# Patient Record
Sex: MALE | Race: OTHER | HISPANIC OR LATINO | Employment: OTHER | URBAN - METROPOLITAN AREA
[De-identification: names, ages, dates, MRNs, and addresses within clinical notes are randomized per-mention and may not be internally consistent; named-entity substitution may affect disease eponyms.]

---

## 2017-01-06 ENCOUNTER — ALLSCRIPTS OFFICE VISIT (OUTPATIENT)
Dept: OTHER | Facility: OTHER | Age: 52
End: 2017-01-06

## 2017-01-13 ENCOUNTER — ALLSCRIPTS OFFICE VISIT (OUTPATIENT)
Dept: OTHER | Facility: OTHER | Age: 52
End: 2017-01-13

## 2017-01-17 DIAGNOSIS — Z79.01 LONG TERM CURRENT USE OF ANTICOAGULANT: ICD-10-CM

## 2017-01-17 DIAGNOSIS — I25.10 ATHEROSCLEROTIC HEART DISEASE OF NATIVE CORONARY ARTERY WITHOUT ANGINA PECTORIS: ICD-10-CM

## 2017-01-17 DIAGNOSIS — I49.01 VENTRICULAR FIBRILLATION (HCC): ICD-10-CM

## 2017-01-17 DIAGNOSIS — I25.2 OLD MYOCARDIAL INFARCTION: ICD-10-CM

## 2017-01-17 DIAGNOSIS — Z95.2 PRESENCE OF PROSTHETIC HEART VALVE: ICD-10-CM

## 2017-01-19 ENCOUNTER — APPOINTMENT (OUTPATIENT)
Dept: LAB | Facility: CLINIC | Age: 52
End: 2017-01-19
Payer: COMMERCIAL

## 2017-01-19 ENCOUNTER — ALLSCRIPTS OFFICE VISIT (OUTPATIENT)
Dept: OTHER | Facility: OTHER | Age: 52
End: 2017-01-19

## 2017-01-19 DIAGNOSIS — I49.01 VENTRICULAR FIBRILLATION (HCC): ICD-10-CM

## 2017-01-19 DIAGNOSIS — Z79.01 LONG TERM CURRENT USE OF ANTICOAGULANT: ICD-10-CM

## 2017-01-19 LAB
ALBUMIN SERPL BCP-MCNC: 3.4 G/DL (ref 3.5–5)
ALP SERPL-CCNC: 102 U/L (ref 46–116)
ALT SERPL W P-5'-P-CCNC: 28 U/L (ref 12–78)
ANION GAP SERPL CALCULATED.3IONS-SCNC: 7 MMOL/L (ref 4–13)
AST SERPL W P-5'-P-CCNC: 22 U/L (ref 5–45)
BILIRUB SERPL-MCNC: 0.36 MG/DL (ref 0.2–1)
BUN SERPL-MCNC: 10 MG/DL (ref 5–25)
CALCIUM SERPL-MCNC: 8.7 MG/DL (ref 8.3–10.1)
CHLORIDE SERPL-SCNC: 104 MMOL/L (ref 100–108)
CO2 SERPL-SCNC: 29 MMOL/L (ref 21–32)
CREAT SERPL-MCNC: 0.93 MG/DL (ref 0.6–1.3)
GFR SERPL CREATININE-BSD FRML MDRD: >60 ML/MIN/1.73SQ M
GLUCOSE SERPL-MCNC: 87 MG/DL (ref 65–140)
INR PPP: 1.6 (ref 0.86–1.16)
MAGNESIUM SERPL-MCNC: 2 MG/DL (ref 1.6–2.6)
POTASSIUM SERPL-SCNC: 4 MMOL/L (ref 3.5–5.3)
PROT SERPL-MCNC: 7.3 G/DL (ref 6.4–8.2)
PROTHROMBIN TIME: 19 SECONDS (ref 12–14.3)
SODIUM SERPL-SCNC: 140 MMOL/L (ref 136–145)

## 2017-01-19 PROCEDURE — 85610 PROTHROMBIN TIME: CPT

## 2017-01-19 PROCEDURE — 36415 COLL VENOUS BLD VENIPUNCTURE: CPT

## 2017-01-19 PROCEDURE — 83735 ASSAY OF MAGNESIUM: CPT

## 2017-01-19 PROCEDURE — 80053 COMPREHEN METABOLIC PANEL: CPT

## 2017-01-20 ENCOUNTER — GENERIC CONVERSION - ENCOUNTER (OUTPATIENT)
Dept: OTHER | Facility: OTHER | Age: 52
End: 2017-01-20

## 2017-01-26 ENCOUNTER — APPOINTMENT (OUTPATIENT)
Dept: LAB | Facility: CLINIC | Age: 52
End: 2017-01-26
Payer: COMMERCIAL

## 2017-01-26 ENCOUNTER — TRANSCRIBE ORDERS (OUTPATIENT)
Dept: LAB | Facility: CLINIC | Age: 52
End: 2017-01-26

## 2017-01-26 ENCOUNTER — TRANSCRIBE ORDERS (OUTPATIENT)
Dept: ADMINISTRATIVE | Facility: HOSPITAL | Age: 52
End: 2017-01-26

## 2017-01-26 DIAGNOSIS — I49.01 VENTRICULAR FIBRILLATION (HCC): ICD-10-CM

## 2017-01-26 DIAGNOSIS — Z79.01 LONG TERM CURRENT USE OF ANTICOAGULANT: ICD-10-CM

## 2017-01-26 DIAGNOSIS — I25.2 OLD MYOCARDIAL INFARCTION: ICD-10-CM

## 2017-01-26 DIAGNOSIS — I25.10 ATHEROSCLEROSIS OF NATIVE CORONARY ARTERY OF NATIVE HEART, ANGINA PRESENCE UNSPECIFIED: ICD-10-CM

## 2017-01-26 DIAGNOSIS — I49.01 VENTRICULAR FIBRILLATION (HCC): Primary | ICD-10-CM

## 2017-01-26 LAB
INR PPP: 2.24 (ref 0.86–1.16)
PROTHROMBIN TIME: 24.5 SECONDS (ref 12–14.3)

## 2017-01-26 PROCEDURE — 36415 COLL VENOUS BLD VENIPUNCTURE: CPT

## 2017-01-26 PROCEDURE — 85610 PROTHROMBIN TIME: CPT

## 2017-01-30 ENCOUNTER — GENERIC CONVERSION - ENCOUNTER (OUTPATIENT)
Dept: OTHER | Facility: OTHER | Age: 52
End: 2017-01-30

## 2017-02-14 ENCOUNTER — ALLSCRIPTS OFFICE VISIT (OUTPATIENT)
Dept: OTHER | Facility: OTHER | Age: 52
End: 2017-02-14

## 2017-02-21 ENCOUNTER — APPOINTMENT (OUTPATIENT)
Dept: LAB | Facility: CLINIC | Age: 52
End: 2017-02-21
Payer: COMMERCIAL

## 2017-02-21 DIAGNOSIS — Z95.2 PRESENCE OF PROSTHETIC HEART VALVE: ICD-10-CM

## 2017-02-21 DIAGNOSIS — Z79.01 LONG TERM CURRENT USE OF ANTICOAGULANT: ICD-10-CM

## 2017-02-21 DIAGNOSIS — I49.01 VENTRICULAR FIBRILLATION (HCC): ICD-10-CM

## 2017-02-21 LAB
INR PPP: 3.32 (ref 0.86–1.16)
PROTHROMBIN TIME: 33 SECONDS (ref 12–14.3)

## 2017-02-21 PROCEDURE — 36415 COLL VENOUS BLD VENIPUNCTURE: CPT

## 2017-02-21 PROCEDURE — 85610 PROTHROMBIN TIME: CPT

## 2017-02-23 ENCOUNTER — ALLSCRIPTS OFFICE VISIT (OUTPATIENT)
Dept: OTHER | Facility: OTHER | Age: 52
End: 2017-02-23

## 2017-02-23 DIAGNOSIS — I49.01 VENTRICULAR FIBRILLATION (HCC): ICD-10-CM

## 2017-02-23 DIAGNOSIS — Z79.01 LONG TERM CURRENT USE OF ANTICOAGULANT: ICD-10-CM

## 2017-02-23 DIAGNOSIS — I42.9 CARDIOMYOPATHY (HCC): ICD-10-CM

## 2017-02-28 ENCOUNTER — APPOINTMENT (OUTPATIENT)
Dept: LAB | Facility: CLINIC | Age: 52
End: 2017-02-28
Payer: COMMERCIAL

## 2017-02-28 ENCOUNTER — TRANSCRIBE ORDERS (OUTPATIENT)
Dept: LAB | Facility: CLINIC | Age: 52
End: 2017-02-28

## 2017-02-28 DIAGNOSIS — I49.01 VENTRICULAR FIBRILLATION (HCC): ICD-10-CM

## 2017-02-28 DIAGNOSIS — Z79.01 LONG TERM CURRENT USE OF ANTICOAGULANT: ICD-10-CM

## 2017-02-28 DIAGNOSIS — I42.9 CARDIOMYOPATHY (HCC): ICD-10-CM

## 2017-02-28 LAB
ALBUMIN SERPL BCP-MCNC: 3.7 G/DL (ref 3.5–5)
ALP SERPL-CCNC: 88 U/L (ref 46–116)
ALT SERPL W P-5'-P-CCNC: 37 U/L (ref 12–78)
ANION GAP SERPL CALCULATED.3IONS-SCNC: 4 MMOL/L (ref 4–13)
AST SERPL W P-5'-P-CCNC: 33 U/L (ref 5–45)
BILIRUB SERPL-MCNC: 0.59 MG/DL (ref 0.2–1)
BUN SERPL-MCNC: 16 MG/DL (ref 5–25)
CALCIUM SERPL-MCNC: 9.1 MG/DL (ref 8.3–10.1)
CHLORIDE SERPL-SCNC: 102 MMOL/L (ref 100–108)
CO2 SERPL-SCNC: 32 MMOL/L (ref 21–32)
CREAT SERPL-MCNC: 1.3 MG/DL (ref 0.6–1.3)
GFR SERPL CREATININE-BSD FRML MDRD: 58 ML/MIN/1.73SQ M
GLUCOSE SERPL-MCNC: 101 MG/DL (ref 65–140)
INR PPP: 4.66 (ref 0.86–1.16)
POTASSIUM SERPL-SCNC: 4.2 MMOL/L (ref 3.5–5.3)
PROT SERPL-MCNC: 7.8 G/DL (ref 6.4–8.2)
PROTHROMBIN TIME: 42.7 SECONDS (ref 12–14.3)
SODIUM SERPL-SCNC: 138 MMOL/L (ref 136–145)
TSH SERPL DL<=0.05 MIU/L-ACNC: 1.22 UIU/ML (ref 0.36–3.74)

## 2017-02-28 PROCEDURE — 85610 PROTHROMBIN TIME: CPT

## 2017-02-28 PROCEDURE — 84443 ASSAY THYROID STIM HORMONE: CPT

## 2017-02-28 PROCEDURE — 36415 COLL VENOUS BLD VENIPUNCTURE: CPT

## 2017-02-28 PROCEDURE — 80053 COMPREHEN METABOLIC PANEL: CPT

## 2017-03-01 ENCOUNTER — TRANSCRIBE ORDERS (OUTPATIENT)
Dept: ADMINISTRATIVE | Facility: HOSPITAL | Age: 52
End: 2017-03-01

## 2017-03-01 DIAGNOSIS — I42.9 CARDIOMYOPATHY (HCC): Primary | ICD-10-CM

## 2017-03-01 DIAGNOSIS — I49.01 VENTRICULAR FIBRILLATION (HCC): ICD-10-CM

## 2017-03-02 ENCOUNTER — GENERIC CONVERSION - ENCOUNTER (OUTPATIENT)
Dept: OTHER | Facility: OTHER | Age: 52
End: 2017-03-02

## 2017-03-07 ENCOUNTER — HOSPITAL ENCOUNTER (OUTPATIENT)
Dept: PULMONOLOGY | Facility: HOSPITAL | Age: 52
Discharge: HOME | End: 2017-03-07
Payer: COMMERCIAL

## 2017-03-07 DIAGNOSIS — I49.01 VENTRICULAR FIBRILLATION (HCC): ICD-10-CM

## 2017-03-07 DIAGNOSIS — I42.9 CARDIOMYOPATHY (HCC): ICD-10-CM

## 2017-03-07 PROCEDURE — 94760 N-INVAS EAR/PLS OXIMETRY 1: CPT

## 2017-03-07 PROCEDURE — 94726 PLETHYSMOGRAPHY LUNG VOLUMES: CPT

## 2017-03-07 PROCEDURE — 94060 EVALUATION OF WHEEZING: CPT

## 2017-03-07 PROCEDURE — 94729 DIFFUSING CAPACITY: CPT

## 2017-03-07 RX ORDER — ALBUTEROL SULFATE 2.5 MG/3ML
2.5 SOLUTION RESPIRATORY (INHALATION) ONCE AS NEEDED
Status: DISCONTINUED | OUTPATIENT
Start: 2017-03-07 | End: 2017-03-11 | Stop reason: HOSPADM

## 2017-03-11 ENCOUNTER — GENERIC CONVERSION - ENCOUNTER (OUTPATIENT)
Dept: OTHER | Facility: OTHER | Age: 52
End: 2017-03-11

## 2017-03-13 ENCOUNTER — HOSPITAL ENCOUNTER (OUTPATIENT)
Dept: NUCLEAR MEDICINE | Facility: HOSPITAL | Age: 52
Discharge: HOME | End: 2017-03-13
Payer: COMMERCIAL

## 2017-03-13 ENCOUNTER — HOSPITAL ENCOUNTER (OUTPATIENT)
Dept: NON INVASIVE DIAGNOSTICS | Facility: HOSPITAL | Age: 52
Discharge: HOME | End: 2017-03-13
Payer: COMMERCIAL

## 2017-03-13 DIAGNOSIS — I25.2 OLD MYOCARDIAL INFARCTION: ICD-10-CM

## 2017-03-13 DIAGNOSIS — I25.10 ATHEROSCLEROSIS OF NATIVE CORONARY ARTERY OF NATIVE HEART, ANGINA PRESENCE UNSPECIFIED: ICD-10-CM

## 2017-03-13 DIAGNOSIS — I49.01 VENTRICULAR FIBRILLATION (HCC): ICD-10-CM

## 2017-03-13 PROCEDURE — A9502 TC99M TETROFOSMIN: HCPCS

## 2017-03-13 PROCEDURE — 93017 CV STRESS TEST TRACING ONLY: CPT

## 2017-03-13 PROCEDURE — 78452 HT MUSCLE IMAGE SPECT MULT: CPT

## 2017-03-13 RX ADMIN — REGADENOSON 0.4 MG: 0.08 INJECTION, SOLUTION INTRAVENOUS at 10:15

## 2017-03-16 ENCOUNTER — GENERIC CONVERSION - ENCOUNTER (OUTPATIENT)
Dept: OTHER | Facility: OTHER | Age: 52
End: 2017-03-16

## 2017-03-21 ENCOUNTER — ALLSCRIPTS OFFICE VISIT (OUTPATIENT)
Dept: OTHER | Facility: OTHER | Age: 52
End: 2017-03-21

## 2017-04-24 ENCOUNTER — ALLSCRIPTS OFFICE VISIT (OUTPATIENT)
Dept: OTHER | Facility: OTHER | Age: 52
End: 2017-04-24

## 2017-05-23 ENCOUNTER — ALLSCRIPTS OFFICE VISIT (OUTPATIENT)
Dept: OTHER | Facility: OTHER | Age: 52
End: 2017-05-23

## 2017-05-25 ENCOUNTER — ALLSCRIPTS OFFICE VISIT (OUTPATIENT)
Dept: OTHER | Facility: OTHER | Age: 52
End: 2017-05-25

## 2017-05-25 ENCOUNTER — APPOINTMENT (OUTPATIENT)
Dept: LAB | Facility: CLINIC | Age: 52
End: 2017-05-25
Payer: MEDICARE

## 2017-05-25 DIAGNOSIS — Z79.01 LONG TERM CURRENT USE OF ANTICOAGULANT: ICD-10-CM

## 2017-05-25 DIAGNOSIS — Z95.2 PRESENCE OF PROSTHETIC HEART VALVE: ICD-10-CM

## 2017-05-25 DIAGNOSIS — I42.9 CARDIOMYOPATHY (HCC): ICD-10-CM

## 2017-05-25 DIAGNOSIS — I49.01 VENTRICULAR FIBRILLATION (HCC): ICD-10-CM

## 2017-05-25 LAB
ANION GAP SERPL CALCULATED.3IONS-SCNC: 7 MMOL/L (ref 4–13)
BUN SERPL-MCNC: 13 MG/DL (ref 5–25)
CALCIUM SERPL-MCNC: 9.2 MG/DL (ref 8.3–10.1)
CHLORIDE SERPL-SCNC: 102 MMOL/L (ref 100–108)
CO2 SERPL-SCNC: 30 MMOL/L (ref 21–32)
CREAT SERPL-MCNC: 1.2 MG/DL (ref 0.6–1.3)
GFR SERPL CREATININE-BSD FRML MDRD: >60 ML/MIN/1.73SQ M
GLUCOSE P FAST SERPL-MCNC: 73 MG/DL (ref 65–99)
INR PPP: 1.3 (ref 0.86–1.16)
POTASSIUM SERPL-SCNC: 4.4 MMOL/L (ref 3.5–5.3)
PROTHROMBIN TIME: 16.3 SECONDS (ref 12.1–14.4)
SODIUM SERPL-SCNC: 139 MMOL/L (ref 136–145)

## 2017-05-25 PROCEDURE — 36415 COLL VENOUS BLD VENIPUNCTURE: CPT

## 2017-05-25 PROCEDURE — 80048 BASIC METABOLIC PNL TOTAL CA: CPT

## 2017-05-25 PROCEDURE — 85610 PROTHROMBIN TIME: CPT

## 2017-05-26 ENCOUNTER — GENERIC CONVERSION - ENCOUNTER (OUTPATIENT)
Dept: OTHER | Facility: OTHER | Age: 52
End: 2017-05-26

## 2017-06-07 ENCOUNTER — HOSPITAL ENCOUNTER (EMERGENCY)
Facility: HOSPITAL | Age: 52
Discharge: HOME | End: 2017-06-07
Payer: MEDICARE

## 2017-06-07 ENCOUNTER — APPOINTMENT (EMERGENCY)
Dept: RADIOLOGY | Facility: HOSPITAL | Age: 52
End: 2017-06-07
Payer: MEDICARE

## 2017-06-07 VITALS
WEIGHT: 155 LBS | BODY MASS INDEX: 25.02 KG/M2 | TEMPERATURE: 97.8 F | RESPIRATION RATE: 16 BRPM | SYSTOLIC BLOOD PRESSURE: 139 MMHG | DIASTOLIC BLOOD PRESSURE: 60 MMHG | OXYGEN SATURATION: 99 % | HEART RATE: 60 BPM

## 2017-06-07 DIAGNOSIS — J06.9 URI (UPPER RESPIRATORY INFECTION): Primary | ICD-10-CM

## 2017-06-07 PROCEDURE — 71020 HB CHEST X-RAY 2VW FRONTAL&LATL: CPT

## 2017-06-07 PROCEDURE — 99283 EMERGENCY DEPT VISIT LOW MDM: CPT

## 2017-06-07 PROCEDURE — 94640 AIRWAY INHALATION TREATMENT: CPT

## 2017-06-07 RX ORDER — SODIUM CHLORIDE FOR INHALATION 0.9 %
3 VIAL, NEBULIZER (ML) INHALATION ONCE
Status: COMPLETED | OUTPATIENT
Start: 2017-06-07 | End: 2017-06-07

## 2017-06-07 RX ORDER — AMIODARONE HYDROCHLORIDE 400 MG/1
200 TABLET ORAL DAILY
COMMUNITY
End: 2018-05-23 | Stop reason: SDUPTHER

## 2017-06-07 RX ORDER — ALBUTEROL SULFATE 90 UG/1
2 AEROSOL, METERED RESPIRATORY (INHALATION) EVERY 6 HOURS PRN
Qty: 1 INHALER | Refills: 0 | Status: ON HOLD | OUTPATIENT
Start: 2017-06-07 | End: 2018-01-15 | Stop reason: SDUPTHER

## 2017-06-07 RX ORDER — AZITHROMYCIN 250 MG/1
TABLET, FILM COATED ORAL
Qty: 6 TABLET | Refills: 0 | Status: SHIPPED | OUTPATIENT
Start: 2017-06-07 | End: 2017-06-07

## 2017-06-07 RX ORDER — EPLERENONE 25 MG/1
25 TABLET, FILM COATED ORAL DAILY
COMMUNITY
End: 2018-09-26 | Stop reason: SDUPTHER

## 2017-06-07 RX ORDER — ALBUTEROL SULFATE 2.5 MG/3ML
2.5 SOLUTION RESPIRATORY (INHALATION) ONCE
Status: COMPLETED | OUTPATIENT
Start: 2017-06-07 | End: 2017-06-07

## 2017-06-07 RX ADMIN — ALBUTEROL SULFATE 2.5 MG: 2.5 SOLUTION RESPIRATORY (INHALATION) at 13:07

## 2017-06-07 RX ADMIN — ISODIUM CHLORIDE 3 ML: 0.03 SOLUTION RESPIRATORY (INHALATION) at 13:07

## 2017-07-03 ENCOUNTER — ALLSCRIPTS OFFICE VISIT (OUTPATIENT)
Dept: OTHER | Facility: OTHER | Age: 52
End: 2017-07-03

## 2017-08-07 ENCOUNTER — ALLSCRIPTS OFFICE VISIT (OUTPATIENT)
Dept: OTHER | Facility: OTHER | Age: 52
End: 2017-08-07

## 2017-08-08 ENCOUNTER — HOSPITAL ENCOUNTER (EMERGENCY)
Facility: HOSPITAL | Age: 52
Discharge: HOME | End: 2017-08-08
Attending: EMERGENCY MEDICINE
Payer: MEDICARE

## 2017-08-08 VITALS
TEMPERATURE: 97.9 F | SYSTOLIC BLOOD PRESSURE: 130 MMHG | BODY MASS INDEX: 24.02 KG/M2 | DIASTOLIC BLOOD PRESSURE: 68 MMHG | OXYGEN SATURATION: 100 % | RESPIRATION RATE: 18 BRPM | HEART RATE: 60 BPM | WEIGHT: 148.8 LBS

## 2017-08-08 DIAGNOSIS — L23.7 POISON IVY DERMATITIS: Primary | ICD-10-CM

## 2017-08-08 PROCEDURE — 99282 EMERGENCY DEPT VISIT SF MDM: CPT

## 2017-08-08 RX ORDER — DIPHENHYDRAMINE HCL 25 MG
25 CAPSULE ORAL EVERY 6 HOURS PRN
Qty: 20 CAPSULE | Refills: 0 | Status: SHIPPED | OUTPATIENT
Start: 2017-08-08 | End: 2017-12-05

## 2017-08-08 RX ORDER — PREDNISONE 10 MG/1
TABLET ORAL
Qty: 48 TABLET | Refills: 0 | Status: SHIPPED | OUTPATIENT
Start: 2017-08-08 | End: 2017-12-05

## 2017-09-07 ENCOUNTER — ALLSCRIPTS OFFICE VISIT (OUTPATIENT)
Dept: OTHER | Facility: OTHER | Age: 52
End: 2017-09-07

## 2017-10-05 ENCOUNTER — ALLSCRIPTS OFFICE VISIT (OUTPATIENT)
Dept: OTHER | Facility: OTHER | Age: 52
End: 2017-10-05

## 2017-11-07 ENCOUNTER — ALLSCRIPTS OFFICE VISIT (OUTPATIENT)
Dept: OTHER | Facility: OTHER | Age: 52
End: 2017-11-07

## 2017-11-17 ENCOUNTER — GENERIC CONVERSION - ENCOUNTER (OUTPATIENT)
Dept: OTHER | Facility: OTHER | Age: 52
End: 2017-11-17

## 2017-11-17 ENCOUNTER — APPOINTMENT (OUTPATIENT)
Dept: LAB | Facility: CLINIC | Age: 52
End: 2017-11-17
Payer: MEDICARE

## 2017-11-17 DIAGNOSIS — Z79.01 LONG TERM CURRENT USE OF ANTICOAGULANT: ICD-10-CM

## 2017-11-17 DIAGNOSIS — I10 ESSENTIAL (PRIMARY) HYPERTENSION: ICD-10-CM

## 2017-11-17 DIAGNOSIS — I42.9 CARDIOMYOPATHY (HCC): ICD-10-CM

## 2017-11-17 DIAGNOSIS — I49.01 VENTRICULAR FIBRILLATION (HCC): ICD-10-CM

## 2017-11-17 LAB
ANION GAP SERPL CALCULATED.3IONS-SCNC: 7 MMOL/L (ref 4–13)
BUN SERPL-MCNC: 6 MG/DL (ref 5–25)
CALCIUM SERPL-MCNC: 8.8 MG/DL (ref 8.3–10.1)
CHLORIDE SERPL-SCNC: 101 MMOL/L (ref 100–108)
CO2 SERPL-SCNC: 30 MMOL/L (ref 21–32)
CREAT SERPL-MCNC: 1.05 MG/DL (ref 0.6–1.3)
ERYTHROCYTE [DISTWIDTH] IN BLOOD BY AUTOMATED COUNT: 14 % (ref 11.6–15.1)
GFR SERPL CREATININE-BSD FRML MDRD: 81 ML/MIN/1.73SQ M
GLUCOSE P FAST SERPL-MCNC: 98 MG/DL (ref 65–99)
HCT VFR BLD AUTO: 44 % (ref 36.5–49.3)
HGB BLD-MCNC: 14.2 G/DL (ref 12–17)
INR PPP: 2.29 (ref 0.86–1.16)
MAGNESIUM SERPL-MCNC: 2.3 MG/DL (ref 1.6–2.6)
MCH RBC QN AUTO: 27.7 PG (ref 26.8–34.3)
MCHC RBC AUTO-ENTMCNC: 32.3 G/DL (ref 31.4–37.4)
MCV RBC AUTO: 86 FL (ref 82–98)
PLATELET # BLD AUTO: 192 THOUSANDS/UL (ref 149–390)
PMV BLD AUTO: 11.8 FL (ref 8.9–12.7)
POTASSIUM SERPL-SCNC: 4.2 MMOL/L (ref 3.5–5.3)
PROTHROMBIN TIME: 25.5 SECONDS (ref 12.1–14.4)
RBC # BLD AUTO: 5.12 MILLION/UL (ref 3.88–5.62)
SODIUM SERPL-SCNC: 138 MMOL/L (ref 136–145)
WBC # BLD AUTO: 10.17 THOUSAND/UL (ref 4.31–10.16)

## 2017-11-17 PROCEDURE — 36415 COLL VENOUS BLD VENIPUNCTURE: CPT

## 2017-11-17 PROCEDURE — 85610 PROTHROMBIN TIME: CPT

## 2017-11-17 PROCEDURE — 80048 BASIC METABOLIC PNL TOTAL CA: CPT

## 2017-11-17 PROCEDURE — 85027 COMPLETE CBC AUTOMATED: CPT

## 2017-11-17 PROCEDURE — 83735 ASSAY OF MAGNESIUM: CPT

## 2017-12-05 ENCOUNTER — APPOINTMENT (EMERGENCY)
Dept: RADIOLOGY | Facility: HOSPITAL | Age: 52
End: 2017-12-05
Payer: MEDICARE

## 2017-12-05 ENCOUNTER — HOSPITAL ENCOUNTER (EMERGENCY)
Facility: HOSPITAL | Age: 52
Discharge: HOME | End: 2017-12-05
Attending: EMERGENCY MEDICINE
Payer: MEDICARE

## 2017-12-05 VITALS
DIASTOLIC BLOOD PRESSURE: 57 MMHG | WEIGHT: 154 LBS | OXYGEN SATURATION: 97 % | BODY MASS INDEX: 24.86 KG/M2 | RESPIRATION RATE: 16 BRPM | TEMPERATURE: 98.9 F | HEART RATE: 60 BPM | SYSTOLIC BLOOD PRESSURE: 123 MMHG

## 2017-12-05 DIAGNOSIS — J20.9 ACUTE BRONCHITIS: Primary | ICD-10-CM

## 2017-12-05 LAB
ANION GAP SERPL CALCULATED.3IONS-SCNC: 7 MMOL/L (ref 4–13)
APTT PPP: 101 SECONDS (ref 23–35)
BASOPHILS # BLD AUTO: 0.03 THOUSANDS/ΜL (ref 0–0.1)
BASOPHILS NFR BLD AUTO: 0 % (ref 0–1)
BUN SERPL-MCNC: 12 MG/DL (ref 5–25)
CALCIUM SERPL-MCNC: 8.9 MG/DL (ref 8.3–10.1)
CHLORIDE SERPL-SCNC: 103 MMOL/L (ref 100–108)
CO2 SERPL-SCNC: 30 MMOL/L (ref 21–32)
CREAT SERPL-MCNC: 1.09 MG/DL (ref 0.6–1.3)
EOSINOPHIL # BLD AUTO: 0.06 THOUSAND/ΜL (ref 0–0.61)
EOSINOPHIL NFR BLD AUTO: 1 % (ref 0–6)
ERYTHROCYTE [DISTWIDTH] IN BLOOD BY AUTOMATED COUNT: 14.7 % (ref 11.6–15.1)
GFR SERPL CREATININE-BSD FRML MDRD: 77 ML/MIN/1.73SQ M
GLUCOSE SERPL-MCNC: 132 MG/DL (ref 65–140)
HCT VFR BLD AUTO: 40.3 % (ref 36.5–49.3)
HGB BLD-MCNC: 13.1 G/DL (ref 12–17)
INR PPP: 5.39 (ref 0.86–1.16)
LYMPHOCYTES # BLD AUTO: 1.75 THOUSANDS/ΜL (ref 0.6–4.47)
LYMPHOCYTES NFR BLD AUTO: 15 % (ref 14–44)
MCH RBC QN AUTO: 28.3 PG (ref 26.8–34.3)
MCHC RBC AUTO-ENTMCNC: 32.5 G/DL (ref 31.4–37.4)
MCV RBC AUTO: 87 FL (ref 82–98)
MONOCYTES # BLD AUTO: 1.15 THOUSAND/ΜL (ref 0.17–1.22)
MONOCYTES NFR BLD AUTO: 10 % (ref 4–12)
NEUTROPHILS # BLD AUTO: 8.68 THOUSANDS/ΜL (ref 1.85–7.62)
NEUTS SEG NFR BLD AUTO: 74 % (ref 43–75)
NRBC BLD AUTO-RTO: 0 /100 WBCS
NT-PROBNP SERPL-MCNC: 1952 PG/ML
PLATELET # BLD AUTO: 182 THOUSANDS/UL (ref 149–390)
PMV BLD AUTO: 11.3 FL (ref 8.9–12.7)
POTASSIUM SERPL-SCNC: 3.8 MMOL/L (ref 3.5–5.3)
PROTHROMBIN TIME: 50.3 SECONDS (ref 12.1–14.4)
RBC # BLD AUTO: 4.63 MILLION/UL (ref 3.88–5.62)
SODIUM SERPL-SCNC: 140 MMOL/L (ref 136–145)
SPECIMEN SOURCE: NORMAL
TROPONIN I BLD-MCNC: 0.01 NG/ML (ref 0–0.08)
WBC # BLD AUTO: 11.67 THOUSAND/UL (ref 4.31–10.16)

## 2017-12-05 PROCEDURE — 80048 BASIC METABOLIC PNL TOTAL CA: CPT | Performed by: PHYSICIAN ASSISTANT

## 2017-12-05 PROCEDURE — 96374 THER/PROPH/DIAG INJ IV PUSH: CPT

## 2017-12-05 PROCEDURE — 85025 COMPLETE CBC W/AUTO DIFF WBC: CPT | Performed by: PHYSICIAN ASSISTANT

## 2017-12-05 PROCEDURE — 93005 ELECTROCARDIOGRAM TRACING: CPT | Performed by: PHYSICIAN ASSISTANT

## 2017-12-05 PROCEDURE — 85730 THROMBOPLASTIN TIME PARTIAL: CPT | Performed by: PHYSICIAN ASSISTANT

## 2017-12-05 PROCEDURE — 94640 AIRWAY INHALATION TREATMENT: CPT

## 2017-12-05 PROCEDURE — 71020 HB CHEST X-RAY 2VW FRONTAL&LATL: CPT

## 2017-12-05 PROCEDURE — 84484 ASSAY OF TROPONIN QUANT: CPT

## 2017-12-05 PROCEDURE — 83880 ASSAY OF NATRIURETIC PEPTIDE: CPT | Performed by: PHYSICIAN ASSISTANT

## 2017-12-05 PROCEDURE — 99284 EMERGENCY DEPT VISIT MOD MDM: CPT

## 2017-12-05 PROCEDURE — 85610 PROTHROMBIN TIME: CPT | Performed by: PHYSICIAN ASSISTANT

## 2017-12-05 PROCEDURE — 36415 COLL VENOUS BLD VENIPUNCTURE: CPT | Performed by: PHYSICIAN ASSISTANT

## 2017-12-05 RX ORDER — GUAIFENESIN/DEXTROMETHORPHAN 100-10MG/5
5 SYRUP ORAL 3 TIMES DAILY PRN
Qty: 118 ML | Refills: 0 | Status: SHIPPED | OUTPATIENT
Start: 2017-12-05 | End: 2017-12-15

## 2017-12-05 RX ORDER — ONDANSETRON 2 MG/ML
4 INJECTION INTRAMUSCULAR; INTRAVENOUS ONCE
Status: COMPLETED | OUTPATIENT
Start: 2017-12-05 | End: 2017-12-05

## 2017-12-05 RX ORDER — ALBUTEROL SULFATE 2.5 MG/3ML
2.5 SOLUTION RESPIRATORY (INHALATION) ONCE
Status: COMPLETED | OUTPATIENT
Start: 2017-12-05 | End: 2017-12-05

## 2017-12-05 RX ORDER — ALBUTEROL SULFATE 90 UG/1
2 AEROSOL, METERED RESPIRATORY (INHALATION) EVERY 4 HOURS PRN
Qty: 1 INHALER | Refills: 0 | Status: SHIPPED | OUTPATIENT
Start: 2017-12-05 | End: 2018-12-05

## 2017-12-05 RX ORDER — AZITHROMYCIN 250 MG/1
250 TABLET, FILM COATED ORAL DAILY
Qty: 6 TABLET | Refills: 0 | Status: SHIPPED | OUTPATIENT
Start: 2017-12-05 | End: 2017-12-10

## 2017-12-05 RX ADMIN — IPRATROPIUM BROMIDE 0.5 MG: 0.5 SOLUTION RESPIRATORY (INHALATION) at 09:32

## 2017-12-05 RX ADMIN — SODIUM CHLORIDE 250 ML: 0.9 INJECTION, SOLUTION INTRAVENOUS at 09:30

## 2017-12-05 RX ADMIN — ALBUTEROL SULFATE 2.5 MG: 2.5 SOLUTION RESPIRATORY (INHALATION) at 09:32

## 2017-12-05 RX ADMIN — ONDANSETRON 4 MG: 2 INJECTION INTRAMUSCULAR; INTRAVENOUS at 09:32

## 2017-12-05 NOTE — ED PROVIDER NOTES
History  Chief Complaint   Patient presents with    Flu Symptoms     Reports to have n/v/d, subjective fever, and coughing intermittently for the past three weeks  This is a 76-year-old male patient who states he has had 3 weeks of intermittent vomiting and diarrhea that improved last night  Over this time he has developed a cough no shortness of breath last night he states he developed fever  No headache no body aches  No blurred vision no congestion no sore throat  He states this feels like he had bronchitis in the past   He has not noted any leg edema  Patient does have a history of congestive heart failure  He denies chest pain or shortness of breath  He currently denies nausea vomiting or diarrhea no blood in his stool no urgency frequency or dysuria  He has tried over-the-counter medication for both his cough and his GI symptoms  Once again a GI symptoms are improving  Patient does have some rhonchi noted in his right upper lung with the other long and portions of the lung clear  Differential diagnosis includes not limited to bronchitis, pneumonia, ACS, early failure  Unlikely        Flu Symptoms       Prior to Admission Medications   Prescriptions Last Dose Informant Patient Reported? Taking? Atorvastatin Calcium (LIPITOR PO)   Yes Yes   Sig: Take 80 mg by mouth daily  Metoprolol Succinate (TOPROL XL PO)   Yes Yes   Sig: Take 50 mg by mouth daily  Potassium Chloride (KLOR-CON 10 PO)   Yes Yes   Sig: Take 20 mEq by mouth daily  albuterol (PROVENTIL HFA,VENTOLIN HFA) 90 mcg/act inhaler   No Yes   Sig: Inhale 2 puffs every 6 (six) hours as needed for wheezing or shortness of breath   amiodarone (PACERONE) 400 MG tablet   Yes Yes   Sig: Take 400 mg by mouth 2 (two) times a day   aspirin 81 MG tablet   Yes Yes   Sig: Take 81 mg by mouth daily     eplerenone (INSPRA) 25 mg tablet   Yes Yes   Sig: Take 25 mg by mouth daily   furosemide (LASIX) 40 mg tablet   Yes Yes   Sig: Take 40 mg by mouth 2 (two) times a day  lisinopril (ZESTRIL) 40 mg tablet   Yes Yes   Sig: Take 40 mg by mouth every evening  nitroglycerin (NITROSTAT) 0 4 mg SL tablet   Yes Yes   Sig: Place 0 4 mg under the tongue every 5 (five) minutes as needed for chest pain  warfarin (COUMADIN) 5 mg tablet   Yes Yes   Sig: Take 2 mg by mouth daily        Facility-Administered Medications: None       Past Medical History:   Diagnosis Date    AICD (automatic cardioverter/defibrillator) present     medtronic     Cardiomyopathy ([address] )     mixed  predominently nonischemic    Colonic mass     Coronary artery disease     Hyperlipidemia     Hypertension     Myocardial infarction     2014 and 2015    Rectal bleeding     S/P AVR (aortic valve replacement)     mechanical    Wears glasses        Past Surgical History:   Procedure Laterality Date    AORTIC VALVE REPLACEMENT      APPENDECTOMY      CARDIAC DEFIBRILLATOR PLACEMENT      CORONARY STENT PLACEMENT      MITRAL VALVE REPAIR      MOUTH SURGERY      PA LAP,SURG,COLECTOMY, PARTIAL, W/ANAST N/A 6/2/2016    Procedure: RESECTION COLON RIGHT LAPAROSCOPIC HAND-ASSISTED;  Surgeon: Bree Mei MD;  Location: [facility] OR;  Service: General       History reviewed  No pertinent family history  I have reviewed and agree with the history as documented  Social History   Substance Use Topics    Smoking status: Never Smoker    Smokeless tobacco: Never Used    Alcohol use No        Review of Systems   All other systems reviewed and are negative        Physical Exam  ED Triage Vitals   Temperature Pulse Respirations Blood Pressure SpO2   12/05/17 0900 12/05/17 0900 12/05/17 0900 12/05/17 0900 12/05/17 0900   98 9 °F (37 2 °C) 60 18 133/61 100 %      Temp Source Heart Rate Source Patient Position - Orthostatic VS BP Location FiO2 (%)   12/05/17 0900 12/05/17 1058 12/05/17 1058 12/05/17 1058 --   Temporal Monitor Lying Left arm       Pain Score       12/05/17 0900       No Pain Status:  Final result Specimen:  Blood from Arm, Right Updated:  12/05/17 1006     Sodium 140 mmol/L      Potassium 3 8 mmol/L      Chloride 103 mmol/L      CO2 30 mmol/L      Anion Gap 7 mmol/L      BUN 12 mg/dL      Creatinine 1 09 mg/dL      Glucose 132 mg/dL      Calcium 8 9 mg/dL      eGFR 77 ml/min/1 73sq m     Narrative:         National Kidney Disease Education Program recommendations are as follows:  GFR calculation is accurate only with a steady state creatinine  Chronic Kidney disease less than 60 ml/min/1 73 sq  meters  Kidney failure less than 15 ml/min/1 73 sq  meters  B-type natriuretic peptide [[id number]]  (Abnormal) Collected:  12/05/17 0929    Lab Status:  Final result Specimen:  Blood from Arm, Right Updated:  12/05/17 1006     NT-proBNP 1,952 (H) pg/mL     POCT troponin [[id number]]  (Normal) Collected:  12/05/17 0930    Lab Status:  Final result Updated:  12/05/17 0944     POC Troponin I 0 01 ng/ml      Specimen Type VENOUS    Narrative:         [employer] i-Stat handheld analyzer 99% cutoff is > 0 08ng/mL in [facility] Emergency Departments    o cTnI 99% cutoff is useful only when applied to patients in the clinical setting of myocardial ischemia  o cTnI 99% cutoff should be interpreted in the context of clinical history, ECG findings and possibly cardiac imaging to establish correct diagnosis  o cTnI 99% cutoff may be suggestive but clearly not indicative of a coronary event without the clinical setting of myocardial ischemia      CBC and differential [[id number]]  (Abnormal) Collected:  12/05/17 0929    Lab Status:  Final result Specimen:  Blood from Arm, Right Updated:  12/05/17 0938     WBC 11 67 (H) Thousand/uL      RBC 4 63 Million/uL      Hemoglobin 13 1 g/dL      Hematocrit 40 3 %      MCV 87 fL      MCH 28 3 pg      MCHC 32 5 g/dL      RDW 14 7 %      MPV 11 3 fL      Platelets 485 Thousands/uL      nRBC 0 /100 WBCs      Neutrophils Relative 74 %      Lymphocytes Relative 15 %      Monocytes Relative 10 %      Eosinophils Relative 1 %      Basophils Relative 0 %      Neutrophils Absolute 8 68 (H) Thousands/µL      Lymphocytes Absolute 1 75 Thousands/µL      Monocytes Absolute 1 15 Thousand/µL      Eosinophils Absolute 0 06 Thousand/µL      Basophils Absolute 0 03 Thousands/µL                  XR chest 2 views   Final Result by Marquise Keita DO (12/05 1016)   Right middle lobe infiltrate, suspicious for early pneumonia  Workstation performed: [id number]                    Procedures  ECG 12 Lead Documentation  Date/Time: 12/5/2017 9:34 AM  Performed by: Nishant Bobo  Authorized by: Steven Milner     Comments:      Initial EKG interpreted by me:  59 beats per minute sinus bradycardia however this rhythm is paced similar to previous  Phone Contacts  ED Phone Contact    ED Course  ED Course as of Dec 05 1140   Tue Dec 05, 2017   1042 Patient presented withUpper respiratory type symptoms  He is not short of breath  His lungs are now clear after 1 albuterol treatment  He does not have any fluid in his legs  Nor does he sound wet  He does have an elevated pro BNP  spoke with Dr Karon Jauregui explained case any feels with a history of cardiomyopathy and his chronic low ejection fracture and the fact that he does not look acutely ill and is not short of breath or have chest pain I could treat him for his bronchitis and follow up with Cardiology            HEART Risk Score    Flowsheet Row Most Recent Value   History  0 Filed at: 12/05/2017 1044   ECG  1 Filed at: 12/05/2017 1044   Age  1 Filed at: 12/05/2017 1044   Risk Factors  2 Filed at: 12/05/2017 1044   Troponin  0 Filed at: 12/05/2017 1044   Heart Score Risk Calculator   History  0 Filed at: 12/05/2017 1044   ECG  1 Filed at: 12/05/2017 1044   Age  1 Filed at: 12/05/2017 1044   Risk Factors  2 Filed at: 12/05/2017 1044   Troponin  0 Filed at: 12/05/2017 1044   HEART Score  4 Filed at: 12/05/2017 1044   HEART Score  4 Filed at: 12/05/2017 1044                            MDM  Number of Diagnoses or Management Options  Acute bronchitis:   Diagnosis management comments: This is a patient presented with cough and some rhonchi in his right lung  No shortness of breath no edema no orthopnea  No chest pain no shortness of breath  The last few days also had some vomiting and diarrhea with that is resolved  He states this feels like his regular bronchitis that he gets yearly  He did have a cardiac workup which was negative it did show that his INR was 5 39 I told him to hold his Coumadin for 2 days and colors Cardiologist  today he states he understands  Patient was given albuterol and states he feels considerably better  He asked for an inhaler for home which has worked the past his x-ray was negative all labs reviewed he is stable for discharge I also spoke with the cardiologist regarding his elevated BNP it was felt with an ejection fracture chronically at 24 and a large heart  that this is an everyday occurrence  Also because he has no symptoms of congestive heart failure at this time  Patient be treated bronchitis and stable for discharge    CritCare Time    Disposition  Final diagnoses:   Acute bronchitis     Time reflects when diagnosis was documented in both MDM as applicable and the Disposition within this note     Time User Action Codes Description Comment    12/5/2017 10:45 AM [place], [address] [facility] Add [J20 9] Acute bronchitis       ED Disposition     ED Disposition Condition Comment    Discharge  [address] discharge to home/self care      Condition at discharge: Good        Follow-up Information     Follow up With Specialties Details Why Contact Info    Tonio Alcantar PA-C Family Medicine  also follow up with cardiology [address] [facility]  [phone]          Discharge Medication List as of 12/5/2017 10:47 AM      START taking these medications    Details   azithromycin (ZITHROMAX) 250 mg tablet Take 1 tablet by mouth daily for 5 days 2 po day 1 then the 1 po day 2-5, Starting Tue 12/5/2017, Until Sun 12/10/2017, Print      dextromethorphan-guaiFENesin (ROBITUSSIN DM)  mg/5 mL syrup Take 5 mL by mouth 3 (three) times a day as needed for cough for up to 10 days, Starting Tue 12/5/2017, Until Fri 12/15/2017, Print         CONTINUE these medications which have NOT CHANGED    Details   albuterol (PROVENTIL HFA,VENTOLIN HFA) 90 mcg/act inhaler Inhale 2 puffs every 6 (six) hours as needed for wheezing or shortness of breath, Starting 6/7/2017, Until Discontinued, Print      amiodarone (PACERONE) 400 MG tablet Take 400 mg by mouth 2 (two) times a day, Until Discontinued, Historical Med      aspirin 81 MG tablet Take 81 mg by mouth daily  , Until Discontinued, Historical Med      Atorvastatin Calcium (LIPITOR PO) Take 80 mg by mouth daily  , Until Discontinued, Historical Med      eplerenone (INSPRA) 25 mg tablet Take 25 mg by mouth daily, Until Discontinued, Historical Med      furosemide (LASIX) 40 mg tablet Take 40 mg by mouth 2 (two) times a day , Until Discontinued, Historical Med      lisinopril (ZESTRIL) 40 mg tablet Take 40 mg by mouth every evening   , Until Discontinued, Historical Med      Metoprolol Succinate (TOPROL XL PO) Take 50 mg by mouth daily  , Until Discontinued, Historical Med      nitroglycerin (NITROSTAT) 0 4 mg SL tablet Place 0 4 mg under the tongue every 5 (five) minutes as needed for chest pain   , Until Discontinued, Historical Med      Potassium Chloride (KLOR-CON 10 PO) Take 20 mEq by mouth daily  , Until Discontinued, Historical Med      warfarin (COUMADIN) 5 mg tablet Take 2 mg by mouth daily  , Historical Med           No discharge procedures on file      ED Provider  Electronically Signed by           Kari Davis PA-C  12/05/17 [facility]  12/05/17 4638

## 2017-12-05 NOTE — ED NOTES
Call received from lab with critical PTT of 101 and INR of 5 39 Brad WOODALL made aware        Frankie Hernández RN  12/05/17 1275

## 2017-12-05 NOTE — DISCHARGE INSTRUCTIONS
Bronquitis aguda   LO QUE NECESITA SABER:   La bronquitis aguda es la inflamación e irritación de nasima vías respiratorias  Esta irritación puede provocar que tosa o que tenga otros problemas de respiración  La bronquitis aguda suele comenzar debido a otra enfermedad, antonio un resfriado o la gripe  La enfermedad se propaga de ware nariz y garganta a ware tráquea y [place]et Oas  La bronquitis a menudo es conocida antonio resfriado de pecho  La bronquitis aguda generalmente dura alrededor de 3 a 6 semanas y no es bertin enfermedad grave  La tos podría durar por varias semanas  INSTRUCCIONES SOBRE EL EMILY HOSPITALARIA:   Regrese a la angelique de emergencias si:   · Usted expectora angeles  · Nasima labios o uñas de los dedos se ponen azules  · Usted siente que no está recibiendo suficiente aire cuando respira  Pregúntele a ware [place] vitaminas y minerales son adecuados para usted  · Usted tiene fiebre  · Nasima problemas respiratorios no desaparecen o empeoran  · Ware tos no mejora dentro de 4 semanas  · Usted tiene preguntas o inquietudes acerca de ware condición o cuidado  Cuidados personales:   · Descanse más  El descanso ayuda a ware cuerpo a sanar  Empiece a hacer un poco más día a día  Descanse cuando usted sienta que es necesario  · Evite irritantes en el aire  Evite productos químicos, gases y polvo  Use bertin mascarilla si tiene que trabajar alrededor de polvo o gases  Permanezca dentro cuando los niveles de contaminación billy altos  Si tiene alergias, permanezca adentro cuando el conteo de polen sea CROSSCANONBY  No use productos en aerosol, antonio desodorante, aerosol contra los insectos y aerosol para el shobha  · No fume o esté alrededor de personas que fuman  La nicotina y otros químicos en los cigarrillos y cigarros dañan la cilia que saca la mucosidad de nasima pulmones  Pida información a ware médico si usted actualmente fuma y necesita ayuda para dejar de fumar   Los cigarrillos electrónicos o tabaco sin humo todavía contienen nicotina  Consulte con ware médico antes de [employer]  · [address] se le haya indicado  Los líquidos ayudan a mantener humectadas christ vías respiratorias y ayudarle a eliminar las flemas por medio de la tos  Es posible que usted necesite ilana más líquidos si tiene bronquitis [place]  Pregunte cuánto líquido debe ilana cada día y cuáles líquidos son los más adecuados para usted  · Use un humidificador o vaporizador  Use un humidificador de harry frío o un vaporizador para elevar la humedad en ware casa  [place] podría ayudarle a respirar más fácilmente y al mismo tiempo disminuir la tos  Disminuya ware riesgo para la bronquitis aguda:   · Vaya a que le pongan las vacunas necesarias  Pregúntele a ware médico si usted debería ser vacunado contra la gripe o la neumonía  · Evite la propagación de gérmenes  Usted puede disminuir ware riesgo de bronquitis [place] y otras enfermedades de la siguiente manera:     ¨ [place] frecuentemente con agua y Bc  Lleve bertin loción o gel antiséptico para las unique  Usted puede usar la loción o el gel para limpiar christ unique cuando no haya agua disponible  ¨ No se toque los ojos, la nariz o la boca a menos que se haya lavado las unique soheila  ¨ Siempre cubra ware boca al toser para evitar la propagación de gérmenes  Lo mejor es toser en un pañuelo desechable o en la manga de ware camisa, en lugar de en ware mano  [place] a los que le rodean que cubran christ bocas al toser  ¨ Trate de evitar a las personas que están resfriadas o tienen gripe  Si usted está enfermo, manténgase alejado de otras personas lo más que pueda  Medicamentos:  Ware médico podría  darle cualquiera de lo siguiente:  · El ibuprofeno o el acetaminofeno  son medicamentos que pueden ayudar a bajar ware fiebre  Están disponibles sin receta médica  Consulte con ware médico cuál medicamento es el adecuado para usted  Pregunte la cantidad y la frecuencia con que debe tomarlos  [address]  Estos medicamentos pueden provocar sangrado estomacal si no se bro correctamente  El ibuprofeno puede provocar daño al [place]vil [place]  No tome ibuprofeno si usted tiene enfermedad de los riñones, [facility] o si es alérgico a la aspirina  El acetaminofeno puede dañar el hígado  No tome más de 4,000 miligramos en 24 horas  · Los descongestionantes  ayudan a despejar la mucosidad en christ pulmones y que sea más fácil expectorarla  [place] puede ayudarle a respirar mejor  · Los jarabes para la tos  disminuyen ware necesidad de toser  Si ware tos produce mucosidad, no tome un supresor de la tos a menos que ware médico se lo indique  Ware médico podría sugerirle que tome un supresor de la tos en la noche para que pueda descansar  · Inhaladores  podrían ser German Prakash  Ware médico podría darle sunil o más inhaladores para ayudarle a respirar más fácil y Clemon Naegeli menos tos  Un inhalador le administra medicamento para abrir christ vías aéreas  [place]dale a ware médico que le muestre cómo usar ware inhalador correctamente  · [place] christ medicamentos antonio se le haya indicado  Consulte con ware médico si usted nila que ware medicamento no le está ayudando o si presenta efectos secundarios  Infórmele si es alérgico a algún medicamento  Mantenga bertin lista actualizada de los [place], las vitaminas y los productos herbales que [place]  Incluya los siguientes datos de los medicamentos: cantidad, frecuencia y motivo de administración  Traiga con usted la lista o los envases de la píldoras a christ citas de seguimiento  Lleve la lista de los medicamentos con usted en [place] de bertin emergencia  Acuda a christ consultas de control con ware médico según le indicaron  Escriba las preguntas que tenga para que recuerde hacerlas chip christ citas de seguimiento  © 2017 [address] Information is for End User's use only and may not be sold, redistributed or otherwise used for commercial purposes   All illustrations and images included in Renetta are the copyrighted property of A D A M , Inc  or Alejandro Hdez  Esta información es sólo para uso en educación  Ware intención no es darle un consejo médico sobre enfermedades o tratamientos  Colsulte con ware Dewain Racer farmacéutico antes de seguir cualquier régimen médico para saber si es seguro y efectivo para usted

## 2017-12-05 NOTE — PROGRESS NOTES
Spoke with patient personally told him to hold his Coumadin for 2 nights and have him call his cardiologist today  He denies any acute bleeding shortness of breath or chest pain

## 2017-12-06 LAB
ATRIAL RATE: 59 BPM
P AXIS: 94 DEGREES
PR INTERVAL: 160 MS
QRS AXIS: 263 DEGREES
QRSD INTERVAL: 198 MS
QT INTERVAL: 492 MS
QTC INTERVAL: 487 MS
T WAVE AXIS: 93 DEGREES
VENTRICULAR RATE: 59 BPM

## 2017-12-07 ENCOUNTER — GENERIC CONVERSION - ENCOUNTER (OUTPATIENT)
Dept: OTHER | Facility: OTHER | Age: 52
End: 2017-12-07

## 2017-12-08 ENCOUNTER — TRANSCRIBE ORDERS (OUTPATIENT)
Dept: LAB | Facility: CLINIC | Age: 52
End: 2017-12-08

## 2017-12-08 ENCOUNTER — GENERIC CONVERSION - ENCOUNTER (OUTPATIENT)
Dept: OTHER | Facility: OTHER | Age: 52
End: 2017-12-08

## 2017-12-08 ENCOUNTER — APPOINTMENT (OUTPATIENT)
Dept: LAB | Facility: CLINIC | Age: 52
End: 2017-12-08
Payer: MEDICARE

## 2017-12-08 ENCOUNTER — ALLSCRIPTS OFFICE VISIT (OUTPATIENT)
Dept: OTHER | Facility: OTHER | Age: 52
End: 2017-12-08

## 2017-12-08 DIAGNOSIS — Z95.2 HEART VALVE REPLACED BY TRANSPLANT: ICD-10-CM
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
## 2017-12-08 DIAGNOSIS — Z79.01 LONG-TERM (CURRENT) USE OF ANTICOAGULANTS: Primary | ICD-10-CM

## 2017-12-08 DIAGNOSIS — Z79.01 LONG-TERM (CURRENT) USE OF ANTICOAGULANTS: ICD-10-CM

## 2017-12-08 LAB
INR PPP: 4.66 (ref 0.86–1.16)
PROTHROMBIN TIME: 44.8 SECONDS (ref 12.1–14.4)

## 2017-12-08 PROCEDURE — 36415 COLL VENOUS BLD VENIPUNCTURE: CPT

## 2017-12-08 PROCEDURE — 85610 PROTHROMBIN TIME: CPT

## 2017-12-11 ENCOUNTER — GENERIC CONVERSION - ENCOUNTER (OUTPATIENT)
Dept: OTHER | Facility: OTHER | Age: 52
End: 2017-12-11

## 2017-12-13 ENCOUNTER — GENERIC CONVERSION - ENCOUNTER (OUTPATIENT)
Dept: OTHER | Facility: OTHER | Age: 52
End: 2017-12-13

## 2017-12-19 ENCOUNTER — APPOINTMENT (EMERGENCY)
Dept: CT IMAGING | Facility: HOSPITAL | Age: 52
End: 2017-12-19
Payer: MEDICARE

## 2017-12-19 ENCOUNTER — HOSPITAL ENCOUNTER (EMERGENCY)
Facility: HOSPITAL | Age: 52
Discharge: LEFT BEFORE DISCHARGE | End: 2017-12-19
Attending: EMERGENCY MEDICINE
Payer: MEDICARE

## 2017-12-19 VITALS
TEMPERATURE: 97.9 F | DIASTOLIC BLOOD PRESSURE: 69 MMHG | BODY MASS INDEX: 24.48 KG/M2 | OXYGEN SATURATION: 99 % | RESPIRATION RATE: 16 BRPM | SYSTOLIC BLOOD PRESSURE: 130 MMHG | WEIGHT: 151.68 LBS | HEART RATE: 70 BPM

## 2017-12-19 DIAGNOSIS — K62.5 RECTAL BLEEDING: Primary | ICD-10-CM

## 2017-12-19 DIAGNOSIS — Z79.01 ANTICOAGULATED ON COUMADIN: ICD-10-CM

## 2017-12-19 LAB
ABO GROUP BLD: NORMAL
ALBUMIN SERPL BCP-MCNC: 3.6 G/DL (ref 3.5–5)
ALP SERPL-CCNC: 92 U/L (ref 46–116)
ALT SERPL W P-5'-P-CCNC: 34 U/L (ref 12–78)
ANION GAP SERPL CALCULATED.3IONS-SCNC: 6 MMOL/L (ref 4–13)
APTT PPP: 54 SECONDS (ref 23–35)
AST SERPL W P-5'-P-CCNC: 32 U/L (ref 5–45)
BASOPHILS # BLD AUTO: 0.04 THOUSANDS/ΜL (ref 0–0.1)
BASOPHILS NFR BLD AUTO: 0 % (ref 0–1)
BILIRUB SERPL-MCNC: 0.52 MG/DL (ref 0.2–1)
BLD GP AB SCN SERPL QL: NEGATIVE
BUN SERPL-MCNC: 18 MG/DL (ref 5–25)
CALCIUM SERPL-MCNC: 9.3 MG/DL (ref 8.3–10.1)
CHLORIDE SERPL-SCNC: 102 MMOL/L (ref 100–108)
CO2 SERPL-SCNC: 29 MMOL/L (ref 21–32)
CREAT SERPL-MCNC: 1.22 MG/DL (ref 0.6–1.3)
EOSINOPHIL # BLD AUTO: 0.11 THOUSAND/ΜL (ref 0–0.61)
EOSINOPHIL NFR BLD AUTO: 1 % (ref 0–6)
ERYTHROCYTE [DISTWIDTH] IN BLOOD BY AUTOMATED COUNT: 14.7 % (ref 11.6–15.1)
GFR SERPL CREATININE-BSD FRML MDRD: 67 ML/MIN/1.73SQ M
GLUCOSE SERPL-MCNC: 114 MG/DL (ref 65–140)
HCT VFR BLD AUTO: 40.1 % (ref 36.5–49.3)
HGB BLD-MCNC: 13 G/DL (ref 12–17)
INR PPP: 3.23 (ref 0.86–1.16)
LACTATE SERPL-SCNC: 0.9 MMOL/L (ref 0.5–2)
LYMPHOCYTES # BLD AUTO: 1.63 THOUSANDS/ΜL (ref 0.6–4.47)
LYMPHOCYTES NFR BLD AUTO: 17 % (ref 14–44)
MCH RBC QN AUTO: 27.5 PG (ref 26.8–34.3)
MCHC RBC AUTO-ENTMCNC: 32.4 G/DL (ref 31.4–37.4)
MCV RBC AUTO: 85 FL (ref 82–98)
MONOCYTES # BLD AUTO: 0.89 THOUSAND/ΜL (ref 0.17–1.22)
MONOCYTES NFR BLD AUTO: 9 % (ref 4–12)
NEUTROPHILS # BLD AUTO: 6.9 THOUSANDS/ΜL (ref 1.85–7.62)
NEUTS SEG NFR BLD AUTO: 73 % (ref 43–75)
NRBC BLD AUTO-RTO: 0 /100 WBCS
PLATELET # BLD AUTO: 216 THOUSANDS/UL (ref 149–390)
PMV BLD AUTO: 10.5 FL (ref 8.9–12.7)
POTASSIUM SERPL-SCNC: 4.5 MMOL/L (ref 3.5–5.3)
PROT SERPL-MCNC: 7.6 G/DL (ref 6.4–8.2)
PROTHROMBIN TIME: 33.5 SECONDS (ref 12.1–14.4)
RBC # BLD AUTO: 4.72 MILLION/UL (ref 3.88–5.62)
RH BLD: POSITIVE
SODIUM SERPL-SCNC: 137 MMOL/L (ref 136–145)
SPECIMEN EXPIRATION DATE: NORMAL
WBC # BLD AUTO: 9.57 THOUSAND/UL (ref 4.31–10.16)

## 2017-12-19 PROCEDURE — 86900 BLOOD TYPING SEROLOGIC ABO: CPT | Performed by: EMERGENCY MEDICINE

## 2017-12-19 PROCEDURE — 80053 COMPREHEN METABOLIC PANEL: CPT | Performed by: EMERGENCY MEDICINE

## 2017-12-19 PROCEDURE — 99285 EMERGENCY DEPT VISIT HI MDM: CPT

## 2017-12-19 PROCEDURE — 86850 RBC ANTIBODY SCREEN: CPT | Performed by: EMERGENCY MEDICINE

## 2017-12-19 PROCEDURE — 85730 THROMBOPLASTIN TIME PARTIAL: CPT | Performed by: EMERGENCY MEDICINE

## 2017-12-19 PROCEDURE — 74177 CT ABD & PELVIS W/CONTRAST: CPT

## 2017-12-19 PROCEDURE — 85610 PROTHROMBIN TIME: CPT | Performed by: EMERGENCY MEDICINE

## 2017-12-19 PROCEDURE — 96360 HYDRATION IV INFUSION INIT: CPT

## 2017-12-19 PROCEDURE — 86901 BLOOD TYPING SEROLOGIC RH(D): CPT | Performed by: EMERGENCY MEDICINE

## 2017-12-19 PROCEDURE — 36415 COLL VENOUS BLD VENIPUNCTURE: CPT | Performed by: EMERGENCY MEDICINE

## 2017-12-19 PROCEDURE — 85025 COMPLETE CBC W/AUTO DIFF WBC: CPT | Performed by: EMERGENCY MEDICINE

## 2017-12-19 PROCEDURE — 83605 ASSAY OF LACTIC ACID: CPT | Performed by: EMERGENCY MEDICINE

## 2017-12-19 RX ADMIN — SODIUM CHLORIDE 1000 ML: 0.9 INJECTION, SOLUTION INTRAVENOUS at 09:06

## 2017-12-19 RX ADMIN — IOHEXOL 100 ML: 350 INJECTION, SOLUTION INTRAVENOUS at 10:11

## 2017-12-19 NOTE — ED PROVIDER NOTES
History  Chief Complaint   Patient presents with    Rectal Bleeding     Reports history of colon cancer with surgery approx 1 year ago  States 7 days ago started with intermittent GI bleeding  Denies pain  States he is also constipated "maybe its causing the bleeding I dont know"  Denies dizziness, chest pain, lightheadedness, or any other symptoms  History provided by:  Patient  Rectal Bleeding - Minor   Quality:  Bright red  Amount:  Scant  Duration:  1 week  Timing:  Intermittent  Chronicity:  New  Context: defecation    Context: not anal penetration and not rectal pain    Relieved by:  Nothing  Worsened by:  Nothing  Ineffective treatments:  None tried  Associated symptoms: no abdominal pain, no dizziness, no epistaxis, no fever, no hematemesis, no light-headedness, no loss of consciousness, no recent illness and no vomiting    Risk factors: anticoagulant use        Prior to Admission Medications   Prescriptions Last Dose Informant Patient Reported? Taking? Atorvastatin Calcium (LIPITOR PO)   Yes No   Sig: Take 80 mg by mouth daily  Metoprolol Succinate (TOPROL XL PO)   Yes No   Sig: Take 50 mg by mouth daily  Potassium Chloride (KLOR-CON 10 PO)   Yes No   Sig: Take 20 mEq by mouth daily  albuterol (PROVENTIL HFA,VENTOLIN HFA) 90 mcg/act inhaler   No No   Sig: Inhale 2 puffs every 6 (six) hours as needed for wheezing or shortness of breath   albuterol (PROVENTIL HFA,VENTOLIN HFA) 90 mcg/act inhaler   No No   Sig: Inhale 2 puffs every 4 (four) hours as needed for wheezing   amiodarone (PACERONE) 400 MG tablet   Yes No   Sig: Take 400 mg by mouth 2 (two) times a day   aspirin 81 MG tablet   Yes No   Sig: Take 81 mg by mouth daily  eplerenone (INSPRA) 25 mg tablet   Yes No   Sig: Take 25 mg by mouth daily   furosemide (LASIX) 40 mg tablet   Yes No   Sig: Take 40 mg by mouth 2 (two) times a day  lisinopril (ZESTRIL) 40 mg tablet   Yes No   Sig: Take 40 mg by mouth every evening  nitroglycerin (NITROSTAT) 0 4 mg SL tablet   Yes No   Sig: Place 0 4 mg under the tongue every 5 (five) minutes as needed for chest pain  warfarin (COUMADIN) 5 mg tablet   Yes No   Sig: Take 2 mg by mouth daily        Facility-Administered Medications: None       Past Medical History:   Diagnosis Date    AICD (automatic cardioverter/defibrillator) present     medtronic     Cardiomyopathy ([address] )     mixed  predominently nonischemic    Colonic mass     Coronary artery disease     Hyperlipidemia     Hypertension     Myocardial infarction     2014 and 2015    Rectal bleeding     S/P AVR (aortic valve replacement)     mechanical    Wears glasses        Past Surgical History:   Procedure Laterality Date    AORTIC VALVE REPLACEMENT      APPENDECTOMY      CARDIAC DEFIBRILLATOR PLACEMENT      CORONARY STENT PLACEMENT      MITRAL VALVE REPAIR      MOUTH SURGERY      NH LAP,SURG,COLECTOMY, PARTIAL, W/ANAST N/A 6/2/2016    Procedure: RESECTION COLON RIGHT LAPAROSCOPIC HAND-ASSISTED;  Surgeon: Mohan Wheatley MD;  Location: [facility] OR;  Service: General       History reviewed  No pertinent family history  I have reviewed and agree with the history as documented  Social History   Substance Use Topics    Smoking status: Never Smoker    Smokeless tobacco: Never Used    Alcohol use Yes      Comment: social        Review of Systems   Constitutional: Negative for activity change, appetite change, fatigue and fever  HENT: Negative for congestion, dental problem, ear pain, nosebleeds, rhinorrhea and sore throat  Eyes: Negative for pain and redness  Respiratory: Negative for chest tightness, shortness of breath and wheezing  Cardiovascular: Negative for chest pain and palpitations  Gastrointestinal: Positive for anal bleeding, blood in stool and hematochezia  Negative for abdominal pain, constipation, diarrhea, hematemesis, nausea and vomiting     Endocrine: Negative for cold intolerance and heat intolerance  Genitourinary: Negative for dysuria, frequency and hematuria  Musculoskeletal: Negative for arthralgias and myalgias  Skin: Negative for color change, pallor and rash  Neurological: Negative for dizziness, loss of consciousness, weakness, light-headedness and numbness  Hematological: Does not bruise/bleed easily  Psychiatric/Behavioral: Negative for agitation, hallucinations and suicidal ideas  Physical Exam  ED Triage Vitals [12/19/17 0832]   Temperature Pulse Respirations Blood Pressure SpO2   97 9 °F (36 6 °C) 60 18 130/60 99 %      Temp Source Heart Rate Source Patient Position - Orthostatic VS BP Location FiO2 (%)   Oral Monitor Sitting -- --      Pain Score       No Pain           Orthostatic Vital Signs  Vitals:    12/19/17 0832 12/19/17 1030   BP: 130/60 130/69   Pulse: 60 70   Patient Position - Orthostatic VS: Sitting Sitting       Physical Exam   Constitutional: He is oriented to person, place, and time  He appears well-developed and well-nourished  HENT:   Mouth/Throat: No oropharyngeal exudate  TMs normal bilaterally no pharyngeal erythema no rhinorrhea nontender palpation of sinuses, normal looking turbinates   Eyes: Conjunctivae and EOM are normal    Neck: Normal range of motion  Neck supple  No meningeal signs   Cardiovascular: Normal rate, regular rhythm, normal heart sounds and intact distal pulses  Pulmonary/Chest: Effort normal and breath sounds normal  No respiratory distress  He has no wheezes  He has no rales  He exhibits no tenderness  Abdominal: Soft  Bowel sounds are normal  He exhibits no distension and no mass  There is no tenderness  No hernia  No cvat   Genitourinary:   Genitourinary Comments: No external or internal hemorrhoids  No stool in rectal vault for hemocult testing   Musculoskeletal: Normal range of motion  He exhibits no edema  Lymphadenopathy:     He has no cervical adenopathy     Neurological: He is alert and oriented to person, place, and time  No cranial nerve deficit  Skin: No rash noted  No erythema  No edema   Psychiatric: He has a normal mood and affect  His behavior is normal    Nursing note and vitals reviewed  ED Medications  Medications   sodium chloride 0 9 % bolus 1,000 mL (0 mL Intravenous Stopped 12/19/17 1001)   iohexol (OMNIPAQUE) 350 MG/ML injection (MULTI-DOSE) 100 mL (100 mL Intravenous Given 12/19/17 1011)       Diagnostic Studies  Results Reviewed     Procedure Component Value Units Date/Time    Lactic acid, plasma [[id number]]  (Normal) Collected:  12/19/17 0906    Lab Status:  Final result Specimen:  Blood from Arm, Right Updated:  12/19/17 0934     LACTIC ACID 0 9 mmol/L     Narrative:         Result may be elevated if tourniquet was used during collection  Comprehensive metabolic panel [[id number]] Collected:  12/19/17 0906    Lab Status:  Final result Specimen:  Blood from Arm, Right Updated:  12/19/17 0932     Sodium 137 mmol/L      Potassium 4 5 mmol/L      Chloride 102 mmol/L      CO2 29 mmol/L      Anion Gap 6 mmol/L      BUN 18 mg/dL      Creatinine 1 22 mg/dL      Glucose 114 mg/dL      Calcium 9 3 mg/dL      AST 32 U/L      ALT 34 U/L      Alkaline Phosphatase 92 U/L      Total Protein 7 6 g/dL      Albumin 3 6 g/dL      Total Bilirubin 0 52 mg/dL      eGFR 67 ml/min/1 73sq m     Narrative:         National Kidney Disease Education Program recommendations are as follows:  GFR calculation is accurate only with a steady state creatinine  Chronic Kidney disease less than 60 ml/min/1 73 sq  meters  Kidney failure less than 15 ml/min/1 73 sq  meters      Protime-INR [[id number]]  (Abnormal) Collected:  12/19/17 0906    Lab Status:  Final result Specimen:  Blood from Arm, Right Updated:  12/19/17 0926     Protime 33 5 (H) seconds      INR 3 23 (H)    APTT [[id number]]  (Abnormal) Collected:  12/19/17 0906    Lab Status:  Final result Specimen:  Blood from Arm, Right Updated:  12/19/17 0926     PTT 54 (H) seconds     Narrative: Therapeutic Heparin Range = 60-90 seconds    CBC and differential [[id number]]  (Normal) Collected:  12/19/17 0906    Lab Status:  Final result Specimen:  Blood from Arm, Right Updated:  12/19/17 0921     WBC 9 57 Thousand/uL      RBC 4 72 Million/uL      Hemoglobin 13 0 g/dL      Hematocrit 40 1 %      MCV 85 fL      MCH 27 5 pg      MCHC 32 4 g/dL      RDW 14 7 %      MPV 10 5 fL      Platelets 945 Thousands/uL      nRBC 0 /100 WBCs      Neutrophils Relative 73 %      Lymphocytes Relative 17 %      Monocytes Relative 9 %      Eosinophils Relative 1 %      Basophils Relative 0 %      Neutrophils Absolute 6 90 Thousands/µL      Lymphocytes Absolute 1 63 Thousands/µL      Monocytes Absolute 0 89 Thousand/µL      Eosinophils Absolute 0 11 Thousand/µL      Basophils Absolute 0 04 Thousands/µL                  CT abdomen pelvis with contrast   ED Interpretation by Anisa Christine MD (12/19 1048)   Limited evaluation of GI tract without oral contrast     No CT explanation for patient's rectal bleeding   The surgical anastomosis is unremarkable  Stable cardiomegaly  Final Result by Dewayne Ramos DO (12/19 1037)   Limited evaluation of GI tract without oral contrast     No CT explanation for patient's rectal bleeding  The surgical anastomosis is unremarkable  Stable cardiomegaly  Workstation performed: [id number]                    Procedures  Procedures       Phone Contacts  ED Phone Contact    ED Course  ED Course as of Dec 19 1111   Tue Dec 19, 2017   5031 INR: (!) 3 23   1103 The patient insists on leaving against medical advice, despite my recommendation to remain for ongoing treatment     1: Capacity: I have determined that the patient has capacity to make the decision to leave against medical advice based on the following:    A  Ability to express a choice: The patient is able to express his or her choice and communicate that choice     Paulo [place] to understand relevant information: The patient is able to verbalize their diagnosis, understand information about the purpose of treatment, remember the information, and show that he or she can be part of the decision-making process     C  Ability to appreciate the significance of the information and its consequences: The patient understands the consequences of treatment refusal and the risks and benefits of accepting or refusing treatment     D  Ability to manipulate information: The patient is able to engage in reasoning as it applies to making treatment decisions   2: Psychiatric Consultation: There is not an indication to call psychiatry consultation to determine capacity    3  Alternative Treatment: I have discussed the recommended course of treatment and available alternatives  4  Risks: I have discussed the specific risks of that patient refusing treatment    5  Follow-up Care: I have discussed the follow-up care and advised to see patient's PCP immediately or return here for worsening  6  ED Option: I have emphasized that the patient has the option to return to the ED  Reviewed that we can have continuation of the workup at any time and that we are always open                                   MDM  Number of Diagnoses or Management Options  Diagnosis management comments: Reported rectal bleeding on coumadin with hx of colonic mass-will do abd labs, type and screen, coags, ct a/p to r/o acute intra-abdominal pathology, admit    CritCare Time    Disposition  Final diagnoses:   Rectal bleeding   Anticoagulated on Coumadin     Time reflects when diagnosis was documented in both MDM as applicable and the Disposition within this note     Time User Action Codes Description Comment    12/19/2017 11:09 AM Brian Mac Add [K62 5] Rectal bleeding     12/19/2017 11:09 AM Osmar Feliz Add [Z51 81,  Z79 01] Anticoagulated on Coumadin       ED Disposition     ED Disposition Condition Comment    AMA  Date: 12/19/2017  Patient: Compa Jarvis  Admitted: 12/19/2017  8:25 AM  Attending Provider: Rubio Taylor MD    [address] or his authorized caregiver has made the decision for the patient to leave the emergency department against the advic e of his attending physician  He or his authorized caregiver has been informed and understands the inherent risks, including death, permanent disability, chronic pain but not limited too  He or his authorized caregiver has decided to accept the responsi bility for this decision  [address] and all necessary parties have been advised that he may return for further evaluation or treatment  His condition at time of discharge was stable  [address] had current vital signs as follows:  BP 13 0/69   Pulse 70   Temp 97 9 °F (36 6 °C) (Oral)   Resp 16   Wt 68 8 kg (151 lb 10 8 oz)         Follow-up Information     Follow up With Specialties Details Why Contact Info    Tonio Alcantar PA-C Family Medicine Go today  [address]  [phone]          Patient's Medications   Discharge Prescriptions    No medications on file     No discharge procedures on file      ED Provider  Electronically Signed by           Rubio Taylor MD  12/19/17 5190

## 2017-12-19 NOTE — DISCHARGE INSTRUCTIONS
Hemorragia gastrointestinal   LO QUE NECESITA SABER:   La hemorragia (sangrado) gastrointestinal (GI) puede ocurrir en cualquier parte de ware tracto digestivo  Micki incluye ware esófago, estómago, intestinos, recto o ano  El sangrado puede ser de leve a grave  El sangrado puede comenzar repentinamente o comenzar lentamente y durar un período más chidi de [place]  El sangrado que dura un periodo de tiempo más chidi se denomina sangrado GI crónico    INSTRUCCIONES SOBRE EL EMILY HOSPITALARIA:   Busque atención médica de inmediato si:   · Nasima síntomas regresan  Pregúntele a ware [place] vitaminas y minerales son adecuados para usted  · Usted tiene náuseas o está vomitando  · Usted tiene [employer]     · Usted tiene preguntas o inquietudes acerca de ware condición o cuidado  Actividad:  Repose según le indicaron  Pregunte cuando puede volver a nasima actividades regulares antonio por [place], ware [place]  Lentamente aumente nasima actividades diarias  Nutrición:  Pregunte si necesita seguir bertin dieta especial  [place] especial puede ayudar a tratar afecciones GI y prevenir problemas antonio el sangrado gastrointestinal  [place] comidas pequeñas más a menudo mientras que darrell ware sistema digestivo  Evitar o limitar la cafeína y comidas picantes  También evite los alimentos que causan [place], náuseas o [place]  Prevenir el sangrado gastrointestinal:   · Manejar afecciones gastrointestinales antonio se indica  Ejemplos de afecciones gastrointestinales incluyen reflujo gastroesofágico, úlcera péptica y colitis ulcerosa  [place] el medicamento para ware afección antonio se le indique  · Limite o no tome ARIANA  Pregúntele a ware médico si es seguro que usted tome [place]  [place] pueden aumentar el riesgo de úlceras y sangrado gastrointestinal     · No consuma alcohol  El alcohol puede causar úlceras y várices esofágicas  [place] son hinchazón de los vasos sanguíneos en el esófago   Con el tiempo los vasos sanguíneos se debilitan y pueden sangrar  · No fume  La nicotina y otros químicos de los cigarrillos y cigarros pueden aumentar el riesgo de [place]  Pida información a sandoval médico si usted actualmente fuma y necesita ayuda para dejar de fumar  Los cigarrillos electrónicos o tabaco sin humo todavía contienen nicotina  Consulte con sandoval médico antes de [employer]  Acuda a christ consultas de control con sandoval médico según le indicaron  Es posible que deba regresar para que le terry bertin colonoscopía, endoscopía y [place]  Estas pruebas pueden hacer que no tenga más sangrado  Anote christ preguntas para que se acuerde de hacerlas chip christ visitas  © 2017 [address] Information is for End User's use only and may not be sold, redistributed or otherwise used for commercial purposes  All illustrations and images included in CareNotes® are the copyrighted property of A D A M , Inc  or Alejandro Hdez  Esta información es sólo para uso en educación  Sandoval intención no es darle un consejo médico sobre enfermedades o tratamientos  Colsulte con sandoval [place] farmacéutico antes de seguir cualquier régimen médico para saber si es seguro y efectivo para usted

## 2018-01-03 ENCOUNTER — ALLSCRIPTS OFFICE VISIT (OUTPATIENT)
Dept: OTHER | Facility: OTHER | Age: 53
End: 2018-01-03

## 2018-01-06 NOTE — PROGRESS NOTES
Assessment   1  Anticoagulant long-term use (V58 61) (Z79 01)   2  Colon cancer screening (V76 51) (Z12 11)   3  History of rectal bleeding (V12 79) (Z87 19)    Plan   Anticoagulant long-term use, Colon cancer screening, History of rectal bleeding    · Enoxaparin Sodium 100 MG/ML Subcutaneous Solution; INJECT 1 ML Daily   Rx By: Wilder Bates; Dispense: 14 Days ; #:14 ML; Refill: 1;For: Anticoagulant long-term use, Colon cancer screening, History of rectal bleeding; [place] = N; Verified Transmission to [place] [address], [place]; Last Updated By: [employer]; 1/3/2018 2:56:41 PM    Discussion/Summary   Discussion Summary:    Josefina Lee is a 47year old male who presents today, after visiting [facility] ER on 12/19/17, to plan for a diagnostic colonoscopy  His is status post right colectomy for removal of a colon mass on 6/2/16  A colonoscopy is indicated to assess for the cause of his rectal bleeding and constipation  Discussed risks, benefits, and alternatives of a diagnostic colonoscopy along with pre- and post- procedural protocol  He will begin bridging from Coumadin to Lovenox one week prior to his colonoscopy  He will schedule his colonoscopy for his earliest convenience  He knows to contact our office if any problems or concerns arise  that he try taking Miralax for constipation  Goals and Barriers: The patient has the current Goals: Have colonoscopy performed after bridging to Lovenox as directed  The patent has the current Barriers: None  Patient's Capacity to Self-Care: Patient is able to Self-Care  Counseling Documentation With Imm: The patient, patient's family was counseled regarding risks and benefits of treatment options  Chief Complaint   Chief Complaint Free Text Note Form: Rectal bleeding  Established patient evaluated at [facility] ER on 12/19/17 for rectal bleeding   Patient is s/p right laparoscopic colon resection done on 06/02/16 for a colonic mass  Denies any n/v/f/c  Maybe some constipation at times  History of Present Illness   HPI: Saul Rothman is a 47year old male who presents today, after visiting [facility] ER on 12/19/17, to plan for a diagnostic colonoscopy  He started to have rectal bleeding lasting for three days prior to him going the ER  Since then the bleeding has been intermittent  Recently he reports painful bowel movements and periods of constipation  He has been eating well and denies any change in weight  is status post laparoscopic right colectomy performed 6/2/16 for removal of a colon mass  Denies nausea, vomiting, fever, and chills  continues to take Coumadin for blood thinning  Review of Systems   Complete-Male:      Constitutional: No fever or chills, feels well, no tiredness, no recent weight gain or weight loss  Eyes: No complaints of eye pain, no red eyes, no discharge from eyes, no itchy eyes  ENT: no complaints of earache, no hearing loss, no nosebleeds, no nasal discharge, no sore throat, no hoarseness  Cardiovascular: No complaints of slow heart rate, no fast heart rate, no chest pain, no palpitations, no leg claudication, no lower extremity  Respiratory: No complaints of shortness of breath, no wheezing, no cough, no SOB on exertion, no orthopnea or PND  Gastrointestinal: abdominal pain,-- constipation-- and-- Rectal bleeding, but-- as noted in HPI,-- no nausea,-- no vomiting-- and-- no diarrhea  Genitourinary: No complaints of dysuria, no incontinence, no hesitancy, no nocturia, no genital lesion, no testicular pain  Musculoskeletal: No complaints of arthralgia, no myalgias, no joint swelling or stiffness, no limb pain or swelling  Integumentary: No complaints of skin rash or skin lesions, no itching, no skin wound, no dry skin        Neurological: No compliants of headache, no confusion, no convulsions, no numbness or tingling, no dizziness or fainting, no limb weakness, no difficulty walking  Psychiatric: Is not suicidal, no sleep disturbances, no anxiety or depression, no change in personality, no emotional problems  Endocrine: No complaints of proptosis, no hot flashes, no muscle weakness, no erectile dysfunction, no deepening of the voice, no feelings of weakness  Hematologic/Lymphatic: No complaints of swollen glands, no swollen glands in the neck, does not bleed easily, no easy bruising  ROS Reviewed:    ROS reviewed  Active Problems   1  Abnormal blood sugar (790 29) (R73 09)   2  Anticoagulant long-term use (V58 61) (Z79 01)   3  Benign essential hypertension (401 1) (I10)   4  Cardiomyopathy (425 4) (I42 9)   5  Colon cancer screening (V76 51) (Z12 11)   6  Colonoscopy (Fiberoptic) Screening   7  Coronary artery disease (414 00) (I25 10)   8  Depression screening (V79 0) (Z13 89)   9  Encounter for screening colonoscopy (V76 51) (Z12 11)   10  H/O non-ST elevation myocardial infarction (NSTEMI) (412) (I25 2)   11  Headache (784 0) (R51)   12  History of aortic valve replacement with metallic valve (G92 6) (T25 789,G15 3)   13  History of rectal bleeding (V12 79) (Z87 19)   14  Influenza vaccine needed (V04 81) (Z23)   15  Palpitations (785 1) (R00 2)   16  Pre-operative cardiovascular examination (V72 81) (Z01 810)   17  Status post laparoscopic colectomy (V45 89) (Z90 49)   18  Ventricular fibrillation (427 41) (I49 01)    Past Medical History   1  History of Abdominal pain, right lower quadrant (789 03) (R10 31)   2  History of Abnormal CT of the abdomen (793 6) (R93 5)   3  History of CAD (coronary artery disease) (414 00) (I25 10)   4  Cardiomyopathy (425 4) (I42 9)   5  History of Colonic mass (569 89) (K63 9)   6  History of cardiac cath (V45 89) (Z98 890)   7  History of cardiac pacemaker (V12 50,V45 01) (Z95 0)   8  No pertinent past medical history   9   Old myocardial infarction (412) (I25 2)  Active Problems And Past Medical History Reviewed: The active problems and past medical history were reviewed and updated today  Surgical History   1  History of Appendectomy   2  History of Cardio-defib Pulse Generator Implantation Date   3  History of Cath Stent Placement   4  History of Mitral Valve Repair   5  History of Partial Colectomy - Sigmoid   6  History of Right Hemicolectomy  Surgical History Reviewed: The surgical history was reviewed and updated today  Family History   Mother    1  Family history of Diabetes Mellitus (V18 0)   2  Family history of Hypertension (V17 49)   3  Family history of Mitral Valve Replacement  Family History Reviewed: The family history was reviewed and updated today  Social History    · Being A Social Drinker   · Denied: History of Drug Use   · Never A Smoker  Social History Reviewed: The social history was reviewed and updated today  The social history was reviewed and is unchanged  Current Meds    1  Amiodarone HCl - 200 MG Oral Tablet; Take 1 tablet daily; Therapy: 94NVF1798 to (Merissalenora Smith)  Requested for: 41JCX3871; Last     Rx:13Rfp2091 Ordered   2  Aspirin 81 MG Oral Tablet Chewable; CHEW AND SWALLOW 1 TABLET DAILY; Therapy: 24Qjn8293 to (Evaluate:17Mar2018)  Requested for: 50NBR8174; Last     Rx:17Nov2017 Ordered   3  Atorvastatin Calcium 80 MG Oral Tablet; TAKE ONE TABLET BY MOUTH ONCE DAILY AT     BEDTIME; Therapy: 81EGW6064 to (Evaluate:12Nov2018)  Requested for: 15EQS2933; Last     Rx:17Nov2017 Ordered   4  Eplerenone 25 MG Oral Tablet; take 1 tablet by mouth once daily; Therapy: 05ATT7405 to (Evaluate:17Mar2018)  Requested for: 73SIV0647; Last     Rx:17Nov2017 Ordered   5  Furosemide 40 MG Oral Tablet; TOME 1 TABLET TWICE DAILY  1 TABLET TWICE     DAILY; Therapy: 96VSN1086 to (Evaluate:17Mar2018)  Requested for: 29XNZ2944; Last     Rx:17Nov2017 Ordered   6   Lisinopril 40 MG Oral Tablet; TAKE 1 TABLET DAILY AS DIRECTED; Therapy: 55APD4820 to (Evaluate:12Nov2018)  Requested for: 99FJU4866; Last     Rx:17Nov2017 Ordered   7  Metoprolol Succinate ER 50 MG Oral Tablet Extended Release 24 Hour; TOME ONE     TABLET BY MOUTH TWICE DAILYONE TABLET BY MOUTH TWICE DAILY; Therapy: 72ELL2003 to (Evaluate:16May2018)  Requested for: 94DHB6810; Last     Rx:17Nov2017 Ordered   8  Nitrostat 0 4 MG Sublingual Tablet Sublingual; DISSOLVE 1 TABLET UNDER THE     TONGUE AS NEEDED FOR CHEST PAIN;     Therapy: 03VYV1430 to (Evaluate:31Jan2018)  Requested for: 50QWM6383; Last     Rx:17Nov2017 Ordered   9  Potassium Chloride Aizza ER 20 MEQ Oral Tablet Extended Release; TOME 1 TABLET BY     MOUTH ONCE DAILY1 TABLET BY MOUTH ONCE DAILY; Therapy: 23QAD1581 to (Evaluate:21Aug2017)  Requested for: 46KZI7818; Last     Rx:80Rnu5626 Ordered   10  Potassium Chloride ER 20 MEQ Oral Tablet Extended Release; Take 1 tablet once daily; Therapy: 70WNN6634 to (Renew:17Jan2017)  Requested for: 63Mrp6347; Last      Rx:85Oaz3431 Ordered   11  Warfarin Sodium 3 MG Oral Tablet; TAKE 1 TABLET DAILY; Therapy: 63LAJ7827 to (Jair Ashby)  Requested for: 89Qgb1578; Last      Rx:18Tcz1884; Status: ACTIVE - Retrospective By Protocol Authorization Ordered   12  Warfarin Sodium 4 MG Oral Tablet; take 1/2 tablet by mouth once daily as directed; Therapy: 66KUI9224 to (Evaluate:17Mar2018)  Requested for: 44OJB9927; Last      Rx:17Nov2017 Ordered  Medication List Reviewed: The medication list was reviewed and updated today  Allergies   1   No Known Drug Allergies    Vitals   Vital Signs    Recorded: 71RJW1670 02:26PM   Temperature 97 1 F, Tympanic   Heart Rate 64, L Radial   Pulse Quality Irregular, L Radial   Respiration Quality Normal   Respiration 16   Systolic 249, LUE, Sitting   Diastolic 70, LUE, Sitting   Height 5 ft 6 in   Weight 158 lb    BMI Calculated 25 5   BSA Calculated 1 81     Physical Exam        Constitutional      General appearance: No acute distress, well appearing and well nourished  Eyes      Conjunctiva and lids: No swelling, erythema, or discharge  Pupils and irises: Equal, round and reactive to light  Sclera non-icteric  Ears, Nose, Mouth, and Throat      External inspection of ears and nose: Normal        Neck      Supple, symmetric, trachea midline, no masses      Pulmonary      Respiratory effort: No increased work of breathing or signs of respiratory distress  Auscultation of lungs: Clear to auscultation, equal breath sounds bilaterally, no wheezes, no rales, no rhonci  Cardiovascular      Auscultation of heart: Normal rate and rhythm, normal S1 and S2, without murmurs  Examination of extremities for edema and/or varicosities: Normal        Carotid pulses: Normal        Abdomen      Abdomen: Non-tender, no masses  Liver and spleen: No hepatomegaly or splenomegaly  Lymphatic      Palpation of lymph nodes in neck: No lymphadenopathy  Musculoskeletal      Digits and nails: Normal without clubbing or cyanosis  Extremities: No clubbing, no cyanosis, no edema      Skin      Skin and subcutaneous tissue: Normal without rashes or lesions  Neurologic      Sensation: Motor and sensory grossly intact  Psychiatric      Orientation to person, place and time: Normal        Mood and affect: Normal        Attending Note   Scribe Attestation:      Scribe [id number] [facility] acting as a scribe in the presence of the attending physician while the attending physician examines the patient  Physician Attestation:      Miles Hercules personally performed the services described in this documentation as scribed in my presence, and it is both accurate and complete        Future Appointments      Date/Time Provider Specialty Site   01/15/2018 11:00 AM Jesus Miller MD General Surgery [place] [address] OR   01/16/2018 09:00 AM Cardiology, Device Remote  [facility] [address]     Signatures    Electronically signed by : Zoran Longoria MD; Jan 5 2018  9:30AM EST                       (Author)

## 2018-01-08 ENCOUNTER — TRANSCRIBE ORDERS (OUTPATIENT)
Dept: LAB | Facility: CLINIC | Age: 53
End: 2018-01-08

## 2018-01-08 ENCOUNTER — APPOINTMENT (OUTPATIENT)
Dept: LAB | Facility: CLINIC | Age: 53
End: 2018-01-08
Payer: MEDICARE

## 2018-01-08 DIAGNOSIS — Z79.01 LONG-TERM (CURRENT) USE OF ANTICOAGULANTS: ICD-10-CM

## 2018-01-08 DIAGNOSIS — Z79.01 LONG-TERM (CURRENT) USE OF ANTICOAGULANTS: Primary | ICD-10-CM

## 2018-01-08 LAB
INR PPP: 1.89 (ref 0.86–1.16)
PROTHROMBIN TIME: 21.9 SECONDS (ref 12.1–14.4)

## 2018-01-08 PROCEDURE — 36415 COLL VENOUS BLD VENIPUNCTURE: CPT

## 2018-01-08 PROCEDURE — 85610 PROTHROMBIN TIME: CPT

## 2018-01-09 NOTE — RESULT NOTES
Verified Results  (1) PT WITH INR 07Sfq7116 09:27AM Sabrina Tulio     Test Name Result Flag Reference   INR 2 61 H 0 86-1 16   PT 27 5 seconds H 12 0-14 3     (1) BASIC METABOLIC PROFILE 72IHB8689 09:27AM Sabrina Tulio     Test Name Result Flag Reference   GLUCOSE,RANDM 151 mg/dL H    If the patient is fasting, the ADA then defines impaired fasting glucose as > 100 mg/dL and diabetes as > or equal to 123 mg/dL  SODIUM 137 mmol/L  136-145   POTASSIUM 5 0 mmol/L  3 5-5 3   CHLORIDE 101 mmol/L  100-108   CARBON DIOXIDE 31 mmol/L  21-32   ANION GAP (CALC) 5 mmol/L  4-13   BLOOD UREA NITROGEN 13 mg/dL  5-25   CREATININE 1 04 mg/dL  0 60-1 30   Standardized to IDMS reference method   CALCIUM 8 9 mg/dL  8 3-10 1   eGFR Non-African American      >60 0 ml/min/1 73sq m   [facility] Disease Education Program recommendations are as follows:  GFR calculation is accurate only with a steady state creatinine  Chronic Kidney disease less than 60 ml/min/1 73 sq  meters  Kidney failure less than 15 ml/min/1 73 sq  meters

## 2018-01-10 NOTE — RESULT NOTES
Verified Results  (1) COMPREHENSIVE METABOLIC PANEL 92HVU9940 89:59PC [employer] Order Number: [id number]     Test Name Result Flag Reference   GLUCOSE,RANDM 87 mg/dL     If the patient is fasting, the ADA then defines impaired fasting glucose as > 100 mg/dL and diabetes as > or equal to 123 mg/dL  SODIUM 140 mmol/L  136-145   POTASSIUM 4 0 mmol/L  3 5-5 3   CHLORIDE 104 mmol/L  100-108   CARBON DIOXIDE 29 mmol/L  21-32   ANION GAP (CALC) 7 mmol/L  4-13   BLOOD UREA NITROGEN 10 mg/dL  5-25   CREATININE 0 93 mg/dL  0 60-1 30   Standardized to IDMS reference method   CALCIUM 8 7 mg/dL  8 3-10 1   BILI, TOTAL 0 36 mg/dL  0 20-1 00   ALK PHOSPHATAS 102 U/L     ALT (SGPT) 28 U/L  12-78   AST(SGOT) 22 U/L  5-45   ALBUMIN 3 4 g/dL L 3 5-5 0   TOTAL PROTEIN 7 3 g/dL  6 4-8 2   eGFR Non-African American      >60 0 ml/min/1 73sq [facility] Disease Education Program recommendations are as follows:  GFR calculation is accurate only with a steady state creatinine  Chronic Kidney disease less than 60 ml/min/1 73 sq  meters  Kidney failure less than 15 ml/min/1 73 sq  meters       (1) MAGNESIUM 68JUN1071 10:06AM [employer] [facility] Order Number: [id number]     Test Name Result Flag Reference   MAGNESIUM 2 0 mg/dL  1 6-2 6     (1) PT WITH INR 19HPG1409 10:06AM [employer] [facility] Order Number: [id number]     Test Name Result Flag Reference   INR 1 60 H 0 86-1 16   PT 19 0 seconds H 12 0-14 3

## 2018-01-11 NOTE — RESULT NOTES
Verified Results  (1) PT WITH INR [id number] 09:59AM Isela Colunga     Test Name Result Flag Reference   INR 3 89 H 0 86-1 16   PT 37 2 seconds H 12 0-14 3     (1) BASIC METABOLIC PROFILE 14UVF5757 09:59AM Isela Colunga     Test Name Result Flag Reference   GLUCOSE,RANDM 102 mg/dL     If the patient is fasting, the ADA then defines impaired fasting glucose as > 100 mg/dL and diabetes as > or equal to 123 mg/dL  SODIUM 137 mmol/L  136-145   POTASSIUM 4 3 mmol/L  3 5-5 3   CHLORIDE 105 mmol/L  100-108   CARBON DIOXIDE 26 mmol/L  21-32   ANION GAP (CALC) 6 mmol/L  4-13   BLOOD UREA NITROGEN 20 mg/dL  5-25   CREATININE 1 19 mg/dL  0 60-1 30   Standardized to IDMS reference method   CALCIUM 9 0 mg/dL  8 3-10 1   eGFR Non-African American      >60 0 ml/min/1 73sq [facility] Disease Education Program recommendations are as follows:  GFR calculation is accurate only with a steady state creatinine  Chronic Kidney disease less than 60 ml/min/1 73 sq  meters  Kidney failure less than 15 ml/min/1 73 sq  meters  (1) LIPID PANEL FASTING W DIRECT LDL REFLEX 96Hnu7223 11:26AM Isela Colunga   TW Order Number: [id number]  TW Order Number: [id number] Patient Instructions: This is a fasting blood test  Water, black tea or black coffee only after 9:00pm the night before test Drink 2 glasses of water the morning of test      Test Name Result Flag Reference   CHOLESTEROL 126 mg/dL     LDL CHOLESTEROL CALCULATED 43 mg/dL  0-100   - Patient Instructions:  This is a fasting blood test  Water, black tea or black coffee only after 9:00pm the night before test   Drink 2 glasses of water the morning of test     Triglyceride:         Normal              <150 mg/dl       Borderline High    150-199 mg/dl       High               200-499 mg/dl       Very High          >499 mg/dl  Cholesterol:         Desirable        <200 mg/dl      Borderline High  200-239 mg/dl      High             >239 mg/dl  HDL Cholesterol:        High    >59 mg/dL      Low     <41 mg/dL  LDL Cholesterol:        Optimal          <100 mg/dl        Near Optimal     100-129 mg/dl        Above Optimal          Borderline High   130-159 mg/dl          High              160-189 mg/dl          Very High        >189 mg/dl  LDL CALCULATED:    This screening LDL is a calculated result  It does not have the accuracy of the Direct Measured LDL in the monitoring of patients with hyperlipidemia and/or statin therapy  Direct Measure LDL (IGC196) must be ordered separately in these patients  TRIGLYCERIDES 177 mg/dL H <=150   Specimen collection should occur prior to N-Acetylcysteine or Metamizole administration due to the potential for falsely depressed results  HDL,DIRECT 48 mg/dL  40-60   Specimen collection should occur prior to Metamizole administration due to the potential for falsely depressed results

## 2018-01-12 ENCOUNTER — ANESTHESIA EVENT (OUTPATIENT)
Dept: GASTROENTEROLOGY | Facility: HOSPITAL | Age: 53
End: 2018-01-12
Payer: MEDICARE

## 2018-01-12 NOTE — PROGRESS NOTES
Chief Complaint  Pt  came into office for a nurse visit for Lovenox teaching  Pt  had medication along with him at appt  Pt  states he does not know when his surgery date is yet, but he is having a colon resection done  Per pt  he is currently on Coumadin 5 mg daily and is being managed by his PCP Dr Terrance Gabriel  Pt  understands that when he finds out the date of his surgery he will STOP his Coumadin 7 days prior to surgery and start Lovenox 70 mg q12h  Pt  made aware he will use one syringe for each dose and not reuse any syringes  Pt  made aware that each syringe has 80 mg in it and Dr Guido Churchill wants you taking 70 mg  Pt  told to waste 0 1 ml (10 mg) before giving injection  Pt  shown syringe and where he would push the plunger to in order to waste medication  I stressed to pt  that it is important to waste it before giving it to himself, to avoiding the risk of giving himself too much Lovenox  Pt  verbally understands this  Pt  requested I write down how much he should waste so he remembers, which I did for him  Pt  told that this medication should be given in his lower abdomen only and at least 2 inches away from his stomach  Pt  aware not to give in same area and to rotate sites  Pt  is aware area should be cleaned with alcohol first and wait a few seconds till area is dry  Pt  will waste 0 1 ml and then pinch skin and put needle into skin and give injection by pushing down on plunger  Pt  told to wait about 5-10 seconds to make sure medication absorbed and pull needle out  Pt  aware to discard syringe and needle afterwards  Pt  repeated these instructions back to me and could show me where to give injection on his abdomen  Pt  was able to show me on syringe how much medication he would waste before giving himself the injection  Pt  is aware he will be giving himself the injection twice a day 12 hours apart  Example given to pt   is if it takes it at 7 AM he would give himself the next injection at 7 PM  Pt  verbally understands this  Pt  told to call office with any questions  Pt  made aware if he would need a refresher to come into office or would want us to give him his first injection he may come into office and we can give it to him  All pt's questions were answered  Active Problems    1  Abnormal CT of the abdomen (793 6) (R93 5)   2  Anticoagulant long-term use (V58 61) (Z79 01)   3  Benign essential hypertension (401 1) (I10)   4  Cardiomyopathy (425 4) (I42 9)   5  Colonic mass (569 89) (K63 9)   6  Colonoscopy (Fiberoptic) Screening   7  Coronary artery disease (414 00) (I25 10)   8  Encounter for screening colonoscopy (V76 51) (Z12 11)   9  H/O non-ST elevation myocardial infarction (NSTEMI) (412) (I25 2)   10  Headache (784 0) (R51)   11  History of aortic valve replacement with metallic valve (Y58 1) (W08 5)   12  History of rectal bleeding (V12 79) (Z87 19)   13  Influenza vaccine needed (V04 81) (Z23)   14  Palpitations (785 1) (R00 2)   15  Pre-operative cardiovascular examination (V72 81) (Z01 810)    Current Meds   1  Aspirin 81 MG TABS; TAKE 1 TABLET DAILY; Therapy: 37MEH2829 to (Evaluate:21Ngc6802)  Requested for: 67UYY9417; Last   Rx:89Pad3726 Ordered   2  Atorvastatin Calcium 80 MG Oral Tablet; COURTNEY 1 TABLETA POR VIA ORAL ANTES DE   DORMIRTAKE ONE TABLET BY MOUTHAT BEDTIME; Therapy: 18VVQ4923 to (Evaluate:25Oct2016)  Requested for: 28Apr2016; Last   Rx:28Apr2016 Ordered   3  Enoxaparin Sodium 80 MG/0 8ML Subcutaneous Solution; Lovenox 70 mg inject   Subqutaneously zane 12 hours 7 days before the surgery  Hold the evening   dose day prior to surgery; Therapy: 28Apr2016 to (Paul Reyna)  Requested for: 28Apr2016; Last   Rx:28Apr2016 Ordered   4  Furosemide 40 MG Oral Tablet; TAKE 1 TABLET TWICE DAILY; Therapy: 00AWV5551 to (Evaluate:24Jun2016)  Requested for: 40WQD3704; Last   Rx:48Ozp8852 Ordered   5   Hydrocodone-Acetaminophen 5-325 MG Oral Tablet; TAKE 1 TABLET EVERY 6 HOURS AS NEEDED FOR PAIN;   Therapy: 93Ejm3562 to (Evaluate:11Ymt3726); Last Rx:38Anq4963 Ordered   6  Lisinopril 40 MG Oral Tablet; TAKE 1 TABLET DAILY AS DIRECTED; Therapy: 17IMJ7688 to (Evaluate:24Jun2016)  Requested for: 45WFL7340; Last   Rx:57Jwb8879 Ordered   7  Metoprolol Succinate ER 50 MG Oral Tablet Extended Release 24 Hour; COURTNEY 1   TABLETA POR VIA ORAL DOS VECES AL DIATAKE ONE TABLET BY MOUTH TWICE   DAILY; Therapy: 13EAS1438 to (Evaluate:27Jun2016)  Requested for: 99Yem0685; Last   Rx:03Hmg2719 Ordered   8  Nitrostat 0 4 MG Sublingual Tablet Sublingual; DISSOLVE 1 TABLET UNDER THE   TONGUE AS NEEDED FOR CHEST PAIN;   Therapy: 58AUL5847 to (Evaluate:89Rfk9072)  Requested for: 99FUN3094; Last   Rx:97Tur9748 Ordered   9  Potassium Chloride ER 20 MEQ Oral Tablet Extended Release; Take 1 tablet once daily; Therapy: 61LIZ4486 to (Natalia Gilmore)  Requested for: 08PPB3228; Last   Rx:02Mar2016 Ordered   10  Warfarin Sodium 5 MG Oral Tablet; TOME 2 TABLET DAILY AS DIRECTED  TAKE 2    TABLET DAILY AS DIRECTED; Therapy: 01EQF2406 to (Evaluate:10Kps2199)  Requested for: 18Apr2016; Last    Rx:18Apr2016 Ordered    Allergies    1   No Known Drug Allergies    Future Appointments    Date/Time Provider Specialty Site   06/17/2016 09:00 AM Cardiology, Device Clinic [facility] CARDIOLOGY  [facility]   06/02/2016 09:45 AM Alexus Bourne MD Cardiology [facility] 85     Signatures   Electronically signed by : Dorys Interiano, ; Apr 29 2016  2:25PM EST                       (Author)    Electronically signed by : Tete Mireles MD; Apr 29 2016  3:53PM EST                       (Author)    Electronically signed by : Denton Lesches, M D ; Apr 29 2016  4:19PM EST                       (Author)

## 2018-01-13 VITALS
DIASTOLIC BLOOD PRESSURE: 70 MMHG | HEIGHT: 67 IN | TEMPERATURE: 97.6 F | WEIGHT: 162.7 LBS | SYSTOLIC BLOOD PRESSURE: 114 MMHG | HEART RATE: 64 BPM | BODY MASS INDEX: 25.54 KG/M2

## 2018-01-13 VITALS
WEIGHT: 161.13 LBS | DIASTOLIC BLOOD PRESSURE: 70 MMHG | BODY MASS INDEX: 25.9 KG/M2 | HEART RATE: 76 BPM | SYSTOLIC BLOOD PRESSURE: 108 MMHG | HEIGHT: 66 IN | TEMPERATURE: 95.7 F | RESPIRATION RATE: 16 BRPM | OXYGEN SATURATION: 99 %

## 2018-01-13 NOTE — RESULT NOTES
Verified Results  (1) PT WITH INR [id number] 09:40AM Khadijah Arango     Test Name Result Flag Reference   INR 4 39 H 0 86-1 16   PT 39 2 seconds H 11 8-14 1     (1) BASIC METABOLIC PROFILE 03ZSP1868 09:40AM Khadijah Arango     Test Name Result Flag Reference   GLUCOSE,RANDM 128 mg/dL     If the patient is fasting, the ADA then defines impaired fasting glucose as > 100 mg/dL and diabetes as > or equal to 123 mg/dL  SODIUM 136 mmol/L  136-145   POTASSIUM 4 3 mmol/L  3 5-5 3   CHLORIDE 101 mmol/L  100-108   CARBON DIOXIDE 30 mmol/L  21-32   ANION GAP (CALC) 5 mmol/L  4-13   BLOOD UREA NITROGEN 16 mg/dL  5-25   CREATININE 1 20 mg/dL  0 60-1 30   Standardized to IDMS reference method   CALCIUM 8 6 mg/dL  8 3-10 1   eGFR Non-African American      >60 0 ml/min/1 73sq m   [facility] [employer] Disease Education Program recommendations are as follows:  GFR calculation is accurate only with a steady state creatinine  Chronic Kidney disease less than 60 ml/min/1 73 sq  meters  Kidney failure less than 15 ml/min/1 73 sq  meters

## 2018-01-14 VITALS
BODY MASS INDEX: 24.8 KG/M2 | HEART RATE: 48 BPM | SYSTOLIC BLOOD PRESSURE: 122 MMHG | TEMPERATURE: 97.5 F | WEIGHT: 154.32 LBS | HEIGHT: 66 IN | DIASTOLIC BLOOD PRESSURE: 74 MMHG

## 2018-01-14 NOTE — PROGRESS NOTES
Assessment    1  Cardiomyopathy (425 4) (I42 9)   2  Coronary artery disease (414 00) (I25 10)   3  Benign essential hypertension (401 1) (I10)    Plan  Depression screening    · *VB-Depression Screening; Status:Complete;   Done: 86KVT6792 08:29AM    Discussion/Summary    Continue on current medications  He declined influenza vaccine  Patient is able to Self-Care  Possible side effects of new medications were reviewed with the patient/guardian today  The treatment plan was reviewed with the patient/guardian  The patient/guardian understands and agrees with the treatment plan   The patient was counseled regarding instructions for management, impressions  Self Referrals: No      Chief Complaint  Patient presents today for routine med refill FU      History of Present Illness  44 y/o M with a history of CAD, cardiomyopathy and mechanical AV here for follow up  He has no complaints  NO CP, SOB or GI complaints  He does not need medication refils and will be folling up with cardiology next week for refills  He will be following up with Dr Greg Cline next month to discuss future colonoscopy as he had right hemicolectomy due to colonoic mass  Review of Systems    Constitutional: No fever or chills, feels well, no tiredness, no recent weight gain or weight loss  Eyes: No complaints of eye pain, no red eyes, no discharge from eyes, no itchy eyes  ENT: no complaints of earache, no hearing loss, no nosebleeds, no nasal discharge, no sore throat, no hoarseness  Cardiovascular: No complaints of slow heart rate, no fast heart rate, no chest pain, no palpitations, no leg claudication, no lower extremity  Respiratory: No complaints of shortness of breath, no wheezing, no cough, no SOB on exertion, no orthopnea or PND  Gastrointestinal: No complaints of abdominal pain, no constipation, no nausea or vomiting, no diarrhea or bloody stools     Genitourinary: No complaints of dysuria, no incontinence, no hesitancy, no nocturia, no genital lesion, no testicular pain  Musculoskeletal: No complaints of arthralgia, no myalgias, no joint swelling or stiffness, no limb pain or swelling  Integumentary: No complaints of skin rash or skin lesions, no itching, no skin wound, no dry skin  Neurological: No compliants of headache, no confusion, no convulsions, no numbness or tingling, no dizziness or fainting, no limb weakness, no difficulty walking  Psychiatric: Is not suicidal, no sleep disturbances, no anxiety or depression, no change in personality, no emotional problems  Endocrine: No complaints of proptosis, no hot flashes, no muscle weakness, no erectile dysfunction, no deepening of the voice, no feelings of weakness  Hematologic/Lymphatic: No complaints of swollen glands, no swollen glands in the neck, does not bleed easily, no easy bruising  Active Problems    1  Abnormal blood sugar (790 29) (R73 09)   2  Anticoagulant long-term use (V58 61) (Z79 01)   3  Benign essential hypertension (401 1) (I10)   4  Cardiomyopathy (425 4) (I42 9)   5  Colon cancer screening (V76 51) (Z12 11)   6  Colonoscopy (Fiberoptic) Screening   7  Coronary artery disease (414 00) (I25 10)   8  Encounter for screening colonoscopy (V76 51) (Z12 11)   9  H/O non-ST elevation myocardial infarction (NSTEMI) (412) (I25 2)   10  Headache (784 0) (R51)   11  History of aortic valve replacement with metallic valve (E27 8) (D82 9)   12  History of rectal bleeding (V12 79) (Z87 19)   13  Influenza vaccine needed (V04 81) (Z23)   14  Palpitations (785 1) (R00 2)   15  Pre-operative cardiovascular examination (V72 81) (Z01 810)   16  Status post laparoscopic colectomy (V45 89) (Z90 49)    Past Medical History    1  History of Abdominal pain, right lower quadrant (789 03) (R10 31)   2  History of Abnormal CT of the abdomen (793 6) (R93 5)   3  History of CAD (coronary artery disease) (414 00) (I25 10)   4  Cardiomyopathy (425 4) (I42 9)   5   History of Colonic mass (569 89) (K63 9)   6  History of cardiac cath (V45 89) (Z98 890)   7  History of cardiac pacemaker (V12 50,V45 01) (Z95 0)   8  No pertinent past medical history   9  Old myocardial infarction (412) (I25 2)    Surgical History    1  History of Appendectomy   2  History of Cardio-defib Pulse Generator Implantation Date   3  History of Cath Stent Placement   4  History of Mitral Valve Repair   5  History of Partial Colectomy - Sigmoid   6  History of Right Hemicolectomy    Family History  Mother    1  Family history of Diabetes Mellitus (V18 0)   2  Family history of Hypertension (V17 49)   3  Family history of Mitral Valve Replacement    Social History    · Being A Social Drinker   · Denied: History of Drug Use   · Never A Smoker  The social history was reviewed and updated today  Current Meds   1  Aspirin 81 MG Oral Tablet Chewable; CHEW AND SWALLOW 1 TABLET DAILY; Therapy: 88Cla7900 to (Evaluate:18Nov2016)  Requested for: 70Lxr2301; Last   Rx:32Vrs2446 Ordered   2  Atorvastatin Calcium 80 MG Oral Tablet; COURTNEY 1 TABLETA POR VIA ORAL ANTES DE   DORMIRTAKE ONE TABLET BY MOUTHAT BEDTIME; Therapy: 88XEG7715 to (Evaluate:25Oct2016)  Requested for: 28Apr2016; Last   Rx:77Mca9013 Ordered   3  Furosemide 40 MG Oral Tablet; TOME 1 TABLET POR VIA ORAL TWICE DAILY  TAKE 1   TABLET BY MOUTH TWICE DAILY; Therapy: 71RVU8453 to (Evaluate:95Lky1003)  Requested for: 82GNZ4973; Last   Rx:26Tlc0041 Ordered   4  Lisinopril 40 MG Oral Tablet; TAKE 1 TABLET DAILY AS DIRECTED; Therapy: 69RRN3610 to (Evaluate:29Jzx4879)  Requested for: 42Ega9749; Last   Rx:65Ygj4903 Ordered   5  Metoprolol Succinate ER 50 MG Oral Tablet Extended Release 24 Hour; COURTNEY 1   TABLETA POR VIA ORAL DOS VECES AL DIATAKE ONE TABLET BY MOUTH TWICE   DAILY; Therapy: 22ZQK2798 to (Evaluate:17Jan2017)  Requested for: 38Aes7502; Last   Rx:05Omt0141 Ordered   6   Nitrostat 0 4 MG Sublingual Tablet Sublingual; DISSOLVE 1 TABLET UNDER THE   TONGUE AS NEEDED FOR CHEST PAIN;   Therapy: 96JGK1620 to (Evaluate:04Oct2016)  Requested for: 24Pmy9310; Last   Rx:08Foo6954 Ordered   7  Potassium Chloride ER 20 MEQ Oral Tablet Extended Release; Take 1 tablet once daily; Therapy: 06XUF5349 to (Renew:17Jan2017)  Requested for: 26Ove0951; Last   Rx:47Nhk1776 Ordered   8  Warfarin Sodium 4 MG Oral Tablet; take 1 tablet by mouth once daily as directed; Therapy: 67CBJ0744 to (Evaluate:07Qmp9554)  Requested for: 93ZIY3688; Last   Rx:91Hwf0026 Ordered    Allergies    1  No Known Drug Allergies    Vitals  Vital Signs    Recorded: 64KGK2330 08:24AM   Temperature 95 7 F   Heart Rate 76   Respiration 16   Systolic 670   Diastolic 70   Height 5 ft 6 in   Weight 161 lb 2 oz   BMI Calculated 26 01   BSA Calculated 1 82   O2 Saturation 99     Physical Exam    Constitutional   General appearance: No acute distress, well appearing and well nourished  Eyes   Conjunctiva and lids: No swelling, erythema, or discharge  Ears, Nose, Mouth, and Throat   External inspection of ears and nose: Normal     Pulmonary   Respiratory effort: No increased work of breathing or signs of respiratory distress  Auscultation of lungs: Clear to auscultation, equal breath sounds bilaterally, no wheezes, no rales, no rhonci  Cardiovascular   Palpation of heart: Normal PMI, no thrills  Auscultation of heart: Abnormal   The heart rate was normal  The rhythm was regular  Prosthetic valve: prosthetic aortic valve heard  Musculoskeletal   Gait and station: Normal     Skin   Skin and subcutaneous tissue: Normal without rashes or lesions      Psychiatric   Orientation to person, place and time: Normal     Mood and affect: Normal          Results/Data  *VB-Depression Screening 17ASE7080 08:29AM Tonio Alcantar     Test Name Result Flag Reference   Depression Scale Result      Depression Screen - Negative For Symptoms     PHQ-2 Adult Depression Screening 19FEC8798 08:27AM Mahad Ahs     Test Name Result Flag Reference   PHQ-2 Adult Depression Score 0     Over the last two weeks, how often have you been bothered by any of the following problems?   Little interest or pleasure in doing things: Not at all - 0  Feeling down, depressed, or hopeless: Not at all - 0   PHQ-2 Adult Depression Screening Negative         Future Appointments    Date/Time Provider Specialty Site   02/06/2017 11:00 AM Erika Wade MD General Surgery [place] [address] OR   09/08/2017 02:30 PM Cardiology, [address] [place] [address] CARDIOLOGY  [place]   01/13/2017 01:00 PM Cardiology, Device Remote   [address]   02/14/2017 02:30 PM Cardiology, Device Remote   [address]   01/19/2017 10:30 AM Kaci Mercado MD Cardiology [facility]     Signatures   Electronically signed by : Ashlee Noyola [facility]; Jan 6 2017  9:03AM EST                       (Author)    Electronically signed by : IDALIA Figueroa ; Jan 6 2017 12:13PM EST

## 2018-01-14 NOTE — RESULT NOTES
Verified Results  (1) PT WITH INR [id number] 09:12AM Kj Pride     Test Name Result Flag Reference   INR 1 70 H 0 86-1 16   PT 19 9 seconds H 12 0-14 3     (1) BASIC METABOLIC PROFILE 37GSH1428 09:12AM Kj Pride     Test Name Result Flag Reference   GLUCOSE,RANDM 157 mg/dL H    If the patient is fasting, the ADA then defines impaired fasting glucose as > 100 mg/dL and diabetes as > or equal to 123 mg/dL  SODIUM 137 mmol/L  136-145   POTASSIUM 4 2 mmol/L  3 5-5 3   CHLORIDE 101 mmol/L  100-108   CARBON DIOXIDE 30 mmol/L  21-32   ANION GAP (CALC) 6 mmol/L  4-13   BLOOD UREA NITROGEN 10 mg/dL  5-25   CREATININE 1 18 mg/dL  0 60-1 30   Standardized to IDMS reference method   CALCIUM 9 7 mg/dL  8 3-10 1   eGFR Non-African American      >60 0 ml/min/1 73sq m   [facility] Disease Education Program recommendations are as follows:  GFR calculation is accurate only with a steady state creatinine  Chronic Kidney disease less than 60 ml/min/1 73 sq  meters  Kidney failure less than 15 ml/min/1 73 sq  meters

## 2018-01-15 ENCOUNTER — ANESTHESIA (OUTPATIENT)
Dept: GASTROENTEROLOGY | Facility: HOSPITAL | Age: 53
End: 2018-01-15
Payer: MEDICARE

## 2018-01-15 ENCOUNTER — GENERIC CONVERSION - ENCOUNTER (OUTPATIENT)
Dept: PERIOP | Facility: HOSPITAL | Age: 53
End: 2018-01-15

## 2018-01-15 ENCOUNTER — HOSPITAL ENCOUNTER (OUTPATIENT)
Facility: HOSPITAL | Age: 53
Setting detail: OUTPATIENT SURGERY
Discharge: HOME | End: 2018-01-15
Attending: SURGERY | Admitting: SURGERY
Payer: MEDICARE

## 2018-01-15 VITALS
SYSTOLIC BLOOD PRESSURE: 110 MMHG | HEIGHT: 66 IN | WEIGHT: 160.05 LBS | TEMPERATURE: 97.4 F | DIASTOLIC BLOOD PRESSURE: 70 MMHG | BODY MASS INDEX: 25.72 KG/M2 | HEART RATE: 60 BPM

## 2018-01-15 VITALS
SYSTOLIC BLOOD PRESSURE: 113 MMHG | DIASTOLIC BLOOD PRESSURE: 73 MMHG | BODY MASS INDEX: 24.91 KG/M2 | HEART RATE: 60 BPM | HEIGHT: 66 IN | RESPIRATION RATE: 16 BRPM | OXYGEN SATURATION: 100 % | TEMPERATURE: 95.3 F | WEIGHT: 155 LBS

## 2018-01-15 RX ORDER — SODIUM CHLORIDE 9 MG/ML
125 INJECTION, SOLUTION INTRAVENOUS CONTINUOUS
Status: DISCONTINUED | OUTPATIENT
Start: 2018-01-15 | End: 2018-01-15 | Stop reason: HOSPADM

## 2018-01-15 RX ORDER — LIDOCAINE HYDROCHLORIDE 10 MG/ML
INJECTION, SOLUTION INFILTRATION; PERINEURAL AS NEEDED
Status: DISCONTINUED | OUTPATIENT
Start: 2018-01-15 | End: 2018-01-15 | Stop reason: SURG

## 2018-01-15 RX ORDER — PROPOFOL 10 MG/ML
INJECTION, EMULSION INTRAVENOUS AS NEEDED
Status: DISCONTINUED | OUTPATIENT
Start: 2018-01-15 | End: 2018-01-15 | Stop reason: SURG

## 2018-01-15 RX ADMIN — PROPOFOL 30 MG: 10 INJECTION, EMULSION INTRAVENOUS at 12:08

## 2018-01-15 RX ADMIN — SODIUM CHLORIDE 125 ML/HR: 0.9 INJECTION, SOLUTION INTRAVENOUS at 11:39

## 2018-01-15 RX ADMIN — PROPOFOL 20 MG: 10 INJECTION, EMULSION INTRAVENOUS at 12:11

## 2018-01-15 RX ADMIN — PROPOFOL 20 MG: 10 INJECTION, EMULSION INTRAVENOUS at 12:15

## 2018-01-15 RX ADMIN — PROPOFOL 40 MG: 10 INJECTION, EMULSION INTRAVENOUS at 12:06

## 2018-01-15 RX ADMIN — PROPOFOL 20 MG: 10 INJECTION, EMULSION INTRAVENOUS at 12:13

## 2018-01-15 RX ADMIN — PROPOFOL 10 MG: 10 INJECTION, EMULSION INTRAVENOUS at 12:18

## 2018-01-15 RX ADMIN — PROPOFOL 100 MG: 10 INJECTION, EMULSION INTRAVENOUS at 12:05

## 2018-01-15 RX ADMIN — LIDOCAINE HYDROCHLORIDE 50 MG: 10 INJECTION, SOLUTION INFILTRATION; PERINEURAL at 12:05

## 2018-01-15 NOTE — PROGRESS NOTES
D/c instructions given and pt verbalizes understanding  OOB to ambulate, gait steady denies dizziness  Denuies need to use BR

## 2018-01-15 NOTE — ANESTHESIA PREPROCEDURE EVALUATION
Review of Systems/Medical History  Patient summary reviewed  Chart reviewed  No history of anesthetic complications     Cardiovascular  Pacemaker/AICD, Hyperlipidemia, Hypertension , Valve replacement (AVR, MVR), Past MI (2014 and 2015) , CAD, , Cardiac stents > 1 year   Comment: Mixed CMP,  Pulmonary  Negative pulmonary ROS Smoker , Asthma: Last rescue: < 1 month ago Asthma type of rescue: PRN inhaler, ,        GI/Hepatic  Negative GI/hepatic ROS   Bowel prep       Negative  ROS        Endo/Other  Negative endo/other ROS      GYN  Negative gynecology ROS          Hematology  Negative hematology ROS      Musculoskeletal  Negative musculoskeletal ROS        Neurology  Negative neurology ROS      Psychology   Negative psychology ROS            Physical Exam    Airway    Mallampati score: II  TM Distance: >3 FB  Neck ROM: full     Dental   Comment: Missing all upper teeth X one  Missing many lower teeth,     Cardiovascular  Rhythm: regular, Rate: normal, Cardiovascular exam normal    Pulmonary  Pulmonary exam normal Breath sounds clear to auscultation,     Other Findings        Anesthesia Plan  ASA Score- 3     Anesthesia Type- IV sedation with anesthesia with ASA Monitors  Additional Monitors:   Airway Plan:         Plan Factors-Patient not instructed to abstain from smoking on day of procedure  Patient did not smoke on day of surgery  Induction- intravenous  Postoperative Plan-     Informed Consent- Anesthetic plan and risks discussed with patient  I personally reviewed this patient with the CRNA  Discussed and agreed on the Anesthesia Plan with the CRNA  Rosalinda Carreon

## 2018-01-15 NOTE — CONSULTS
Assessment    1  No pertinent past medical history   2  History of Appendectomy   3  Colonic mass (569 89) (K63 9)   4  Cardiomyopathy (425 4) (I42 9)   5  Coronary artery disease (414 00) (I25 10)    Plan  Coronary artery disease    · Hydrocodone-Acetaminophen 5-325 MG Oral Tablet; TAKE 1 TABLET EVERY 6  HOURS AS NEEDED FOR PAIN   Rx By: Fernando Foster; Dispense: 8 Days ; #:30 Tablet; Refill: 0; For: Coronary artery disease; [place] = N; Print Rx    Discussion/Summary  Discussion Summary:   Pt is a 45year old male who presents with rectal bleeding  New patient who presented to [facility] on 4/22/16 right-sided abdominal pain and noted blood in toilet after moving bowels  CT completed and results showed abnormality in the colon and thickening of wall  The lesion is near obstructing and bleeding and therefore needs excsion  A colonoscopy for tissue biopsy is unnecessary due to the appearance on CT and symptoms are highly suspicious for malignancy  In the office we discussed operative approaches, risks, and benefits and patient has decided to proceed with colon resection  Pt will not need to perform a prep due to near obstrcution  Discussed with patient that he is at a much higher risk for surgery due to cardiovascular history  Pt will need to be cleared by Cardiology  Pt has an appointment in with Cardiology on Thursday for further evaluation before surgery  I expect that he will be high risk however I discussed that this needs to be resected  In addition he will need to be transitioned off his Coumadin with either Lovenox at home or by admission with IV heparin  I'll wait for cardiac input  I will schedule the procedure at the patient's earliest convenience  Chief Complaint  Chief Complaint Free Text Note Form: Rectal bleed  New patient who presented to [facility] on 4/22/16 right-sided abdominal pain and noted blood in toilet after moving bowels  Also has some nausea with vomiting  CT done   Abnormality in the colon noted  Comes in today for followup and discuss next step  Patient has never had a colonoscopy in the past  Never had any obvious blood in stool  No abnormal weight loss  Does have a very extensive cardiac history with implantation of ventricular ICD  Followed very closely by Cardiology  On Coumadin also for atrial fibrillation  History of Present Illness  HPI: Pt is a 45year old male who presents with rectal bleeding  New patient who presented to [facility] on 4/22/16 right-sided abdominal pain and noted blood in toilet after moving bowels  CT completed and results showed abnormality in the colon and thickening of wall  Currently, Pt notes constant pain in right abdominal wall  Notes he has not had rectal bleeding since Friday  Patient has never had a colonoscopy in the past  No recent abnormal weight loss  Some nausea  No fever or chills  Also, Pt has an extensive cardiac history with implantation of ventricular ICD  Pt is on Coumadin for atrial fibrillation  Followed very closely by Cardiology and Pt notes he has an appointment on Thursday  Review of Systems  Complete-Male:   Constitutional: as noted in HPI, no fever, no chills and no recent weight loss  Eyes: No complaints of eye pain, no red eyes, no discharge from eyes, no itchy eyes  ENT: no complaints of earache, no hearing loss, no nosebleeds, no nasal discharge, no sore throat, no hoarseness  Cardiovascular: as noted in HPI  Respiratory: No complaints of shortness of breath, no wheezing, no cough, no SOB on exertion, no orthopnea or PND  Gastrointestinal: abdominal pain, nausea and right abdominal wall, but as noted in HPI and no blood in stools  Genitourinary: No complaints of dysuria, no incontinence, no hesitancy, no nocturia, no genital lesion, no testicular pain  Musculoskeletal: No complaints of arthralgia, no myalgias, no joint swelling or stiffness, no limb pain or swelling     Integumentary: No complaints of skin rash or skin lesions, no itching, no skin wound, no dry skin  Neurological: No compliants of headache, no confusion, no convulsions, no numbness or tingling, no dizziness or fainting, no limb weakness, no difficulty walking  Psychiatric: Is not suicidal, no sleep disturbances, no anxiety or depression, no change in personality, no emotional problems  Endocrine: No complaints of proptosis, no hot flashes, no muscle weakness, no erectile dysfunction, no deepening of the voice, no feelings of weakness  Hematologic/Lymphatic: No complaints of swollen glands, no swollen glands in the neck, does not bleed easily, no easy bruising  ROS Reviewed:   ROS reviewed  Active Problems    1  Abnormal CT of the abdomen (793 6) (R93 5)   2  Anticoagulant long-term use (V58 61) (Z79 01)   3  Benign essential hypertension (401 1) (I10)   4  Cardiomyopathy (425 4) (I42 9)   5  Colonoscopy (Fiberoptic) Screening   6  Coronary artery disease (414 00) (I25 10)   7  Encounter for screening colonoscopy (V76 51) (Z12 11)   8  H/O non-ST elevation myocardial infarction (NSTEMI) (412) (I25 2)   9  Headache (784 0) (R51)   10  History of aortic valve replacement with metallic valve (K34 0) (G61 4)   11  History of rectal bleeding (V12 79) (Z87 19)   12  Influenza vaccine needed (V04 81) (Z23)   13  Palpitations (785 1) (R00 2)    Past Medical History    1  Cardiomyopathy (425 4) (I42 9)   2  No pertinent past medical history  Active Problems And Past Medical History Reviewed: The active problems and past medical history were reviewed and updated today  Surgical History    1  History of Appendectomy  Surgical History Reviewed: The surgical history was reviewed and updated today  Family History  Mother    1  Family history of Diabetes Mellitus (V18 0)   2  Family history of Hypertension (V17 49)   3  Family history of Mitral Valve Replacement  Family History Reviewed: The family history was reviewed and updated today  Social History    · Being A Social Drinker   · Denied: History of Drug Use   · Never A Smoker  Social History Reviewed: The social history was reviewed and updated today  Current Meds   1  Aspirin 81 MG TABS; TAKE 1 TABLET DAILY; Therapy: 61UFT3240 to (Evaluate:73Btl9251)  Requested for: 92TJP1419; Last   Rx:44Kxq7566 Ordered   2  Atorvastatin Calcium 80 MG Oral Tablet; COURTNEY 1 TABLETA POR VIA ORAL ANTES DE   DORMIRTAKE ONE TABLET BY MOUTHAT BEDTIME; Therapy: 69KEJ6744 to (Evaluate:78Uio8320)  Requested for: 26LRC5343; Last   Rx:50Xkg0474 Ordered   3  Furosemide 40 MG Oral Tablet; TAKE 1 TABLET TWICE DAILY; Therapy: 56VUM0010 to (Evaluate:24Jun2016)  Requested for: 31TCD2369; Last   Rx:90Ijd1319 Ordered   4  Lisinopril 40 MG Oral Tablet; TAKE 1 TABLET DAILY AS DIRECTED; Therapy: 26TZV3155 to (Evaluate:24Jun2016)  Requested for: 09DVT5941; Last   Rx:34Zeu7129 Ordered   5  Metoprolol Succinate ER 50 MG Oral Tablet Extended Release 24 Hour; COURTNEY 1   TABLETA POR VIA ORAL DOS VECES AL DIATAKE ONE TABLET BY MOUTH TWICE   DAILY; Therapy: 67XNA6425 to (Evaluate:14Jun2016)  Requested for: 53Slu7757; Last   Rx:37Xgl2518 Ordered   6  Nitrostat 0 4 MG Sublingual Tablet Sublingual; DISSOLVE 1 TABLET UNDER THE   TONGUE AS NEEDED FOR CHEST PAIN;   Therapy: 69GFE2958 to (Evaluate:03Dky2316)  Requested for: 69MKZ7411; Last   Rx:57Jaa5738 Ordered   7  Potassium Chloride ER 20 MEQ Oral Tablet Extended Release; Take 1 tablet once daily; Therapy: 34OFH6742 to (Aurelio Soni)  Requested for: 10RZN8293; Last   Rx:90Fbl7833 Ordered   8  Warfarin Sodium 5 MG Oral Tablet; TOME 2 TABLET DAILY AS DIRECTED  TAKE 2   TABLET DAILY AS DIRECTED; Therapy: 72RLL6969 to (Evaluate:15Epp1544)  Requested for: 48Qlq8185; Last   Rx:85Qrv6484 Ordered  Medication List Reviewed: The medication list was reviewed and updated today  Allergies    1   No Known Drug Allergies    Vitals  Vital Signs [Data Includes: Current Encounter] Recorded: 26Apr2016 09:31AM   Temperature 96 2 F   Heart Rate 72   Respiration 16   Systolic 929   Diastolic 88   Height 5 ft 5 7 in   Weight 162 lb 0 48 oz   BMI Calculated 26 39   BSA Calculated 1 82     Physical Exam    Constitutional   General appearance: No acute distress, well appearing and well nourished  Eyes   Conjunctiva and lids: No swelling, erythema, or discharge  Pupils and irises: Equal, round and reactive to light  Sclera non-icteric  Ears, Nose, Mouth, and Throat   External inspection of ears and nose: Normal     Neck   Supple, symmetric, trachea midline, no masses   Pulmonary   Respiratory effort: No increased work of breathing or signs of respiratory distress  implantable defibrillator  Auscultation of lungs: Clear to auscultation, equal breath sounds bilaterally, no wheezes, no rales, no rhonci  Cardiovascular   Auscultation of heart: Normal rate and rhythm, normal S1 and S2, without murmurs  Examination of extremities for edema and/or varicosities: Normal     Carotid pulses: Normal     Abdomen   Abdomen: Non-tender, no masses  Abdomen soft, mild tenderness, non-distended  Old abdominal incisions well healed  Liver and spleen: No hepatomegaly or splenomegaly  Well healed sternotomy scars and chest tubes  Lymphatic   Palpation of lymph nodes in neck: No lymphadenopathy  Musculoskeletal   Digits and nails: Normal without clubbing or cyanosis  Extremities: No clubbing, no cyanosis, no edema   Skin   Skin and subcutaneous tissue: Normal without rashes or lesions  Neurologic   Sensation: Motor and sensory grossly intact  Psychiatric   Orientation to person, place and time: Normal     Mood and affect: Normal        Provider Comments  Provider Comments:   Due to the patient's extensive cardiovascular history including the need for anticoagulation and need for Cardiac evaluation I believe the medical decision making to be high        Attending Note  Scribe Attestation:   Scribe Attestation I Kathyfrancy Cavanaugh am acting as a scribe in the presence of the attending physician while the attending physician examines the patient  Physician Attestation:   Elicia Cramer personally performed the services described in this documentation as scribed in my presence, and it is both accurate and complete        Future Appointments    Date/Time Provider Specialty Site   06/17/2016 09:00 AM Cardiology, Device Clinic [facility] CARDIOLOGY  [place]   04/27/2016 01:00 PM Cardiology, Device Remote  [address]   04/28/2016 09:45 AM Marlon Curtis MD Cardiology [facility] CARDIAC CENTER     Signatures   Electronically signed by : Marcelino Eller MD; Apr 26 2016  2:03PM EST                       (Author)    Electronically signed by : Marcelino Eller MD; Apr 26 2016  2:05PM EST                       (Author)

## 2018-01-15 NOTE — OP NOTE
OPERATIVE REPORT  PATIENT NAME: Riddhi Resendiz    :  1965  MRN: [id number]  Pt Location: [facility] 01    SURGERY DATE: 1/15/2018    Surgeon(s) and Role:     * Izzy Dodd MD - Primary    Preop Diagnosis:  Encounter for screening for malignant neoplasm of colon [Z12 11]  History of right colectomy  GI bleed   Post-Op Diagnosis Codes:     * Encounter for screening for malignant neoplasm of colon [Z12 11]  History right colectomy    Procedure(s) (LRB):  COLONOSCOPY (N/A)    Specimen(s):  * No specimens in log *    Estimated Blood Loss:   Minimal    Drains:       Anesthesia Type:   IV Sedation with Anesthesia    Operative Indications:  Encounter for screening for malignant neoplasm of colon [Z12 11]  History right colectomy  GI bleeding    Operative Findings:  Status post right colectomy  No evidence of abnormalities to account for bleeding    Complications:   None    Procedure and Technique:   The patient was seen in the Holding Room  The risks, benefits, complications, treatment options, and expected outcomes were discussed with the patient  The possibilities of reaction to medication, pulmonary aspiration, perforation of viscus, bleeding, recurrent infection, the need for additional procedures, failure to diagnose a condition, and creating a complication requiring transfusion or operation were discussed with the patient  The patient concurred with the proposed plan, giving informed consent  The patient was taken to Endoscopy Suite, identified as Riddhi Resendiz  Staff confirmed patient name, , and procedure  A Time Out was held and the above information confirmed  The colonoscope was passed per anus and traversed to ileal colonic anastomosis  The scope was passed through the terminal ileum for a short ways without any abnormality  The anastomosis was intact without any abnormality  The scope was withdrawn  There were no mucosal lesions or polyps seen throughout the colon        The scope was retroflexed  There were no anorectal lesions other than the hemorrhoids  The scope was removed  The patient tolerated the procedure well  Withdrawal time was greater than 10 minutes  Prep was good   This text is generated with voice recognition software  There may be translation, syntax,  or grammatical errors  If you have any questions, please contact the dictating provider        I was present for the entire procedure and A qualified resident physician was not available    Patient Disposition:  PACU     SIGNATURE: Gladys De La Fuente MD  DATE: January 15, 2018  TIME: 12:24 PM

## 2018-01-16 ENCOUNTER — GENERIC CONVERSION - ENCOUNTER (OUTPATIENT)
Dept: OTHER | Facility: OTHER | Age: 53
End: 2018-01-16

## 2018-01-16 ENCOUNTER — ALLSCRIPTS OFFICE VISIT (OUTPATIENT)
Dept: OTHER | Facility: OTHER | Age: 53
End: 2018-01-16

## 2018-01-16 NOTE — PROGRESS NOTES
Assessment  Assessed    1  Cardiomyopathy (425 4) (I42 9)   2  Coronary artery disease (414 00) (I25 10)   3  History of aortic valve replacement with metallic valve (M98 3) (R75 0)    Plan  Anticoagulant long-term use, History of aortic valve replacement with metallic valve    · Enoxaparin Sodium 80 MG/0 8ML Subcutaneous Solution   Rx By: Greyson Gary; Dispense: 0 Days ; #:10 X 0 8 ML Syringe; Refill: 1; For: Anticoagulant long-term use, History of aortic valve replacement with metallic valve; LONDON = N; Sent To: [facility]; Last Updated By: Taylor Hendricks; 7/21/2016 11:14:11 AM  Benign essential hypertension    · Potassium Chloride ER 20 MEQ Oral Tablet Extended Release; Take 1 tablet  once daily   Rx By: Taylor Hendricks; Dispense: 30 Days ; #:30 Tablet Extended Release; Refill: 5; For: Benign essential hypertension; [place] = N; Verified Transmission to [address] [place] [address]; Msg to Pharmacy: This is in response to a fax questioning correct dosage; Last Updated By: System, [employer]; 7/21/2016 11:21:35 AM  Cardiomyopathy    · Lisinopril 40 MG Oral Tablet; TAKE 1 TABLET DAILY AS DIRECTED   Rx By: Taylor Hendricks; Dispense: 90 Days ; #:90 Tablet; Refill: 3; For: Cardiomyopathy; [place] = N; Verified Transmission to [address] [place] [address]; Last Updated By: System, [employer]; 7/21/2016 11:21:57 AM   · Metoprolol Succinate ER 50 MG Oral Tablet Extended Release 24 Hour; COURTNEY  1 TABLETA POR VIA ORAL DOS VECES AL DIATAKE ONE TABLET BY MOUTH TWICE  DAILY   Rx By: Taylor Hednricks; Dispense: 90 Days ; #:90 X 90 Tablet Extended Release 24 Hour Bottle; Refill: 1; For: Cardiomyopathy; [place] = N; Verified Transmission to [address] [place] [address]; Last Updated By: System, [employer]; 7/21/2016 11:17:24 AM   · Follow-up visit in 4 Months Evaluation and Treatment  Follow-up  Status: Hold For -  Scheduling  Requested for: [id number]   Ordered;  For: Cardiomyopathy; Ordered By: Taylor Hendricks Performed:  Due: 35SYD4033  Coronary artery disease · Aspirin 81 MG Oral Tablet Chewable; CHEW AND SWALLOW 1 TABLET DAILY   Rx By: Anny Shukla; Dispense: 30 Days ; #:30 Tablet Chewable; Refill: 3; For: Coronary artery disease; [place] = N; Verified Transmission to [facility]; Last Updated By: System, [employer]; 7/21/2016 11:17:31 AM   · (1) LIPID PANEL FASTING W DIRECT LDL REFLEX; Status:Active; Requested  for:36Wxx7971;    Perform:[facility]; NGR:25XOD1091;ITMZRAD; For:Coronary artery disease; Ordered By:Marisela Acuña;  H/O non-ST elevation myocardial infarction (NSTEMI)    · Aspirin 81 MG TABS   Rx By: Anny Shukla; Dispense: 30 Days ; #:30 Tablet; Refill: 11; For: H/O non-ST elevation myocardial infarction (NSTEMI); [place] = N; [facility] To: [facility]   · Nitrostat 0 4 MG Sublingual Tablet Sublingual; DISSOLVE 1 TABLET UNDER  THE TONGUE AS NEEDED FOR CHEST PAIN   Rx By: Anny Shukla; Dispense: 25 Days ; #:1 X 25 Tablet Sublingual Bottle; Refill: 2; For: H/O non-ST elevation myocardial infarction (NSTEMI); [place] = N; Verified Transmission to [facility]; Last Updated By: System, [employer]; 7/21/2016 11:17:26 AM  Pre-operative cardiovascular examination    · Enoxaparin Sodium 80 MG/0 8ML Subcutaneous Solution   Rx By: Anny Shukla; Dispense: 7 Days ; #:10 ML; Refill: 0; For: Pre-operative cardiovascular examination; [place] = N; Sent To: [facility]    Discussion/Summary  Cardiology Discussion Summary Free Text Note Form [facility]:   1  CAD s/p px LAD 90%, Px circ 90% lesion stented with BMS- 11/2014, continue ASA, statin, metoprolol  will follow up lipid panel    2  CM (predominatly NICM)- EF 20%, s/p BiVICD- currently clinically decompensated  weight 156 lbs today  lasix 40 mg Q 12 hr, continue lisinopril 40 mg, continue beta blocker, and potassium supplement    BiV paced 93%, with paced complexes with RBBB morphology  Intermittent PVC's and fusion beats  due for device check tomorrow      3  RHD s/p mechanical AVR- last INR >2  follow up with PCP for INR and coumadin refills, last INR 3 0  4  Colonic resection - no pain/ no complaints  follow up with colonoscopy and sx/GI teams    follow up in 6 months  D/W Dr Domenic Small  Counseling Documentation With Imm: The patient was counseled regarding instructions for management, risk factor reductions, patient and family education  total time of encounter was 25 minutes and 15 minutes was spent counseling  Patient Clearance: Surgical Clearance: He is at a MODERATE TO HIGH risk from a cardiovascular standpoint at this time without any additional cardiac testing  Reevaluation needed, if he should present with symptoms prior to surgery/procedure  Chief Complaint  Chief Complaint Free Text Note Form: For follow up on CAD, Mixed CM and post colectomy   Chief Complaint Chronic Condition [facility]: Patient is here today for follow up of chronic conditions described in HPI  History of Present Illness  Cardiology HPI Free Text Note Form [facility]: Carolina Jacome is a 53yo  man (immigrated from [facility] in [place]) with PMHx of RHD as a child with AVR with mechanical valve in 1988, h/o cardiomyopathy ( predominatly NICM, EF 20%), NSTEMI (11/2014), CAD( 85-90% proximal LAD and 60% proximal circumflex lesion both lesions stented with bare-metal stents), s/p BiVICD after VT arrest        Was in hospital from 12/7- 12/09 for decompensated CHF with BNP-2200  discharged on home dose of lasix 20mg Q daily  today noticed facial swelling and SOB on minimal exertion  Therapeutic INR's on Coumadin  Weight today is 159 lb  his weight has been running at home from 155- 160 lbs  He taking lasix 40 mg Q 12 hr     hospitalization at [facility] on 4/22/16 with right-sided abdominal pain and noted blood in toilet after moving bowels  CT completed and results showed abnormality in the colon and thickening of wall  The lesion is near obstructing and bleeding and therefore needs excsion   A colonoscopy for tissue biopsy thought to be unnecessary due to the appearance on CT and symptoms are highly suspicious for malignancy  Patient is her for pre op clearance  Today he is post surgical colectomy  Negative for malignancy, plan for repeat colonoscopy  No CP, palpitations, orthopnea, PND, angina, SORENSEN  weights 155-160 lb at home  He checks twice daily  He was advised to stick with morning weights  No other comlains  echocardiogram: (02/20/15)  LEFT VENTRICLE:  The ventricle was mildly dilated  Systolic function was markedly reduced  Ejection fraction was estimated to be  20 %  There was severe diffuse hypokinesis  the posterolateral wall moves  somewhat better  RIGHT VENTRICLE:  Systolic function was mildly reduced  LEFT ATRIUM:  The atrium was mildly dilated  RIGHT ATRIUM:  The atrium was mildly dilated  MITRAL VALVE:  There was mild to moderate regurgitation  AORTIC VALVE:  A mechanical prosthesis was present  It exhibited normal function  TRICUSPID VALVE:  There was mild regurgitation  PULMONIC VALVE:  There was trace regurgitation  IVC, HEPATIC VEINS:  Respirophasic changes were blunted (less than 50% variation)  TTE (03/2016)  LVEF 25%, diffuse hypokinesis with slight sparing of posterolateral wall  normal mechanical prosthesis    Recent device interrogation: 94 5% BiV paced  optivol now in WNL  EKG showing paced beats with RBBB morphology and QRS duration 160 msec    ROS: doing much better than before No short of breath with exertion, No orthopnea, no PND     SHx: Non smoker, non drinker presently  PE:  Gen: NAD  Neck, No JVD, no carotid bruit  CVS:s1, S2 +, RRR, no M,R,G, mechanical click of Aortic valve  RS-CTAB/L, no JVD  Ext- no edema  CNS-AAOx3        Hospital Based Practices Required Assessment:   Pain Assessment   the patient states they do not have pain  (on a scale of 0 to 10, the patient rates the pain at 0 )    Prefered Language is  Albanian  Primary Language is  Albanian  Pre-Op Visit (Brief):  The patient is being seen for a preoperative visit and For resection of colonic mass  Surgical Risk Assessment:   Prior Anesthesia: He had prior anesthesia and no prior adverse reaction to general anesthesia  Pertinent Past Medical History: see below  Exercise Capacity: able to walk four blocks without symptoms and able to walk two flights of stairs without symptoms  Lifestyle Factors: denies alcohol use, denies tobacco use and denies illegal drug use  Symptoms: no frequent nosebleeds, no chest pain, no cough, no dyspnea, no edema, no palpitations and no wheezing  Living Situation: home is secure and supportive and no post-op concerns with his living situation  Review of Systems  Cardiology Male ROS:     Cardiac: No complaints of chest pain, no palpitations, no fainiting  Skin: No complaints of nonhealing sores or skin rash  Genitourinary: No complaints of recurrent urinary tract infections, frequent urination at night, difficult urination, blood in urine, kidney stones, loss of bladder control, no kidney or prostate problems, no erectile dysfunction  Psychological: No complaints of feeling depressed, anxiety, panic attacks, or difficulty concentrating  General: No complaints of trouble sleeping, lack of energy, fatigue, appetite changes, weight changes, fever, frequent infections, or night sweats  Gastrointestinal: bloody stools, abdonimal pain and rectal bleeding, but as noted in HPI none presently  Active Problems  Problems    1  Abdominal pain, right lower quadrant (789 03) (R10 31)   2  Abnormal blood sugar (790 29) (R73 09)   3  Abnormal CT of the abdomen (793 6) (R93 5)   4  Anticoagulant long-term use (V58 61) (Z79 01)   5  Benign essential hypertension (401 1) (I10)   6  Cardiomyopathy (425 4) (I42 9)   7  Colon cancer screening (V76 51) (Z12 11)   8  Colonoscopy (Fiberoptic) Screening   9  Coronary artery disease (414 00) (I25 10)   10   Encounter for screening colonoscopy (V76 51) (Z12 11)   11  H/O non-ST elevation myocardial infarction (NSTEMI) (412) (I25 2)   12  Headache (784 0) (R51)   13  History of aortic valve replacement with metallic valve (P94 8) (M63 5)   14  History of rectal bleeding (V12 79) (Z87 19)   15  Influenza vaccine needed (V04 81) (Z23)   16  Palpitations (785 1) (R00 2)   17  Pre-operative cardiovascular examination (V72 81) (Z01 810)   18  Status post laparoscopic colectomy (V45 89) (Z90 49)    Past Medical History  Problems    1  History of CAD (coronary artery disease) (414 00) (I25 10)   2  Cardiomyopathy (425 4) (I42 9)   3  History of Colonic mass (569 89) (K63 9)   4  History of cardiac cath (V45 89) (Z98 89)   5  History of cardiac pacemaker (V12 50,V45 01) (Z95 0)   6  No pertinent past medical history   7  Old myocardial infarction (12) (I25 2)  Active Problems And Past Medical History Reviewed: The active problems and past medical history were reviewed and updated today  Surgical History  Problems    1  History of Appendectomy   2  History of Cardio-defib Pulse Generator Implantation Date   3  History of Cath Stent Placement   4  History of Mitral Valve Repair   5  History of Partial Colectomy - Sigmoid   6  History of Right Hemicolectomy  Surgical History Reviewed: The surgical history was reviewed and updated today  Family History  Mother    1  Family history of Diabetes Mellitus (V18 0)   2  Family history of Hypertension (V17 49)   3  Family history of Mitral Valve Replacement  Family History Reviewed: The family history was reviewed and updated today  Social History  Problems    · Being A Social Drinker   · Denied: History of Drug Use   · Never A Smoker  Social History Reviewed: The social history was reviewed and updated today  The social history was reviewed and is unchanged  Current Meds   1  Aspirin 81 MG TABS; TAKE 1 TABLET DAILY; Therapy: 55SXL1498 to (Evaluate:46Hrm7261)  Requested for: 50DKR9137;  Last Rx:88Xhh6278 Ordered   2  Atorvastatin Calcium 80 MG Oral Tablet; COURTNEY 1 TABLETA POR VIA ORAL ANTES DE   DORMIRTAKE ONE TABLET BY MOUTHAT BEDTIME; Therapy: 65AJF6373 to (Evaluate:25Oct2016)  Requested for: 28Apr2016; Last   Rx:28Apr2016 Ordered   3  Enoxaparin Sodium 80 MG/0 8ML Subcutaneous Solution; INJECT 70 MG Twice daily; Therapy: 45TYA6407 to (Last Rx:07Jun2016)  Requested for: 07Jun2016 Ordered   4  Enoxaparin Sodium 80 MG/0 8ML Subcutaneous Solution; Lovenox 70 mg inject   Subqutaneously zane 12 hours 7 days before the surgery  Hold the evening   dose day prior to surgery; Therapy: 20Fcj5227 to (Asha Harrison)  Requested for: 28Apr2016; Last   Rx:28Apr2016 Ordered   5  Furosemide 40 MG Oral Tablet; TOME 1 TABLET POR VIA ORAL TWICE DAILY  TAKE 1   TABLET BY MOUTH TWICE DAILY; Therapy: 38MOQ6010 to (Evaluate:52Qps1397)  Requested for: 15MUW0931; Last   Rx:38Ltk3599 Ordered   6  Lisinopril 40 MG Oral Tablet; TAKE 1 TABLET DAILY AS DIRECTED; Therapy: 43BWD2213 to (Evaluate:24Jun2016)  Requested for: 61JAP4893; Last   Rx:18Vve7760 Ordered   7  Metoprolol Succinate ER 50 MG Oral Tablet Extended Release 24 Hour; COURTNEY 1   TABLETA POR VIA ORAL DOS VECES AL DIATAKE ONE TABLET BY MOUTH TWICE   DAILY; Therapy: 34DCH9091 to (Evaluate:27Jun2016)  Requested for: 28Apr2016; Last   Rx:28Apr2016 Ordered   8  Nitrostat 0 4 MG Sublingual Tablet Sublingual; DISSOLVE 1 TABLET UNDER THE   TONGUE AS NEEDED FOR CHEST PAIN;   Therapy: 73CSY5817 to (Evaluate:36Tpg1598)  Requested for: 59VOA2681; Last   Rx:45Mts7464 Ordered   9  Potassium Chloride ER 20 MEQ Oral Tablet Extended Release; Take 1 tablet once daily; Therapy: 84XFE3117 to (Watson Weeks)  Requested for: 63PQG3429; Last   Rx:02Mar2016 Ordered   10  Warfarin Sodium 4 MG Oral Tablet; take 1 tablet by mouth once daily as directed;     Therapy: 33CCR9696 to (Daphnie Campos)  Requested for: 82XNE5579; Last    PK:24YRI9188 Ordered    Allergies  Medication    1  No Known Drug Allergies    Vitals  Vital Signs    Recorded: 81DZU0081 36:56EP   Systolic 730   Diastolic 62   Heart Rate 60   Temperature 97 8 F   Height 5 ft 6 in   Weight 156 lb 15 46 oz   BMI Calculated 25 34   BSA Calculated 1 8     Attending Note  Collaborating Physician Note: Collaborating Physician: I discussed the case with the Advanced Practitioner and reviewed the note and I agree with the Advanced Practitioner note  Future Appointments    Date/Time Provider Specialty Site   06/23/2017 09:00 AM Cardiology, Device Clinic [facility] CARDIOLOGY  [place]   07/22/2016 02:00 PM Cardiology, Device Remote   [address]   08/22/2016 10:00 AM Cardiology, Device Remote   [address]   01/19/2017 10:30 AM Danielle Lira MD Cardiology [facility]     Signatures   Electronically signed by : Christina Branham MD; Jul 21 2016 11:19AM EST                       (Author)    Electronically signed by : IDALIA Dobson ; Jul 21 2016 11:29AM EST                       (Author)

## 2018-01-16 NOTE — RESULT NOTES
Verified Results  (1) BASIC METABOLIC PROFILE [id number] 10:19AM Catha Speak Order Number: [id number]     Test Name Result Flag Reference   SODIUM 139 mmol/L  136-145   POTASSIUM 4 4 mmol/L  3 5-5 3   CHLORIDE 102 mmol/L  100-108   CARBON DIOXIDE 30 mmol/L  21-32   ANION GAP (CALC) 7 mmol/L  4-13   BLOOD UREA NITROGEN 13 mg/dL  5-25   CREATININE 1 20 mg/dL  0 60-1 30   Standardized to IDMS reference method   CALCIUM 9 2 mg/dL  8 3-10 1   eGFR Non-African American      >60 0 ml/min/1 73sq [facility] Disease Education Program recommendations are as follows:  GFR calculation is accurate only with a steady state creatinine  Chronic Kidney disease less than 60 ml/min/1 73 sq  meters  Kidney failure less than 15 ml/min/1 73 sq  meters     GLUCOSE FASTING 73 mg/dL  65-99

## 2018-01-16 NOTE — PROGRESS NOTES
Assessment    1  History of Right Hemicolectomy   2  Colonic mass (559 89) (K63 9)   3  Anticoagulant long-term use (V58 61) (Z79 01)   4  Abnormal blood sugar (790 29) (R73 09)    Plan  Colon cancer screening    · COLONOSCOPY; Status:Unauthorized - Requires Signature; Requested [id number];   PMH: Diabetic eye exam    · *VB - Eye Exam; Status:Unauthorized - Requires Signature; Requested [id number];   PMH: Encounter for diabetic foot exam    · *VB-Foot Exam; Status:Unauthorized - Requires Signature; Requested [id number];     Discussion/Summary  Discussion Summary:   Patient is doing well  He will schedule a follow up with Dr Roy Sin  Continue on current pain medications  A1C was 5 8 today  He should get his INR rechecked in 2 weeks  Counseling Documentation With Imm: The patient was counseled regarding instructions for management, impressions  Medication SE Review and Pt Understands Tx: Possible side effects of new medications were reviewed with the patient/guardian today  The treatment plan was reviewed with the patient/guardian  The patient/guardian understands and agrees with the treatment plan   Self Referrals:   Self Referrals: No      Chief Complaint  Chief Complaint Free Text Note Form: F/u after colon surgery on 6/2      History of Present Illness  HPI: 44 y/o M here for follow up right colon resection due to a mass on June 2nd  He was found to have a colonic mass after having an episode of abdomen pain and bloody diarrhea  He is doing well  NO f/c  He has abdomen pain on/off but it is controlled  BM have been regular and not hard  No blood in stool  Appetite is good  He just started back on coumadin  His BS was elevated part of the time inpatient  Review of Systems  Complete-Male:   Constitutional: No fever or chills, feels well, no tiredness, no recent weight gain or weight loss  Eyes: No complaints of eye pain, no red eyes, no discharge from eyes, no itchy eyes     ENT: no complaints of earache, no hearing loss, no nosebleeds, no nasal discharge, no sore throat, no hoarseness  Cardiovascular: No complaints of slow heart rate, no fast heart rate, no chest pain, no palpitations, no leg claudication, no lower extremity  Respiratory: No complaints of shortness of breath, no wheezing, no cough, no SOB on exertion, no orthopnea or PND  Gastrointestinal: abdominal pain, but No complaints of abdominal pain, no constipation, no nausea or vomiting, no diarrhea or bloody stools  Genitourinary: No complaints of dysuria, no incontinence, no hesitancy, no nocturia, no genital lesion, no testicular pain  Musculoskeletal: No complaints of arthralgia, no myalgias, no joint swelling or stiffness, no limb pain or swelling  Integumentary: No complaints of skin rash or skin lesions, no itching, no skin wound, no dry skin  Neurological: No compliants of headache, no confusion, no convulsions, no numbness or tingling, no dizziness or fainting, no limb weakness, no difficulty walking  Psychiatric: Is not suicidal, no sleep disturbances, no anxiety or depression, no change in personality, no emotional problems  Endocrine: No complaints of proptosis, no hot flashes, no muscle weakness, no erectile dysfunction, no deepening of the voice, no feelings of weakness  Hematologic/Lymphatic: No complaints of swollen glands, no swollen glands in the neck, does not bleed easily, no easy bruising  Active Problems    1  Abdominal pain, right lower quadrant (789 03) (R10 31)   2  Abnormal CT of the abdomen (793 6) (R93 5)   3  Anticoagulant long-term use (V58 61) (Z79 01)   4  Benign essential hypertension (401 1) (I10)   5  Cardiomyopathy (425 4) (I42 9)   6  Colon cancer screening (V76 51) (Z12 11)   7  Colonic mass (569 89) (K63 9)   8  Colonoscopy (Fiberoptic) Screening   9  Coronary artery disease (414 00) (I25 10)   10  Encounter for screening colonoscopy (V76 51) (Z12 11)   11   H/O non-ST elevation myocardial infarction (NSTEMI) (412) (I25 2)   12  Headache (784 0) (R51)   13  History of aortic valve replacement with metallic valve (V28 9) (L78 7)   14  History of rectal bleeding (V12 79) (Z87 19)   15  Influenza vaccine needed (V04 81) (Z23)   16  Palpitations (785 1) (R00 2)   17  Pre-operative cardiovascular examination (V72 81) (Z01 810)    Past Medical History    1  History of CAD (coronary artery disease) (414 00) (I25 10)   2  Cardiomyopathy (425 4) (I42 9)   3  History of cardiac cath (V45 89) (Z98 89)   4  History of cardiac pacemaker (V12 50,V45 01) (Z95 0)   5  No pertinent past medical history   6  Old myocardial infarction (412) (I25 2)    Surgical History    1  History of Appendectomy   2  History of Cardio-defib Pulse Generator Implantation Date   3  History of Cath Stent Placement   4  History of Mitral Valve Repair   5  History of Right Hemicolectomy  Surgical History Reviewed: The surgical history was reviewed and updated today  Family History  Mother    1  Family history of Diabetes Mellitus (V18 0)   2  Family history of Hypertension (V17 49)   3  Family history of Mitral Valve Replacement    Social History    · Being A Social Drinker   · Denied: History of Drug Use   · Never A Smoker  Social History Reviewed: The social history was reviewed and updated today  Current Meds   1  Aspirin 81 MG TABS; TAKE 1 TABLET DAILY; Therapy: 61TFM8687 to (Evaluate:41Vym3484)  Requested for: 05QLB9414; Last Rx:58Zod2101   Ordered   2  Atorvastatin Calcium 80 MG Oral Tablet; COURTNEY 1 TABLETA POR VIA ORAL ANTES DE   DORMIRTAKE ONE TABLET BY MOUTHAT BEDTIME; Therapy: 64SUO7936 to (Evaluate:25Oct2016)  Requested for: 28Apr2016; Last Rx:28Apr2016   Ordered   3  Enoxaparin Sodium 80 MG/0 8ML Subcutaneous Solution; Lovenox 70 mg inject Subqutaneously   zane 12 hours 7 days before the surgery  Hold the evening dose day   prior to surgery;    Therapy: 78Tiy6865 to (Ulysess Rambo)  Requested for: 28Apr2016; Last Rx:28Apr2016   Ordered   4  Furosemide 40 MG Oral Tablet; TOME 1 TABLET POR VIA ORAL TWICE DAILY  TAKE 1 TABLET BY   MOUTH TWICE DAILY; Therapy: 67SZZ9284 to (Evaluate:99Zxf8299)  Requested for: 15RWK0137; Last Rx:26May2016   Ordered   5  Hydrocodone-Acetaminophen 5-325 MG Oral Tablet; TAKE 1 TABLET EVERY 6 HOURS AS NEEDED   FOR PAIN;   Therapy: 44Xgs2634 to (Evaluate:33Foj5425); Last Rx:37Bcl6331 Ordered   6  Lisinopril 40 MG Oral Tablet; TAKE 1 TABLET DAILY AS DIRECTED; Therapy: 10MOX6660 to (Evaluate:24Jun2016)  Requested for: 41ZAE2048; Last Rx:41Kka0189   Ordered   7  Metoprolol Succinate ER 50 MG Oral Tablet Extended Release 24 Hour; COURTNEY 1 TABLETA POR VIA   ORAL DOS VECES AL DIATAKE ONE TABLET BY MOUTH TWICE DAILY; Therapy: 24QGQ6185 to (Evaluate:27Jun2016)  Requested for: 86Czb5043; Last Rx:25Xcb8808   Ordered   8  Nitrostat 0 4 MG Sublingual Tablet Sublingual; DISSOLVE 1 TABLET UNDER THE TONGUE AS NEEDED   FOR CHEST PAIN;   Therapy: 46BGH9154 to (Evaluate:57Tid9462)  Requested for: 17USM1192; Last Rx:74Ahq6777   Ordered   9  Potassium Chloride ER 20 MEQ Oral Tablet Extended Release; Take 1 tablet once daily; Therapy: 60HEW8748 to (Pete Simeonfolk)  Requested for: 86PHC1331; Last Rx:02Mar2016   Ordered   10  Warfarin Sodium 4 MG Oral Tablet; take 1 tablet by mouth once daily as directed; Therapy: 60QPB6235 to (Agnes Paulellani)  Requested for: 39LBI6056; Last NO:54WUX8902    Ordered  Medication List Reviewed: The medication list was reviewed and updated today  Allergies    1   No Known Drug Allergies    Vitals  Vital Signs [Data Includes: Current Encounter]    Recorded: 64EVN3706 01:08PM   Temperature 97 F   Heart Rate 86   Respiration 16   Systolic 230   Diastolic 70   Height 5 ft 6 in   Weight 154 lb    BMI Calculated 24 86   BSA Calculated 1 79   O2 Saturation 94     Physical Exam    Constitutional   General appearance: No acute distress, well appearing and well nourished  Eyes   Conjunctiva and lids: No swelling, erythema, or discharge  Ears, Nose, Mouth, and Throat   External inspection of ears and nose: Normal     Pulmonary   Respiratory effort: No increased work of breathing or signs of respiratory distress  Auscultation of lungs: Clear to auscultation, equal breath sounds bilaterally, no wheezes, no rales, no rhonci  Cardiovascular   Palpation of heart: Normal PMI, no thrills  Auscultation of heart: Abnormal   Prosthetic valve: prosthetic mitral valve heard  Examination of extremities for edema and/or varicosities: Normal     Abdomen   Abdomen: Abnormal   (staples look to healing well no redness or purulent d/c) The abdomen was flat  Bowel sounds were normal  There was mild tenderness that was diffuse  The abdomen was not firm and not rigid  No guarding     Psychiatric   Orientation to person, place and time: Normal     Mood and affect: Normal          Future Appointments    Date/Time Provider Specialty Site   06/17/2016 09:00 AM Cardiology, [address] [facility]     Signatures   Electronically signed by : Jenene Krabbe, Anh Cortez; Jun 7 2016  1:35PM EST                       (Author)    Electronically signed by : IDALIA Casas ; Jun 7 2016  3:44PM EST

## 2018-01-16 NOTE — RESULT NOTES
Verified Results  NM MYOCARDIAL PERFUSION SPECT (RX STRESS AND/OR REST) 51IBR8066 08:30AM [place]     Test Name Result Flag Reference   NM MYOCARDIAL PERFUSION SPECT (RX STRESS AND/OR REST) (Report)     [address]   [address]  [place], [address]   [phone]     Rest/Stress Gated SPECT Myocardial Perfusion Imaging After Regadenoson     Patient: Neville Phoenix   MR number: [id number]   Account number: [de-identified]   : 1965   Age: 45 years   Gender: Male   Status: Outpatient   Location: Stress lab   Height: 66 in   Weight: 162 lb   BP: 122/ 64 mmHg     Allergies: NO KNOWN ALLERGIES     Diagnosis: I25 10 - Atherosclerotic heart disease of native coronary artery without angina pectoris, Z01 810 - Encounter for preprocedural cardiovascular examination     Primary Physician: Leidy Diaz PA-C   Technician: Yossi Holliday   RN: Faby Arreaga RN BSN   Referring Physician: Andrea Lara MD   Group: [facility]   Report Prepared By[de-identified] Faby Arreaga RN BSN   Interpreting Physician: Diana Murray DO     INDICATIONS: Evaluation of known coronary artery disease  HISTORY: The patient is a 45year old  male  Chest pain status: no chest pain  Coronary artery disease risk factors: hypertension  Cardiovascular history: coronary artery disease, prior myocardial infarction( VT/VF arrest ),   congestive heart failure( EF 20%), mitral valve disease(MV repair), and aortic valve disease( AV [id number])  Prior cardiovascular procedures: angioplasty of left anterior descending (on 2014), angioplasty of left circumflex (on   2014), pacemaker, and implantable cardioverter defibrillator procedure  Medications: a nitrate, a beta blocker, an ACE inhibitor/ARB, a diuretic, aspirin, and a lipid lowering agent       PHYSICAL EXAM: Baseline physical exam screening: normal lung exam      REST ECG: Paced rhythm     PROCEDURE: The study was performed in the stress lab  A regadenoson infusion pharmacologic stress test was performed  Gated SPECT myocardial perfusion imaging was performed after stress and at rest  Systolic blood pressure was 122 mmHg, at   the start of the study  Diastolic blood pressure was 64 mmHg, at the start of the study  The heart rate was 50 bpm, at the start of the study  IV double checked  Regadenoson protocol:   HR bpm SBP mmHg DBP mmHg Symptoms   Baseline 50 122 64 none   Immediate 83 100 60 mild dyspnea   5 min 52 118 72 none   No medications or fluids given  The patient also performed low level exercise  STRESS SUMMARY: Duration of pharmacologic stress was 3 min  Maximal heart rate during stress was 83 bpm  The rate-pressure product for the peak heart rate and blood pressure was 24091  There was no chest pain during stress  The stress test   was terminated due to protocol completion  Pre oxygen saturation: 96 %  Peak oxygen saturation: 100 %  Arrhythmia during stress: isolated premature ventricular beats  The stress ECG was non-diagnostic due to paced rhythm and resting ST/T   wave abnormality  ISOTOPE ADMINISTRATION:   Resting isotope administration Stress isotope administration   Agent Tetrofosmin Tetrofosmin   Dose 11 mCi 33 mCi   Date 03/13/2017 03/13/2017   Injection time 09:07 10:17   Imaging time 09:37 10:47   Injection-image interval 30 min 30 min     The radiopharmaceutical was injected at the peak effect of pharmacologic stress  MYOCARDIAL PERFUSION IMAGING:   The image quality was fair  The left ventricle was dilated  PERFUSION DEFECTS:   - 1) There was a large, severe, fixed myocardial perfusion defect of the entire anterior wall, with a complete fixed perfusion defect of the apex without reversibility  2) There was a large, severe, fixed myocardial perfusion defect of the entire inferior wall, extending to the mid-basal inferolateral wall without reversibility       GATED SPECT:   The calculated left ventricular ejection fraction was 18 %  Left ventricular ejection fraction was markedly decreased by visual estimate  There was severe global left ventricular hypokinesis  SUMMARY:   - Stress results: There was no chest pain during stress  - ECG conclusions: The stress ECG was non-diagnostic due to paced rhythm and resting ST/T wave abnormality    - Perfusion imagin) There was a large, severe, fixed myocardial perfusion defect of the entire anterior wall, with a complete fixed perfusion defect of the apex without reversibility  2) There was a large, severe, fixed myocardial perfusion defect of the entire inferior wall, extending to the mid-basal inferolateral wall without reversibility    - Gated SPECT: The calculated left ventricular ejection fraction was 18 %  Left ventricular ejection fraction was markedly decreased by visual estimate  There was severe global left ventricular hypokinesis  IMPRESSIONS: Abnormal study after pharmacologic vasodilation  The left ventricle was dilated  There was evidence of prior myocardial infarction of the entire anterior wall extending to the apex, and the inferior wall exending to the   mid-basal inferolateral myocardial wall segments with no evidence of myocardial ischemia  Left ventricular systolic function was reduced, without distinct regional wall motion abnormalities       Prepared and signed by     Raquel Clark DO   Signed 2017 14:20:12

## 2018-01-16 NOTE — PROGRESS NOTES
Assessment   1  Abnormal CT of the abdomen (793 6) (R93 5)  2  Anticoagulant long-term use (V58 61) (Z79 01)  3  Cardiomyopathy (425 4) (I42 9)  4  Colonic mass (569 89) (K63 9)  5  Pre-operative cardiovascular examination (V72 81) (Z01 810)    Plan   1  Renew: Metoprolol Succinate ER 50 MG Oral Tablet Extended Release 24 Hour; COURTNEY   1 TABLETA POR VIA ORAL DOS VECES AL DIATAKE ONE TABLET BY MOUTH TWICE   DAILY  2  *1 - [facility] (CARDIOLOGY ) Physician Referral  Consult  Status:   Complete  Done: 02TMM7677 11:20AM  () Care Summary provided  : Yes   3  Follow-up visit in 1 month Evaluation and Treatment  Follow-up  Status: Hold For -   Scheduling  Requested for: 53MQA5703   4  Renew: Atorvastatin Calcium 80 MG Oral Tablet; COURTNEY 1 TABLETA POR VIA ORAL   ANTES DE DORMIRTAKE ONE TABLET BY MOUTHAT BEDTIME   5  Stop: Enoxaparin Sodium 80 MG/0 8ML Subcutaneous Solution (Lovenox)  6  *VB - Follow-up With Nurse or MA For BP Check Evaluation and Treatment  Follow-up for   teaching lovenox administration  Status: Hold For - Scheduling  Requested for:   09Nmu0799   7  Start: Enoxaparin Sodium 80 MG/0 8ML Subcutaneous Solution; Lovenox 70 mg inject   Subqutaneously zane 12 hours 7 days before the surgery  Hold the evening   dose day prior to surgery    Discussion/Summary  Patient Clearance: Surgical Clearance: He is at a MODERATE TO HIGH risk from a cardiovascular standpoint at this time without any additional cardiac testing  Reevaluation needed, if he should present with symptoms prior to surgery/procedure  Discussion Summary:   1  Preop eval for colonic resection sx: Patient at elevated risk with multiple cardiac co morbidities  He is currently euvolemic and has NYHA class II-III symptoms  Its ok to proceed with surgery without any prior testing  Plan to stop Coumadin 7 days before surgery and transition to lovenox 70 mg SQ Q 12hrly  Patient to hold the evening dose of lovenox before surgery   I would recommend continuing his other cardiac meds at present doses including lasix  He would need cautious fluid management perioperatively due to severely depressed EF  Patient advised to present to the clinic for nurse visit for lovenox administration teaching  2  Predominently NICM- continue lisinopril, metoprolol and lasix at present doses  plan on adding spironolactone at later date  3  CAD s/p BMS x2- contune ASA  4  RHD s/p mechanical AVR- continue coumadin until a week prior to surgery  D/W Dr Troy Doss  follow up in 4-6 weeks  Cardiology Discussion Summary Free Text Note Form [facility]:   CAD s/p px LAD 90%, Px circ 90% lesion stented with BMS- 11/2014, continue ASA, statin, metoprolol  2  CM (predominatly NICM)- EF 20%, s/p BiVICD- currently clinically decompensated  weight 149 lb ( baseline probably around 158lb) lasix 40 mg Q 12 hr, continue lisinopril 40 mg, continue beta blocker, and potassium supplement    BiV paced 93%, with paced complexes with RBBB morphology  Intermittent PVC's and fusion beats  will follow up on repeat TTE and discuss with DR Sunitha George if needed  3  RHD s/p mechanical AVR- last INR >2  follow up with PCP for INR and coumadin refills,  follow up in 2 months  D/W Dr Melo Pierre  Chief Complaint  Chief Complaint Free Text Note Form: For follow up on CAD, Mixed CM and recent GI bleed      History of Present Illness  Pre-Op Visit (Brief): The patient is being seen for a preoperative visit and For resection of colonic mass  Surgical Risk Assessment:   Prior Anesthesia: He had prior anesthesia and no prior adverse reaction to general anesthesia  Pertinent Past Medical History: see below  Exercise Capacity: able to walk four blocks without symptoms and able to walk two flights of stairs without symptoms  Lifestyle Factors: denies alcohol use, denies tobacco use and denies illegal drug use   Symptoms: no frequent nosebleeds, no chest pain, no cough, no dyspnea, no edema, no palpitations and no wheezing  Living Situation: home is secure and supportive and no post-op concerns with his living situation  Cardiology HPI Free Text Note [facility]: Darci Moody is a 53yo  man (immigrated from [facility] in [place]) with PMHx of RHD as a child with AVR with mechanical valve in 1988, h/o cardiomyopathy ( predominatly NICM, EF 20%), NSTEMI (11/2014), CAD( 85-90% proximal LAD and 60% proximal circumflex lesion both lesions stented with bare-metal stents), s/p BiVICD after VT arrest        Was in hospital from 12/7- 12/09 for decompensated CHF with BNP-2200  discharged on home dose of lasix 20mg Q daily  today noticed facial swelling and SOB on minimal exertion  Therapeutic INR's on Coumadin  Weight today is 159 lb  his weight has been running at home from 155- 160 lbs  He taking lasix 40 mg Q 12 hr    Recent hospitalization at [facility] on 4/22/16 with right-sided abdominal pain and noted blood in toilet after moving bowels  CT completed and results showed abnormality in the colon and thickening of wall  The lesion is near obstructing and bleeding and therefore needs excsion  A colonoscopy for tissue biopsy thought to be unnecessary due to the appearance on CT and symptoms are highly suspicious for malignancy  Patient is her for pre op clearance  Recent echocardiogram: (02/20/15)  LEFT VENTRICLE:  The ventricle was mildly dilated  Systolic function was markedly reduced  Ejection fraction was estimated to be  20 %  There was severe diffuse hypokinesis  the posterolateral wall moves  somewhat better  RIGHT VENTRICLE:  Systolic function was mildly reduced  LEFT ATRIUM:  The atrium was mildly dilated  RIGHT ATRIUM:  The atrium was mildly dilated  MITRAL VALVE:  There was mild to moderate regurgitation  AORTIC VALVE:  A mechanical prosthesis was present  It exhibited normal function  TRICUSPID VALVE:  There was mild regurgitation  PULMONIC VALVE:  There was trace regurgitation    IVC, HEPATIC VEINS:  Respirophasic changes were blunted (less than 50% variation)  TTE (03/2016)  LVEF 25%, diffuse hypokinesis with slight sparing of posterolateral wall  normal mechanical prosthesis    Recent device interrogation: 94 5% BiV paced  optivol now in WNL  EKG showing paced beats with RBBB morphology and QRS duration 160 msec    ROS: doing much better than before No short of breath with exertion, No orthopnea, no PND     SHx: Non smoker, non drinker presently  PE:  Gen: NAD  Neck, No JVD, no carotid bruit  CVS:s1, S2 +, RRR, no M,R,G, mechanical click of Aortic valve  RS-CTAB/L, no JVD  Ext- no edema  CNS-AAOx3        Hospital Based Practices Required Assessment:   Pain Assessment   the patient states they do not have pain  Prefered Language is  Vietnamese  Primary Language is  Vietnamese  Review of Systems  Cardiology Male ROS:     Cardiac: No complaints of chest pain, no palpitations, no fainiting  Skin: No complaints of nonhealing sores or skin rash  Genitourinary: No complaints of recurrent urinary tract infections, frequent urination at night, difficult urination, blood in urine, kidney stones, loss of bladder control, no kidney or prostate problems, no erectile dysfunction  Psychological: No complaints of feeling depressed, anxiety, panic attacks, or difficulty concentrating  General: No complaints of trouble sleeping, lack of energy, fatigue, appetite changes, weight changes, fever, frequent infections, or night sweats  Gastrointestinal: bloody stools, abdonimal pain and rectal bleeding, but as noted in HPI none presently  Active Problems   1  Abnormal CT of the abdomen (793 6) (R93 5)  2  Anticoagulant long-term use (V58 61) (Z79 01)  3  Benign essential hypertension (401 1) (I10)  4  Cardiomyopathy (425 4) (I42 9)  5  Colonic mass (569 89) (K63 9)  6  Colonoscopy (Fiberoptic) Screening  7  Coronary artery disease (414 00) (I25 10)  8   Encounter for screening colonoscopy (V76 51) (Z12 11)  9  H/O non-ST elevation myocardial infarction (NSTEMI) (412) (I25 2)  10  Headache (784 0) (R51)  11  History of aortic valve replacement with metallic valve (J66 5) (Y97 3)  12  History of rectal bleeding (V12 79) (Z87 19)  13  Influenza vaccine needed (V04 81) (Z23)  14  Palpitations (785 1) (R00 2)    Past Medical History   1  History of CAD (coronary artery disease) (414 00) (I25 10)  2  Cardiomyopathy (425 4) (I42 9)  3  History of cardiac cath (V45 89) (Z98 89)  4  History of cardiac pacemaker (V12 50,V45 01) (Z95 0)  5  No pertinent past medical history  6  Old myocardial infarction (12) (I25 2)  Active Problems And Past Medical History Reviewed: The active problems and past medical history were reviewed and updated today  Surgical History   1  History of Appendectomy  2  History of Cardio-defib Pulse Generator Implantation Date  3  History of Cath Stent Placement  4  History of Mitral Valve Repair  Surgical History Reviewed: The surgical history was reviewed and updated today  Family History  Mother   1  Family history of Diabetes Mellitus (V18 0)  2  Family history of Hypertension (V17 49)  3  Family history of Mitral Valve Replacement  Family History Reviewed: The family history was reviewed and updated today  Social History    · Being A Social Drinker   · Denied: History of Drug Use   · Never A Smoker  Social History Reviewed: The social history was reviewed and updated today  The social history was reviewed and is unchanged  Current Meds  1  Aspirin 81 MG TABS; TAKE 1 TABLET DAILY; Therapy: 85RGY4144 to (Evaluate:30Ehj8533)  Requested for: 41CUU3060; Last   Rx:[id number] Ordered  2  Atorvastatin Calcium 80 MG Oral Tablet; COURTNEY 1 TABLETA POR VIA ORAL ANTES DE   DORMIRTAKE ONE TABLET BY MOUTHAT BEDTIME; Therapy: 16CEZ2474 to (Evaluate:36Vxj1797)  Requested for: 43OYS0818; Last   Rx:[id number] Ordered  3   Furosemide 40 MG Oral Tablet; TAKE 1 TABLET TWICE DAILY; Therapy: 79ZZI5469 to (Evaluate:24Jun2016)  Requested for: 55YYM1073; Last   Rx:80Iwo8421 Ordered  4  Hydrocodone-Acetaminophen 5-325 MG Oral Tablet; TAKE 1 TABLET EVERY 6 HOURS   AS NEEDED FOR PAIN;   Therapy: 48Mvq9312 to (Evaluate:30Fim7469); Last Rx:75Gwi0601 Ordered  5  Lisinopril 40 MG Oral Tablet; TAKE 1 TABLET DAILY AS DIRECTED; Therapy: 70ZII4224 to (Evaluate:24Jun2016)  Requested for: 57HYY3196; Last   Rx:[id number] Ordered  6  Metoprolol Succinate ER 50 MG Oral Tablet Extended Release 24 Hour; COURTNEY 1   TABLETA POR VIA ORAL DOS VECES AL DIATAKE ONE TABLET BY MOUTH TWICE   DAILY; Therapy: 83UIZ6646 to (Evaluate:14Jun2016)  Requested for: 25Apr2016; Last   Rx:[id number] Ordered  7  Nitrostat 0 4 MG Sublingual Tablet Sublingual; DISSOLVE 1 TABLET UNDER THE   TONGUE AS NEEDED FOR CHEST PAIN;   Therapy: 87ZPC4179 to (Evaluate:78Hvy9134)  Requested for: 85HFO8216; Last   Rx:[id number] Ordered  8  Potassium Chloride ER 20 MEQ Oral Tablet Extended Release; Take 1 tablet once daily; Therapy: 43ZGN1417 to (Dony Herrera)  Requested for: 41LRL8551; Last   Rx:02Mar2016 Ordered  9  Warfarin Sodium 5 MG Oral Tablet; TOME 2 TABLET DAILY AS DIRECTED  TAKE 2   TABLET DAILY AS DIRECTED; Therapy: 52AVY6189 to (Evaluate:63Cco0843)  Requested for: 18Apr2016; Last   Rx:18Apr2016 Ordered    Allergies   1  No Known Drug Allergies    Vitals  Signs [Data Includes: Current Encounter]   Recorded: 88PVK8789 10:05AM   Temperature: 97 5 F  Heart Rate: 64  Systolic: 285  Diastolic: 82  Height: 5 ft 5 in  Weight: 159 lb 9 79 oz  BMI Calculated: 26 56  BSA Calculated: 1 8    Attending Note  Collaborating Physician Note: Collaborating Physician: I discussed the case with the Advanced Practitioner and reviewed the note and I agree with the Advanced Practitioner note        Future Appointments    Date/Time Provider Specialty Site   06/17/2016 09:00 AM Cardiology, [facility] CARDIOLOGY  [place]   06/02/2016 09:45 AM Althea Romo MD Cardiology [address] [place] [address]   04/29/2016 01:00 PM [place] Nurse [address]     Signatures   Electronically signed by : Aurelio Contreras MD; Apr 28 2016 12:16PM EST                       (Author)    Electronically signed by : IDALIA Velazquez ; Apr 28 2016  2:11PM EST                       (Author)    Electronically signed by : IDALIA Velazquez ; Apr 28 2016  2:11PM EST                       (Author)

## 2018-01-16 NOTE — MISCELLANEOUS
Provider Comments  Provider Comments:   Patient did not show for his 8:40am appt today 12/22/2016        Signatures   Electronically signed by : RITA Aggarwal; Dec 22 2016 12:55PM EST

## 2018-01-17 NOTE — RESULT NOTES
Verified Results  (1) MAGNESIUM 03QVN6202 10:37AM [facility]     Test Name Result Flag Reference   MAGNESIUM 2 0 mg/dL  1 6-2 6     (1) PT WITH INR 13NDM1233 10:36AM [facility] [place]     Test Name Result Flag Reference   INR 2 78 H 0 86-1 16   PT 27 9 seconds H 11 8-14 1     (1) BASIC METABOLIC PROFILE 18RRL2341 10:36AM GABI [address] Kidney Disease Education Program recommendations are as follows:  GFR calculation is accurate only with a steady state creatinine  Chronic Kidney disease less than 60 ml/min/1 73 sq  meters  Kidney failure less than 15 ml/min/1 73 sq  meters       Test Name Result Flag Reference   GLUCOSE,RANDM 132 mg/dL     SODIUM 138 mmol/L  136-145   POTASSIUM 4 0 mmol/L  3 5-5 3   CHLORIDE 103 mmol/L  100-108   CARBON DIOXIDE 29 mmol/L  21-32   ANION GAP (CALC) 6 mmol/L  4-13   BLOOD UREA NITROGEN 11 mg/dL  5-25   CREATININE 0 97 mg/dL  0 60-1 30   Standardized to IDMS reference method   CALCIUM 8 1 mg/dL L 8 3-10 1   eGFR Non-African American      >60 0 ml/min/1 73sq m     (1) BNP 44WKF6399 10:36AM [facility]     Test Name Result Flag Reference    85 pg/mL H <=100

## 2018-01-17 NOTE — RESULT NOTES
Verified Results  ECHO COMPLETE WITH CONTRAST IF INDICATED, TTE / TRANSTHORACIC 60XGZ5273 08:45AM Barbara Myers     Test Name Result Flag Reference   ECHO COMPLETE WITH CONTRAST IF INDICATED (Report)     [facility]   Crystal Perkins [address]  [address], [address]   [phone]     Transthoracic Echocardiogram   2D, M-mode, Doppler, and Color Doppler     Study date: 21-Mar-2016     Patient: Jitendra Alonzo   MR number: [id number]   Account number: [de-identified]   : 1965   Age: 45 years   Gender: Male   Status: Outpatient   Location: [facility]   Height: 66 in   Weight: 158 lb   BP: 132/ 90 mmHg     Indications: Assess left ventricular function  Diagnoses: I42 9 - Cardiomyopathy, unspecified     Sonographer: LEOBARDO Solis   Primary Physician: Clary Gates PA-C   Referring Physician: Jose Riddle MD   Group: [facility]   Interpreting Physician: Kristen Alicia MD     SUMMARY     LEFT VENTRICLE:   The ventricle was mildly to moderately dilated  Systolic function was moderately to markedly reduced  Ejection fraction was   estimated to be 25 %  There was severe diffuse hypokinesis  Posterolateral wall moves better   Wall thickness was at the upper limits of normal      RIGHT VENTRICLE:   Systolic function was mildly reduced  LEFT ATRIUM:   The atrium was mildly to moderately dilated  RIGHT ATRIUM:   The atrium was mildly dilated  MITRAL VALVE:   There was mild to moderate regurgitation  AORTIC VALVE:   A mechanical prosthesis was present  It exhibited normal function  TRICUSPID VALVE:   There was mild regurgitation  PULMONIC VALVE:   There was mild regurgitation  HISTORY: PRIOR HISTORY: Myocardial infarction  Congestive heart failure  Cardiomyopathy  Rheumatic heart disease  Risk factors: hypertension and   hypercholesterolemia  PRIOR PROCEDURES: Stents  ICD implantation   Mechanical   prosthesis of aortic valve  PROCEDURE: The study was performed in the [address] [place] [address]  This   was a routine study  The transthoracic approach was used  The study included   complete 2D imaging, M-mode, complete spectral Doppler, and color Doppler  Image quality was adequate  LEFT VENTRICLE: The ventricle was mildly to moderately dilated  Systolic   function was moderately to markedly reduced  Ejection fraction was estimated to   be 25 %  There was severe diffuse hypokinesis  Posterolateral wall moves better   Wall thickness was at the upper limits of normal  DOPPLER: The ratio of early   ventricular filling to atrial contraction velocities was within the normal   range  The deceleration time of the early transmitral flow velocity was   increased  RIGHT VENTRICLE: The size was normal  Systolic function was mildly reduced  Wall thickness was normal      LEFT ATRIUM: The atrium was mildly to moderately dilated  RIGHT ATRIUM: The atrium was mildly dilated  A pacing wire was present  MITRAL VALVE: Valve structure was normal  There was normal leaflet separation  DOPPLER: The transmitral velocity was within the normal range  There was no   evidence for stenosis  There was mild to moderate regurgitation  AORTIC VALVE: A mechanical prosthesis was present  It exhibited normal   function  DOPPLER: There was no evidence for stenosis  There was no   regurgitation  TRICUSPID VALVE: The valve structure was normal  There was normal leaflet   separation  DOPPLER: The transtricuspid velocity was within the normal range  There was no evidence for stenosis  There was mild regurgitation  Pulmonary   artery systolic pressure was moderately increased  Estimated peak PA pressure   was 47 mmHg  PULMONIC VALVE: Leaflets exhibited normal thickness, no calcification, and   normal cuspal separation  DOPPLER: The transpulmonic velocity was within the   normal range  There was mild regurgitation  PERICARDIUM: There was no pericardial effusion  The pericardium was normal in   appearance  AORTA: The root exhibited normal size  SYSTEMIC VEINS: IVC: The inferior vena cava was normal in size and course     Respirophasic changes were normal      MEASUREMENT TABLES     M-MODE MEASUREMENTS   Aorta  (Reference normals)   Root diam ed  36 mm  (20-37)     SYSTEM MEASUREMENT TABLES     2D   IVSd: 1 cm   LA Diam: 4 7 cm   LVIDd: 6 2 cm   LVIDs: 5 5 cm   LVOT Diam: 2 cm   LVPWd: 1 cm     CW   AV VTI: 40 6 cm   AV Vmax: 2 5 m/s   AV Vmax: 2 4 m/s   AV Vmean: 1 6 m/s   AV maxP 9 mmHg   AV maxP 5 mmHg   AV meanP 9 mmHg   TR Vmax: 3 2 m/s   TR maxP 8 mmHg     PW   E': 0 m/s   E/E': 21 9   LVOT VTI: 53 3 cm   LVOT Vmax: 0 9 m/s   LVOT Vmax: 0 9 m/s   LVOT Vmean: 0 6 m/s   LVOT maxPG: 3 4 mmHg   LVOT maxPG: 3 2 mmHg   LVOT meanP 6 mmHg   MV A [place]: 0 5 m/s   MV Dec [place]: 3 3 m/s2   MV DecT: 236 9 ms   MV E [place]: 0 8 m/s   MV E/A Ratio: 1 5     [employer] Accredited Echocardiography Laboratory     Prepared and electronically signed by     Renetta Faria MD   Signed 21-Mar-2016 15:18:16

## 2018-01-17 NOTE — RESULT NOTES
Verified Results  (1) PT WITH INR [id number] 10:52AM Amauri Mendoza Order Number: [id number]     Test Name Result Flag Reference   INR 2 24 H 0 86-1 16   PT 24 5 seconds H 12 0-14 3

## 2018-01-17 NOTE — RESULT NOTES
Verified Results  (1) PT WITH INR 67SLR6150 09:49AM Yareli Johnson     Test Name Result Flag Reference   INR 2 73 H 0 86-1 16   PT 28 5 seconds H 12 0-14 3     (1) BASIC METABOLIC PROFILE 27HGY9931 09:49AM Yareli Johnson     Test Name Result Flag Reference   GLUCOSE,RANDM 131 mg/dL     If the patient is fasting, the ADA then defines impaired fasting glucose as > 100 mg/dL and diabetes as > or equal to 123 mg/dL  SODIUM 137 mmol/L  136-145   POTASSIUM 4 6 mmol/L  3 5-5 3   CHLORIDE 103 mmol/L  100-108   CARBON DIOXIDE 30 mmol/L  21-32   ANION GAP (CALC) 4 mmol/L  4-13   BLOOD UREA NITROGEN 8 mg/dL  5-25   CREATININE 0 88 mg/dL  0 60-1 30   Standardized to IDMS reference method   CALCIUM 8 7 mg/dL  8 3-10 1   eGFR Non-African American      >60 0 ml/min/1 73sq m   [facility] Disease Education Program recommendations are as follows:  GFR calculation is accurate only with a steady state creatinine  Chronic Kidney disease less than 60 ml/min/1 73 sq  meters  Kidney failure less than 15 ml/min/1 73 sq  meters

## 2018-01-19 ENCOUNTER — TRANSCRIBE ORDERS (OUTPATIENT)
Dept: LAB | Facility: CLINIC | Age: 53
End: 2018-01-19

## 2018-01-19 ENCOUNTER — APPOINTMENT (OUTPATIENT)
Dept: LAB | Facility: CLINIC | Age: 53
End: 2018-01-19
Payer: MEDICARE

## 2018-01-19 ENCOUNTER — GENERIC CONVERSION - ENCOUNTER (OUTPATIENT)
Dept: OTHER | Facility: OTHER | Age: 53
End: 2018-01-19

## 2018-01-19 DIAGNOSIS — Z79.01 LONG-TERM (CURRENT) USE OF ANTICOAGULANTS: ICD-10-CM

## 2018-01-19 DIAGNOSIS — Z79.01 LONG-TERM (CURRENT) USE OF ANTICOAGULANTS: Primary | ICD-10-CM

## 2018-01-19 LAB
INR PPP: 1.14 (ref 0.86–1.16)
PROTHROMBIN TIME: 14.6 SECONDS (ref 12.1–14.4)

## 2018-01-19 PROCEDURE — 36415 COLL VENOUS BLD VENIPUNCTURE: CPT

## 2018-01-19 PROCEDURE — 85610 PROTHROMBIN TIME: CPT

## 2018-01-22 VITALS
WEIGHT: 148.15 LBS | HEART RATE: 64 BPM | HEIGHT: 66 IN | BODY MASS INDEX: 23.81 KG/M2 | SYSTOLIC BLOOD PRESSURE: 110 MMHG | DIASTOLIC BLOOD PRESSURE: 62 MMHG | TEMPERATURE: 97.5 F

## 2018-01-23 ENCOUNTER — GENERIC CONVERSION - ENCOUNTER (OUTPATIENT)
Dept: OTHER | Facility: OTHER | Age: 53
End: 2018-01-23

## 2018-01-23 VITALS
SYSTOLIC BLOOD PRESSURE: 102 MMHG | DIASTOLIC BLOOD PRESSURE: 70 MMHG | WEIGHT: 158 LBS | HEART RATE: 64 BPM | RESPIRATION RATE: 16 BRPM | BODY MASS INDEX: 25.39 KG/M2 | TEMPERATURE: 97.1 F | HEIGHT: 66 IN

## 2018-01-23 NOTE — RESULT NOTES
Message  I revieved a message from the device clinic that Mr Tram waldrop crossed the threshold  He recently experienced flu symptoms and was treated for acute bronchitis        Signatures   Electronically signed by : Phuong Grady; Dec 13 2017  1:26PM EST                       (Author)

## 2018-01-23 NOTE — MISCELLANEOUS
Message  Results of colonoscopy reviewed  no abnormalities and no source of bleeding identified  recommend repeat colonoscopy in 8-10 years on usual screening  if continues to bleed may need to be changed from Coumadin to another anti coagulation  or may need referral to gastroenterology for possible capsule endoscopy        Signatures   Electronically signed by : Michael Mixon MD; Jan 16 2018 10:06AM EST                       (Author)

## 2018-01-24 VITALS
BODY MASS INDEX: 24.68 KG/M2 | OXYGEN SATURATION: 100 % | DIASTOLIC BLOOD PRESSURE: 72 MMHG | RESPIRATION RATE: 18 BRPM | WEIGHT: 153.56 LBS | TEMPERATURE: 95.2 F | SYSTOLIC BLOOD PRESSURE: 110 MMHG | HEIGHT: 66 IN | HEART RATE: 68 BPM

## 2018-01-24 NOTE — MISCELLANEOUS
Please contact our office at your convenience  It is important that we speak to you  REGARDING:          Scheduling an Appointment     Rescheduling an Appointment      Cancelling an Appointment     XXXXX Your Lab/ X-ray Results     Updating your Phone number or Address      Other:       Please call us regarding your PT/INR results  THANK YOU

## 2018-01-24 NOTE — MISCELLANEOUS
Message  I have attempted to contact patient multiple times but was unsuccessful  Will send a letter stating to call us back regarding PT/INR results  Active Problems    1  Abnormal blood sugar (790 29) (R73 09)   2  Anticoagulant long-term use (V58 61) (Z79 01)   3  Benign essential hypertension (401 1) (I10)   4  Cardiomyopathy (425 4) (I42 9)   5  Colon cancer screening (V76 51) (Z12 11)   6  Colonoscopy (Fiberoptic) Screening   7  Coronary artery disease (414 00) (I25 10)   8  Depression screening (V79 0) (Z13 89)   9  Encounter for screening colonoscopy (V76 51) (Z12 11)   10  H/O non-ST elevation myocardial infarction (NSTEMI) (412) (I25 2)   11  Headache (784 0) (R51)   12  History of aortic valve replacement with metallic valve (Z94 6) (J07 690,S59 3)   13  History of rectal bleeding (V12 79) (Z87 19)   14  Influenza vaccine needed (V04 81) (Z23)   15  Palpitations (785 1) (R00 2)   16  Pre-operative cardiovascular examination (V72 81) (Z01 810)   17  Status post laparoscopic colectomy (V45 89) (Z90 49)   18  Ventricular fibrillation (427 41) (I49 01)    Current Meds   1  Amiodarone HCl - 200 MG Oral Tablet; Take 1 tablet daily; Therapy: 08YZM6600 to (Ree Hals)  Requested for: 31HCN2141; Last   Rx:[id number] Ordered   2  Aspirin 81 MG Oral Tablet Chewable; CHEW AND SWALLOW 1 TABLET DAILY; Therapy: 52Tbs3008 to (Evaluate:17Mar2018)  Requested for: 29RTC3830; Last   Rx:17Nov2017 Ordered   3  Atorvastatin Calcium 80 MG Oral Tablet; TAKE ONE TABLET BY MOUTH ONCE DAILY   AT BEDTIME; Therapy: 88IDA9620 to (Evaluate:12Nov2018)  Requested for: 09BGR7930; Last   Rx:17Nov2017 Ordered   4  Enoxaparin Sodium 100 MG/ML Subcutaneous Solution; INJECT 1 ML Daily; Therapy: 97DMV1844 to (QMBYYWS:48JAU5816)  Requested for: 49MSB2695; Last   Rx:03Jan2018 Ordered   5  Eplerenone 25 MG Oral Tablet; take 1 tablet by mouth once daily;    Therapy: 09WWE3390 to (Evaluate:17Mar2018)  Requested for: 91JUC1953; Last   Rx:17Nov2017 Ordered   6  Furosemide 40 MG Oral Tablet; TOME 1 TABLET TWICE DAILY  TAKE 1 TABLET TWICE   DAILY; Therapy: 96YXE2708 to (Evaluate:17Mar2018)  Requested for: 95DWK3566; Last   Rx:17Nov2017 Ordered   7  Lisinopril 40 MG Oral Tablet; TAKE 1 TABLET DAILY AS DIRECTED; Therapy: 64PHD1098 to (Evaluate:12Nov2018)  Requested for: 57DQX2280; Last   Rx:17Nov2017 Ordered   8  Metoprolol Succinate ER 50 MG Oral Tablet Extended Release 24 Hour; TOME ONE   TABLET BY MOUTH TWICE DAILYTAKE ONE TABLET BY MOUTH TWICE DAILY; Therapy: 12HQC5602 to (Evaluate:16May2018)  Requested for: 11XJD1796; Last   Rx:17Nov2017 Ordered   9  Nitrostat 0 4 MG Sublingual Tablet Sublingual; DISSOLVE 1 TABLET UNDER THE   TONGUE AS NEEDED FOR CHEST PAIN;   Therapy: 19VGK1323 to (Evaluate:31Jan2018)  Requested for: 70EYS6381; Last   Rx:17Nov2017 Ordered   10  Potassium Chloride [place] ER 20 MEQ Oral Tablet Extended Release; TOME 1 TABLET    BY MOUTH ONCE DAILYTAKE 1 TABLET BY MOUTH ONCE DAILY; Therapy: 66IEA4672 to (Evaluate:22Ehj5826)  Requested for: 23CNJ7314; Last    Rx:23May2017 Ordered   11  Potassium Chloride ER 20 MEQ Oral Tablet Extended Release; Take 1 tablet once daily; Therapy: 89YKU3898 to (Renew:17Jan2017)  Requested for: 51Tnd1705; Last    Rx:[id number] Ordered   12  Warfarin Sodium 3 MG Oral Tablet; TAKE 1 TABLET DAILY; Therapy: 11OVQ2118 to (Evita Hernández)  Requested for: 08OWT6896; Last    Rx:91Kzc4598; Status: ACTIVE - Retrospective By Protocol Authorization Ordered   13  Warfarin Sodium 4 MG Oral Tablet; take 1/2 tablet by mouth once daily as directed; Therapy: 01JLT9572 to (Evaluate:17Mar2018)  Requested for: 65AZF8936; Last    Rx:17Nov2017 Ordered    Allergies    1   No Known Drug Allergies    Signatures   Electronically signed by : RITA Kimble; Jan 23 2018  3:36PM EST                       (Author)

## 2018-02-08 RX ORDER — GUAIFENESIN/DEXTROMETHORPHAN 100-10MG/5
SYRUP ORAL
Refills: 0 | COMMUNITY
Start: 2017-12-05 | End: 2018-09-26 | Stop reason: ALTCHOICE

## 2018-02-09 ENCOUNTER — APPOINTMENT (OUTPATIENT)
Dept: LAB | Facility: CLINIC | Age: 53
End: 2018-02-09
Payer: MEDICARE

## 2018-02-09 ENCOUNTER — OFFICE VISIT (OUTPATIENT)
Dept: MULTI SPECIALTY CLINIC | Facility: CLINIC | Age: 53
End: 2018-02-09
Payer: MEDICARE

## 2018-02-09 VITALS
HEART RATE: 60 BPM | TEMPERATURE: 97.6 F | HEIGHT: 66 IN | SYSTOLIC BLOOD PRESSURE: 120 MMHG | WEIGHT: 157.85 LBS | DIASTOLIC BLOOD PRESSURE: 68 MMHG | BODY MASS INDEX: 25.37 KG/M2

## 2018-02-09 DIAGNOSIS — I47.2 VT (VENTRICULAR TACHYCARDIA) (HCC): Primary | ICD-10-CM

## 2018-02-09 DIAGNOSIS — Z95.2 H/O MECHANICAL AORTIC VALVE REPLACEMENT: ICD-10-CM

## 2018-02-09 LAB
INR PPP: 1.61 (ref 0.86–1.16)
PROTHROMBIN TIME: 19.3 SECONDS (ref 12.1–14.4)

## 2018-02-09 PROCEDURE — 99213 OFFICE O/P EST LOW 20 MIN: CPT | Performed by: INTERNAL MEDICINE

## 2018-02-09 PROCEDURE — 36415 COLL VENOUS BLD VENIPUNCTURE: CPT

## 2018-02-09 PROCEDURE — 85610 PROTHROMBIN TIME: CPT

## 2018-02-09 NOTE — PATIENT INSTRUCTIONS
Please check your INR regularly as recommended    Please follow up with electrophysiology  Please take your coumadin regularly  Please take your medications regularly

## 2018-02-09 NOTE — PROGRESS NOTES
Cardiology Follow Up    Beauty Amelia  1965  [id number]  [address] [facility] [address]    Discussion/Summary:  1  RHD s/p mechanical AVR - with non compliance to INR testing  2  Ischemic Cardiomyopathy s/p BiV-ICD  3  History of vfib with device interrogation showing ATP x 3 on 1/6/17 - currently on amiodarone  4  CAD s/p PCI in 2014 to LAD and left circumflex    47year old man with past medical history of RHD with a mechanical AVR, CAD s/p PCI with BMS in 2014 to LAD and circumflex, ischemic cardiomyopathy s/p BiV-ICD and VT/Vfib on amiodarone who presents for follow up of his multiple medical issues  RHD s/p mechanical AVR- last INR 1 14 : follow up INR  Goal INR is 2 5 - 3 5   Check INR in office today  Needs close monitoring given his mechanical prosthesis and cardiomyopathy   If INR subtherapeutic, will call in enoxaparin for bridging  Patient states he is aware and knows how to administer enoxaparin as he has done so while he was being bridged  Does not want to go to hospital for bridging (if needed)  Has not been getting INR checked because of needle stick but states he will do so regularly going forward  Ischemic cardiomyopathy:s/p BiVICD:  Asymptomatic  NYHA class 1 - 2  Weight 157 lbs today  On metoprolol succinate 50 mg bid, lisinopril 40 mg daily, furosemide 40 mg bid, and eplerenone 25 mg daily      History of V fib/ Fast VT - ATPx3 on 01/06/ 2017  one episode lasted > 30 sec  asymptomatic  On Amiodarone 400 mg daily  Decrease to 200 mg daily  LFT in 12/19 WNL  PFTs 3/17: Normal spirometry with significant bronchodilator response,Normal lung volumes, mildly decreased diffusion capacity  Young patient and amiodarone not ideal    Consider transitioning to Sotalol/ Dofetilide   Will refer to electrophysiology     CAD s/p px LAD 90%, Px circ 90% lesion stented with BMS- 11/2014, continue ASA, statin, metoprolol  stress test: Severe fixed defect of enitre anterior and apical wall and of entire inferior and inferolateral walls  No reversible ischemia        History of Present Illness:     47year old man with past medical history of RHD with a mechanical AVR, CAD s/p PCI with BMS in 2014 to LAD and circumflex, ischemic cardiomyopathy s/p BiV-ICD and VT/Vfib on amiodarone who presents for follow up of his multiple medical issues  Since last visit had to go to ER for rectal bleeding  Describes having very small amount, blood tinged stools  Was referred for colonoscopy  Was bridged with enoxaparin  Colonoscopy was unremarkable for a source of bleed  Continues to have hematochezia  Stopped having his INR checked post op  Last check was on 1/19/18 with INR 1 14  Was being bridged at the time, but for two weeks is no longer taking enoxaparin  Continues to take warfarin 2 mg daily  Has not had INR checked  Says, " I don't like the needle"   Denies dyspnea on exertion  NYHA class 1  Able to walk 2 - 3 blocks with no dyspnea  No chest pain at rest or exertion  Sometimes gets dizzy/lightheaded  Lasting 10 - 20 minutes  At both rest and exertion  Occurs about 2 times per week  Denies presyncope or syncope  Denies smoking, excess alcohol or IVDA  Live with his girlfriend      Patient Active Problem List   Diagnosis    Colonic mass    [facility]omy[facility] [address] [address] [facility]    CAD (coronary artery disease)     Past Medical History:   Diagnosis Date    AICD (automatic cardioverter/defibrillator) present     medtronic     CAD (coronary artery disease)     Cardiomyopathy ([facility] [address] )     mixed    predominently nonischemic    Colonic mass     Coronary artery disease     Hyperlipidemia     Hypertension     Irregular heart beat     Myocardial infarction     2014 and 2015    Rectal bleeding     S/P AVR (aortic valve replacement)     mechanical    Wears glasses      Social History     Social History    Marital status: Single     Spouse name: N/A    Number of children: N/A    Years of education: N/A     Occupational History    Not on file  Social History Main Topics    Smoking status: Never Smoker    Smokeless tobacco: Never Used    Alcohol use No    Drug use: No    Sexual activity: Not on file     Other Topics Concern    Not on file     Social History Narrative    No narrative on file      Family History   Problem Relation Age of Onset    Diabetes Mother     Hypertension Mother      Past Surgical History:   Procedure Laterality Date    AORTIC VALVE REPLACEMENT      APPENDECTOMY      CARDIAC DEFIBRILLATOR PLACEMENT      COLECTOMY MADELIN Right     Last Assessed: 6/7/2016    COLECTOMY LAPAROSCOPIC      COLON SURGERY Right     colectomy    CORONARY STENT PLACEMENT      INSERT / Jerson Vika / REMOVE PACEMAKER      MITRAL VALVE REPAIR      MITRAL VALVE REPAIR      MOUTH SURGERY      NV COLONOSCOPY FLX DX W/COLLJ SPEC WHEN PFRMD N/A 1/15/2018    Procedure: COLONOSCOPY;  Surgeon: Olvin Delacruz MD;  Location: [facility]; Service: General    NV LAP,SURG,COLECTOMY, PARTIAL, W/ANAST N/A 6/2/2016    Procedure: RESECTION COLON RIGHT LAPAROSCOPIC HAND-ASSISTED;  Surgeon: Olvin Delacruz MD;  Location: [facility];  Service: General       Current Outpatient Prescriptions:     albuterol (PROVENTIL HFA,VENTOLIN HFA) 90 mcg/act inhaler, Inhale 2 puffs every 4 (four) hours as needed for wheezing, Disp: 1 Inhaler, Rfl: 0    amiodarone (PACERONE) 400 MG tablet, Take 200 mg by mouth daily  , Disp: , Rfl:     aspirin 81 MG tablet, Take 81 mg by mouth daily  , Disp: , Rfl:     Atorvastatin Calcium (LIPITOR PO), Take 80 mg by mouth daily    , Disp: , Rfl:     eplerenone (INSPRA) 25 mg tablet, Take 25 mg by mouth daily, Disp: , Rfl:     furosemide (LASIX) 40 mg tablet, Take 40 mg by mouth 2 (two) times a day , Disp: , Rfl:     lisinopril (ZESTRIL) 40 mg tablet, Take 40 mg by mouth every evening   , Disp: , Rfl:     Metoprolol Succinate (TOPROL XL PO), Take 50 mg by mouth daily  , Disp: , Rfl:     Potassium Chloride (KLOR-CON 10 PO), Take 20 mEq by mouth daily  , Disp: , Rfl:     warfarin (COUMADIN) 5 mg tablet, Take 4 mg by mouth daily Take 1/2 tablet by mouth once daily as directed , Disp: , Rfl:     enoxaparin (LOVENOX) 100 mg/mL, Inject under the skin daily, Disp: , Rfl:     nitroglycerin (NITROSTAT) 0 4 mg SL tablet, Place 0 4 mg under the tongue every 5 (five) minutes as needed for chest pain   , Disp: , Rfl:     RA TUSSIN CGH/CHEST CONGEST -10 MG/5ML oral liquid, take 5 milliliter by mouth three times a day if needed for cough FOR UP TO 10 DAYS, Disp: , Rfl: 0  No Known Allergies    Labs:  Lab Results   Component Value Date    WBC 9 57 12/19/2017    HGB 13 0 12/19/2017    HCT 40 1 12/19/2017    MCV 85 12/19/2017     12/19/2017     Lab Results   Component Value Date    GLUCOSE 114 12/19/2017    CALCIUM 9 3 12/19/2017     12/19/2017    K 4 5 12/19/2017    CO2 29 12/19/2017     12/19/2017    BUN 18 12/19/2017    CREATININE 1 22 12/19/2017     Lab Results   Component Value Date    HGBA1C 5 8 06/07/2016     Lab Results   Component Value Date    CHOL 126 07/21/2016    CHOL 92 10/29/2015    CHOL 141 07/07/2015     Lab Results   Component Value Date    HDL 48 07/21/2016    HDL 39 10/29/2015    HDL 53 07/07/2015     Lab Results   Component Value Date    LDLCALC 43 07/21/2016    LDLCALC 40 10/29/2015    LDLCALC 68 07/07/2015     Lab Results   Component Value Date    TRIG 177 (H) 07/21/2016    TRIG 67 10/29/2015    TRIG 98 07/07/2015     No components found for: CHOLHDL    Imaging:       Review of Systems:  Review of Systems   Constitutional: Negative  Negative for appetite change, chills, diaphoresis and fever  HENT: Negative  Eyes: Negative  Respiratory: Negative  Negative for apnea, cough, shortness of breath and stridor  Cardiovascular: Negative    Negative for chest pain, palpitations and leg swelling  Gastrointestinal: Negative  Endocrine: Negative  Genitourinary: Negative  Musculoskeletal: Negative  Neurological: Positive for dizziness  Negative for tremors, light-headedness and numbness  Hematological: Negative  Psychiatric/Behavioral: Negative  Physical Exam:  Physical Exam   Constitutional: He is oriented to person, place, and time  He appears well-developed and well-nourished  HENT:   Head: Normocephalic  Eyes: Pupils are equal, round, and reactive to light  Neck: Normal range of motion  No JVD present  Cardiovascular: Normal rate and regular rhythm  No murmur heard  Mechanical heart sound   Pulmonary/Chest: Effort normal and breath sounds normal  No respiratory distress  He has no wheezes  Abdominal: Soft  Musculoskeletal: Normal range of motion  Neurological: He is alert and oriented to person, place, and time  Skin: Skin is warm and dry  He is not diaphoretic  Psychiatric: He has a normal mood and affect   His behavior is normal

## 2018-02-10 DIAGNOSIS — Z95.2 H/O HEART VALVE REPLACEMENT WITH MECHANICAL VALVE: Primary | ICD-10-CM

## 2018-02-12 ENCOUNTER — ANTICOAG VISIT (OUTPATIENT)
Dept: INTERNAL MEDICINE CLINIC | Facility: CLINIC | Age: 53
End: 2018-02-12

## 2018-02-12 NOTE — PATIENT INSTRUCTIONS
Called to f/u with pt  Pt received call from Dr Destini Flood on Sat 2/10 with further instructions  Pt was instructed to take 6 mg of Coumadin daily and start Lovenox injections  Pt reports he was taking 4 mg of Coumadin sat & sun and not 6 mg  Pt will start 6 mg today 2/12  Pt  Started Lovenox injections on Sat 2/10 and will continue daily until given further instructions  Pt will go for PT/INR on Tuesday 2/13

## 2018-02-13 ENCOUNTER — APPOINTMENT (OUTPATIENT)
Dept: LAB | Facility: CLINIC | Age: 53
End: 2018-02-13
Payer: MEDICARE

## 2018-02-13 ENCOUNTER — TRANSCRIBE ORDERS (OUTPATIENT)
Dept: LAB | Facility: CLINIC | Age: 53
End: 2018-02-13

## 2018-02-13 DIAGNOSIS — Z95.2 HEART VALVE REPLACED BY TRANSPLANT: Primary | ICD-10-CM

## 2018-02-13 LAB
INR PPP: 3.73 (ref 0.86–1.16)
PROTHROMBIN TIME: 37.5 SECONDS (ref 12.1–14.4)

## 2018-02-13 PROCEDURE — 85610 PROTHROMBIN TIME: CPT

## 2018-02-13 PROCEDURE — 36415 COLL VENOUS BLD VENIPUNCTURE: CPT

## 2018-02-14 ENCOUNTER — TELEPHONE (OUTPATIENT)
Dept: INTERNAL MEDICINE CLINIC | Facility: CLINIC | Age: 53
End: 2018-02-14

## 2018-02-14 ENCOUNTER — ANTICOAG VISIT (OUTPATIENT)
Dept: INTERNAL MEDICINE CLINIC | Facility: CLINIC | Age: 53
End: 2018-02-14

## 2018-02-14 ENCOUNTER — TELEPHONE (OUTPATIENT)
Dept: FAMILY MEDICINE CLINIC | Facility: CLINIC | Age: 53
End: 2018-02-14

## 2018-02-14 DIAGNOSIS — I10 BENIGN ESSENTIAL HTN: Primary | ICD-10-CM

## 2018-02-14 NOTE — TELEPHONE ENCOUNTER
Pt  HAD INR DONE 2/13/18 AND RESULTS ARE IN UNDER LABS BUT I CANT LINK IT TO THEM  I HAVE TO GO IN AND TRY TO FIX IT BUT IN THE MEANTIME CAN YOU PLEASE REVIEW AND GIVE INSTRUCTIONS  HE IS ON 6 MG DAILY AS OF 2/10/18 AND ALSO ON LOVENOX   THANKS

## 2018-02-14 NOTE — PATIENT INSTRUCTIONS
Called and spoke with pt  Pt is aware he can STOP is Lovenox injections  Pt reports took Lovenox this morning and 6 mg of Coumadin today  Pt made aware no Lovenox injections and to not take the other one tonight  Pt made aware starting tomorrow new dose of Coumadin will be 4 mg Monday thru Friday and 6 mg Sat & Sun and bloodwork on Monday 2/19  Pt repeated instructions back to me correctly

## 2018-02-14 NOTE — TELEPHONE ENCOUNTER
Pt saw Dr Dee Martin with cardiology clinic at [facility] and he prefers to manage pt's Coumadin at this time  I called Dr Juan Carlos Thorpe office and made them aware and that we would call their office if Dr Dee Martin would stop managing it

## 2018-02-15 RX ORDER — POTASSIUM CHLORIDE 1500 MG/1
20 TABLET, FILM COATED, EXTENDED RELEASE ORAL DAILY
Qty: 30 TABLET | Refills: 5 | Status: SHIPPED | OUTPATIENT
Start: 2018-02-15 | End: 2018-09-26 | Stop reason: SDUPTHER

## 2018-02-19 ENCOUNTER — CLINICAL SUPPORT (OUTPATIENT)
Dept: CARDIOLOGY CLINIC | Facility: CLINIC | Age: 53
End: 2018-02-19
Payer: MEDICARE

## 2018-02-19 DIAGNOSIS — Z95.810 AICD (AUTOMATIC CARDIOVERTER/DEFIBRILLATOR) PRESENT: ICD-10-CM

## 2018-02-19 DIAGNOSIS — I25.5 ISCHEMIC CARDIOMYOPATHY: Primary | ICD-10-CM

## 2018-02-19 PROCEDURE — 93297 REM INTERROG DEV EVAL ICPMS: CPT | Performed by: INTERNAL MEDICINE

## 2018-02-19 PROCEDURE — 93299 PR REM INTERROG ICPMS/SCRMS <30 D TECH REVIEW: CPT | Performed by: INTERNAL MEDICINE

## 2018-02-21 ENCOUNTER — ANTICOAG VISIT (OUTPATIENT)
Dept: INTERNAL MEDICINE CLINIC | Facility: CLINIC | Age: 53
End: 2018-02-21

## 2018-02-21 NOTE — PROGRESS NOTES
[employer] TRANSMISSION - OPTI-VOL ONLY: ICD  BATTERY STATUS "OK"  AP 95% BVP 99% VSRP 1 3%  ALL AVAILABLE LEAD PARAMETERS WITHIN NORMAL LIMITS  NO SIGNIFICANT HIGH RATE EPISODES  2,321 VENT SENSING NOTED, MARKERS ONLY  OPTI-VOL WITHIN NORMAL LIMITS  NORMAL DEVICE FUNCTION   NC

## 2018-02-23 ENCOUNTER — TELEPHONE (OUTPATIENT)
Dept: INTERNAL MEDICINE CLINIC | Facility: CLINIC | Age: 53
End: 2018-02-23

## 2018-02-23 ENCOUNTER — APPOINTMENT (OUTPATIENT)
Dept: LAB | Facility: CLINIC | Age: 53
End: 2018-02-23
Payer: MEDICARE

## 2018-02-23 ENCOUNTER — TELEPHONE (OUTPATIENT)
Dept: MULTI SPECIALTY CLINIC | Facility: CLINIC | Age: 53
End: 2018-02-23

## 2018-02-23 ENCOUNTER — ANTICOAG VISIT (OUTPATIENT)
Dept: INTERNAL MEDICINE CLINIC | Facility: CLINIC | Age: 53
End: 2018-02-23

## 2018-02-23 DIAGNOSIS — Z95.2 HEART VALVE REPLACED BY TRANSPLANT: ICD-10-CM

## 2018-02-23 LAB
INR PPP: 7.8 (ref 0.86–1.16)
PROTHROMBIN TIME: 67.4 SECONDS (ref 12.1–14.4)

## 2018-02-23 PROCEDURE — 85610 PROTHROMBIN TIME: CPT

## 2018-02-23 PROCEDURE — 36415 COLL VENOUS BLD VENIPUNCTURE: CPT

## 2018-02-23 NOTE — PATIENT INSTRUCTIONS
Called and spoke with pt and denies any bleeding or bruising and states "I feel fine " Pt reports took Coumadin today 2/23  Pt made aware to hold Coumadin Sat 2/24 and Sun 2/25 and take 2 mg on Monday 2/26 and go for bloodwork on Tuesday 2/26  Instructions given by Dr Pastor Foster directly to us on the phone

## 2018-02-23 NOTE — TELEPHONE ENCOUNTER
Called and spoke with pt  Pt reports took Coumadin today already  Pt denies any bleeding or bruising anywhere and states, "I feel fine " Pt made aware to hold Coumadin tomorrow 2/24 & 2/25 and do not take any Coumadin until we f/u with him on Monday  Pt reports understanding of this

## 2018-02-27 DIAGNOSIS — I50.9 CHF (CONGESTIVE HEART FAILURE) (HCC): Primary | ICD-10-CM

## 2018-02-28 ENCOUNTER — OFFICE VISIT (OUTPATIENT)
Dept: CARDIOLOGY CLINIC | Facility: CLINIC | Age: 53
End: 2018-02-28
Payer: MEDICARE

## 2018-02-28 VITALS
DIASTOLIC BLOOD PRESSURE: 70 MMHG | SYSTOLIC BLOOD PRESSURE: 115 MMHG | BODY MASS INDEX: 25.62 KG/M2 | WEIGHT: 159.4 LBS | HEIGHT: 66 IN | HEART RATE: 60 BPM

## 2018-02-28 DIAGNOSIS — Z95.2 AORTIC VALVE REPLACED: ICD-10-CM

## 2018-02-28 DIAGNOSIS — I25.10 CORONARY ARTERY DISEASE INVOLVING NATIVE CORONARY ARTERY OF NATIVE HEART WITHOUT ANGINA PECTORIS: Chronic | ICD-10-CM

## 2018-02-28 DIAGNOSIS — I47.2 VENTRICULAR TACHYCARDIA (HCC): ICD-10-CM

## 2018-02-28 DIAGNOSIS — I42.9 CARDIOMYOPATHY, UNSPECIFIED TYPE (HCC): Primary | ICD-10-CM

## 2018-02-28 PROCEDURE — 99214 OFFICE O/P EST MOD 30 MIN: CPT | Performed by: INTERNAL MEDICINE

## 2018-02-28 PROCEDURE — 93000 ELECTROCARDIOGRAM COMPLETE: CPT | Performed by: INTERNAL MEDICINE

## 2018-03-01 ENCOUNTER — APPOINTMENT (OUTPATIENT)
Dept: LAB | Facility: CLINIC | Age: 53
End: 2018-03-01
Payer: MEDICARE

## 2018-03-01 DIAGNOSIS — Z95.2 HEART VALVE REPLACED BY TRANSPLANT: ICD-10-CM

## 2018-03-01 LAB
INR PPP: 4.17 (ref 0.86–1.16)
PROTHROMBIN TIME: 41 SECONDS (ref 12.1–14.4)

## 2018-03-01 PROCEDURE — 36415 COLL VENOUS BLD VENIPUNCTURE: CPT

## 2018-03-01 PROCEDURE — 85610 PROTHROMBIN TIME: CPT

## 2018-03-02 ENCOUNTER — ANTICOAG VISIT (OUTPATIENT)
Dept: INTERNAL MEDICINE CLINIC | Facility: CLINIC | Age: 53
End: 2018-03-02

## 2018-03-02 NOTE — PROGRESS NOTES
Cardiology Follow Up    Romie Fishman  1965  [id number]  [address]    1  Cardiomyopathy, unspecified type [facility])  POCT ECG    Ambulatory referral to Cardiac Electrophysiology       Interval History:  No complaints at office today  Denies chest pain shortness of breath orthopnea paroxysmal nocturnal dyspnea or syncope    Patient Active Problem List   Diagnosis    Colonic mass    Cardiomyopathy ([facility] )    CAD (coronary artery disease)     Past Medical History:   Diagnosis Date    AICD (automatic cardioverter/defibrillator) present     medtronic     CAD (coronary artery disease)     Cardiomyopathy ([facility] )     mixed  predominently nonischemic    Colonic mass     Coronary artery disease     Hyperlipidemia     Hypertension     Irregular heart beat     Myocardial infarction     2014 and 2015    Rectal bleeding     S/P AVR (aortic valve replacement)     mechanical    Wears glasses      Social History     Social History    Marital status: Single     Spouse name: N/A    Number of children: N/A    Years of education: N/A     Occupational History    Not on file       Social History Main Topics    Smoking status: Never Smoker    Smokeless tobacco: Never Used    Alcohol use No    Drug use: No    Sexual activity: Not on file     Other Topics Concern    Not on file     Social History Narrative    No narrative on file      Family History   Problem Relation Age of Onset    Diabetes Mother     Hypertension Mother      Past Surgical History:   Procedure Laterality Date    AORTIC VALVE REPLACEMENT      APPENDECTOMY      CARDIAC DEFIBRILLATOR PLACEMENT      COLECTOMY MADELIN Right     Last Assessed: 6/7/2016    COLECTOMY LAPAROSCOPIC      COLON SURGERY Right     colectomy    CORONARY STENT PLACEMENT      INSERT / REPLACE / [address]      MITRAL VALVE REPAIR      MOUTH SURGERY      IN COLONOSCOPY FLX DX W/COLLJ SPEC WHEN PFRMD N/A 1/15/2018    Procedure: COLONOSCOPY;  Surgeon: Addie Soni MD;  Location[facility]; Service: General    IN LAP,SURG,COLECTOMY, PARTIAL, W/ANAST N/A 6/2/2016    Procedure: RESECTION COLON RIGHT LAPAROSCOPIC HAND-ASSISTED;  Surgeon: Addie Soni MD;  Location: [facility];  Service: General       Current Outpatient Prescriptions:     albuterol (PROVENTIL HFA,VENTOLIN HFA) 90 mcg/act inhaler, Inhale 2 puffs every 4 (four) hours as needed for wheezing, Disp: 1 Inhaler, Rfl: 0    amiodarone (PACERONE) 400 MG tablet, Take 200 mg by mouth daily  , Disp: , Rfl:     aspirin 81 MG tablet, Take 81 mg by mouth daily  , Disp: , Rfl:     Atorvastatin Calcium (LIPITOR PO), Take 80 mg by mouth daily  , Disp: , Rfl:     eplerenone (INSPRA) 25 mg tablet, Take 25 mg by mouth daily, Disp: , Rfl:     furosemide (LASIX) 40 mg tablet, Take 40 mg by mouth 2 (two) times a day , Disp: , Rfl:     lisinopril (ZESTRIL) 40 mg tablet, Take 40 mg by mouth every evening   , Disp: , Rfl:     Metoprolol Succinate (TOPROL XL PO), Take 50 mg by mouth daily    , Disp: , Rfl:     nitroglycerin (NITROSTAT) 0 4 mg SL tablet, Place 0 4 mg under the tongue every 5 (five) minutes as needed for chest pain   , Disp: , Rfl:     Potassium Chloride ER 20 MEQ TBCR, Take 1 tablet (20 mEq total) by mouth daily, Disp: 30 tablet, Rfl: 5    warfarin (COUMADIN) 5 mg tablet, Take 4 mg by mouth daily Take 1/2 tablet by mouth once daily as directed , Disp: , Rfl:     enoxaparin (LOVENOX) 100 mg/mL, Inject under the skin daily, Disp: , Rfl:     enoxaparin (LOVENOX) 100 mg/mL, Inject 0 7 mL (70 mg total) under the skin 2 (two) times a day for 10 days, Disp: 14 mL, Rfl: 0    RA TUSSIN CGH/CHEST CONGEST -10 MG/5ML oral liquid, take 5 milliliter by mouth three times a day if needed for cough FOR UP TO 10 DAYS, Disp: , Rfl: 0  No Known Allergies    Labs:  Appointment on 02/23/2018   Component Date Value    Protime 02/23/2018 67 4*    INR 02/23/2018 7 80*   Transcribe Orders on 02/13/2018   Component Date Value    Protime 02/13/2018 37 5*    INR 02/13/2018 3 73*   Anticoag visit on 02/12/2018   Component Date Value    INR 02/09/2018 1 61*   Anticoag visit on 02/12/2018   Component Date Value    INR 02/09/2018 1 61*   Anticoag visit on 02/12/2018   Component Date Value    INR 02/09/2018 1 61*   Appointment on 02/09/2018   Component Date Value    Protime 02/09/2018 19 3*    INR 02/09/2018 1 61*   Appointment on 01/19/2018   Component Date Value    Protime 01/19/2018 14 6*    INR 01/19/2018 1 14    Appointment on 01/08/2018   Component Date Value    Protime 01/08/2018 21 9*    INR 01/08/2018 1 89*     Imaging: No results found  Review of Systems:  Review of Systems   Constitutional: Negative for fatigue  HENT: Negative for nosebleeds  Eyes: Negative for redness  Respiratory: Negative for chest tightness and shortness of breath  Cardiovascular: Negative for chest pain, palpitations and leg swelling  Gastrointestinal: Negative for abdominal pain  Endocrine: Negative for polyuria  Genitourinary: Negative for urgency  Musculoskeletal: Negative for arthralgias  Skin: Negative for rash  Neurological: Negative for dizziness and syncope  Psychiatric/Behavioral: Negative for confusion and sleep disturbance  The patient is not nervous/anxious  Physical Exam:  Physical Exam   Constitutional: He is oriented to person, place, and time  He appears well-developed and well-nourished  HENT:   Head: Normocephalic and atraumatic  Nose: Nose normal    Mouth/Throat: Oropharynx is clear and moist    Eyes: Pupils are equal, round, and reactive to light  Neck: Neck supple  Cardiovascular: Normal rate and regular rhythm  Murmur heard     Systolic murmur is present with a grade of 2/6   Mechanical S2   Pulmonary/Chest: Effort normal and breath sounds normal    Abdominal: Soft  Bowel sounds are normal    Musculoskeletal: Normal range of motion  Neurological: He is alert and oriented to person, place, and time  Skin: Skin is warm and dry  Psychiatric: He has a normal mood and affect  His behavior is normal  Judgment and thought content normal        Discussion/Summary:Discussion/Summary:    The patient is normally seen in our Cardiology Clinic please see the nice synopsis below from his most recent visit  I placed stents in the LAD and left circumflex in 2014  From the standpoint he is stable and has no ongoing angina or evidence of ischemic heart disease  Improved his compliance with Coumadin and has been undergoing INR testing with the coagulation Clinic and has been therapeutic  His most pertinent issue he is taking amiodarone long term in the setting of ventricular fibrillation and potential toxicities  He will be set up with EPS  He will continue to follow in the Cardiology Clinic  I can see him again as needed  1  RHD s/p mechanical AVR - with non compliance to INR testing  2  Ischemic Cardiomyopathy s/p BiV-ICD  3  History of vfib with device interrogation showing ATP x 3 on 1/6/17 - currently on amiodarone  4  CAD s/p PCI in 2014 to LAD and left circumflex     47year old man with past medical history of RHD with a mechanical AVR, CAD s/p PCI with BMS in 2014 to LAD and circumflex, ischemic cardiomyopathy s/p BiV-ICD and VT/Vfib on amiodarone who presents for follow up of his multiple medical issues      RHD s/p mechanical AVR- last INR 1 14 : follow up INR  Goal INR is 2 5 - 3 5   Check INR in office today  Needs close monitoring given his mechanical prosthesis and cardiomyopathy   If INR subtherapeutic, will call in enoxaparin for bridging  Patient states he is aware and knows how to administer enoxaparin as he has done so while he was being bridged  Does not want to go to hospital for bridging (if needed)    Has not been getting INR checked because of needle stick but states he will do so regularly going forward       Ischemic cardiomyopathy:s/p BiVICD:  Asymptomatic  NYHA class 1 - 2  Weight 157 lbs today  On metoprolol succinate 50 mg bid, lisinopril 40 mg daily, furosemide 40 mg bid, and eplerenone 25 mg daily      History of V fib/ Fast VT - ATPx3 on 01/06/ 2017  one episode lasted > 30 sec  asymptomatic  On Amiodarone 400 mg daily  Decrease to 200 mg daily  LFT in 12/19 WNL  PFTs 3/17: Normal spirometry with significant bronchodilator response,Normal lung volumes, mildly decreased diffusion capacity  Young patient and amiodarone not ideal    Consider transitioning to Sotalol/ Dofetilide  Will refer to electrophysiology     CAD s/p px LAD 90%, Px circ 90% lesion stented with BMS- 11/2014, continue ASA, statin, metoprolol  stress test: Severe fixed defect of enitre anterior and apical wall and of entire inferior and inferolateral walls   No reversible ischemia

## 2018-03-02 NOTE — PROGRESS NOTES
PER VERBAL COMMUNICATION WITH Dr Lenny Moody HE WANTS Pt  TO START COUMADIN 2 MG DAILY AND REPEAT LABS ON 3/6/18   Pt  MADE AWARE

## 2018-03-06 ENCOUNTER — APPOINTMENT (OUTPATIENT)
Dept: LAB | Facility: CLINIC | Age: 53
End: 2018-03-06
Payer: MEDICARE

## 2018-03-06 DIAGNOSIS — Z95.2 HEART VALVE REPLACED BY TRANSPLANT: ICD-10-CM

## 2018-03-06 LAB
INR PPP: 2.12 (ref 0.86–1.16)
PROTHROMBIN TIME: 24 SECONDS (ref 12.1–14.4)

## 2018-03-06 PROCEDURE — 85610 PROTHROMBIN TIME: CPT

## 2018-03-06 PROCEDURE — 36415 COLL VENOUS BLD VENIPUNCTURE: CPT

## 2018-03-07 NOTE — PROGRESS NOTES
"  Discussion/Summary  Normal device function      Results/Data  Results   Cardiac Device Remote [id number] 06:53PM Radha Romero     Test Name Result Flag Reference   MISCELLANEOUS COMMENT (Report)     CARELINK TRANSMISSION: BATTERY STATUS "OK"  BVP 95 4% VSR PACE 3 6% AP 10 3%  ALL AVAILABLE LEAD PARAMETERS WITHIN NORMAL LIMITS  OPTI-VOL WITHIN NORMAL LIMITS  2 VT-NS NOTED, NO THERAPIES GIVEN  PT ON METO SUCC & EF 20% (2015), LONGEST 2 SECS ~ 8 BEATS  VENT SENSING EPISODES NOTED  NORMAL DEVICE FUNCTION  NC   Cardiac Electrophysiology Report      mbvimoaedna[id number]}  pdf   DEVICE TYPE CRT-D       Cardiac Electrophysiology Report [id number] 06:53PM Radhaslick Romero     Test Name Result Flag Reference   Cardiac Electrophysiology Report      juujkfghdsf[id number]  pdf     Signatures   Electronically signed by : Kamini Raza, ; Mar 28 2016  1:45PM EST                       (Author)    Electronically signed by : IDALIA Rhodes ; Mar 28 2016  5:38PM EST                       (Author)    "

## 2018-03-07 NOTE — PROGRESS NOTES
"  Discussion/Summary  Normal device function      Results/Data  Cardiac Device Remote [id number] 06:25AM Virginia Aus     Test Name Result Flag Reference   MISCELLANEOUS COMMENT (Report)     CARELINK TRANSMISSION - OPTI-VOL ONLY: OPTI-VOL WITHIN NORMAL LIMITS; AP = 22 6%, BVP = 95 8% (  = 94 6%, VSR = 1 2%); NORMAL DEVICE FUNCTION  eb X0A0A**BATTERY STATUS "OK" (7 7 YRS); NO SIGNIFICANT HIGH RATE EPISODES; (665) BENIGN VENTRICULAR SENSING EPISODES NOTED (CHANNEL MARKERS ONLY) - FUSION; ALL AVAILABLE LEAD PARAMETERS APPEAR WITHIN NORMAL LIMITS/STABLE  eb   Cardiac Electrophysiology Report      [id number]}  pdf   DEVICE TYPE CRT-D       Cardiac Electrophysiology Report [id number] 06:25AM Virginia Aus     Test Name Result Flag Reference   Cardiac Electrophysiology Report      [id number]  pdf     Signatures   Electronically signed by : Alan Obregon, ; Jul 22 2016  3:04PM EST                       (Author)    Electronically signed by : IDALIA Wilks ; Jul 22 2016  6:54PM EST                       (Author)    "

## 2018-03-07 NOTE — PROGRESS NOTES
"  Discussion/Summary  Normal device function     VF noted and appropriate with ATP  consider sotalol or amiodarone as soon as possible  If electrolyte abnormality chance is high, use amiodarone     Results/Data  Cardiac Device Remote [id number] 07:26AM Vanna First     Test Name Result Flag Reference   MISCELLANEOUS COMMENT (Report)     CARELINK TRANSMISSION - OPTI-VOL ONLY: OPTI-VOL WITHIN NORMAL LIMITS  BATTERY VOLTAGE ADEQUATE (6 7 YRS)  AP-16%, BVP-90 7% (TOTAL -85 2% + VSR PACE-5 5%)  3 DEVICE CLASSIFIED VF EPISODES (AVG CL~220MS) ON 1/6/17 TREATED SUCCESSFULLY W/ ATP X 1-3  3 NSVT EPISODES LONGEST 12 BEATS, AVG CL~290MS  EF-25% (ECHO 3/21/16)  PT ON WARFARIN, ASA 81MG & METOPROLOL  [address]  ALL AVAILABLE LEAD PARAMETERS WITHIN NORMAL LIMITS  NORMAL DEVICE FUNCTION  GV   Cardiac Electrophysiology Report      slh[id number]_[facility]_[id number]  pdf   DEVICE TYPE CRT-D       Cardiac Electrophysiology Report [id number] 07:26AM Vanna First     Test Name Result Flag Reference   Cardiac Electrophysiology Report      [id number]  pdf     Signatures   Electronically signed by : Ciera Gaspar RN; Jan 13 2017  3:35PM EST                       (Author)    Electronically signed by : IDALIA Brambila ; Richard 15 2017  8:41PM EST                       (Author)    "

## 2018-03-07 NOTE — PROGRESS NOTES
"  Discussion/Summary  Normal device function      Results/Data  Cardiac Device Remote [id number] 12:27PM Marie Riddle     Test Name Result Flag Reference   MISCELLANEOUS COMMENT (Report)     CARELINK TRANSMISSION - OPTI-VOL ONLY: OPTI-VOL FLUID THRESHOLD TRENDING UPWARD BUT NOT YET CROSSED  WILL IGGY IN 31 DAYS   eb [id number] VOLTAGE ADEQUATE (5 7 YR)  AP - 86 2% BP - 97 9% (- 95 3% + VSRP- 2 6%)  ALL AVAILABLE LEAD PARAMETERS APPEAR WITHIN NORMAL LIMITS  NO SIGNIFICANT HIGH RATE EPISODES  4,120 V SENSE EPISODES (38 MIN /D) WITH AVAILABLE CHANNEL MARKERS SHOWING FUSION , PVCs, AT @ 148 BPM  NORMAL DEVICE FUNCTION  eb   Cardiac Electrophysiology Report      ASP[id number]_[facility]_[id number]_[id number]  pdf   DEVICE TYPE CRT-D       Cardiac Electrophysiology Report [id number] 12:27PM Marie Riddle     Test Name Result Flag Reference   Cardiac Electrophysiology Report      [id number] pdf     Signatures   Electronically signed by : Merced Burkitt, ; Oct  5 2017  1:40PM EST                       (Author)    Electronically signed by : Randall Castro DO; Oct  8 2017 11:24AM EST                       (Author)    "

## 2018-03-07 NOTE — PROGRESS NOTES
"  Discussion/Summary  Normal device function      Results/Data  Cardiac Device Remote [id number] 06:25AM Alexander Emile     Test Name Result Flag Reference   MISCELLANEOUS COMMENT (Report)     CARELINK TRANSMISSION: BATTERY VOLTAGE ADEQUATE 7 YEARS  AP= 5 3% BVP=90% VSR PACING=4 1% [ TOTAL BVP=94 1%]  ALL AVAILABLE LEAD PARAMETERS WITHIN NORMAL LIMITS  3 V SENSE EPISODES W/ MARKERS ONLY SHOWING 6 BEAT NSVT , PAT @ 146 FOR 33 SECS , FREQUENT PVCs AND BENIGN VENTRICULAR FUSION BEATS NOTED  ALL SAME DAY  PT TAKES ASA 81, METOPROLOL SUCC AND WARFARIN  EF 20% [ECHO FEB 2015]  OPTI-VOL WITHIN NORMAL LIMITS  NO OTHER SIGNIFICANT HIGH RATE EPISODES  NORMAL DEVICE FUNCTION  DL   Cardiac Electrophysiology Report      slhbiom[id number]_[id number]_[facility]_[id number]  pdf   DEVICE TYPE CRT-D       Cardiac Electrophysiology Report [id number] 06:25AM Alexander Emile     Test Name Result Flag Reference   Cardiac Electrophysiology Report      lvw[id number]  pdf     Signatures   Electronically signed by : Shawn Crenshaw, ; Dec  5 2016  1:31PM EST                       (Author)    Electronically signed by : IDALIA Rico ; Dec  5 2016  3:44PM EST                       (Author)    "

## 2018-03-07 NOTE — PROGRESS NOTES
"  Discussion/Summary  Normal device function     Increase lower rate to 60/min  BiV pacing at 94%  Results/Data  Cardiac Device Remote [id number] 05:23AM Parnell Carbine     Test Name Result Flag Reference   MISCELLANEOUS COMMENT (Report)     CARELINK TRANSMISSION - OPTI-VOL ONLY: OPTI-VOL WITHIN NORMAL LIMITS  NORMAL DEVICE FUNCTION  [id number]*BATTERY STATUS "OK"  AP 67% BVP 94% ( 90 VSR PACE 4)  NO SIGNIFICANT HIGH RATE EPISODES  5,837 VENT SENSING NOTED, MARKERS ONLY  ALL AVAILABLE LEAD PARAMETERS WITHIN NORMAL LIMITS  *   Cardiac Electrophysiology Report      slhbiom[id number]_[id number]_[id number]  pdf   DEVICE TYPE CRT-D       Cardiac Electrophysiology Report [id number] 05:23AM Parnell Carbine     Test Name Result Flag Reference   Cardiac Electrophysiology Report      [id number]  pdf     Signatures   Electronically signed by : Kaylie Crowder, ; Apr 26 2017  2:33PM EST                       (Author)    Electronically signed by : IDALIA Meraz ; Apr 30 2017  7:27AM EST                       (Author)    "

## 2018-03-07 NOTE — PROGRESS NOTES
"  Discussion/Summary  Normal device function      Results/Data  Cardiac Device Remote [id number] 03:48PM [facility] [place]     Test Name Result Flag Reference   MISCELLANEOUS COMMENT (Report)     CARELINK TRANSMISSION - OPTI-VOL ONLY: OPTI-VOL WITHIN NORMAL LIMITS  X0A0A***PRESENTING RHYTHM AS/BVP @ 67 BPM  BATTERY VOLTAGE ADEQUATE 6 8 YEARS  AP=22 1% BVP=97 6% VSR PACING =0 5% X0A[TOTAL BVP=98 1%]  ALL AVAILABLE LEAD PARAMETERS WITHIN NORMAL LIMITS  NO SIGNIFICANT HIGH RATE EPISODES  NORMAL DEVICE FUNCTION  DL   Cardiac Electrophysiology Report      slhb[id number]_[facility]_[id number]_[facility]_[id number]  pdf   DEVICE TYPE CRT-D       Cardiac Electrophysiology Report [id number] 03:48PM [facility] [place]     Test Name Result Flag Reference   Cardiac Electrophysiology Report      ezvhfph[id number]  pdf     Signatures   Electronically signed by : Kristine Jo ; Feb 14 2017 11:12AM EST                       (Author)    Electronically signed by : Jelena Higginbotham DO; Feb 19 2017 12:27PM EST                       (Author)    "

## 2018-03-07 NOTE — PROGRESS NOTES
"  Discussion/Summary  Normal device function      Results/Data  Results   Cardiac Device Remote 25Jan2016 05:14PM MerleCearna     Test Name Result Flag Reference   MISCELLANEOUS COMMENT (Report)     CARELINK TRANSMISSION - OPTI-VOL ONLY: OPTI-VOL FLUID THRESHOLD HAD BEEN CROSSED BUT NOW WNL; AP= 4 8%, BVP = 92 9% (  = 84 3%, VSR = 8 6%); NORMAL DEVICE FUNCTION  eb X0A** BATTERY VOLTAGE ADEQUATE( 7 9 YRS) ; (1) NSVT EPISODES - 7 BEATS/AVG  MS; HX OF SAME - EF = 20% ; >= 4400 VENTRICULAR SENSING EPISODES @ ~ 40 MIN/DAY - 1 EGRM AVAILABLE = FUSION; PT FOLLOWED BY CLINIC - TAKES ASA 81, WARFARIN, METOPROLOL  eb   Cardiac Electrophysiology Report      [id number]}  pdf   DEVICE TYPE CRT-D       Cardiac Electrophysiology Report [id number] 05:14PM Merle Blabroom     Test Name Result Flag Reference   Cardiac Electrophysiology Report      [id number]  pdf     Signatures   Electronically signed by : Soraya Suh, ; Jan 25 2016  1:03PM EST                       (Author)    Electronically signed by : Emelia Matthews DO; Feb 7 2016  7:54PM EST                       (Author)    "

## 2018-03-07 NOTE — PROGRESS NOTES
"  Discussion/Summary  Normal device function     Adequate BiV pacing  Results/Data  Cardiac Device Remote [id number] 08:33AM Elicia Hodgkins     Test Name Result Flag Reference   MISCELLANEOUS COMMENT (Report)     CARELINK TRANSMISSION - OPTI-VOL ONLY: OPTI-VOL WITHIN NORMAL LIMITS  X0A0A***PRESENTING RHYTHM AP/BVP @ 60 PPM  BATTERY VOLTAGE ADEQUATE 5 YEARS  AP=92 6% BVP=96 8% VSR PACING=1 7%X0A[TOTAL BVP=97 7%]  ALL AVAILABLE LEAD PARAMETERS WITHIN NORMAL LIMITS  [facility]  NO SIGNIFICANT HIGH RATE EPISODES  NORMAL DEVICE FUNCTION  DL   Cardiac Electrophysiology Report      ASP[id number]_[facility]_[id number]  pdf   DEVICE TYPE CRT-D       Cardiac Electrophysiology Report [id number] 08:33AM Elicia Hodgkins     Test Name Result Flag Reference   Cardiac Electrophysiology Report      [id number]  pdf     Signatures   Electronically signed by : Alisha Herrera, ; Jan 17 2018  1:01PM EST                       (Author)    Electronically signed by : IDALIA Schumacher ; Jan 21 2018  7:23PM EST                       (Author)    "

## 2018-03-07 NOTE — PROGRESS NOTES
"  Discussion/Summary  Normal device function      Results/Data  Cardiac Device Remote [id number] 07:16AM Noble [place]     Test Name Result Flag Reference   MISCELLANEOUS COMMENT (Report)     CARELINK TRANSMISSION - OPTI-VOL ONLY: BATTERY STATUS "OK"  AP 88 8% BVP 98 6% ( 96 4 VSR 2 2)  ALL AVAILABLE LEAD PARAMETERS WITHIN NORMAL LIMITS  NO SIGNIFICANT HIGH RATE EPISODES  4,620 VENT SENSING NOTED, MARKERS ONLY  OPTI-VOL WITHIN NORMAL LIMITS  NORMAL DEVICE FUNCTION  NC   Cardiac Electrophysiology Report      slh[id number]_[id number]  pdf   DEVICE TYPE CRT-D       Cardiac Electrophysiology Report [id number] 07:16AM Tune Clout [place]     Test Name Result Flag Reference   Cardiac Electrophysiology Report      yrnlnrhlg[id number]  pdf     Signatures   Electronically signed by : Yadira Cornelius, ; Jul 12 2017 11:38AM EST                       (Author)    Electronically signed by : IDALIA Frazier ; Jul 12 2017  1:30PM EST                       (Author)    "

## 2018-03-07 NOTE — PROGRESS NOTES
"  Discussion/Summary  Normal device function     Increased LRL to increase BiV pacing  Results/Data  Cardiac Device In Clinic [id number] 02:32PM Omar Doran     Test Name Result Flag Reference   MISCELLANEOUS COMMENT (Report)     DEVICE INTERROGATED IN THE [place] OFFICE  REPROGRAMMING LRL & OPTI-VOL ONLY PER DR WINCHESTER  AP = 36%, BVP = 94 3% ( = 90 6% + VSR = 3 7%)  X0AOPTI-VOL WITHIN NORMAL LIMITS  LRL INCREASED FROM 50 BPM tO 60 BPM PER DR WINCHESTER TO HELP PROMOTE INCREASED BVP%  WILL RECHECK IN 31 DAYS  NO OTHER PROGRAMMING CHANGES MADE TO DEVICE PARAMETERS  APPROPRIATELY FUNCTIONING ICD  eb X0A**ALL AVAILABLE LEAD PARAMETERS APPEAR WITHIN NORMAL LIMITS  NO NEW SIGNIFICANT HIGH RATE EPISODES  7 VENTRICULAR SENSING EPISODES (CHANNEL MARKERS ONLY) WITH LONGEST DURATION @ 19 SECS  STIPS AVAILABLE FOR PVC, FUSION, VSR PACING ALL DATED FROM TODAY 5/23/17      Cardiac Electrophysiology Report      khzivev[id number] [facility]_[id number] [id number]  pdf   DEVICE TYPE CRT-D       Cardiac Electrophysiology Report [id number] 02:32PM Omar Doran     Test Name Result Flag Reference   Cardiac Electrophysiology Report      [id number]  pdf     Signatures   Electronically signed by : Travis Zuniga, ; May 23 2017 11:12AM EST                       (Author)    Electronically signed by : IDALIA Padron ; May 27 2017  6:31AM EST                       (Author)    "

## 2018-03-07 NOTE — PROGRESS NOTES
"  Discussion/Summary  Normal device function      Results/Data  Cardiac Device Remote [id number] 09:27AM Roy June     Test Name Result Flag Reference   MISCELLANEOUS COMMENT (Report)     CARELINK TRANSMISSION - OPTI-VOL ONLY: OPTI-VOL FLUID THRESHOLD APPEARS TO BE RISING BUT NOT CROSSED  WILL IGGY IN 31 DAYS  W5NWQLACDO STATUS ADEQUATE ( 5 8 YRS)  AP 90% BVP 98 9% ( 97 3% + VSR 1 6%)  ALL AVAILABLE LEAD PARAMETERS APPEAR WITHIN NORMAL LIMITS & STABLE  NO SIGNIFICANT HIGH RATE EPISODES  3,096 VENTRICULAR SENSING EPISODES NOTED (CHANNEL MARKERS ONLY) FOR V FUSION, VSRP  APPROPRIATELY FUNCTIONING BIV ICD  eb   Cardiac Electrophysiology Report      slh[id number]_[id number]  pdf   DEVICE TYPE CRT-D       Cardiac Electrophysiology Report [id number] 09:27AM Roy June     Test Name Result Flag Reference   Cardiac Electrophysiology Report      [id number]  pdf     Signatures   Electronically signed by : Soraya Suh, ; Aug  8 2017 12:38PM EST                       (Author)    Electronically signed by : IDALIA Ibarra ; Aug  8 2017 12:43PM EST                       (Author)    "

## 2018-03-07 NOTE — PROGRESS NOTES
"  Discussion/Summary  Normal device function      Results/Data  Cardiac Device Remote [id number] 07:27PM Suri Gore     Test Name Result Flag Reference   MISCELLANEOUS COMMENT (Report)     CARELINK TRANSMISSION: BATTERY VOLTAGE ADEQUATE  (5 2 YRS)  AP 92% BVP 95%  ALL AVAILABLE LEAD PARAMETERS WITHIN NORMAL LIMITS  NO SIGNIFICANT HIGH RATE EPISODES  VENTRICULAR SENSE EPISODES DETECTED  OPTI-VOL FLUID THRESHOLD CROSSED  NP TASKED  NORMAL DEVICE FUNCTION  ---HERNANDEZ   Cardiac Electrophysiology Report      ASPACE[id number]_[id number]  pdf   DEVICE TYPE CRT-D       Cardiac Electrophysiology Report [id number] 07:27PM Suri Gore     Test Name Result Flag Reference   Cardiac Electrophysiology Report      [id number]  pdf     Signatures   Electronically signed by : Rox Dawson, ; Dec 13 2017 11:50AM EST                       (Author)    Electronically signed by : Heather Chopra DO; Dec 17 2017  2:36PM EST                       (Author)    "

## 2018-03-07 NOTE — PROGRESS NOTES
"  Discussion/Summary  Normal device function      Results/Data  Cardiac Device Remote [id number] 11:17AM Charlene Connolly     Test Name Result Flag Reference   MISCELLANEOUS COMMENT (Report)     CARELINK TRANSMISSION - OPTI-VOL ONLY: OPTI-VOL WITHIN NORMAL LIMITS BUT RISING  WILL RECHECK IN 1 MONTH  BATTERY VOLTAGE ADEQUATE (5 4 YRS)  AP-85%, BVP-97%  CHRONICALLY SMALL PWAVES-0 4MV  ALL OTHER AVAILABLE LEAD PARAMETERS WITHIN NORMAL LIMITS  NO SIGNIFICANT HIGH RATE EPISODES  5,666 VENT SENSING EPISODES- PVC'S, FUSION BEATS, PROB ATRIAL UNDERSENSING  WILL NEED TO ADJUST ATRIAL SENSITIVITY @ NEXT INTERRO  NORMAL DEVICE FUNCTION  GV   Cardiac Electrophysiology Report      ASPA[id number]_[id number]  pdf   DEVICE TYPE CRT-D       Cardiac Electrophysiology Report [id number] 11:17AM Charlene Connolly     Test Name Result Flag Reference   Cardiac Electrophysiology Report      [id number]  pdf     Signatures   Electronically signed by : Alise Evans RN; Nov 7 2017  9:02AM EST                       (Author)    Electronically signed by : Som Schmitt DO; Nov 7 2017  8:05PM EST                       (Author)    "

## 2018-03-07 NOTE — PROGRESS NOTES
"  Discussion/Summary  Normal device function      Results/Data  Cardiac Device In Clinic 02Sep2016 02:42PM Suri Gore     Test Name Result Flag Reference   MISCELLANEOUS COMMENT (Report)     DEVICE INTERROGATED IN THE [place] OFFICE; BATTERY VOLTAGE ADEQUATE (7 4 YRS); AP = 14 75%, BVP = 98 7% ( = 97 6%, VSR = 1 1%); NO SIGNIFICANT HIGH RATE EPISODES; (8) VENTRICULAR SENSING EPISODES (CHANNEL MARKERS ONLY) - APPEARS TO BE ATACH vs STACH WITH 1:1 CONDUCTION/AVG -450 MS/LONGEST DURATION @ 28 MINS  ; PT WITH HX OF SAME -TAKES METOPROLOL SUCC , ASA, WARFARIN; ALL LEAD PARAMETERS TEST WITHIN NORMAL LIMITS/STABLE; RV AMPLITUDE INCREASED TO 2 0 mv FOR CLINIC SETTINGS; NO OTHER PROGRAMMING CHANGES MADE TO DEVICE PARAMETERS; OPTI-VOL WITHIN NORMAL LIMITS; NORMAL DEVICE FUNCTION  eb   Cardiac Electrophysiology Report      mwmigtr[id number] [facility]_[id number]_Session [id number]_02_[id number]  pdf   DEVICE TYPE CRT-D       Cardiac Electrophysiology Report 02Sep2016 02:42PM Suri Gore     Test Name Result Flag Reference   Cardiac Electrophysiology Report      [id number]  pdf     Signatures   Electronically signed by : Jayshree Chan, ; Sep  2 2016 11:16AM EST                       (Author)    Electronically signed by : Heather Chopra DO; Sep  5 2016  9:52AM EST                       (Author)    "

## 2018-03-07 NOTE — PROGRESS NOTES
"  Discussion/Summary  Normal device function      Results/Data  Cardiac Device Remote [id number] 06:33AM Lizzy Dixon     Test Name Result Flag Reference   MISCELLANEOUS COMMENT (Report)     CARELINK TRANSMISSION: BATTERY VOLTAGE ADEQUATE (5 7 YR)  AP 84 7% BP 98 2% ( 95 5%+VSRP 2 7%)  ALL AVAILABLE LEAD PARAMETERS WITHIN NORMAL LIMITS  NO SIGNIFICANT HIGH RATE EPISODES  1,983 V SENSE EPISODES (40 MIN /D) WITH AVAILABLE MARKERS SHOWING FUSION AND PVCs  OPTI-VOL WITHIN NORMAL LIMITS  NORMAL DEVICE FUNCTION  RG   Cardiac Electrophysiology Report      ASP[id number]_[id number]  pdf   DEVICE TYPE CRT-D       Cardiac Electrophysiology Report [id number] 06:33AM Lizzy Dixon     Test Name Result Flag Reference   Cardiac Electrophysiology Report      [id number]  pdf     Signatures   Electronically signed by : Vic Wesley, ; Sep  7 2017  2:23PM EST                       (Author)    Electronically signed by : IDALIA Shaw ; Sep  7 2017  3:59PM EST                       (Author)    "

## 2018-03-07 NOTE — PROGRESS NOTES
"  Discussion/Summary  Normal device function     V sensing episodes are mostly below 60    Increase BiV pacing rate to 60 /min  go up to 70 in subsequent visit if patient tolerates  Results/Data  Cardiac Device Remote [id number] 08:37AM Deidre Santana     Test Name Result Flag Reference   MISCELLANEOUS COMMENT (Report)     CARELINK TRANSMISSION: BATTERY STATUS "OK"  AP 64% BVP 95% ( 91 6 VSR PACE 3 5)  NO SIGNIFICANT HIGH RATE EPISODES  [address] OPTI-VOL WITHIN NORMAL LIMITS  ALL AVAILABLE LEAD PARAMETERS WITHIN NORMAL LIMITS  NORMAL DEVICE FUNCTION  NC   Cardiac Electrophysiology Report      sl[id number]_[id number]  pdf   DEVICE TYPE CRT-D       Cardiac Electrophysiology Report 21Mar2017 08:37AM Deidre Santana     Test Name Result Flag Reference   Cardiac Electrophysiology Report      [id number]  pdf     Signatures   Electronically signed by : Michael Taylor, ; Mar 28 2017  3:09PM EST                       (Author)    Electronically signed by : Severiano Santee, M D ; Apr 2 2017  9:13AM EST                       (Author)    "

## 2018-03-09 ENCOUNTER — ANTICOAG VISIT (OUTPATIENT)
Dept: INTERNAL MEDICINE CLINIC | Facility: CLINIC | Age: 53
End: 2018-03-09

## 2018-03-09 NOTE — PROGRESS NOTES
SPOKE TO Pt   AND MADE AWARE TO ALTERNATE COUMADIN BETWEEN 2 MG AND 4 MG DAILY AND REPEAT LABS ON 3/15/18 PER Dr Bhardwaj Richard

## 2018-03-12 RX ORDER — METOPROLOL SUCCINATE 50 MG/1
50 TABLET, EXTENDED RELEASE ORAL DAILY
Qty: 30 TABLET | Refills: 3 | Status: SHIPPED | OUTPATIENT
Start: 2018-03-12 | End: 2018-09-26 | Stop reason: SDUPTHER

## 2018-03-20 ENCOUNTER — CLINICAL SUPPORT (OUTPATIENT)
Dept: CARDIOLOGY CLINIC | Facility: CLINIC | Age: 53
End: 2018-03-20
Payer: MEDICARE

## 2018-03-20 DIAGNOSIS — I25.5 ISCHEMIC CARDIOMYOPATHY: Primary | ICD-10-CM

## 2018-03-20 DIAGNOSIS — Z95.810 AICD (AUTOMATIC CARDIOVERTER/DEFIBRILLATOR) PRESENT: ICD-10-CM

## 2018-03-20 PROCEDURE — 93296 REM INTERROG EVL PM/IDS: CPT

## 2018-03-20 PROCEDURE — 93295 DEV INTERROG REMOTE 1/2/MLT: CPT

## 2018-03-20 NOTE — PROGRESS NOTES
ICD CARELINK TRANSMISSION: BATTERY VOLTAGE ADEQUATE (4 8 YRS)  AP-92%, BVP-98% (TOTAL -96 3%+VSR PACE-1 9%)  CHRONICALLY SMALL PWAVES-0 5MV  ALL OTHER AVAILABLE LEAD PARAMETERS WITHIN NORMAL LIMITS  NO SIGNIFICANT HIGH RATE EPISODES  2,712 VENT SENSING EPISODES- FUSION BEATS  OPTI-VOL WITHIN NORMAL LIMITS  NORMAL DEVICE FUNCTION   GV

## 2018-03-23 ENCOUNTER — TRANSCRIBE ORDERS (OUTPATIENT)
Dept: LAB | Facility: CLINIC | Age: 53
End: 2018-03-23

## 2018-03-23 ENCOUNTER — APPOINTMENT (OUTPATIENT)
Dept: LAB | Facility: CLINIC | Age: 53
End: 2018-03-23
Payer: MEDICARE

## 2018-03-23 DIAGNOSIS — Z95.2 HEART VALVE REPLACED BY TRANSPLANT: Primary | ICD-10-CM

## 2018-03-23 DIAGNOSIS — Z95.2 HEART VALVE REPLACED BY TRANSPLANT: ICD-10-CM

## 2018-03-23 DIAGNOSIS — I42.9 FAMILIAL CARDIOMYOPATHY (HCC): ICD-10-CM

## 2018-03-23 DIAGNOSIS — Z79.01 LONG-TERM (CURRENT) USE OF ANTICOAGULANTS: ICD-10-CM

## 2018-03-23 LAB
INR PPP: 2.3 (ref 0.86–1.16)
PROTHROMBIN TIME: 25.6 SECONDS (ref 12.1–14.4)

## 2018-03-23 PROCEDURE — 85610 PROTHROMBIN TIME: CPT

## 2018-03-23 PROCEDURE — 36415 COLL VENOUS BLD VENIPUNCTURE: CPT

## 2018-03-27 ENCOUNTER — ANTICOAG VISIT (OUTPATIENT)
Dept: INTERNAL MEDICINE CLINIC | Facility: CLINIC | Age: 53
End: 2018-03-27

## 2018-04-03 ENCOUNTER — APPOINTMENT (OUTPATIENT)
Dept: LAB | Facility: CLINIC | Age: 53
End: 2018-04-03
Payer: MEDICARE

## 2018-04-03 LAB
INR PPP: 4.47 (ref 0.86–1.16)
PROTHROMBIN TIME: 43.3 SECONDS (ref 12.1–14.4)

## 2018-04-03 PROCEDURE — 85610 PROTHROMBIN TIME: CPT

## 2018-04-03 PROCEDURE — 36415 COLL VENOUS BLD VENIPUNCTURE: CPT

## 2018-04-06 ENCOUNTER — ANTICOAG VISIT (OUTPATIENT)
Dept: INTERNAL MEDICINE CLINIC | Facility: CLINIC | Age: 53
End: 2018-04-06

## 2018-04-06 NOTE — PROGRESS NOTES
PER Dr Ash Perez Pt   MADE AWARE TO TAKE 2 MG THE NEXT 3 DAYS (4/6, 4/7, 4/8) THEN ALT  4 MG AND 2 MG AND REPEAT INR ON 4/11/18

## 2018-04-13 ENCOUNTER — APPOINTMENT (OUTPATIENT)
Dept: LAB | Facility: CLINIC | Age: 53
End: 2018-04-13
Payer: MEDICARE

## 2018-04-13 PROCEDURE — 85610 PROTHROMBIN TIME: CPT

## 2018-04-13 PROCEDURE — 36415 COLL VENOUS BLD VENIPUNCTURE: CPT

## 2018-04-16 ENCOUNTER — ANTICOAG VISIT (OUTPATIENT)
Dept: INTERNAL MEDICINE CLINIC | Facility: CLINIC | Age: 53
End: 2018-04-16

## 2018-04-25 ENCOUNTER — IN-CLINIC DEVICE VISIT (OUTPATIENT)
Dept: CARDIOLOGY CLINIC | Facility: CLINIC | Age: 53
End: 2018-04-25
Payer: MEDICARE

## 2018-04-25 DIAGNOSIS — I25.5 ISCHEMIC CARDIOMYOPATHY: Primary | ICD-10-CM

## 2018-04-25 DIAGNOSIS — Z95.810 BIVENTRICULAR IMPLANTABLE CARDIOVERTER-DEFIBRILLATOR IN SITU: ICD-10-CM

## 2018-04-25 DIAGNOSIS — I49.01 VENTRICULAR FIBRILLATION (HCC): ICD-10-CM

## 2018-04-25 DIAGNOSIS — I47.2 VENTRICULAR TACHYCARDIA (HCC): ICD-10-CM

## 2018-04-25 PROCEDURE — 93297 REM INTERROG DEV EVAL ICPMS: CPT | Performed by: INTERNAL MEDICINE

## 2018-04-25 PROCEDURE — 93299 PR REM INTERROG ICPMS/SCRMS <30 D TECH REVIEW: CPT | Performed by: INTERNAL MEDICINE

## 2018-04-25 NOTE — PROGRESS NOTES
Results for orders placed or performed in visit on 04/25/18   Cardiac EP device report    Narrative    MDT BI-V ICD  [address]: OPTI-VOL WITHIN NORMAL LIMITS  BATTERY VOLTAGE ADEQUATE (4 5 YRS)  AP - 92 5%, BVP - 97 6% [  - 95 2% + VSRP - 2 4% ]  CHRONICALLY LOW P WV SENSING AMPLITUDE BUT STABLE  ALL OTHER AVAILABLE LEAD PARAMETERS WITHIN NORMAL LIMITS  NO SIGNIFICANT HIGH RATE EPISODES  4,171 VENT  SENSING EPISODES WITH AVAIL  MARKERS STORED FOR FUSION, VSRP  NORMAL DEVICE FUNCTION    eb

## 2018-05-09 ENCOUNTER — TRANSCRIBE ORDERS (OUTPATIENT)
Dept: LAB | Facility: CLINIC | Age: 53
End: 2018-05-09

## 2018-05-09 ENCOUNTER — APPOINTMENT (OUTPATIENT)
Dept: LAB | Facility: CLINIC | Age: 53
End: 2018-05-09
Payer: MEDICARE

## 2018-05-10 ENCOUNTER — ANTICOAG VISIT (OUTPATIENT)
Dept: INTERNAL MEDICINE CLINIC | Facility: CLINIC | Age: 53
End: 2018-05-10

## 2018-05-10 NOTE — PROGRESS NOTES
PT  MADE AWARE TO CONTINUE CURRENT DOSE OF ALTERNATING BETWEEN 2 MG AND 4 MG  REPEAT INR ON 5/24/18 PER DR Dudley Pae

## 2018-05-22 DIAGNOSIS — Z95.2 H/O HEART VALVE REPLACEMENT WITH MECHANICAL VALVE: Primary | ICD-10-CM

## 2018-05-23 ENCOUNTER — OFFICE VISIT (OUTPATIENT)
Dept: CARDIOLOGY CLINIC | Facility: CLINIC | Age: 53
End: 2018-05-23
Payer: MEDICARE

## 2018-05-23 VITALS
SYSTOLIC BLOOD PRESSURE: 115 MMHG | WEIGHT: 161 LBS | HEIGHT: 66 IN | HEART RATE: 60 BPM | DIASTOLIC BLOOD PRESSURE: 70 MMHG | BODY MASS INDEX: 25.88 KG/M2

## 2018-05-23 DIAGNOSIS — Z95.810 BIVENTRICULAR ICD (IMPLANTABLE CARDIOVERTER-DEFIBRILLATOR) IN PLACE: ICD-10-CM

## 2018-05-23 DIAGNOSIS — Z95.2 S/P AVR (AORTIC VALVE REPLACEMENT): ICD-10-CM

## 2018-05-23 DIAGNOSIS — I42.0 DILATED CARDIOMYOPATHY (HCC): Chronic | ICD-10-CM

## 2018-05-23 DIAGNOSIS — I49.01 VENTRICULAR FIBRILLATION (HCC): ICD-10-CM

## 2018-05-23 DIAGNOSIS — I25.10 CORONARY ARTERY DISEASE INVOLVING NATIVE HEART, ANGINA PRESENCE UNSPECIFIED, UNSPECIFIED VESSEL OR LESION TYPE: Primary | ICD-10-CM

## 2018-05-23 PROCEDURE — 93000 ELECTROCARDIOGRAM COMPLETE: CPT | Performed by: INTERNAL MEDICINE

## 2018-05-23 PROCEDURE — 99214 OFFICE O/P EST MOD 30 MIN: CPT | Performed by: INTERNAL MEDICINE

## 2018-05-23 RX ORDER — AMIODARONE HYDROCHLORIDE 100 MG/1
100 TABLET ORAL DAILY
Qty: 90 TABLET | Refills: 3 | Status: SHIPPED | OUTPATIENT
Start: 2018-05-23 | End: 2019-03-26 | Stop reason: SDUPTHER

## 2018-05-25 ENCOUNTER — IN-CLINIC DEVICE VISIT (OUTPATIENT)
Dept: CARDIOLOGY CLINIC | Facility: CLINIC | Age: 53
End: 2018-05-25
Payer: MEDICARE

## 2018-05-25 DIAGNOSIS — I25.5 ISCHEMIC CARDIOMYOPATHY: Primary | ICD-10-CM

## 2018-05-25 DIAGNOSIS — Z95.810 AICD (AUTOMATIC CARDIOVERTER/DEFIBRILLATOR) PRESENT: ICD-10-CM

## 2018-05-25 PROCEDURE — 93297 REM INTERROG DEV EVAL ICPMS: CPT

## 2018-05-25 PROCEDURE — 93299 PR REM INTERROG ICPMS/SCRMS <30 D TECH REVIEW: CPT

## 2018-05-25 NOTE — PROGRESS NOTES
Results for orders placed or performed in visit on 05/25/18   Cardiac EP device report    Narrative    MDT BI-V ICD  [address]: OPTI-VOL WITHIN NORMAL LIMITS  BATTERY VOLTAGE ADEQUATE (4 5 YRS)  AP-87%, BVP-98% (TOTAL -95 2%+VSR PACE-2 6%)  CHRONICALLY SMALL PWAVES-0 6MV  ALL OTHER AVAILABLE LEAD PARAMETERS WITHIN NORMAL LIMITS  NO SIGNIFICANT HIGH RATE EPISODES  [address]  NORMAL DEVICE FUNCTION   GV

## 2018-05-31 RX ORDER — WARFARIN SODIUM 4 MG/1
4 TABLET ORAL
Qty: 60 TABLET | Refills: 2 | Status: SHIPPED | OUTPATIENT
Start: 2018-05-31 | End: 2018-09-11 | Stop reason: SDUPTHER

## 2018-06-06 ENCOUNTER — APPOINTMENT (OUTPATIENT)
Dept: LAB | Facility: CLINIC | Age: 53
End: 2018-06-06
Payer: MEDICARE

## 2018-06-06 LAB
ALBUMIN SERPL BCP-MCNC: 3.4 G/DL (ref 3.5–5)
ALP SERPL-CCNC: 103 U/L (ref 46–116)
ALT SERPL W P-5'-P-CCNC: 121 U/L (ref 12–78)
ANION GAP SERPL CALCULATED.3IONS-SCNC: 6 MMOL/L (ref 4–13)
AST SERPL W P-5'-P-CCNC: 76 U/L (ref 5–45)
BILIRUB DIRECT SERPL-MCNC: 0.13 MG/DL (ref 0–0.2)
BILIRUB SERPL-MCNC: 0.35 MG/DL (ref 0.2–1)
BUN SERPL-MCNC: 17 MG/DL (ref 5–25)
CALCIUM SERPL-MCNC: 8.5 MG/DL (ref 8.3–10.1)
CHLORIDE SERPL-SCNC: 102 MMOL/L (ref 100–108)
CO2 SERPL-SCNC: 28 MMOL/L (ref 21–32)
CREAT SERPL-MCNC: 1.46 MG/DL (ref 0.6–1.3)
GFR SERPL CREATININE-BSD FRML MDRD: 54 ML/MIN/1.73SQ M
GLUCOSE P FAST SERPL-MCNC: 130 MG/DL (ref 65–99)
INR PPP: 1.48 (ref 0.86–1.17)
POTASSIUM SERPL-SCNC: 3.9 MMOL/L (ref 3.5–5.3)
PROT SERPL-MCNC: 7.3 G/DL (ref 6.4–8.2)
SODIUM SERPL-SCNC: 136 MMOL/L (ref 136–145)
TSH SERPL DL<=0.05 MIU/L-ACNC: 0.84 UIU/ML (ref 0.36–3.74)

## 2018-06-06 PROCEDURE — 84443 ASSAY THYROID STIM HORMONE: CPT | Performed by: INTERNAL MEDICINE

## 2018-06-06 PROCEDURE — 80048 BASIC METABOLIC PNL TOTAL CA: CPT | Performed by: INTERNAL MEDICINE

## 2018-06-06 PROCEDURE — 80076 HEPATIC FUNCTION PANEL: CPT | Performed by: INTERNAL MEDICINE

## 2018-06-07 ENCOUNTER — ANTICOAG VISIT (OUTPATIENT)
Dept: MULTI SPECIALTY CLINIC | Facility: CLINIC | Age: 53
End: 2018-06-07

## 2018-06-18 PROBLEM — I49.01 VENTRICULAR FIBRILLATION (HCC): Status: ACTIVE | Noted: 2018-06-18

## 2018-06-18 PROBLEM — Z95.2 S/P AVR (AORTIC VALVE REPLACEMENT): Status: ACTIVE | Noted: 2018-06-18

## 2018-06-18 PROBLEM — Z95.810 BIVENTRICULAR ICD (IMPLANTABLE CARDIOVERTER-DEFIBRILLATOR) IN PLACE: Status: ACTIVE | Noted: 2018-06-18

## 2018-06-18 NOTE — PROGRESS NOTES
EPS Progress Note - Lynda Jarvis 47 y o  male MRN: [id number]           ASSESSMENT:  1  Coronary artery disease involving native heart, angina presence unspecified, unspecified vessel or lesion type  POCT ECG    Echo complete with contrast if indicated    amiodarone 100 mg tablet    TSH, 3rd generation with T4 reflex    Hepatic function panel    Basic metabolic panel    X-ray chest PA lateral with both oblique projections   2  Dilated cardiomyopathy ([address] )         1  Coronary artery disease involving native heart, angina presence unspecified, unspecified vessel or lesion type  POCT ECG    Echo complete with contrast if indicated    amiodarone 100 mg tablet    TSH, 3rd generation with T4 reflex    Hepatic function panel    Basic metabolic panel    X-ray chest PA lateral with both oblique projections   2  Dilated cardiomyopathy ([address] )     3  S/P AVR (aortic valve replacement)     4  Ventricular fibrillation ([address] )     5  Biventricular ICD (implantable cardioverter-defibrillator) in place         PLAN:  Needs amiodarone surveillance labs tsh, lft, chem 7(for CHF)  Chest xray  Needs echo  Should be plugged in with general cardiologist   For VF should stay on low dose amiodarone  Continue warfarin for mechanical valve  BiV icd is working appropriately  HPI:   Interim history f/u for the following he feels quite well  He is taking his medicines and offers no complaints  1  RHD s/p mechanical AVR - with non compliance to INR testing  2  Ischemic Cardiomyopathy s/p BiV-ICD  3  History of vfib with device interrogation showing ATP x 3 on 1/6/17 - currently on amiodarone  4  CAD s/p PCI in 2014 to LAD and left circumflex     47year old man with past medical history of RHD with a mechanical AVR, CAD s/p PCI with BMS in 2014 to LAD and circumflex, ischemic cardiomyopathy s/p BiV-ICD and VT/Vfib on amiodarone who presents for follow up of his multiple medical issues      MDT BI-V ICD  [facility] ONLY: OPTI-VOL WITHIN NORMAL LIMITS  BATTERY VOLTAGE ADEQUATE (4 5 YRS)  AP-87%, BVP-98% (TOTAL -95 2%+VSR PACE-2 6%)  CHRONICALLY SMALL PWAVES-0 6MV  ALL OTHER AVAILABLE LEAD PARAMETERS WITHIN NORMAL LIMITS  NO SIGNIFICANT HIGH RATE EPISODES  [address]  NORMAL DEVICE FUNCTION  GV    ROS:  negative, palpitations, tachycardia, irregular heart beat, stable dyspnea on exertion  all other 12 point ros negative    Objective:     Vitals: Blood pressure 115/70, pulse 60, height 5' 6" (1 676 m), weight 73 kg (161 lb)  , Body mass index is 25 99 kg/m²  ,        Physical Exam:    GEN: Mireya Jackson appears well, alert and oriented x 3, pleasant and cooperative   HEENT: pupils equal, round, and reactive to light; extraocular muscles intact  NECK: supple, no carotid bruits   HEART: regular rhythm, normal S1 and mechanical S2, no murmurs, clicks, gallops or rubs   LUNGS: clear to auscultation bilaterally; no wheezes, rales, or rhonchi   ABDOMEN: normal bowel sounds, soft, no tenderness, no distention  EXTREMITIES: peripheral pulses normal; no clubbing, cyanosis, or edema  NEURO: no focal findings   SKIN: normal without suspicious lesions on exposed skin    Medications:      Current Outpatient Prescriptions:     albuterol (PROVENTIL HFA,VENTOLIN HFA) 90 mcg/act inhaler, Inhale 2 puffs every 4 (four) hours as needed for wheezing, Disp: 1 Inhaler, Rfl: 0    amiodarone 100 mg tablet, Take 1 tablet (100 mg total) by mouth daily, Disp: 90 tablet, Rfl: 3    aspirin 81 MG tablet, Take 81 mg by mouth daily  , Disp: , Rfl:     Atorvastatin Calcium (LIPITOR PO), Take 80 mg by mouth daily    , Disp: , Rfl:     eplerenone (INSPRA) 25 mg tablet, Take 25 mg by mouth daily, Disp: , Rfl:     furosemide (LASIX) 40 mg tablet, Take 40 mg by mouth 2 (two) times a day , Disp: , Rfl:     lisinopril (ZESTRIL) 40 mg tablet, Take 40 mg by mouth every evening   , Disp: , Rfl:     metoprolol succinate (TOPROL XL) 50 mg 24 hr tablet, Take 1 tablet (50 mg total) by mouth daily, Disp: 30 tablet, Rfl: 3    mirtazapine (REMERON) 15 mg tablet, Take 15 mg by mouth daily at bedtime, Disp: , Rfl: 0    nitroglycerin (NITROSTAT) 0 4 mg SL tablet, Place 0 4 mg under the tongue every 5 (five) minutes as needed for chest pain   , Disp: , Rfl:     Potassium Chloride ER 20 MEQ TBCR, Take 1 tablet (20 mEq total) by mouth daily, Disp: 30 tablet, Rfl: 5    RA TUSSIN CGH/CHEST CONGEST -10 MG/5ML oral liquid, take 5 milliliter by mouth three times a day if needed for cough FOR UP TO 10 DAYS, Disp: , Rfl: 0    enoxaparin (LOVENOX) 100 mg/mL, Inject under the skin daily, Disp: , Rfl:     enoxaparin (LOVENOX) 100 mg/mL, Inject 0 7 mL (70 mg total) under the skin 2 (two) times a day for 10 days, Disp: 14 mL, Rfl: 0    warfarin (COUMADIN) 4 mg tablet, Take 1 tablet (4 mg total) by mouth daily Take 1/2 tablet by mouth once daily as directed, Disp: 60 tablet, Rfl: 2     Family History:   Family History   Problem Relation Age of Onset    Diabetes Mother     Hypertension Mother        Labs & Results:  Below is the patient's most recent value for Albumin, ALT, AST, BUN, Calcium, Chloride, Cholesterol, CO2, Creatinine, GFR, Glucose, HDL, Hematocrit, Hemoglobin, Hemoglobin A1C, LDL, Magnesium, Phosphorus, Platelets, Potassium, PSA, Sodium, Triglycerides, and WBC  Lab Results   Component Value Date     (H) 06/06/2018    AST 76 (H) 06/06/2018    BUN 17 06/06/2018    CALCIUM 8 5 06/06/2018     06/06/2018    CHOL 126 07/21/2016    CO2 28 06/06/2018    CREATININE 1 46 (H) 06/06/2018    HDL 48 07/21/2016    HCT 40 1 12/19/2017    HGB 13 0 12/19/2017    HGBA1C 5 8 06/07/2016    MG 2 3 11/17/2017     12/19/2017    K 3 9 06/06/2018     06/06/2018    TRIG 177 (H) 07/21/2016    WBC 9 57 12/19/2017     Note: for a comprehensive list of the patient's lab results, access the Results Review activity           Cardiac testing: Results for orders placed during the hospital encounter of 16   Echo complete with contrast if indicated    Narrative [facility]  [facility]  [place], [address]  [phone]    Transthoracic Echocardiogram  2D, M-mode, Doppler, and Color Doppler    Study date:  21-Mar-2016    Patient: Kev Pretty  MR number: [id number]  Account number: [de-identified]  : 1965  Age: 45 years  Gender: Male  Status: Outpatient  Location: [address] [place] [address] [facility] [place]  Height: 66 in  Weight: 158 lb  BP: 132/ 90 mmHg    Indications: Assess left ventricular function  Diagnoses: I42 9 - Cardiomyopathy, unspecified    Sonographer:  LEOBARDO Blackwood  Primary Physician:  Dee Yao PA-C  Referring Physician:  Vandana Montes MD  Group:  [facility]  Interpreting Physician:  Frieda Gabriel MD    SUMMARY    LEFT VENTRICLE:  The ventricle was mildly to moderately dilated  Systolic function was moderately to markedly reduced  Ejection fraction was  estimated to be 25 %  There was severe diffuse hypokinesis  Posterolateral wall moves better  Wall thickness was at the upper limits of normal     RIGHT VENTRICLE:  Systolic function was mildly reduced  LEFT ATRIUM:  The atrium was mildly to moderately dilated  RIGHT ATRIUM:  The atrium was mildly dilated  MITRAL VALVE:  There was mild to moderate regurgitation  AORTIC VALVE:  A mechanical prosthesis was present  It exhibited normal function  TRICUSPID VALVE:  There was mild regurgitation  PULMONIC VALVE:  There was mild regurgitation  HISTORY: PRIOR HISTORY: Myocardial infarction  Congestive heart failure  Cardiomyopathy  Rheumatic heart disease  Risk factors: hypertension and  hypercholesterolemia  PRIOR PROCEDURES: Stents  ICD implantation  Mechanical  prosthesis of aortic valve  PROCEDURE: The study was performed in the [address] [place] [address]  This  was a routine study   The transthoracic approach was used  The study included  complete 2D imaging, M-mode, complete spectral Doppler, and color Doppler  Image quality was adequate  LEFT VENTRICLE: The ventricle was mildly to moderately dilated  Systolic  function was moderately to markedly reduced  Ejection fraction was estimated to  be 25 %  There was severe diffuse hypokinesis  Posterolateral wall moves better  Wall thickness was at the upper limits of normal  DOPPLER: The ratio of early  ventricular filling to atrial contraction velocities was within the normal  range  The deceleration time of the early transmitral flow velocity was  increased  RIGHT VENTRICLE: The size was normal  Systolic function was mildly reduced  Wall thickness was normal     LEFT ATRIUM: The atrium was mildly to moderately dilated  RIGHT ATRIUM: The atrium was mildly dilated  A pacing wire was present  MITRAL VALVE: Valve structure was normal  There was normal leaflet separation  DOPPLER: The transmitral velocity was within the normal range  There was no  evidence for stenosis  There was mild to moderate regurgitation  AORTIC VALVE: A mechanical prosthesis was present  It exhibited normal  function  DOPPLER: There was no evidence for stenosis  There was no  regurgitation  TRICUSPID VALVE: The valve structure was normal  There was normal leaflet  separation  DOPPLER: The transtricuspid velocity was within the normal range  There was no evidence for stenosis  There was mild regurgitation  Pulmonary  artery systolic pressure was moderately increased  Estimated peak PA pressure  was 47 mmHg  PULMONIC VALVE: Leaflets exhibited normal thickness, no calcification, and  normal cuspal separation  DOPPLER: The transpulmonic velocity was within the  normal range  There was mild regurgitation  PERICARDIUM: There was no pericardial effusion  The pericardium was normal in  appearance  AORTA: The root exhibited normal size      SYSTEMIC VEINS: IVC: The inferior vena cava was normal in size and course  Respirophasic changes were normal     MEASUREMENT TABLES    M-MODE MEASUREMENTS  Aorta   (Reference normals)  Root diam ed   36 mm   (20-37)    SYSTEM MEASUREMENT TABLES    2D  IVSd: 1 cm  LA Diam: 4 7 cm  LVIDd: 6 2 cm  LVIDs: 5 5 cm  LVOT Diam: 2 cm  LVPWd: 1 cm    CW  AV VTI: 40 6 cm  AV Vmax: 2 5 m/s  AV Vmax: 2 4 m/s  AV Vmean: 1 6 m/s  AV maxP 9 mmHg  AV maxP 5 mmHg  AV meanP 9 mmHg  TR Vmax: 3 2 m/s  TR maxP 8 mmHg    PW  E': 0 m/s  E/E': 21 9  LVOT VTI: 53 3 cm  LVOT Vmax: 0 9 m/s  LVOT Vmax: 0 9 m/s  LVOT Vmean: 0 6 m/s  LVOT maxPG: 3 4 mmHg  LVOT maxPG: 3 2 mmHg  LVOT meanP 6 mmHg  MV A Kev[place]: 0 5 m/s  MV Dec [place]: 3 3 m/s2  MV DecT: 236 9 ms  MV E Kev[place]: 0 8 m/s  MV E/A Ratio: 1 5    [facility] Accredited Echocardiography Laboratory    Prepared and electronically signed by    Eric Teresa MD  Signed 21-Mar-2016 15:18:16       No results found for this or any previous visit  No results found for this or any previous visit  No results found for this or any previous visit  EKG personally reviewed by Gabe Holliday DO   Av paced rhythm

## 2018-06-27 ENCOUNTER — REMOTE DEVICE CLINIC VISIT (OUTPATIENT)
Dept: CARDIOLOGY CLINIC | Facility: CLINIC | Age: 53
End: 2018-06-27
Payer: MEDICARE

## 2018-06-27 DIAGNOSIS — I25.5 ISCHEMIC CARDIOMYOPATHY: Primary | ICD-10-CM

## 2018-06-27 DIAGNOSIS — Z95.810 PRESENCE OF IMPLANTABLE CARDIOVERTER-DEFIBRILLATOR (ICD): ICD-10-CM

## 2018-06-27 PROCEDURE — 93296 REM INTERROG EVL PM/IDS: CPT | Performed by: INTERNAL MEDICINE

## 2018-06-27 PROCEDURE — 93295 DEV INTERROG REMOTE 1/2/MLT: CPT | Performed by: INTERNAL MEDICINE

## 2018-06-27 NOTE — PROGRESS NOTES
MDT BI-V ICD  [employer]:  BATTERY VOLTAGE ADEQUATE (4 3 YR)    AP 79 4% BP 95 0% (VSRP 2 6%)   ALL LEAD PARAMETERS WITHIN NORMAL LIMITS   13 AT/AF EPISODES FOR AF (<0 1 HR/D, 0 1%)   LONGEST 18 MIN     5,614 V SENSE EPISODES (58 MIN /D) WITH MARKERS SHOWING FUSION/RVR   OPTI-VOL WITHIN NORMAL LIMITS   NORMAL DEVICE FUNCTION   RG

## 2018-06-28 DIAGNOSIS — R79.9 ABNORMAL BLOOD FINDING: Primary | ICD-10-CM

## 2018-07-03 ENCOUNTER — HOSPITAL ENCOUNTER (OUTPATIENT)
Dept: NON INVASIVE DIAGNOSTICS | Facility: CLINIC | Age: 53
Discharge: HOME | End: 2018-07-03
Payer: MEDICARE

## 2018-07-03 DIAGNOSIS — I25.10 CORONARY ARTERY DISEASE INVOLVING NATIVE HEART, ANGINA PRESENCE UNSPECIFIED, UNSPECIFIED VESSEL OR LESION TYPE: ICD-10-CM

## 2018-07-03 PROCEDURE — 93306 TTE W/DOPPLER COMPLETE: CPT

## 2018-07-03 PROCEDURE — 93306 TTE W/DOPPLER COMPLETE: CPT | Performed by: INTERNAL MEDICINE

## 2018-07-20 ENCOUNTER — IN-CLINIC DEVICE VISIT (OUTPATIENT)
Dept: CARDIOLOGY CLINIC | Facility: CLINIC | Age: 53
End: 2018-07-20
Payer: MEDICARE

## 2018-07-20 DIAGNOSIS — I25.5 ISCHEMIC CARDIOMYOPATHY: Primary | ICD-10-CM

## 2018-07-20 DIAGNOSIS — Z95.810 PRESENCE OF AUTOMATIC CARDIOVERTER/DEFIBRILLATOR (AICD): ICD-10-CM

## 2018-07-20 PROCEDURE — 93284 PRGRMG EVAL IMPLANTABLE DFB: CPT

## 2018-07-20 NOTE — PROGRESS NOTES
Results for orders placed or performed in visit on 07/20/18   Cardiac EP device report    Narrative    MDT BI-V ICD  DEVICE INTERROGATED IN THE [place] OFFICE  BATTERY VOLTAGE ADEQUATE (4 6 YRS)  AP: 88%  BP 95 8% (VSRP 2 3%)  ALL LEAD PARAMETERS WITHIN NORMAL LIMITS   53,441 V SENSE EPISODES (45 MIN /D) WITH MARKERS ONLY SHOWING FUSION  NO OTHER SIGNIFICANT HIGH RATE EPISODES  OPTI-VOL WITHIN NORMAL LIMITS  NO PROGRAMMING CHANGES MADE TO DEVICE PARAMETERS  APPROPRIATELY FUNCTIONING BI-V ICD    [address] [place] [address] Street

## 2018-08-14 ENCOUNTER — TELEPHONE (OUTPATIENT)
Dept: INTERNAL MEDICINE CLINIC | Facility: CLINIC | Age: 53
End: 2018-08-14

## 2018-08-29 ENCOUNTER — TELEPHONE (OUTPATIENT)
Dept: INTERNAL MEDICINE CLINIC | Facility: CLINIC | Age: 53
End: 2018-08-29

## 2018-09-05 ENCOUNTER — APPOINTMENT (OUTPATIENT)
Dept: LAB | Facility: CLINIC | Age: 53
End: 2018-09-05
Payer: MEDICARE

## 2018-09-05 ENCOUNTER — TRANSCRIBE ORDERS (OUTPATIENT)
Dept: LAB | Facility: CLINIC | Age: 53
End: 2018-09-05

## 2018-09-06 ENCOUNTER — ANTICOAG VISIT (OUTPATIENT)
Dept: INTERNAL MEDICINE CLINIC | Facility: CLINIC | Age: 53
End: 2018-09-06

## 2018-09-06 DIAGNOSIS — Z95.2 S/P AVR (AORTIC VALVE REPLACEMENT): ICD-10-CM

## 2018-09-06 DIAGNOSIS — Z95.810 BIVENTRICULAR ICD (IMPLANTABLE CARDIOVERTER-DEFIBRILLATOR) IN PLACE: ICD-10-CM

## 2018-09-06 DIAGNOSIS — I49.01 VENTRICULAR FIBRILLATION (HCC): Primary | ICD-10-CM

## 2018-09-06 NOTE — PROGRESS NOTES
FORMER PT  OF DR Geoff Doherty, HAS NOT HAD INR DONE SINCE June 2018  I CALLED TO REMIND HIM TO HAVE IT DONE AND HE WENT ON 9/5/18 AND THE RESULT WAS 7 25 - I SENT RESULTS TO DR Yuliya Barry AND SHE SPOKE WITH THE PT  AND HE ADVISED THAT HE HAS BEEN TAKING 4 MG DAILY SINCE June  HE HAS ALREADY TAKEN HIS COUMADIN TODAY (9/6/18) SO SHE ADVISED HIM TO HOLD IT FRI  9/7, SAT  9/8 AND SUN  9/9/18  ALTHOUGH IT WILL STILL BE HIGH SHE WOULD LIKE HIM TO REPEAT INR, CBC AND CMP TOMORROW 9/7/18 AND THEN ON MON  9/10/18 REPEAT JUST INR AGAIN  SHE ALSO NOTICED THAT PT  HAS BEEN SEEN A FEW TIMES AT [address] [place] [address]  CARDIOLOGY [facility] BUT IT DOES NOT APPEAR THAT THEY ARE MONITORING HIS COUMADIN  SHE ASKED THAT I CALL AND SPEAK TO THEM TO SEE IF THEY CAN CONTINUE TO SEE PT  THERE AND ALSO MONITOR HIS INR AS WELL  SHE SAID PT  ALSO STATED THAT HE WOULD PREFER GOING TO ONE PLACE AS WELL  I CALLED AND SPOKE TO [address] [place] [address] AND THEY SAID PT  IS SEEN IN [place], I CALLED [place] AND THEY SAID HE IS SEEN IN [place]  I CALLED [place] BACK AND ASIYA  SAID THEY ONLY MONITOR HIS ICD DEVICE AND PT  WAS SEEN IN [place] FOR EVERYTHING ELSE  SHE IS HAVING HER  REACH OUT TO THE [place]  TO BE SURE PT  GETS SET UP WITH AN APPT   I WILL F/U WITH [place] OFFICE LATER TODAY OR TOMORROW

## 2018-09-06 NOTE — PROGRESS NOTES
Patient had INR done yesterday which was elevated at 7 25  Spoke with the patient  He has been taking coumadin 4 mg daily  No episodes of bleeding  Patient denies any complaints  Patient already took his coumadin today morning  Asked patient to stop taking Coumadin on Friday, Saturday, Sunday (9/7/18, 9/8/18 and 9/9/18)  Asked patient to get some blood work (CBC, CMP, INR) tomorrow to see if his creatinine and LFTs are abnormal or not which could explain why his INR is high  He will also get repeat INR on Monday and will resume coumadin at lower dose based on the INR  He is on coumadin for Mechanical AVR and cardiomyopathy with EF 25 to 30% (INR goal 2 5 - 2 5)  Patient will also be scheduled to f/u with cardiology fellows at [address] as he sees all his other doctors there and will be set up eventually with the Coumadin clinic at [address] for better INR monitoring

## 2018-09-07 ENCOUNTER — APPOINTMENT (OUTPATIENT)
Dept: LAB | Facility: CLINIC | Age: 53
End: 2018-09-07
Payer: MEDICARE

## 2018-09-07 ENCOUNTER — TELEPHONE (OUTPATIENT)
Dept: INTERNAL MEDICINE CLINIC | Facility: CLINIC | Age: 53
End: 2018-09-07

## 2018-09-07 DIAGNOSIS — Z95.2 S/P AVR (AORTIC VALVE REPLACEMENT): ICD-10-CM

## 2018-09-07 DIAGNOSIS — Z95.810 BIVENTRICULAR ICD (IMPLANTABLE CARDIOVERTER-DEFIBRILLATOR) IN PLACE: ICD-10-CM

## 2018-09-07 DIAGNOSIS — I49.01 VENTRICULAR FIBRILLATION (HCC): ICD-10-CM

## 2018-09-07 LAB
ALBUMIN SERPL BCP-MCNC: 3.6 G/DL (ref 3.5–5)
ALP SERPL-CCNC: 93 U/L (ref 46–116)
ALT SERPL W P-5'-P-CCNC: 33 U/L (ref 12–78)
ANION GAP SERPL CALCULATED.3IONS-SCNC: 5 MMOL/L (ref 4–13)
AST SERPL W P-5'-P-CCNC: 34 U/L (ref 5–45)
BASOPHILS # BLD AUTO: 0.06 THOUSANDS/ΜL (ref 0–0.1)
BASOPHILS NFR BLD AUTO: 1 % (ref 0–1)
BILIRUB SERPL-MCNC: 0.47 MG/DL (ref 0.2–1)
BUN SERPL-MCNC: 23 MG/DL (ref 5–25)
CALCIUM SERPL-MCNC: 8.7 MG/DL (ref 8.3–10.1)
CHLORIDE SERPL-SCNC: 102 MMOL/L (ref 100–108)
CO2 SERPL-SCNC: 26 MMOL/L (ref 21–32)
CREAT SERPL-MCNC: 1.44 MG/DL (ref 0.6–1.3)
EOSINOPHIL # BLD AUTO: 0.29 THOUSAND/ΜL (ref 0–0.61)
EOSINOPHIL NFR BLD AUTO: 3 % (ref 0–6)
ERYTHROCYTE [DISTWIDTH] IN BLOOD BY AUTOMATED COUNT: 14.7 % (ref 11.6–15.1)
GFR SERPL CREATININE-BSD FRML MDRD: 55 ML/MIN/1.73SQ M
GLUCOSE P FAST SERPL-MCNC: 138 MG/DL (ref 65–99)
HCT VFR BLD AUTO: 42.3 % (ref 36.5–49.3)
HGB BLD-MCNC: 13.3 G/DL (ref 12–17)
IMM GRANULOCYTES # BLD AUTO: 0.07 THOUSAND/UL (ref 0–0.2)
IMM GRANULOCYTES NFR BLD AUTO: 1 % (ref 0–2)
LYMPHOCYTES # BLD AUTO: 2 THOUSANDS/ΜL (ref 0.6–4.47)
LYMPHOCYTES NFR BLD AUTO: 24 % (ref 14–44)
MCH RBC QN AUTO: 27.8 PG (ref 26.8–34.3)
MCHC RBC AUTO-ENTMCNC: 31.4 G/DL (ref 31.4–37.4)
MCV RBC AUTO: 89 FL (ref 82–98)
MONOCYTES # BLD AUTO: 1.01 THOUSAND/ΜL (ref 0.17–1.22)
MONOCYTES NFR BLD AUTO: 12 % (ref 4–12)
NEUTROPHILS # BLD AUTO: 5.04 THOUSANDS/ΜL (ref 1.85–7.62)
NEUTS SEG NFR BLD AUTO: 59 % (ref 43–75)
NRBC BLD AUTO-RTO: 0 /100 WBCS
PLATELET # BLD AUTO: 195 THOUSANDS/UL (ref 149–390)
PMV BLD AUTO: 10.7 FL (ref 8.9–12.7)
POTASSIUM SERPL-SCNC: 4.8 MMOL/L (ref 3.5–5.3)
PROT SERPL-MCNC: 7.8 G/DL (ref 6.4–8.2)
RBC # BLD AUTO: 4.78 MILLION/UL (ref 3.88–5.62)
SODIUM SERPL-SCNC: 133 MMOL/L (ref 136–145)
WBC # BLD AUTO: 8.47 THOUSAND/UL (ref 4.31–10.16)

## 2018-09-07 PROCEDURE — 85025 COMPLETE CBC W/AUTO DIFF WBC: CPT

## 2018-09-07 PROCEDURE — 80053 COMPREHEN METABOLIC PANEL: CPT

## 2018-09-07 NOTE — TELEPHONE ENCOUNTER
ESE FROM [facility] LAB CALLED WITH A CRITICAL INR ON PT  - RESULT 8 96  PT  AND DR Adalberto Shah (CARDIOLOGY) ARE AWARE OF THIS RESULT  WE HAVE SPOKEN TO THE PT  YESTERDAY 9/6/18 AND MADE HIM AWARE HE IS TO NOT TAKE ANY COUMADIN 9/7, 9/8, 9/9 AND REPEAT INR ON 9/10/18  TODAYS INR WAS JUST DONE AS ANOTHER BASELINE   WE ARE GOING TO MONITOR HIS COUMADIN UNTIL INR GETS BACK TO NORMAL AND THEN HE WILL BE TRANSFERRED TO [facility] CARDIOLOGY [place] FOR FURTHER CARDIOLOGY CARE AND COUMADIN MANAGEMENT (SEE OTHER NOTES UNDER "LABS" TAB    NO ACTION NEEDED FOR THIS INR - PLEASE DO NOT CHANGE ANY INSTRUCTIONS

## 2018-09-10 ENCOUNTER — APPOINTMENT (OUTPATIENT)
Dept: LAB | Facility: CLINIC | Age: 53
End: 2018-09-10
Payer: MEDICARE

## 2018-09-10 DIAGNOSIS — Z79.01 LONG TERM CURRENT USE OF ANTICOAGULANT THERAPY: ICD-10-CM

## 2018-09-10 DIAGNOSIS — I42.9 FAMILIAL CARDIOMYOPATHY (HCC): ICD-10-CM

## 2018-09-10 DIAGNOSIS — Z95.2 HEART VALVE REPLACED BY TRANSPLANT: ICD-10-CM

## 2018-09-10 LAB
INR PPP: 3.38 (ref 0.86–1.17)
PROTHROMBIN TIME: 34.2 SECONDS (ref 11.8–14.2)

## 2018-09-10 PROCEDURE — 85610 PROTHROMBIN TIME: CPT

## 2018-09-10 PROCEDURE — 36415 COLL VENOUS BLD VENIPUNCTURE: CPT

## 2018-09-11 ENCOUNTER — ANTICOAG VISIT (OUTPATIENT)
Dept: INTERNAL MEDICINE CLINIC | Facility: CLINIC | Age: 53
End: 2018-09-11

## 2018-09-11 DIAGNOSIS — Z95.2 H/O HEART VALVE REPLACEMENT WITH MECHANICAL VALVE: ICD-10-CM

## 2018-09-11 RX ORDER — WARFARIN SODIUM 4 MG/1
4 TABLET ORAL
Qty: 60 TABLET | Refills: 3 | Status: SHIPPED | OUTPATIENT
Start: 2018-09-11 | End: 2021-01-01 | Stop reason: HOSPADM

## 2018-09-11 NOTE — PROGRESS NOTES
PER DR Terra Liu PT  IS TO ALT  BETWEEN 2 MG AND 4 MG  STARTING 9/10/18 WITH 4 MG  REPEAT INR ON 9/17/18  SHE SPOKE WITH PT  AND MADE HIM AWARE AND I ALSO SPOKE TO HIM TODAY AND REVIEWED WITH HIM AGAIN

## 2018-09-19 ENCOUNTER — APPOINTMENT (OUTPATIENT)
Dept: LAB | Facility: CLINIC | Age: 53
End: 2018-09-19
Payer: MEDICARE

## 2018-09-19 DIAGNOSIS — Z79.01 LONG TERM CURRENT USE OF ANTICOAGULANT THERAPY: ICD-10-CM

## 2018-09-19 DIAGNOSIS — I42.9 FAMILIAL CARDIOMYOPATHY (HCC): ICD-10-CM

## 2018-09-19 DIAGNOSIS — Z95.2 HEART VALVE REPLACED BY TRANSPLANT: ICD-10-CM

## 2018-09-19 LAB
INR PPP: 3.89 (ref 0.86–1.17)
PROTHROMBIN TIME: 38.1 SECONDS (ref 11.8–14.2)

## 2018-09-19 PROCEDURE — 36415 COLL VENOUS BLD VENIPUNCTURE: CPT

## 2018-09-19 PROCEDURE — 85610 PROTHROMBIN TIME: CPT

## 2018-09-20 ENCOUNTER — ANTICOAG VISIT (OUTPATIENT)
Dept: INTERNAL MEDICINE CLINIC | Facility: CLINIC | Age: 53
End: 2018-09-20

## 2018-09-20 NOTE — PROGRESS NOTES
PER DR Reema Betancourt I MADE PT  AWARE TO DECREASE TO 4 MG ON SUN  AND WED  AND 2 MG ON ALL OTHER DAYS  REPEAT INR ON 10/1/18  I AM ALSO WORKING ON GETTING PT  TRANSFERRED TO CARDIOLOGY IN [place] OR [address]    (SEE PREVIOUS NOTE) WE ARE MONITORING INR UNTIL PT  IS SCHEDULED WITH THEM

## 2018-09-21 ENCOUNTER — TELEPHONE (OUTPATIENT)
Dept: CARDIOLOGY CLINIC | Facility: CLINIC | Age: 53
End: 2018-09-21

## 2018-09-21 ENCOUNTER — ANTICOAG VISIT (OUTPATIENT)
Dept: CARDIOLOGY CLINIC | Facility: CLINIC | Age: 53
End: 2018-09-21

## 2018-09-21 ENCOUNTER — TELEPHONE (OUTPATIENT)
Dept: INTERNAL MEDICINE CLINIC | Facility: CLINIC | Age: 53
End: 2018-09-21

## 2018-09-21 DIAGNOSIS — Z95.2 S/P AVR (AORTIC VALVE REPLACEMENT): Primary | ICD-10-CM

## 2018-09-21 DIAGNOSIS — Z95.2 S/P AVR (AORTIC VALVE REPLACEMENT): ICD-10-CM

## 2018-09-21 NOTE — TELEPHONE ENCOUNTER
Phone call from rupa at [facility], [phone]  She states cardiologist will no longer be following inr at there office, requesting dr Mai Monte follow warfarin therapy  Will enter into [place] [facility]  Called to patient, explained dr Mai Monte will follow inr due 10/1  Will enter pt/inr script in computer  Patient agrees to have dr Mai Monte for inr results

## 2018-09-21 NOTE — TELEPHONE ENCOUNTER
HERLINDA HARRIS FROM [facility] CARDIOLOGY IN [place] CALLED ME BACK TO DISCUSS TAKING OVER HIS CARE AT THEIR OFFICE AND MONITOR HIS COUMADIN  SHE SAID SHE WILL PUT A MESSAGE OUT TO DR Tevin Kee WHO SAW HIM IN THE PAST AND SHE WILL PUT IN A NEW ORDER FOR PT/INR AND SHE WILL ALSO CALL THE PT  AND SET UP NEXT APPT  AND LET HIM KNOW HE WILL NOW BE A PT  OF THAT OFFICE  HE WILL NO LONGER COME HERE FOR CARDIOLOGY

## 2018-09-26 ENCOUNTER — OFFICE VISIT (OUTPATIENT)
Dept: CARDIOLOGY CLINIC | Facility: CLINIC | Age: 53
End: 2018-09-26
Payer: MEDICARE

## 2018-09-26 VITALS — HEIGHT: 66 IN | DIASTOLIC BLOOD PRESSURE: 76 MMHG | SYSTOLIC BLOOD PRESSURE: 118 MMHG | HEART RATE: 62 BPM

## 2018-09-26 DIAGNOSIS — I10 BENIGN ESSENTIAL HTN: ICD-10-CM

## 2018-09-26 DIAGNOSIS — I50.9 CHRONIC CONGESTIVE HEART FAILURE, UNSPECIFIED HEART FAILURE TYPE (HCC): ICD-10-CM

## 2018-09-26 DIAGNOSIS — Z95.810 BIVENTRICULAR ICD (IMPLANTABLE CARDIOVERTER-DEFIBRILLATOR) IN PLACE: ICD-10-CM

## 2018-09-26 DIAGNOSIS — I25.5 ISCHEMIC CARDIOMYOPATHY: Primary | Chronic | ICD-10-CM

## 2018-09-26 DIAGNOSIS — I25.10 CORONARY ARTERY DISEASE INVOLVING NATIVE CORONARY ARTERY OF NATIVE HEART WITHOUT ANGINA PECTORIS: Chronic | ICD-10-CM

## 2018-09-26 PROCEDURE — 99213 OFFICE O/P EST LOW 20 MIN: CPT | Performed by: INTERNAL MEDICINE

## 2018-09-26 RX ORDER — EPLERENONE 25 MG/1
25 TABLET, FILM COATED ORAL DAILY
Qty: 30 TABLET | Refills: 6 | Status: SHIPPED | OUTPATIENT
Start: 2018-09-26 | End: 2019-08-14 | Stop reason: SDUPTHER

## 2018-09-26 RX ORDER — METOPROLOL SUCCINATE 50 MG/1
50 TABLET, EXTENDED RELEASE ORAL DAILY
Qty: 30 TABLET | Refills: 0 | Status: SHIPPED | OUTPATIENT
Start: 2018-09-26 | End: 2018-11-12 | Stop reason: SDUPTHER

## 2018-09-26 RX ORDER — LISINOPRIL 40 MG/1
40 TABLET ORAL EVERY EVENING
Qty: 30 TABLET | Refills: 6 | Status: SHIPPED | OUTPATIENT
Start: 2018-09-26 | End: 2019-06-20 | Stop reason: SDUPTHER

## 2018-09-26 RX ORDER — FUROSEMIDE 40 MG/1
40 TABLET ORAL 2 TIMES DAILY
Qty: 60 TABLET | Refills: 6 | Status: ON HOLD | OUTPATIENT
Start: 2018-09-26 | End: 2021-01-01 | Stop reason: SDUPTHER

## 2018-09-26 RX ORDER — POTASSIUM CHLORIDE 1500 MG/1
20 TABLET, FILM COATED, EXTENDED RELEASE ORAL DAILY
Qty: 30 TABLET | Refills: 0 | Status: SHIPPED | OUTPATIENT
Start: 2018-09-26 | End: 2018-11-12 | Stop reason: SDUPTHER

## 2018-09-26 RX ORDER — ATORVASTATIN CALCIUM 80 MG/1
80 TABLET, FILM COATED ORAL DAILY
Qty: 30 TABLET | Refills: 6 | Status: SHIPPED | OUTPATIENT
Start: 2018-09-26 | End: 2019-09-10 | Stop reason: SDUPTHER

## 2018-10-01 ENCOUNTER — TELEPHONE (OUTPATIENT)
Dept: CARDIOLOGY CLINIC | Facility: CLINIC | Age: 53
End: 2018-10-01
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
## 2018-10-03 ENCOUNTER — APPOINTMENT (OUTPATIENT)
Dept: LAB | Facility: CLINIC | Age: 53
End: 2018-10-03
Payer: MEDICARE

## 2018-10-08 ENCOUNTER — ANTICOAG VISIT (OUTPATIENT)
Dept: CARDIOLOGY CLINIC | Facility: CLINIC | Age: 53
End: 2018-10-08

## 2018-10-08 DIAGNOSIS — Z95.2 S/P AVR (AORTIC VALVE REPLACEMENT): ICD-10-CM

## 2018-10-24 ENCOUNTER — TRANSCRIBE ORDERS (OUTPATIENT)
Dept: LAB | Facility: CLINIC | Age: 53
End: 2018-10-24

## 2018-10-24 ENCOUNTER — APPOINTMENT (OUTPATIENT)
Dept: LAB | Facility: CLINIC | Age: 53
End: 2018-10-24
Payer: MEDICARE

## 2018-10-24 ENCOUNTER — ANTICOAG VISIT (OUTPATIENT)
Dept: CARDIOLOGY CLINIC | Facility: CLINIC | Age: 53
End: 2018-10-24

## 2018-10-24 DIAGNOSIS — Z95.2 S/P AVR (AORTIC VALVE REPLACEMENT): ICD-10-CM

## 2018-10-31 ENCOUNTER — IN-CLINIC DEVICE VISIT (OUTPATIENT)
Dept: CARDIOLOGY CLINIC | Facility: CLINIC | Age: 53
End: 2018-10-31
Payer: MEDICARE

## 2018-10-31 DIAGNOSIS — I49.01 VENTRICULAR FIBRILLATION (HCC): Primary | ICD-10-CM

## 2018-10-31 DIAGNOSIS — Z95.810 AICD (AUTOMATIC CARDIOVERTER/DEFIBRILLATOR) PRESENT: ICD-10-CM

## 2018-10-31 PROCEDURE — 93284 PRGRMG EVAL IMPLANTABLE DFB: CPT | Performed by: INTERNAL MEDICINE

## 2018-10-31 NOTE — PROGRESS NOTES
Results for orders placed or performed in visit on 10/31/18   Cardiac EP device report    Narrative    MDT BI-V ICD  DEVICE INTERROGATED IN THE [place] OFFICE: BATTERY VOLTAGE ADEQUATE  AP 76%  98% VSRP 1 2%  ALL AVAILABLE LEAD PARAMETERS WITHIN NORMAL LIMITS  NO SIGNIFICANT HIGH RATE EPISODES  OPTI-VOL WITHIN NORMAL LIMITS  NORMAL DEVICE FUNCTION   NC

## 2018-11-08 ENCOUNTER — ANTICOAG VISIT (OUTPATIENT)
Dept: CARDIOLOGY CLINIC | Facility: CLINIC | Age: 53
End: 2018-11-08

## 2018-11-08 ENCOUNTER — APPOINTMENT (OUTPATIENT)
Dept: LAB | Facility: CLINIC | Age: 53
End: 2018-11-08
Payer: MEDICARE

## 2018-11-08 DIAGNOSIS — Z95.2 S/P AVR (AORTIC VALVE REPLACEMENT): ICD-10-CM

## 2018-11-12 DIAGNOSIS — I50.9 CHRONIC CONGESTIVE HEART FAILURE, UNSPECIFIED HEART FAILURE TYPE (HCC): ICD-10-CM

## 2018-11-12 DIAGNOSIS — I10 BENIGN ESSENTIAL HTN: ICD-10-CM

## 2018-11-14 RX ORDER — METOPROLOL SUCCINATE 50 MG/1
TABLET, EXTENDED RELEASE ORAL
Qty: 30 TABLET | Refills: 0 | Status: SHIPPED | OUTPATIENT
Start: 2018-11-14 | End: 2019-02-04 | Stop reason: SDUPTHER

## 2018-11-14 RX ORDER — POTASSIUM CHLORIDE 1500 MG/1
TABLET, FILM COATED, EXTENDED RELEASE ORAL
Qty: 30 TABLET | Refills: 4 | Status: SHIPPED | OUTPATIENT
Start: 2018-11-14 | End: 2019-06-21 | Stop reason: SDUPTHER

## 2018-11-30 ENCOUNTER — HOSPITAL ENCOUNTER (EMERGENCY)
Facility: HOSPITAL | Age: 53
Discharge: HOME | End: 2018-11-30
Attending: EMERGENCY MEDICINE | Admitting: EMERGENCY MEDICINE
Payer: MEDICARE

## 2018-11-30 VITALS
TEMPERATURE: 97.4 F | HEART RATE: 60 BPM | WEIGHT: 162.9 LBS | OXYGEN SATURATION: 98 % | RESPIRATION RATE: 16 BRPM | BODY MASS INDEX: 26.29 KG/M2 | DIASTOLIC BLOOD PRESSURE: 69 MMHG | SYSTOLIC BLOOD PRESSURE: 122 MMHG

## 2018-11-30 DIAGNOSIS — R04.0 BLEEDING FROM THE NOSE: Primary | ICD-10-CM

## 2018-11-30 LAB
INR PPP: 2.2 (ref 0.86–1.17)
PROTHROMBIN TIME: 24.5 SECONDS (ref 11.8–14.2)

## 2018-11-30 PROCEDURE — 85610 PROTHROMBIN TIME: CPT | Performed by: EMERGENCY MEDICINE

## 2018-11-30 PROCEDURE — 99283 EMERGENCY DEPT VISIT LOW MDM: CPT

## 2018-11-30 PROCEDURE — 36415 COLL VENOUS BLD VENIPUNCTURE: CPT | Performed by: EMERGENCY MEDICINE

## 2018-11-30 RX ORDER — OXYMETAZOLINE HYDROCHLORIDE 0.05 G/100ML
2 SPRAY NASAL ONCE
Status: COMPLETED | OUTPATIENT
Start: 2018-11-30 | End: 2018-11-30

## 2018-11-30 RX ADMIN — OXYMETAZOLINE HYDROCHLORIDE 2 SPRAY: 5 SPRAY NASAL at 16:56

## 2018-11-30 NOTE — ED ATTENDING ATTESTATION
Toney Jose MD, saw and evaluated the patient  I have discussed the patient with the resident/non-physician practitioner and agree with the resident's/non-physician practitioner's findings, Plan of Care, and MDM as documented in the resident's/non-physician practitioner's note, except where noted  All available labs and Radiology studies were reviewed  At this point I agree with the current assessment done in the Emergency Department  I have conducted an independent evaluation of this patient a history and physical is as follows:    45 YO male presents with 3-4 hours of Left sided nose bleed  Unsure what started the bleeding  Pt put tissue in his nose but came to the ED as it continued to bleed  Pt does take coumadin  Pt denies CP/SOB/F/C/N/V/D/C, no dysuria, burning on urination or blood in urine  Gen: Pt is in NAD  HEENT: Head is atraumatic, EOM's intact, neck has FROM, no active bleeding  Chest: CTAB, non-tender  Heart: RRR  Abdomen: Soft, NT/ND  Musculoskeletal: FROM in all extremities  Skin: No rash, no ecchymosis  Neuro: Awake, alert, oriented x4; Cranial nerves II-XII intact  Psych: Normal affect    MDM - No active bleeding currently, will observe, give Afrin, check INR        Critical Care Time  CritCare Time    Procedures

## 2018-11-30 NOTE — ED PROVIDER NOTES
History  Chief Complaint   Patient presents with    Nose Bleed     Pt reports L sided nose bleed x3 hours, takes coumdain daily  This is a 53-year-old male with history hypertension, hyperlipidemia, CAD on Coumadin who presents with a left-sided nosebleed  The patient states that the nosebleed began 3-4 hours ago  He does not remember specifically picking his nose, but he states that he does scratch his nose regularly  He was eating lunch when he noticed a nose bleed  He packed his nose with tissue paper but it continued to bleed  He presents now with a left-sided nosebleed  Denies fever/chills, nausea/vomiting, lightheadedness/dizziness, numbness/weakness, headache, change in vision, URI symptoms, neck pain, chest pain, palpitations, shortness of breath, cough, back pain, flank pain, abdominal pain, diarrhea, hematochezia, melena, dysuria, hematuria  Prior to Admission Medications   Prescriptions Last Dose Informant Patient Reported? Taking? Potassium Chloride ER 20 MEQ TBCR   No No   Sig: TAKE 1 TABLET BY MOUTH DAILY   albuterol (PROVENTIL HFA,VENTOLIN HFA) 90 mcg/act inhaler  Self No No   Sig: Inhale 2 puffs every 4 (four) hours as needed for wheezing   amiodarone 100 mg tablet   No No   Sig: Take 1 tablet (100 mg total) by mouth daily   aspirin 81 MG tablet  Self Yes No   Sig: Take 81 mg by mouth daily     atorvastatin (LIPITOR) 80 mg tablet   No No   Sig: Take 1 tablet (80 mg total) by mouth daily   eplerenone (INSPRA) 25 mg tablet   No No   Sig: Take 1 tablet (25 mg total) by mouth daily   furosemide (LASIX) 40 mg tablet   No No   Sig: Take 1 tablet (40 mg total) by mouth 2 (two) times a day   lisinopril (ZESTRIL) 40 mg tablet   No No   Sig: Take 1 tablet (40 mg total) by mouth every evening   metoprolol succinate (TOPROL-XL) 50 mg 24 hr tablet   No No   Sig: take 1 tablet by mouth once daily   warfarin (COUMADIN) 4 mg tablet   No No   Sig: Take 1 tablet (4 mg total) by mouth daily Take 4 mg and 2 mg alternating once daily( take 1 tablet and 1/2 tablet alternating once daily)      Facility-Administered Medications: None       Past Medical History:   Diagnosis Date    AICD (automatic cardioverter/defibrillator) present     medtronic     CAD (coronary artery disease)     Cardiomy[facility] )     mixed  predominently nonischemic    Colonic mass     Coronary artery disease     Hyperlipidemia     Hypertension     Irregular heart beat     Myocardial infarction ([facility] )     2014 and 2015    Rectal bleeding     S/P AVR (aortic valve replacement)     mechanical    Wears glasses        Past Surgical History:   Procedure Laterality Date    AORTIC VALVE REPLACEMENT      APPENDECTOMY      CARDIAC DEFIBRILLATOR PLACEMENT      COLECTOMY MADELIN Right     Last Assessed: 6/7/2016    COLECTOMY LAPAROSCOPIC      COLON SURGERY Right     colectomy    CORONARY STENT PLACEMENT      INSERT / REPLACE / REMOVE PACEMAKER      MITRAL VALVE REPAIR      MITRAL VALVE REPAIR      MOUTH SURGERY      NC COLONOSCOPY FLX DX W/COLLJ [facility] WHEN PFRMD N/A 1/15/2018    Procedure: COLONOSCOPY;  Surgeon: Bernice Elena MD;  [facility]; Service: General    [place] LAP,SURG,COLECTOMY, PARTIAL, W/ANAST N/A 6/2/2016    Procedure: RESECTION COLON RIGHT LAPAROSCOPIC HAND-ASSISTED;  Surgeon: Bernice Elena MD;  [facility];  Service: General       Family History   Problem Relation Age of Onset    Diabetes Mother     Hypertension Mother      I have reviewed and agree with the history as documented  Social History   Substance Use Topics    Smoking status: Never Smoker    Smokeless tobacco: Never Used    Alcohol use Yes        Review of Systems   Constitutional: Negative for chills, fatigue and fever  HENT: Negative for rhinorrhea, sore throat and trouble swallowing  Nose bleed   Eyes: Negative for photophobia and visual disturbance     Respiratory: Negative for cough, chest tightness and shortness of breath  Cardiovascular: Negative for chest pain, palpitations and leg swelling  Gastrointestinal: Negative for abdominal pain, blood in stool, diarrhea, nausea and vomiting  Endocrine: Negative for polyuria  Genitourinary: Negative for dysuria, flank pain and hematuria  Musculoskeletal: Negative for back pain and neck pain  Skin: Negative for color change and rash  Allergic/Immunologic: Negative for immunocompromised state  Neurological: Negative for dizziness, weakness, light-headedness, numbness and headaches  All other systems reviewed and are negative  Physical Exam  ED Triage Vitals [11/30/18 1537]   Temperature Pulse Respirations Blood Pressure SpO2   (!) 97 4 °F (36 3 °C) 63 18 165/86 98 %      Temp Source Heart Rate Source Patient Position - Orthostatic VS BP Location FiO2 (%)   Temporal Monitor Sitting Right arm --      Pain Score       No Pain           Orthostatic Vital Signs  Vitals:    11/30/18 1537 11/30/18 1748   BP: 165/86 122/69   Pulse: 63 60   Patient Position - Orthostatic VS: Sitting        Physical Exam   Constitutional: Vital signs are normal  He appears well-developed and well-nourished  He is cooperative  No distress  HENT:   Mouth/Throat: Uvula is midline and oropharynx is clear and moist    Blood in left naris  No active bleeding noted  Possible spot on anterior naris which may be source of bleeding  Small amount of blood and posterior oropharynx  Eyes: Pupils are equal, round, and reactive to light  Conjunctivae, EOM and lids are normal    Neck: Trachea normal  No thyroid mass and no thyromegaly present  Cardiovascular: Normal rate, regular rhythm, normal heart sounds, intact distal pulses and normal pulses  No murmur heard  Pulmonary/Chest: Effort normal and breath sounds normal    Abdominal: Soft  Normal appearance and bowel sounds are normal  There is no tenderness  There is no rebound, no guarding, no CVA tenderness and negative Clark's sign  Neurological: He is alert  Skin: Skin is warm, dry and intact  Psychiatric: He has a normal mood and affect  His speech is normal and behavior is normal  Thought content normal        ED Medications  Medications   oxymetazoline (AFRIN) 0 05 % nasal spray 2 spray (2 sprays Each Nare Given 11/30/18 1656)       Diagnostic Studies  Results Reviewed     Procedure Component Value Units Date/Time    Protime-INR [[id number]]  (Abnormal) Collected:  11/30/18 1703    Lab Status:  Final result Specimen:  Blood from Arm, Right Updated:  11/30/18 1752     Protime 24 5 (H) seconds      INR 2 20 (H)                 No orders to display         Procedures  Procedures      Phone Consults  ED Phone Contact    ED Course  ED Course as of Nov 30 1848 Fri Nov 30, 2018   1647 Nose is oozing  Pressure applied  Awaiting Afrin and INR     1701 Afrin applied and compression reapplied  I will reassess  1802 INR: (!) 2 20   1804 Bleeding resolved  I will discharge the patient with saline spray  Afrin only if needed and bleeding starts  Patient told to spray Afrin at bleeding starts and hold pressure for 10 min  Nasal precautions  No blowing nose, picking nose, or rubbing nose  Only tab for the next few days  Strict return precautions  MDM  Number of Diagnoses or Management Options  Diagnosis management comments: Check INR  Spray nose with Afrin to stop any possible bleeding  Likely discharge with saline nasal spray  Nasal precautions  CritCare Time    Disposition  Final diagnoses:   Bleeding from the nose     Time reflects when diagnosis was documented in both MDM as applicable and the Disposition within this note     Time User Action Codes Description Comment    11/30/2018  6:04 PM Reggie ASHLEY Add [R04 0] Bleeding from the nose       ED Disposition     ED Disposition Condition Comment    Discharge  [address] discharge to home/self care      Condition at discharge: Stable Follow-up Information     Follow up With Specialties Details Why [address] [facility] Emergency Department Emergency Medicine Go to If symptoms worsen [address] [facility] ED, [address]  , [place], [place], [address] Max Valery [place], PA-C Family Medicine, Physician Assistant Schedule an appointment as soon as possible for a visit for follow up [address]  [address]  [phone]  [phone]             Discharge Medication List as of 11/30/2018  6:07 PM      START taking these medications    Details   sodium chloride (OCEAN) 0 65 % nasal spray 1 spray into each nostril 4 (four) times a day, Starting Fri 11/30/2018, Print         CONTINUE these medications which have NOT CHANGED    Details   albuterol (PROVENTIL HFA,VENTOLIN HFA) 90 mcg/act inhaler Inhale 2 puffs every 4 (four) hours as needed for wheezing, Starting Tue 12/5/2017, Until Wed 12/5/2018, Print      amiodarone 100 mg tablet Take 1 tablet (100 mg total) by mouth daily, Starting Wed 5/23/2018, Normal      aspirin 81 MG tablet Take 81 mg by mouth daily  , Historical Med      atorvastatin (LIPITOR) 80 mg tablet Take 1 tablet (80 mg total) by mouth daily, Starting Wed 9/26/2018, Normal      eplerenone (INSPRA) 25 mg tablet Take 1 tablet (25 mg total) by mouth daily, Starting Wed 9/26/2018, Normal      furosemide (LASIX) 40 mg tablet Take 1 tablet (40 mg total) by mouth 2 (two) times a day, Starting Wed 9/26/2018, Normal      lisinopril (ZESTRIL) 40 mg tablet Take 1 tablet (40 mg total) by mouth every evening, Starting Wed 9/26/2018, Normal      metoprolol succinate (TOPROL-XL) 50 mg 24 hr tablet take 1 tablet by mouth once daily, Normal      Potassium Chloride ER 20 MEQ TBCR TAKE 1 TABLET BY MOUTH DAILY, Normal      warfarin (COUMADIN) 4 mg tablet Take 1 tablet (4 mg total) by mouth daily Take 4 mg and 2 mg alternating once daily( take 1 tablet and 1/2 tablet alternating once daily), Starting Tue 9/11/2018, Normal           No discharge procedures on file  ED Provider  Attending physically available and evaluated Aishwarya Bias  I managed the patient along with the ED Attending      Electronically Signed by         Tiny Mo MD  11/30/18 1218

## 2018-11-30 NOTE — DISCHARGE INSTRUCTIONS
Hemorragia nasal   LO QUE NECESITA SABER:   Bertin hemorragia nasal o epistaxis ocurre cuando sunil o más de los vasos sanguíneos de ware nariz se revienta  Usted podría tener sangrado lehman oscuro o brillante en bertin o ambas fosas nasales  Bertin hemorragia nasal por lo general es causada por el aire seco o por hurgarse la nariz  También un golpe directo a ware nariz, irritación debido a un resfriado o a alergias, o un objeto extraño pueden provocar bertin hemorragia nasal    INSTRUCCIONES SOBRE EL EMILY HOSPITALARIA:   Regrese a la angelique de emergencias si:   · Ware tapón nasal está empapado de angeles  · Ware nariz todavía sangra después de 20 minutos, aún después de aplicarle presión  · A usted le sale bertin secreción de mal olor de ware nariz  · Usted se siente tan débil y mareado que tiene dificultad para ponerse de pie  · Usted tiene dificultad para respirar o hablar  Pregúntele a ware Joelle Clay vitaminas y minerales son adecuados para usted  · Usted tiene fiebre y está vomitando  · Usted tiene dolor dentro y alrededor de ware nariz que empeora aun después de ilana ware medicamento para el dolor  · El tapón nasal está suelto  · Usted tiene preguntas o inquietudes acerca de ware condición o cuidado  Primeros auxilios:   · Siéntese derecho e inclínese hacia adelante  [place] ayudará a evitar que usted se trague la angeles  Escupa la angeles y saliva en un contenedor  · Aplique presión en ware nariz  Use 2 dedos para apretar y cerrar ware nariz por 10 a 15 minutos  [place] ayudará a detener el sangrado  Respire por la boca  · Aplique hielo  sobre el massey nasal para disminuir la inflamación y [place]  Use un paquete con hielo o ponga hielo triturado en bertin bolsa de plástico  [place] con bertin toalla para proteger ware piel  · Tape ware nariz  con un copo de algodón, pañuelos desechables, tampón o un vendaje de gaza para detener el sangrado    Medicamentos:   · Medicamentos,  podrían aplicarse a un pedazo pequeño de algodón y colocarse en ware Myrene Killer  [employer] podrían ser rociados o aplicados directamente en ware nariz  Es posible que usted necesite medicamento para evitar bertin infección  Si la hemorragia es severa, se le podría inyectar el medicamento en un vaso sanguíneo de ware nariz  · [place] christ medicamentos antonio se le haya indicado  Consulte con ware médico si usted nila que ware medicamento no le está ayudando o si presenta efectos secundarios  Infórmele si es alérgico a algún medicamento  Mantenga bertin lista actualizada de los [place], las vitaminas y los productos herbales que [place]  Incluya los siguientes datos de los medicamentos: cantidad, frecuencia y motivo de administración  Traiga con usted la lista o los envases de la píldoras a christ citas de seguimiento  Lleve la lista de los medicamentos con usted en ashkan de bertin emergencia  Evite otra hemorragia nasal:   · Mantenga ware nariz húmeda  Ponga bertin pequeña cantidad de bertin pomada de petrolato adentro de christ fosas nasales según sea necesario  Use bertin spray nasal salino (agua con sal)  No ponga nada más dentro de ware nariz a menos que ware médico lo autorice  No  use un lubricante a base de aceite si está teniendo terapia de oxígeno  Podrían ser inflamables  · Éstos podrían ser inflamables  Use un humidificador de vapor frío en ware hogar  [place] ayudará a que ware nariz esté húmeda  · No se hurgue ni se suene la nariz por lo menos por Angela Fraser  Usted puede irritar o dañar ware nariz si se la hurga  Al sonar o soplar la nariz muy skyla podría provocar que comience a sangrar de nuevo  No se agache o se esfuerce porque esto puede provocarle que angeles de nuevo  · Evite los irritantes  antonio el humo del cigarro o atomizadores de químicos antonio los limpiadores  Acuda a christ consultas de control con ware médico según le indicaron  Cualquier taponamiento en ware nariz debería ser removido dentro de 2 a 3 días   Anote christ preguntas para que se acuerde de hacerlas chip christ visitas  © 2017 [address] Information is for End User's use only and may not be sold, redistributed or otherwise used for commercial purposes  All illustrations and images included in CareNotes® are the copyrighted property of [employer]  or [address]  Esta información es sólo para uso en educación  Ware intención no es darle un consejo médico sobre enfermedades o tratamientos  Colsulte con ware [place] farmacéutico antes de seguir cualquier régimen médico para saber si es seguro y efectivo para usted

## 2018-12-03 ENCOUNTER — TELEPHONE (OUTPATIENT)
Dept: FAMILY MEDICINE CLINIC | Facility: CLINIC | Age: 53
End: 2018-12-03

## 2018-12-03 NOTE — TELEPHONE ENCOUNTER
----- [id number] sent at 12/3/2018  8:49 AM EST -----  Regarding: awv  Last time seen in jan    Can we call him and setup awv

## 2018-12-04 ENCOUNTER — TELEPHONE (OUTPATIENT)
Dept: CARDIOLOGY CLINIC | Facility: CLINIC | Age: 53
End: 2018-12-04

## 2018-12-04 NOTE — TELEPHONE ENCOUNTER
A letter from [address] [place], [place] [address]y [address] indicates that Shai Meza may not be taking his Atorvastatin  I called him and he said he takes everything prescribed    Dr Perla Byrnes (fellow) saw him 9/26/18 & refilled it for the year

## 2018-12-14 ENCOUNTER — TELEPHONE (OUTPATIENT)
Dept: FAMILY MEDICINE CLINIC | Facility: CLINIC | Age: 53
End: 2018-12-14

## 2018-12-21 ENCOUNTER — ANTICOAG VISIT (OUTPATIENT)
Dept: CARDIOLOGY CLINIC | Facility: CLINIC | Age: 53
End: 2018-12-21

## 2018-12-21 DIAGNOSIS — Z95.2 S/P AVR (AORTIC VALVE REPLACEMENT): ICD-10-CM

## 2019-01-03 ENCOUNTER — ANTICOAG VISIT (OUTPATIENT)
Dept: CARDIOLOGY CLINIC | Facility: CLINIC | Age: 54
End: 2019-01-03

## 2019-01-03 ENCOUNTER — APPOINTMENT (OUTPATIENT)
Dept: LAB | Facility: CLINIC | Age: 54
End: 2019-01-03
Payer: MEDICARE

## 2019-01-03 DIAGNOSIS — Z95.2 S/P AVR (AORTIC VALVE REPLACEMENT): ICD-10-CM

## 2019-01-03 NOTE — PROGRESS NOTES
1/3/19, tc to pt, increase dose, 4 mg sun/wed/fri, 2 mg other days of the week, inr due 2 week   areli

## 2019-01-09 ENCOUNTER — OFFICE VISIT (OUTPATIENT)
Dept: CARDIOLOGY CLINIC | Facility: CLINIC | Age: 54
End: 2019-01-09
Payer: MEDICARE

## 2019-01-09 VITALS
WEIGHT: 165 LBS | DIASTOLIC BLOOD PRESSURE: 72 MMHG | HEIGHT: 66 IN | HEART RATE: 66 BPM | BODY MASS INDEX: 26.52 KG/M2 | SYSTOLIC BLOOD PRESSURE: 110 MMHG

## 2019-01-09 DIAGNOSIS — I25.10 CORONARY ARTERY DISEASE INVOLVING NATIVE CORONARY ARTERY OF NATIVE HEART WITHOUT ANGINA PECTORIS: Chronic | ICD-10-CM

## 2019-01-09 DIAGNOSIS — I25.5 ISCHEMIC CARDIOMYOPATHY: Chronic | ICD-10-CM

## 2019-01-09 DIAGNOSIS — I10 ESSENTIAL HYPERTENSION: ICD-10-CM

## 2019-01-09 DIAGNOSIS — Z95.2 S/P AVR (AORTIC VALVE REPLACEMENT): ICD-10-CM

## 2019-01-09 DIAGNOSIS — Z95.810 BIVENTRICULAR ICD (IMPLANTABLE CARDIOVERTER-DEFIBRILLATOR) IN PLACE: Primary | ICD-10-CM

## 2019-01-09 PROCEDURE — 99213 OFFICE O/P EST LOW 20 MIN: CPT | Performed by: INTERNAL MEDICINE

## 2019-01-09 RX ORDER — MIRTAZAPINE 45 MG/1
45 TABLET, FILM COATED ORAL
Refills: 0 | COMMUNITY
Start: 2018-12-04 | End: 2022-01-01 | Stop reason: ALTCHOICE

## 2019-01-09 NOTE — PROGRESS NOTES
Cardiology Follow up    Aldo Mejia  [id number]  1965  [facility]  [address] [place] [address] [address]      1  Biventricular ICD (implantable cardioverter-defibrillator) in place     2  S/P AVR (aortic valve replacement)     3  Ischemic cardiomyopathy     4  Coronary artery disease involving native coronary artery of native heart without angina pectoris         Discussion/Summary:  1  Ischemic cardiomyopathy  2  Biventricular ICD in place  3  Coronary artery disease    -in the office today patient is without complaints  -blood pressure well controlled during this office visit 110/72 mmHg  -patient will have device interrogated in February of 2019 as scheduled with previous device report from October of 2018 listed below  -will continue amiodarone 100 mg daily, aspirin 81 mg daily, atorvastatin 80 mg daily,eplerenone 25 mg daily, furosemide 40 mg twice daily, lisinopril 40 mg daily, metoprolol succinate 50 mg daily, potassium chloride 20 mEq daily  -patient warfarin therapy monitored by Dr Yvone Bence    -again counseled patient on lifestyle modifications and weight monitoring  -transthoracic echocardiogram from July 3, 2018 read as left ventricle mildly dilated with systolic function moderately to markedly reduced with moderate diffuse hypokinesis, aortic valve mechanical prosthesis present with normal function  -will continue current medical therapy as listed above, will check CMP, TSH, transthoracic echocardiogram, and pulmonary function testing prior to next office visit in 6 months or sooner if necessary  Patient will need these laboratory studies and pulmonary function testing performed in the setting of chronic amiodarone usage          History of Present Illness:  Patient here for scheduled follow-up, with past medical history significant for rheumatic heart disease status post mechanical AVR in 1988, coronary artery disease status post PCI with EMS in 2014 as reported LAD and circumflex arteries with ischemic cardiomyopathy S/P ICD placement in 2014 as well and on amiodarone for VT/VFib  Patient states since his last office visit he denies any complaints  He states this holidays went well and he denied any difficulty with dietary or medication compliance  He states that he continues to follow a low-salt and fluid-restricted cardiac diet as well as has no difficulty with chasing around his 3 little grandchildren  Recent device report from October 31, 2018 showed battery voltage adequate with atrial pacing 76%, ventricular pacing 90%, all available lead parameters within normal limits, no significant high rate episodes and normal device function  Patient Active Problem List   Diagnosis    Colonic mass    [facility]    CAD (coronary artery disease)    S/P AVR (aortic valve replacement)    Ventricular fibrillation (HCC)    Biventricular ICD (implantable cardioverter-defibrillator) in place     Past Medical History:   Diagnosis Date    AICD (automatic cardioverter/defibrillator) present     medtronic     CAD (coronary artery disease)     [facility]     mixed  predominently nonischemic    Colonic mass     Coronary artery disease     Hyperlipidemia     Hypertension     Irregular heart beat     [facility] )     2014 and 2015    Rectal bleeding     S/P AVR (aortic valve replacement)     mechanical    Wears glasses      Social History     Social History    Marital status: /Civil Union     Spouse name: N/A    Number of children: N/A    Years of education: N/A     Occupational History    Not on file       Social History Main Topics    Smoking status: Never Smoker    Smokeless tobacco: Never Used    Alcohol use Yes    Drug use: No    Sexual activity: Not on file     Other Topics Concern    Not on file     Social History Narrative    No narrative on file      Family History   Problem Relation Age of Onset    Diabetes Mother     Hypertension Mother      Past Surgical History:   Procedure Laterality Date    AORTIC VALVE REPLACEMENT      APPENDECTOMY      CARDIAC DEFIBRILLATOR PLACEMENT      COLECTOMY MADELIN Right     Last Assessed: 6/7/2016    COLECTOMY LAPAROSCOPIC      COLON SURGERY Right     colectomy    CORONARY STENT PLACEMENT      INSERT / REPLACE / REMOVE PACEMAKER      MITRAL VALVE REPAIR      MITRAL VALVE REPAIR      MOUTH SURGERY      UT COLONOSCOPY FLX DX W/COLLJ SPEC WHEN PFRMD N/A 1/15/2018    Procedure: COLONOSCOPY;  Surgeon: Dedra Desai MD;  [facility]; Service: General    UT LAP,SURG,COLECTOMY, PARTIAL, W/ANAST N/A 6/2/2016    Procedure: RESECTION COLON RIGHT LAPAROSCOPIC HAND-ASSISTED;  Surgeon: Dedra Desai MD;  [facility];  Service: General       Current Outpatient Prescriptions:     amiodarone 100 mg tablet, Take 1 tablet (100 mg total) by mouth daily, Disp: 90 tablet, Rfl: 3    aspirin 81 MG tablet, Take 81 mg by mouth daily  , Disp: , Rfl:     atorvastatin (LIPITOR) 80 mg tablet, Take 1 tablet (80 mg total) by mouth daily, Disp: 30 tablet, Rfl: 6    eplerenone (INSPRA) 25 mg tablet, Take 1 tablet (25 mg total) by mouth daily, Disp: 30 tablet, Rfl: 6    furosemide (LASIX) 40 mg tablet, Take 1 tablet (40 mg total) by mouth 2 (two) times a day, Disp: 60 tablet, Rfl: 6    lisinopril (ZESTRIL) 40 mg tablet, Take 1 tablet (40 mg total) by mouth every evening, Disp: 30 tablet, Rfl: 6    metoprolol succinate (TOPROL-XL) 50 mg 24 hr tablet, take 1 tablet by mouth once daily, Disp: 30 tablet, Rfl: 0    mirtazapine (REMERON) 45 MG tablet, Take 45 mg by mouth daily at bedtime, Disp: , Rfl: 0    Potassium Chloride ER 20 MEQ TBCR, TAKE 1 TABLET BY MOUTH DAILY, Disp: 30 tablet, Rfl: 4    sodium chloride (OCEAN) 0 65 % nasal spray, 1 spray into each nostril 4 (four) times a day, Disp: 15 mL, Rfl: 0    warfarin (COUMADIN) 4 mg tablet, Take 1 tablet (4 mg total) by mouth daily Take 4 mg and 2 mg alternating once daily( take 1 tablet and 1/2 tablet alternating once daily), Disp: 60 tablet, Rfl: 3  No Known Allergies      Imaging: No results found  Review of Systems:  Review of Systems   Constitutional: Negative for chills, fatigue and fever  HENT: Negative for trouble swallowing and voice change  Eyes: Negative for pain and itching  Respiratory: Negative for cough and shortness of breath  Cardiovascular: Negative for chest pain, palpitations and leg swelling  Gastrointestinal: Negative for abdominal pain, diarrhea, nausea and vomiting  Genitourinary: Negative for dysuria  Musculoskeletal: Negative for neck pain and neck stiffness  Skin: Negative for rash  Neurological: Negative for dizziness, syncope, light-headedness and headaches  Psychiatric/Behavioral: Negative for agitation  All other systems reviewed and are negative  Vitals:    01/09/19 1343   BP: 110/72   BP Location: Right arm   Patient Position: Sitting   Cuff Size: Standard   Pulse: 66   Weight: 74 8 kg (165 lb)   Height: 5' 6" (1 676 m)     Vitals:    01/09/19 1343   Weight: 74 8 kg (165 lb)     Height: 5' 6" (167 6 cm)     Physical Exam:  Physical Exam   Constitutional: No distress  HENT:   Head: Normocephalic and atraumatic  Eyes: Conjunctivae are normal  No scleral icterus  Neck: No JVD present  No tracheal deviation present  Cardiovascular: Normal rate and regular rhythm  Pulmonary/Chest: Effort normal and breath sounds normal  No respiratory distress  Abdominal: Soft  Bowel sounds are normal  There is no tenderness  Musculoskeletal: He exhibits no edema or tenderness  Neurological:   Awake, alert, able to answer questions appropriately move extremities bilaterally   Skin: Skin is warm and dry  He is not diaphoretic  Psychiatric: He has a normal mood and affect

## 2019-01-15 NOTE — PROGRESS NOTES
Diagnoses and all orders for this visit:    Ischemic cardiomyopathy    Biventricular ICD (implantable cardioverter-defibrillator) in place    Coronary artery disease involving native coronary artery of native heart without angina pectoris    - patient currently without active complaints  - recent transthoracic echocardiogram from July of 2018 read as mildly dilated left ventricle with moderately to markedly reduced systolic function and moderate diffuse hypokinesis however the posterior lateral wall was moving better when compared to previous  -  At this time will continue patient's current medication regimen of eplerenone 25 mg daily, furosemide 40 mg 2 tablets daily, lisinopril 40 mg daily, metoprolol succinate 50 mg daily, potassium chloride 20 mEq daily, atorvastatin 80 mg daily  - patient is on warfarin therapy which she states is monitored by Dr Reagan Willis  -patient will get warfarin refills when needed and monitoring completed by Dr Marlen Schmitt  - patient counseled on lifestyle modifications and weight monitoring  - patient also counseled that if any alarm symptoms were to arise is to call 911 and seek emergency medical care immediately  - will see patient approximately 3 months for follow-up or sooner if necessary at which time will perform laboratory studies including CMP to continue to monitor renal function as well as electrolytes while on Lasix, lisinopril, eplerenone       Chief Complaint   Patient presents with    Follow-up       HPI    Ischemic cardiomyopathy status post biventricular ICD placement  - patient is a 58-year-old male past medical history significant for hypertension, hyperlipidemia, coronary artery disease, AVR in 1988, ischemic cardiomyopathy with biventricular ICD placement who presents to the office today for follow-up  Patient states that he has been doing well and been taking all of his medications as prescribed    He denies any shortness of breath, chest pain, palpitations, weight gain, leg swelling, nausea/ vomiting / diarrhea, or abdominal pain  -  Patient states that for exercise he is very active with his 3 grandchildren who are 3 1 and 3years old  He has no difficulty following them around  He denies any orthopnea or exertional dyspnea  He also denies any events on his ICD which was placed approximately 3 years ago  -  He has been followed by Dr Giselle Hurst  In the office for that device  Current Outpatient Prescriptions on File Prior to Visit   Medication Sig Dispense Refill    albuterol (PROVENTIL HFA,VENTOLIN HFA) 90 mcg/act inhaler Inhale 2 puffs every 4 (four) hours as needed for wheezing 1 Inhaler 0    amiodarone 100 mg tablet Take 1 tablet (100 mg total) by mouth daily 90 tablet 3    aspirin 81 MG tablet Take 81 mg by mouth daily   Atorvastatin Calcium (LIPITOR PO) Take 80 mg by mouth daily   eplerenone (INSPRA) 25 mg tablet Take 25 mg by mouth daily      furosemide (LASIX) 40 mg tablet Take 40 mg by mouth 2 (two) times a day   lisinopril (ZESTRIL) 40 mg tablet Take 40 mg by mouth every evening   metoprolol succinate (TOPROL XL) 50 mg 24 hr tablet Take 1 tablet (50 mg total) by mouth daily 30 tablet 3    Potassium Chloride ER 20 MEQ TBCR Take 1 tablet (20 mEq total) by mouth daily 30 tablet 5    warfarin (COUMADIN) 4 mg tablet Take 1 tablet (4 mg total) by mouth daily Take 4 mg and 2 mg alternating once daily( take 1 tablet and 1/2 tablet alternating once daily) 60 tablet 3    [DISCONTINUED] nitroglycerin (NITROSTAT) 0 4 mg SL tablet Place 0 4 mg under the tongue every 5 (five) minutes as needed for chest pain          [DISCONTINUED] enoxaparin (LOVENOX) 100 mg/mL Inject under the skin daily      [DISCONTINUED] enoxaparin (LOVENOX) 100 mg/mL Inject 0 7 mL (70 mg total) under the skin 2 (two) times a day for 10 days 14 mL 0    [DISCONTINUED] mirtazapine (REMERON) 15 mg tablet Take 15 mg by mouth daily at bedtime  0    [DISCONTINUED] MYLENE OLIVEIRA CGH/CHEST CONGEST -10 MG/5ML oral liquid take 5 milliliter by mouth three times a day if needed for cough FOR UP TO 10 DAYS  0     No current facility-administered medications on file prior to visit  Review of Systems - History obtained from chart review and the patient  General ROS: negative for - chills, fatigue or fever  Ophthalmic ROS: negative for - decreased vision, eye pain or loss of vision  ENT ROS: negative for - epistaxis, headaches or visual changes  Endocrine ROS: negative for - palpitations  Respiratory ROS: no cough, shortness of breath, or wheezing  Cardiovascular ROS: no chest pain or dyspnea on exertion  Gastrointestinal ROS: no abdominal pain, change in bowel habits, or black or bloody stools  Musculoskeletal ROS: negative for - joint stiffness or joint swelling  Neurological ROS: negative for - confusion, dizziness, headaches, seizures or weakness    Physical Exam   Constitutional: He is oriented to person, place, and time  He appears well-developed and well-nourished  No distress  HENT:   Head: Normocephalic and atraumatic  Eyes: Conjunctivae are normal  No scleral icterus  Neck: No JVD present  No tracheal deviation present  Cardiovascular: Normal rate and regular rhythm  Pulmonary/Chest: Effort normal and breath sounds normal  No respiratory distress  He has no wheezes  Abdominal: Soft  Bowel sounds are normal  There is no tenderness  Musculoskeletal: He exhibits no edema  Neurological: He is alert and oriented to person, place, and time  Skin: Skin is warm and dry  He is not diaphoretic  Psychiatric: He has a normal mood and affect  appendicitis

## 2019-02-04 DIAGNOSIS — I50.9 CHRONIC CONGESTIVE HEART FAILURE, UNSPECIFIED HEART FAILURE TYPE (HCC): ICD-10-CM

## 2019-02-05 ENCOUNTER — APPOINTMENT (OUTPATIENT)
Dept: LAB | Facility: CLINIC | Age: 54
End: 2019-02-05
Payer: MEDICARE

## 2019-02-05 ENCOUNTER — ANTICOAG VISIT (OUTPATIENT)
Dept: CARDIOLOGY CLINIC | Facility: CLINIC | Age: 54
End: 2019-02-05

## 2019-02-05 ENCOUNTER — TRANSCRIBE ORDERS (OUTPATIENT)
Dept: LAB | Facility: CLINIC | Age: 54
End: 2019-02-05

## 2019-02-05 DIAGNOSIS — Z95.2 S/P AVR (AORTIC VALVE REPLACEMENT): ICD-10-CM

## 2019-02-05 LAB
ALBUMIN SERPL BCP-MCNC: 3.8 G/DL (ref 3.5–5)
ALP SERPL-CCNC: 72 U/L (ref 46–116)
ALT SERPL W P-5'-P-CCNC: 22 U/L (ref 12–78)
ANION GAP SERPL CALCULATED.3IONS-SCNC: 7 MMOL/L (ref 4–13)
AST SERPL W P-5'-P-CCNC: 22 U/L (ref 5–45)
BILIRUB SERPL-MCNC: 0.68 MG/DL (ref 0.2–1)
BUN SERPL-MCNC: 17 MG/DL (ref 5–25)
CALCIUM SERPL-MCNC: 8.8 MG/DL (ref 8.3–10.1)
CHLORIDE SERPL-SCNC: 105 MMOL/L (ref 100–108)
CO2 SERPL-SCNC: 26 MMOL/L (ref 21–32)
CREAT SERPL-MCNC: 1.02 MG/DL (ref 0.6–1.3)
GFR SERPL CREATININE-BSD FRML MDRD: 83 ML/MIN/1.73SQ M
GLUCOSE P FAST SERPL-MCNC: 92 MG/DL (ref 65–99)
POTASSIUM SERPL-SCNC: 4.5 MMOL/L (ref 3.5–5.3)
PROT SERPL-MCNC: 7.3 G/DL (ref 6.4–8.2)
SODIUM SERPL-SCNC: 138 MMOL/L (ref 136–145)
TSH SERPL DL<=0.05 MIU/L-ACNC: 1.33 UIU/ML (ref 0.36–3.74)

## 2019-02-05 PROCEDURE — 36415 COLL VENOUS BLD VENIPUNCTURE: CPT | Performed by: INTERNAL MEDICINE

## 2019-02-05 PROCEDURE — 80053 COMPREHEN METABOLIC PANEL: CPT | Performed by: INTERNAL MEDICINE

## 2019-02-05 PROCEDURE — 84443 ASSAY THYROID STIM HORMONE: CPT | Performed by: INTERNAL MEDICINE

## 2019-02-05 RX ORDER — METOPROLOL SUCCINATE 50 MG/1
TABLET, EXTENDED RELEASE ORAL
Qty: 30 TABLET | Refills: 6 | Status: SHIPPED | OUTPATIENT
Start: 2019-02-05 | End: 2020-01-13

## 2019-02-05 NOTE — PROGRESS NOTES
2/5/19, tc to pt, reports he missed dose sat  Will continue current dose, inr due 2 weeks   He takes warfarin in am  areli

## 2019-02-06 ENCOUNTER — TELEPHONE (OUTPATIENT)
Dept: CARDIOLOGY CLINIC | Facility: CLINIC | Age: 54
End: 2019-02-06

## 2019-02-06 NOTE — TELEPHONE ENCOUNTER
Spoke with patient regard blood test results      ----- Message from Zoran Dunn DO sent at 2/6/2019  6:32 AM EST -----  Please call the patient to let him know that his recent lab work was normal

## 2019-02-08 ENCOUNTER — REMOTE DEVICE CLINIC VISIT (OUTPATIENT)
Dept: CARDIOLOGY CLINIC | Facility: CLINIC | Age: 54
End: 2019-02-08
Payer: MEDICARE

## 2019-02-08 DIAGNOSIS — Z95.810 PRESENCE OF AUTOMATIC CARDIOVERTER/DEFIBRILLATOR (AICD): ICD-10-CM

## 2019-02-08 DIAGNOSIS — I25.5 ISCHEMIC CARDIOMYOPATHY: Primary | ICD-10-CM

## 2019-02-08 DIAGNOSIS — Z95.810 BIVENTRICULAR ICD (IMPLANTABLE CARDIOVERTER-DEFIBRILLATOR) IN PLACE: ICD-10-CM

## 2019-02-08 PROCEDURE — 93296 REM INTERROG EVL PM/IDS: CPT | Performed by: INTERNAL MEDICINE

## 2019-02-08 PROCEDURE — 93297 REM INTERROG DEV EVAL ICPMS: CPT | Performed by: INTERNAL MEDICINE

## 2019-02-08 PROCEDURE — 93295 DEV INTERROG REMOTE 1/2/MLT: CPT | Performed by: INTERNAL MEDICINE

## 2019-02-11 NOTE — PROGRESS NOTES
Results for orders placed or performed in visit on 02/08/19   Cardiac EP device report    Narrative    MDT BI-V ICD  CARELINK TRANSMISSION: BATTERY VOLTAGE ADEQUATE  (3 6 YRS)  AP 71% BVP 99%  ALL AVAILABLE LEAD PARAMETERS WITHIN NORMAL LIMITS  NO SIGNIFICANT HIGH RATE EPISODES  OPTI-VOL WITHIN NORMAL LIMITS  NORMAL DEVICE FUNCTION  ---HERNANDEZ

## 2019-03-01 ENCOUNTER — TELEPHONE (OUTPATIENT)
Dept: CARDIOLOGY CLINIC | Facility: CLINIC | Age: 54
End: 2019-03-01

## 2019-03-01 NOTE — TELEPHONE ENCOUNTER
Spoke with patient to remind him to get inr which is slightly overdue  Patient was planning on going today but his ride did not show up due to the snow  He will take the bus and go on Monday    jonas

## 2019-03-05 ENCOUNTER — APPOINTMENT (OUTPATIENT)
Dept: LAB | Facility: CLINIC | Age: 54
End: 2019-03-05
Payer: COMMERCIAL

## 2019-03-05 ENCOUNTER — ANTICOAG VISIT (OUTPATIENT)
Dept: CARDIOLOGY CLINIC | Facility: CLINIC | Age: 54
End: 2019-03-05

## 2019-03-05 DIAGNOSIS — Z95.2 S/P AVR (AORTIC VALVE REPLACEMENT): ICD-10-CM

## 2019-03-05 NOTE — PROGRESS NOTES
3/5/19, tc to pt, reports he had 2 nose bleeds this week, small amount of blood, then stopped  denies any changes in medication  Will hold x 1 day, then decrease dose, 4 mg sun/fri, 2 mg other days of the week, inr due 1 week   areli

## 2019-03-26 DIAGNOSIS — I25.10 CORONARY ARTERY DISEASE INVOLVING NATIVE HEART, ANGINA PRESENCE UNSPECIFIED, UNSPECIFIED VESSEL OR LESION TYPE: ICD-10-CM

## 2019-03-26 RX ORDER — AMIODARONE HYDROCHLORIDE 100 MG/1
100 TABLET ORAL DAILY
Qty: 90 TABLET | Refills: 3 | Status: SHIPPED | OUTPATIENT
Start: 2019-03-26 | End: 2019-05-08

## 2019-03-27 ENCOUNTER — OFFICE VISIT (OUTPATIENT)
Dept: FAMILY MEDICINE CLINIC | Facility: CLINIC | Age: 54
End: 2019-03-27

## 2019-03-27 VITALS
HEIGHT: 66 IN | HEART RATE: 72 BPM | OXYGEN SATURATION: 99 % | RESPIRATION RATE: 18 BRPM | WEIGHT: 165 LBS | SYSTOLIC BLOOD PRESSURE: 124 MMHG | TEMPERATURE: 97.1 F | BODY MASS INDEX: 26.52 KG/M2 | DIASTOLIC BLOOD PRESSURE: 70 MMHG

## 2019-03-27 DIAGNOSIS — I49.01 VENTRICULAR FIBRILLATION (HCC): ICD-10-CM

## 2019-03-27 DIAGNOSIS — Z00.00 MEDICARE WELCOME EXAM: ICD-10-CM

## 2019-03-27 DIAGNOSIS — Z00.00 MEDICARE ANNUAL WELLNESS VISIT, INITIAL: Primary | ICD-10-CM

## 2019-03-27 PROCEDURE — G0438 PPPS, INITIAL VISIT: HCPCS | Performed by: PHYSICIAN ASSISTANT

## 2019-03-27 PROCEDURE — 3725F SCREEN DEPRESSION PERFORMED: CPT | Performed by: PHYSICIAN ASSISTANT

## 2019-03-27 PROCEDURE — 93000 ELECTROCARDIOGRAM COMPLETE: CPT | Performed by: PHYSICIAN ASSISTANT

## 2019-03-27 NOTE — PROGRESS NOTES
Assessment and Plan:  Problem List Items Addressed This Visit        Cardiovascular and Mediastinum    Ventricular fibrillation ([facility] [address] )     Continue regular f/u with cardiology             Other Visit Diagnoses     Medicare annual wellness visit, initial    -  Primary    Medicare welcome exam        Relevant Orders    POCT ECG (Completed)    PSA, Total Screen    Lipid panel    Hepatitis C antibody        Health Maintenance Due   Topic Date Due    Hepatitis C Screening  1965    BMI: Followup Plan  07/27/1982    Pneumococcal PPSV23 Medium Risk Adult (1 of 1 - PPSV23) 07/27/1983    DTaP,Tdap,and Td Vaccines (1 - Tdap) 07/27/1985    INFLUENZA VACCINE  07/01/2018         HPI:  Patient Active Problem List   Diagnosis    Colonic mass    Cardiomyopathy ([facility] [address] )    CAD (coronary artery disease)    S/P AVR (aortic valve replacement)    Ventricular fibrillation (HCC)    Biventricular ICD (implantable cardioverter-defibrillator) in place     Past Medical History:   Diagnosis Date    AICD (automatic cardioverter/defibrillator) present     medtronic     CAD (coronary artery disease)     Cardiomyopathy ([facility] [address] )     mixed    predominently nonischemic    Colonic mass     Coronary artery disease     Hyperlipidemia     Hypertension     Irregular heart beat     Myocardial infarction ([address] )     2014 and 2015    Rectal bleeding     S/P AVR (aortic valve replacement)     mechanical    Wears glasses      Past Surgical History:   Procedure Laterality Date    AORTIC VALVE REPLACEMENT      APPENDECTOMY      CARDIAC DEFIBRILLATOR PLACEMENT      COLECTOMY MADELIN Right     Last Assessed: 6/7/2016    COLECTOMY LAPAROSCOPIC      COLON SURGERY Right     colectomy    CORONARY STENT PLACEMENT      INSERT / REPLACE / REMOVE PACEMAKER      MITRAL VALVE REPAIR      MITRAL VALVE REPAIR      MOUTH SURGERY      MD COLONOSCOPY FLX DX [facility] WHEN PFRMD N/A 1/15/2018    Procedure: COLONOSCOPY;  Surgeon: Khurram Archer MD; Location: [facility]; Service: General    NE LAP,SURG,COLECTOMY, PARTIAL, W/ANAST N/A 6/2/2016    Procedure: RESECTION COLON RIGHT LAPAROSCOPIC HAND-ASSISTED;  Surgeon: Raina Rodriguez MD;  Location: [facility];  Service: General     Family History   Problem Relation Age of Onset    Diabetes Mother     Hypertension Mother      Social History     Tobacco Use   Smoking Status Never Smoker   Smokeless Tobacco Never Used     Social History     Substance and Sexual Activity   Alcohol Use Yes      Social History     Substance and Sexual Activity   Drug Use No         Current Outpatient Medications   Medication Sig Dispense Refill    amiodarone 100 mg tablet Take 1 tablet (100 mg total) by mouth daily 90 tablet 3    aspirin 81 MG tablet Take 81 mg by mouth daily   atorvastatin (LIPITOR) 80 mg tablet Take 1 tablet (80 mg total) by mouth daily 30 tablet 6    eplerenone (INSPRA) 25 mg tablet Take 1 tablet (25 mg total) by mouth daily 30 tablet 6    furosemide (LASIX) 40 mg tablet Take 1 tablet (40 mg total) by mouth 2 (two) times a day 60 tablet 6    lisinopril (ZESTRIL) 40 mg tablet Take 1 tablet (40 mg total) by mouth every evening 30 tablet 6    metoprolol succinate (TOPROL-XL) 50 mg 24 hr tablet take 1 tablet by mouth once daily 30 tablet 6    mirtazapine (REMERON) 45 MG tablet Take 45 mg by mouth daily at bedtime  0    Potassium Chloride ER 20 MEQ TBCR TAKE 1 TABLET BY MOUTH DAILY 30 tablet 4    sodium chloride (OCEAN) 0 65 % nasal spray 1 spray into each nostril 4 (four) times a day 15 mL 0    warfarin (COUMADIN) 4 mg tablet Take 1 tablet (4 mg total) by mouth daily Take 4 mg and 2 mg alternating once daily( take 1 tablet and 1/2 tablet alternating once daily) 60 tablet 3     No current facility-administered medications for this visit  No Known Allergies  There is no immunization history for the selected administration types on file for this patient      Patient Care Team:  Jenene Krabbe, PA-C as PCP - General  MD Tonio Mills PA-C Lanita Ivory, MD    Medicare Screening Tests and Risk Assessments:  An Hopper is here for his Initial Wellness visit  Health Risk Assessment:  Patient rates overall health as good  Patient feels that their physical health rating is Much better  Eyesight was rated as Same  Hearing was rated as Same  Patient feels that their emotional and mental health rating is Same  Pain experienced by patient in the last 7 days has been None  Patient states that he has experienced no weight loss or gain in last 6 months  Emotional/Mental Health:  Patient has been feeling nervous/anxious  PHQ-9 Depression Screening:    Frequency of the following problems over the past two weeks:      1  Little interest or pleasure in doing things: 0 - not at all      2  Feeling down, depressed, or hopeless: 0 - not at all  PHQ-2 Score: 0          Broken Bones/Falls: Fall Risk Assessment:    In the past year, patient has experienced: No history of falling in past year  visual disturbance    Bladder/Bowel:  Patient has not leaked urine accidently in the last six months  Patient reports no loss of bowel control  Immunizations:  Patient has not had a flu vaccination within the last year  Patient has not received a pneumonia shot  Patient has not received a shingles shot  Patient has not received tetanus/diphtheria shot  (Additional Comments: Pt states he does not believe in vaccines )    Home Safety:  Patient does not have trouble with stairs inside or outside of their home  Patient currently reports that there are no safety hazards present in home, working smoke alarms, working carbon monoxide detectors        Preventative Screenings:   no prostate cancer screen performed, colon cancer screen completed, cholesterol screen completed, glaucoma eye exam completed,     Nutrition:  Current diet: Regular and Frequent junk food with servings of the following:    Medications:  Patient is not currently taking any over-the-counter supplements  Patient is able to manage medications  Lifestyle Choices:  Patient reports no tobacco use  Patient has not smoked or used tobacco in the past   Patient reports no alcohol use  Patient does not drive a vehicle  Patient wears seat belt  Current level of exercise of physical activity described by patient as: Very limited   Activities of Daily Living:  Can get out of bed by his or her self, able to dress self, able to make own meals, able to do own shopping, able to bathe self, can do own laundry/housekeeping, can manage own money, pay bills and track expenses    Previous Hospitalizations:  No hospitalization or ED visit in past 12 months        Advanced Directives:  Patient has decided on a power of   Patient has spoken to designated power of   Patient has completed advanced directive  Preventative Screening/Counseling:      Cardiovascular:      General: Screening Current      Counseling: Healthy Weight and Improve Exercise Tolerance     Due for Labs/Analytes/Optional EKG: Lipid Panel          Diabetes:      General: Screening Current          Colorectal Cancer:      General: Screening Current          Prostate Cancer:      General: Risks and Benefits Discussed      Due for labs: PSA          Osteoporosis:      General: Screening Not Indicated          AAA:      General: Screening Not Indicated          Glaucoma:      General: Screening Current          HIV:      General: Screening Not Indicated          Hepatitis C:      General: Screening Not Indicated        Advanced Directives:   Patient has living will for healthcare, has durable POA for healthcare, patient has an advanced directive  Information on ACP and/or AD not provided  Additional Comments: Patients wife is KEVEN pearson  Other Preventative Counseling (Non-Medicare):   Increase physical activity and Weight reduction discussed      Referrals: Additional Comments: He sees cardiology regularly and sees optometry and dentist as well        Visual Acuity Screening    Right eye Left eye Both eyes   Without correction: 20/25 20/50    With correction:          Physical Exam:  Review of Systems   Constitutional: Negative for activity change, appetite change, fatigue, fever and unexpected weight change  HENT: Negative for dental problem, ear pain, hearing loss and sore throat  Eyes: Negative for visual disturbance  Respiratory: Negative for cough and wheezing  Gastrointestinal: Negative for abdominal pain, bowel incontinence, constipation, diarrhea and vomiting  Genitourinary: Negative for difficulty urinating and dysuria  Musculoskeletal: Negative for arthralgias, back pain and myalgias  Skin: Negative for rash  Neurological: Negative for dizziness and headaches  Psychiatric/Behavioral: Negative for behavioral problems  The patient is not nervous/anxious  Vitals:    03/27/19 1037   BP: 124/70   BP Location: Left arm   Patient Position: Sitting   Cuff Size: Standard   Pulse: 72   Resp: 18   Temp: (!) 97 1 °F (36 2 °C)   TempSrc: Temporal   SpO2: 99%   Weight: 74 8 kg (165 lb)   Height: 5' 6" (1 676 m)   Body mass index is 26 63 kg/m²  Physical Exam   Constitutional: He is oriented to person, place, and time  He appears well-developed and well-nourished  HENT:   Head: Normocephalic and atraumatic  Right Ear: External ear normal    Left Ear: External ear normal    Nose: Nose normal    Mouth/Throat: Oropharynx is clear and moist    Eyes: Pupils are equal, round, and reactive to light  EOM are normal    Neck: Normal range of motion  Neck supple  Cardiovascular: Normal rate and regular rhythm  Mechanical valve     Pulmonary/Chest: Effort normal and breath sounds normal    Abdominal: Soft  Bowel sounds are normal  He exhibits no distension and no mass  There is no tenderness  No hernia     Musculoskeletal: Normal range of motion  Neurological: He is alert and oriented to person, place, and time  No cranial nerve deficit  Skin: Skin is warm  Psychiatric: He has a normal mood and affect  His behavior is normal    Nursing note and vitals reviewed  EKG: no change    Pacer rhythm

## 2019-03-27 NOTE — PATIENT INSTRUCTIONS
Visita de bienestar para los adultos   CUIDADO AMBULATORIO:   Bertin visita de bienestar  es cuando usted acude con un médico para que le terry exámenes de detección de problemas de [place]  También puede obtener asesoramiento sobre cómo mantenerse saludable  Anote christ preguntas para que se acuerde de hacerlas  Pregunte a ware médico con qué frecuencia debería realizarse bertin visita de bienestar  Lo que sucede en bertin visita de bienestar:  Ware médico le preguntará sobre ware mary lou y ware historia familiar [address]  [place] incluye presión arterial veelio, enfermedades del corazón y cáncer  El médico le preguntará si tiene síntomas que le preocupen, si [facility] y [place] de ánimo  También se le preguntará acerca del uso de medicamentos, suplementos, alimentos y alcohol  Es posible que le terry cualquiera de lo siguiente:  · Ware peso  será revisado  Es posible que [facility] midan ware altura para calcular ware índice de masa corporal formerly [facility]  El [facility] indica si tiene un peso saludable  · Se verificarán ware presión arterial  y frecuencia cardíaca  También pueden revisar ware temperatura  · Exámenes de [place] y [facility]  se podría realizar  Se podrían realizar exámenes de angeles para revisar los niveles de [facility]  Los niveles anormales de colesterol aumentan el riesgo de enfermedad del corazón y accidente cerebrovascular  Puede que también necesite bertin prueba de angeles u orina para revisar si tiene diabetes si usted está en mayor riesgo  Las pruebas de orina pueden hacerse para buscar signos de bertin infección o bertin enfermedad renal      · Un examen físico  incluye la comprobación de christ latidos del corazón y los pulmones con un estetoscopio  Ware médico también podría revisarle la piel para buscar daños causados por el sol  · Pruebas de detección  podría recomendarse  Se realiza un examen de detección para detectar enfermedades que pueden no causar síntomas   Los exámenes de detección que necesite dependen de ware edad, género, antecedentes familiares y hábitos de senthil  Por ejemplo, podrían recomendarle la exploración selectiva colorrectal si tiene 48 años o más  Si es [place], necesita los siguientes exámenes de detección:   · El examen de Papanicolaou  se utiliza para detectar cáncer de kiel uterino  El examen del Papanicolaou por lo general se realiza entre 3 a 5 años dependiendo de ware edad  Puede necesitarlo más a menudo si usted ha tenido [employer] de la prueba de Papanicolaou en el pasado  Pregunte a ware médico con qué frecuencia debería realizarse un examen de Papanicolaou  · Bertin mamografía  es bertin radiografía de christ senos para detectar cáncer de mama  Los expertos recomiendan [address] cada 2 años empezando a los 48 años de [place]  Es probable que usted necesite [address] a los 52 años o antes si tiene riesgo alto de cáncer de seno  Hable con ware médico sobre cuándo debe empezar con christ mamografías y con cuánta frecuencia las necesita  Vacunas que podría necesitar:   · Debe recibir bertin vacuna contra la influenza  todos los [place]  La vacuna contra la influenza protege de la gripe  Varios tipos de virus causan la influenza  Debido a que los virus [place] con el Marnie, es necesaria la producción de nuevas vacunas cada año  · Debe recibir Jahaira Leash vacuna de refuerzo contra el tétanos-difteria (Td)  cada 10 años  Esta vacuna protege contra el tétanos y la difteria  El tétanos es bertin infección severa que puede causar trismo y espasmos musculares dolorosos  La difteria es bertin infección bacterial grave que produce bertni cubierta gruesa en la parte de atrás de la boca y garganta  · Debe recibir la vacuna contra el virus del papiloma humano (VPH)  si usted es bre y Roy 19 y 32 años o es hombre y Roy 23 y 24 años y nunca la recibió  Esta vacuna protege contra la infección por VPH   El virus del papiloma humano o VPH es la infección más común que se transmite por contacto sexual  El virus del papiloma humano también podría provocar cáncer vaginal, del pene y del ano  · Debe recibir la vacuna antineumocócica  si tiene más de 72 años  La vacuna antineumocócica es bertin inyección que se administra para protegerlo contra bertin enfermedad neumocócica  La enfermedad neumocócica es bertin infección causada por la bacteria neumocócica  La infección puede causar neumonía, meningitis o bertin infección del oído  · Debe recibir la vacuna contra la culebrilla  si tiene [address] [place] [address],[address] [place] [address] o [place], incluso si chance tenido culebrilla antes  La vacuna contra la culebrilla (herpes zóster) es bertin inyección usada para proteger contra el virus zóster que causa la varicela  [place] es el mismo virus que causa la varicela  La culebrilla es un sarpullido doloroso que se desarrolla en personas que tuvieron varicela o chance estado expuestas al virus  Cómo comer saludable:  [place] es un modelo para planear comidas sanas  Muestra los tipos y cantidades de alimentos que deberían ir en un plato  [place] y verduras representan alrededor de la mitad de ware plato y los granos y proteínas representan la otra mitad  Se incluye bertin porción de productos lácteos al lado del plato  La cantidad de calorías y [address] de las porciones que usted necesita dependen de ware edad, [place], peso y altura  Los ejemplos de alimentos saludables son:  · Consuma bertin variedad de verduras  antonio las de color william oscuro, lehman y [facility]  Usted también puede incluir verduras enlatadas bajas en sodio (sal) y verduras congeladas sin mantequilla ni salsas [id number]  · Consuma bertin variedad de fruta frescas , las frutas enlatadas en 100% de jugo , fruta [place] y howard secos  · Incluya granos enteros  Por lo menos la mitad de los granos que usted consume deben ser granos de myra integral  Por ejemplo, panes de grano entero, pasta integral, arroz integral y cereales de grano entero antonio la donna      · Consuma bertin variedad de alimentos con proteínas antonio mariscos (pescado y crustáceos), carne magra y carne de ave sin piel (pavo y jaspal)  Ejemplos de ovidio magras incluyen pierna, paleta o lomo de puerco y ellis, solomillo o, lomo de res y carne [place] de res extra [place]  Otros alimentos ricos en proteínas son los huevos y sustitutos de [place], frijoles, chícharos, productos de soya, nueces y semillas  · Elija productos lácteos bajos en grasa [employer] o del 1% o yogur, queso y requesón bajos en grasa  · Limite las grasas poco saludables,  antonio la [place], la margarina dura y la [place]  Ejercicio:  Realice bertin actividad física por lo menos 30 minutos al día, la mayoría de los días de la [place]  Algunos de los ejercicios incluyen caminar, montar en bicicleta, bailar y nadar  Usted también puede realizar más actividad física usando las escaleras en vez de los ascensores o estacionarse más lejos cuando [place] a las tiendas  Incluya ejercicios para fortalecer los músculos 2 días a la semana  El ejercicio regular proporciona muchos beneficios para la mary lou  Adin Dakins a controlar ware peso y [employer] riesgo de diabetes tipo 2, presión arterial evelio, enfermedad del corazón y accidente cerebrovascular  El ejercicio [employer] puede ayudar a mejorar ware estado de ánimo  Pregunte a ware médico acerca del mejor plan de ejercicio para usted  Pautas generales de mary lou y seguridad:   · No fume  La nicotina y otras sustancias químicas que contienen los cigarrillos y cigarros pueden dañar los pulmones  Pida información a ware médico si usted actualmente fuma y necesita ayuda para dejar de fumar  Los cigarrillos electrónicos o tabaco sin humo todavía contienen nicotina  Consulte con ware médico antes de [employer]  · Limite el consumo de alcohol  Un trago equivale a 12 onzas de cerveza, 5 onzas de vino o 1 onza y ½ de licor  · Baje de peso, si es necesario  El sobrepeso aumenta el riesgo de ciertas condiciones de [place]   Estos incluyen enfermedad del corazón, presión arterial evelio, diabetes tipo 2 y ciertos tipos de cáncer  · Proteja sandoval piel  No tome el sol ni use moira de bronceado  Use protector solar con un SPF 13 o mayor  Aplíquese el bloqueador por lo menos 15 minutos antes de que vaya a estar al [employer]  Vuelva a aplicarse la crema solar cada 2 horas  Use ropa protectora, sombrero y lentes para el sol cuando se encuentre afuera  · Conduzca con seguridad  Use siempre sandoval cinturón de seguridad  Asegúrese que todos en el annie usan el cinturón de seguridad  Un cinturón de seguridad puede salvar sandoval senthil en [place] de un accidente automovilístico  No use el celular cuando esté manejando  [place] puede hacer que se distraiga y causar un accidente  Es mejor que pare y se orille si necesita hacer bertin Ollen Grippe un texto  · Practique el sexo seguro  Use condones de látex si es sexualmente [place] y tiene más de [place]  Sandoval médico puede recomendar exámenes de detección de infecciones de transmisión sexual (ITS)  · Use un laura, un chaleco salvavidas y unos implementos de protección  Siempre use un laura al [employer] en bicicleta o motocicleta, esquiar o jugar deportes que podrían causar bertin lesión en la shir[place]  Use implementos de protección cuando practique deportes  Use un chaleco salvavidas cuando esté en un bote o practicando actividades acuáticas  © 2017 [address] Information is for End User's use only and may not be sold, redistributed or otherwise used for commercial purposes  All illustrations and images included in CareNotes® are the copyrighted property of A D A M , Inc  or Alejandro Hdez  Esta información es sólo para uso en educación  Sandoval intención no es darle un consejo médico sobre enfermedades o tratamientos  Colsulte con sandoval Classie Aranda farmacéutico antes de seguir cualquier régimen médico para saber si es seguro y efectivo para usted

## 2019-04-03 ENCOUNTER — ANTICOAG VISIT (OUTPATIENT)
Dept: CARDIOLOGY CLINIC | Facility: CLINIC | Age: 54
End: 2019-04-03

## 2019-04-03 DIAGNOSIS — Z95.2 S/P AVR (AORTIC VALVE REPLACEMENT): ICD-10-CM

## 2019-04-26 ENCOUNTER — ANTICOAG VISIT (OUTPATIENT)
Dept: CARDIOLOGY CLINIC | Facility: CLINIC | Age: 54
End: 2019-04-26

## 2019-04-26 ENCOUNTER — LAB (OUTPATIENT)
Dept: LAB | Facility: HOSPITAL | Age: 54
End: 2019-04-26
Payer: COMMERCIAL

## 2019-04-26 DIAGNOSIS — Z95.2 S/P AVR (AORTIC VALVE REPLACEMENT): ICD-10-CM

## 2019-04-26 DIAGNOSIS — Z00.00 MEDICARE WELCOME EXAM: ICD-10-CM

## 2019-04-26 LAB
CHOLEST SERPL-MCNC: 118 MG/DL
HCV AB SER QL: ABNORMAL
HDLC SERPL-MCNC: 45 MG/DL (ref 40–59)
LDLC SERPL CALC-MCNC: 55 MG/DL
NONHDLC SERPL-MCNC: 73 MG/DL
TRIGL SERPL-MCNC: 91 MG/DL

## 2019-04-26 PROCEDURE — 86803 HEPATITIS C AB TEST: CPT

## 2019-04-26 PROCEDURE — 80061 LIPID PANEL: CPT

## 2019-04-30 DIAGNOSIS — R76.8 HEPATITIS C ANTIBODY POSITIVE IN BLOOD: Primary | ICD-10-CM

## 2019-05-08 ENCOUNTER — TELEPHONE (OUTPATIENT)
Dept: CARDIOLOGY CLINIC | Facility: CLINIC | Age: 54
End: 2019-05-08

## 2019-05-08 DIAGNOSIS — I25.10 CORONARY ARTERY DISEASE INVOLVING NATIVE HEART, ANGINA PRESENCE UNSPECIFIED, UNSPECIFIED VESSEL OR LESION TYPE: ICD-10-CM

## 2019-05-08 RX ORDER — AMIODARONE HYDROCHLORIDE 200 MG/1
100 TABLET ORAL DAILY
Qty: 30 TABLET | Refills: 6 | Status: SHIPPED | OUTPATIENT
Start: 2019-05-08 | End: 2020-09-16 | Stop reason: SDUPTHER

## 2019-05-08 RX ORDER — AMIODARONE HYDROCHLORIDE 200 MG/1
100 TABLET ORAL DAILY
Qty: 30 TABLET | Refills: 6 | Status: SHIPPED | OUTPATIENT
Start: 2019-05-08 | End: 2019-05-08 | Stop reason: SDUPTHER

## 2019-05-22 ENCOUNTER — REMOTE DEVICE CLINIC VISIT (OUTPATIENT)
Dept: CARDIOLOGY CLINIC | Facility: CLINIC | Age: 54
End: 2019-05-22
Payer: COMMERCIAL

## 2019-05-22 DIAGNOSIS — Z95.810 AICD (AUTOMATIC CARDIOVERTER/DEFIBRILLATOR) PRESENT: Primary | ICD-10-CM

## 2019-05-22 PROCEDURE — 93297 REM INTERROG DEV EVAL ICPMS: CPT | Performed by: INTERNAL MEDICINE

## 2019-05-22 PROCEDURE — 93295 DEV INTERROG REMOTE 1/2/MLT: CPT | Performed by: INTERNAL MEDICINE

## 2019-05-22 PROCEDURE — 93296 REM INTERROG EVL PM/IDS: CPT | Performed by: INTERNAL MEDICINE

## 2019-06-20 DIAGNOSIS — I25.5 ISCHEMIC CARDIOMYOPATHY: Chronic | ICD-10-CM

## 2019-06-21 DIAGNOSIS — I10 BENIGN ESSENTIAL HTN: ICD-10-CM

## 2019-06-21 RX ORDER — LISINOPRIL 40 MG/1
TABLET ORAL
Qty: 30 TABLET | Refills: 6 | Status: SHIPPED | OUTPATIENT
Start: 2019-06-21 | End: 2020-03-03 | Stop reason: SDUPTHER

## 2019-06-21 RX ORDER — POTASSIUM CHLORIDE 1500 MG/1
TABLET, FILM COATED, EXTENDED RELEASE ORAL
Qty: 30 TABLET | Refills: 4 | Status: SHIPPED | OUTPATIENT
Start: 2019-06-21 | End: 2020-01-06 | Stop reason: SDUPTHER

## 2019-07-10 ENCOUNTER — TELEPHONE (OUTPATIENT)
Dept: CARDIOLOGY CLINIC | Facility: CLINIC | Age: 54
End: 2019-07-10

## 2019-07-10 ENCOUNTER — OFFICE VISIT (OUTPATIENT)
Dept: CARDIOLOGY CLINIC | Facility: CLINIC | Age: 54
End: 2019-07-10
Payer: COMMERCIAL

## 2019-07-10 VITALS
DIASTOLIC BLOOD PRESSURE: 58 MMHG | WEIGHT: 152.9 LBS | OXYGEN SATURATION: 98 % | BODY MASS INDEX: 24.57 KG/M2 | SYSTOLIC BLOOD PRESSURE: 94 MMHG | HEART RATE: 60 BPM | HEIGHT: 66 IN

## 2019-07-10 DIAGNOSIS — Z95.810 BIVENTRICULAR ICD (IMPLANTABLE CARDIOVERTER-DEFIBRILLATOR) IN PLACE: Primary | ICD-10-CM

## 2019-07-10 DIAGNOSIS — Z95.2 S/P AVR (AORTIC VALVE REPLACEMENT): ICD-10-CM

## 2019-07-10 DIAGNOSIS — I25.10 CORONARY ARTERY DISEASE INVOLVING NATIVE CORONARY ARTERY OF NATIVE HEART WITHOUT ANGINA PECTORIS: Chronic | ICD-10-CM

## 2019-07-10 DIAGNOSIS — I25.5 ISCHEMIC CARDIOMYOPATHY: Chronic | ICD-10-CM

## 2019-07-10 PROCEDURE — 99212 OFFICE O/P EST SF 10 MIN: CPT | Performed by: INTERNAL MEDICINE

## 2019-07-10 NOTE — PROGRESS NOTES
Cardiology Follow up    Toya Lyons  [id number]  1965  [address] [address]      1  Biventricular ICD (implantable cardioverter-defibrillator) in place     2  S/P AVR (aortic valve replacement)     3  Coronary artery disease involving native coronary artery of native heart without angina pectoris     4  Ischemic cardiomyopathy         Discussion/Summary:  1  Ischemic cardiomyopathy  2  Biventricular ICD in place  3  Coronary artery disease    -in the office today patient without complaints  -device interrogation 05/22/2019 showed battery status okay atrial paced 49%, ventricular paced 98% with no significant high rate episodes  -will continue current medication regimen with amiodarone 100 mg daily, aspirin 81 mg daily, atorvastatin 80 mg daily, plantar known 25 mg daily, furosemide 40 mg twice daily, lisinopril 40 mg daily, metoprolol succinate 50 mg daily, potassium chloride 20 mEq daily  -monitoring for amiodarone did not show any significant liver renal dysfunction and TSH normal 1 33  -patient will undergo pulmonary function testing as well as repeat echocardiogram for continued evaluation and monitoring  This appeared to have been scheduled for May of 2019 however through symptom ischemic a shin patient was unable to obtain but will obtain before next visit  - patient warfarin therapy monitored by Dr Jaswinder Garcia   -will see patient in approximately 6 months or sooner if necessary  -at next visit consider functional stress testing for evaluation of functional capacity for patient  -counseled patient that if he were to have any warning or alarm type symptoms he should seek emergency medical care immediately        History of Present Illness:  Chronic ischemic cardiomyopathy most recent LVEF 25%  -patient 62-year-old male past medical history significant for coronary artery disease S/P PCI in 2014, known ischemic cardiomyopathy with biventricular ICD placement, hypertension, hyperlipidemia and history of aortic valve replacement in 1988 who presents to the office today for follow-up  -he denies any issues at this time  He denies any chest pain, palpitations, lightheadedness or dizziness, shortness of breath  He states that he is able to walk several blocks to the grocery store and ambulate around the grocery store and home without any difficulty  He is able to play with 4 grandchildren without any difficulty  -he has been compliant with his medications and notes that he has been compliant with his lifestyle and dietary restrictions as well  Patient Active Problem List   Diagnosis    Colonic mass    Cardiomyopathy ([facility] )    CAD (coronary artery disease)    S/P AVR (aortic valve replacement)    Ventricular fibrillation (HCC)    Biventricular ICD (implantable cardioverter-defibrillator) in place     Past Medical History:   Diagnosis Date    AICD (automatic cardioverter/defibrillator) present     medtronic     CAD (coronary artery disease)     Cardiomyopathy ([facility] )     mixed    predominently nonischemic    Colonic mass     Coronary artery disease     Hyperlipidemia     Hypertension     Irregular heart beat     Myocardial infarction ([facility] )     2014 and 2015    Rectal bleeding     S/P AVR (aortic valve replacement)     mechanical    Wears glasses      Social History     Socioeconomic History    Marital status: /Civil Union     Spouse name: Not on file    Number of children: Not on file    Years of education: Not on file    Highest education level: Not on file   Occupational History    Not on file   Social Needs    Financial resource strain: Not on file    Food insecurity:     Worry: Not on file     Inability: Not on file    Transportation needs:     Medical: Not on file     Non-medical: Not on file   Tobacco Use    Smoking status: Never Smoker    Smokeless tobacco: Never Used   Substance and Sexual Activity    Alcohol use: Yes    Drug use: No    Sexual activity: Not on file   Lifestyle    Physical activity:     Days per week: Not on file     Minutes per session: Not on file    Stress: Not on file   Relationships    Social connections:     Talks on phone: Not on file     Gets together: Not on file     Attends Buddhist service: Not on file     Active member of club or organization: Not on file     Attends meetings of clubs or organizations: Not on file     Relationship status: Not on file    Intimate partner violence:     Fear of current or ex partner: Not on file     Emotionally abused: Not on file     Physically abused: Not on file     Forced sexual activity: Not on file   Other Topics Concern    Not on file   Social History Narrative    Not on file      Family History   Problem Relation Age of Onset    Diabetes Mother     Hypertension Mother      Past Surgical History:   Procedure Laterality Date    AORTIC VALVE REPLACEMENT      APPENDECTOMY      CARDIAC DEFIBRILLATOR PLACEMENT      COLECTOMY MADELIN Right     Last Assessed: 6/7/2016    COLECTOMY LAPAROSCOPIC      COLON SURGERY Right     colectomy    CORONARY STENT PLACEMENT      Jesus Aguila / Sallie Payton / [address] [facility] [place] [address] [place] DX W/FABIANA Michel 1979 PFRMD N/A 1/15/2018    Procedure: COLONOSCOPY;  Surgeon: Saran Saldivar MD;  Location: [facility]; Service: General    [place] LAP,SURG,COLECTOMY, PARTIAL, W/ANAST N/A 6/2/2016    Procedure: RESECTION COLON RIGHT LAPAROSCOPIC HAND-ASSISTED;  Surgeon: Saran Saldivar MD;  Location: [facility];  Service: General       Current Outpatient Medications:     amiodarone 200 mg tablet, Take 0 5 tablets (100 mg total) by mouth daily Patient is will take a half a tabled a day  100 mg TDD, Disp: 30 tablet, Rfl: 6    aspirin 81 MG tablet, Take 81 mg by mouth daily  , Disp: , Rfl:     atorvastatin (LIPITOR) 80 mg tablet, Take 1 tablet (80 mg total) by mouth daily, Disp: 30 tablet, Rfl: 6    eplerenone (INSPRA) 25 mg tablet, Take 1 tablet (25 mg total) by mouth daily, Disp: 30 tablet, Rfl: 6    furosemide (LASIX) 40 mg tablet, Take 1 tablet (40 mg total) by mouth 2 (two) times a day, Disp: 60 tablet, Rfl: 6    lisinopril (ZESTRIL) 40 mg tablet, take 1 tablet by mouth at bedtime, Disp: 30 tablet, Rfl: 6    metoprolol succinate (TOPROL-XL) 50 mg 24 hr tablet, take 1 tablet by mouth once daily, Disp: 30 tablet, Rfl: 6    mirtazapine (REMERON) 45 MG tablet, Take 45 mg by mouth daily at bedtime, Disp: , Rfl: 0    Potassium Chloride ER 20 MEQ TBCR, take 1 tablet by mouth once daily, Disp: 30 tablet, Rfl: 4    sodium chloride (OCEAN) 0 65 % nasal spray, 1 spray into each nostril 4 (four) times a day, Disp: 15 mL, Rfl: 0    warfarin (COUMADIN) 4 mg tablet, Take 1 tablet (4 mg total) by mouth daily Take 4 mg and 2 mg alternating once daily( take 1 tablet and 1/2 tablet alternating once daily), Disp: 60 tablet, Rfl: 3  No Known Allergies      Labs:  Lab on 04/26/2019   Component Date Value    Cholesterol 04/26/2019 118     Triglycerides 04/26/2019 91     HDL, Direct 04/26/2019 45     LDL Calculated 04/26/2019 55     Non-HDL-Chol (CHOL-HDL) 04/26/2019 73     Hepatitis C Ab 04/26/2019 High Reactive*   Ancillary Orders on 03/08/2019   Component Date Value    Protime 04/26/2019 26 0*    INR 04/26/2019 2 59*   Ancillary Orders on 02/08/2019   Component Date Value    Protime 03/05/2019 41 1*    INR 03/05/2019 4 29*        Imaging: No results found  Review of Systems:  Review of Systems   Constitutional: Negative for chills, fatigue and fever  HENT: Negative for trouble swallowing and voice change  Eyes: Negative for pain and redness  Respiratory: Negative for shortness of breath and wheezing  Cardiovascular: Negative for chest pain, palpitations and leg swelling     Gastrointestinal: Negative for abdominal pain, constipation, nausea and vomiting  Genitourinary: Negative for dysuria  Musculoskeletal: Negative for neck pain and neck stiffness  Skin: Negative for rash  Neurological: Negative for dizziness, syncope and headaches  Psychiatric/Behavioral: Negative for agitation  All other systems reviewed and are negative  Vitals:    07/10/19 1251   BP: 94/58   BP Location: Right arm   Patient Position: Sitting   Cuff Size: Standard   Pulse: 60   SpO2: 98%   Weight: 69 4 kg (152 lb 14 4 oz)   Height: 5' 6" (1 676 m)     Vitals:    07/10/19 1251   Weight: 69 4 kg (152 lb 14 4 oz)     Height: 5' 6" (167 6 cm)     Physical Exam   Constitutional: He is oriented to person, place, and time  No distress  HENT:   Head: Normocephalic and atraumatic  Eyes: Right eye exhibits no discharge  Left eye exhibits no discharge  Neck: No JVD present  No tracheal deviation present  Cardiovascular: Normal rate and regular rhythm  Exam reveals no friction rub  Murmur (Systolic click present) heard  Pulmonary/Chest: Effort normal and breath sounds normal  No respiratory distress  He has no wheezes  Abdominal: Soft  Bowel sounds are normal  There is no tenderness  Musculoskeletal: He exhibits no edema or tenderness  Neurological: He is alert and oriented to person, place, and time  Skin: Skin is warm and dry  He is not diaphoretic  Psychiatric: He has a normal mood and affect

## 2019-07-11 ENCOUNTER — ANTICOAG VISIT (OUTPATIENT)
Dept: CARDIOLOGY CLINIC | Facility: CLINIC | Age: 54
End: 2019-07-11

## 2019-07-11 ENCOUNTER — APPOINTMENT (OUTPATIENT)
Dept: LAB | Facility: HOSPITAL | Age: 54
End: 2019-07-11
Attending: INTERNAL MEDICINE
Payer: COMMERCIAL

## 2019-07-11 ENCOUNTER — TRANSCRIBE ORDERS (OUTPATIENT)
Dept: ADMINISTRATIVE | Facility: HOSPITAL | Age: 54
End: 2019-07-11

## 2019-07-11 DIAGNOSIS — Z95.2 S/P AVR (AORTIC VALVE REPLACEMENT): ICD-10-CM

## 2019-07-11 LAB — PSA SERPL-MCNC: 0.6 NG/ML (ref 0–4)

## 2019-07-11 PROCEDURE — G0103 PSA SCREENING: HCPCS

## 2019-07-11 NOTE — PROGRESS NOTES
PC to patient who was slightly low with INR at 2 4  Advised 6 mgs tonight and then same dose and another INR in one month    Patient is usually delinquent getting INR's  Monet Foley

## 2019-08-05 ENCOUNTER — HOSPITAL ENCOUNTER (OUTPATIENT)
Dept: PULMONOLOGY | Facility: HOSPITAL | Age: 54
Discharge: HOME | End: 2019-08-05

## 2019-08-05 RX ORDER — ALBUTEROL SULFATE 2.5 MG/3ML
2.5 SOLUTION RESPIRATORY (INHALATION) ONCE AS NEEDED
Status: DISCONTINUED | OUTPATIENT
Start: 2019-08-05 | End: 2019-08-09 | Stop reason: HOSPADM

## 2019-08-14 ENCOUNTER — HOSPITAL ENCOUNTER (OUTPATIENT)
Dept: PULMONOLOGY | Facility: HOSPITAL | Age: 54
Discharge: HOME | End: 2019-08-14

## 2019-08-14 DIAGNOSIS — I25.5 ISCHEMIC CARDIOMYOPATHY: Chronic | ICD-10-CM

## 2019-08-14 RX ORDER — ALBUTEROL SULFATE 2.5 MG/3ML
2.5 SOLUTION RESPIRATORY (INHALATION) ONCE AS NEEDED
Status: DISCONTINUED | OUTPATIENT
Start: 2019-08-14 | End: 2019-08-18 | Stop reason: HOSPADM

## 2019-08-15 RX ORDER — EPLERENONE 25 MG/1
TABLET, FILM COATED ORAL
Qty: 30 TABLET | Refills: 6 | Status: SHIPPED | OUTPATIENT
Start: 2019-08-15 | End: 2020-04-14

## 2019-08-16 ENCOUNTER — TELEPHONE (OUTPATIENT)
Dept: CARDIOLOGY CLINIC | Facility: CLINIC | Age: 54
End: 2019-08-16

## 2019-08-16 NOTE — TELEPHONE ENCOUNTER
Phone call to patient , reminded to have inr done asap  Pt will go to lab in the next few days   areli

## 2019-08-23 ENCOUNTER — IN-CLINIC DEVICE VISIT (OUTPATIENT)
Dept: CARDIOLOGY CLINIC | Facility: CLINIC | Age: 54
End: 2019-08-23
Payer: COMMERCIAL

## 2019-08-23 DIAGNOSIS — I47.2 VENTRICULAR TACHYCARDIA (HCC): ICD-10-CM

## 2019-08-23 DIAGNOSIS — Z95.810 PRESENCE OF AUTOMATIC CARDIOVERTER/DEFIBRILLATOR (AICD): ICD-10-CM

## 2019-08-23 DIAGNOSIS — I25.5 ISCHEMIC CARDIOMYOPATHY: Primary | ICD-10-CM

## 2019-08-23 PROCEDURE — 93284 PRGRMG EVAL IMPLANTABLE DFB: CPT | Performed by: INTERNAL MEDICINE

## 2019-08-23 NOTE — PROGRESS NOTES
Results for orders placed or performed in visit on 08/23/19   Cardiac EP device report    Narrative    MDT BI-V ICD  DEVICE INTERROGATED IN THE [place] OFFICE  BATTERY VOLTAGE ADEQUATE (2 9 YRS)  AP: 64 6%  : 98 3% + VSRP: 0 8%  ALL LEAD PARAMETERS WITHIN NORMAL LIMITS  NO SIGNIFICANT HIGH RATE EPISODES  OPTI-VOL FLUID THRESHOLD CROSSED SINCE 08/19/19, PT IS ASYMPTOMATIC  TASK TO HF RN  NO PROGRAMMING CHANGES MADE TO DEVICE PARAMETERS  APPROPRIATELY FUNCTIONING BI-V ICD   [address] [place] [address]

## 2019-08-27 ENCOUNTER — HOSPITAL ENCOUNTER (OUTPATIENT)
Dept: PULMONOLOGY | Facility: HOSPITAL | Age: 54
Discharge: HOME | End: 2019-08-27
Payer: COMMERCIAL

## 2019-08-27 ENCOUNTER — TELEPHONE (OUTPATIENT)
Dept: CARDIOLOGY CLINIC | Facility: CLINIC | Age: 54
End: 2019-08-27

## 2019-08-27 DIAGNOSIS — I25.5 ISCHEMIC CARDIOMYOPATHY: ICD-10-CM

## 2019-08-27 PROCEDURE — 94729 DIFFUSING CAPACITY: CPT

## 2019-08-27 PROCEDURE — 94726 PLETHYSMOGRAPHY LUNG VOLUMES: CPT

## 2019-08-27 PROCEDURE — 94729 DIFFUSING CAPACITY: CPT | Performed by: INTERNAL MEDICINE

## 2019-08-27 PROCEDURE — 94010 BREATHING CAPACITY TEST: CPT

## 2019-08-27 PROCEDURE — 94010 BREATHING CAPACITY TEST: CPT | Performed by: INTERNAL MEDICINE

## 2019-08-27 PROCEDURE — 94726 PLETHYSMOGRAPHY LUNG VOLUMES: CPT | Performed by: INTERNAL MEDICINE

## 2019-08-27 PROCEDURE — 94760 N-INVAS EAR/PLS OXIMETRY 1: CPT

## 2019-08-27 RX ORDER — ALBUTEROL SULFATE 2.5 MG/3ML
2.5 SOLUTION RESPIRATORY (INHALATION) ONCE AS NEEDED
Status: DISCONTINUED | OUTPATIENT
Start: 2019-08-27 | End: 2019-08-27

## 2019-08-27 NOTE — TELEPHONE ENCOUNTER
----- Message from Diana Walters DO sent at 8/27/2019 12:15 PM EDT -----  Please call patient and let him know that his pulmonary function tests oral overall good and that he continue his current medical therapy and I will see him at our next office visit

## 2019-09-03 ENCOUNTER — LAB (OUTPATIENT)
Dept: LAB | Facility: HOSPITAL | Age: 54
End: 2019-09-03
Attending: INTERNAL MEDICINE
Payer: COMMERCIAL

## 2019-09-03 ENCOUNTER — ANTICOAG VISIT (OUTPATIENT)
Dept: CARDIOLOGY CLINIC | Facility: CLINIC | Age: 54
End: 2019-09-03

## 2019-09-03 DIAGNOSIS — R76.8 HEPATITIS C ANTIBODY POSITIVE IN BLOOD: ICD-10-CM

## 2019-09-03 DIAGNOSIS — Z95.2 S/P AVR (AORTIC VALVE REPLACEMENT): ICD-10-CM

## 2019-09-03 LAB
INR PPP: 3.62 (ref 0.91–1.09)
PROTHROMBIN TIME: 39.2 SECONDS (ref 9.8–12)

## 2019-09-03 PROCEDURE — 87522 HEPATITIS C REVRS TRNSCRPJ: CPT

## 2019-09-03 PROCEDURE — 85610 PROTHROMBIN TIME: CPT

## 2019-09-03 PROCEDURE — 36415 COLL VENOUS BLD VENIPUNCTURE: CPT

## 2019-09-03 NOTE — PROGRESS NOTES
9/3/19, tc to pt,  Will decrease dose, 4 mg sun, 2 mg other days of the week, inr due 2 weeks  deneis any bleeding  areli

## 2019-09-05 LAB
HCV RNA SERPL NAA+PROBE-ACNC: NORMAL IU/ML
TEST INFORMATION: NORMAL

## 2019-09-10 DIAGNOSIS — I25.5 ISCHEMIC CARDIOMYOPATHY: Chronic | ICD-10-CM

## 2019-09-10 RX ORDER — ATORVASTATIN CALCIUM 80 MG/1
TABLET, FILM COATED ORAL
Qty: 30 TABLET | Refills: 6 | Status: SHIPPED | OUTPATIENT
Start: 2019-09-10 | End: 2020-06-07

## 2019-09-24 ENCOUNTER — TELEPHONE (OUTPATIENT)
Dept: CARDIOLOGY CLINIC | Facility: CLINIC | Age: 54
End: 2019-09-24

## 2019-09-24 ENCOUNTER — REMOTE DEVICE CLINIC VISIT (OUTPATIENT)
Dept: CARDIOLOGY CLINIC | Facility: CLINIC | Age: 54
End: 2019-09-24
Payer: COMMERCIAL

## 2019-09-24 DIAGNOSIS — Z95.810 PRESENCE OF AUTOMATIC CARDIOVERTER/DEFIBRILLATOR (AICD): Primary | ICD-10-CM

## 2019-09-24 PROCEDURE — 93299 PR REM INTERROG ICPMS/SCRMS <30 D TECH REVIEW: CPT | Performed by: INTERNAL MEDICINE

## 2019-09-24 PROCEDURE — 93297 REM INTERROG DEV EVAL ICPMS: CPT | Performed by: INTERNAL MEDICINE

## 2019-09-24 NOTE — PROGRESS NOTES
Results for orders placed or performed in visit on 09/24/19   Cardiac EP device report    Narrative    MDT BI-V ICD  CARELINK TRANSMISSION - OPTI-VOL ONLY: OPTI-VOL FLUID THRESHOLD CROSSED  RECHECK IN ONE MONTH  TASKED HF-RN  AP 89%  99%  ALL AVAILABLE LEAD PARAMETERS WITHIN NORMAL LIMITS  NO SIGNIFICANT HIGH RATE EPISODES  VENTRICULAR SENSE EPISODES DETECTE  NORMAL DEVICE FUNCTION  ---HERNANDEZ

## 2019-09-24 NOTE — TELEPHONE ENCOUNTER
----- Message from Pinky Friday sent at 9/24/2019 11:05 AM EDT -----  Patients optivol high   Will recheck in one month

## 2019-10-11 ENCOUNTER — HOSPITAL ENCOUNTER (EMERGENCY)
Facility: HOSPITAL | Age: 54
Discharge: HOME | End: 2019-10-11
Attending: EMERGENCY MEDICINE
Payer: COMMERCIAL

## 2019-10-11 VITALS
RESPIRATION RATE: 18 BRPM | HEIGHT: 66 IN | DIASTOLIC BLOOD PRESSURE: 68 MMHG | SYSTOLIC BLOOD PRESSURE: 130 MMHG | BODY MASS INDEX: 24.11 KG/M2 | WEIGHT: 150 LBS | HEART RATE: 60 BPM | OXYGEN SATURATION: 100 % | TEMPERATURE: 96.7 F

## 2019-10-11 DIAGNOSIS — R09.81 NASAL CONGESTION: Primary | ICD-10-CM

## 2019-10-11 PROCEDURE — 99282 EMERGENCY DEPT VISIT SF MDM: CPT | Performed by: EMERGENCY MEDICINE

## 2019-10-11 PROCEDURE — 99282 EMERGENCY DEPT VISIT SF MDM: CPT

## 2019-10-11 RX ORDER — FLUTICASONE PROPIONATE 50 MCG
1 SPRAY, SUSPENSION (ML) NASAL DAILY
Qty: 16 G | Refills: 0 | Status: SHIPPED | OUTPATIENT
Start: 2019-10-11 | End: 2021-01-01 | Stop reason: HOSPADM

## 2019-10-11 RX ORDER — LORATADINE 10 MG/1
10 TABLET ORAL DAILY
Qty: 20 TABLET | Refills: 0 | Status: SHIPPED | OUTPATIENT
Start: 2019-10-11 | End: 2021-01-01 | Stop reason: HOSPADM

## 2019-10-11 NOTE — ED PROVIDER NOTES
History  Chief Complaint   Patient presents with    Nasal Congestion     since last night-took medicine that was a placebo     58-year-old gentleman presents with complaint of nasal congestion and runny nose  Symptoms have been fluctuating over the past couple days  He denies any fevers/chills  He has complaint of mild sinus pressure  He has not had any coughing, wheezing, chest pain, GI symptoms, or other acute issues or concerns  URI   Presenting symptoms: congestion and rhinorrhea    Presenting symptoms: no cough, no ear pain, no facial pain, no fatigue, no fever and no sore throat    Severity:  Mild  Onset quality:  Gradual  Timing:  Constant  Progression:  Waxing and waning  Chronicity:  New  Relieved by:  Nothing  Worsened by:  Nothing  Ineffective treatments:  None tried  Associated symptoms: sinus pain (mild pressure)    Associated symptoms: no arthralgias, no headaches, no myalgias, no neck pain, no sneezing, no swollen glands and no wheezing        Prior to Admission Medications   Prescriptions Last Dose Informant Patient Reported? Taking? Potassium Chloride ER 20 MEQ TBCR  Self No No   Sig: take 1 tablet by mouth once daily   amiodarone 200 mg tablet  Self No No   Sig: Take 0 5 tablets (100 mg total) by mouth daily Patient is will take a half a tabled a day   100 mg TDD   aspirin 81 MG tablet  Self Yes No   Sig: Take 81 mg by mouth daily     atorvastatin (LIPITOR) 80 mg tablet   No No   Sig: take 1 tablet by mouth once daily   eplerenone (INSPRA) 25 mg tablet   No No   Sig: take 1 tablet by mouth once daily   furosemide (LASIX) 40 mg tablet  Self No No   Sig: Take 1 tablet (40 mg total) by mouth 2 (two) times a day   lisinopril (ZESTRIL) 40 mg tablet  Self No No   Sig: take 1 tablet by mouth at bedtime   metoprolol succinate (TOPROL-XL) 50 mg 24 hr tablet  Self No No   Sig: take 1 tablet by mouth once daily   mirtazapine (REMERON) 45 MG tablet  Self Yes No   Sig: Take 45 mg by mouth daily at bedtime   sodium chloride (OCEAN) 0 65 % nasal spray  Self No No   Si spray into each nostril 4 (four) times a day   warfarin (COUMADIN) 4 mg tablet  Self No No   Sig: Take 1 tablet (4 mg total) by mouth daily Take 4 mg and 2 mg alternating once daily( take 1 tablet and 1/2 tablet alternating once daily)      Facility-Administered Medications: None       Past Medical History:   Diagnosis Date    AICD (automatic cardioverter/defibrillator) present     medtronic     CAD (coronary artery disease)     Cancer ([facility] )     Cardiac disease     Cardiomyopathy ([facility] )     mixed  predominently nonischemic    Colonic mass     Coronary artery disease     Hyperlipidemia     Hypertension     Irregular heart beat     Myocardial infarction ([facility] )      and     Rectal bleeding     S/P AVR (aortic valve replacement)     mechanical    Wears glasses        Past Surgical History:   Procedure Laterality Date    AORTIC VALVE REPLACEMENT      APPENDECTOMY      CARDIAC DEFIBRILLATOR PLACEMENT      COLECTOMY MADELIN Right     Last Assessed: 2016    COLECTOMY LAPAROSCOPIC      COLON SURGERY Right     colectomy    CORONARY STENT PLACEMENT      INSERT / REPLACE / REMOVE PACEMAKER      MITRAL VALVE REPAIR      MITRAL VALVE REPAIR      MOUTH SURGERY      TN COLONOSCOPY FLX DX W/COLLJ [facility] WHEN PFRMD N/A 1/15/2018    Procedure: COLONOSCOPY;  Surgeon: Miky Knox MD;  Location: [facility]; Service: General    TN LAP,SURG,COLECTOMY, PARTIAL, W/ANAST N/A 2016    Procedure: RESECTION COLON RIGHT LAPAROSCOPIC HAND-ASSISTED;  Surgeon: Miky Knox MD;  Location: [facility];  Service: General       Family History   Problem Relation Age of Onset    Diabetes Mother     Hypertension Mother      I have reviewed and agree with the history as documented      Social History     Tobacco Use    Smoking status: Never Smoker    Smokeless tobacco: Never Used   Substance Use Topics    Alcohol use: Not Currently    Drug use: No        Review of Systems   Constitutional: Negative for activity change, chills, fatigue and fever  HENT: Positive for congestion, postnasal drip, rhinorrhea and sinus pain (mild pressure)  Negative for ear pain, sneezing, sore throat and trouble swallowing  Eyes: Negative  Respiratory: Negative  Negative for cough and wheezing  Cardiovascular: Negative for chest pain  Gastrointestinal: Negative for abdominal pain, constipation, diarrhea, nausea and vomiting  Endocrine: Negative  Genitourinary: Negative  Musculoskeletal: Negative  Negative for arthralgias, back pain, myalgias and neck pain  Skin: Negative  Allergic/Immunologic: Negative  Neurological: Negative  Negative for headaches  Hematological: Negative  Psychiatric/Behavioral: Negative  Physical Exam  Physical Exam   Constitutional: He is oriented to person, place, and time  He appears well-developed and well-nourished  No distress  HENT:   Head: Normocephalic and atraumatic  Right Ear: Tympanic membrane and ear canal normal    Left Ear: Tympanic membrane and ear canal normal    Nose: Mucosal edema and rhinorrhea present  Right sinus exhibits no maxillary sinus tenderness and no frontal sinus tenderness  Left sinus exhibits no maxillary sinus tenderness and no frontal sinus tenderness  Mouth/Throat: Uvula is midline, oropharynx is clear and moist and mucous membranes are normal    Eyes: Pupils are equal, round, and reactive to light  Neck: Neck supple  Cardiovascular: Normal rate, regular rhythm and normal heart sounds  Pulmonary/Chest: Effort normal and breath sounds normal  No respiratory distress  Abdominal: Soft  Bowel sounds are normal  He exhibits no distension  There is no tenderness  There is no guarding  Musculoskeletal: Normal range of motion  He exhibits no edema  Neurological: He is alert and oriented to person, place, and time  Skin: Skin is warm and dry   Capillary refill takes less than 2 seconds  He is not diaphoretic  Psychiatric: He has a normal mood and affect  His behavior is normal    Nursing note and vitals reviewed  Vital Signs  ED Triage Vitals [10/11/19 1430]   Temperature Pulse Respirations Blood Pressure SpO2   (!) 96 7 °F (35 9 °C) 60 18 130/68 100 %      Temp src Heart Rate Source Patient Position - Orthostatic VS BP Location FiO2 (%)   -- -- -- -- --      Pain Score       6           Vitals:    10/11/19 1430   BP: 130/68   Pulse: 60         Visual Acuity      ED Medications  Medications - No data to display    Diagnostic Studies  Results Reviewed     None                 No orders to display              Procedures  Procedures       ED Course                               MDM    Disposition  Final diagnoses:   Nasal congestion     Time reflects when diagnosis was documented in both MDM as applicable and the Disposition within this note     Time User Action Codes Description Comment    10/11/2019  2:36 PM Angeles Shirley Add [R09 [id number]] Nasal congestion       ED Disposition     ED Disposition Condition Date/Time Comment    Discharge Stable Fri Oct 11, 2019  2:36 PM [address] discharge to home/self care              Follow-up Information     Follow up With Specialties Details Why Contact Info    Tonio Alcantar PA-C Family Medicine, Physician Assistant Call   [address]  [address]       [facility] Emergency Department Emergency Medicine  If symptoms worsen [address] [address] [phone]          Patient's Medications   Discharge Prescriptions    FLUTICASONE (FLONASE) 50 MCG/ACT NASAL SPRAY    1 spray into each nostril daily       Start Date: 10/11/2019End Date: --       Order Dose: 1 spray       Quantity: 16 g    Refills: 0    LORATADINE (CLARITIN) 10 MG TABLET    Take 1 tablet (10 mg total) by mouth daily       Start Date: 10/11/2019End Date: --       Order Dose: 10 mg       Quantity: 20 tablet    Refills: 0     No discharge procedures on file      ED Provider  Electronically Signed by           Davina Garcia DO  10/11/19 4165

## 2019-10-16 ENCOUNTER — TELEPHONE (OUTPATIENT)
Dept: CARDIOLOGY CLINIC | Facility: CLINIC | Age: 54
End: 2019-10-16

## 2019-10-16 NOTE — TELEPHONE ENCOUNTER
Phone call to patient, noted he is over due for pt/inr test  Patient states he will go to lab for inr test tomorrow   areli

## 2019-10-23 ENCOUNTER — TRANSCRIBE ORDERS (OUTPATIENT)
Dept: ADMINISTRATIVE | Facility: HOSPITAL | Age: 54
End: 2019-10-23

## 2019-10-23 ENCOUNTER — APPOINTMENT (OUTPATIENT)
Dept: LAB | Facility: HOSPITAL | Age: 54
End: 2019-10-23
Attending: INTERNAL MEDICINE
Payer: COMMERCIAL

## 2019-10-23 ENCOUNTER — REMOTE DEVICE CLINIC VISIT (OUTPATIENT)
Dept: CARDIOLOGY CLINIC | Facility: CLINIC | Age: 54
End: 2019-10-23
Payer: COMMERCIAL

## 2019-10-23 ENCOUNTER — ANTICOAG VISIT (OUTPATIENT)
Dept: CARDIOLOGY CLINIC | Facility: CLINIC | Age: 54
End: 2019-10-23

## 2019-10-23 DIAGNOSIS — Z95.810 PRESENCE OF AUTOMATIC CARDIOVERTER/DEFIBRILLATOR (AICD): Primary | ICD-10-CM

## 2019-10-23 DIAGNOSIS — Z95.2 S/P AVR (AORTIC VALVE REPLACEMENT): ICD-10-CM

## 2019-10-23 DIAGNOSIS — Z95.2 S/P AVR (AORTIC VALVE REPLACEMENT): Primary | ICD-10-CM

## 2019-10-23 LAB
INR PPP: 2.57 (ref 0.91–1.09)
PROTHROMBIN TIME: 27.7 SECONDS (ref 9.8–12)

## 2019-10-23 PROCEDURE — 93297 REM INTERROG DEV EVAL ICPMS: CPT | Performed by: INTERNAL MEDICINE

## 2019-10-23 PROCEDURE — 85610 PROTHROMBIN TIME: CPT

## 2019-10-23 PROCEDURE — 36415 COLL VENOUS BLD VENIPUNCTURE: CPT

## 2019-10-23 PROCEDURE — 93299 PR REM INTERROG ICPMS/SCRMS <30 D TECH REVIEW: CPT | Performed by: INTERNAL MEDICINE

## 2019-10-23 NOTE — PROGRESS NOTES
10/23/19, tc to pt, will continue current dose, inr due 4 weeks  Pt denies any bleeding or health changes   areli

## 2019-10-23 NOTE — PROGRESS NOTES
Results for orders placed or performed in visit on 10/23/19   Cardiac EP device report    Narrative    MDT BI-V ICD  CARELINK TRANSMISSION - OPTI-VOL ONLY: OPTI-VOL FLUID THRESHOLD CROSSED  RECHECK IN ONE MONTH  TASKED HF-RN  AP 81%  99%  ALL AVAILABLE LEAD PARAMETERS WITHIN NORMAL LIMITS  NO SIGNIFICANT HIGH RATE EPISODES  VENTRICULAR SENSE EPISODES DETECTED  NORMAL DEVICE FUNCTION  ---HERNANDEZ

## 2019-12-03 ENCOUNTER — TELEPHONE (OUTPATIENT)
Dept: CARDIOLOGY CLINIC | Facility: CLINIC | Age: 54
End: 2019-12-03

## 2019-12-03 ENCOUNTER — REMOTE DEVICE CLINIC VISIT (OUTPATIENT)
Dept: CARDIOLOGY CLINIC | Facility: CLINIC | Age: 54
End: 2019-12-03
Payer: COMMERCIAL

## 2019-12-03 DIAGNOSIS — Z95.810 PRESENCE OF AUTOMATIC CARDIOVERTER/DEFIBRILLATOR (AICD): Primary | ICD-10-CM

## 2019-12-03 PROCEDURE — 93296 REM INTERROG EVL PM/IDS: CPT | Performed by: INTERNAL MEDICINE

## 2019-12-03 PROCEDURE — 93295 DEV INTERROG REMOTE 1/2/MLT: CPT | Performed by: INTERNAL MEDICINE

## 2019-12-03 PROCEDURE — 93297 REM INTERROG DEV EVAL ICPMS: CPT | Performed by: INTERNAL MEDICINE

## 2019-12-03 NOTE — PROGRESS NOTES
Results for orders placed or performed in visit on 12/03/19   Cardiac EP device report    Narrative    MDT BI-V ICD  CARELINK TRANSMISSION: BATTERY VOLTAGE ADEQUATE  (2 5 YRS)  AP 79% BVP 98%  ALL AVAILABLE LEAD PARAMETERS WITHIN NORMAL LIMITS  NO SIGNIFICANT HIGH RATE EPISODES  VENTRICULAR SENSE EPISODES DETECTED  OPTI-VOL FLUID THRESHOLD CROSSED  RECHECK IN ONE MONTH  NORMAL DEVICE FUNCTION  ---HERNANDEZ

## 2019-12-03 NOTE — TELEPHONE ENCOUNTER
----- Message from Kiran Koehler sent at 12/3/2019  9:37 AM EST -----  This patients optivol was elevated in todays remote  We will recheck in one month  CARELINK TRANSMISSION:   BATTERY VOLTAGE ADEQUATE  (2 5 YRS)  AP 79% BVP 98%  ALL AVAILABLE LEAD PARAMETERS WITHIN NORMAL LIMITS  NO SIGNIFICANT HIGH RATE EPISODES  VENTRICULAR SENSE EPISODES DETECTED  OPTI-VOL FLUID THRESHOLD CROSSED  RECHECK IN ONE MONTH  NORMAL DEVICE FUNCTION  ---HERNANDEZ

## 2019-12-12 ENCOUNTER — TELEPHONE (OUTPATIENT)
Dept: CARDIOLOGY CLINIC | Facility: CLINIC | Age: 54
End: 2019-12-12

## 2019-12-18 ENCOUNTER — TELEPHONE (OUTPATIENT)
Dept: CARDIOLOGY CLINIC | Facility: CLINIC | Age: 54
End: 2019-12-18

## 2020-01-02 ENCOUNTER — ANTICOAG VISIT (OUTPATIENT)
Dept: CARDIOLOGY CLINIC | Facility: CLINIC | Age: 55
End: 2020-01-02

## 2020-01-02 ENCOUNTER — APPOINTMENT (OUTPATIENT)
Dept: LAB | Facility: HOSPITAL | Age: 55
End: 2020-01-02
Attending: INTERNAL MEDICINE
Payer: COMMERCIAL

## 2020-01-02 DIAGNOSIS — Z95.2 S/P AVR (AORTIC VALVE REPLACEMENT): ICD-10-CM

## 2020-01-06 DIAGNOSIS — I10 BENIGN ESSENTIAL HTN: ICD-10-CM

## 2020-01-06 RX ORDER — POTASSIUM CHLORIDE 1500 MG/1
1 TABLET, FILM COATED, EXTENDED RELEASE ORAL DAILY
Qty: 30 TABLET | Refills: 4 | Status: SHIPPED | OUTPATIENT
Start: 2020-01-06 | End: 2020-07-27

## 2020-01-08 ENCOUNTER — HOSPITAL ENCOUNTER (OUTPATIENT)
Dept: NON INVASIVE DIAGNOSTICS | Facility: HOSPITAL | Age: 55
Discharge: HOME | End: 2020-01-08
Payer: COMMERCIAL

## 2020-01-08 ENCOUNTER — TELEPHONE (OUTPATIENT)
Dept: CARDIOLOGY CLINIC | Facility: CLINIC | Age: 55
End: 2020-01-08

## 2020-01-08 ENCOUNTER — REMOTE DEVICE CLINIC VISIT (OUTPATIENT)
Dept: CARDIOLOGY CLINIC | Facility: CLINIC | Age: 55
End: 2020-01-08
Payer: COMMERCIAL

## 2020-01-08 DIAGNOSIS — I50.30 HEART FAILURE WITH PRESERVED EJECTION FRACTION, UNSPECIFIED HF CHRONICITY (HCC): Primary | ICD-10-CM

## 2020-01-08 DIAGNOSIS — Z95.810 AICD (AUTOMATIC CARDIOVERTER/DEFIBRILLATOR) PRESENT: Primary | ICD-10-CM

## 2020-01-08 DIAGNOSIS — I25.5 ISCHEMIC CARDIOMYOPATHY: Chronic | ICD-10-CM

## 2020-01-08 PROCEDURE — 93306 TTE W/DOPPLER COMPLETE: CPT | Performed by: INTERNAL MEDICINE

## 2020-01-08 PROCEDURE — 93306 TTE W/DOPPLER COMPLETE: CPT

## 2020-01-08 PROCEDURE — 93297 REM INTERROG DEV EVAL ICPMS: CPT | Performed by: INTERNAL MEDICINE

## 2020-01-08 NOTE — TELEPHONE ENCOUNTER
I personally called and spoke with patient after receiving report from device clinic of 13 beat VT successfully treated with ATP on 12/3/19  The device interrogation is from today 1/8/2020  After speaking with the patient, he denies any symptoms at this time and notes that he is feeling well  He denies any chest pain, dyspnea, lower extremity edema, orthopnea, weight gain, syncope, palpitations  He states that he has been 100% complaint with all medications but does notes that he has had issues with ventricular arrhythmias in the past which is why he is also followed by Dr Coretta Scott and is on amiodarone  At last visit, the patient BP was 89GMVX systolic and he does not regularly check BP at home  I asked him to start doing this  At this time with lower BP cannot increase metoprolol and if VT episodes increase may then need increase in amiodarone which should be monitored by electrophysiology  Will get BMP and magnesium level at this time and have patient seen in office in the next week for evaluation  Also counseled patient that if he were to have any warning or alarm type symptoms he should seek emergency medical care immediately  The patient noted understanding and thanked me for calling

## 2020-01-08 NOTE — PROGRESS NOTES
Results for orders placed or performed in visit on 01/08/20   Cardiac EP device report    Narrative    MDT BI-V ICD  CARELINK TRANSMISSION - OPTI-VOL ONLY: BATTERY STATUS "OK"  AP 83%  95% VSRP 2%  ALL AVAILABLE LEAD PARAMETERS WITHIN NORMAL LIMITS  1 VT TREATED W/ATP; APPROX 13 SECS @ 207 BPM  PT ON AMIO & METO SUCC  DR  MM & DT MADE AWARE  4,193 VENT SENSING NOTED, MARKERS ONLY  OPTI-VOL FLUID THRESHOLD CROSSED  NORMAL DEVICE FUNCTION   NC

## 2020-01-08 NOTE — PROGRESS NOTES
I personally called patient and he denied any symptoms  Please have him get laboratory studies completed and in for follow up in the next week for evaluation

## 2020-01-09 ENCOUNTER — TELEPHONE (OUTPATIENT)
Dept: CARDIOLOGY CLINIC | Facility: CLINIC | Age: 55
End: 2020-01-09

## 2020-01-09 NOTE — TELEPHONE ENCOUNTER
----- Message from Dena Morales DO sent at 1/9/2020 11:21 AM EST -----  Please call patient and let him know that his echo performed still shows the reduced function and that he keeps the appointment on Monday 1/13 and please make sure he gets the lab work done prior to that appointment

## 2020-01-09 NOTE — TELEPHONE ENCOUNTER
Called patient and made him aware of results  Reminded him of his appointment on 1/13/2020, he stated he will be at the appointment  Also reminded him to complete labs prior to upcoming appointment  Patient states he got them done yesterday through [facility]  Verbalized understanding

## 2020-01-13 ENCOUNTER — OFFICE VISIT (OUTPATIENT)
Dept: CARDIOLOGY CLINIC | Facility: CLINIC | Age: 55
End: 2020-01-13
Payer: COMMERCIAL

## 2020-01-13 VITALS
WEIGHT: 147.3 LBS | HEART RATE: 64 BPM | SYSTOLIC BLOOD PRESSURE: 118 MMHG | DIASTOLIC BLOOD PRESSURE: 62 MMHG | HEIGHT: 66 IN | BODY MASS INDEX: 23.67 KG/M2

## 2020-01-13 DIAGNOSIS — I25.10 CORONARY ARTERY DISEASE INVOLVING NATIVE CORONARY ARTERY OF NATIVE HEART WITHOUT ANGINA PECTORIS: ICD-10-CM

## 2020-01-13 DIAGNOSIS — I47.2 VENTRICULAR TACHYCARDIA (HCC): ICD-10-CM

## 2020-01-13 DIAGNOSIS — Z95.2 S/P AVR (AORTIC VALVE REPLACEMENT): ICD-10-CM

## 2020-01-13 DIAGNOSIS — I50.22 CHRONIC HFREF (HEART FAILURE WITH REDUCED EJECTION FRACTION) (HCC): Primary | ICD-10-CM

## 2020-01-13 PROCEDURE — 99213 OFFICE O/P EST LOW 20 MIN: CPT | Performed by: PHYSICIAN ASSISTANT

## 2020-01-13 RX ORDER — METOPROLOL SUCCINATE 50 MG/1
50 TABLET, EXTENDED RELEASE ORAL EVERY 12 HOURS SCHEDULED
Qty: 60 TABLET | Refills: 3 | Status: SHIPPED | OUTPATIENT
Start: 2020-01-13 | End: 2020-07-14 | Stop reason: SDUPTHER

## 2020-01-13 NOTE — PATIENT INSTRUCTIONS
Have blood work completed today  Increase metoprolol succinate (Toprol) dose to 1 pill twice a day  Please weigh yourself every day, and contact the Heart Failure program at [phone] if you gain 3 lbs overnight or 5 lbs in 5-7 days  Limit daily sodium/salt intake to 5469-4886 mg daily to prevent fluid retention  Avoid canned foods, [place] food, and processed meat (hot dogs, lunch meat, and sausage etc )  Limit daily fluid intake to 2000 mL or 2L (about 60 ounces) daily  Bring complete list of medications to your follow-up appointment

## 2020-01-13 NOTE — PROGRESS NOTES
Advanced Heart Failure / Pulmonary Hypertension Outpatient Consultation    Buck Simms 53 y o  male   MRN: [id number]  Encounter: [id number]    Assessment / Plan:  Patient Active Problem List    Diagnosis Date Noted    S/P AVR (aortic valve replacement) 06/18/2018    Ventricular fibrillation ([facility] [address] ) 06/18/2018    Biventricular ICD (implantable cardioverter-defibrillator) in place 06/18/2018    Colonic mass 06/03/2016    Cardiomyopathy ([facility] [address] ) 06/03/2016    CAD (coronary artery disease) 06/03/2016     Chronic HFrEF; LVEF 17%; LVIDd 5 67 cm; NYHA II/III; ACC/AHA Stage B   Etiology: ischemic  Reviewed importance of low sodium diet and fluid restriction  Nuclear stress test from 03/13/2017: Non-diagnostic stress EKG  "Large, severe, fixed myocardial perfusion defect of the entire anterior wall, with a complete fixed perfusion defect of the apex without reversibility and of the entire inferior wall, extending to the mid-basal inferolateral wall without reversibility " LVEF 18%  TTE from 01/08/2020: LVEF 15-20%; LVIDd 5 67 cm  Mildly enlarged RV and lower RVSF  TAPSE 1 62 cm  ASHLEY  Mild to moderate MR  Mild TR  Normal mechanical aortic valve function  Weight of 152 lbs on 07/10  Today, weighs 147 3 lbs  Neurohormonal Blockade:  --Beta-Blocker: increase metoprolol succinate to 50 mg q12 hours  --ACEi, ARB or ARNi: lisinopril 40 mg daily  --Aldosterone Receptor Blocker: eplerenone 25 mg daily  --Diuretic: Lasix 40 mg BID with potassium 20 mEq daily  Sudden Cardiac Death Risk Reduction / Electrical Resynchronization:  --Medtronic BiV ICD in situ since 02/24/2015  --Interrogation from 01/08/2020: AP 83%   95%  VSRP 2%  Lead parameters WNL  1 VT treated with ATP; approximately 13 seconds at 207 bpm  4913 vent-sensing noted  Opti-Vol threshold crossed  Normal device function  Advanced Therapies: Will continue to monitor      Coronary artery disease   S/p PCI with BMS to LAD and left circumflex in 2014  Nuclear stress test from 03/13/2017: Non-diagnostic stress EKG  "Large, severe, fixed myocardial perfusion defect of the entire anterior wall, with a complete fixed perfusion defect of the apex without reversibility and of the entire inferior wall, extending to the mid-basal inferolateral wall without reversibility " LVEF 18%  Continue on aspirin, statin, and BB as above  History of ventricular fibrillation / ventricular tachycardia   Continue on amiodarone 100 mg daily  Increased BB as above  Normal TSH and LFTs in 02/2019  PFTs from 08/27/2019: Normal spirometry  Lung volumes with very mild restricion  Normal DLCO  BMP and magnesium to be completed today  Hypertension    BP of 118/62 mmHg in office today  Hyperlipidemia   Most recent lipid panel from 04/26/2019 cholesterol 118; TG 91; HDL 45; direct LDL 55  Continue on atorvastatin  Rheumatic heart disease / Aortic stenosis   S/p mechanical AVR in 1980s  Continue on Coumadin and aspirin  Coumadin/INR managed by PCP  History of colon cancer: s/p colon resection on 06/02/2016  HPI:   Tal Coyle is a 54-year-old male with chronic HFrEF, iCM (s/p BiV ICD in 2015), CAD (s/p BMS to LAD and left Cx in 2014), rheumatic heart disease, AS (s/p mechanical AVR), history of VF, HTN and HLD who presents to the office for an acute visit/consultation  From office visit with Dr Madeline Ruvalcaba on 07/10/2019: "Chronic ischemic cardiomyopathy most recent LVEF 25%  Patient 54-year-old male past medical history significant for coronary artery disease S/P PCI in 2014, known ischemic cardiomyopathy with biventricular ICD placement, hypertension, hyperlipidemia and history of aortic valve replacement in 1988 who presents to the office today for follow-up  He denies any issues at this time  He denies any chest pain, palpitations, lightheadedness or dizziness, shortness of breath    He states that he is able to walk several blocks to the grocery store and ambulate around the grocery store and home without any difficulty  He is able to play with 4 grandchildren without any difficulty  He has been compliant with his medications and notes that he has been compliant with his lifestyle and dietary restrictions as well "    Patient had a 13 second run of VT on 12/03/2019 that was successfully treated with ATP  Patient reports being asymptomatic with this  Acute visit was then scheduled for 01/13/2019 01/13/2019: Patient presents for initial visit with HF service  He has no complaints; to get blood work completed this morning  Reviewed in detail findings from recent ICD interrogation; patient denies any symptoms during the timeframe when VT was detected  Is watching his diet and fluid intake as well as weighing himself daily  Weights fluctuating some but, trending stable/down overall  Reports have somewhat decreased appetite for the past week; no abdominal pain or n/v/d  Does have some SOB with stairs/hills  Reports being able to walk about 1 block, before stopping due to knee pain  Family history significant for numerous members with valvular heart disease and "heart blockages " No family history of SCD  Patient is a never smoker and reports drinking about 3-4 light beers monthly only at social events  Reports remote history of drug use in the 1990s (used heroin, cocaine, marijuana); denies any current drug use  Patient lives with his girlfriend  Has 4 children who all live relatively nearby  Previously worked in construction but now watches his 7 grandchildren (ranging from an infant to 10years old) during the day  Past Medical History:   Diagnosis Date    AICD (automatic cardioverter/defibrillator) present     medtronic     CAD (coronary artery disease)     [facility]     Cardiac disease     [facility]omy[facility]     mixed    predominently nonischemic    Colonic mass     Coronary artery disease     Hyperlipidemia     Hypertension     Irregular heart beat     Myocardial infarction [facility] )     2014 and 2015    Rectal bleeding     S/P AVR (aortic valve replacement)     mechanical    Wears glasses      Review of Systems   Constitutional: Positive for appetite change (decreased for about 1 week)  Negative for diaphoresis, fatigue and unexpected weight change  HENT: Negative for congestion, rhinorrhea, sneezing and sore throat  Eyes: Negative  Respiratory: Positive for shortness of breath (with hills)  Negative for cough and chest tightness  Cardiovascular: Negative for chest pain, palpitations and leg swelling  Gastrointestinal: Negative for abdominal distention, abdominal pain, diarrhea, nausea and vomiting  Endocrine: Negative  Genitourinary: Negative for decreased urine volume, difficulty urinating and dysuria  Musculoskeletal: Negative  Skin: Negative  Allergic/Immunologic: Negative  Neurological: Negative for dizziness, light-headedness and headaches  Hematological: Negative  Psychiatric/Behavioral: Negative for agitation and confusion  The patient is not nervous/anxious  14-point ROS completed and negative except as stated above and/or in the HPI  No Known Allergies    Current Outpatient Medications:     amiodarone 200 mg tablet, Take 0 5 tablets (100 mg total) by mouth daily Patient is will take a half a tabled a day  100 mg TDD, Disp: 30 tablet, Rfl: 6    aspirin 81 MG tablet, Take 81 mg by mouth daily  , Disp: , Rfl:     atorvastatin (LIPITOR) 80 mg tablet, take 1 tablet by mouth once daily, Disp: 30 tablet, Rfl: 6    eplerenone (INSPRA) 25 mg tablet, take 1 tablet by mouth once daily, Disp: 30 tablet, Rfl: 6    fluticasone (FLONASE) 50 mcg/act nasal spray, 1 spray into each nostril daily, Disp: 16 g, Rfl: 0    furosemide (LASIX) 40 mg tablet, Take 1 tablet (40 mg total) by mouth 2 (two) times a day, Disp: 60 tablet, Rfl: 6    lisinopril (ZESTRIL) 40 mg tablet, take 1 tablet by mouth at bedtime, Disp: 30 tablet, Rfl: 6    loratadine (CLARITIN) 10 mg tablet, Take 1 tablet (10 mg total) by mouth daily, Disp: 20 tablet, Rfl: 0    metoprolol succinate (TOPROL-XL) 50 mg 24 hr tablet, Take 1 tablet (50 mg total) by mouth every 12 (twelve) hours, Disp: 60 tablet, Rfl: 3    mirtazapine (REMERON) 45 MG tablet, Take 45 mg by mouth daily at bedtime, Disp: , Rfl: 0    Potassium Chloride ER 20 MEQ TBCR, Take 1 tablet (20 mEq total) by mouth daily, Disp: 30 tablet, Rfl: 4    sodium chloride (OCEAN) 0 65 % nasal spray, 1 spray into each nostril 4 (four) times a day, Disp: 15 mL, Rfl: 0    warfarin (COUMADIN) 4 mg tablet, Take 1 tablet (4 mg total) by mouth daily Take 4 mg and 2 mg alternating once daily( take 1 tablet and 1/2 tablet alternating once daily), Disp: 60 tablet, Rfl: 3    Social History     Socioeconomic History    Marital status: /Civil Union     Spouse name: Not on file    Number of children: Not on file    Years of education: Not on file    Highest education level: Not on file   Occupational History    Not on file   Social Needs    Financial resource strain: Not on file    Food insecurity:     Worry: Not on file     Inability: Not on file    Transportation needs:     Medical: Not on file     Non-medical: Not on file   Tobacco Use    Smoking status: Never Smoker    Smokeless tobacco: Never Used   Substance and Sexual Activity    Alcohol use: Not Currently    Drug use: No    Sexual activity: Not on file   Lifestyle    Physical activity:     Days per week: Not on file     Minutes per session: Not on file    Stress: Not on file   Relationships    Social connections:     Talks on phone: Not on file     Gets together: Not on file     Attends L[employer] day service: Not on file     Active member of club or organization: Not on file     Attends meetings of clubs or organizations: Not on file     Relationship status: Not on file    Intimate partner violence:     Fear of current or ex partner: Not on file     Emotionally abused: Not on file     Physically abused: Not on file     Forced sexual activity: Not on file   Other Topics Concern    Not on file   Social History Narrative    Not on file     Family History   Problem Relation Age of Onset    Diabetes Mother     Hypertension Mother     Valvular heart disease Mother     Valvular heart disease Father     Alcohol abuse Father      Vitals:   Blood pressure 118/62, pulse 64, height 5' 6" (1 676 m), weight 66 8 kg (147 lb 4 8 oz)  Body mass index is 23 77 kg/m²  Wt Readings from Last 3 Encounters:   01/13/20 66 8 kg (147 lb 4 8 oz)   10/11/19 68 kg (150 lb)   07/10/19 69 4 kg (152 lb 14 4 oz)     Vitals:    01/13/20 0844   BP: 118/62   BP Location: Right arm   Patient Position: Sitting   Cuff Size: Adult   Pulse: 64   Weight: 66 8 kg (147 lb 4 8 oz)   Height: 5' 6" (1 676 m)     Physical Exam   Constitutional: He is oriented to person, place, and time  He appears well-developed and well-nourished  HENT:   Head: Normocephalic and atraumatic  Eyes: Pupils are equal, round, and reactive to light  EOM are normal    Neck: Neck supple  No tracheal deviation present  Cardiovascular: Normal rate, regular rhythm and intact distal pulses  Murmur (metallic closing auscultated) heard  Pulmonary/Chest: Effort normal and breath sounds normal  No respiratory distress  He has no wheezes  He has no rales  Abdominal: Soft  Bowel sounds are normal  He exhibits no distension  There is no tenderness  Musculoskeletal: He exhibits no edema or tenderness  Neurological: He is alert and oriented to person, place, and time  Skin: Skin is warm and dry  Psychiatric: He has a normal mood and affect  His behavior is normal      Diagnostic Testing:  Echocardiogram from 01/08/2020:  LVEF: 17%; grade 2 DD  LVIDd: 5 67 cm  RV: Mildly enlarged RV and lower RVSF  TAPSE 1 62 cm  MR: Mild-to-moderate     Aortic: mechanical valve present with mild AI and normal function  Mild aortic root enlargement  PASP: 44 mmHg  RVOT:   Other: ASHLEY  Mild TR  Echocardiogram from 07/03/2018:  LVEF: moderately to markedly reduced LVEF; moderate diffuse hypokinesis  LVIDd: 6 cm  RV: Normal size and RVSF  MR: Mild  Aortic: Mechanical prosthesis with normal function  Trace AI  Mild TR  PASP: 25 mmHg  RVOT:   Other: Biatrial enlargement  Nuclear stress test from 03/13/2017: Non-diagnostic stress EKG  "Large, severe, fixed myocardial perfusion defect of the entire anterior wall, with a complete fixed perfusion defect of the apex without reversibility and of the entire inferior wall, extending to the mid-basal inferolateral wall without reversibility " LVEF 18%  Labs & Results:  Lab Results   Component Value Date    WBC 8 47 09/07/2018    HGB 13 3 09/07/2018    HCT 42 3 09/07/2018    MCV 89 09/07/2018     09/07/2018     Lab Results   Component Value Date    SODIUM 138 02/05/2019    K 4 5 02/05/2019     02/05/2019    CO2 26 02/05/2019    BUN 17 02/05/2019    CREATININE 1 02 02/05/2019    GLUC 114 12/19/2017    CALCIUM 8 8 02/05/2019     Lab Results   Component Value Date    INR 2 64 (H) 01/02/2020    INR 2 57 (H) 10/23/2019    INR 3 62 (H) 09/03/2019    PROTIME 28 7 (H) 01/02/2020    PROTIME 27 7 (H) 10/23/2019    PROTIME 39 2 (H) 09/03/2019     Lab Results   Component Value Date    NTBNP 1,952 (H) 12/05/2017      EKG personally reviewed by Valeri David PA-C  Counseling / Coordination of Care: Today, 40 minutes were spent with patient during which greater than 50% of this time was spent in counseling/coordination of care regarding low sodium diet, fluid restriction, daily weights, and importance of medication compliance  Thank you for the opportunity to participate in the care of this patient      Wandalee Essex, PA-C

## 2020-01-23 ENCOUNTER — ANTICOAG VISIT (OUTPATIENT)
Dept: CARDIOLOGY CLINIC | Facility: CLINIC | Age: 55
End: 2020-01-23

## 2020-01-23 ENCOUNTER — TELEPHONE (OUTPATIENT)
Dept: FAMILY MEDICINE CLINIC | Facility: CLINIC | Age: 55
End: 2020-01-23

## 2020-01-23 ENCOUNTER — TELEPHONE (OUTPATIENT)
Dept: CARDIOLOGY CLINIC | Facility: CLINIC | Age: 55
End: 2020-01-23

## 2020-01-23 ENCOUNTER — APPOINTMENT (OUTPATIENT)
Dept: LAB | Facility: HOSPITAL | Age: 55
End: 2020-01-23
Payer: COMMERCIAL

## 2020-01-23 DIAGNOSIS — I50.30 HEART FAILURE WITH PRESERVED EJECTION FRACTION, UNSPECIFIED HF CHRONICITY (HCC): ICD-10-CM

## 2020-01-23 DIAGNOSIS — Z95.2 S/P AVR (AORTIC VALVE REPLACEMENT): ICD-10-CM

## 2020-01-23 DIAGNOSIS — Z95.2 S/P AVR (AORTIC VALVE REPLACEMENT): Primary | ICD-10-CM

## 2020-01-23 LAB
ANION GAP SERPL CALCULATED.3IONS-SCNC: 6 MMOL/L (ref 5–14)
BUN SERPL-MCNC: 13 MG/DL (ref 5–25)
CALCIUM SERPL-MCNC: 8.6 MG/DL (ref 8.4–10.2)
CHLORIDE SERPL-SCNC: 102 MMOL/L (ref 97–108)
CO2 SERPL-SCNC: 29 MMOL/L (ref 22–30)
CREAT SERPL-MCNC: 1 MG/DL (ref 0.7–1.5)
GFR SERPL CREATININE-BSD FRML MDRD: 84 ML/MIN/1.73SQ M
GLUCOSE P FAST SERPL-MCNC: 139 MG/DL (ref 70–99)
MAGNESIUM SERPL-MCNC: 2 MG/DL (ref 1.6–2.3)
POTASSIUM SERPL-SCNC: 4.1 MMOL/L (ref 3.6–5)
SODIUM SERPL-SCNC: 137 MMOL/L (ref 137–147)

## 2020-01-23 PROCEDURE — 80048 BASIC METABOLIC PNL TOTAL CA: CPT | Performed by: INTERNAL MEDICINE

## 2020-01-23 PROCEDURE — 83735 ASSAY OF MAGNESIUM: CPT

## 2020-01-23 RX ORDER — WARFARIN SODIUM 4 MG/1
TABLET ORAL
Qty: 30 TABLET | Refills: 2 | Status: SHIPPED | OUTPATIENT
Start: 2020-01-23 | End: 2020-03-18

## 2020-01-23 NOTE — PROGRESS NOTES
1/23/20, tc to pt, he began yelling because he states he has no warfarin in home , missed 4 days, and pharmacy has been calling to office  reviewed chart, noted prescription request was sent to pcp office  Patient denies any bleeding, denies any changes in health, medication or diet  reviewed with dr Kevin Rebolledo, hold warfarin until inr done 1/27  Tc to patient, understood to hold warfarin inr due Monday  denies mixing up any medications and taking warfarin by mistake  Understood to call office if any bleeding  instructed to eat salad today

## 2020-01-23 NOTE — TELEPHONE ENCOUNTER
----- Message from Panfilo Clemons DO sent at 1/23/2020 10:31 AM EST -----  Please call patient and let him know that his labs look good but glucose was a little high

## 2020-01-28 ENCOUNTER — ANTICOAG VISIT (OUTPATIENT)
Dept: CARDIOLOGY CLINIC | Facility: CLINIC | Age: 55
End: 2020-01-28

## 2020-01-28 ENCOUNTER — APPOINTMENT (OUTPATIENT)
Dept: LAB | Facility: HOSPITAL | Age: 55
End: 2020-01-28
Attending: INTERNAL MEDICINE
Payer: COMMERCIAL

## 2020-01-28 DIAGNOSIS — Z95.2 S/P AVR (AORTIC VALVE REPLACEMENT): ICD-10-CM

## 2020-01-31 NOTE — PROGRESS NOTES
Heart Failure Consult Note - Ramesh South 53 y o  male MRN: [id number]    @ Encounter: [id number]      Assessment/Plan:    Patient Active Problem List    Diagnosis Date Noted    S/P AVR (aortic valve replacement) 06/18/2018    Ventricular fibrillation ([facility] [address] ) 06/18/2018    Biventricular ICD (implantable cardioverter-defibrillator) in place 06/18/2018    Colonic mass 06/03/2016    Cardiomyopathy ([facility] [address] ) 06/03/2016    CAD (coronary artery disease) 06/03/2016     # Chronic HFrEF, Stage C, NYHA  Etiology: iCM    Weight: 147 lbs on 1/13, 142 today    Echocardiogram 1/8/20  LVEF: 15-20%  LVIDd: 5 7 cm  RV: mildly enlarged, lower RVSF, TAPSE 1 6  MR: mild to moderate  PASP:  RVOT:   Other: normal mechanical aortic valve function    Nuclear stress test 03/13/2017: Non-diagnostic stress EKG  "Large, severe, fixed myocardial perfusion defect of the entire anterior wall, with a complete fixed perfusion defect of the apex without reversibility and of the entire inferior wall, extending to the mid-basal inferolateral wall without reversibility " LVEF 18%  Lab Results   Component Value Date    NTBNP 1,952 (H) 12/05/2017        Neurohormonal Blockade:  --Beta-Blocker: metoprolol 50 mg Q12  --ACEi, ARB or ARNi: lisinopril 40 mg daily  --Aldosterone Receptor Blocker: epleronone 25 mg daily  --Diuretic: lasix 40 mg BID with potassium 20 meq daily    Sudden Cardiac Death Risk Reduction:  --ICD: MDT BiV    Electrical Resynchronization:  --Medtronic BiV ICD in situ since 02/24/2015  --Interrogation from 01/08/2020: AP 83%   95%  VSRP 2%  Lead parameters WNL  1 VT treated with ATP; approximately 13 seconds at 207 bpm  4913 vent-sensing noted  Opti-Vol threshold crossed  Normal device function  Advanced Therapies (if appropriate): --Inotrope:  --LVAD/Transplant Candidacy:    # CAD- s/p PCI with BMS to LAD and LCx in 2014  Nuclear stress test 03/13/2017: Non-diagnostic stress EKG   "Large, severe, fixed myocardial perfusion defect of the entire anterior wall, with a complete fixed perfusion defect of the apex without reversibility and of the entire inferior wall, extending to the mid-basal inferolateral wall without reversibility " LVEF 18%  # hyperlipidemia- atorvastatin  04/26/2019 cholesterol 118; TG 91; HDL 45; direct LDL 55    # Hx of ventricular fibrillation/ ventricular tachycardia  Amiodarone 100 mg daily, metoprolol 50 mg Q12  12/3- had 13 second run of VT treated with ATP (metoprolol since increased)    # Rheumatic heart disease/ aortic stenosis  S/p mechanical AVR in 1980's  coumadin    # HTN    # hx of colon CA- s/p colon resection on 6/2/16    Today's Plan:    --2g sodium diet  Weights daily    HPI:      52 yo male with chronic HFrEF, iCM (s/p BiV ICD in 2015), CAD (s/p BMS to LAD and left Cx in 2014), rheumatic heart disease, AS (s/p mechanical AVR), history of VF, HTN and HLD  Patient had a 13 second run of VT on 12/03/2019 that was successfully treated with ATP  Patient reports being asymptomatic with this  Acute visit was then scheduled for 01/13/2019  No family history of SCD  Patient is a never smoker and reports drinking about 3-4 light beers monthly only at social events  Reports remote history of drug use in the 1990s (used heroin, cocaine, marijuana); denies any current drug use  Patient lives with his girlfriend  Has 4 children who all live relatively nearby  Previously worked in construction but now watches his 7 grandchildren (ranging from an infant to 10years old) during the day  From a symptoms standpoint he feels fine, no shortness of breath, no orthopnea  Past Medical History:   Diagnosis Date    AICD (automatic cardioverter/defibrillator) present     medtronic     CAD (coronary artery disease)     Cancer ([facility] [address] )     Cardiac disease     Cardiomyopathy ([facility] [address] )     mixed    predominently nonischemic    Colonic mass     Coronary artery disease     Hyperlipidemia     Hypertension  Irregular heart beat     Myocardial infarction ([facility] [address] )     2014 and 2015    Rectal bleeding     S/P AVR (aortic valve replacement)     mechanical    Wears glasses        Review of Systems   Constitutional: Negative for activity change, appetite change, fatigue and unexpected weight change  HENT: Negative for congestion and nosebleeds  Eyes: Negative  Respiratory: Negative for cough, chest tightness and shortness of breath  Cardiovascular: Negative for chest pain, palpitations and leg swelling  Gastrointestinal: Negative for abdominal distention  Endocrine: Negative  Genitourinary: Negative  Musculoskeletal: Negative  Skin: Negative  Neurological: Negative for dizziness, syncope and weakness  Hematological: Negative  Psychiatric/Behavioral: Negative  No Known Allergies    Current Outpatient Medications:     amiodarone 200 mg tablet, Take 0 5 tablets (100 mg total) by mouth daily Patient is will take a half a tabled a day  100 mg TDD, Disp: 30 tablet, Rfl: 6    aspirin 81 MG tablet, Take 81 mg by mouth daily  , Disp: , Rfl:     atorvastatin (LIPITOR) 80 mg tablet, take 1 tablet by mouth once daily, Disp: 30 tablet, Rfl: 6    eplerenone (INSPRA) 25 mg tablet, take 1 tablet by mouth once daily, Disp: 30 tablet, Rfl: 6    fluticasone (FLONASE) 50 mcg/act nasal spray, 1 spray into each nostril daily, Disp: 16 g, Rfl: 0    furosemide (LASIX) 40 mg tablet, Take 1 tablet (40 mg total) by mouth 2 (two) times a day, Disp: 60 tablet, Rfl: 6    lisinopril (ZESTRIL) 40 mg tablet, take 1 tablet by mouth at bedtime, Disp: 30 tablet, Rfl: 6    loratadine (CLARITIN) 10 mg tablet, Take 1 tablet (10 mg total) by mouth daily, Disp: 20 tablet, Rfl: 0    metoprolol succinate (TOPROL-XL) 50 mg 24 hr tablet, Take 1 tablet (50 mg total) by mouth every 12 (twelve) hours, Disp: 60 tablet, Rfl: 3    mirtazapine (REMERON) 45 MG tablet, Take 45 mg by mouth daily at bedtime, Disp: , Rfl: 0    Potassium Chloride ER 20 MEQ TBCR, Take 1 tablet (20 mEq total) by mouth daily, Disp: 30 tablet, Rfl: 4    sodium chloride (OCEAN) 0 65 % nasal spray, 1 spray into each nostril 4 (four) times a day, Disp: 15 mL, Rfl: 0    warfarin (COUMADIN) 4 mg tablet, Take 1 tablet (4 mg total) by mouth daily Take 4 mg and 2 mg alternating once daily( take 1 tablet and 1/2 tablet alternating once daily), Disp: 60 tablet, Rfl: 3    warfarin (COUMADIN) 4 mg tablet, Take one tablet daily or as directed by MD, Disp: 30 tablet, Rfl: 2    Social History     Socioeconomic History    Marital status: /Civil Union     Spouse name: Not on file    Number of children: Not on file    Years of education: Not on file    Highest education level: Not on file   Occupational History    Not on file   Social Needs    Financial resource strain: Not on file    Food insecurity:     Worry: Not on file     Inability: Not on file    Transportation needs:     Medical: Not on file     Non-medical: Not on file   Tobacco Use    Smoking status: Never Smoker    Smokeless tobacco: Never Used   Substance and Sexual Activity    Alcohol use: Not Currently    Drug use: No    Sexual activity: Not on file   Lifestyle    Physical activity:     Days per week: Not on file     Minutes per session: Not on file    Stress: Not on file   Relationships    Social connections:     Talks on phone: Not on file     Gets together: Not on file     Attends Quaker service: Not on file     Active member of club or organization: Not on file     Attends meetings of clubs or organizations: Not on file     Relationship status: Not on file    Intimate partner violence:     Fear of current or ex partner: Not on file     Emotionally abused: Not on file     Physically abused: Not on file     Forced sexual activity: Not on file   Other Topics Concern    Not on file   Social History Narrative    Not on file       Family History   Problem Relation Age of Onset  Diabetes Mother     Hypertension Mother     Valvular heart disease Mother     Valvular heart disease Father     Alcohol abuse Father        Physical Exam:    Vitals: Blood pressure 112/60, pulse 60, height 5' 6" (1 676 m), weight 64 5 kg (142 lb 4 8 oz), SpO2 97 %  , Body mass index is 22 97 kg/m² ,   Wt Readings from Last 3 Encounters:   02/03/20 64 5 kg (142 lb 4 8 oz)   01/13/20 66 8 kg (147 lb 4 8 oz)   10/11/19 68 kg (150 lb)       Physical Exam   Constitutional: He is oriented to person, place, and time  He appears well-developed and well-nourished  HENT:   Head: Normocephalic and atraumatic  Eyes: Pupils are equal, round, and reactive to light  Neck: Normal range of motion  No JVD present  Cardiovascular: Normal rate and regular rhythm  No murmur heard  Pulmonary/Chest: Effort normal and breath sounds normal  No respiratory distress  Abdominal: Soft  He exhibits no distension  There is no tenderness  Musculoskeletal: Normal range of motion  He exhibits no edema  Neurological: He is alert and oriented to person, place, and time  Skin: Skin is warm and dry  No rash noted  Psychiatric: He has a normal mood and affect         Labs & Results:    Lab Results   Component Value Date    WBC 8 47 09/07/2018    HGB 13 3 09/07/2018    HCT 42 3 09/07/2018    MCV 89 09/07/2018     09/07/2018     Lab Results   Component Value Date    SODIUM 137 01/23/2020    K 4 1 01/23/2020     01/23/2020    CO2 29 01/23/2020    BUN 13 01/23/2020    CREATININE 1 00 01/23/2020    GLUC 114 12/19/2017    CALCIUM 8 6 01/23/2020     Lab Results   Component Value Date    NTBNP 1,952 (H) 12/05/2017      Lab Results   Component Value Date    CHOLESTEROL 118 04/26/2019    CHOLESTEROL 126 07/21/2016     Lab Results   Component Value Date    HDL 45 04/26/2019    HDL 48 07/21/2016    HDL 39 10/29/2015     Lab Results   Component Value Date    TRIG 91 04/26/2019    TRIG 177 (H) 07/21/2016    TRIG 67 10/29/2015 Lab Results   Component Value Date    Janae 73 04/26/2019       EKG personally reviewed by Rollo Baumgarten, DO  Counseling / Coordination of Care  Total floor / unit time spent today 40 minutes  Greater than 50% of total time was spent with the patient and / or family counseling and / or coordination of care  A description of the counseling / coordination of care: 25 min  Thank you for the opportunity to participate in the care of this patient  Rollo Baumgarten D O    Director of Advanced Heart Failure Program  Medical Director of [address] [place] [address]

## 2020-02-03 ENCOUNTER — OFFICE VISIT (OUTPATIENT)
Dept: CARDIOLOGY CLINIC | Facility: CLINIC | Age: 55
End: 2020-02-03
Payer: COMMERCIAL

## 2020-02-03 VITALS
HEART RATE: 60 BPM | OXYGEN SATURATION: 97 % | WEIGHT: 142.3 LBS | DIASTOLIC BLOOD PRESSURE: 60 MMHG | HEIGHT: 66 IN | SYSTOLIC BLOOD PRESSURE: 112 MMHG | BODY MASS INDEX: 22.87 KG/M2

## 2020-02-03 DIAGNOSIS — I49.01 VENTRICULAR FIBRILLATION (HCC): ICD-10-CM

## 2020-02-03 DIAGNOSIS — E78.5 HYPERLIPIDEMIA LDL GOAL <70: ICD-10-CM

## 2020-02-03 DIAGNOSIS — I25.10 CORONARY ARTERY DISEASE INVOLVING NATIVE CORONARY ARTERY WITHOUT ANGINA PECTORIS, UNSPECIFIED WHETHER NATIVE OR TRANSPLANTED HEART: Primary | ICD-10-CM

## 2020-02-03 DIAGNOSIS — I50.22 CHRONIC SYSTOLIC CHF (CONGESTIVE HEART FAILURE), NYHA CLASS 2 (HCC): ICD-10-CM

## 2020-02-03 DIAGNOSIS — Z95.2 S/P AVR (AORTIC VALVE REPLACEMENT): ICD-10-CM

## 2020-02-03 DIAGNOSIS — I25.5 ISCHEMIC CARDIOMYOPATHY: ICD-10-CM

## 2020-02-03 PROCEDURE — 1036F TOBACCO NON-USER: CPT | Performed by: INTERNAL MEDICINE

## 2020-02-03 PROCEDURE — 3074F SYST BP LT 130 MM HG: CPT | Performed by: INTERNAL MEDICINE

## 2020-02-03 PROCEDURE — 99214 OFFICE O/P EST MOD 30 MIN: CPT | Performed by: INTERNAL MEDICINE

## 2020-02-03 PROCEDURE — 3078F DIAST BP <80 MM HG: CPT | Performed by: INTERNAL MEDICINE

## 2020-02-03 PROCEDURE — 3008F BODY MASS INDEX DOCD: CPT | Performed by: INTERNAL MEDICINE

## 2020-02-03 NOTE — PATIENT INSTRUCTIONS
Take your second dose of diuretic at 2 or 3 pm in afternoon    Please check daily weights and contact the heart failure program at [phone] if you gain 3 lb in one day or 5 lb in one week  Please limit daily sodium intake to 2000 mg daily  Please limit daily fluid intake to 2000 ml daily  Bring complete list of medications to your follow up appointment

## 2020-02-17 ENCOUNTER — APPOINTMENT (OUTPATIENT)
Dept: LAB | Facility: HOSPITAL | Age: 55
End: 2020-02-17
Attending: INTERNAL MEDICINE
Payer: COMMERCIAL

## 2020-02-17 ENCOUNTER — ANTICOAG VISIT (OUTPATIENT)
Dept: CARDIOLOGY CLINIC | Facility: CLINIC | Age: 55
End: 2020-02-17

## 2020-02-17 DIAGNOSIS — Z95.2 S/P AVR (AORTIC VALVE REPLACEMENT): ICD-10-CM

## 2020-02-17 NOTE — PROGRESS NOTES
Pt called LM to call back with Amharic language line  4 mg today and 4mg sat  With 2 mg other days  Pt states missed dose yesterday  Will recheck on 2/26/20  Pt told INR in low

## 2020-02-26 ENCOUNTER — APPOINTMENT (OUTPATIENT)
Dept: LAB | Facility: HOSPITAL | Age: 55
End: 2020-02-26
Attending: INTERNAL MEDICINE
Payer: COMMERCIAL

## 2020-02-26 ENCOUNTER — ANTICOAG VISIT (OUTPATIENT)
Dept: CARDIOLOGY CLINIC | Facility: CLINIC | Age: 55
End: 2020-02-26

## 2020-02-26 DIAGNOSIS — Z95.2 S/P AVR (AORTIC VALVE REPLACEMENT): ICD-10-CM

## 2020-02-26 NOTE — PROGRESS NOTES
Pt called states took pills as directed  Ask if any dietery changes/ ?cranberry juice said " okay now can't drink juice" told just to avoid cranberry juice  Will hold x1 day then 2 mg daily recheck in one week

## 2020-03-03 ENCOUNTER — REMOTE DEVICE CLINIC VISIT (OUTPATIENT)
Dept: CARDIOLOGY CLINIC | Facility: CLINIC | Age: 55
End: 2020-03-03
Payer: COMMERCIAL

## 2020-03-03 DIAGNOSIS — I25.5 ISCHEMIC CARDIOMYOPATHY: Chronic | ICD-10-CM

## 2020-03-03 DIAGNOSIS — Z95.810 PRESENCE OF AUTOMATIC CARDIOVERTER/DEFIBRILLATOR (AICD): Primary | ICD-10-CM

## 2020-03-03 PROCEDURE — 93295 DEV INTERROG REMOTE 1/2/MLT: CPT | Performed by: INTERNAL MEDICINE

## 2020-03-03 PROCEDURE — G2066 INTER DEVC REMOTE 30D: HCPCS | Performed by: INTERNAL MEDICINE

## 2020-03-03 PROCEDURE — 93296 REM INTERROG EVL PM/IDS: CPT | Performed by: INTERNAL MEDICINE

## 2020-03-03 RX ORDER — LISINOPRIL 40 MG/1
40 TABLET ORAL
Qty: 30 TABLET | Refills: 11 | Status: SHIPPED | OUTPATIENT
Start: 2020-03-03 | End: 2021-03-06

## 2020-03-03 NOTE — PROGRESS NOTES
Results for orders placed or performed in visit on 03/03/20   Cardiac EP device report    Narrative    MDT BI-V ICD  CARELINK TRANSMISSION: BATTERY VOLTAGE ADEQUATE  (2 3 YRS)  AP 85% BVP 95%  ALL AVAILABLE LEAD PARAMETERS WITHIN NORMAL LIMITS  NO SIGNIFICANT HIGH RATE EPISODES  VENTRICULAR SENSE EPISODES DETECTED  OPTI-VOL WITHIN NORMAL LIMITS  NORMAL DEVICE FUNCTION  ---HERNANDEZ

## 2020-03-04 ENCOUNTER — APPOINTMENT (OUTPATIENT)
Dept: LAB | Facility: HOSPITAL | Age: 55
End: 2020-03-04
Attending: INTERNAL MEDICINE
Payer: COMMERCIAL

## 2020-03-04 ENCOUNTER — ANTICOAG VISIT (OUTPATIENT)
Dept: CARDIOLOGY CLINIC | Facility: CLINIC | Age: 55
End: 2020-03-04

## 2020-03-04 DIAGNOSIS — Z95.2 S/P AVR (AORTIC VALVE REPLACEMENT): ICD-10-CM

## 2020-03-04 NOTE — PROGRESS NOTES
3/4/20, tc to pt, denies any bleeding or change in medication or diet  Verified correct dose, will decrease dose, 0 mg wed, 2 mg other days of the week, inr due 1 week  Will eat slad today  Will monitor if bleeding   areli

## 2020-03-06 ENCOUNTER — OFFICE VISIT (OUTPATIENT)
Dept: FAMILY MEDICINE CLINIC | Facility: CLINIC | Age: 55
End: 2020-03-06

## 2020-03-06 VITALS
HEART RATE: 61 BPM | HEIGHT: 66 IN | WEIGHT: 139.2 LBS | BODY MASS INDEX: 22.37 KG/M2 | SYSTOLIC BLOOD PRESSURE: 102 MMHG | DIASTOLIC BLOOD PRESSURE: 62 MMHG | OXYGEN SATURATION: 95 % | TEMPERATURE: 98.1 F | RESPIRATION RATE: 18 BRPM

## 2020-03-06 DIAGNOSIS — R45.89 DEPRESSED MOOD: ICD-10-CM

## 2020-03-06 DIAGNOSIS — R73.09 ABNORMAL GLUCOSE: ICD-10-CM

## 2020-03-06 DIAGNOSIS — Z00.00 MEDICARE ANNUAL WELLNESS VISIT, SUBSEQUENT: Primary | ICD-10-CM

## 2020-03-06 PROCEDURE — 3078F DIAST BP <80 MM HG: CPT | Performed by: PHYSICIAN ASSISTANT

## 2020-03-06 PROCEDURE — G0439 PPPS, SUBSEQ VISIT: HCPCS | Performed by: PHYSICIAN ASSISTANT

## 2020-03-06 PROCEDURE — 3008F BODY MASS INDEX DOCD: CPT | Performed by: PHYSICIAN ASSISTANT

## 2020-03-06 PROCEDURE — 3074F SYST BP LT 130 MM HG: CPT | Performed by: PHYSICIAN ASSISTANT

## 2020-03-06 PROCEDURE — 3008F BODY MASS INDEX DOCD: CPT | Performed by: INTERNAL MEDICINE

## 2020-03-06 PROCEDURE — 1036F TOBACCO NON-USER: CPT | Performed by: PHYSICIAN ASSISTANT

## 2020-03-06 NOTE — PROGRESS NOTES
Assessment and Plan:     Problem List Items Addressed This Visit        Other    Depressed mood     Depression Screening Follow-up Plan: Patient's depression screening was positive with a PHQ-2 score of 3  Their PHQ-9 score was 13  Patient declines further evaluation by mental health professional and/or medications  They have no active suicidal ideations  Brief counseling provided and recommend additional follow-up/re-evaluation at next office visit  States that he will call if his mood gets worse  He is now starting to eat better  Other Visit Diagnoses     Medicare annual wellness visit, subsequent    -  Primary    Abnormal glucose        Relevant Orders    HEMOGLOBIN A1C W/ EAG ESTIMATION           Preventive health issues were discussed with patient, and age appropriate screening tests were ordered as noted in patient's After Visit Summary  Personalized health advice and appropriate referrals for health education or preventive services given if needed, as noted in patient's After Visit Summary  History of Present Illness:     Patient presents for Medicare Annual Wellness visit    Patient Care Team:  Deborah Ge PA-C as PCP - Brennon Connors MD as PCP - [address] [place] [address] (E)  MD Tonio Pedraza PA-C Edelmiro Octave, MD Valeria Brave, MD as Endoscopist     Problem List:     Patient Active Problem List   Diagnosis    Colonic mass    Cardiomyopathy [facility])    CAD (coronary artery disease)    S/P AVR (aortic valve replacement)    Ventricular fibrillation (HCC)    Biventricular ICD (implantable cardioverter-defibrillator) in place    Depressed mood      Past Medical and Surgical History:     Past Medical History:   Diagnosis Date    AICD (automatic cardioverter/defibrillator) present     medtronic     CAD (coronary artery disease)     [facility] [address] [facility]     Cardiac disease     Cardiomyopathy ([facility] [address] )     mixed    predominently nonischemic    Colonic mass     Coronary artery disease     Hyperlipidemia     Hypertension     Irregular heart beat     Myocardial infarction ([facility] [address] )     2014 and 2015    Rectal bleeding     S/P AVR (aortic valve replacement)     mechanical    Wears glasses      Past Surgical History:   Procedure Laterality Date    AORTIC VALVE REPLACEMENT      APPENDECTOMY      CARDIAC DEFIBRILLATOR PLACEMENT      COLECTOMY MADELIN Right     Last Assessed: 6/7/2016    COLECTOMY LAPAROSCOPIC      COLON SURGERY Right     colectomy    CORONARY STENT PLACEMENT      INSERT / REPLACE / REMOVE PACEMAKER      MITRAL VALVE REPAIR      MITRAL VALVE REPAIR      MOUTH SURGERY      [place] COLONOSCOPY FLX DX W/COLLJ [address] WHEN PFRMD N/A 1/15/2018    Procedure: COLONOSCOPY;  Surgeon: Vicki Dugan MD;  Location: [facility];   Service: General    [place] LAP,SURG,COLECTOMY, PARTIAL, W/ANAST N/A 6/2/2016    Procedure: RESECTION COLON RIGHT LAPAROSCOPIC HAND-ASSISTED;  Surgeon: Vicki Dugan MD;  Location: [facility];  Service: General      Family History:     Family History   Problem Relation Age of Onset    Diabetes Mother     Hypertension Mother     Valvular heart disease Mother     Valvular heart disease Father     Alcohol abuse Father       Social History:        Social History     Socioeconomic History    Marital status: /Civil Union     Spouse name: None    Number of children: None    Years of education: None    Highest education level: None   Occupational History    None   Social Needs    Financial resource strain: None    Food insecurity:     Worry: None     Inability: None    Transportation needs:     Medical: None     Non-medical: None   Tobacco Use    Smoking status: Never Smoker    Smokeless tobacco: Never Used   Substance and Sexual Activity    Alcohol use: Not Currently    Drug use: No    Sexual activity: None   Lifestyle    Physical activity:     Days per week: None     Minutes per session: None    Stress: None   Relationships    Social connections:     Talks on phone: None     Gets together: None     Attends Pentecostal service: None     Active member of club or organization: None     Attends meetings of clubs or organizations: None     Relationship status: None    Intimate partner violence:     Fear of current or ex partner: None     Emotionally abused: None     Physically abused: None     Forced sexual activity: None   Other Topics Concern    None   Social History Narrative    None      Medications and Allergies:     Current Outpatient Medications   Medication Sig Dispense Refill    amiodarone 200 mg tablet Take 0 5 tablets (100 mg total) by mouth daily Patient is will take a half a tabled a day   100 mg TDD 30 tablet 6    aspirin 81 MG tablet Take 81 mg by mouth daily        atorvastatin (LIPITOR) 80 mg tablet take 1 tablet by mouth once daily 30 tablet 6    eplerenone (INSPRA) 25 mg tablet take 1 tablet by mouth once daily 30 tablet 6    fluticasone (FLONASE) 50 mcg/act nasal spray 1 spray into each nostril daily 16 g 0    lisinopril (ZESTRIL) 40 mg tablet Take 1 tablet (40 mg total) by mouth daily at bedtime 30 tablet 11    loratadine (CLARITIN) 10 mg tablet Take 1 tablet (10 mg total) by mouth daily 20 tablet 0    metoprolol succinate (TOPROL-XL) 50 mg 24 hr tablet Take 1 tablet (50 mg total) by mouth every 12 (twelve) hours 60 tablet 3    mirtazapine (REMERON) 45 MG tablet Take 45 mg by mouth daily at bedtime  0    Potassium Chloride ER 20 MEQ TBCR Take 1 tablet (20 mEq total) by mouth daily 30 tablet 4    sodium chloride (OCEAN) 0 65 % nasal spray 1 spray into each nostril 4 (four) times a day 15 mL 0    warfarin (COUMADIN) 4 mg tablet Take 1 tablet (4 mg total) by mouth daily Take 4 mg and 2 mg alternating once daily( take 1 tablet and 1/2 tablet alternating once daily) 60 tablet 3    warfarin (COUMADIN) 4 mg tablet Take one tablet daily or as directed by MD 30 tablet 2    furosemide (LASIX) 40 mg tablet Take 1 tablet (40 mg total) by mouth 2 (two) times a day (Patient not taking: Reported on 3/6/2020) 60 tablet 6     No current facility-administered medications for this visit  No Known Allergies   Immunizations: There is no immunization history for the selected administration types on file for this patient  Health Maintenance:         Topic Date Due    CRC Screening: Colonoscopy  01/15/2028    Hepatitis C Screening  Completed         Topic Date Due    Pneumococcal Vaccine: Pediatrics (0 to 5 Years) and At-Risk Patients (6 to 59 Years) (1 of 1 - PPSV23) 07/27/1970    DTaP,Tdap,and Td Vaccines (1 - Tdap) 07/27/1975    Influenza Vaccine  07/01/2019      Medicare Health Risk Assessment:     /62 (BP Location: Left arm, Patient Position: Sitting, Cuff Size: Standard)   Pulse 61   Temp 98 1 °F (36 7 °C) (Temporal)   Resp 18   Ht 5' 6" (1 676 m)   Wt 63 1 kg (139 lb 3 2 oz)   SpO2 95%   BMI 22 47 kg/m²      Catie Maldonado is here for his Subsequent Wellness visit  Health Risk Assessment:   Patient rates overall health as good  Patient feels that their physical health rating is same  Eyesight was rated as same  Hearing was rated as same  Patient feels that their emotional and mental health rating is same  Pain experienced in the last 7 days has been none  Patient states that he has experienced weight loss or gain in last 6 months  He has lost weight recently  He is currently going through a break with his wife  He states he has been feeling more depressed but is now feeling better  He was eating for a while but now has been eating better  Depression Screening:   PHQ-2 Score: 3  PHQ-9 Score: 13      Fall Risk Screening: In the past year, patient has experienced: no history of falling in past year      Home Safety:  Patient has trouble with stairs inside or outside of their home  Patient has working smoke alarms and has working carbon monoxide detector  Home safety hazards include: none       Nutrition: Current diet is Regular  Medications:   Patient is not currently taking any over-the-counter supplements  Patient is able to manage medications  Activities of Daily Living (ADLs)/Instrumental Activities of Daily Living (IADLs):   Walk and transfer into and out of bed and chair?: Yes  Dress and groom yourself?: Yes    Bathe or shower yourself?: Yes    Feed yourself? Yes  Do your laundry/housekeeping?: Yes  Manage your money, pay your bills and track your expenses?: Yes  Make your own meals?: Yes    Do your own shopping?: Yes    Previous Hospitalizations:   Any hospitalizations or ED visits within the last 12 months?: No      Advance Care Planning:   Living will: No    Durable POA for healthcare: No    Advanced directive: No    Advanced directive counseling given: Yes    Five wishes given: No    Patient declined ACP directive: No      Comments:   Patient has 5 wishes forms at home and will fill out      Cognitive Screening:   Provider or family/friend/caregiver concerned regarding cognition?: No    PREVENTIVE SCREENINGS      Cardiovascular Screening:    General: Screening Not Indicated and History Lipid Disorder      Diabetes Screening:     General: Screening Current      Colorectal Cancer Screening:     General: Screening Current      Prostate Cancer Screening:    General: Screening Current      Osteoporosis Screening:    General: Screening Not Indicated      Abdominal Aortic Aneurysm (AAA) Screening:        General: Screening Not Indicated      Lung Cancer Screening:     General: Screening Not Indicated      Hepatitis C Screening:    General: Screening Current      Tonio Alcantar PA-C

## 2020-03-06 NOTE — ASSESSMENT & PLAN NOTE
Depression Screening Follow-up Plan: Patient's depression screening was positive with a PHQ-2 score of 3  Their PHQ-9 score was 13  Patient declines further evaluation by mental health professional and/or medications  They have no active suicidal ideations  Brief counseling provided and recommend additional follow-up/re-evaluation at next office visit  States that he will call if his mood gets worse  He is now starting to eat better

## 2020-03-06 NOTE — PATIENT INSTRUCTIONS
Medicare Preventive Visit Patient Instructions  Thank you for completing your Welcome to Medicare Visit or Medicare Annual Wellness Visit today  Your next wellness visit will be due in one year (3/6/2021)  The screening/preventive services that you may require over the next 5-10 years are detailed below  Some tests may not apply to you based off risk factors and/or age  Screening tests ordered at today's visit but not completed yet may show as past due  Also, please note that scanned in results may not display below  Preventive Screenings:  Service Recommendations Previous Testing/Comments   Colorectal Cancer Screening  · Colonoscopy    · Fecal Occult Blood Test (FOBT)/Fecal Immunochemical Test (FIT)  · Fecal DNA/Cologuard Test  · Flexible Sigmoidoscopy Age: 54-65 years old   Colonoscopy: every 10 years (May be performed more frequently if at higher risk)  OR  FOBT/FIT: every 1 year  OR  Cologuard: every 3 years  OR  Sigmoidoscopy: every 5 years  Screening may be recommended earlier than age 48 if at higher risk for colorectal cancer  Also, an individualized decision between you and your healthcare provider will decide whether screening between the ages of 74-80 would be appropriate   Colonoscopy: 01/15/2018  FOBT/FIT: Not on file  Cologuard: Not on file  Sigmoidoscopy: Not on file    Screening Current     Prostate Cancer Screening Individualized decision between patient and health care provider in men between ages of 53-78   Medicare will cover every 12 months beginning on the day after your 50th birthday PSA: 0 6 ng/mL     Screening Current     Hepatitis C Screening Once for adults born between 1945 and 1965  More frequently in patients at high risk for Hepatitis C Hep C Antibody: 09/03/2019    Screening Current   Diabetes Screening 1-2 times per year if you're at risk for diabetes or have pre-diabetes Fasting glucose: 139 mg/dL   A1C: 5 8    Screening Current   Cholesterol Screening Once every 5 years if you don't have a lipid disorder  May order more often based on risk factors  Lipid panel: 04/26/2019    Screening Not Indicated  History Lipid Disorder      Other Preventive Screenings Covered by Medicare:  1  Abdominal Aortic Aneurysm (AAA) Screening: covered once if your at risk  You're considered to be at risk if you have a family history of AAA or a male between the age of 73-68 who smoking at least 100 cigarettes in your lifetime  2  Lung Cancer Screening: covers low dose CT scan once per year if you meet all of the following conditions: (1) Age 50-69; (2) No signs or symptoms of lung cancer; (3) Current smoker or have quit smoking within the last 15 years; (4) You have a tobacco smoking history of at least 30 pack years (packs per day x number of years you smoked); (5) You get a written order from a healthcare provider  3  Glaucoma Screening: covered annually if you're considered high risk: (1) You have diabetes OR (2) Family history of glaucoma OR (3)  aged 48 and older OR (3)  American aged 72 and older  3  Osteoporosis Screening: covered every 2 years if you meet one of the following conditions: (1) Have a vertebral abnormality; (2) On glucocorticoid therapy for more than 3 months; (3) Have primary hyperparathyroidism; (4) On osteoporosis medications and need to assess response to drug therapy  5  HIV Screening: covered annually if you're between the age of 12-76  Also covered annually if you are younger than 13 and older than 72 with risk factors for HIV infection  For pregnant patients, it is covered up to 3 times per pregnancy      Immunizations:  Immunization Recommendations   Influenza Vaccine Annual influenza vaccination during flu season is recommended for all persons aged >= 6 months who do not have contraindications   Pneumococcal Vaccine (Prevnar and Pneumovax)  * Prevnar = PCV13  * Pneumovax = PPSV23 Adults 25-60 years old: 1-3 doses may be recommended based on certain risk factors  Adults 72 years old: Prevnar (PCV13) vaccine recommended followed by Pneumovax (PPSV23) vaccine  If already received PPSV23 since turning 65, then PCV13 recommended at least one year after PPSV23 dose  Hepatitis B Vaccine 3 dose series if at intermediate or high risk (ex: diabetes, end stage renal disease, liver disease)   Tetanus (Td) Vaccine - COST NOT COVERED BY MEDICARE PART B Following completion of primary series, a booster dose should be given every 10 years to maintain immunity against tetanus  Td may also be given as tetanus wound prophylaxis  Tdap Vaccine - COST NOT COVERED BY MEDICARE PART B Recommended at least once for all adults  For pregnant patients, recommended with each pregnancy  Shingles Vaccine (Shingrix) - COST NOT COVERED BY MEDICARE PART B  2 shot series recommended in those aged 48 and above     Health Maintenance Due:      Topic Date Due    CRC Screening: Colonoscopy  01/15/2028    Hepatitis C Screening  Completed     Immunizations Due:      Topic Date Due    Pneumococcal Vaccine: Pediatrics (0 to 5 Years) and At-Risk Patients (6 to 59 Years) (1 of 1 - PPSV23) 07/27/1970    DTaP,Tdap,and Td Vaccines (1 - Tdap) 07/27/1975    Influenza Vaccine  07/01/2019     Advance Directives   What are advance directives? Advance directives are legal documents that state your wishes and plans for medical care  These plans are made ahead of time in case you lose your ability to make decisions for yourself  Advance directives can apply to any medical decision, such as the treatments you want, and if you want to donate organs  What are the types of advance directives? There are many types of advance directives, and each state has rules about how to use them  You may choose a combination of any of the following:  · Living will: This is a written record of the treatment you want  You can also choose which treatments you do not want, which to limit, and which to stop at a certain time  This includes surgery, medicine, IV fluid, and tube feedings  · Durable power of  for healthcare [facility]): This is a written record that states who you want to make healthcare choices for you when you are unable to make them for yourself  This person, called a proxy, is usually a family member or a friend  You may choose more than 1 proxy  · Do not resuscitate (DNR) order:  A DNR order is used in case your heart stops beating or you stop breathing  It is a request not to have certain forms of treatment, such as CPR  A DNR order may be included in other types of advance directives  · Medical directive: This covers the care that you want if you are in a coma, near death, or unable to make decisions for yourself  You can list the treatments you want for each condition  Treatment may include pain medicine, surgery, blood transfusions, dialysis, IV or tube feedings, and a ventilator (breathing machine)  · Values history: This document has questions about your views, beliefs, and how you feel and think about life  This information can help others choose the care that you would choose  Why are advance directives important? An advance directive helps you control your care  Although spoken wishes may be used, it is better to have your wishes written down  Spoken wishes can be misunderstood, or not followed  Treatments may be given even if you do not want them  An advance directive may make it easier for your family to make difficult choices about your care  Depression   Depression  is a medical condition that causes feelings of sadness or hopelessness that do not go away  Depression may cause you to lose interest in things you used to enjoy  These feelings may interfere with your daily life  Call your local emergency number (911 in the [address]) if:   · You think about harming yourself or someone else  · You have done something on purpose to hurt yourself    The following resources are available at any time to help you, if needed:   · [address]: 8-862.256.7161 ([facility]-119[facility])   · Suicide Hotline: 5-619.448.4419 (7-724-VSQPOMO)   · For a list of international numbers: https://save org/find-help/international-resources/  Treatment for depression may include medicine to relieve depression  Medicine is often used together with therapy  Therapy is a way for you to talk about your feelings and anything that may be causing depression  Therapy can be done alone or in a group  It may also be done with family members or a significant other  · Get regular physical activity  · Create a regular sleep schedule  · Eat a variety of healthy foods  · Do not drink alcohol or use drugs  © Copyright [employer] 2018 Information is for End User's use only and may not be sold, redistributed or otherwise used for commercial purposes   All illustrations and images included in CareNotes® are the copyrighted property of A D A M , Inc  or [address] [place], [place] [address]dontae

## 2020-03-18 DIAGNOSIS — Z95.2 S/P AVR (AORTIC VALVE REPLACEMENT): ICD-10-CM

## 2020-03-18 RX ORDER — WARFARIN SODIUM 4 MG/1
TABLET ORAL
Qty: 30 TABLET | Refills: 2 | Status: SHIPPED | OUTPATIENT
Start: 2020-03-18 | End: 2020-08-02

## 2020-03-25 ENCOUNTER — TELEPHONE (OUTPATIENT)
Dept: CARDIOLOGY CLINIC | Facility: CLINIC | Age: 55
End: 2020-03-25

## 2020-04-13 ENCOUNTER — ANTICOAG VISIT (OUTPATIENT)
Dept: CARDIOLOGY CLINIC | Facility: CLINIC | Age: 55
End: 2020-04-13

## 2020-04-13 ENCOUNTER — LAB (OUTPATIENT)
Dept: LAB | Facility: HOSPITAL | Age: 55
End: 2020-04-13
Payer: COMMERCIAL

## 2020-04-13 ENCOUNTER — APPOINTMENT (OUTPATIENT)
Dept: LAB | Facility: HOSPITAL | Age: 55
End: 2020-04-13
Attending: INTERNAL MEDICINE
Payer: COMMERCIAL

## 2020-04-13 DIAGNOSIS — R73.09 ABNORMAL GLUCOSE: ICD-10-CM

## 2020-04-13 DIAGNOSIS — Z95.2 AORTIC VALVE REPLACED: Primary | ICD-10-CM

## 2020-04-13 DIAGNOSIS — Z95.2 S/P AVR (AORTIC VALVE REPLACEMENT): ICD-10-CM

## 2020-04-13 LAB
EST. AVERAGE GLUCOSE BLD GHB EST-MCNC: 114 MG/DL
HBA1C MFR BLD: 5.6 %

## 2020-04-13 PROCEDURE — 83036 HEMOGLOBIN GLYCOSYLATED A1C: CPT

## 2020-04-13 RX ORDER — WARFARIN SODIUM 1 MG/1
TABLET ORAL
Qty: 30 TABLET | Refills: 1 | Status: SHIPPED | OUTPATIENT
Start: 2020-04-13 | End: 2021-01-01 | Stop reason: HOSPADM

## 2020-04-14 DIAGNOSIS — I25.5 ISCHEMIC CARDIOMYOPATHY: Chronic | ICD-10-CM

## 2020-04-14 RX ORDER — EPLERENONE 25 MG/1
TABLET, FILM COATED ORAL
Qty: 30 TABLET | Refills: 6 | Status: SHIPPED | OUTPATIENT
Start: 2020-04-14 | End: 2021-01-06

## 2020-05-15 ENCOUNTER — ANTICOAG VISIT (OUTPATIENT)
Dept: CARDIOLOGY CLINIC | Facility: CLINIC | Age: 55
End: 2020-05-15

## 2020-05-15 ENCOUNTER — APPOINTMENT (OUTPATIENT)
Dept: LAB | Facility: HOSPITAL | Age: 55
End: 2020-05-15
Payer: COMMERCIAL

## 2020-05-15 DIAGNOSIS — Z95.2 S/P AVR (AORTIC VALVE REPLACEMENT): ICD-10-CM

## 2020-06-02 ENCOUNTER — REMOTE DEVICE CLINIC VISIT (OUTPATIENT)
Dept: CARDIOLOGY CLINIC | Facility: CLINIC | Age: 55
End: 2020-06-02
Payer: COMMERCIAL

## 2020-06-02 DIAGNOSIS — Z95.810 BIVENTRICULAR ICD (IMPLANTABLE CARDIOVERTER-DEFIBRILLATOR) IN PLACE: Primary | ICD-10-CM

## 2020-06-02 PROCEDURE — 93296 REM INTERROG EVL PM/IDS: CPT | Performed by: INTERNAL MEDICINE

## 2020-06-02 PROCEDURE — 93295 DEV INTERROG REMOTE 1/2/MLT: CPT | Performed by: INTERNAL MEDICINE

## 2020-06-06 DIAGNOSIS — I25.5 ISCHEMIC CARDIOMYOPATHY: Chronic | ICD-10-CM

## 2020-06-07 RX ORDER — ATORVASTATIN CALCIUM 80 MG/1
TABLET, FILM COATED ORAL
Qty: 30 TABLET | Refills: 6 | Status: SHIPPED | OUTPATIENT
Start: 2020-06-07 | End: 2020-06-11

## 2020-06-11 DIAGNOSIS — I25.5 ISCHEMIC CARDIOMYOPATHY: Chronic | ICD-10-CM

## 2020-06-11 RX ORDER — ATORVASTATIN CALCIUM 80 MG/1
TABLET, FILM COATED ORAL
Qty: 30 TABLET | Refills: 6 | Status: SHIPPED | OUTPATIENT
Start: 2020-06-11 | End: 2021-01-01

## 2020-06-23 ENCOUNTER — ANTICOAG VISIT (OUTPATIENT)
Dept: CARDIOLOGY CLINIC | Facility: CLINIC | Age: 55
End: 2020-06-23

## 2020-06-23 ENCOUNTER — APPOINTMENT (OUTPATIENT)
Dept: LAB | Facility: HOSPITAL | Age: 55
End: 2020-06-23
Attending: INTERNAL MEDICINE
Payer: COMMERCIAL

## 2020-06-23 DIAGNOSIS — Z95.2 S/P AVR (AORTIC VALVE REPLACEMENT): ICD-10-CM

## 2020-07-14 DIAGNOSIS — I50.22 CHRONIC HFREF (HEART FAILURE WITH REDUCED EJECTION FRACTION) (HCC): ICD-10-CM

## 2020-07-14 RX ORDER — METOPROLOL SUCCINATE 50 MG/1
50 TABLET, EXTENDED RELEASE ORAL EVERY 12 HOURS SCHEDULED
Qty: 60 TABLET | Refills: 8 | Status: SHIPPED | OUTPATIENT
Start: 2020-07-14 | End: 2021-01-01

## 2020-07-27 DIAGNOSIS — I10 BENIGN ESSENTIAL HTN: ICD-10-CM

## 2020-07-27 RX ORDER — POTASSIUM CHLORIDE 1500 MG/1
TABLET, FILM COATED, EXTENDED RELEASE ORAL
Qty: 30 TABLET | Refills: 4 | Status: SHIPPED | OUTPATIENT
Start: 2020-07-27 | End: 2021-02-19 | Stop reason: SDUPTHER

## 2020-08-02 DIAGNOSIS — Z95.2 S/P AVR (AORTIC VALVE REPLACEMENT): ICD-10-CM

## 2020-08-02 RX ORDER — WARFARIN SODIUM 4 MG/1
TABLET ORAL
Qty: 30 TABLET | Refills: 2 | Status: SHIPPED | OUTPATIENT
Start: 2020-08-02 | End: 2020-11-18

## 2020-08-24 ENCOUNTER — HOSPITAL ENCOUNTER (EMERGENCY)
Facility: HOSPITAL | Age: 55
Discharge: HOME | End: 2020-08-24
Attending: EMERGENCY MEDICINE | Admitting: EMERGENCY MEDICINE
Payer: COMMERCIAL

## 2020-08-24 ENCOUNTER — APPOINTMENT (EMERGENCY)
Dept: RADIOLOGY | Facility: HOSPITAL | Age: 55
End: 2020-08-24
Payer: COMMERCIAL

## 2020-08-24 VITALS
TEMPERATURE: 98.3 F | RESPIRATION RATE: 16 BRPM | SYSTOLIC BLOOD PRESSURE: 134 MMHG | HEART RATE: 60 BPM | WEIGHT: 139.33 LBS | BODY MASS INDEX: 22.49 KG/M2 | OXYGEN SATURATION: 100 % | DIASTOLIC BLOOD PRESSURE: 66 MMHG
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
## 2020-08-24 DIAGNOSIS — M25.551 RIGHT HIP PAIN: ICD-10-CM

## 2020-08-24 DIAGNOSIS — M54.9 BACK PAIN: ICD-10-CM

## 2020-08-24 PROCEDURE — 96372 THER/PROPH/DIAG INJ SC/IM: CPT

## 2020-08-24 PROCEDURE — 99283 EMERGENCY DEPT VISIT LOW MDM: CPT

## 2020-08-24 PROCEDURE — 99284 EMERGENCY DEPT VISIT MOD MDM: CPT | Performed by: EMERGENCY MEDICINE

## 2020-08-24 PROCEDURE — 73502 X-RAY EXAM HIP UNI 2-3 VIEWS: CPT

## 2020-08-24 RX ORDER — KETOROLAC TROMETHAMINE 30 MG/ML
30 INJECTION, SOLUTION INTRAMUSCULAR; INTRAVENOUS ONCE
Status: COMPLETED | OUTPATIENT
Start: 2020-08-24 | End: 2020-08-24

## 2020-08-24 RX ORDER — NAPROXEN 500 MG/1
500 TABLET ORAL 2 TIMES DAILY WITH MEALS
Qty: 30 TABLET | Refills: 0 | Status: SHIPPED | OUTPATIENT
Start: 2020-08-24 | End: 2021-01-01 | Stop reason: HOSPADM

## 2020-08-24 RX ORDER — CYCLOBENZAPRINE HCL 10 MG
10 TABLET ORAL 2 TIMES DAILY PRN
Qty: 20 TABLET | Refills: 0 | Status: SHIPPED | OUTPATIENT
Start: 2020-08-24 | End: 2021-01-01 | Stop reason: HOSPADM

## 2020-08-24 RX ADMIN — KETOROLAC TROMETHAMINE 30 MG: 30 INJECTION, SOLUTION INTRAMUSCULAR at 11:53

## 2020-08-24 NOTE — ED PROVIDER NOTES
History  Chief Complaint   Patient presents with    Fall     pt fell down 3 steps at home denies LOC, has back pain  39-year-old gentleman presents one day after falling down three stairs  He states he partially calm self and landed on his right side  He denies any head injury, loss of consciousness, or pain complaints other than his right which now extends into his right lower back  He is taking medication for the pain reports the pain is worse with movement and palpation of the area  He denies any focal neurological deficits or other acute issues or concerns  Fall   Mechanism of injury: fall    Injury location:  Pelvis  Pelvic injury location:  R hip  Incident location:  Home  Time since incident:  1 day  Arrived directly from scene: no    Fall:     Fall occurred:  Down stairs    Impact surface:  Hard floor    Point of impact: right hip  Prior to arrival data:     Patient ambulatory at scene: yes      Blood loss:  None    Responsiveness at scene:  Alert    Orientation at scene:  Person, place, situation and time    Loss of consciousness: no      Amnesic to event: no    Associated symptoms: back pain (right lumbar area)    Associated symptoms: no abdominal pain, no chest pain, no difficulty breathing, no headaches, no hearing loss, no loss of consciousness, no nausea, no neck pain, no seizures and no vomiting        Prior to Admission Medications   Prescriptions Last Dose Informant Patient Reported? Taking? Potassium Chloride ER 20 MEQ TBCR   No No   Sig: take 1 tablet by mouth once daily   amiodarone 200 mg tablet  Self No No   Sig: Take 0 5 tablets (100 mg total) by mouth daily Patient is will take a half a tabled a day   100 mg TDD   aspirin 81 MG tablet  Self Yes No   Sig: Take 81 mg by mouth daily     atorvastatin (LIPITOR) 80 mg tablet   No No   Sig: take 1 tablet by mouth once daily   eplerenone (INSPRA) 25 mg tablet   No No   Sig: take 1 tablet by mouth once daily   fluticasone (FLONASE) 50 mcg/act nasal spray  Self No No   Si spray into each nostril daily   furosemide (LASIX) 40 mg tablet  Self No No   Sig: Take 1 tablet (40 mg total) by mouth 2 (two) times a day   Patient not taking: Reported on 3/6/2020   lisinopril (ZESTRIL) 40 mg tablet   No No   Sig: Take 1 tablet (40 mg total) by mouth daily at bedtime   loratadine (CLARITIN) 10 mg tablet  Self No No   Sig: Take 1 tablet (10 mg total) by mouth daily   metoprolol succinate (TOPROL-XL) 50 mg 24 hr tablet   No No   Sig: Take 1 tablet (50 mg total) by mouth every 12 (twelve) hours   mirtazapine (REMERON) 45 MG tablet  Self Yes No   Sig: Take 45 mg by mouth daily at bedtime   sodium chloride (OCEAN) 0 65 % nasal spray  Self No No   Si spray into each nostril 4 (four) times a day   warfarin (COUMADIN) 1 mg tablet   No No   Sig: One daily or as directed by MD to obtain warfarin dose  warfarin (COUMADIN) 4 mg tablet  Self No No   Sig: Take 1 tablet (4 mg total) by mouth daily Take 4 mg and 2 mg alternating once daily( take 1 tablet and 1/2 tablet alternating once daily)   warfarin (COUMADIN) 4 mg tablet   No No   Sig: take 1 tablet by mouth once daily as directed      Facility-Administered Medications: None       Past Medical History:   Diagnosis Date    AICD (automatic cardioverter/defibrillator) present     medtronic     CAD (coronary artery disease)     [facility]     Cardiac disease     [facility]     mixed    predominently nonischemic    Colonic mass     Coronary artery disease     Hyperlipidemia     Hypertension     Irregular heart beat     Myocardial infarction ([facility]      and     Rectal bleeding     S/P AVR (aortic valve replacement)     mechanical    Wears glasses        Past Surgical History:   Procedure Laterality Date    AORTIC VALVE REPLACEMENT      APPENDECTOMY      CARDIAC DEFIBRILLATOR PLACEMENT      COLECTOMY MADELIN Right     Last Assessed: 2016    COLECTOMY LAPAROSCOPIC      COLON SURGERY Right     colectomy    CORONARY STENT PLACEMENT      INSERT / REPLACE / REMOVE PACEMAKER      MITRAL VALVE REPAIR      MITRAL VALVE REPAIR      MOUTH SURGERY      MS COLONOSCOPY FLX DX W/COLLJ SPEC WHEN PFRMD N/A 1/15/2018    Procedure: COLONOSCOPY;  Surgeon: Jordon Robert MD;  Location: [facility]; Service: General    MS LAP,SURG,COLECTOMY, PARTIAL, W/ANAST N/A 6/2/2016    Procedure: RESECTION COLON RIGHT LAPAROSCOPIC HAND-ASSISTED;  Surgeon: Jordon Robert MD;  Location: [facility];  Service: General       Family History   Problem Relation Age of Onset    Diabetes Mother     Hypertension Mother     Valvular heart disease Mother     Valvular heart disease Father     Alcohol abuse Father      I have reviewed and agree with the history as documented  E-Cigarette/Vaping     E-Cigarette/Vaping Substances     Social History     Tobacco Use    Smoking status: Never Smoker    Smokeless tobacco: Never Used   Substance Use Topics    Alcohol use: Not Currently    Drug use: No       Review of Systems   Constitutional: Negative for activity change, chills, fatigue and fever  HENT: Negative  Negative for congestion, hearing loss, postnasal drip, rhinorrhea, sinus pain, sore throat and trouble swallowing  Eyes: Negative  Respiratory: Negative  Cardiovascular: Negative for chest pain  Gastrointestinal: Negative for abdominal pain, constipation, diarrhea, nausea and vomiting  Endocrine: Negative  Genitourinary: Negative  Musculoskeletal: Positive for back pain (right lumbar area)  Negative for arthralgias, myalgias and neck pain  Skin: Negative  Allergic/Immunologic: Negative  Neurological: Negative  Negative for seizures, loss of consciousness and headaches  Hematological: Negative  Psychiatric/Behavioral: Negative  Physical Exam  Physical Exam  Vitals signs and nursing note reviewed  Constitutional:       General: He is not in acute distress  Appearance: He is well-developed  He is not diaphoretic  HENT:      Head: Normocephalic and atraumatic  Eyes:      Pupils: Pupils are equal, round, and reactive to light  Neck:      Musculoskeletal: Neck supple  Cardiovascular:      Rate and Rhythm: Normal rate and regular rhythm  Pulses: Normal pulses  Heart sounds: Normal heart sounds  Pulmonary:      Effort: Pulmonary effort is normal  No respiratory distress  Breath sounds: Normal breath sounds  Abdominal:      General: Bowel sounds are normal  There is no distension  Palpations: Abdomen is soft  Tenderness: There is no abdominal tenderness  There is no guarding  Musculoskeletal: Normal range of motion  General: Tenderness present  No swelling or deformity  Back:       Right lower leg: No edema  Left lower leg: No edema  Legs:    Skin:     General: Skin is warm and dry  Capillary Refill: Capillary refill takes less than 2 seconds  Neurological:      Mental Status: He is alert and oriented to person, place, and time     Psychiatric:         Behavior: Behavior normal          Vital Signs  ED Triage Vitals   Temperature Pulse Respirations Blood Pressure SpO2   08/24/20 1105 08/24/20 1105 08/24/20 1105 08/24/20 1105 08/24/20 1105   98 3 °F (36 8 °C) 60 16 126/71 100 %      Temp Source Heart Rate Source Patient Position - Orthostatic VS BP Location FiO2 (%)   08/24/20 1105 08/24/20 1105 08/24/20 1105 08/24/20 1105 --   Tympanic Monitor Sitting Left arm       Pain Score       08/24/20 1153       7           Vitals:    08/24/20 1105   BP: 126/71   Pulse: 60   Patient Position - Orthostatic VS: Sitting         Visual Acuity      ED Medications  Medications   ketorolac (TORADOL) injection 30 mg (30 mg Intramuscular Given 8/24/20 1153)       Diagnostic Studies  Results Reviewed     None                 XR hip/pelv 2-3 vws right if performed    (Results Pending)              Procedures  Procedures ED Course       US AUDIT      Most Recent Value   Initial Alcohol Screen: US AUDIT-C    1  How often do you have a drink containing alcohol?  0 Filed at: 08/24/2020 1109   2  How many drinks containing alcohol do you have on a typical day you are drinking? 0 Filed at: 08/24/2020 1109   3a  Male UNDER 65: How often do you have five or more drinks on one occasion? 0 Filed at: 08/24/2020 1109   Audit-C Score  0 Filed at: 08/24/2020 1109                  JOHANA/DAST-10      Most Recent Value   How many times in the past year have you    Used an illegal drug or used a prescription medication for non-medical reasons? Never Filed at: 08/24/2020 1109                                MDM  Number of Diagnoses or Management Options  Back pain:   Fall, initial encounter:   Right hip pain:   Diagnosis management comments: 54-year-old gentleman presents with complaint of right hip pain after suffering fall yesterday  Acute findings were discovered on x-ray the patient is having some lumbar spasm at this time as well  Patient is ambulatory in the room with no assistance being required  There are no focal neurological deficits  Discussed supportive care measures, need for follow-up, reasons return to the ER  Amount and/or Complexity of Data Reviewed  Tests in the radiology section of CPT®: ordered and reviewed          Disposition  Final diagnoses:   Fall, initial encounter   Right hip pain   Back pain     Time reflects when diagnosis was documented in both MDM as applicable and the Disposition within this note     Time User Action Codes Description Comment    8/24/2020 12:53 PM Velton Johnson Add [U96  TKZM] Fall, initial encounter     8/24/2020 12:53 PM Velton Johnson Add [J00 621] Right hip pain     8/24/2020 12:54 PM Velton Johnson Add [M54 9] Back pain       ED Disposition     ED Disposition Condition Date/Time Comment    Discharge Stable Mon Aug 24, 2020 12:53 PM [id number] discharge to home/self care             Follow-up Information     Follow up With Specialties Details Why   Kaylee Barros PA-C Family Medicine, Physician Assistant Call   [address]  [address] [facility]  [place]  [address]             Patient's Medications   Discharge Prescriptions    CYCLOBENZAPRINE (FLEXERIL) 10 MG TABLET    Take 1 tablet (10 mg total) by mouth 2 (two) times a day as needed for muscle spasms       Start Date: 8/24/2020 End Date: --       Order Dose: 10 mg       Quantity: 20 tablet    Refills: 0    NAPROXEN (NAPROSYN) 500 MG TABLET    Take 1 tablet (500 mg total) by mouth 2 (two) times a day with meals       Start Date: 8/24/2020 End Date: --       Order Dose: 500 mg       Quantity: 30 tablet    Refills: 0     No discharge procedures on file      PDMP Review     None          ED Provider  Electronically Signed by           Monika Taylor DO  08/24/20 [id number]

## 2020-09-02 ENCOUNTER — TELEPHONE (OUTPATIENT)
Dept: CARDIOLOGY CLINIC | Facility: CLINIC | Age: 55
End: 2020-09-02

## 2020-09-02 NOTE — TELEPHONE ENCOUNTER
Tc to patient, reviewed chart, noted he is over due for pt/inr testing  He understood and will have inr done tomorrow at [place] lab

## 2020-09-03 ENCOUNTER — REMOTE DEVICE CLINIC VISIT (OUTPATIENT)
Dept: CARDIOLOGY CLINIC | Facility: CLINIC | Age: 55
End: 2020-09-03
Payer: COMMERCIAL

## 2020-09-03 DIAGNOSIS — Z95.810 AICD (AUTOMATIC CARDIOVERTER/DEFIBRILLATOR) PRESENT: Primary | ICD-10-CM

## 2020-09-03 PROCEDURE — 93295 DEV INTERROG REMOTE 1/2/MLT: CPT | Performed by: INTERNAL MEDICINE

## 2020-09-03 PROCEDURE — 93296 REM INTERROG EVL PM/IDS: CPT | Performed by: INTERNAL MEDICINE

## 2020-09-03 NOTE — PROGRESS NOTES
Results for orders placed or performed in visit on 09/03/20   Cardiac EP device report    Narrative    MDT BI-V ICD/ NOT MRI CONDITIONAL  CARELINK TRANSMISSION: BATTERY VOLTAGE ADEQUATE (2 YRS)  AP-94%, BVP-95% (TOTAL -92 7%+VSR PACE-2 4%)  CHRONICALLY SMALL PWAVES-0 5MV  ALL OTHER AVAILABLE LEAD PARAMETERS WITHIN NORMAL LIMITS  NO SIGNIFICANT HIGH RATE EPISODES  2,429 VENT SENSING EPISODES- PROB ATRIAL UNDERSENSING & FUSION BEATS  OPTI-VOL WITHIN NORMAL LIMITS  NORMAL DEVICE FUNCTION   GV

## 2020-09-16 DIAGNOSIS — I25.10 CORONARY ARTERY DISEASE INVOLVING NATIVE HEART, ANGINA PRESENCE UNSPECIFIED, UNSPECIFIED VESSEL OR LESION TYPE: ICD-10-CM

## 2020-09-16 RX ORDER — AMIODARONE HYDROCHLORIDE 200 MG/1
100 TABLET ORAL DAILY
Qty: 45 TABLET | Refills: 3 | Status: SHIPPED | OUTPATIENT
Start: 2020-09-16 | End: 2021-01-01

## 2020-09-21 ENCOUNTER — TELEPHONE (OUTPATIENT)
Dept: CARDIOLOGY CLINIC | Facility: CLINIC | Age: 55
End: 2020-09-21

## 2020-09-21 NOTE — TELEPHONE ENCOUNTER
Tc to patient, left message on answering machine, reminded he is over due for pt/inr testing, requested he test inr asap

## 2020-10-09 ENCOUNTER — TELEPHONE (OUTPATIENT)
Dept: CARDIOLOGY CLINIC | Facility: CLINIC | Age: 55
End: 2020-10-09

## 2020-10-12 ENCOUNTER — TELEPHONE (OUTPATIENT)
Dept: CARDIOLOGY CLINIC | Facility: CLINIC | Age: 55
End: 2020-10-12

## 2020-10-28 ENCOUNTER — TELEPHONE (OUTPATIENT)
Dept: CARDIOLOGY CLINIC | Facility: CLINIC | Age: 55
End: 2020-10-28

## 2020-11-17 ENCOUNTER — TELEPHONE (OUTPATIENT)
Dept: CARDIOLOGY CLINIC | Facility: CLINIC | Age: 55
End: 2020-11-17

## 2020-11-17 DIAGNOSIS — Z95.2 S/P AVR (AORTIC VALVE REPLACEMENT): ICD-10-CM

## 2020-11-18 ENCOUNTER — TELEPHONE (OUTPATIENT)
Dept: CARDIOLOGY CLINIC | Facility: CLINIC | Age: 55
End: 2020-11-18

## 2020-11-18 ENCOUNTER — LAB (OUTPATIENT)
Dept: LAB | Facility: HOSPITAL | Age: 55
End: 2020-11-18
Attending: INTERNAL MEDICINE
Payer: COMMERCIAL

## 2020-11-18 ENCOUNTER — TRANSCRIBE ORDERS (OUTPATIENT)
Dept: LAB | Facility: HOSPITAL | Age: 55
End: 2020-11-18

## 2020-11-18 ENCOUNTER — ANTICOAG VISIT (OUTPATIENT)
Dept: CARDIOLOGY CLINIC | Facility: CLINIC | Age: 55
End: 2020-11-18

## 2020-11-18 DIAGNOSIS — Z95.2 S/P AVR (AORTIC VALVE REPLACEMENT): ICD-10-CM

## 2020-11-18 DIAGNOSIS — Z95.2 S/P AVR (AORTIC VALVE REPLACEMENT): Primary | ICD-10-CM

## 2020-11-18 LAB
INR PPP: 2.7 (ref 0.84–1.19)
PROTHROMBIN TIME: 28.8 SECONDS (ref 11.6–14.5)

## 2020-11-18 PROCEDURE — 36415 COLL VENOUS BLD VENIPUNCTURE: CPT

## 2020-11-18 PROCEDURE — 85610 PROTHROMBIN TIME: CPT

## 2020-11-18 RX ORDER — WARFARIN SODIUM 4 MG/1
TABLET ORAL
Qty: 30 TABLET | Refills: 2 | Status: SHIPPED | OUTPATIENT
Start: 2020-11-18 | End: 2021-01-01

## 2020-11-24 ENCOUNTER — IN-CLINIC DEVICE VISIT (OUTPATIENT)
Dept: CARDIOLOGY CLINIC | Facility: CLINIC | Age: 55
End: 2020-11-24
Payer: COMMERCIAL

## 2020-11-24 DIAGNOSIS — Z95.810 BIVENTRICULAR ICD (IMPLANTABLE CARDIOVERTER-DEFIBRILLATOR) IN PLACE: Primary | ICD-10-CM

## 2020-11-24 PROCEDURE — 93284 PRGRMG EVAL IMPLANTABLE DFB: CPT | Performed by: INTERNAL MEDICINE

## 2020-11-27 ENCOUNTER — TELEPHONE (OUTPATIENT)
Dept: CARDIOLOGY CLINIC | Facility: CLINIC | Age: 55
End: 2020-11-27

## 2021-01-01 ENCOUNTER — TELEPHONE (OUTPATIENT)
Dept: CARDIOLOGY CLINIC | Facility: CLINIC | Age: 56
End: 2021-01-01

## 2021-01-01 ENCOUNTER — HOSPITAL ENCOUNTER (INPATIENT)
Facility: HOSPITAL | Age: 56
LOS: 1 days | Discharge: HOME | DRG: 292 | End: 2021-09-03
Attending: EMERGENCY MEDICINE | Admitting: STUDENT IN AN ORGANIZED HEALTH CARE EDUCATION/TRAINING PROGRAM
Payer: COMMERCIAL

## 2021-01-01 ENCOUNTER — APPOINTMENT (OUTPATIENT)
Dept: LAB | Facility: HOSPITAL | Age: 56
End: 2021-01-01
Attending: INTERNAL MEDICINE
Payer: COMMERCIAL

## 2021-01-01 ENCOUNTER — VBI (OUTPATIENT)
Dept: ADMINISTRATIVE | Facility: OTHER | Age: 56
End: 2021-01-01

## 2021-01-01 ENCOUNTER — OFFICE VISIT (OUTPATIENT)
Dept: CARDIOLOGY CLINIC | Facility: CLINIC | Age: 56
End: 2021-01-01
Payer: COMMERCIAL

## 2021-01-01 ENCOUNTER — OFFICE VISIT (OUTPATIENT)
Dept: FAMILY MEDICINE CLINIC | Facility: CLINIC | Age: 56
End: 2021-01-01

## 2021-01-01 ENCOUNTER — ANTICOAG VISIT (OUTPATIENT)
Dept: CARDIOLOGY CLINIC | Facility: CLINIC | Age: 56
End: 2021-01-01

## 2021-01-01 ENCOUNTER — TELEPHONE (OUTPATIENT)
Dept: FAMILY MEDICINE CLINIC | Facility: CLINIC | Age: 56
End: 2021-01-01

## 2021-01-01 ENCOUNTER — REMOTE DEVICE CLINIC VISIT (OUTPATIENT)
Dept: CARDIOLOGY CLINIC | Facility: CLINIC | Age: 56
End: 2021-01-01
Payer: COMMERCIAL

## 2021-01-01 ENCOUNTER — APPOINTMENT (EMERGENCY)
Dept: RADIOLOGY | Facility: HOSPITAL | Age: 56
DRG: 292 | End: 2021-01-01
Payer: COMMERCIAL

## 2021-01-01 VITALS
OXYGEN SATURATION: 99 % | TEMPERATURE: 97.6 F | WEIGHT: 135 LBS | HEART RATE: 74 BPM | RESPIRATION RATE: 18 BRPM | SYSTOLIC BLOOD PRESSURE: 122 MMHG | DIASTOLIC BLOOD PRESSURE: 66 MMHG | HEIGHT: 66 IN | BODY MASS INDEX: 21.69 KG/M2

## 2021-01-01 VITALS
OXYGEN SATURATION: 100 % | HEART RATE: 69 BPM | WEIGHT: 138.01 LBS | DIASTOLIC BLOOD PRESSURE: 68 MMHG | TEMPERATURE: 98.7 F | RESPIRATION RATE: 16 BRPM | SYSTOLIC BLOOD PRESSURE: 125 MMHG | BODY MASS INDEX: 22.28 KG/M2

## 2021-01-01 VITALS
SYSTOLIC BLOOD PRESSURE: 90 MMHG | WEIGHT: 138.6 LBS | HEART RATE: 60 BPM | HEIGHT: 66 IN | DIASTOLIC BLOOD PRESSURE: 56 MMHG | OXYGEN SATURATION: 98 % | BODY MASS INDEX: 22.28 KG/M2

## 2021-01-01 VITALS
BODY MASS INDEX: 22.1 KG/M2 | WEIGHT: 137.5 LBS | HEIGHT: 66 IN | DIASTOLIC BLOOD PRESSURE: 54 MMHG | HEART RATE: 70 BPM | SYSTOLIC BLOOD PRESSURE: 96 MMHG

## 2021-01-01 VITALS
RESPIRATION RATE: 16 BRPM | HEART RATE: 61 BPM | SYSTOLIC BLOOD PRESSURE: 116 MMHG | HEIGHT: 66 IN | DIASTOLIC BLOOD PRESSURE: 62 MMHG | BODY MASS INDEX: 21.86 KG/M2 | WEIGHT: 136 LBS

## 2021-01-01 DIAGNOSIS — I49.01 VENTRICULAR FIBRILLATION (HCC): Primary | ICD-10-CM

## 2021-01-01 DIAGNOSIS — I50.43 ACUTE ON CHRONIC COMBINED SYSTOLIC AND DIASTOLIC CHF (CONGESTIVE HEART FAILURE) (HCC): ICD-10-CM

## 2021-01-01 DIAGNOSIS — Z95.2 S/P AVR (AORTIC VALVE REPLACEMENT): ICD-10-CM

## 2021-01-01 DIAGNOSIS — I25.119 CORONARY ARTERY DISEASE INVOLVING NATIVE CORONARY ARTERY OF NATIVE HEART WITH ANGINA PECTORIS (HCC): ICD-10-CM

## 2021-01-01 DIAGNOSIS — I47.2 VENTRICULAR TACHYCARDIA (HCC): ICD-10-CM

## 2021-01-01 DIAGNOSIS — I25.10 CORONARY ARTERY DISEASE INVOLVING NATIVE CORONARY ARTERY OF NATIVE HEART WITHOUT ANGINA PECTORIS: ICD-10-CM

## 2021-01-01 DIAGNOSIS — Z95.810 AICD (AUTOMATIC CARDIOVERTER/DEFIBRILLATOR) PRESENT: Primary | ICD-10-CM

## 2021-01-01 DIAGNOSIS — I50.22 CHRONIC SYSTOLIC CHF (CONGESTIVE HEART FAILURE), NYHA CLASS 2 (HCC): ICD-10-CM

## 2021-01-01 DIAGNOSIS — Z95.810 BIVENTRICULAR ICD (IMPLANTABLE CARDIOVERTER-DEFIBRILLATOR) IN PLACE: ICD-10-CM

## 2021-01-01 DIAGNOSIS — R77.8 ELEVATED TROPONIN: ICD-10-CM

## 2021-01-01 DIAGNOSIS — R65.20 SEVERE SEPSIS (HCC): ICD-10-CM

## 2021-01-01 DIAGNOSIS — R06.02 SOB (SHORTNESS OF BREATH): Primary | ICD-10-CM

## 2021-01-01 DIAGNOSIS — I25.5 ISCHEMIC CARDIOMYOPATHY: Chronic | ICD-10-CM

## 2021-01-01 DIAGNOSIS — I10 BENIGN ESSENTIAL HTN: ICD-10-CM

## 2021-01-01 DIAGNOSIS — I25.5 ISCHEMIC CARDIOMYOPATHY: Primary | ICD-10-CM

## 2021-01-01 DIAGNOSIS — R91.8 RIGHT LOWER LOBE PULMONARY INFILTRATE: ICD-10-CM

## 2021-01-01 DIAGNOSIS — I50.22 CHRONIC HFREF (HEART FAILURE WITH REDUCED EJECTION FRACTION) (HCC): ICD-10-CM

## 2021-01-01 DIAGNOSIS — I49.01 VENTRICULAR FIBRILLATION (HCC): ICD-10-CM

## 2021-01-01 DIAGNOSIS — A41.9 SEVERE SEPSIS (HCC): ICD-10-CM

## 2021-01-01 DIAGNOSIS — I42.0 DILATED CARDIOMYOPATHY (HCC): Chronic | ICD-10-CM

## 2021-01-01 DIAGNOSIS — R73.01 IMPAIRED FASTING GLUCOSE: ICD-10-CM

## 2021-01-01 DIAGNOSIS — Z95.2 S/P AVR (AORTIC VALVE REPLACEMENT): Primary | ICD-10-CM

## 2021-01-01 DIAGNOSIS — R73.01 IMPAIRED FASTING GLUCOSE: Primary | ICD-10-CM

## 2021-01-01 DIAGNOSIS — I50.43 ACUTE ON CHRONIC COMBINED SYSTOLIC AND DIASTOLIC HEART FAILURE (HCC): ICD-10-CM

## 2021-01-01 DIAGNOSIS — Z95.810 BIVENTRICULAR ICD (IMPLANTABLE CARDIOVERTER-DEFIBRILLATOR) IN PLACE: Primary | ICD-10-CM

## 2021-01-01 LAB
ALBUMIN SERPL BCP-MCNC: 3.6 G/DL (ref 3.5–5)
ALBUMIN SERPL BCP-MCNC: 4.1 G/DL (ref 3–5.2)
ALP SERPL-CCNC: 105 U/L (ref 46–116)
ALP SERPL-CCNC: 87 U/L (ref 43–122)
ALT SERPL W P-5'-P-CCNC: 19 U/L (ref 12–78)
ALT SERPL W P-5'-P-CCNC: 28 U/L
AMPHETAMINES SERPL QL SCN: NEGATIVE
ANION GAP SERPL CALCULATED.3IONS-SCNC: 13 MMOL/L (ref 4–13)
ANION GAP SERPL CALCULATED.3IONS-SCNC: 8 MMOL/L (ref 4–13)
ANION GAP SERPL CALCULATED.3IONS-SCNC: 8 MMOL/L (ref 5–14)
APTT PPP: 35 SECONDS (ref 23–37)
APTT PPP: 39 SECONDS (ref 23–37)
APTT PPP: 42 SECONDS (ref 23–37)
APTT PPP: 52 SECONDS (ref 23–37)
APTT PPP: 57 SECONDS (ref 23–37)
APTT PPP: 79 SECONDS (ref 23–37)
AST SERPL W P-5'-P-CCNC: 24 U/L (ref 5–45)
AST SERPL W P-5'-P-CCNC: 46 U/L (ref 17–59)
ATRIAL RATE: 104 BPM
ATRIAL RATE: 112 BPM
ATRIAL RATE: 87 BPM
ATRIAL RATE: 90 BPM
ATRIAL RATE: 92 BPM
ATRIAL RATE: 96 BPM
BACTERIA BLD CULT: NORMAL
BACTERIA BLD CULT: NORMAL
BARBITURATES UR QL: NEGATIVE
BASOPHILS # BLD AUTO: 0.05 THOUSANDS/ΜL (ref 0–0.1)
BASOPHILS # BLD AUTO: 0.05 THOUSANDS/ΜL (ref 0–0.1)
BASOPHILS # BLD AUTO: 0.07 THOUSAND/UL (ref 0–0.1)
BASOPHILS NFR BLD AUTO: 1 % (ref 0–1)
BASOPHILS NFR BLD AUTO: 1 % (ref 0–1)
BASOPHILS NFR MAR MANUAL: 1 % (ref 0–1)
BENZODIAZ UR QL: NEGATIVE
BILIRUB DIRECT SERPL-MCNC: 0.35 MG/DL (ref 0–0.2)
BILIRUB DIRECT SERPL-MCNC: 0.36 MG/DL
BILIRUB SERPL-MCNC: 0.67 MG/DL
BILIRUB SERPL-MCNC: 0.95 MG/DL (ref 0.2–1)
BUN SERPL-MCNC: 10 MG/DL (ref 5–25)
BUN SERPL-MCNC: 11 MG/DL (ref 5–25)
BUN SERPL-MCNC: 17 MG/DL (ref 5–25)
CALCIUM SERPL-MCNC: 8.4 MG/DL (ref 8.3–10.1)
CALCIUM SERPL-MCNC: 8.9 MG/DL (ref 8.3–10.1)
CALCIUM SERPL-MCNC: 9.6 MG/DL (ref 8.4–10.2)
CHLORIDE SERPL-SCNC: 102 MMOL/L (ref 100–108)
CHLORIDE SERPL-SCNC: 98 MMOL/L (ref 100–108)
CHLORIDE SERPL-SCNC: 99 MMOL/L (ref 97–108)
CO2 SERPL-SCNC: 26 MMOL/L (ref 21–32)
CO2 SERPL-SCNC: 30 MMOL/L (ref 21–32)
CO2 SERPL-SCNC: 30 MMOL/L (ref 22–30)
COCAINE UR QL: POSITIVE
CREAT SERPL-MCNC: 0.83 MG/DL (ref 0.6–1.3)
CREAT SERPL-MCNC: 0.98 MG/DL (ref 0.7–1.5)
CREAT SERPL-MCNC: 1.14 MG/DL (ref 0.6–1.3)
EOSINOPHIL # BLD AUTO: 0.03 THOUSAND/ΜL (ref 0–0.61)
EOSINOPHIL # BLD AUTO: 0.07 THOUSAND/UL (ref 0–0.4)
EOSINOPHIL # BLD AUTO: 0.08 THOUSAND/ΜL (ref 0–0.61)
EOSINOPHIL NFR BLD AUTO: 0 % (ref 0–6)
EOSINOPHIL NFR BLD AUTO: 1 % (ref 0–6)
EOSINOPHIL NFR BLD MANUAL: 1 % (ref 0–6)
ERYTHROCYTE [DISTWIDTH] IN BLOOD BY AUTOMATED COUNT: 13.9 % (ref 11.6–15.1)
ERYTHROCYTE [DISTWIDTH] IN BLOOD BY AUTOMATED COUNT: 14 % (ref 11.6–15.1)
ERYTHROCYTE [DISTWIDTH] IN BLOOD BY AUTOMATED COUNT: 14.7 %
EST. AVERAGE GLUCOSE BLD GHB EST-MCNC: 120 MG/DL
GFR SERPL CREATININE-BSD FRML MDRD: 71 ML/MIN/1.73SQ M
GFR SERPL CREATININE-BSD FRML MDRD: 85 ML/MIN/1.73SQ M
GFR SERPL CREATININE-BSD FRML MDRD: 98 ML/MIN/1.73SQ M
GLUCOSE P FAST SERPL-MCNC: 163 MG/DL (ref 70–99)
GLUCOSE SERPL-MCNC: 214 MG/DL (ref 65–140)
GLUCOSE SERPL-MCNC: 96 MG/DL (ref 65–140)
HBA1C MFR BLD: 5.8 %
HCT VFR BLD AUTO: 40.7 % (ref 41–53)
HCT VFR BLD AUTO: 42 % (ref 36.5–49.3)
HCT VFR BLD AUTO: 43.7 % (ref 36.5–49.3)
HGB BLD-MCNC: 13 G/DL (ref 13.5–17.5)
HGB BLD-MCNC: 13.5 G/DL (ref 12–17)
HGB BLD-MCNC: 14 G/DL (ref 12–17)
IMM GRANULOCYTES # BLD AUTO: 0.04 THOUSAND/UL (ref 0–0.2)
IMM GRANULOCYTES # BLD AUTO: 0.06 THOUSAND/UL (ref 0–0.2)
IMM GRANULOCYTES NFR BLD AUTO: 0 % (ref 0–2)
IMM GRANULOCYTES NFR BLD AUTO: 1 % (ref 0–2)
INR PPP: 1.18 (ref 0.84–1.19)
INR PPP: 1.32 (ref 0.84–1.19)
L PNEUMO1 AG UR QL IA.RAPID: NEGATIVE
LACTATE SERPL-SCNC: 1.2 MMOL/L (ref 0.5–2)
LACTATE SERPL-SCNC: 1.2 MMOL/L (ref 0.5–2)
LACTATE SERPL-SCNC: 2.4 MMOL/L (ref 0.5–2)
LYMPHOCYTES # BLD AUTO: 1.17 THOUSANDS/ΜL (ref 0.6–4.47)
LYMPHOCYTES # BLD AUTO: 1.37 THOUSAND/UL (ref 0.5–4)
LYMPHOCYTES # BLD AUTO: 1.65 THOUSANDS/ΜL (ref 0.6–4.47)
LYMPHOCYTES # BLD AUTO: 21 % (ref 25–45)
LYMPHOCYTES NFR BLD AUTO: 12 % (ref 14–44)
LYMPHOCYTES NFR BLD AUTO: 16 % (ref 14–44)
MCH RBC QN AUTO: 27.8 PG (ref 26–34)
MCH RBC QN AUTO: 28.2 PG (ref 26.8–34.3)
MCH RBC QN AUTO: 28.5 PG (ref 26.8–34.3)
MCHC RBC AUTO-ENTMCNC: 32 G/DL (ref 31.4–37.4)
MCHC RBC AUTO-ENTMCNC: 32.1 G/DL (ref 31.4–37.4)
MCHC RBC AUTO-ENTMCNC: 32.1 G/DL (ref 31–36)
MCV RBC AUTO: 87 FL (ref 80–100)
MCV RBC AUTO: 88 FL (ref 82–98)
MCV RBC AUTO: 89 FL (ref 82–98)
METHADONE UR QL: NEGATIVE
MONOCYTES # BLD AUTO: 0.98 THOUSAND/UL (ref 0.2–0.9)
MONOCYTES # BLD AUTO: 1.04 THOUSAND/ΜL (ref 0.17–1.22)
MONOCYTES # BLD AUTO: 1.23 THOUSAND/ΜL (ref 0.17–1.22)
MONOCYTES NFR BLD AUTO: 10 % (ref 4–12)
MONOCYTES NFR BLD AUTO: 13 % (ref 4–12)
MONOCYTES NFR BLD AUTO: 15 % (ref 1–10)
NEUTROPHILS # BLD AUTO: 7.2 THOUSANDS/ΜL (ref 1.85–7.62)
NEUTROPHILS # BLD AUTO: 7.73 THOUSANDS/ΜL (ref 1.85–7.62)
NEUTS BAND NFR BLD MANUAL: 1 % (ref 0–8)
NEUTS SEG # BLD: 4.03 THOUSAND/UL (ref 1.8–7.8)
NEUTS SEG NFR BLD AUTO: 61 %
NEUTS SEG NFR BLD AUTO: 71 % (ref 43–75)
NEUTS SEG NFR BLD AUTO: 74 % (ref 43–75)
NRBC BLD AUTO-RTO: 0 /100 WBCS
NRBC BLD AUTO-RTO: 0 /100 WBCS
NT-PROBNP SERPL-MCNC: ABNORMAL PG/ML
OPIATES UR QL SCN: NEGATIVE
OXYCODONE+OXYMORPHONE UR QL SCN: NEGATIVE
P AXIS: 102 DEGREES
P AXIS: 169 DEGREES
P AXIS: 57 DEGREES
P AXIS: 89 DEGREES
P AXIS: 96 DEGREES
P AXIS: 99 DEGREES
PCP UR QL: NEGATIVE
PLATELET # BLD AUTO: 155 THOUSANDS/UL (ref 149–390)
PLATELET # BLD AUTO: 169 THOUSANDS/UL (ref 149–390)
PLATELET # BLD AUTO: 172 THOUSANDS/UL (ref 150–450)
PLATELET BLD QL SMEAR: ABNORMAL
PMV BLD AUTO: 11.1 FL (ref 8.9–12.7)
PMV BLD AUTO: 11.4 FL (ref 8.9–12.7)
PMV BLD AUTO: 9 FL (ref 8.9–12.7)
POIKILOCYTOSIS BLD QL SMEAR: PRESENT
POTASSIUM SERPL-SCNC: 3.3 MMOL/L (ref 3.5–5.3)
POTASSIUM SERPL-SCNC: 3.9 MMOL/L (ref 3.5–5.3)
POTASSIUM SERPL-SCNC: 4.9 MMOL/L (ref 3.6–5)
PR INTERVAL: 166 MS
PR INTERVAL: 192 MS
PR INTERVAL: 192 MS
PR INTERVAL: 206 MS
PROCALCITONIN SERPL-MCNC: <0.05 NG/ML
PROCALCITONIN SERPL-MCNC: <0.05 NG/ML
PROT SERPL-MCNC: 7.6 G/DL (ref 5.9–8.4)
PROT SERPL-MCNC: 7.7 G/DL (ref 6.4–8.2)
PROTHROMBIN TIME: 14.8 SECONDS (ref 11.6–14.5)
PROTHROMBIN TIME: 16.1 SECONDS (ref 11.6–14.5)
QRS AXIS: -81 DEGREES
QRS AXIS: -81 DEGREES
QRS AXIS: -82 DEGREES
QRS AXIS: -83 DEGREES
QRS AXIS: -85 DEGREES
QRS AXIS: -89 DEGREES
QRSD INTERVAL: 146 MS
QRSD INTERVAL: 146 MS
QRSD INTERVAL: 158 MS
QRSD INTERVAL: 170 MS
QRSD INTERVAL: 172 MS
QRSD INTERVAL: 172 MS
QT INTERVAL: 334 MS
QT INTERVAL: 360 MS
QT INTERVAL: 432 MS
QT INTERVAL: 446 MS
QT INTERVAL: 452 MS
QT INTERVAL: 464 MS
QTC INTERVAL: 455 MS
QTC INTERVAL: 473 MS
QTC INTERVAL: 548 MS
QTC INTERVAL: 558 MS
QTC INTERVAL: 560 MS
QTC INTERVAL: 571 MS
RBC # BLD AUTO: 4.68 MILLION/UL (ref 4.5–5.9)
RBC # BLD AUTO: 4.79 MILLION/UL (ref 3.88–5.62)
RBC # BLD AUTO: 4.92 MILLION/UL (ref 3.88–5.62)
RBC MORPH BLD: ABNORMAL
S PNEUM AG UR QL: NEGATIVE
SARS-COV-2 RNA RESP QL NAA+PROBE: NEGATIVE
SCHISTOCYTES BLD QL SMEAR: PRESENT
SODIUM SERPL-SCNC: 137 MMOL/L (ref 136–145)
SODIUM SERPL-SCNC: 137 MMOL/L (ref 137–147)
SODIUM SERPL-SCNC: 140 MMOL/L (ref 136–145)
T WAVE AXIS: 77 DEGREES
T WAVE AXIS: 89 DEGREES
T WAVE AXIS: 89 DEGREES
T WAVE AXIS: 90 DEGREES
T WAVE AXIS: 90 DEGREES
T WAVE AXIS: 91 DEGREES
THC UR QL: POSITIVE
TOTAL CELLS COUNTED SPEC: 100
TROPONIN I SERPL-MCNC: 0.05 NG/ML
TROPONIN I SERPL-MCNC: 0.05 NG/ML
TROPONIN I SERPL-MCNC: 0.08 NG/ML
TSH SERPL DL<=0.05 MIU/L-ACNC: 1.99 UIU/ML (ref 0.47–4.68)
VENTRICULAR RATE: 104 BPM
VENTRICULAR RATE: 112 BPM
VENTRICULAR RATE: 87 BPM
VENTRICULAR RATE: 95 BPM
VENTRICULAR RATE: 96 BPM
VENTRICULAR RATE: 97 BPM
WBC # BLD AUTO: 10.61 THOUSAND/UL (ref 4.31–10.16)
WBC # BLD AUTO: 6.5 THOUSAND/UL (ref 4.5–11)
WBC # BLD AUTO: 9.72 THOUSAND/UL (ref 4.31–10.16)

## 2021-01-01 PROCEDURE — 93005 ELECTROCARDIOGRAM TRACING: CPT

## 2021-01-01 PROCEDURE — 85730 THROMBOPLASTIN TIME PARTIAL: CPT | Performed by: INTERNAL MEDICINE

## 2021-01-01 PROCEDURE — 3008F BODY MASS INDEX DOCD: CPT | Performed by: INTERNAL MEDICINE

## 2021-01-01 PROCEDURE — NC001 PR NO CHARGE: Performed by: INTERNAL MEDICINE

## 2021-01-01 PROCEDURE — 99239 HOSP IP/OBS DSCHRG MGMT >30: CPT | Performed by: INTERNAL MEDICINE

## 2021-01-01 PROCEDURE — 99214 OFFICE O/P EST MOD 30 MIN: CPT | Performed by: FAMILY MEDICINE

## 2021-01-01 PROCEDURE — 85025 COMPLETE CBC W/AUTO DIFF WBC: CPT | Performed by: PHYSICIAN ASSISTANT

## 2021-01-01 PROCEDURE — 1036F TOBACCO NON-USER: CPT | Performed by: INTERNAL MEDICINE

## 2021-01-01 PROCEDURE — NC001 PR NO CHARGE: Performed by: NURSE PRACTITIONER

## 2021-01-01 PROCEDURE — 84145 PROCALCITONIN (PCT): CPT | Performed by: PHYSICIAN ASSISTANT

## 2021-01-01 PROCEDURE — 93295 DEV INTERROG REMOTE 1/2/MLT: CPT | Performed by: INTERNAL MEDICINE

## 2021-01-01 PROCEDURE — 99214 OFFICE O/P EST MOD 30 MIN: CPT | Performed by: INTERNAL MEDICINE

## 2021-01-01 PROCEDURE — 85027 COMPLETE CBC AUTOMATED: CPT | Performed by: INTERNAL MEDICINE

## 2021-01-01 PROCEDURE — 83880 ASSAY OF NATRIURETIC PEPTIDE: CPT | Performed by: PHYSICIAN ASSISTANT

## 2021-01-01 PROCEDURE — 3078F DIAST BP <80 MM HG: CPT | Performed by: FAMILY MEDICINE

## 2021-01-01 PROCEDURE — 80048 BASIC METABOLIC PNL TOTAL CA: CPT | Performed by: PHYSICIAN ASSISTANT

## 2021-01-01 PROCEDURE — 85610 PROTHROMBIN TIME: CPT | Performed by: PHYSICIAN ASSISTANT

## 2021-01-01 PROCEDURE — 83605 ASSAY OF LACTIC ACID: CPT | Performed by: NURSE PRACTITIONER

## 2021-01-01 PROCEDURE — U0003 INFECTIOUS AGENT DETECTION BY NUCLEIC ACID (DNA OR RNA); SEVERE ACUTE RESPIRATORY SYNDROME CORONAVIRUS 2 (SARS-COV-2) (CORONAVIRUS DISEASE [COVID-19]), AMPLIFIED PROBE TECHNIQUE, MAKING USE OF HIGH THROUGHPUT TECHNOLOGIES AS DESCRIBED BY CMS-2020-01-R: HCPCS | Performed by: PHYSICIAN ASSISTANT

## 2021-01-01 PROCEDURE — 99232 SBSQ HOSP IP/OBS MODERATE 35: CPT | Performed by: INTERNAL MEDICINE

## 2021-01-01 PROCEDURE — 93296 REM INTERROG EVL PM/IDS: CPT | Performed by: INTERNAL MEDICINE

## 2021-01-01 PROCEDURE — 85730 THROMBOPLASTIN TIME PARTIAL: CPT | Performed by: PHYSICIAN ASSISTANT

## 2021-01-01 PROCEDURE — 36415 COLL VENOUS BLD VENIPUNCTURE: CPT | Performed by: INTERNAL MEDICINE

## 2021-01-01 PROCEDURE — 99285 EMERGENCY DEPT VISIT HI MDM: CPT

## 2021-01-01 PROCEDURE — 71045 X-RAY EXAM CHEST 1 VIEW: CPT

## 2021-01-01 PROCEDURE — U0005 INFEC AGEN DETEC AMPLI PROBE: HCPCS | Performed by: PHYSICIAN ASSISTANT

## 2021-01-01 PROCEDURE — 3725F SCREEN DEPRESSION PERFORMED: CPT | Performed by: FAMILY MEDICINE

## 2021-01-01 PROCEDURE — 85730 THROMBOPLASTIN TIME PARTIAL: CPT | Performed by: STUDENT IN AN ORGANIZED HEALTH CARE EDUCATION/TRAINING PROGRAM

## 2021-01-01 PROCEDURE — 1036F TOBACCO NON-USER: CPT | Performed by: FAMILY MEDICINE

## 2021-01-01 PROCEDURE — 93289 INTERROG DEVICE EVAL HEART: CPT | Performed by: INTERNAL MEDICINE

## 2021-01-01 PROCEDURE — 80048 BASIC METABOLIC PNL TOTAL CA: CPT | Performed by: NURSE PRACTITIONER

## 2021-01-01 PROCEDURE — 82248 BILIRUBIN DIRECT: CPT | Performed by: INTERNAL MEDICINE

## 2021-01-01 PROCEDURE — 85007 BL SMEAR W/DIFF WBC COUNT: CPT | Performed by: INTERNAL MEDICINE

## 2021-01-01 PROCEDURE — 93000 ELECTROCARDIOGRAM COMPLETE: CPT | Performed by: INTERNAL MEDICINE

## 2021-01-01 PROCEDURE — 36415 COLL VENOUS BLD VENIPUNCTURE: CPT | Performed by: PHYSICIAN ASSISTANT

## 2021-01-01 PROCEDURE — 99223 1ST HOSP IP/OBS HIGH 75: CPT | Performed by: INTERNAL MEDICINE

## 2021-01-01 PROCEDURE — 99285 EMERGENCY DEPT VISIT HI MDM: CPT | Performed by: PHYSICIAN ASSISTANT

## 2021-01-01 PROCEDURE — 83605 ASSAY OF LACTIC ACID: CPT | Performed by: PHYSICIAN ASSISTANT

## 2021-01-01 PROCEDURE — 80053 COMPREHEN METABOLIC PANEL: CPT | Performed by: INTERNAL MEDICINE

## 2021-01-01 PROCEDURE — 87040 BLOOD CULTURE FOR BACTERIA: CPT | Performed by: PHYSICIAN ASSISTANT

## 2021-01-01 PROCEDURE — 87449 NOS EACH ORGANISM AG IA: CPT | Performed by: NURSE PRACTITIONER

## 2021-01-01 PROCEDURE — 84484 ASSAY OF TROPONIN QUANT: CPT | Performed by: PHYSICIAN ASSISTANT

## 2021-01-01 PROCEDURE — 3074F SYST BP LT 130 MM HG: CPT | Performed by: FAMILY MEDICINE

## 2021-01-01 PROCEDURE — 85610 PROTHROMBIN TIME: CPT | Performed by: INTERNAL MEDICINE

## 2021-01-01 PROCEDURE — 80307 DRUG TEST PRSMV CHEM ANLYZR: CPT | Performed by: NURSE PRACTITIONER

## 2021-01-01 PROCEDURE — 96365 THER/PROPH/DIAG IV INF INIT: CPT

## 2021-01-01 PROCEDURE — 3008F BODY MASS INDEX DOCD: CPT | Performed by: FAMILY MEDICINE

## 2021-01-01 PROCEDURE — 80076 HEPATIC FUNCTION PANEL: CPT | Performed by: PHYSICIAN ASSISTANT

## 2021-01-01 PROCEDURE — 93010 ELECTROCARDIOGRAM REPORT: CPT | Performed by: INTERNAL MEDICINE

## 2021-01-01 PROCEDURE — 84443 ASSAY THYROID STIM HORMONE: CPT | Performed by: INTERNAL MEDICINE

## 2021-01-01 PROCEDURE — 84484 ASSAY OF TROPONIN QUANT: CPT | Performed by: NURSE PRACTITIONER

## 2021-01-01 PROCEDURE — 96375 TX/PRO/DX INJ NEW DRUG ADDON: CPT

## 2021-01-01 PROCEDURE — 83036 HEMOGLOBIN GLYCOSYLATED A1C: CPT | Performed by: INTERNAL MEDICINE

## 2021-01-01 PROCEDURE — 85025 COMPLETE CBC W/AUTO DIFF WBC: CPT | Performed by: NURSE PRACTITIONER

## 2021-01-01 RX ORDER — EPLERENONE 25 MG/1
25 TABLET, FILM COATED ORAL DAILY
Status: DISCONTINUED | OUTPATIENT
Start: 2021-01-01 | End: 2021-01-01 | Stop reason: HOSPADM

## 2021-01-01 RX ORDER — ASPIRIN 81 MG/1
324 TABLET, CHEWABLE ORAL ONCE
Status: COMPLETED | OUTPATIENT
Start: 2021-01-01 | End: 2021-01-01

## 2021-01-01 RX ORDER — HEPARIN SODIUM 1000 [USP'U]/ML
3600 INJECTION, SOLUTION INTRAVENOUS; SUBCUTANEOUS ONCE
Status: COMPLETED | OUTPATIENT
Start: 2021-01-01 | End: 2021-01-01

## 2021-01-01 RX ORDER — ONDANSETRON 2 MG/ML
4 INJECTION INTRAMUSCULAR; INTRAVENOUS EVERY 6 HOURS PRN
Status: DISCONTINUED | OUTPATIENT
Start: 2021-01-01 | End: 2021-01-01 | Stop reason: HOSPADM

## 2021-01-01 RX ORDER — ATORVASTATIN CALCIUM 80 MG/1
80 TABLET, FILM COATED ORAL
Status: DISCONTINUED | OUTPATIENT
Start: 2021-01-01 | End: 2021-01-01 | Stop reason: HOSPADM

## 2021-01-01 RX ORDER — ECHINACEA PURPUREA EXTRACT 125 MG
1 TABLET ORAL EVERY 4 HOURS PRN
Status: DISCONTINUED | OUTPATIENT
Start: 2021-01-01 | End: 2021-01-01 | Stop reason: HOSPADM

## 2021-01-01 RX ORDER — FUROSEMIDE 10 MG/ML
40 INJECTION INTRAMUSCULAR; INTRAVENOUS 2 TIMES DAILY
Status: DISCONTINUED | OUTPATIENT
Start: 2021-01-01 | End: 2021-01-01

## 2021-01-01 RX ORDER — WARFARIN SODIUM 4 MG/1
4 TABLET ORAL
Status: DISCONTINUED | OUTPATIENT
Start: 2021-01-01 | End: 2021-01-01 | Stop reason: HOSPADM

## 2021-01-01 RX ORDER — FUROSEMIDE 40 MG/1
40 TABLET ORAL
Status: DISCONTINUED | OUTPATIENT
Start: 2021-01-01 | End: 2021-01-01 | Stop reason: HOSPADM

## 2021-01-01 RX ORDER — METOPROLOL SUCCINATE 50 MG/1
50 TABLET, EXTENDED RELEASE ORAL EVERY 12 HOURS SCHEDULED
Status: DISCONTINUED | OUTPATIENT
Start: 2021-01-01 | End: 2021-01-01 | Stop reason: HOSPADM

## 2021-01-01 RX ORDER — FUROSEMIDE 40 MG/1
TABLET ORAL
Qty: 60 TABLET | Refills: 0 | Status: SHIPPED | OUTPATIENT
Start: 2021-01-01 | End: 2022-01-01 | Stop reason: ALTCHOICE

## 2021-01-01 RX ORDER — MIRTAZAPINE 15 MG/1
45 TABLET, FILM COATED ORAL
Status: DISCONTINUED | OUTPATIENT
Start: 2021-01-01 | End: 2021-01-01 | Stop reason: HOSPADM

## 2021-01-01 RX ORDER — LISINOPRIL 20 MG/1
40 TABLET ORAL
Status: DISCONTINUED | OUTPATIENT
Start: 2021-01-01 | End: 2021-01-01 | Stop reason: HOSPADM

## 2021-01-01 RX ORDER — ATORVASTATIN CALCIUM 80 MG/1
TABLET, FILM COATED ORAL
Qty: 30 TABLET | Refills: 6 | Status: SHIPPED | OUTPATIENT
Start: 2021-01-01 | End: 2022-01-01

## 2021-01-01 RX ORDER — FUROSEMIDE 40 MG/1
40 TABLET ORAL 2 TIMES DAILY
Qty: 60 TABLET | Refills: 0 | Status: SHIPPED | OUTPATIENT
Start: 2021-01-01 | End: 2021-01-01

## 2021-01-01 RX ORDER — FUROSEMIDE 40 MG/1
40 TABLET ORAL 2 TIMES DAILY
Qty: 60 TABLET | Refills: 0 | Status: SHIPPED | OUTPATIENT
Start: 2021-01-01 | End: 2021-01-01 | Stop reason: SDUPTHER

## 2021-01-01 RX ORDER — MAGNESIUM HYDROXIDE/ALUMINUM HYDROXICE/SIMETHICONE 120; 1200; 1200 MG/30ML; MG/30ML; MG/30ML
30 SUSPENSION ORAL EVERY 6 HOURS PRN
Status: DISCONTINUED | OUTPATIENT
Start: 2021-01-01 | End: 2021-01-01 | Stop reason: HOSPADM

## 2021-01-01 RX ORDER — WARFARIN SODIUM 4 MG/1
TABLET ORAL
Qty: 30 TABLET | Refills: 2 | Status: SHIPPED | OUTPATIENT
Start: 2021-01-01 | End: 2022-01-01 | Stop reason: HOSPADM

## 2021-01-01 RX ORDER — POTASSIUM CHLORIDE 20 MEQ/1
20 TABLET, EXTENDED RELEASE ORAL DAILY
Status: DISCONTINUED | OUTPATIENT
Start: 2021-01-01 | End: 2021-01-01 | Stop reason: HOSPADM

## 2021-01-01 RX ORDER — ACETAMINOPHEN 325 MG/1
650 TABLET ORAL EVERY 6 HOURS PRN
Status: DISCONTINUED | OUTPATIENT
Start: 2021-01-01 | End: 2021-01-01 | Stop reason: HOSPADM

## 2021-01-01 RX ORDER — POTASSIUM CHLORIDE 1500 MG/1
1 TABLET, FILM COATED, EXTENDED RELEASE ORAL DAILY
Qty: 90 TABLET | Refills: 3 | Status: SHIPPED | OUTPATIENT
Start: 2021-01-01 | End: 2022-01-01 | Stop reason: HOSPADM

## 2021-01-01 RX ORDER — METOPROLOL SUCCINATE 50 MG/1
TABLET, EXTENDED RELEASE ORAL
Qty: 60 TABLET | Refills: 8 | Status: SHIPPED | OUTPATIENT
Start: 2021-01-01 | End: 2022-01-01 | Stop reason: HOSPADM

## 2021-01-01 RX ORDER — AZITHROMYCIN 250 MG/1
500 TABLET, FILM COATED ORAL EVERY 24 HOURS
Status: DISCONTINUED | OUTPATIENT
Start: 2021-01-01 | End: 2021-01-01

## 2021-01-01 RX ORDER — EPLERENONE 25 MG/1
TABLET, FILM COATED ORAL
Qty: 30 TABLET | Refills: 3 | Status: ON HOLD | OUTPATIENT
Start: 2021-01-01 | End: 2022-01-01

## 2021-01-01 RX ORDER — ASPIRIN 81 MG/1
81 TABLET ORAL DAILY
Status: DISCONTINUED | OUTPATIENT
Start: 2021-01-01 | End: 2021-01-01 | Stop reason: HOSPADM

## 2021-01-01 RX ORDER — DILTIAZEM HYDROCHLORIDE 5 MG/ML
15 INJECTION INTRAVENOUS ONCE
Status: COMPLETED | OUTPATIENT
Start: 2021-01-01 | End: 2021-01-01

## 2021-01-01 RX ORDER — HEPARIN SODIUM 10000 [USP'U]/100ML
3-20 INJECTION, SOLUTION INTRAVENOUS
Status: DISCONTINUED | OUTPATIENT
Start: 2021-01-01 | End: 2021-01-01

## 2021-01-01 RX ORDER — WARFARIN SODIUM 4 MG/1
TABLET ORAL
Qty: 30 TABLET | Refills: 2 | Status: SHIPPED | OUTPATIENT
Start: 2021-01-01 | End: 2021-01-01

## 2021-01-01 RX ORDER — ATORVASTATIN CALCIUM 80 MG/1
TABLET, FILM COATED ORAL
Qty: 30 TABLET | Refills: 6 | Status: SHIPPED | OUTPATIENT
Start: 2021-01-01 | End: 2021-01-01

## 2021-01-01 RX ORDER — SIMETHICONE 80 MG
80 TABLET,CHEWABLE ORAL 4 TIMES DAILY PRN
Status: DISCONTINUED | OUTPATIENT
Start: 2021-01-01 | End: 2021-01-01 | Stop reason: HOSPADM

## 2021-01-01 RX ORDER — WARFARIN SODIUM 4 MG/1
4 TABLET ORAL
Status: COMPLETED | OUTPATIENT
Start: 2021-01-01 | End: 2021-01-01

## 2021-01-01 RX ORDER — POTASSIUM CHLORIDE 20 MEQ/1
20 TABLET, EXTENDED RELEASE ORAL ONCE
Status: COMPLETED | OUTPATIENT
Start: 2021-01-01 | End: 2021-01-01

## 2021-01-01 RX ADMIN — CEFTRIAXONE SODIUM 1000 MG: 10 INJECTION, POWDER, FOR SOLUTION INTRAVENOUS at 09:56

## 2021-01-01 RX ADMIN — WARFARIN SODIUM 4 MG: 4 TABLET ORAL at 11:52

## 2021-01-01 RX ADMIN — POTASSIUM CHLORIDE 20 MEQ: 1500 TABLET, EXTENDED RELEASE ORAL at 09:14

## 2021-01-01 RX ADMIN — HEPARIN SODIUM 3600 UNITS: 1000 INJECTION INTRAVENOUS; SUBCUTANEOUS at 05:07

## 2021-01-01 RX ADMIN — EPLERENONE 25 MG: 25 TABLET, FILM COATED ORAL at 09:46

## 2021-01-01 RX ADMIN — EPLERENONE 25 MG: 25 TABLET, FILM COATED ORAL at 09:56

## 2021-01-01 RX ADMIN — ENOXAPARIN SODIUM 60 MG: 60 INJECTION SUBCUTANEOUS at 13:09

## 2021-01-01 RX ADMIN — FUROSEMIDE 40 MG: 10 INJECTION, SOLUTION INTRAMUSCULAR; INTRAVENOUS at 17:12

## 2021-01-01 RX ADMIN — POTASSIUM CHLORIDE 20 MEQ: 1500 TABLET, EXTENDED RELEASE ORAL at 09:56

## 2021-01-01 RX ADMIN — FUROSEMIDE 40 MG: 10 INJECTION, SOLUTION INTRAMUSCULAR; INTRAVENOUS at 09:56

## 2021-01-01 RX ADMIN — ASPIRIN 324 MG: 81 TABLET, CHEWABLE ORAL at 03:43

## 2021-01-01 RX ADMIN — CEFTRIAXONE SODIUM 1000 MG: 10 INJECTION, POWDER, FOR SOLUTION INTRAVENOUS at 10:18

## 2021-01-01 RX ADMIN — METOPROLOL SUCCINATE 50 MG: 50 TABLET, EXTENDED RELEASE ORAL at 23:13

## 2021-01-01 RX ADMIN — POTASSIUM CHLORIDE 20 MEQ: 1500 TABLET, EXTENDED RELEASE ORAL at 09:57

## 2021-01-01 RX ADMIN — ASPIRIN 81 MG: 81 TABLET ORAL at 09:14

## 2021-01-01 RX ADMIN — HEPARIN SODIUM 20 UNITS/KG/HR: 10000 INJECTION, SOLUTION INTRAVENOUS at 10:58

## 2021-01-01 RX ADMIN — LISINOPRIL 40 MG: 20 TABLET ORAL at 23:14

## 2021-01-01 RX ADMIN — ASPIRIN 81 MG: 81 TABLET ORAL at 09:56

## 2021-01-01 RX ADMIN — HEPARIN SODIUM 12 UNITS/KG/HR: 10000 INJECTION, SOLUTION INTRAVENOUS at 05:10

## 2021-01-01 RX ADMIN — ATORVASTATIN CALCIUM 80 MG: 80 TABLET, FILM COATED ORAL at 17:12

## 2021-01-01 RX ADMIN — MIRTAZAPINE 45 MG: 15 TABLET, FILM COATED ORAL at 23:14

## 2021-01-01 RX ADMIN — FUROSEMIDE 40 MG: 10 INJECTION, SOLUTION INTRAMUSCULAR; INTRAVENOUS at 09:14

## 2021-01-01 RX ADMIN — WARFARIN SODIUM 4 MG: 4 TABLET ORAL at 17:12

## 2021-01-01 RX ADMIN — METOPROLOL SUCCINATE 50 MG: 50 TABLET, EXTENDED RELEASE ORAL at 09:56

## 2021-01-01 RX ADMIN — METOPROLOL SUCCINATE 50 MG: 50 TABLET, EXTENDED RELEASE ORAL at 09:14

## 2021-01-01 RX ADMIN — CEFEPIME HYDROCHLORIDE 2000 MG: 2 INJECTION, POWDER, FOR SOLUTION INTRAVENOUS at 04:12

## 2021-01-05 DIAGNOSIS — I25.5 ISCHEMIC CARDIOMYOPATHY: Chronic | ICD-10-CM

## 2021-01-06 RX ORDER — EPLERENONE 25 MG/1
TABLET, FILM COATED ORAL
Qty: 30 TABLET | Refills: 3 | Status: SHIPPED | OUTPATIENT
Start: 2021-01-06 | End: 2021-01-01

## 2021-02-17 ENCOUNTER — OFFICE VISIT (OUTPATIENT)
Dept: CARDIOLOGY CLINIC | Facility: CLINIC | Age: 56
End: 2021-02-17
Payer: COMMERCIAL

## 2021-02-17 VITALS
DIASTOLIC BLOOD PRESSURE: 58 MMHG | WEIGHT: 149.6 LBS | HEART RATE: 63 BPM | SYSTOLIC BLOOD PRESSURE: 112 MMHG | BODY MASS INDEX: 24.04 KG/M2 | HEIGHT: 66 IN

## 2021-02-17 DIAGNOSIS — I25.10 CORONARY ARTERY DISEASE INVOLVING NATIVE CORONARY ARTERY WITHOUT ANGINA PECTORIS, UNSPECIFIED WHETHER NATIVE OR TRANSPLANTED HEART: Chronic | ICD-10-CM

## 2021-02-17 DIAGNOSIS — I25.5 ISCHEMIC CARDIOMYOPATHY: Primary | Chronic | ICD-10-CM

## 2021-02-17 DIAGNOSIS — Z95.2 S/P AVR (AORTIC VALVE REPLACEMENT): ICD-10-CM

## 2021-02-17 PROCEDURE — 93000 ELECTROCARDIOGRAM COMPLETE: CPT | Performed by: INTERNAL MEDICINE

## 2021-02-17 PROCEDURE — 99213 OFFICE O/P EST LOW 20 MIN: CPT | Performed by: INTERNAL MEDICINE

## 2021-02-17 PROCEDURE — 1036F TOBACCO NON-USER: CPT | Performed by: INTERNAL MEDICINE

## 2021-02-17 PROCEDURE — 3008F BODY MASS INDEX DOCD: CPT | Performed by: INTERNAL MEDICINE

## 2021-02-17 NOTE — PROGRESS NOTES
Cardiology Consultation     Chuy Kee  [id number]  1965  [facility]ART [facility] VASCULAR [facility] [address] [address]      1  Ischemic cardiomyopathy  POCT ECG   2  Coronary artery disease involving native coronary artery without angina pectoris, unspecified whether native or transplanted heart     3  S/P AVR (aortic valve replacement)         Discussion/Summary:  1  Ischemic cardiomyopathy with Bi V ICD in place  2  CAD with history of PCI  3  History rheumatic heart disease s/p mechanical AVR 1988  4  History of VT/VF    - most recent device interrogation from 11/24/2020 showing a pace 86 1% Bi V pace 97 5% with battery voltage adequate 1 9 years but all other parameters within normal limits and no high rate episodes  - EKG performed in the office today shows dual paced rhythm with PVC  - in the setting of chronic amiodarone usage will check CMP,INR, TSH with reflex T4, PFTs and recommended patient go for his yearly eye screening at this time  -  Will check transthoracic echocardiogram to evaluate valve as well as overall cardiac function  - patient will follow-up with electrophysiology and will follow up with heart failure as well  - patient can continue current medical regimen along with dietary and lifestyle modifications  - patient will be seen in 3 months or sooner if necessary  - patient counseled that if he were to have any warning or alarm type symptoms he should seek emergency medical care immediately      History of Present Illness:  -Patient is a 21-year-old male past medical history significant for coronary artery disease s/p PCI in 2014, known ischemic cardiomyopathy with biventricular ICD placement, hypertension, hyperlipidemia, history of aortic valve replacement in 1988 who presents the office today for scheduled follow-up    - patient denies any symptoms of chest pain, palpitations, lightheadedness or dizziness, nausea vomiting, shortness of breath, lower extremity edema, loss of consciousness  He states that he has been very active  Chasing around the kids without any change in his physical activity since being last seen in the office  He denies any significant issues at this time and states that overall he has been well without any ICD shocks that he is aware of or any changes in his physical well-being  He states that he has been compliant with his medical regimen as well as his dietary and lifestyle modifications while he does not weigh himself every day has not noticed a significant change in his weight  Patient Active Problem List   Diagnosis    Colonic mass    Cardiomyopathy ([facility] )    CAD (coronary artery disease)    S/P AVR (aortic valve replacement)    Ventricular fibrillation (HCC)    Biventricular ICD (implantable cardioverter-defibrillator) in place    Depressed mood     Past Medical History:   Diagnosis Date    AICD (automatic cardioverter/defibrillator) present     medtronic     CAD (coronary artery disease)     Cancer ([place] [address] )     Cardiac disease     Cardiomyopathy ([place] [address] )     mixed    predominently nonischemic    Colonic mass     Coronary artery disease     Hyperlipidemia     Hypertension     Irregular heart beat     Myocardial infarction ([facility] )     2014 and 2015    Rectal bleeding     S/P AVR (aortic valve replacement)     mechanical    Wears glasses      Social History     Socioeconomic History    Marital status: /Civil Union     Spouse name: Not on file    Number of children: Not on file    Years of education: Not on file    Highest education level: Not on file   Occupational History    Not on file   Social Needs    Financial resource strain: Not on file    Food insecurity     Worry: Not on file     Inability: Not on file    Transportation needs     Medical: Not on file     Non-medical: Not on file   Tobacco Use    Smoking status: Never Smoker    Smokeless tobacco: Never Used   Substance and Sexual Activity    Alcohol use: Not Currently    Drug use: No    Sexual activity: Not on file   Lifestyle    Physical activity     Days per week: Not on file     Minutes per session: Not on file    Stress: Not on file   Relationships    Social connections     Talks on phone: Not on file     Gets together: Not on file     Attends Hinduism service: Not on file     Active member of club or organization: Not on file     Attends meetings of clubs or organizations: Not on file     Relationship status: Not on file    Intimate partner violence     Fear of current or ex partner: Not on file     Emotionally abused: Not on file     Physically abused: Not on file     Forced sexual activity: Not on file   Other Topics Concern    Not on file   Social History Narrative    Not on file      Family History   Problem Relation Age of Onset    Diabetes Mother     Hypertension Mother     Valvular heart disease Mother     Valvular heart disease Father     Alcohol abuse Father      Past Surgical History:   Procedure Laterality Date    AORTIC VALVE REPLACEMENT      APPENDECTOMY      CARDIAC DEFIBRILLATOR PLACEMENT      COLECTOMY MADELIN Right     Last Assessed: 6/7/2016    COLECTOMY LAPAROSCOPIC      COLON SURGERY Right     colectomy    CORONARY STENT PLACEMENT      INSERT / [facility] / [id number]      NE COLONOSCOPY FLX DX W/COLLMANOLO Michel 1978 PFRMD N/A 1/15/2018    Procedure: COLONOSCOPY;  Surgeon: Jaden Khalil MD;  Location: [facility];   Service: General    NE LAP,SURG,COLECTOMY, PARTIAL, W/ANAST N/A 6/2/2016    Procedure: RESECTION COLON RIGHT LAPAROSCOPIC HAND-ASSISTED;  Surgeon: Jaden Khalil MD;  Location: [facility];  Service: General       Current Outpatient Medications:     amiodarone 200 mg tablet, Take 0 5 tablets (100 mg total) by mouth daily Patient is will take a half a tablet a day 100 mg, Disp: 45 tablet, Rfl: 3   aspirin 81 MG tablet, Take 81 mg by mouth daily  , Disp: , Rfl:     atorvastatin (LIPITOR) 80 mg tablet, take 1 tablet by mouth once daily, Disp: 30 tablet, Rfl: 6    cyclobenzaprine (FLEXERIL) 10 mg tablet, Take 1 tablet (10 mg total) by mouth 2 (two) times a day as needed for muscle spasms, Disp: 20 tablet, Rfl: 0    eplerenone (INSPRA) 25 mg tablet, take 1 tablet by mouth once daily, Disp: 30 tablet, Rfl: 3    furosemide (LASIX) 40 mg tablet, Take 1 tablet (40 mg total) by mouth 2 (two) times a day, Disp: 60 tablet, Rfl: 6    lisinopril (ZESTRIL) 40 mg tablet, Take 1 tablet (40 mg total) by mouth daily at bedtime, Disp: 30 tablet, Rfl: 11    loratadine (CLARITIN) 10 mg tablet, Take 1 tablet (10 mg total) by mouth daily, Disp: 20 tablet, Rfl: 0    metoprolol succinate (TOPROL-XL) 50 mg 24 hr tablet, Take 1 tablet (50 mg total) by mouth every 12 (twelve) hours, Disp: 60 tablet, Rfl: 8    mirtazapine (REMERON) 45 MG tablet, Take 45 mg by mouth daily at bedtime, Disp: , Rfl: 0    Potassium Chloride ER 20 MEQ TBCR, take 1 tablet by mouth once daily, Disp: 30 tablet, Rfl: 4    sodium chloride (OCEAN) 0 65 % nasal spray, 1 spray into each nostril 4 (four) times a day, Disp: 15 mL, Rfl: 0    warfarin (COUMADIN) 1 mg tablet, One daily or as directed by MD to obtain warfarin dose , Disp: 30 tablet, Rfl: 1    warfarin (COUMADIN) 4 mg tablet, Take 1 tablet (4 mg total) by mouth daily Take 4 mg and 2 mg alternating once daily( take 1 tablet and 1/2 tablet alternating once daily), Disp: 60 tablet, Rfl: 3    warfarin (COUMADIN) 4 mg tablet, take 1 tablet by mouth once daily as directed, Disp: 30 tablet, Rfl: 2    fluticasone (FLONASE) 50 mcg/act nasal spray, 1 spray into each nostril daily (Patient not taking: Reported on 2/17/2021), Disp: 16 g, Rfl: 0    naproxen (NAPROSYN) 500 mg tablet, Take 1 tablet (500 mg total) by mouth 2 (two) times a day with meals (Patient not taking: Reported on 2/17/2021), Disp: 30 tablet, Rfl: 0  No Known Allergies      Labs:  No visits with results within 2 Month(s) from this visit  Latest known visit with results is:   Telephone on 11/18/2020   Component Date Value    Protime 11/18/2020 28 8*    INR 11/18/2020 2 70*        Imaging: No results found  Review of Systems:  Review of Systems   Constitutional: Negative for chills, fatigue, fever and unexpected weight change  HENT: Negative for trouble swallowing and voice change  Eyes: Negative for pain and redness  Respiratory: Negative for shortness of breath and wheezing  Cardiovascular: Negative for chest pain, palpitations and leg swelling  Gastrointestinal: Negative for abdominal pain, constipation, diarrhea and vomiting  Genitourinary: Negative for dysuria  Musculoskeletal: Negative for neck pain and neck stiffness  Skin: Negative for rash  Neurological: Negative for dizziness, syncope and headaches  Psychiatric/Behavioral: Negative for agitation and confusion  All other systems reviewed and are negative  Vitals:    02/17/21 1556   BP: 112/58   BP Location: Right arm   Patient Position: Sitting   Cuff Size: Standard   Pulse: 63   Weight: 67 9 kg (149 lb 9 6 oz)   Height: 5' 6" (1 676 m)     Vitals:    02/17/21 1556   Weight: 67 9 kg (149 lb 9 6 oz)     Height: 5' 6" (167 6 cm)     Physical Exam:  Physical Exam  Vitals signs reviewed  Constitutional:       General: He is not in acute distress  Appearance: He is not toxic-appearing  HENT:      Head: Normocephalic and atraumatic  Eyes:      General:         Right eye: No discharge  Left eye: No discharge  Neck:      Musculoskeletal: No neck rigidity  Comments: No JVD  Cardiovascular:      Rate and Rhythm: Normal rate  Heart sounds: Murmur present  No friction rub  Pulmonary:      Effort: Pulmonary effort is normal  No respiratory distress  Breath sounds: No wheezing     Abdominal:      General: Bowel sounds are normal       Palpations: Abdomen is soft  Tenderness: There is no abdominal tenderness  Musculoskeletal:      Right lower leg: No edema  Left lower leg: No edema  Skin:     General: Skin is warm and dry  Neurological:      Mental Status: He is alert        Comments: Awake, alert, able to answer questions    Psychiatric:         Mood and Affect: Mood normal          Behavior: Behavior normal

## 2021-02-19 DIAGNOSIS — I10 BENIGN ESSENTIAL HTN: ICD-10-CM

## 2021-02-22 ENCOUNTER — TELEPHONE (OUTPATIENT)
Dept: FAMILY MEDICINE CLINIC | Facility: CLINIC | Age: 56
End: 2021-02-22

## 2021-02-22 DIAGNOSIS — I10 BENIGN ESSENTIAL HTN: ICD-10-CM

## 2021-02-22 RX ORDER — POTASSIUM CHLORIDE 1500 MG/1
TABLET, FILM COATED, EXTENDED RELEASE ORAL
Qty: 30 TABLET | OUTPATIENT
Start: 2021-02-22

## 2021-02-22 RX ORDER — POTASSIUM CHLORIDE 1500 MG/1
1 TABLET, FILM COATED, EXTENDED RELEASE ORAL DAILY
Qty: 90 TABLET | Refills: 3 | OUTPATIENT
Start: 2021-02-22 | End: 2021-02-24 | Stop reason: SDUPTHER

## 2021-02-22 NOTE — TELEPHONE ENCOUNTER
Can you please call this patient's pharmacy as I sent this refill in this morning electronically and received another request earlier today as well  Please see if the refill request was sent through so that they stop sending these inappropriately

## 2021-02-22 NOTE — TELEPHONE ENCOUNTER
----- Message from [id number] [place], [place] [address]JOSE C sent at 2/22/2021  2:47 PM EST -----  Regarding: awv  Needs awv after 3/6

## 2021-02-24 ENCOUNTER — TELEPHONE (OUTPATIENT)
Dept: CARDIOLOGY CLINIC | Facility: CLINIC | Age: 56
End: 2021-02-24

## 2021-02-24 DIAGNOSIS — I10 BENIGN ESSENTIAL HTN: ICD-10-CM

## 2021-02-24 RX ORDER — POTASSIUM CHLORIDE 1500 MG/1
1 TABLET, FILM COATED, EXTENDED RELEASE ORAL DAILY
Qty: 90 TABLET | Refills: 3 | OUTPATIENT
Start: 2021-02-24 | End: 2021-01-01 | Stop reason: SDUPTHER

## 2021-02-24 NOTE — TELEPHONE ENCOUNTER
Tc to pt, unable to reach patient by cell phone, message was received that phone number not receiving messages  Called to home number, spoke with rodri, she identified herself as wife  Requested she have patient call our office to discuss over due inr testing, per wife she will give patient message to go for inr asap  Pt to call if any questions

## 2021-02-26 ENCOUNTER — TELEPHONE (OUTPATIENT)
Dept: CARDIOLOGY CLINIC | Facility: CLINIC | Age: 56
End: 2021-02-26

## 2021-02-26 NOTE — TELEPHONE ENCOUNTER
Tc to patient's spoke with rodri, girlfriend  She reports patient is not home, cell phone not working  Requested she remind patient to test inr asap

## 2021-03-03 ENCOUNTER — REMOTE DEVICE CLINIC VISIT (OUTPATIENT)
Dept: CARDIOLOGY CLINIC | Facility: CLINIC | Age: 56
End: 2021-03-03
Payer: COMMERCIAL

## 2021-03-03 DIAGNOSIS — Z95.810 AICD (AUTOMATIC CARDIOVERTER/DEFIBRILLATOR) PRESENT: Primary | ICD-10-CM

## 2021-03-03 PROCEDURE — 93296 REM INTERROG EVL PM/IDS: CPT | Performed by: INTERNAL MEDICINE

## 2021-03-03 PROCEDURE — 93295 DEV INTERROG REMOTE 1/2/MLT: CPT | Performed by: INTERNAL MEDICINE

## 2021-03-03 NOTE — PROGRESS NOTES
Results for orders placed or performed in visit on 03/03/21   Cardiac EP device report    Narrative    MDT BI-V ICD/ NOT MRI CONDITIONAL  CARELINK TRANSMISSION: BATTERY STATUS "OK"  AP 78%  95% VSRP 2%  ALL AVAILABLE LEAD PARAMETERS WITHIN NORMAL LIMITS  NO SIGNIFICANT HIGH RATE EPISODES  13,536 VENT SENSING NOTED, MARKERS ONLY  NORMAL DEVICE FUNCTION  NC       Current Outpatient Medications:     amiodarone 200 mg tablet, Take 0 5 tablets (100 mg total) by mouth daily Patient is will take a half a tablet a day 100 mg, Disp: 45 tablet, Rfl: 3    aspirin 81 MG tablet, Take 81 mg by mouth daily  , Disp: , Rfl:     atorvastatin (LIPITOR) 80 mg tablet, take 1 tablet by mouth once daily, Disp: 30 tablet, Rfl: 6    cyclobenzaprine (FLEXERIL) 10 mg tablet, Take 1 tablet (10 mg total) by mouth 2 (two) times a day as needed for muscle spasms, Disp: 20 tablet, Rfl: 0    eplerenone (INSPRA) 25 mg tablet, take 1 tablet by mouth once daily, Disp: 30 tablet, Rfl: 3    fluticasone (FLONASE) 50 mcg/act nasal spray, 1 spray into each nostril daily (Patient not taking: Reported on 2/17/2021), Disp: 16 g, Rfl: 0    furosemide (LASIX) 40 mg tablet, Take 1 tablet (40 mg total) by mouth 2 (two) times a day, Disp: 60 tablet, Rfl: 6    lisinopril (ZESTRIL) 40 mg tablet, Take 1 tablet (40 mg total) by mouth daily at bedtime, Disp: 30 tablet, Rfl: 11    loratadine (CLARITIN) 10 mg tablet, Take 1 tablet (10 mg total) by mouth daily, Disp: 20 tablet, Rfl: 0    metoprolol succinate (TOPROL-XL) 50 mg 24 hr tablet, Take 1 tablet (50 mg total) by mouth every 12 (twelve) hours, Disp: 60 tablet, Rfl: 8    mirtazapine (REMERON) 45 MG tablet, Take 45 mg by mouth daily at bedtime, Disp: , Rfl: 0    naproxen (NAPROSYN) 500 mg tablet, Take 1 tablet (500 mg total) by mouth 2 (two) times a day with meals (Patient not taking: Reported on 2/17/2021), Disp: 30 tablet, Rfl: 0    Potassium Chloride ER 20 MEQ TBCR, Take 1 tablet (20 mEq total) by mouth daily, Disp: 90 tablet, Rfl: 3    sodium chloride (OCEAN) 0 65 % nasal spray, 1 spray into each nostril 4 (four) times a day, Disp: 15 mL, Rfl: 0    warfarin (COUMADIN) 1 mg tablet, One daily or as directed by MD to obtain warfarin dose , Disp: 30 tablet, Rfl: 1    warfarin (COUMADIN) 4 mg tablet, Take 1 tablet (4 mg total) by mouth daily Take 4 mg and 2 mg alternating once daily( take 1 tablet and 1/2 tablet alternating once daily), Disp: 60 tablet, Rfl: 3    warfarin (COUMADIN) 4 mg tablet, take 1 tablet by mouth once daily as directed, Disp: 30 tablet, Rfl: 2

## 2021-03-06 DIAGNOSIS — I25.5 ISCHEMIC CARDIOMYOPATHY: Chronic | ICD-10-CM

## 2021-03-06 RX ORDER — LISINOPRIL 40 MG/1
TABLET ORAL
Qty: 30 TABLET | Refills: 11 | Status: ON HOLD | OUTPATIENT
Start: 2021-03-06 | End: 2022-01-01

## 2021-03-09 ENCOUNTER — HOSPITAL ENCOUNTER (OUTPATIENT)
Dept: PULMONOLOGY | Facility: HOSPITAL | Age: 56
Discharge: HOME | End: 2021-03-09
Payer: COMMERCIAL

## 2021-03-09 DIAGNOSIS — I25.5 ISCHEMIC CARDIOMYOPATHY: ICD-10-CM

## 2021-03-09 PROCEDURE — 94726 PLETHYSMOGRAPHY LUNG VOLUMES: CPT

## 2021-03-09 PROCEDURE — 94729 DIFFUSING CAPACITY: CPT

## 2021-03-09 PROCEDURE — 94726 PLETHYSMOGRAPHY LUNG VOLUMES: CPT | Performed by: INTERNAL MEDICINE

## 2021-03-09 PROCEDURE — 94010 BREATHING CAPACITY TEST: CPT

## 2021-03-09 PROCEDURE — 94760 N-INVAS EAR/PLS OXIMETRY 1: CPT

## 2021-03-09 PROCEDURE — 94010 BREATHING CAPACITY TEST: CPT | Performed by: INTERNAL MEDICINE

## 2021-03-09 PROCEDURE — 94729 DIFFUSING CAPACITY: CPT | Performed by: INTERNAL MEDICINE

## 2021-03-11 ENCOUNTER — TELEPHONE (OUTPATIENT)
Dept: CARDIOLOGY CLINIC | Facility: CLINIC | Age: 56
End: 2021-03-11

## 2021-03-11 NOTE — TELEPHONE ENCOUNTER
I attempted to contact the patient at the phone number provided to discuss recent PFT results  When I asked to speak Erika Quevedo to the person that answered the phone, the call became disconnected  When I attempted to call back at the number I previously callted it went to voice mail for a rodri swanson which is not listed as a person of contact for the patient so I did not leave a message  I also attempted the cell phone listed for the patient and it was out of service  I will have my office staff attempt to get in conatct with the patient for a time and phone number that I can call to speak with him about recent PFT results

## 2021-03-18 ENCOUNTER — TELEPHONE (OUTPATIENT)
Dept: CARDIOLOGY CLINIC | Facility: CLINIC | Age: 56
End: 2021-03-18

## 2021-03-18 ENCOUNTER — APPOINTMENT (OUTPATIENT)
Dept: LAB | Facility: HOSPITAL | Age: 56
End: 2021-03-18
Attending: INTERNAL MEDICINE
Payer: COMMERCIAL

## 2021-03-18 ENCOUNTER — HOSPITAL ENCOUNTER (OUTPATIENT)
Dept: NON INVASIVE DIAGNOSTICS | Facility: HOSPITAL | Age: 56
Discharge: HOME | End: 2021-03-18
Payer: COMMERCIAL

## 2021-03-18 DIAGNOSIS — I25.5 ISCHEMIC CARDIOMYOPATHY: Chronic | ICD-10-CM

## 2021-03-18 PROCEDURE — 93306 TTE W/DOPPLER COMPLETE: CPT | Performed by: INTERNAL MEDICINE

## 2021-03-18 PROCEDURE — 93306 TTE W/DOPPLER COMPLETE: CPT

## 2021-03-18 NOTE — TELEPHONE ENCOUNTER
Attempted calling patient on mobile phone number listed in chart, received message, phone not in service  Called to home phone number, spoke with rodri, requested patient call our office regarding pt/inr blood test results  She will give patient the message

## 2021-03-19 ENCOUNTER — TELEPHONE (OUTPATIENT)
Dept: CARDIOLOGY CLINIC | Facility: CLINIC | Age: 56
End: 2021-03-19

## 2021-03-19 NOTE — TELEPHONE ENCOUNTER
Tc to home phone number, identified as rodri, left message to have pt call our office   Will wait for return call

## 2021-03-19 NOTE — TELEPHONE ENCOUNTER
Called to patient's listed home number, left message on answering machine, identifies as rodri, requested patient call our office

## 2021-03-19 NOTE — TELEPHONE ENCOUNTER
Attempted to contact patient about recent PFTs as well as recent TTE results and recommendations for medication changes based on these studies  I would like to change the patient from lisinopril 40mg daily to Entresto 24/26mg bid  I wanted to discuss this with the patient as he will need to hold his lisinoril 36 hours before first dose of entresto and I would like a BMP 1 week after transition to monitor renal function and electrolytes  I also wanted him to monitor blood pressures at home  Patient has upcoming appointments with EP and heart failure on 3/24/21 and 3/30/21  I also wanted to make sure he kept these important appointments  I attempted to call him at all 3 phone numbers listed in his chart  The 2 mobile numbers for himself and his wife are not in service  The home number does have an answering machine listed for Heidy Paul but this is not a listed contact that I can see for the patient  I will have my office staff reach out to the patient again to try and set up a time that I can speak with him or at least inform him of the medication changes that I would like to make  Will continue to monitor and follow up at this time

## 2021-03-19 NOTE — TELEPHONE ENCOUNTER
Tc to patient home phone number, spoke with rodri, states patient not home, asked if patient has a new cell phone  number, she states it is government issued phone and does not always work,  Requested she have patient call our office, she will give patient message to call our office   Waiting call back

## 2021-03-22 ENCOUNTER — TELEPHONE (OUTPATIENT)
Dept: CARDIOLOGY CLINIC | Facility: CLINIC | Age: 56
End: 2021-03-22

## 2021-03-22 NOTE — TELEPHONE ENCOUNTER
To pt's home phone number, identifies as rodri, left message, trying to reach patient, please have patient call our office

## 2021-03-23 ENCOUNTER — TELEPHONE (OUTPATIENT)
Dept: CARDIOLOGY CLINIC | Facility: CLINIC | Age: 56
End: 2021-03-23

## 2021-03-23 ENCOUNTER — ANTICOAG VISIT (OUTPATIENT)
Dept: CARDIOLOGY CLINIC | Facility: CLINIC | Age: 56
End: 2021-03-23

## 2021-03-23 DIAGNOSIS — Z95.2 S/P AVR (AORTIC VALVE REPLACEMENT): ICD-10-CM

## 2021-03-23 NOTE — TELEPHONE ENCOUNTER
Ed call to patient's home phone number, left message on answering machine, identifies itself as rodri, requesting patient to call office regarding his medication  Called patient's listed cell phone number and emergency contact phone number, Mayte Sutton, both have message that number is not in service

## 2021-05-25 PROBLEM — I47.2 VENTRICULAR TACHYCARDIA (HCC): Status: ACTIVE | Noted: 2021-01-01

## 2021-05-25 NOTE — PROGRESS NOTES
EPS Progress Note - Manus Elizabeth South 63 y o  male MRN: [id number]           ASSESSMENT:  1  Ventricular fibrillation (HCC)  POCT ECG   2  Coronary artery disease involving native coronary artery of native heart without angina pectoris     3  Dilated cardiomyopathy ([address] )     4  S/P AVR (aortic valve replacement)     5  Biventricular ICD (implantable cardioverter-defibrillator) in place         [address]  [facility], [address] [place] [address]     Transthoracic Echocardiogram  2D, M-mode, Doppler, and Color Doppler     Study date:  18-Mar-2021     Patient: Yaron Orlando  MR number: [id number]  Account number: [de-identified]  : 1965  Age: 63 years  Gender: Male  Status: Outpatient  Location: [facility]  Height: 66 in  Weight: 148 7 lb  BP: 112/ 58 mmHg     Indications: Ischemic Cardiomyopathy     Diagnoses: I25 5 - Ischemic cardiomyopathy     Sonographer:  Chris Szymanski RDCS  Interpreting Physician:  Loren Barber DO  Primary Physician:  Zoran Dunn DO  Group:  [facility]     SUMMARY     SUMMARY:  1  This is a technically adequate study  2  Left ventricle severely dilated with severely reduced systolic function  Left ventricular ejection fraction is estimated at 18%  3  Grade 2 diastolic dysfunction with echocardiographic indications of elevated left atrial pressures  4  Regional wall motion abnormalities consistent with coronary artery disease  5  Right ventricle is mildly dilated with mildly reduced systolic function  Probable pacemaker wire present  6  Severe biatrial dilatation  7  Mechanical valve is noted within the aortic position  Valve is well seated  Trace central aortic regurgitation  Normal velocities and gradients for valve with Vmax 1 85 m/s, EOA 1 38 cm2, DVI 0 44, acceleration time 66 ms  8   Mild posteriorly directed mitral regurgitation with fibrocalcific changes of the mitral valve and mild mitral annular calcification  9  Moderate tricuspid regurgitation with pulmonary artery systolic pressure is estimated at 60-65 mm Hg  10  Compared to report from January 8, 5520, grade 3 diastolic dysfunction is noted with elevated filling pressures and pulmonary hypertension    PLAN:  1  Ischemic cardiomyopathy with Bi V ICD in place  2  CAD with history of PCI  3  History rheumatic heart disease s/p mechanical AVR 1988  4  History of VT/VF off amiodarone no events on ICD  5  LVEF 18% very well compensated     - most recent device interrogation from 03/03/2021 shows 21 months and appropriate device function  - EKG performed in the office today shows Bi V pacing  - now off amiodarone  -   transthoracic echo LVEF 18% needs heart failure follow-up  -   HPI:    -Patient is a 62-year-old male past medical history significant for coronary artery disease s/p PCI in 2014, known ischemic cardiomyopathy with biventricular ICD placement, hypertension, hyperlipidemia, history of aortic valve replacement in 1988 who presents the office today for scheduled follow-up  No chest pain or sob  Got vaccinated      ROS: 12 point ros negative for complaints today       Objective:     Vitals: Resp  rate 16, height 5' 6" (1 676 m), weight 61 7 kg (136 lb)  , Body mass index is 21 95 kg/m²  ,        Physical Exam:    GEN: Kendy Hurley appears well, alert and oriented x 3, pleasant and cooperative   HEENT: pupils equal, round, and reactive to light; extraocular muscles intact  NECK: supple, no carotid bruits   HEART: regular rhythm, mechanical S1 and S2, no murmurs, clicks, gallops or rubs   LUNGS: clear to auscultation bilaterally; no wheezes, rales, or rhonchi   ABDOMEN: normal bowel sounds, soft, no tenderness, no distention  EXTREMITIES: peripheral pulses normal; no clubbing, cyanosis, or edema  NEURO: no focal findings   SKIN: normal without suspicious lesions on exposed skin    Medications:      Current Outpatient Medications:    amiodarone 200 mg tablet, Take 0 5 tablets (100 mg total) by mouth daily Patient is will take a half a tablet a day 100 mg, Disp: 45 tablet, Rfl: 3    aspirin 81 MG tablet, Take 81 mg by mouth daily  , Disp: , Rfl:     atorvastatin (LIPITOR) 80 mg tablet, take 1 tablet by mouth once daily, Disp: 30 tablet, Rfl: 6    cyclobenzaprine (FLEXERIL) 10 mg tablet, Take 1 tablet (10 mg total) by mouth 2 (two) times a day as needed for muscle spasms, Disp: 20 tablet, Rfl: 0    eplerenone (INSPRA) 25 mg tablet, take 1 tablet by mouth once daily, Disp: 30 tablet, Rfl: 3    furosemide (LASIX) 40 mg tablet, Take 1 tablet (40 mg total) by mouth 2 (two) times a day, Disp: 60 tablet, Rfl: 6    lisinopril (ZESTRIL) 40 mg tablet, take 1 tablet by mouth once daily at bedtime, Disp: 30 tablet, Rfl: 11    loratadine (CLARITIN) 10 mg tablet, Take 1 tablet (10 mg total) by mouth daily, Disp: 20 tablet, Rfl: 0    metoprolol succinate (TOPROL-XL) 50 mg 24 hr tablet, Take 1 tablet (50 mg total) by mouth every 12 (twelve) hours, Disp: 60 tablet, Rfl: 8    mirtazapine (REMERON) 45 MG tablet, Take 45 mg by mouth daily at bedtime, Disp: , Rfl: 0    naproxen (NAPROSYN) 500 mg tablet, Take 1 tablet (500 mg total) by mouth 2 (two) times a day with meals, Disp: 30 tablet, Rfl: 0    Potassium Chloride ER 20 MEQ TBCR, Take 1 tablet (20 mEq total) by mouth daily, Disp: 90 tablet, Rfl: 3    sodium chloride (OCEAN) 0 65 % nasal spray, 1 spray into each nostril 4 (four) times a day, Disp: 15 mL, Rfl: 0    warfarin (COUMADIN) 1 mg tablet, One daily or as directed by MD to obtain warfarin dose , Disp: 30 tablet, Rfl: 1    warfarin (COUMADIN) 4 mg tablet, Take 1 tablet (4 mg total) by mouth daily Take 4 mg and 2 mg alternating once daily( take 1 tablet and 1/2 tablet alternating once daily), Disp: 60 tablet, Rfl: 3    warfarin (COUMADIN) 4 mg tablet, take 1 tablet by mouth once daily as directed, Disp: 30 tablet, Rfl: 2    fluticasone (FLONASE) 50 mcg/act nasal spray, 1 spray into each nostril daily (Patient not taking: Reported on 2/17/2021), Disp: 16 g, Rfl: 0     Family History   Problem Relation Age of Onset    Diabetes Mother     Hypertension Mother     Valvular heart disease Mother     Valvular heart disease Father     Alcohol abuse Father      Social History     Socioeconomic History    Marital status: /Civil Union     Spouse name: Not on file    Number of children: Not on file    Years of education: Not on file    Highest education level: Not on file   Occupational History    Not on file   Social Needs    Financial resource strain: Not on file    Food insecurity     Worry: Not on file     Inability: Not on file   [employer] needs     Medical: Not on file     Non-medical: Not on file   Tobacco Use    Smoking status: Never Smoker    Smokeless tobacco: Never Used   Substance and Sexual Activity    Alcohol use: Not Currently    Drug use: No    Sexual activity: Not on file   Lifestyle    Physical activity     Days per week: Not on file     Minutes per session: Not on file    Stress: Not on file   Relationships    Social connections     Talks on phone: Not on file     Gets together: Not on file     Attends Restoration[employer] service: Not on file     Active member of club or organization: Not on file     Attends meetings of clubs or organizations: Not on file     Relationship status: Not on file    Intimate partner violence     Fear of current or ex partner: Not on file     Emotionally abused: Not on file     Physically abused: Not on file     Forced sexual activity: Not on file   Other Topics Concern    Not on file   Social History Narrative    Not on file     Social History     Tobacco Use   Smoking Status Never Smoker   Smokeless Tobacco Never Used     Social History     Substance and Sexual Activity   Alcohol Use Not Currently       Labs & Results:  Below is the patient's most recent value for Albumin, ALT, AST, BUN, Calcium, Chloride, Cholesterol, CO2, Creatinine, GFR, Glucose, HDL, Hematocrit, Hemoglobin, Hemoglobin A1C, LDL, Magnesium, Phosphorus, Platelets, Potassium, PSA, Sodium, Triglycerides, and WBC  Lab Results   Component Value Date    ALT 22 2019    AST 22 2019    BUN 13 2020    CALCIUM 8 6 2020     2020    CHOL 92 10/29/2015    CO2 29 2020    CREATININE 1 00 2020    HDL 45 2019    HCT 42 3 2018    HGB 13 3 2018    HGBA1C 5 6 2020    MG 2 0 2020     2018    K 4 1 2020    PSA 0 6 2019     2015     2015    TRIG 91 2019    WBC 8 47 2018     Note: for a comprehensive list of the patient's lab results, access the Results Review activity  Cardiac testing:   Results for orders placed during the hospital encounter of 21   Echo complete with contrast if indicated    Narrative [address] [place] [address]    Transthoracic Echocardiogram  2D, M-mode, Doppler, and Color Doppler    Study date:  18-Mar-2021    Patient: Reigna Xiong  MR number: [id number]  Account number: [de-identified]  : 1965  Age: 63 years  Gender: Male  Status: Outpatient  Location: [place]   Height: 66 in  Weight: 148 7 lb  BP: 112/ 58 mmHg    Indications: Ischemic Cardiomyopathy    Diagnoses: I25 5 - Ischemic cardiomyopathy    Sonographer:  Gail Moser  Interpreting Physician:  Ariana Hay DO  Primary Physician:  Marielle Weir DO  Group:  [facility]    SUMMARY    SUMMARY:  1  This is a technically adequate study  2  Left ventricle severely dilated with severely reduced systolic function  Left ventricular ejection fraction is estimated at 18%  3  Grade 2 diastolic dysfunction with echocardiographic indications of elevated left atrial pressures  4   Regional wall motion abnormalities consistent with coronary artery disease  5  Right ventricle is mildly dilated with mildly reduced systolic function  Probable pacemaker wire present  6  Severe biatrial dilatation  7  Mechanical valve is noted within the aortic position  Valve is well seated  Trace central aortic regurgitation  Normal velocities and gradients for valve with Vmax 1 85 m/s, EOA 1 38 cm2, DVI 0 44, acceleration time 66 ms  8  Mild posteriorly directed mitral regurgitation with fibrocalcific changes of the mitral valve and mild mitral annular calcification  9  Moderate tricuspid regurgitation with pulmonary artery systolic pressure is estimated at 60-65 mm Hg  10  Compared to report from January 8, 9935, grade 3 diastolic dysfunction is noted with elevated filling pressures and pulmonary hypertension    HISTORY: PRIOR HISTORY: CAD, AVR, Ischemic cardiomyopathy, HTN, HLD    PROCEDURE: The study was performed in the [facility]  This was a routine study  The transthoracic approach was used  The study included complete 2D imaging, M-mode, complete spectral Doppler, and color Doppler  The heart rate was 60  bpm, at the start of the study  Image quality was adequate  LEFT VENTRICLE: Left is severely dilated with severely reduced systolic function  Left ventricular ejection fraction is estimated at 18%  Normal wall thickness  Grade 3 diastolic dysfunction with echocardiographic indications of elevated  left atrial pressures  Increased trabeculations noted at the left ventricular apex  There is akinesis of the anterior and anterolateral wall from base to apex with dyskinetic apical wall and hypokinesis of the remaining walls  RIGHT VENTRICLE: Right ventricle is mildly dilated with mildly reduced systolic function  Linear echodensity noted within the right ventricle consistent with pacemaker/catheter    LEFT ATRIUM: Left atrium is severely dilated      ATRIAL SEPTUM: Interatrial septum is bowing toward the right atrium consistent with elevated left atrial pressures  RIGHT ATRIUM: Right atrium is severely dilated  MITRAL VALVE: Mitral valve opens well  Fibrocalcific changes of the mitral valve  No evidence of mitral stenosis  Mild posteriorly directed mitral regurgitation  AORTIC VALVE: There is a mechanical valve in the aortic position  The valve appears well seated  Trace central aortic regurgitation  Velocities and gradients are normal for valve  Vmax 1 85 m/s, mean PG 6 9 mmHg, EOA/ASHLEY 1 38 cm2, DVI  0 44, Acceleration time 66 ms, LVOTd 2cm, LVOT VTI 12 3, AV VTI 28 cm  TRICUSPID VALVE: Tricuspid valve opens well  Moderate tricuspid regurgitation with pulmonary artery systolic pressure is estimated at 60-65 mm Hg consistent with severe pulmonary hypertension  PULMONIC VALVE: Pulmonic valve not well visualized  No evidence of pulmonic stenosis or pulmonic regurgitation  PERICARDIUM: There is no evidence of significant pericardial effusion  AORTA: Aortic root is top-normal in size at 3 4 cm    SYSTEMIC VEINS: IVC: IVC is dilated with greater than 50% respirophasic collapse  SYSTEM MEASUREMENT TABLES    2D  %FS: 8 64 %  Ao Diam: 3 38 cm  EDV(Teich): 235 37 ml  EF(Teich): 18 56 %  ESV(Teich): 191 68 ml  IVSd: 0 97 cm  LA Area: 29 2 cm2  LA Diam: 4 78 cm  LVEDV MOD A4C: 260 34 ml  LVEF MOD A4C: 20 96 %  LVESV MOD A4C: 205 77 ml  LVIDd: 6 75 cm  LVIDs: 6 17 cm  LVLd A4C: 9 93 cm  LVLs A4C: 9 59 cm  LVOT Diam: 1 98 cm  LVPWd: 1 01 cm  RA Area: 23 cm2  RVIDd: 4 cm  SV MOD A4C: 54 57 ml  SV(Teich): 43 69 ml    CW  AV Env  Ti: 235 97 ms  AV VTI: 28 01 cm  AV Vmax: 1 85 m/s  AV Vmean: 1 19 m/s  AV maxP 71 mmHg  AV meanP 86 mmHg  TR Vmax: 3 67 m/s  TR maxP 96 mmHg    MM  TAPSE: 1 36 cm    PW  ASHLEY (VTI): 2 28 cm2  ASHLEY Vmax: 1 59 cm2  AVAI (VTI): 0 cm2/m2  AVAI Vmax: 0 cm2/m2  E' Sept: 0 06 m/s  E/E' Sept: 15 17  LVOT Env  Ti: 308 28 ms  LVOT VTI: 20 81 cm  LVOT Vmax: 0 96 m/s  LVOT Vmean: 0 67 m/s  LVOT maxPG: 3 7 mmHg  LVOT meanPG: 2 23 mmHg  LVSI Dopp: 36 28 ml/m2  LVSV Dopp: 63 86 ml  MV A [place]: 0 35 m/s  MV Dec [place]: 3 47 m/s2  MV DecT: 254 1 ms  MV E [place]: 0 88 m/s  MV E/A Ratio: 2 51  MV PHT: 73 69 ms  MVA By PHT: 2 99 cm2    [facility] Accredited Echocardiography Laboratory    Prepared and electronically signed by    Stephany Montenegro DO  Signed 18-Mar-2021 18:50:34       No results found for this or any previous visit  No results found for this or any previous visit  No results found for this or any previous visit

## 2021-06-02 NOTE — PROGRESS NOTES
Results for orders placed or performed in visit on 06/02/21   Cardiac EP device report    Narrative    MDT BI-V ICD/ NOT MRI CONDITIONAL  CARELINK TRANSMISSION: BATTERY VOLTAGE ADEQUATE (19 MOS)  AP-64%, BVP-96% (TOTAL -92 7%+VSR PACE-3 1%)  ALL AVAILABLE LEAD PARAMETERS WITHIN NORMAL LIMITS  NO SIGNIFICANT HIGH RATE EPISODES  17,689 VENT SENSING EPISODES- ST & FUSION BEATS  OPTI-VOL WITHIN NORMAL LIMITS  NORMAL DEVICE FUNCTION   GV

## 2021-06-04 NOTE — PATIENT INSTRUCTIONS
No change in medicines    Blood work needed- you are getting Monday    Please check daily weights and contact the heart failure program at [phone] if you gain 3 lb in one day or 5 lb in one week  Please limit daily sodium intake to 2000 mg daily  Please limit daily fluid intake to 2000 ml daily  Bring complete list of medications to your follow up appointment

## 2021-06-04 NOTE — PROGRESS NOTES
Heart Failure Office Note - [place] 63 y o  male MRN: [id number]    @ Encounter: [id number]      Assessment/Plan:    Patient Active Problem List    Diagnosis Date Noted    Ventricular tachycardia ([facility] ) 05/25/2021    Depressed mood 03/06/2020    S/P AVR (aortic valve replacement) 06/18/2018    Ventricular fibrillation ([facility] ) 06/18/2018    Biventricular ICD (implantable cardioverter-defibrillator) in place 06/18/2018    Colonic mass 06/03/2016    Cardiomyopathy ([facility] ) 06/03/2016    CAD (coronary artery disease) 06/03/2016     # Chronic HFrEF, Stage C, NYHA  Etiology: iCM    Weight: 147 lbs on 1/13, 142 today  NT proBNP:    Studies- personally reviewed by me  Echo 3/18/21:   LVEF: 18%, grade 2 DD  LVEDd:6 8 cm  RV: mildly dilated, mildly reduced  Mechanical AV, normal velocities  Moderate TR  PASP: 60-65 mmHg    Echocardiogram 1/8/20  LVEF: 15-20%  LVIDd: 5 7 cm  RV: mildly enlarged, lower RVSF, TAPSE 1 6  MR: mild to moderate  PASP:  RVOT:   Other: normal mechanical aortic valve function    Nuclear stress test 03/13/2017: Non-diagnostic stress EKG  "Large, severe, fixed myocardial perfusion defect of the entire anterior wall, with a complete fixed perfusion defect of the apex without reversibility and of the entire inferior wall, extending to the mid-basal inferolateral wall without reversibility " LVEF 18%  Lab Results   Component Value Date    NTBNP 1,952 (H) 12/05/2017        Neurohormonal Blockade:  --Beta-Blocker: metoprolol 50 mg Q12  --ACEi, ARB or ARNi: lisinopril 40 mg daily  --Aldosterone Receptor Blocker: epleronone 25 mg daily  --Diuretic: lasix 40 mg BID with potassium 20 meq daily    Sudden Cardiac Death Risk Reduction:  --ICD: MDT BiV    Electrical Resynchronization:  --Medtronic BiV ICD in situ since 02/24/2015    Baseline  msec  Response: negative, good pacing percentage, somewhat wide QRS at 180 msec- wider than native; also large scar burden  Interrogation: 6/2/21: BVP 96%, no sign high rate episodes, optivol normal    Advanced Therapies (if appropriate): --Inotrope:  --LVAD/Transplant Candidacy:  He is now 5 years from colon cancer    # CAD- s/p PCI with BMS to LAD and LCx in 2014  Nuclear stress test 03/13/2017: Non-diagnostic stress EKG  "Large, severe, fixed myocardial perfusion defect of the entire anterior wall, with a complete fixed perfusion defect of the apex without reversibility and of the entire inferior wall, extending to the mid-basal inferolateral wall without reversibility " LVEF 18%  # hyperlipidemia- atorvastatin  04/26/2019 cholesterol 118; TG 91; HDL 45; direct LDL 55    # Hx of ventricular fibrillation/ ventricular tachycardia  Amiodarone 100 mg daily, metoprolol 50 mg Q12  12/3- had 13 second run of VT treated with ATP (metoprolol since increased)    # Amiodarone use  3/9/21: corrected DLCO 72%  2019: DLCO 79%    # Rheumatic heart disease/ aortic stenosis  S/p mechanical AVR in 1980's  coumadin    # hx of colon CA- s/p colon resection on 6/2/16    Today's Plan:  Needs labs- getting Monday  GDMT is optimized  --2g sodium diet  Weights daily    HPI:      64 yo male with chronic HFrEF, iCM (s/p BiV ICD in 2015), CAD (s/p BMS to LAD and left Cx in 2014), rheumatic heart disease, AS (s/p mechanical AVR), history of VF, HTN and HLD  Patient had a 13 second run of VT on 12/03/2019 that was successfully treated with ATP  Patient reports being asymptomatic with this  Acute visit was then scheduled for 01/13/2019  No family history of SCD  Patient is a never smoker and reports drinking about 3-4 light beers monthly only at social events  Reports remote history of drug use in the 1990s (used heroin, cocaine, marijuana); denies any current drug use  Patient lives with his girlfriend  Has 4 children who all live relatively nearby  Previously worked in construction but now watches his 7 grandchildren (ranging from an infant to 10years old) during the day      From a symptoms standpoint he feels fine, no shortness of breath, no orthopnea  Interim:  Interrogation: 6/2/21: BVP 96%, no sign high rate episodes, optivol normal  Echo 3/18/21:   LVEF: 18%, grade 2 DD  LVEDd:6 8 cm  RV: mildly dilated, mildly reduced  Mechanical AV, normal velocities  Moderate TR  PASP: 60-65 mmHg    States his breathing is ok, no edema, no chest pain, no syncope    Past Medical History:   Diagnosis Date    AICD (automatic cardioverter/defibrillator) present     medtronic     CAD (coronary artery disease)     Cancer ([facility] )     Cardiac disease     Cardiomyopathy ([place] [address] )     mixed  predominently nonischemic    Colonic mass     Coronary artery disease     Hyperlipidemia     Hypertension     Irregular heart beat     Myocardial infarction ([place] [address] )     2014 and 2015    Rectal bleeding     S/P AVR (aortic valve replacement)     mechanical    Wears glasses        Review of Systems   Constitutional: Negative for activity change, appetite change, fatigue and unexpected weight change  HENT: Negative for congestion and nosebleeds  Eyes: Negative  Respiratory: Negative for cough, chest tightness and shortness of breath  Cardiovascular: Negative for chest pain, palpitations and leg swelling  Gastrointestinal: Negative for abdominal distention  Endocrine: Negative  Genitourinary: Negative  Musculoskeletal: Negative  Skin: Negative  Neurological: Negative for dizziness, syncope and weakness  Hematological: Negative  Psychiatric/Behavioral: Negative  No Known Allergies    Current Outpatient Medications:     aspirin 81 MG tablet, Take 81 mg by mouth daily  , Disp: , Rfl:     atorvastatin (LIPITOR) 80 mg tablet, take 1 tablet by mouth once daily, Disp: 30 tablet, Rfl: 6    cyclobenzaprine (FLEXERIL) 10 mg tablet, Take 1 tablet (10 mg total) by mouth 2 (two) times a day as needed for muscle spasms, Disp: 20 tablet, Rfl: 0    eplerenone (INSPRA) 25 mg tablet, take 1 tablet by mouth once daily, Disp: 30 tablet, Rfl: 3    fluticasone (FLONASE) 50 mcg/act nasal spray, 1 spray into each nostril daily (Patient not taking: Reported on 2/17/2021), Disp: 16 g, Rfl: 0    furosemide (LASIX) 40 mg tablet, Take 1 tablet (40 mg total) by mouth 2 (two) times a day, Disp: 60 tablet, Rfl: 6    lisinopril (ZESTRIL) 40 mg tablet, take 1 tablet by mouth once daily at bedtime, Disp: 30 tablet, Rfl: 11    loratadine (CLARITIN) 10 mg tablet, Take 1 tablet (10 mg total) by mouth daily, Disp: 20 tablet, Rfl: 0    metoprolol succinate (TOPROL-XL) 50 mg 24 hr tablet, Take 1 tablet (50 mg total) by mouth every 12 (twelve) hours, Disp: 60 tablet, Rfl: 8    mirtazapine (REMERON) 45 MG tablet, Take 45 mg by mouth daily at bedtime, Disp: , Rfl: 0    naproxen (NAPROSYN) 500 mg tablet, Take 1 tablet (500 mg total) by mouth 2 (two) times a day with meals, Disp: 30 tablet, Rfl: 0    Potassium Chloride ER 20 MEQ TBCR, Take 1 tablet (20 mEq total) by mouth daily, Disp: 90 tablet, Rfl: 3    sodium chloride (OCEAN) 0 65 % nasal spray, 1 spray into each nostril 4 (four) times a day, Disp: 15 mL, Rfl: 0    warfarin (COUMADIN) 1 mg tablet, One daily or as directed by MD to obtain warfarin dose , Disp: 30 tablet, Rfl: 1    warfarin (COUMADIN) 4 mg tablet, Take 1 tablet (4 mg total) by mouth daily Take 4 mg and 2 mg alternating once daily( take 1 tablet and 1/2 tablet alternating once daily), Disp: 60 tablet, Rfl: 3    warfarin (COUMADIN) 4 mg tablet, take 1 tablet by mouth once daily as directed, Disp: 30 tablet, Rfl: 2    Social History     Socioeconomic History    Marital status: /Civil Union     Spouse name: Not on file    Number of children: Not on file    Years of education: Not on file    Highest education level: Not on file   Occupational History    Not on file   Social Needs    Financial resource strain: Not on file    Food insecurity     Worry: Not on file     Inability: Not on file    Transportation needs     Medical: Not on file     Non-medical: Not on file   Tobacco Use    Smoking status: Never Smoker    Smokeless tobacco: Never Used   Substance and Sexual Activity    Alcohol use: Not Currently    Drug use: No    Sexual activity: Not on file   Lifestyle    Physical activity     Days per week: Not on file     Minutes per session: Not on file    Stress: Not on file   Relationships    Social connections     Talks on phone: Not on file     Gets together: Not on file     Attends [employer] service: Not on file     Active member of club or organization: Not on file     Attends meetings of clubs or organizations: Not on file     Relationship status: Not on file    Intimate partner violence     Fear of current or ex partner: Not on file     Emotionally abused: Not on file     Physically abused: Not on file     Forced sexual activity: Not on file   Other Topics Concern    Not on file   Social History Narrative    Not on file       Family History   Problem Relation Age of Onset    Diabetes Mother     Hypertension Mother     Valvular heart disease Mother     Valvular heart disease Father     Alcohol abuse Father        Physical Exam:    Vitals: There were no vitals taken for this visit  , There is no height or weight on file to calculate BMI ,   Wt Readings from Last 3 Encounters:   05/25/21 61 7 kg (136 lb)   02/17/21 67 9 kg (149 lb 9 6 oz)   08/24/20 63 2 kg (139 lb 5 3 oz)       Physical Exam  Constitutional:       Appearance: He is well-developed  HENT:      Head: Normocephalic and atraumatic  Eyes:      Pupils: Pupils are equal, round, and reactive to light  Neck:      Musculoskeletal: Normal range of motion  Vascular: No JVD  Cardiovascular:      Rate and Rhythm: Normal rate and regular rhythm  Heart sounds: No murmur  Pulmonary:      Effort: Pulmonary effort is normal  No respiratory distress  Breath sounds: Normal breath sounds     Abdominal: General: There is no distension  Palpations: Abdomen is soft  Tenderness: There is no abdominal tenderness  Musculoskeletal: Normal range of motion  Skin:     General: Skin is warm and dry  Findings: No rash  Neurological:      Mental Status: He is alert and oriented to person, place, and time  Labs & Results:    Lab Results   Component Value Date    WBC 8 47 09/07/2018    HGB 13 3 09/07/2018    HCT 42 3 09/07/2018    MCV 89 09/07/2018     09/07/2018     Lab Results   Component Value Date    SODIUM 137 01/23/2020    K 4 1 01/23/2020     01/23/2020    CO2 29 01/23/2020    BUN 13 01/23/2020    CREATININE 1 00 01/23/2020    GLUC 114 12/19/2017    CALCIUM 8 6 01/23/2020     Lab Results   Component Value Date    NTBNP 1,952 (H) 12/05/2017      Lab Results   Component Value Date    CHOLESTEROL 118 04/26/2019    CHOLESTEROL 126 07/21/2016     Lab Results   Component Value Date    HDL 45 04/26/2019    HDL 48 07/21/2016    HDL 39 10/29/2015     Lab Results   Component Value Date    TRIG 91 04/26/2019    TRIG 177 (H) 07/21/2016    TRIG 67 10/29/2015     Lab Results   Component Value Date    [place] 73 04/26/2019       EKG personally reviewed by Rhona Hooper DO  Counseling / Coordination of Care  Time spent today 25 minutes  Greater than 50% of total time was spent with the patient and / or family counseling and / or coordination of care  We discussed diagnoses, most recent studies, tests and any changes in treatment plan    Thank you for the opportunity to participate in the care of this patient        [address] [facility]  MEDICAL DIRECTOR OF [facility]

## 2021-07-16 NOTE — TELEPHONE ENCOUNTER
Tc to pt, left message to have pt/inr drawn  asap  He is over due for inr testing  Patient to call if any questions

## 2021-07-27 NOTE — TELEPHONE ENCOUNTER
To pt, called phone number listed, rodri answered phone  She stattes she is his x girlfriend  He no longer lives with her, however , she does help patient with messages  I had called the cell phone listed in chart, it is no longer in service  Requested she have patine t call our office or go to lab asap for pt/inr tetsing  Will also mail letter to listed address as reminder to have pt/inr done asap  Glenda Payne verified current address in chart is correct

## 2021-08-04 NOTE — TELEPHONE ENCOUNTER
Tc to phone number listed on chart, [phone], rodri answered phone, identifies as x girlfriend  See telephone note from 7/27/21  '  Per rodri, she did tell patient to go for blood work , patient aware, but we have not received any blood work  Questioned rodri if patient has a phone number we can call to speak with him directly  She reports he does not, she speaks with him in person  Attempted to call aslternate phone number on chart, [phone], received message subscriber is not in service

## 2021-09-02 PROBLEM — R65.10 SIRS (SYSTEMIC INFLAMMATORY RESPONSE SYNDROME) (HCC): Status: ACTIVE | Noted: 2021-01-01

## 2021-09-02 PROBLEM — R77.8 ELEVATED TROPONIN: Status: ACTIVE | Noted: 2021-01-01

## 2021-09-02 PROBLEM — R91.8 INFILTRATE OF RIGHT LUNG PRESENT ON CHEST X-RAY: Status: ACTIVE | Noted: 2021-01-01

## 2021-09-02 PROBLEM — Z86.79 HISTORY OF VENTRICULAR TACHYCARDIA: Status: ACTIVE | Noted: 2021-01-01

## 2021-09-02 PROBLEM — I50.43 ACUTE ON CHRONIC COMBINED SYSTOLIC AND DIASTOLIC HEART FAILURE (HCC): Status: ACTIVE | Noted: 2021-01-01

## 2021-09-02 PROBLEM — F14.90 COCAINE USE: Status: ACTIVE | Noted: 2021-01-01

## 2021-09-02 PROBLEM — E87.2 LACTIC ACIDOSIS: Status: ACTIVE | Noted: 2021-01-01

## 2021-09-02 PROBLEM — Z86.79 HISTORY OF VENTRICULAR FIBRILLATION: Status: ACTIVE | Noted: 2021-01-01

## 2021-09-02 PROBLEM — J18.9 COMMUNITY ACQUIRED PNEUMONIA OF RIGHT LUNG: Status: ACTIVE | Noted: 2021-01-01

## 2021-09-02 NOTE — ASSESSMENT & PLAN NOTE
Wt Readings from Last 3 Encounters:   09/02/21 62 3 kg (137 lb 5 6 oz)   06/04/21 62 9 kg (138 lb 9 6 oz)   05/25/21 61 7 kg (136 lb)     · LVEF 18%, severely reduced systolic function, P5NK  · BNP >10,000  Does not examine fluid overloaded; last BNP in 2017 >1900  · CXR shows vascular congestion, infiltrate R>L, follow-up official report  · Maintained on furosemide 40mg BID, ACEI and BB   Will start IV Lasix 40mg BID  · Monitor daily weight, I&O  · Low Na diet, 1500 mL fluid restriction  · Cardiology consult

## 2021-09-02 NOTE — ED PROVIDER NOTES
History  Chief Complaint   Patient presents with    Shortness of Breath     Pt states sob starting around 11pm  Denies cp  Per EMS, diaphoretic, tachypnic, tachycardic  15mg cardizem given in route  79-year-old male with relevant past medical history significant for ischemic cardiomyopathy and secondary HFrEF (EF 15-20% on ECHO from 03/21), coronary artery disease s/p PCI with BMS to LAD and LCx (2014) on warfarin, rheumatic heart disease and aortic stenosis s/p AVR, ventricular fibrillation and tachycardia, BiV medtronic ICD in place, hyperlipidemia, hypertension, colon cancer (resection in 2016), and drug abuse (1990s) who presents to the emergency department via EMS for complaint of shortness of breath  Patient reports he laid down to bed at approximately 11PM and had sudden onset shortness of breath, palpitations, and diaphoresis  Patient was in his normal state of health prior to bedtime  Was given Cardizem 15mg IV en route to ER  States he feels better since medication but continues to be short of breath  Denies any accompanying chest pain, dizziness, near syncope or syncope, abdominal pain, nausea or vomiting, leg swelling, cough, congestion  There is no hx of COVID 19 infection or vaccination  Reports he has been compliant with all medications  Denies illicit drug use  Prior to Admission Medications   Prescriptions Last Dose Informant Patient Reported? Taking? Potassium Chloride ER 20 MEQ TBCR  Self No Yes   Sig: Take 1 tablet (20 mEq total) by mouth daily   aspirin 81 MG tablet  Self Yes Yes   Sig: Take 81 mg by mouth daily     atorvastatin (LIPITOR) 80 mg tablet   No Yes   Sig: take 1 tablet by mouth once daily   cyclobenzaprine (FLEXERIL) 10 mg tablet Not Taking at Unknown time Self No No   Sig: Take 1 tablet (10 mg total) by mouth 2 (two) times a day as needed for muscle spasms   Patient not taking: Reported on 6/4/2021   eplerenone (INSPRA) 25 mg tablet   No Yes   Sig: take 1 tablet by mouth once daily   fluticasone (FLONASE) 50 mcg/act nasal spray Not Taking at Unknown time Self No No   Si spray into each nostril daily   Patient not taking: Reported on 2021   furosemide (LASIX) 40 mg tablet Not Taking at Unknown time Self No No   Sig: Take 1 tablet (40 mg total) by mouth 2 (two) times a day   Patient not taking: Reported on 2021   lisinopril (ZESTRIL) 40 mg tablet  Self No Yes   Sig: take 1 tablet by mouth once daily at bedtime   loratadine (CLARITIN) 10 mg tablet  Self No Yes   Sig: Take 1 tablet (10 mg total) by mouth daily   metoprolol succinate (TOPROL-XL) 50 mg 24 hr tablet   No Yes   Sig: take 1 tablet by mouth every 12 hours   mirtazapine (REMERON) 45 MG tablet  Self Yes Yes   Sig: Take 45 mg by mouth daily at bedtime   naproxen (NAPROSYN) 500 mg tablet Not Taking at Unknown time Self No No   Sig: Take 1 tablet (500 mg total) by mouth 2 (two) times a day with meals   Patient not taking: Reported on 2021   sodium chloride (OCEAN) 0 65 % nasal spray Not Taking at Unknown time Self No No   Si spray into each nostril 4 (four) times a day   Patient not taking: Reported on 2021   warfarin (COUMADIN) 1 mg tablet  Self No Yes   Sig: One daily or as directed by MD to obtain warfarin dose  warfarin (COUMADIN) 4 mg tablet  Self No Yes   Sig: Take 1 tablet (4 mg total) by mouth daily Take 4 mg and 2 mg alternating once daily( take 1 tablet and 1/2 tablet alternating once daily)   warfarin (COUMADIN) 4 mg tablet  Self No Yes   Sig: take 1 tablet by mouth once daily as directed      Facility-Administered Medications: None       Past Medical History:   Diagnosis Date    AICD (automatic cardioverter/defibrillator) present     [employer]     CAD (coronary artery disease)     Cancer ([facility] )     Cardiac disease     Cardiomyopathy ([facility] )     mixed    predominently nonischemic    Colonic mass     Coronary artery disease     Hyperlipidemia     Hypertension     Irregular heart beat     Myocardial infarction ([facility] )     2014 and 2015    Rectal bleeding     S/P AVR (aortic valve replacement)     mechanical    Wears glasses        Past Surgical History:   Procedure Laterality Date    AORTIC VALVE REPLACEMENT      APPENDECTOMY      CARDIAC DEFIBRILLATOR PLACEMENT      COLECTOMY MADELIN Right     Last Assessed: 6/7/2016    COLECTOMY LAPAROSCOPIC      COLON SURGERY Right     colectomy    CORONARY STENT PLACEMENT      INSERT / REPLACE / REMOVE PACEMAKER      MITRAL VALVE REPAIR      MITRAL VALVE REPAIR      MOUTH SURGERY      AL COLONOSCOPY FLX DX W/COLLJ [address] WHEN PFRMD N/A 1/15/2018    Procedure: COLONOSCOPY;  Surgeon: Saran Saldivar MD;  Location: [facility]; Service: General    AL LAP,SURG,COLECTOMY, PARTIAL, W/ANAST N/A 6/2/2016    Procedure: RESECTION COLON RIGHT LAPAROSCOPIC HAND-ASSISTED;  Surgeon: Saran Saldivar MD;  Location: [facility];  Service: General       Family History   Problem Relation Age of Onset    Diabetes Mother     Hypertension Mother     Valvular heart disease Mother     Valvular heart disease Father     Alcohol abuse Father      I have reviewed and agree with the history as documented  E-Cigarette/Vaping     E-Cigarette/Vaping Substances     Social History     Tobacco Use    Smoking status: Never Smoker    Smokeless tobacco: Never Used   Substance Use Topics    Alcohol use: Not Currently    Drug use: No       Review of Systems   Constitutional: Positive for diaphoresis  Negative for activity change, appetite change, chills, fatigue and fever  HENT: Negative for congestion, rhinorrhea and sore throat  Eyes: Negative for visual disturbance  Respiratory: Positive for shortness of breath  Negative for cough, choking, chest tightness, wheezing and stridor  Cardiovascular: Positive for palpitations  Negative for chest pain and leg swelling  Gastrointestinal: Negative for abdominal pain, nausea and vomiting     Genitourinary: Negative for decreased urine volume, difficulty urinating, dysuria, frequency, hematuria and urgency  Musculoskeletal: Negative for back pain, myalgias, neck pain and neck stiffness  Skin: Negative for color change and rash  Neurological: Negative for dizziness, syncope, weakness, light-headedness and headaches  Hematological: Negative for adenopathy  All other systems reviewed and are negative  Physical Exam  Physical Exam  Vitals reviewed  Constitutional:       General: He is awake  He is not in acute distress  Appearance: Normal appearance  He is well-developed  He is not ill-appearing or toxic-appearing  HENT:      Head: Normocephalic and atraumatic  Mouth/Throat:      Lips: Pink  Mouth: Mucous membranes are moist       Pharynx: Oropharynx is clear  Uvula midline  Eyes:      Extraocular Movements: Extraocular movements intact  Conjunctiva/sclera: Conjunctivae normal       Pupils: Pupils are equal, round, and reactive to light  Cardiovascular:      Rate and Rhythm: Normal rate and regular rhythm  Pulses: Normal pulses  Pulmonary:      Effort: Tachypnea and respiratory distress present  No accessory muscle usage or retractions  Breath sounds: Normal air entry  No stridor or decreased air movement  Examination of the right-upper field reveals rales  Examination of the right-middle field reveals rales  Examination of the right-lower field reveals rales  Rales present  No decreased breath sounds, wheezing or rhonchi  Abdominal:      General: Bowel sounds are normal  There is no distension  Palpations: Abdomen is soft  There is no hepatomegaly, splenomegaly or mass  Tenderness: There is no abdominal tenderness  Musculoskeletal:         General: Normal range of motion  Cervical back: Full passive range of motion without pain, normal range of motion and neck supple  Right lower leg: No edema  Left lower leg: No edema     Skin:     General: Skin is warm  Capillary Refill: Capillary refill takes less than 2 seconds  Findings: No erythema, lesion or rash  Neurological:      Mental Status: He is alert and oriented to person, place, and time  Psychiatric:         Behavior: Behavior is cooperative           Vital Signs  ED Triage Vitals   Temperature Pulse Respirations Blood Pressure SpO2   09/02/21 0328 09/02/21 0328 09/02/21 0328 09/02/21 0328 09/02/21 0328   97 9 °F (36 6 °C) (!) 110 20 (!) 161/102 100 %      Temp Source Heart Rate Source Patient Position - Orthostatic VS BP Location FiO2 (%)   09/02/21 0328 09/02/21 0328 09/02/21 0328 09/02/21 0328 --   Oral Monitor Sitting Right arm       Pain Score       09/02/21 0353       No Pain           Vitals:    09/02/21 0500 09/02/21 0600 09/02/21 0731 09/02/21 1528   BP: 125/83 132/80 132/86 131/85   Pulse: 88 88 102 90   Patient Position - Orthostatic VS: Sitting Lying Lying          Visual Acuity      ED Medications  Medications   heparin (porcine) 25,000 units in 0 45% NaCl 250 mL infusion (premix) (16 Units/kg/hr × 60 kg (Order-Specific) Intravenous Rate/Dose Change 9/2/21 1848)   aspirin (ECOTRIN LOW STRENGTH) EC tablet 81 mg (81 mg Oral Given 9/2/21 0914)   atorvastatin (LIPITOR) tablet 80 mg (80 mg Oral Given 9/2/21 1712)   eplerenone (INSPRA) tablet 25 mg (25 mg Oral Given 9/2/21 0946)   lisinopril (ZESTRIL) tablet 40 mg (has no administration in time range)   metoprolol succinate (TOPROL-XL) 24 hr tablet 50 mg (50 mg Oral Given 9/2/21 0914)   mirtazapine (REMERON) tablet 45 mg (has no administration in time range)   potassium chloride (K-DUR,KLOR-CON) CR tablet 20 mEq (20 mEq Oral Given 9/2/21 0914)   sodium chloride (OCEAN) 0 65 % nasal spray 1 spray (has no administration in time range)   acetaminophen (TYLENOL) tablet 650 mg (has no administration in time range)   ondansetron (ZOFRAN) injection 4 mg (has no administration in time range)   aluminum-magnesium hydroxide-simethicone (MYLANTA) oral suspension 30 mL (has no administration in time range)   simethicone (MYLICON) chewable tablet 80 mg (has no administration in time range)   furosemide (LASIX) injection 40 mg (40 mg Intravenous Given 9/2/21 1712)   warfarin (COUMADIN) tablet 4 mg (4 mg Oral Given 9/2/21 1712)   cefTRIAXone (ROCEPHIN) 1,000 mg in dextrose 5 % 50 mL IVPB (1,000 mg Intravenous New Bag 9/2/21 1018)   diltiazem (CARDIZEM) injection 15 mg (0 mg Intravenous Given to EMS 9/2/21 0328)   aspirin chewable tablet 324 mg (324 mg Oral Given 9/2/21 0343)   cefepime (MAXIPIME) 2 g/50 mL dextrose IVPB (0 mg Intravenous Stopped 9/2/21 0502)   heparin (porcine) injection 3,600 Units (3,600 Units Intravenous Given 9/2/21 0507)       Diagnostic Studies  Results Reviewed     Procedure Component Value Units Date/Time    Rapid drug screen, urine [[id number]]  (Abnormal) Collected: 09/02/21 0915    Lab Status: Final result Specimen: Urine, Clean Catch Updated: 09/02/21 0949     Amph/Meth UR Negative     Barbiturate Ur Negative     Benzodiazepine Urine Negative     Cocaine Urine Positive     Methadone Urine Negative     Opiate Urine Negative     PCP Ur Negative     THC Urine Positive     Oxycodone Urine Negative    Narrative:      Presumptive report  If requested, specimen will be sent to reference lab for confirmation  FOR MEDICAL PURPOSES ONLY  IF CONFIRMATION NEEDED PLEASE CONTACT THE LAB WITHIN 5 DAYS      Drug Screen Cutoff Levels:  AMPHETAMINE/METHAMPHETAMINES  1000 ng/mL  BARBITURATES     200 ng/mL  BENZODIAZEPINES     200 ng/mL  COCAINE      300 ng/mL  METHADONE      300 ng/mL  OPIATES      300 ng/mL  PHENCYCLIDINE     25 ng/mL  THC       50 ng/mL  OXYCODONE      100 ng/mL    Procalcitonin with AM Reflex [[id number]]  (Normal) Collected: 09/02/21 0406    Lab Status: Final result Specimen: Blood from Arm, Right Updated: 09/02/21 0804     Procalcitonin <0 05 ng/ml     Blood culture #1 [[id number]] Collected: 09/02/21 0400    Lab Status: Preliminary result Specimen: Blood from Arm, Right Updated: 09/02/21 0801     Blood Culture Received in Microbiology Lab  Culture in Progress  Blood culture #2 [[id number]] Collected: 09/02/21 0406    Lab Status: Preliminary result Specimen: Blood from Arm, Right Updated: 09/02/21 0801     Blood Culture Received in Microbiology Lab  Culture in Progress  Troponin I repeat in 3hrs [[id number]]  (Abnormal) Collected: 09/02/21 0734    Lab Status: Final result Specimen: Blood from Arm, Right Updated: 09/02/21 0758     Troponin I 0 08 ng/mL     Lactic acid 2 Hours [[id number]]  (Normal) Collected: 09/02/21 4250    Lab Status: Final result Specimen: Blood from Arm, Right Updated: 09/02/21 0643     LACTIC ACID 1 2 mmol/L     Narrative:      Result may be elevated if tourniquet was used during collection  APTT six (6) hours after Heparin bolus/drip initiation or dosing change [[id number]]  (Abnormal) Collected: 09/02/21 0507    Lab Status: Final result Specimen: Blood from Arm, Right Updated: 09/02/21 0540     PTT 39 seconds     Lactic acid [[id number]]  (Abnormal) Collected: 09/02/21 0405    Lab Status: Final result Specimen: Blood from Arm, Right Updated: 09/02/21 0506     LACTIC ACID 2 4 mmol/L     Narrative:      Result may be elevated if tourniquet was used during collection      Hepatic function panel [[id number]]  (Abnormal) Collected: 09/02/21 0345    Lab Status: Final result Specimen: Blood from Arm, Right Updated: 09/02/21 0448     Total Bilirubin 0 95 mg/dL      Bilirubin, Direct 0 35 mg/dL      Alkaline Phosphatase 105 U/L      AST 24 U/L      ALT 19 U/L      Total Protein 7 7 g/dL      Albumin 3 6 g/dL     NT-BNP PRO [[id number]]  (Abnormal) Collected: 09/02/21 0345    Lab Status: Final result Specimen: Blood from Arm, Right Updated: 09/02/21 0448     NT-proBNP 10,329 pg/mL     Novel Coronavirus (Covid-19),PCR [facility] - 2 Hour Stat [[id number]]  (Normal) Collected: 09/02/21 0345    Lab Status: Final result Specimen: Nares from Nose Updated: 09/02/21 0447     SARS-CoV-2 Negative    Narrative: The specimen collection materials, transport medium, and/or testing methodology utilized in the production of these test results have been proven to be reliable in a limited validation with an abbreviated program under the Emergency Utilization Authorization provided by the FDA  Testing reported as "Presumptive positive" will be confirmed with secondary testing to ensure result accuracy  Clinical caution and judgement should be used with the interpretation of these results with consideration of the clinical impression and other laboratory testing  Testing reported as "Positive" or "Negative" has been proven to be accurate according to standard laboratory validation requirements  All testing is performed with control materials showing appropriate reactivity at standard intervals        Protime-INR [[id number]]  (Abnormal) Collected: 09/02/21 0345    Lab Status: Final result Specimen: Blood from Arm, Right Updated: 09/02/21 0431     Protime 14 8 seconds      INR 1 18    APTT [[id number]]  (Normal) Collected: 09/02/21 0345    Lab Status: Final result Specimen: Blood from Arm, Right Updated: 09/02/21 0431     PTT 35 seconds     Troponin I [[id number]]  (Abnormal) Collected: 09/02/21 0345    Lab Status: Final result Specimen: Blood from Arm, Right Updated: 09/02/21 0418     Troponin I 0 05 ng/mL     Basic metabolic panel [[id number]]  (Abnormal) Collected: 09/02/21 0345    Lab Status: Final result Specimen: Blood from Arm, Right Updated: 09/02/21 0415     Sodium 137 mmol/L      Potassium 3 9 mmol/L      Chloride 98 mmol/L      CO2 26 mmol/L      ANION GAP 13 mmol/L      BUN 11 mg/dL      Creatinine 1 14 mg/dL      Glucose 214 mg/dL      Calcium 8 9 mg/dL      eGFR 71 ml/min/1 73sq m     Narrative:      [place] guidelines for Chronic Kidney Disease (CKD):     Stage 1 with normal or high GFR (GFR > 90 mL/min/1 73 square meters)    Stage 2 Mild CKD (GFR = 60-89 mL/min/1 73 square meters)    Stage 3A Moderate CKD (GFR = 45-59 mL/min/1 73 square meters)    Stage 3B Moderate CKD (GFR = 30-44 mL/min/1 73 square meters)    Stage 4 Severe CKD (GFR = 15-29 mL/min/1 73 square meters)    Stage 5 End Stage CKD (GFR <15 mL/min/1 73 square meters)  Note: GFR calculation is accurate only with a steady state creatinine    CBC and differential [[id number]]  (Abnormal) Collected: 09/02/21 0345    Lab Status: Final result Specimen: Blood from Arm, Right Updated: 09/02/21 0357     WBC 10 61 Thousand/uL      RBC 4 92 Million/uL      Hemoglobin 14 0 g/dL      Hematocrit 43 7 %      MCV 89 fL      MCH 28 5 pg      MCHC 32 0 g/dL      RDW 13 9 %      MPV 11 1 fL      Platelets 449 Thousands/uL      nRBC 0 /100 WBCs      Neutrophils Relative 71 %      Immat GRANS % 1 %      Lymphocytes Relative 16 %      Monocytes Relative 10 %      Eosinophils Relative 1 %      Basophils Relative 1 %      Neutrophils Absolute 7 73 Thousands/µL      Immature Grans Absolute 0 06 Thousand/uL      Lymphocytes Absolute 1 65 Thousands/µL      Monocytes Absolute 1 04 Thousand/µL      Eosinophils Absolute 0 08 Thousand/µL      Basophils Absolute 0 05 Thousands/µL                  XR chest 1 view portable   ED Interpretation by Diego Noe PA-C (09/02 0357)   RLL infiltrate and hazy right heart border      Final Result by Zac Kenney DO (09/02 4141)      Cardiomegaly with moderate CHF/pulmonary edema  Workstation performed: [id number]                    Procedures  Procedures         ED Course  ED Course as of Sep 02 1924   Thu Sep 02, 2021   4916 X-ray suspicious for RLL pneumonia  Sepsis labs and empiric abx ordered  Will hold sepsis fluid bolus, given hx of cardiomyopathy and heart failure  Patient may be experiencing worsening cardiomyopathy, heart failure, pulmonary edema, or cardiogenic shock    Patient has chronic HFrEF, Stage C, NYHA  Therefore 30 mL/kg crystalloid bolus would be harmful  Patient to receive 0 volume of crystalloid at this time  MDM  Number of Diagnoses or Management Options  Right lower lobe pulmonary infiltrate  Severe sepsis (HCC)  SOB (shortness of breath)  Diagnosis management comments: On exam, tachypneic male in mild respiratory distress, nontoxic appearance, afebrile, tachycardic, hypertensive, no hypoxia, awake alert and oriented, able to speak multiple words in a row but appears more short of breath, +rales, occasional dry cough, remainder of exam as above  Consider focal pneumonia, viral illness (COVID 19), ACS, acute heart failure, pulmonary edema, PE  Anticipate admission given clinical appearance, age, and comorbidities  Will check labs, CXR, COVID test   HR average low 100s-110s, sinus tachycardia with frequent PVCs on EKG  Amount and/or Complexity of Data Reviewed  Clinical lab tests: reviewed and ordered  Tests in the radiology section of CPT®: reviewed and ordered  Tests in the medicine section of CPT®: ordered and reviewed  Discussion of test results with the performing providers: yes  Decide to obtain previous medical records or to obtain history from someone other than the patient: yes  Obtain history from someone other than the patient: yes  Review and summarize past medical records: yes  Discuss the patient with other providers: yes  Independent visualization of images, tracings, or specimens: yes    Patient Progress  Patient progress: stable (See ED course note for dispo and plan  I reviewed and discussed all lab and imaging findings with the patient at bedside  I answered any and all questions the patient had regarding emergency department course of evaluation and treatment   The patient verbalized understanding of and agreement with plan   )      Disposition  Final diagnoses:   SOB (shortness of breath)   Right lower lobe pulmonary infiltrate   Severe sepsis ([facility] )     Time reflects when diagnosis was documented in both MDM as applicable and the Disposition within this note     Time User Action Codes Description Comment    9/2/2021  5:08 AM Dallin Spikes Add [R06 02] SOB (shortness of breath)     9/2/2021  5:09 AM Dallin Spikes Add [R91 8] Right lower lobe pulmonary infiltrate     9/2/2021  5:09 AM Dallin Spikes Add [A41 9] Sepsis ([facility] )     9/2/2021  5:11 AM Dallin Spikes Add [A41 9,  R65 20] Severe sepsis ([facility] )     9/2/2021  5:11 AM Dallin Spikes Remove [A41 9] Sepsis ([facility] )     9/2/2021  6:15 AM Tasha Sieve Add [I50 43] Acute on chronic combined systolic and diastolic heart failure ([facility] )     9/2/2021  6:15 AM Tasha Sieve Add [Z78 512] Biventricular ICD (implantable cardioverter-defibrillator) in place     9/2/2021  6:15 AM Tasha Sieve Add [Z95 2] S/P AVR (aortic valve replacement)     9/2/2021  6:15 AM Tasha Sieve Add [R77 8] Elevated troponin       ED Disposition     ED Disposition Condition Date/Time Comment    Admit Stable Thu Sep 2, 2021  5:08 AM Case was discussed with Cata Torres and the patient's admission status was agreed to be Admission Status: inpatient status to the service of Dr Roman Villarreal          Follow-up Information    None         Current Discharge Medication List      CONTINUE these medications which have NOT CHANGED    Details   aspirin 81 MG tablet Take 81 mg by mouth daily        atorvastatin (LIPITOR) 80 mg tablet take 1 tablet by mouth once daily  Qty: 30 tablet, Refills: 6    Associated Diagnoses: Ischemic cardiomyopathy      eplerenone (INSPRA) 25 mg tablet take 1 tablet by mouth once daily  Qty: 30 tablet, Refills: 3    Associated Diagnoses: Ischemic cardiomyopathy      lisinopril (ZESTRIL) 40 mg tablet take 1 tablet by mouth once daily at bedtime  Qty: 30 tablet, Refills: 11    Associated Diagnoses: Ischemic cardiomyopathy      loratadine (CLARITIN) 10 mg tablet Take 1 tablet (10 mg total) by mouth daily  Qty: 20 tablet, Refills: 0    Associated Diagnoses: Nasal congestion      metoprolol succinate (TOPROL-XL) 50 mg 24 hr tablet take 1 tablet by mouth every 12 hours  Qty: 60 tablet, Refills: 8    Associated Diagnoses: Chronic HFrEF (heart failure with reduced ejection fraction) ([facility])      mirtazapine (REMERON) 45 MG tablet Take 45 mg by mouth daily at bedtime  Refills: 0      Potassium Chloride ER 20 MEQ TBCR Take 1 tablet (20 mEq total) by mouth daily  Qty: 90 tablet, Refills: 3    Associated Diagnoses: Benign essential HTN      !! warfarin (COUMADIN) 1 mg tablet One daily or as directed by MD to obtain warfarin dose  Qty: 30 tablet, Refills: 1    Associated Diagnoses:  Aortic valve replaced      !! warfarin (COUMADIN) 4 mg tablet Take 1 tablet (4 mg total) by mouth daily Take 4 mg and 2 mg alternating once daily( take 1 tablet and 1/2 tablet alternating once daily)  Qty: 60 tablet, Refills: 3    Associated Diagnoses: H/O heart valve replacement with mechanical valve      !! warfarin (COUMADIN) 4 mg tablet take 1 tablet by mouth once daily as directed  Qty: 30 tablet, Refills: 2    Associated Diagnoses: S/P AVR (aortic valve replacement)      cyclobenzaprine (FLEXERIL) 10 mg tablet Take 1 tablet (10 mg total) by mouth 2 (two) times a day as needed for muscle spasms  Qty: 20 tablet, Refills: 0    Associated Diagnoses: Fall, initial encounter      fluticasone (FLONASE) 50 mcg/act nasal spray 1 spray into each nostril daily  Qty: 16 g, Refills: 0    Associated Diagnoses: Nasal congestion      furosemide (LASIX) 40 mg tablet Take 1 tablet (40 mg total) by mouth 2 (two) times a day  Qty: 60 tablet, Refills: 6    Associated Diagnoses: Ischemic cardiomyopathy      naproxen (NAPROSYN) 500 mg tablet Take 1 tablet (500 mg total) by mouth 2 (two) times a day with meals  Qty: 30 tablet, Refills: 0    Associated Diagnoses: Fall, initial encounter      sodium chloride (OCEAN) 0 65 % nasal spray 1 spray into each nostril 4 (four) times a day  Qty: 15 mL, Refills: 0    Associated Diagnoses: Bleeding from the nose       !! - Potential duplicate medications found  Please discuss with provider  No discharge procedures on file      PDMP Review     None          ED Provider  Electronically Signed by           Hodan Dennis PA-C  09/02/21 1706

## 2021-09-02 NOTE — ASSESSMENT & PLAN NOTE
· CXR shows infiltrate R>L  · COVID-19 PCR negative; afebrile, no leukocytosis  · Will monitor off antibiotics   · Follow-up procalcitonin and blood cx  · Urine antigens ordered

## 2021-09-02 NOTE — ASSESSMENT & PLAN NOTE
-patient was afebrile, however tachycardic on admission  -no leukocytosis  -presentation not likely secondary to sepsis, rather secondary to SIRS, from congestive heart failure exacerbation

## 2021-09-02 NOTE — ASSESSMENT & PLAN NOTE
· History of VFib and V-tach maintained on metoprolol   · Previous amiodarone had been discontinued by EP  · S/p bi-V AICD

## 2021-09-02 NOTE — ASSESSMENT & PLAN NOTE
· Patient has a history of coronary artery disease  · s/p PCI/bare metal stent to LAD and LCx 2014      · On medical management with ASA, beta blocker, ACE-inhibitor and statin  · Patient with in significantly elevated troponin, secondary to non MI troponin elevation, secondary to congestive heart failure, and pneumonia  · Evidence of acute ischemia  · Continue medical therapy

## 2021-09-02 NOTE — ASSESSMENT & PLAN NOTE
· H/o rheumatic heart disease and aortic stenosis s/p mechanical AVR in 1980's  · Subtherapeutic INR 1 1   Will start IV heparin  · Monitor PTT  · Cardiology consult

## 2021-09-02 NOTE — PLAN OF CARE
Problem: PAIN - ADULT  Goal: Verbalizes/displays adequate comfort level or baseline comfort level  Description: Interventions:  - Encourage patient to monitor pain and request assistance  - Assess pain using appropriate pain scale  - Administer analgesics based on type and severity of pain and evaluate response  - Implement non-pharmacological measures as appropriate and evaluate response  - Consider cultural and social influences on pain and pain management  - Notify physician/advanced practitioner if interventions unsuccessful or patient reports new pain  Outcome: Progressing     Problem: INFECTION - ADULT  Goal: Absence or prevention of progression during hospitalization  Description: INTERVENTIONS:  - Assess and monitor for signs and symptoms of infection  - Monitor lab/diagnostic results  - Monitor all insertion sites, i e  indwelling lines, tubes, and drains  - Monitor endotracheal if appropriate and nasal secretions for changes in amount and color  - [place] appropriate cooling/warming therapies per order  - Administer medications as ordered  - Instruct and encourage patient and family to use good hand hygiene technique  - Identify and instruct in appropriate isolation precautions for identified infection/condition  Outcome: Progressing     Problem: SAFETY ADULT  Goal: Patient will remain free of falls  Description: INTERVENTIONS:  - Educate patient/family on patient safety including physical limitations  - Instruct patient to call for assistance with activity   - Consult OT/PT to assist with strengthening/mobility   - Keep Call bell within reach  - Keep bed low and locked with side rails adjusted as appropriate  - Keep care items and personal belongings within reach  - Initiate and maintain comfort rounds  - Make Fall Risk Sign visible to staff  - Offer Toileting every  Hours, in advance of need  - Initiate/Maintain alarm  - Obtain necessary fall risk management equipment:   - Apply yellow socks and bracelet for high fall risk patients  - Consider moving patient to room near nurses station  Outcome: Progressing  Goal: Maintain or return to baseline ADL function  Description: INTERVENTIONS:  -  Assess patient's ability to carry out ADLs; assess patient's baseline for ADL function and identify physical deficits which impact ability to perform ADLs (bathing, care of mouth/teeth, toileting, grooming, dressing, etc )  - Assess/evaluate cause of self-care deficits   - Assess range of motion  - Assess patient's mobility; develop plan if impaired  - Assess patient's need for assistive devices and provide as appropriate  - Encourage maximum independence but intervene and supervise when necessary  - Involve family in performance of ADLs  - Assess for home care needs following discharge   - Consider OT consult to assist with ADL evaluation and planning for discharge  - Provide patient education as appropriate  Outcome: Progressing  Goal: Maintains/Returns to pre admission functional level  Description: INTERVENTIONS:  - Perform BMAT or MOVE assessment daily    - Set and communicate daily mobility goal to care team and patient/family/caregiver  - Collaborate with rehabilitation services on mobility goals if consulted  - Perform Range of Motion  times a day  - Reposition patient every  hours    - Dangle patient  times a day  - Stand patie times a day  - Ambulate patient  times a day  - Out of bed to chair times a day   - Out of bed for meals  times a day  - Out of bed for toileting  - Record patient progress and toleration of activity level   Outcome: Progressing     Problem: DISCHARGE PLANNING  Goal: Discharge to home or other facility with appropriate resources  Description: INTERVENTIONS:  - Identify barriers to discharge w/patient and caregiver  - Arrange for needed discharge resources and transportation as appropriate  - Identify discharge learning needs (meds, wound care, etc )  - Arrange for interpretive services to assist at discharge as needed  - Refer to Case Management Department for coordinating discharge planning if the patient needs post-hospital services based on physician/advanced practitioner order or complex needs related to functional status, cognitive ability, or social support system  Outcome: Progressing     Problem: Knowledge Deficit  Goal: Patient/family/caregiver demonstrates understanding of disease process, treatment plan, medications, and discharge instructions  Description: Complete learning assessment and assess knowledge base    Interventions:  - Provide teaching at level of understanding  - Provide teaching via preferred learning methods  Outcome: Progressing

## 2021-09-02 NOTE — PROGRESS NOTES
[address] [facility]  Progress Note - [address] [place] 1965, 61 y o  male MRN: [id number]  Unit/Bed#: [address] 2 -01 Encounter: [id number]  Primary Care Provider: Tonio Alcantar PA-C   Date and time admitted to hospital: 9/2/2021  3:20 AM    * Acute on chronic combined systolic and diastolic CHF (congestive heart failure) ([facility] [address] )  Assessment & Plan  · Patient has a history of chronic systolic congestive Heart failure secondary to Ischemic cardiomyopathy  · Most recent 2D echocardiogram 03/2021 with left ventricular ejection fraction 18% and grade  diastolic dysfunction  Patient had severe biatrial dilatation and a appropriate functioning aortic mechanical valve mild MR, moderate TR  · Patient presented with an acute exacerbation of his chronic systolic and diastolic congestive heart failure  · ProBNP 10,000  · Chest x-ray with moderate interstitial edema  · Continue diuresis with Lasix 40 mg IV b i d   · Continue ACE-inhibitor, beta-blocker  · Monitor daily weights and I/O    Community acquired pneumonia of right lung  Assessment & Plan  · Patient presented with dyspnea, and a cough  · CXR revealed moderate interstitial edema  No focal infiltrate  · There were concerns for possible community-acquired pneumonia  · Patient was admitted to the hospital and started on empiric antibiotics with ceftriaxone, while workup in progress  · COVID-19 PCR negative  · Blood cultures pending x2  · Negative Legionella/strep pneumoniae antigen  · Procalcitonin negative  · Will repeat procalcitonin in a m   And discontinue antibiotics if remains negative  · If procalcitonin remains negative, suspect patient's presentation with dyspnea and cough for secondary to congestive heart failure further than infectious etiology    Cocaine use  Assessment & Plan  Patient's urine drug screen was positive for cocaine  Patient notes he smokes marijuana regularly but denies using cocaine, he states he was smoking his brother's marijuana cigarette yesterday, and must had cocaine in it, unbeknownst to him    SIRS (systemic inflammatory response syndrome) ([facility] [address] )  Assessment & Plan  -patient was afebrile, however tachycardic on admission  -no leukocytosis  -presentation not likely secondary to sepsis, rather secondary to SIRS, from congestive heart failure exacerbation    History of ventricular tachycardia  Assessment & Plan  · History of VFib and V-tach maintained on metoprolol   · Previous amiodarone had been discontinued by EP  · S/p bi-V AICD    Biventricular ICD (implantable cardioverter-defibrillator) in place  Assessment & Plan  · S/p BiV ICD 2015  · Patient follows with the cardiologist Dr Dave Taylor for ischemic cardiomyopathy with biventricular ICD in place  · Most recent office note from 05/25/2021 was reviewed: Most recent device interrogation revealed appropriate functioning with Bi V pacing  Patient previously been on amiodarone, since discontinued  · Continue beta-blocker  · ICD interrogated by Cardiology today, no significant arrhythmias    S/P AVR (aortic valve replacement)  Assessment & Plan  · H/o rheumatic heart disease and aortic stenosis s/p mechanical AVR in 1988  · Subtherapeutic INR 1 1  · patient notes that he has not had his blood drawn in approximately 4 months  Outpatient records are reviewed and Coumadin clinic has been attempting on multiple occasions to contact patient for blood work  · He states he is still taking his Coumadin at home 2 mg daily apart from 4 mg on Sunday  · Last Coumadin clinic note recommends he take 4 mg on Tuesday/Sunday and 2 mg all other days  · Continue bridging therapy with IV heparin due to mechanical valve    Coronary artery disease involving native coronary artery of native heart with angina pectoris [facility])  Assessment & Plan  · Patient has a history of coronary artery disease  · s/p PCI/bare metal stent to LAD and LCx 2014      · On medical management with ASA, beta blocker, ACE-inhibitor and statin  · Patient with in significantly elevated troponin, secondary to non MI troponin elevation, secondary to congestive heart failure, and pneumonia  · Evidence of acute ischemia  · Continue medical therapy        VTE Pharmacologic Prophylaxis: Heparin infusion--bridging therapy for coumadin  VTE Mechanical Prophylaxis: sequential compression device        Certification Statement: The patient will continue to require additional inpatient hospital stay due to need for further acute intervention for congestive heart failure on IV Lasix    Status: inpatient     ===================================================================    Subjective:  Patient relates he feels better  He notes prior to admission he had a dry cough, and dyspnea  He notes both of those have improved  He denies any chest pain, or chest tightness  He denies any pain anywhere else  He denies any abdominal pain  He denies any nausea, vomiting, diarrhea  He denies any dizziness or lightheadedness  Patient notes he smokes marijuana  He relates he does not use cocaine however he shared a marijuana cigarette with his brother that he suspects may have had cocaine in it    Patient notes he has been taking his Coumadin at 2 mg every day except for 4 mg on Sunday  He notes he has not had his Coumadin level drawn in approximately 4 months    Physical Exam:   Temp:  [97 9 °F (36 6 °C)-99 4 °F (37 4 °C)] 99 4 °F (37 4 °C)  HR:  [] 90  Resp:  [20] 20  BP: (125-161)/() 131/85    Gen:  Pleasant, non-tachypnic, non-dyspnic  Conversant  Able to speak in complete sentences without having to stop for dyspnea  Heart: regular rate and rhythm, S1S2 present, no murmur, rub or gallop  Lungs:  Bilateral rhonchi  No wheezing or crackles  No accessory muscle use or respiratory distress  Abd: soft, non-tender, non-distended  NABS, no guarding, rebound or peritoneal signs  Extremities: no clubbing, cyanosis or edema    2+pedal pulses bilaterally  Neuro: awake, alert  Fluent speech  Moving all 4 extremities  Skin: warm and dry: no petechiae, purpura and rash  LABS:   Results from last 7 days   Lab Units 09/02/21  0345   WBC Thousand/uL 10 61*   HEMOGLOBIN g/dL 14 0   HEMATOCRIT % 43 7   PLATELETS Thousands/uL 169     Results from last 7 days   Lab Units 09/02/21  0345   POTASSIUM mmol/L 3 9   CHLORIDE mmol/L 98*   CO2 mmol/L 26   BUN mg/dL 11   CREATININE mg/dL 1 14   CALCIUM mg/dL 8 9       Hospital Data:    9/2:  Chest x-ray  Cardiomegaly with moderate CHF/pulmonary edema    Microbiology  9/2-strep pneumoniae/Legionella antigen  9/2 blood culture:  Negative x2  9/2:  Negative COVID      ---------------------------------------------------------------------------------------------------------------  This note has been constructed using a voice recognition system

## 2021-09-02 NOTE — ASSESSMENT & PLAN NOTE
· Troponin 0 05, no baseline on file    Denies chest pain  · EKG [address], RBBB PVC; cycle troponins and EKG  · Monitor on telemetry  · Cardiology consult

## 2021-09-02 NOTE — ASSESSMENT & PLAN NOTE
· H/o rheumatic heart disease and aortic stenosis s/p mechanical AVR in 1988  · Subtherapeutic INR 1 1  · patient notes that he has not had his blood drawn in approximately 4 months    Outpatient records are reviewed and Coumadin clinic has been attempting on multiple occasions to contact patient for blood work  · He states he is still taking his Coumadin at home 2 mg daily apart from 4 mg on Sunday  · Last Coumadin clinic note recommends he take 4 mg on Tuesday/Sunday and 2 mg all other days  · Continue bridging therapy with IV heparin due to mechanical valve

## 2021-09-02 NOTE — CONSULTS
Consult - Cardiology   [facility] [place] A ValcarcelRiver[place] 61 y o  male MRN: [id number]  Unit/Bed#: [facility] 2 -01 Encounter: [id number]        Reason For Consult:  Subtherapeutic INR with in Situ mechanical heart valve                 Assessment:  1  Hx rheumatic Aortic stenosis - S/p mechanical AVR 1988  2  Warfarin anticoagulation:  Subtherapeutic INR on arrival (1 18)  3  Non MI troponin elevation   0 05, 0 08, 0 05  4  Mild mitral regurgitation, moderate tricuspid regurgitation with severe pulmonary hypertension (PASP 60-65) -- echo 3/18/2021  5  HFrEF:     O/P diuretic:  Lasix 40 mg b i d   6  Ischemic cardiomyopathy   Multiple echoes between 2015 & 3/2021 report LVEF between 15 and 20% (most recent below)   Stress 3/2017: Calculated EF 18%   O/p Rx:  Toprol XL 50 mg b i d , Zestril 40 mg daily, Inspra 25 mg daily  7  Indwelling Medtronic BiV-ICD (Not MRI conditional)   Implant 2/24/2015   Interrogation 6/2/2021: Battery adequate-19 months, AP-64%, BVP-96% (TOTAL -92 7%+VSR PACE-3 1%), lead parameters WNL, no high rate episodes  8  CAD   PCI/BMS LAD & Cfx 2014   NM stress 3/2017:  Dense scar of the anterior wall, apex, and entire inferior wall without reversibility  9  Hx VT/VFib   Prior successful ATP events   Previously on amiodarone -- stopped  (no events per ICD check 9/2/2021)  10  Hypertension   11  dyslipidemia   Atorvastatin 80 mg  12  illicit substance use    Current UDS positive for cocaine and THC (hx of remote heroin use)    Discussion / Plan:  #  subtherapeutic INR present on arrival with patient recently non-compliant with obtaining pro times and failing to correspond with our office despite their efforts to reach him    Unclear cause as the patient reports Rx compliance and no significant dietary indiscretion  · Agree with IV heparin until INR is at least 2 with goal of 2 5-3 5    # Some radiographic suggestion of pulmonary edema present on arrival with rising OptiVol also suggesting same, though current exam does not endorse much in the way of volume overload  Again patient reports Rx compliance without dietary indiscretion  Suspect some component of acute heart failure perhaps instigated by recent use of cocaine with presumed tachycardia and hypertension  · Continue IV Lasix today suspecting patient may be able to transition to oral therapy tomorrow    #  ICD interrogated showing no high rate episodes or delivered therapy  High percentage BiV pacing  · No changes made to device  · Continue pre-hospital regimen of heart failure medications including metoprolol, lisinopril, and Inspra     #  possible atrial fibrillation - reported on ECG imbedded in note below though without prior history of same  Also not endorsed by current telemetry or device interrogation  · Will confer with attending regarding current data set and decision regarding initiation of anticoagulation        History Of Present Illness:  Adeola Agrawal is a 62-year-old with multiple problems enumerated above  He is cared for by [facility] cardiologists Jorge Jay (advanced heart failure) and Avinash Charles (electrophysiology)  Yesterday after an unremarkable day with the patient feeling at his baseline state of health he went to bed at around 11:00 p m  With the patient reporting that shortly thereafter he developed some acute dyspnea and palpitations prompting a call to EMS  Without clarification for why his ED report indicates he was given IV Cardizem en route to the hospital    Patient presented with blood pressure 161/102 with a heart rate of 110 beats per minute  His presenting ECG shows what looks to be atrial sensed and Bi V paced rhythm with some VPCs and inherently conducted beats versus the possibility of some brief atrial fibrillation on the rhythm strip (see EKG below)  Of note this gentleman has no prior history of atrial fibrillation    Chest x-ray shows signs of some central vascular congestion with NT proBNP over 10,000 which is disproportionate to current exam   Urine drug screen was positive for cocaine and THC  On arrival INR INR subtherapeutic at 1 18  When seen the patient reports compliance with all prescribed medications and no recent dietary indiscretion  He acknowledges having been left messages by our [facility] but failed to go get blood work  He is breathing comfortably at the time my visit and feels he is overall back to his baseline  Past Medical History:        Past Medical History:   Diagnosis Date    AICD (automatic cardioverter/defibrillator) present     medtronic     CAD (coronary artery disease)     [facility]     Cardiac disease     Cardiomyopathy ([facility] )     mixed  predominently nonischemic    Colonic mass     Coronary artery disease     Hyperlipidemia     Hypertension     Irregular heart beat     [facility]     2014 and 2015    Rectal bleeding     S/P AVR (aortic valve replacement)     mechanical    Wears glasses       Past Surgical History:   Procedure Laterality Date    AORTIC VALVE REPLACEMENT      APPENDECTOMY      CARDIAC DEFIBRILLATOR PLACEMENT      COLECTOMY MADELIN Right     Last Assessed: 6/7/2016    COLECTOMY LAPAROSCOPIC      COLON SURGERY Right     colectomy    CORONARY STENT PLACEMENT      INSERT / REPLACE / REMOVE PACEMAKER      MITRAL VALVE REPAIR      MITRAL VALVE REPAIR      MOUTH SURGERY      [place] COLONOSCOPY FLX DX W/COLLJ [facility] WHEN PFRMD N/A 1/15/2018    Procedure: COLONOSCOPY;  Surgeon: Gordon Mazariegos MD;  Location: [facility]; Service: General    [place] LAP,SURG,COLECTOMY, PARTIAL, W/ANAST N/A 6/2/2016    Procedure: RESECTION COLON RIGHT LAPAROSCOPIC HAND-ASSISTED;  Surgeon: Gordon Mazariegos MD;  Location: [facility];  Service: General        Allergy:        No Known Allergies    Medications:       Prior to Admission medications    Medication Sig Start Date End Date Taking?  Authorizing Provider   aspirin 81 MG tablet Take 81 mg by mouth daily  Yes Historical Provider, MD   atorvastatin (LIPITOR) 80 mg tablet take 1 tablet by mouth once daily 8/11/21  Yes Ran Bella MD   eplerenone (INSPRA) 25 mg tablet take 1 tablet by mouth once daily 6/4/21  Yes Unique Corona DO   lisinopril (ZESTRIL) 40 mg tablet take 1 tablet by mouth once daily at bedtime 3/6/21  Yes Fransisco Foley DO   loratadine (CLARITIN) 10 mg tablet Take 1 tablet (10 mg total) by mouth daily 10/11/19  Yes Pedro Cash DO   metoprolol succinate (TOPROL-XL) 50 mg 24 hr tablet take 1 tablet by mouth every 12 hours 7/21/21  Yes Shabbir Main MD   mirtazapine (REMERON) 45 MG tablet Take 45 mg by mouth daily at bedtime 12/4/18  Yes Historical Provider, MD   Potassium Chloride ER 20 MEQ TBCR Take 1 tablet (20 mEq total) by mouth daily 2/24/21  Yes Unique Corona DO   warfarin (COUMADIN) 1 mg tablet One daily or as directed by MD to obtain warfarin dose   4/13/20  Yes Lai Linn DO   warfarin (COUMADIN) 4 mg tablet Take 1 tablet (4 mg total) by mouth daily Take 4 mg and 2 mg alternating once daily( take 1 tablet and 1/2 tablet alternating once daily) 9/11/18  Yes Abdirizak Cordero MD   warfarin (COUMADIN) 4 mg tablet take 1 tablet by mouth once daily as directed 5/13/21  Yes Lai Linn DO   cyclobenzaprine (FLEXERIL) 10 mg tablet Take 1 tablet (10 mg total) by mouth 2 (two) times a day as needed for muscle spasms  Patient not taking: Reported on 6/4/2021 8/24/20   Pedro Cash DO   fluticasone [facility]) 50 mcg/act nasal spray 1 spray into each nostril daily  Patient not taking: Reported on 2/17/2021 10/11/19   Pedro Cash DO   furosemide (LASIX) 40 mg tablet Take 1 tablet (40 mg total) by mouth 2 (two) times a day  Patient not taking: Reported on 6/4/2021 9/26/18   Unique Corona DO   naproxen (NAPROSYN) 500 mg tablet Take 1 tablet (500 mg total) by mouth 2 (two) times a day with meals  Patient not taking: Reported on 6/4/2021 8/24/20   Pedro Cash DO sodium chloride (OCEAN) 0 65 % nasal spray 1 spray into each nostril 4 (four) times a day  Patient not taking: Reported on 6/4/2021 11/30/18   Casie Grace MD       Family History:     Family History   Problem Relation Age of Onset    Diabetes Mother     Hypertension Mother     Valvular heart disease Mother     Valvular heart disease Father     Alcohol abuse Father         Social History:       Social History     Socioeconomic History    Marital status: /Civil Union     Spouse name: None    Number of children: None    Years of education: None    Highest education level: None   Occupational History    None   Tobacco Use    Smoking status: Never Smoker    Smokeless tobacco: Never Used   Substance and Sexual Activity    Alcohol use: Not Currently    Drug use: No    Sexual activity: None   Other Topics Concern    None   Social History Narrative    None     Social Determinants of Health     Financial Resource Strain:     Difficulty of Paying Living Expenses:    Food Insecurity:     Worried About Running Out of Food in the Last Year:     Ran Out of Food in the Last Year:    Transportation Needs:     Lack of Transportation (Medical):      Lack of Transportation (Non-Medical):    Physical Activity:     Days of Exercise per Week:     Minutes of Exercise per Session:    Stress:     Feeling of Stress :    Social Connections:     Frequency of Communication with Friends and Family:     Frequency of Social Gatherings with Friends and Family:     Attends [employer]ism Services:     Active Member of Clubs or Organizations:     Attends Club or Organization Meetings:     Marital Status:    Intimate Partner Violence:     Fear of Current or Ex-Partner:     Emotionally Abused:     Physically Abused:     Sexually Abused:        ROS:  Symptoms per HPI  Patient reports he can typically comfortably walk about 1 5 blocks  The remainder of the review of systems is negative    Exam:  General:  Alert, normally conversant, comfortable appearing  Head: Normocephalic, atraumatic  Eyes:  EOMI  Pupils - equal, round, reactive to accomodation  No icterus  Normal Conjunctiva  Oropharynx: Moist without lesion  Neck:  No gross bruit, JVD, thyromegaly, or lymphadenopathy  Heart:  Regular with controlled rate  No rub nor pathologic murmur  Lungs:  Clear without rales/rhonchi/wheeze  Abdomen:  Soft and nontender with normal bowel sounds  No organomegaly or mass  Lower Limbs:  Warm extremities with No edema  Pulses[de-identified]  RLE - DP:  2+                 LLE - DP:  1-2+  Musculoskeletal: Independent movement of limbs observed, Formal ROM and strength eval not performed  Neurologic:    Oriented to: person, place, situation  Cranial Nerves: grossly intact - vision, smell, taste, and hearing were not tested  Motor function: grossly normal, symmetric   Sensation: Was not tested      Vitals:    09/02/21 0353 09/02/21 0500 09/02/21 0600 09/02/21 0731   BP: 149/81 125/83 132/80 132/86   BP Location: Right arm Right arm Right arm Left arm   Pulse: 102 88 88 102   Resp: 20 20 20 20   Temp:    98 °F (36 7 °C)   TempSrc:    Oral   SpO2: 90% 94% 95% 95%   Weight:               DATA:      ECG:                                -----------------------------------------------------------------------------------------------------------------------------------------------  Echocardiogram:         TTE 3/18/2021   SUMMARY:  1  This is a technically adequate study  2  Left ventricle severely dilated with severely reduced systolic function  Left ventricular ejection fraction is estimated at 18%  3  Grade 2 diastolic dysfunction with echocardiographic indications of elevated left atrial pressures  4  Regional wall motion abnormalities consistent with coronary artery disease  5  Right ventricle is mildly dilated with mildly reduced systolic function  Probable pacemaker wire present  6  Severe biatrial dilatation  7   Mechanical valve is noted within the aortic position  Valve is well seated  Trace central aortic regurgitation  Normal velocities and gradients for valve with Vmax 1 85 m/s, EOA 1 38 cm2, DVI 0 44, acceleration time 66 ms  8  Mild posteriorly directed mitral regurgitation with fibrocalcific changes of the mitral valve and mild mitral annular calcification  9  Moderate tricuspid regurgitation with pulmonary artery systolic pressure is estimated at 60-65 mm Hg  10  Compared to report from January 8, 6361, grade 3 diastolic dysfunction is noted with elevated filling pressures and pulmonary hypertension    LEFT VENTRICLE: Left is severely dilated with severely reduced systolic function  Left ventricular ejection fraction is estimated at 18%  Normal wall thickness  Grade 3 diastolic dysfunction with echocardiographic indications of elevated  left atrial pressures  Increased trabeculations noted at the left ventricular apex  There is akinesis of the anterior and anterolateral wall from base to apex with dyskinetic apical wall and hypokinesis of the remaining walls  RIGHT VENTRICLE: Right ventricle is mildly dilated with mildly reduced systolic function  Linear echodensity noted within the right ventricle consistent with pacemaker/catheter     LEFT ATRIUM: Left atrium is severely dilated  ATRIAL SEPTUM: Interatrial septum is bowing toward the right atrium consistent with elevated left atrial pressures  RIGHT ATRIUM: Right atrium is severely dilated  MITRAL VALVE: Mitral valve opens well  Fibrocalcific changes of the mitral valve  No evidence of mitral stenosis  Mild posteriorly directed mitral regurgitation  AORTIC VALVE: There is a mechanical valve in the aortic position  The valve appears well seated  Trace central aortic regurgitation  Velocities and gradients are normal for valve  Vmax 1 85 m/s, mean PG 6 9 mmHg, EOA/ASHLEY 1 38 cm2, DVI  0 44, Acceleration time 66 ms, LVOTd 2cm, LVOT VTI 12 3, AV VTI 28 cm       TRICUSPID VALVE: Tricuspid valve opens well  Moderate tricuspid regurgitation with pulmonary artery systolic pressure is estimated at 60-65 mm Hg consistent with severe pulmonary hypertension  PULMONIC VALVE: Pulmonic valve not well visualized  No evidence of pulmonic stenosis or pulmonic regurgitation  PERICARDIUM: There is no evidence of significant pericardial effusion  AORTA: Aortic root is top-normal in size at 3 4 cm     SYSTEMIC VEINS: IVC: IVC is dilated with greater than 50% respirophasic collapse  [facility]    Isreal Shaw DO  Signed 18-Mar-2021 18:50:34      -----------------------------------------------------------------------------------------------------------------------------------------------  Ischemic Testing:  Stress test       Nuclear stress test 03/13/2017: Non-diagnostic stress EKG  Large, severe, fixed myocardial perfusion defect of the entire anterior wall, with a complete fixed perfusion defect of the apex without reversibility and of the entire inferior wall, extending to the mid-basal inferolateral wall without reversibility  LVEF 18%  Catheterization    -----------------------------------------------------------------------------------------------------------------------------------------------  Weights:     Wt Readings from Last 20 Encounters:   09/02/21 62 3 kg (137 lb 5 6 oz)   06/04/21 62 9 kg (138 lb 9 6 oz)   05/25/21 61 7 kg (136 lb)   02/17/21 67 9 kg (149 lb 9 6 oz)   08/24/20 63 2 kg (139 lb 5 3 oz)   03/06/20 63 1 kg (139 lb 3 2 oz)   02/03/20 64 5 kg (142 lb 4 8 oz)   01/13/20 66 8 kg (147 lb 4 8 oz)   10/11/19 68 kg (150 lb)   07/10/19 69 4 kg (152 lb 14 4 oz)   03/27/19 74 8 kg (165 lb)   01/09/19 74 8 kg (165 lb)   11/30/18 73 9 kg (162 lb 14 4 oz)   05/23/18 73 kg (161 lb)   02/28/18 72 3 kg (159 lb 6 4 oz)   02/09/18 71 6 kg (157 lb 13 6 oz)   01/19/18 69 7 kg (153 lb 8 9 oz)   01/15/18 70 3 kg (155 lb) 01/03/18 71 7 kg (158 lb)   12/19/17 68 8 kg (151 lb 10 8 oz)   , Body mass index is 22 17 kg/m²           Lab Studies:    Results from last 7 days   Lab Units 09/02/21  0734 09/02/21  0345   TROPONIN I ng/mL 0 08* 0 05*            Results from last 7 days   Lab Units 09/02/21  0345   WBC Thousand/uL 10 61*   HEMOGLOBIN g/dL 14 0   HEMATOCRIT % 43 7   PLATELETS Thousands/uL 169   ,   Results from last 7 days   Lab Units 09/02/21  0345   POTASSIUM mmol/L 3 9   CHLORIDE mmol/L 98*   CO2 mmol/L 26   BUN mg/dL 11   CREATININE mg/dL 1 14   CALCIUM mg/dL 8 9   ALK PHOS U/L 105   ALT U/L 19   AST U/L 24

## 2021-09-02 NOTE — ASSESSMENT & PLAN NOTE
Patient's urine drug screen was positive for cocaine  Patient notes he smokes marijuana regularly but denies using cocaine, he states he was smoking his brother's marijuana cigarette yesterday, and must had cocaine in it, unbeknownst to him

## 2021-09-02 NOTE — H&P
[address] 1965, 61 y o  male MRN: [id number]  Unit/Bed#: [place] 68 2 -01 Encounter: [id number]  Primary Care Provider: Tonio Alcantar PA-C   Date and time admitted to hospital: 9/2/2021  3:20 AM    * Acute on chronic combined systolic and diastolic heart failure [facility])  Assessment & Plan  Wt Readings from Last 3 Encounters:   09/02/21 62 3 kg (137 lb 5 6 oz)   06/04/21 62 9 kg (138 lb 9 6 oz)   05/25/21 61 7 kg (136 lb)     · LVEF 18%, severely reduced systolic function, B4QV  · BNP >10,000  Does not examine fluid overloaded; last BNP in 2017 >1900  · CXR shows vascular congestion, infiltrate R>L, follow-up official report  · Maintained on furosemide 40mg BID, ACEI and BB  Will start IV Lasix 40mg BID  · Monitor daily weight, I&O  · Low Na diet, 1500 mL fluid restriction  · Cardiology consult    Community acquired pneumonia of right lung  Assessment & Plan  · CXR shows infiltrate R>L  · COVID-19 PCR negative; afebrile, no leukocytosis  · Will monitor off antibiotics   · Follow-up procalcitonin and blood cx  · Urine antigens ordered    Elevated troponin  Assessment & Plan  · Troponin 0 05, no baseline on file  Denies chest pain  · EKG S Tac 104, RBBB PVC; cycle troponins and EKG  · Monitor on telemetry  · Cardiology consult    S/P AVR (aortic valve replacement)  Assessment & Plan  · H/o rheumatic heart disease and aortic stenosis s/p mechanical AVR in 1980's  · Subtherapeutic INR 1 1  Will start IV heparin  · Monitor PTT  · Cardiology consult    Cardiomyopathy [facility])  Assessment & Plan  · Ischemic cardiomyopathy LVEF 18% s/p ICD; on ACEI and furosemide    Lactic acidosis  Assessment & Plan  · Lactate 2 4  Will hold off IV fluids while treating for CHF  Repeat lactate    Coronary artery disease involving native coronary artery of native heart with angina pectoris [facility])  Assessment & Plan  · CAD s/p PCI BMS to LAD and LCx 2014    On ASA, warfarin, ACEI, BB and statin    History of ventricular tachycardia  Assessment & Plan  · History of VFib and V-tach maintained on metoprolol     History of ventricular fibrillation  Assessment & Plan  · H/o V fib s/p AICD  Maintained on Toprol    Biventricular ICD (implantable cardioverter-defibrillator) in place  Assessment & Plan  · S/p BiV ICD 2015    VTE Prophylaxis: Heparin Drip  / reason for no mechanical VTE prophylaxis ac   Code Status: fc  POLST: POLST is not applicable to this patient  Discussion with family:     Anticipated Length of Stay:  Patient will be admitted on an Inpatient basis with an anticipated length of stay of  > 2 midnights  Justification for Hospital Stay: CHF, Elevated troponin, subtherapeutic INR in pt with mechanical valve    Total Time for Visit, including Counseling / Coordination of Care: 60 minutes  Greater than 50% of this total time spent on direct patient counseling and coordination of care  Chief Complaint:   "Couldn't breathe"    History of Present Illness:    Elpidio Hodgkins is a 61 y o  male who presents with c/o dyspnea  Patient reports laying down last night and developing progressively worsening shortness of breath  Reports symptoms were sudden and associated with palpitations so he called 911  Denies chest pain, fever, chills, wheezing, chest tightness, weight gain, edema or exertional dyspnea  Reports compliance with medications and low-salt diet  No h/o Covid vaccine  Review of Systems:    Review of Systems   Constitutional: Positive for unexpected weight change  Negative for appetite change, chills, diaphoresis, fatigue and fever  Weight loss   HENT: Negative  Respiratory: Positive for shortness of breath  Negative for cough, chest tightness and wheezing  Cardiovascular: Positive for palpitations  Negative for chest pain and leg swelling  Gastrointestinal: Negative  Genitourinary: Negative  Musculoskeletal: Negative  Skin: Negative  Neurological: Negative  Psychiatric/Behavioral: Negative  Past Medical and Surgical History:     Past Medical History:   Diagnosis Date    AICD (automatic cardioverter/defibrillator) present     medtronic     CAD (coronary artery disease)     [facility] [address]     Cardiac disease     [facility]     mixed  predominently nonischemic    Colonic mass     Coronary artery disease     Hyperlipidemia     Hypertension     Irregular heart beat     Myocardial infarction ([facility] [address] [facility]     2014 and 2015    Rectal bleeding     S/P AVR (aortic valve replacement)     mechanical    Wears glasses        Past Surgical History:   Procedure Laterality Date    AORTIC VALVE REPLACEMENT      APPENDECTOMY      CARDIAC DEFIBRILLATOR PLACEMENT      COLECTOMY MADELIN Right     Last Assessed: 6/7/2016    COLECTOMY LAPAROSCOPIC      COLON SURGERY Right     colectomy    CORONARY STENT PLACEMENT      INSERT / REPLACE / REMOVE PACEMAKER      MITRAL VALVE REPAIR      MITRAL VALVE REPAIR      MOUTH SURGERY      TN COLONOSCOPY FLX DX W/COLLJ [address] WHEN PFRMD N/A 1/15/2018    Procedure: COLONOSCOPY;  Surgeon: Mary Grace Sandhu MD;  Location: [facility]; Service: General    TN LAP,SURG,COLECTOMY, PARTIAL, W/ANAST N/A 6/2/2016    Procedure: RESECTION COLON RIGHT LAPAROSCOPIC HAND-ASSISTED;  Surgeon: Mary Grace Sandhu MD;  Location: [facility];  Service: General       Meds/Allergies:    Prior to Admission medications    Medication Sig Start Date End Date Taking? Authorizing Provider   aspirin 81 MG tablet Take 81 mg by mouth daily     Yes Historical Provider, MD   atorvastatin (LIPITOR) 80 mg tablet take 1 tablet by mouth once daily 8/11/21  Yes Jagdish Yu MD   eplerenone (INSPRA) 25 mg tablet take 1 tablet by mouth once daily 6/4/21  Yes Ariela Londono, DO   lisinopril (ZESTRIL) 40 mg tablet take 1 tablet by mouth once daily at bedtime 3/6/21  Yes Vitaly Fierro, DO   loratadine (CLARITIN) 10 mg tablet Take 1 tablet (10 mg total) by mouth daily 10/11/19  Yes Ivette Little DO   metoprolol succinate (TOPROL-XL) 50 mg 24 hr tablet take 1 tablet by mouth every 12 hours 7/21/21  Yes Abran Jaimes MD   mirtazapine (REMERON) 45 MG tablet Take 45 mg by mouth daily at bedtime 12/4/18  Yes Manju Garcia MD   Potassium Chloride ER 20 MEQ TBCR Take 1 tablet (20 mEq total) by mouth daily 2/24/21  Yes Tatiana Sharma DO   warfarin (COUMADIN) 1 mg tablet One daily or as directed by MD to obtain warfarin dose  4/13/20  Yes Lai Nelson DO   warfarin (COUMADIN) 4 mg tablet Take 1 tablet (4 mg total) by mouth daily Take 4 mg and 2 mg alternating once daily( take 1 tablet and 1/2 tablet alternating once daily) 9/11/18  Yes Marquita Hale MD   warfarin (COUMADIN) 4 mg tablet take 1 tablet by mouth once daily as directed 5/13/21  Yes Lai Nelson DO   cyclobenzaprine (FLEXERIL) 10 mg tablet Take 1 tablet (10 mg total) by mouth 2 (two) times a day as needed for muscle spasms  Patient not taking: Reported on 6/4/2021 8/24/20   Ivette Little DO   fluticasone [facility]) 50 mcg/act nasal spray 1 spray into each nostril daily  Patient not taking: Reported on 2/17/2021 10/11/19   Ievtte Little DO   furosemide (LASIX) 40 mg tablet Take 1 tablet (40 mg total) by mouth 2 (two) times a day  Patient not taking: Reported on 6/4/2021 9/26/18   Tatiana Sharma DO   naproxen (NAPROSYN) 500 mg tablet Take 1 tablet (500 mg total) by mouth 2 (two) times a day with meals  Patient not taking: Reported on 6/4/2021 8/24/20   Ivette Little DO   sodium chloride (OCEAN) 0 65 % nasal spray 1 spray into each nostril 4 (four) times a day  Patient not taking: Reported on 6/4/2021 11/30/18   Barron oWng MD     I have reviewed home medications using allscripts      Allergies: No Known Allergies    Social History:     Marital Status: /Civil Union   Occupation: disabled  Patient Pre-hospital Living Situation: resides alone  Patient Pre-hospital Level of Mobility: ambulatory  Patient Pre-hospital Diet Restrictions: low na  Substance Use History:   Social History     Substance and Sexual Activity   Alcohol Use Not Currently     Social History     Tobacco Use   Smoking Status Never Smoker   Smokeless Tobacco Never Used     Social History     Substance and Sexual Activity   Drug Use No       Family History:    Family History   Problem Relation Age of Onset    Diabetes Mother     Hypertension Mother     Valvular heart disease Mother     Valvular heart disease Father     Alcohol abuse Father        Physical Exam:     Vitals:   Blood Pressure: 132/80 (09/02/21 0600)  Pulse: 88 (09/02/21 0600)  Temperature: 97 9 °F (36 6 °C) (09/02/21 0328)  Temp Source: Oral (09/02/21 0328)  Respirations: 20 (09/02/21 0600)  Weight - Scale: 62 3 kg (137 lb 5 6 oz) (09/02/21 0328)  SpO2: 95 % (09/02/21 0600)    Physical Exam  Constitutional:       Appearance: Normal appearance  He is normal weight  HENT:      Head: Normocephalic and atraumatic  Nose: No congestion or rhinorrhea  Mouth/Throat:      Mouth: Mucous membranes are dry  Pharynx: No oropharyngeal exudate or posterior oropharyngeal erythema  Eyes:      Conjunctiva/sclera: Conjunctivae normal    Pulmonary:      Effort: Pulmonary effort is normal  No respiratory distress  Breath sounds: Rhonchi present  Comments: Rhonchi right lung; dry cough  Chest:      Chest wall: No tenderness  Abdominal:      General: Bowel sounds are normal       Palpations: Abdomen is soft  Musculoskeletal:      Right lower leg: Edema present  Left lower leg: Edema present  Comments: 1+ trace edema B/LLE   Skin:     General: Skin is dry  Coloration: Skin is not pale  Neurological:      General: No focal deficit present  Mental Status: He is alert and oriented to person, place, and time     Psychiatric:         Mood and Affect: Mood normal          Behavior: Behavior normal          Thought Content: Thought content normal          Judgment: Judgment normal        Additional Data:     Lab Results: I have personally reviewed pertinent reports  Results from last 7 days   Lab Units 09/02/21  0345   WBC Thousand/uL 10 61*   HEMOGLOBIN g/dL 14 0   HEMATOCRIT % 43 7   PLATELETS Thousands/uL 169   NEUTROS PCT % 71   LYMPHS PCT % 16   MONOS PCT % 10   EOS PCT % 1     Results from last 7 days   Lab Units 09/02/21  0345   SODIUM mmol/L 137   POTASSIUM mmol/L 3 9   CHLORIDE mmol/L 98*   CO2 mmol/L 26   BUN mg/dL 11   CREATININE mg/dL 1 14   ANION GAP mmol/L 13   CALCIUM mg/dL 8 9   ALBUMIN g/dL 3 6   TOTAL BILIRUBIN mg/dL 0 95   ALK PHOS U/L 105   ALT U/L 19   AST U/L 24   GLUCOSE RANDOM mg/dL 214*     Results from last 7 days   Lab Units 09/02/21  0345   INR  1 18             Results from last 7 days   Lab Units 09/02/21  0605 09/02/21  0405   LACTIC ACID mmol/L 1 2 2 4*       Imaging: I have personally reviewed pertinent reports  XR chest 1 view portable   ED Interpretation by Gertrudis Cazares PA-C (09/02 0357)   RLL infiltrate and hazy right heart border          EKG, Pathology, and Other Studies Reviewed on Admission:   · EKG: cxr echo    Allscripts / Epic Records Reviewed: Yes     ** Please Note: This note has been constructed using a voice recognition system   **

## 2021-09-02 NOTE — ASSESSMENT & PLAN NOTE
· Patient presented with dyspnea, and a cough  · CXR revealed moderate interstitial edema  No focal infiltrate  · There were concerns for possible community-acquired pneumonia  · Patient was admitted to the hospital and started on empiric antibiotics with ceftriaxone, while workup in progress  · COVID-19 PCR negative  · Blood cultures pending x2  · Negative Legionella/strep pneumoniae antigen  · Procalcitonin negative  · Will repeat procalcitonin in a m   And discontinue antibiotics if remains negative  · If procalcitonin remains negative, suspect patient's presentation with dyspnea and cough for secondary to congestive heart failure further than infectious etiology

## 2021-09-02 NOTE — ASSESSMENT & PLAN NOTE
· S/p BiV ICD 2015  · Patient follows with the cardiologist Dr Miesha Rosas for ischemic cardiomyopathy with biventricular ICD in place  · Most recent office note from 05/25/2021 was reviewed: Most recent device interrogation revealed appropriate functioning with Bi V pacing    Patient previously been on amiodarone, since discontinued  · Continue beta-blocker  · ICD interrogated by Cardiology today, no significant arrhythmias

## 2021-09-02 NOTE — PROCEDURES
Normal functioning biventricular ICD with evidence of nonsustained less than 1 minute waited short bursts of what looks like atrial tachycardia along with ongoing heart failure                            Lina Parson MD

## 2021-09-02 NOTE — ASSESSMENT & PLAN NOTE
· Patient has a history of chronic systolic congestive Heart failure secondary to Ischemic cardiomyopathy  · Most recent 2D echocardiogram 03/2021 with left ventricular ejection fraction 18% and grade  diastolic dysfunction    Patient had severe biatrial dilatation and a appropriate functioning aortic mechanical valve mild MR, moderate TR  · Patient presented with an acute exacerbation of his chronic systolic and diastolic congestive heart failure  · ProBNP 10,000  · Chest x-ray with moderate interstitial edema  · Continue diuresis with Lasix 40 mg IV b i d   · Continue ACE-inhibitor, beta-blocker  · Monitor daily weights and I/O

## 2021-09-03 PROBLEM — R05.9 COUGH: Status: ACTIVE | Noted: 2021-01-01

## 2021-09-03 NOTE — PLAN OF CARE
Problem: PAIN - ADULT  Goal: Verbalizes/displays adequate comfort level or baseline comfort level  Description: Interventions:  - Encourage patient to monitor pain and request assistance  - Assess pain using appropriate pain scale  - Administer analgesics based on type and severity of pain and evaluate response  - Implement non-pharmacological measures as appropriate and evaluate response  - Consider cultural and social influences on pain and pain management  - Notify physician/advanced practitioner if interventions unsuccessful or patient reports new pain  Outcome: Progressing     Problem: INFECTION - ADULT  Goal: Absence or prevention of progression during hospitalization  Description: INTERVENTIONS:  - Assess and monitor for signs and symptoms of infection  - Monitor lab/diagnostic results  - Monitor all insertion sites, i e  indwelling lines, tubes, and drains  - Monitor endotracheal if appropriate and nasal secretions for changes in amount and color  - Sidney appropriate cooling/warming therapies per order  - Administer medications as ordered  - Instruct and encourage patient and family to use good hand hygiene technique  - Identify and instruct in appropriate isolation precautions for identified infection/condition  Outcome: Progressing     Problem: SAFETY ADULT  Goal: Patient will remain free of falls  Description: INTERVENTIONS:  - Educate patient/family on patient safety including physical limitations  - Instruct patient to call for assistance with activity   - Consult OT/PT to assist with strengthening/mobility   - Keep Call bell within reach  - Keep bed low and locked with side rails adjusted as appropriate  - Keep care items and personal belongings within reach  - Initiate and maintain comfort rounds  - Apply yellow socks and bracelet for high fall risk patients  - Consider moving patient to room near nurses station  Outcome: Progressing  Goal: Maintain or return to baseline ADL function  Description: INTERVENTIONS:  -  Assess patient's ability to carry out ADLs; assess patient's baseline for ADL function and identify physical deficits which impact ability to perform ADLs (bathing, care of mouth/teeth, toileting, grooming, dressing, etc )  - Assess/evaluate cause of self-care deficits   - Assess range of motion  - Assess patient's mobility; develop plan if impaired  - Assess patient's need for assistive devices and provide as appropriate  - Encourage maximum independence but intervene and supervise when necessary  - Involve family in performance of ADLs  - Assess for home care needs following discharge   - Consider OT consult to assist with ADL evaluation and planning for discharge  - Provide patient education as appropriate  Outcome: Progressing  Goal: Maintains/Returns to pre admission functional level  Description: INTERVENTIONS:  - Perform BMAT or MOVE assessment daily    - Set and communicate daily mobility goal to care team and patient/family/caregiver     - Collaborate with rehabilitation services on mobility goals if consulted  - Out of bed for toileting  - Record patient progress and toleration of activity level   Outcome: Progressing     Problem: DISCHARGE PLANNING  Goal: Discharge to home or other facility with appropriate resources  Description: INTERVENTIONS:  - Identify barriers to discharge w/patient and caregiver  - Arrange for needed discharge resources and transportation as appropriate  - Identify discharge learning needs (meds, wound care, etc )  - Arrange for interpretive services to assist at discharge as needed  - Refer to Case Management Department for coordinating discharge planning if the patient needs post-hospital services based on physician/advanced practitioner order or complex needs related to functional status, cognitive ability, or social support system  Outcome: Progressing     Problem: Knowledge Deficit  Goal: Patient/family/caregiver demonstrates understanding of disease process, treatment plan, medications, and discharge instructions  Description: Complete learning assessment and assess knowledge base    Interventions:  - Provide teaching at level of understanding  - Provide teaching via preferred learning methods  Outcome: Progressing     Problem: Potential for Falls  Goal: Patient will remain free of falls  Description: INTERVENTIONS:  - Educate patient/family on patient safety including physical limitations  - Instruct patient to call for assistance with activity   - Consult OT/PT to assist with strengthening/mobility   - Keep Call bell within reach  - Keep bed low and locked with side rails adjusted as appropriate  - Keep care items and personal belongings within reach  - Initiate and maintain comfort rounds  - Make Fall Risk Sign visible to staff  - Apply yellow socks and bracelet for high fall risk patients  - Consider moving patient to room near nurses station  Outcome: Progressing

## 2021-09-03 NOTE — ASSESSMENT & PLAN NOTE
Patient's urine drug screen was positive for cocaine  Patient notes he smokes marijuana regularly but denies using cocaine, he states he was smoking his brother's marijuana cigarette yesterday, and must had cocaine in it, unbeknownst to him  Patient is counseled on avoidance of all drugs, and the risk of cocaine causing an acute MI   patient verbalized understanding

## 2021-09-03 NOTE — UTILIZATION REVIEW
Inpatient Admission Authorization Request   NOTIFICATION OF INPATIENT ADMISSION/INPATIENT AUTHORIZATION REQUEST   SERVICING FACILITY:   [address] [place] [address], [address]   Tax ID: [id number]  NPI: [id number]  Place of Service: Inpatient [address] [facility]  [address]  Place of Service Code: 24     ATTENDING PROVIDER:  Attending Name and NPI#: Thompson Aaronma [[id number]]  Address: [address] [place] [address], [address]   Phone: [phone]     UTILIZATION REVIEW CONTACT:  [facility] Utilization   Network Utilization Review Department  Phone: [phone]  Fax: [phone]  Email: [email or web address]     PHYSICIAN ADVISORY SERVICES:  FOR [email or web address] REVIEW - MEDICAL NECESSITY DENIAL  Phone: [phone]  Fax: [phone]  Email: [email or web address]     TYPE OF REQUEST:  Inpatient Status     ADMISSION INFORMATION:  ADMISSION DATE/TIME: 9/2/21  5:10 AM  PATIENT DIAGNOSIS CODE/DESCRIPTION:  Palpitations [R00 2]  Acute on chronic combined systolic and diastolic heart failure (HCC) [I50 43]  SOB (shortness of breath) [R06 02]  Elevated troponin [R77 8]  S/P AVR (aortic valve replacement) [Z95 2]  Severe sepsis (HCC) [A41 9, R65 20]  Biventricular ICD (implantable cardioverter-defibrillator) in place [Z95 810]  Right lower lobe pulmonary infiltrate [R91 8]  DISCHARGE DATE/TIME: 9/3/2021  2:11 PM  DISCHARGE DISPOSITION (IF DISCHARGED): Home with Home Health Care     IMPORTANT INFORMATION:  Please contact the [facility] directly with any questions or concerns regarding this request  Department voicemails are confidential     Send requests for admission clinical reviews, concurrent reviews, approvals, and administrative denials due to lack of clinical to fax [phone]

## 2021-09-03 NOTE — ASSESSMENT & PLAN NOTE
· Patient has a history of coronary artery disease  · s/p PCI/bare metal stent to LAD and LCx 2014      · On medical management with ASA, beta blocker, ACE-inhibitor and statin  · Patient admitted with insignificantly elevated troponin, secondary to non MI troponin elevation, secondary to congestive heart failure  · No evidence of acute ischemia  · Continue medical therapy  · No additional ischemic testing currently indicated  · Outpatient cardiology follow-up

## 2021-09-03 NOTE — NURSING NOTE
Patient's juvencio and IVs were removed  Patient was instructed on how to deliver lovenox injection  Patient performed the lovenox injection on self  Patient was provided verbal discharge information and a copy of the discharge information was sent home with him  Patient was sent home with all belongings  Patient was discharged via walking and accompanied by Kaity Carias RN  Patient picked up prescriptions from [address] [place] [address] prior to transporting home       Vallery Goodell, RN

## 2021-09-03 NOTE — PLAN OF CARE
Problem: PAIN - ADULT  Goal: Verbalizes/displays adequate comfort level or baseline comfort level  Description: Interventions:  - Encourage patient to monitor pain and request assistance  - Assess pain using appropriate pain scale  - Administer analgesics based on type and severity of pain and evaluate response  - Implement non-pharmacological measures as appropriate and evaluate response  - Consider cultural and social influences on pain and pain management  - Notify physician/advanced practitioner if interventions unsuccessful or patient reports new pain  Outcome: Progressing     Problem: INFECTION - ADULT  Goal: Absence or prevention of progression during hospitalization  Description: INTERVENTIONS:  - Assess and monitor for signs and symptoms of infection  - Monitor lab/diagnostic results  - Monitor all insertion sites, i e  indwelling lines, tubes, and drains  - Monitor endotracheal if appropriate and nasal secretions for changes in amount and color  - Treichlers appropriate cooling/warming therapies per order  - Administer medications as ordered  - Instruct and encourage patient and family to use good hand hygiene technique  - Identify and instruct in appropriate isolation precautions for identified infection/condition  Outcome: Progressing     Problem: SAFETY ADULT  Goal: Patient will remain free of falls  Description: INTERVENTIONS:  - Educate patient/family on patient safety including physical limitations  - Instruct patient to call for assistance with activity   - Consult OT/PT to assist with strengthening/mobility   - Keep Call bell within reach  - Keep bed low and locked with side rails adjusted as appropriate  - Keep care items and personal belongings within reach  - Initiate and maintain comfort rounds  - Make Fall Risk Sign visible to staff  - Offer Toileting every 2 Hours, in advance of need  - Apply yellow socks and bracelet for high fall risk patients  - Consider moving patient to room near nurses station  Outcome: Progressing  Goal: Maintain or return to baseline ADL function  Description: INTERVENTIONS:  -  Assess patient's ability to carry out ADLs; assess patient's baseline for ADL function and identify physical deficits which impact ability to perform ADLs (bathing, care of mouth/teeth, toileting, grooming, dressing, etc )  - Assess/evaluate cause of self-care deficits   - Assess range of motion  - Assess patient's mobility; develop plan if impaired  - Assess patient's need for assistive devices and provide as appropriate  - Encourage maximum independence but intervene and supervise when necessary  - Involve family in performance of ADLs  - Assess for home care needs following discharge   - Consider OT consult to assist with ADL evaluation and planning for discharge  - Provide patient education as appropriate  Outcome: Progressing  Goal: Maintains/Returns to pre admission functional level  Description: INTERVENTIONS:  - Perform BMAT or MOVE assessment daily    - Set and communicate daily mobility goal to care team and patient/family/caregiver  - Collaborate with rehabilitation services on mobility goals if consulted  - Perform Range of Motion 4 times a day  - Reposition patient every 2 hours    - Dangle patient 4 times a day  - Stand patient 4 times a day  - Ambulate patient 4 times a day  - Out of bed to chair 4 times a day   - Out of bed for meals 3 times a day  - Out of bed for toileting  - Record patient progress and toleration of activity level   Outcome: Progressing     Problem: DISCHARGE PLANNING  Goal: Discharge to home or other facility with appropriate resources  Description: INTERVENTIONS:  - Identify barriers to discharge w/patient and caregiver  - Arrange for needed discharge resources and transportation as appropriate  - Identify discharge learning needs (meds, wound care, etc )  - Arrange for interpretive services to assist at discharge as needed  - Refer to Case Management Department for coordinating discharge planning if the patient needs post-hospital services based on physician/advanced practitioner order or complex needs related to functional status, cognitive ability, or social support system  Outcome: Progressing     Problem: Knowledge Deficit  Goal: Patient/family/caregiver demonstrates understanding of disease process, treatment plan, medications, and discharge instructions  Description: Complete learning assessment and assess knowledge base    Interventions:  - Provide teaching at level of understanding  - Provide teaching via preferred learning methods  Outcome: Progressing     Problem: Potential for Falls  Goal: Patient will remain free of falls  Description: INTERVENTIONS:  - Educate patient/family on patient safety including physical limitations  - Instruct patient to call for assistance with activity   - Consult OT/PT to assist with strengthening/mobility   - Keep Call bell within reach  - Keep bed low and locked with side rails adjusted as appropriate  - Keep care items and personal belongings within reach  - Initiate and maintain comfort rounds  - Make Fall Risk Sign visible to staff  - Offer Toileting every 2 Hours, in advance of need  - Apply yellow socks and bracelet for high fall risk patients  - Consider moving patient to room near nurses station  Outcome: Progressing

## 2021-09-03 NOTE — DISCHARGE SUMMARY
[facility]  Discharge- [address] 1965, 61 y o  male MRN: [id number]  Unit/Bed#: [facility] 2 -01 Encounter: [id number]  Primary Care Provider: Sharona Alcantar PA-C   Date and time admitted to hospital: 9/2/2021  3:20 AM          Admission Date: 9/2/2021       Discharge Date:  09/03/2021        Primary Diagnoses  Principal Problem:    Acute on chronic combined systolic and diastolic CHF (congestive heart failure) ([facility])  Active Problems:    Cough    Coronary artery disease involving native coronary artery of native heart with angina pectoris ([facility])    S/P AVR (aortic valve replacement)    Biventricular ICD (implantable cardioverter-defibrillator) in place    History of ventricular tachycardia    Lactic acidosis    SIRS (systemic inflammatory response syndrome) ([facility] [address] )    Cocaine use  Resolved Problems:    * No resolved hospital problems  [facility] Course by problem:  * Acute on chronic combined systolic and diastolic CHF (congestive heart failure) ([facility])  Assessment & Plan  · Patient has a history of chronic systolic congestive Heart failure secondary to Ischemic cardiomyopathy  · Most recent 2D echocardiogram 03/2021 with left ventricular ejection fraction 18% and grade  diastolic dysfunction    Patient had severe biatrial dilatation and a appropriate functioning aortic mechanical valve mild MR, moderate TR  · Patient presented with an acute exacerbation of his chronic systolic and diastolic congestive heart failure  · ProBNP 10,000  · Chest x-ray with moderate interstitial edema  · Was diuresed with Lasix 40 mg IV b i d  with significant improvement  · Transitioned to Lasix 40 mg p o  b i d   · Continued home regimen of ACE-inhibitor, beta-blocker, eplerenone  · Patient had symptomatic and clinical improvement  · Appreciate cardiology evaluation recommendations:  Patient be discharged home on current regimen  · Congestive heart failure dietary modifications and lifestyle changes  · Outpatient cardiology follow-up  · Will need a BMP in 1 week    Cough  Assessment & Plan  · Patient presented with dyspnea, and a cough, and initially there were concerns for possible community-acquired pneumonia  · CXR revealed moderate interstitial edema  No focal infiltrate  · Patient was admitted to the hospital and started on empiric antibiotics with ceftriaxone, while workup in progress  · COVID-19 PCR negative  · Blood cultures pending x2  · Negative Legionella/strep pneumoniae antigen  · Procalcitonin negative  · suspect patient's presentation with dyspnea and cough was secondary to congestive heart failure further than infectious etiology  · Patient notes symptoms have resolved after diuresis  Antibiotics will be discontinued and he will be discharged home    Cocaine use  Assessment & Plan  Patient's urine drug screen was positive for cocaine  Patient notes he smokes marijuana regularly but denies using cocaine, he states he was smoking his brother's marijuana cigarette yesterday, and must had cocaine in it, unbeknownst to him  Patient is counseled on avoidance of all drugs, and the risk of cocaine causing an acute MI   patient verbalized understanding    SIRS (systemic inflammatory response syndrome) ([facility] [address] )  Assessment & Plan  -patient was afebrile, however tachycardic on admission  -no leukocytosis  -presentation not likely secondary to sepsis, rather secondary to SIRS, from congestive heart failure exacerbation    No evidence of an acute infection    History of ventricular tachycardia  Assessment & Plan  · History of VFib and V-tach maintained on metoprolol   · Previous amiodarone had been discontinued by EP  · S/p bi-V AICD    Biventricular ICD (implantable cardioverter-defibrillator) in place  Assessment & Plan  · S/p BiV ICD 2015  · Patient follows with the cardiologist Dr Immanuel Hancock for ischemic cardiomyopathy with biventricular ICD in place  · Most recent office note from 05/25/2021 was reviewed: Most recent device interrogation revealed appropriate functioning with Bi V pacing  Patient previously been on amiodarone, since discontinued  · Continue beta-blocker  · ICD interrogated by Cardiology today, no significant arrhythmias    S/P AVR (aortic valve replacement)  Assessment & Plan  · H/o rheumatic heart disease and aortic stenosis s/p mechanical AVR in 1988  · INR goal 2 5-3 5  · Subtherapeutic INR 1 1  On admission  · patient notes that he has not had his blood drawn in approximately 4 months  Outpatient records are reviewed and Coumadin clinic has been attempting on multiple occasions to contact patient for blood work  · He states he is still taking his Coumadin at home 2 mg daily apart from 4 mg on Sunday  · Last Coumadin clinic note recommends he take 4 mg on Tuesday/Sunday and 2 mg all other days  · Patient was placed bridging therapy with IV heparin due to mechanical valve  · Patient was evaluated by the cardiology team  · INR improved to 1 3:  Cardiology dosed patient's Coumadin with 4 mg b i d  times today  · At discharge he will continue 4 mg daily  · Will be discharged on bridging therapy with Lovenox  · Will need INR 9/6:  Patient relates he goes to [address] [place] [address] for his INR draws, he declines home nursing  Patient notes he has self administer Lovenox in the past without any difficulties    Coronary artery disease involving native coronary artery of native heart with angina pectoris [facility])  Assessment & Plan  · Patient has a history of coronary artery disease  · s/p PCI/bare metal stent to LAD and LCx 2014      · On medical management with ASA, beta blocker, ACE-inhibitor and statin  · Patient admitted with insignificantly elevated troponin, secondary to non MI troponin elevation, secondary to congestive heart failure  · No evidence of acute ischemia  · Continue medical therapy  · No additional ischemic testing currently indicated  · Outpatient cardiology follow-up          Service:  [facility], [facility]  Consulting Providers   Cardiology:  Dr Chivo Tyson    Procedures Performed   None    Hospital Studies:  9/2:  Chest x-ray  Cardiomegaly with moderate CHF/pulmonary edema     Microbiology  9/2-strep pneumoniae/Legionella antigen  9/2 blood culture:  Negative x2  9/2:  Negative COVID    Results from last 7 days   Lab Units 09/03/21  0608 09/02/21  0345   WBC Thousand/uL 9 72 10 61*   HEMOGLOBIN g/dL 13 5 14 0   HEMATOCRIT % 42 0 43 7   PLATELETS Thousands/uL 155 169     Results from last 7 days   Lab Units 09/03/21  0608 09/02/21  0345   POTASSIUM mmol/L 3 3* 3 9   CHLORIDE mmol/L 102 98*   CO2 mmol/L 30 26   BUN mg/dL 10 11   CREATININE mg/dL 0 83 1 14   CALCIUM mg/dL 8 4 8 9       History and Physical Exam:  Please refer to the Admission H&P note    Discharge Condition: Improved  Discharge Disposition: Home/Self Care    Discharge Note and Physical Exam:   Patient notes he feels back to his baseline  He is ambulating and denies any shortness of breath or dyspnea on exertion  He denies any cough  He denies any pain anywhere at all  He denies any chest pain specifically  He denies any abdominal pain, nausea, vomiting, diarrhea  He is ambulating and denies any dizziness or lightheadedness  Patient notes he self administer Lovenox injections in the past without any difficulty and is agreeable to continue        Vitals:    09/03/21 0740   BP: 125/68   Pulse: 69   Resp: 16   Temp: 98 7 °F (37 1 °C)   SpO2: 100%     General:  Pleasant, non-tachypnic, non-dyspnic  Conversant  Able to speak in complete sentences without having to stop for dyspnea  Heart: Regular rate and rhythm, S1S2 present  No murmur, rub or gallop  Lungs: Clear to auscultation bilaterally, no wheezing, rhonchi, or crackles  Good air movement  No accessory muscle use or respiratory distress  Abdomen: soft, non-tender, non-distended, NABS    No rebound or guarding  No mass or peritoneal signs  Extremities: no clubbing, cyanosis or edema  2+ pedal pulses bilaterally  Neurologic:  Awake alert  Fluent speech  Skin: warm and dry  No petechiae, purpura or rash  Discharge Medications   Please see Medical Reconciliation Discharge Form    Discharge Follow Up Appointments:   Tonio Alcantar PA-C: 1 week  Dr Cliff Wolf: 2 weeks  Labs; INR/BMP monday 9/6    Discharge  Statement   Total Time Spent today including physical exam, discussion with patient, and discharge arrangements/care = 38 minutes      Family:  Patient does not have any contacts he wishes notified    Discussed with patient's nurse  Discussed with     This note has been constructed using a voice recognition system

## 2021-09-03 NOTE — DISCHARGE INSTRUCTIONS
Enoxaparin (By injection)   Enoxaparin (ee-nox-a-PAR-in)  Prevents and treats blood clots  Also treats heart attacks  This medicine is a blood thinner  Brand Name(s): [employer] Choice Enoxaparin Sodium, Lovenox, [facility] Rx Lovenox   There may be other brand names for this medicine  When This Medicine Should Not Be Used: You should not use this medicine if you have had an allergic reaction to enoxaparin, heparin, benzyl alcohol, or products made from pork  You should not use enoxaparin if you have bleeding disorders or any active bleeding  How to Use This Medicine:   Injectable  · Your doctor will prescribe your exact dose and tell you how often it should be given  This medicine is given as a shot under your skin  · A nurse or other health provider will give you this medicine  It may also be given by a home health caregiver  · You may be taught how to give your medicine at home  Make sure you understand all instructions before giving yourself an injection  Do not use more medicine or use it more often than your doctor tells you to  · You will be shown the body areas where this shot can be given  Use a different body area each time you give yourself a shot  Keep track of where you give each shot to make sure you rotate body areas  · Use a new needle and syringe each time you inject your medicine  If a dose is missed:   · You must use this medicine on a fixed schedule  Call your doctor or pharmacist if you miss a dose  How to Store and Dispose of This Medicine:   · If you store this medicine at home, keep it at room temperature, away from heat and direct light  · If you were given a bottle of medicine to use with your syringes, you must use the medicine within 28 days after the first shot  Throw away the unused medicine in the bottle after 28 days  · Throw away used needles in a hard, closed container that the needles cannot poke through  Keep this container away from children and pets    · Ask your pharmacist, doctor, or health caregiver about the best way to dispose of any leftover medicine, containers, and other supplies  Throw away old medicine after the expiration date has passed  · Keep all medicine out of the reach of children  Never share your medicine with anyone  Drugs and Foods to Avoid:   Ask your doctor or pharmacist before using any other medicine, including over-the-counter medicines, vitamins, and herbal products  · Make sure your doctor knows if you are also using blood thinners (such as clopidogrel, warfarin, or Coumadin®)  Tell your doctor if you are also using dipyridamole (Persantine®), ketorolac (Toradol®), or sulfinpyrazone (Anturane®)  · Make sure your doctor knows if you are using pain or arthritis medicine (such as aspirin, Advil®, Aleve®, Motrin®, Orudis®, Dolobid®, [place], Indocin®, Relafen®, or Voltaren®)  Avoid taking aspirin or medicines that contain aspirin, unless your doctor tells you to  Warnings While Using This Medicine:   · Make sure your doctor knows if you are pregnant or breastfeeding, or if you have liver disease, kidney disease, blood vessel problems, diabetes, a heart infection, uncontrolled high blood pressure, a stomach ulcer or bleeding, or a bleeding disorder such as hemophilia  Tell your doctor if you have a bleeding disorder caused by heparin  · Make sure your doctor knows if you have recently had a stroke, or surgery on your eyes, brain, or spine  Tell your doctor if you have had a heart valve replaced  · This medicine may cause bleeding or bruising  This risk is higher if you have a catheter in your back for pain medicine or anesthesia (sometimes called an "epidural"), or if you have kidney problems  The risk of bleeding increases if your kidney problems get worse  Discuss this with your doctor if you are concerned  · You may bleed and bruise more easily while you are using this medicine   Be extra careful to avoid injuries until the effects of the medicine have worn off  Stay away from rough sports or other situations where you could be bruised, cut, or injured  Brush and floss your teeth gently  Be careful when using sharp objects, including razors and fingernail clippers  Avoid picking your nose  If you need to blow your nose, blow it gently  · Watch for any bleeding from open areas such as around the injection site  Also check for blood in your urine or stool  If you have any bleeding or injuries, tell your doctor right away  · Tell any doctor or dentist who treats you that you are using this medicine  You may need to stop using this medicine several days before you have surgery or medical tests  · Your doctor will do lab tests at regular visits to check on the effects of this medicine  Keep all appointments  Possible Side Effects While Using This Medicine:   Call your doctor right away if you notice any of these side effects:  · Allergic reaction: Itching or hives, swelling in your face or hands, swelling or tingling in your mouth or throat, chest tightness, trouble breathing  · Blood in your urine  · Bloody or black, tarry stools  · Chest pain, shortness of breath, or coughing up blood  · Fever  · Large, flat, blue or purplish patches in the skin  · Numbness or weakness in your arm or leg, or on one side of your body  · Pain in your lower leg (calf)  · Sudden or severe headache, problems with vision, speech, or walking  · Swelling in your hands, ankles, or feet  · Uneven heartbeat  · Unusual bleeding or bruising  · Vomiting blood or material that looks like coffee grounds  · Warmth or redness in your face, neck, arms, or upper chest   If you notice these less serious side effects, talk with your doctor:   · Confusion  · Nausea or diarrhea  · Pain, redness, bruising, swelling, or a lump under your skin where the shot was given  If you notice other side effects that you think are caused by this medicine, tell your doctor     Call your doctor for medical advice about side effects  You may report side effects to FDA at 4-742-FDA-3175  © Copyright [employer] 2021 Information is for End User's use only and may not be sold, redistributed or otherwise used for commercial purposes  The above information is an  only  It is not intended as medical advice for individual conditions or treatments  Talk to your doctor, nurse or pharmacist before following any medical regimen to see if it is safe and effective for you  Enoxaparin (Lovenox) - (Por inyección)   Para qué se utiliza andrei medicamento:   Previene y trata los coágulos sanguíneos  Comuníquese de inmediato con un médico o enfermera si usted tiene:  · Dolor de shirley repentino o severo  · Dolor de espalda, entumecimiento, hormigueo o debilidad en las piernas o los pies  · Heces berry o negras, alquitranosas; pérdida del control de esfínteres  · Vómito con angeles o con aspecto de café molido  · Sangrado que no se detiene o moretones que no sanan     Efectos secundarios comunes:  · Sangrado leve o moretones  · Dolor, enrojecimiento, inflamación o bulto en el área de la inyección  © [employer] 2021 Information is for [employer] use only and may not be sold, redistributed or otherwise used for commercial purposes  Insuficiencia cardíaca   LO QUE NECESITA SABER:   La insuficiencia cardíaca es bertin condición que no permite que ware corazón se llene o bombee correctamente  No hay suficiente oxígeno en la angeles que llega a christ órganos y tejidos  Es posible que el líquido no se mueva adecuadamente a través del cuerpo  El líquido se acumula y causa hinchazón y dificultad para respirar  [place] se conoce antonio insuficiencia cardíaca congestiva  La insuficiencia cardíaca puede comenzar en el ventrículo belinda o derecho  La insuficiencia cardíaca a menudo es causada por daños o lesiones en el corazón   El daño puede ser causado por otros problemas del corazón, diabetes o presión arterial emily  El daño también puede ramandeep sido causado por bertin infección  La insuficiencia cardíaca es bertin afección a chidi plazo que tiende a empeorar con el transcurso del Marnie  Es importante controlar ware mary lou para mejorar ware calidad de senthil  INSTRUCCIONES SOBRE EL EMILY HOSPITALARIA:   Llame al número de emergencias local (911 en los [place]) si:  · Tiene alguno de los siguientes signos de un ataque cardíaco:      ? Estrujamiento, presión o tensión en ware pecho    ? Usted también podría presentar alguno de los siguientes:     § Malestar o dolor en ware espalda, kiel, mandíbula, abdomen, o brazo    § Falta de [employer] o vómitos    § Desvanecimiento o sudor frío repentino      Llame a ware médico si:  · Ware latido cardíaco es acelerado, lento o irregular todo el tiempo  · Usted tiene síntomas de que la insuficiencia cardíaca está empeorando:     ? Falta de Loco Rodríguez en reposo o chip la noche, o que está empeorando de cualquier forma  ? Usted aumenta más de 3 libras (1 4 kg) en un día, o más de lo que ware médico le indica que podría aumentar    ? Inflamación en exceso en christ piernas o tobillos    ? Dolor o inflamación abdominal    ? Aumento de la tos    ? Pérdida del apetito    ? Sensación de cansancio todo el tiempo    · Usted se siente desesperado o deprimido, o ha perdido el interés en las cosas que antes disfrutaba  · Usted se siente preocupado o tiene miedo con frecuencia  · Usted tiene preguntas o inquietudes acerca de ware condición o cuidado  Medicamentos:  · Los medicamentos pueden ser necesarios para ayudar a regular ware ritmo cardíaco  Puede que también necesite medicamentos para bajar la presión arterial y para disminuir el exceso de líquidos  · [place] christ medicamentos antonio se le haya indicado  Consulte con ware médico si usted nila que ware medicamento no le está ayudando o si presenta efectos secundarios  Infórmele si es alérgico a algún medicamento  Mantenga bertin lista actualizada de los [place], las vitaminas y los productos herbales que [place]  Incluya los siguientes datos de los medicamentos: cantidad, frecuencia y motivo de administración  Traiga con usted la lista o los envases de las píldoras a christ citas de seguimiento  Lleve la lista de los medicamentos con usted en ashkan de bertin emergencia  Acuda a la rehabilitación cardíaca según le indicaron: La rehabilitación cardíaca es un programa dirigido por especialistas que le ayudan a fortalecer ware corazón de forma collins  El programa incluye ejercicios, relajación, manejo del estrés y nutrición para un corazón saludable  Controle la hinchazón causada por el líquido acumulado:  · Eleve las piernas por encima del nivel de ware corazón  [place] ayuda con el líquido que se acumula en las piernas o los tobillos  Eleve las piernas lo más frecuente posible chip el día  Coloque christ piernas sobre almohadas o cobijas para mantenerlas elevadas cómodamente  Trate de no permanecer de pie chip largos períodos de [employer]  Muévase para mantener la circulación de la angeles  · Limite el consumo de sodio (sal)  Pregunte cuánto sodio puede consumir cada día  Ware médico podría indicarle un límite, antonio 2,300 miligramos (mg) al día  Ware médico o un dietista pueden mostrarle cómo leer las etiquetas de los alimentos para determinar la cantidad de mg en un alimento  También puede ayudarlo a encontrar maneras de consumir menos sal  Por ejemplo, si angelique la comida mientras cocina, no añada más en la koenig  · [address] se le haya indicado  Es posible que usted necesite limitar la cantidad de líquidos que [place] en 24 horas  Ware médico le indicará cuánto líquido debe beber y [place] líquidos son los mejores para usted  Podría indicarle que limite los líquidos a 1 5 a 2 litros al día  Dennys Erichsen con qué frecuencia beber líquidos chip el día  · Pésese todas las mañanas   Use la misma báscula en el mismo sitio  [place]pert esto después de ir al baño sandra antes de consumir cualquier alimento o bebida  Use el mismo tipo de ropa cada vez  Anote ware peso y llame a ware médico si tiene un aumento de peso repentino  Marijean Bernard y el aumento de peso son signos de acumulación de líquidos  Controle la insuficiencia cardíaca: Ware calidad de senthil puede mejorar con el tratamiento y con lo siguiente:  · No fume  La nicotina y otros químicos contenidos en los cigarrillos y cigarros pueden causar daño a christ pulmones y el corazón  Pida información a ware médico si usted actualmente fuma y necesita ayuda para dejar de fumar  Los cigarrillos electrónicos o el tabaco sin humo igualmente contienen nicotina  Consulte con ware médico antes de [employer]  · No consuma alcohol ni drogas ilegales  El alcohol y las drogas pueden aumentar ware riesgo de hipertensión, diabetes y arteriopatías coronaria  · Consuma alimentos saludables para el corazón  Los alimentos saludables para el corazón incluyen frutas, verduras, carne magra (antonio res, jaspal o cerdo) y productos lácteos bajos en grasa  También el pescado, antonio el salmón y el atún son saludables para el corazón  Otros alimentos saludables para el corazón incluyen nueces, panes integrales, frijoles y frijoles cocidos  Reemplace la mantequilla y la margarina con aceites saludables para el corazón antonio el aceite de chris o el aceite de canola  Ware médico o ware dietista pueden ayudarlo a crear planes de comidas saludables  · Controle cualquier afección médica que tenga  Estas incluyen presión arterial evelio, diabetes, obesidad, colesterol alto, síndrome metabólico y EPOC  Tendrá menos síntomas si controla estas afecciones de [place]  Siga las recomendaciones de ware médico y realice un seguimiento de forma regular  · Mantenga un peso saludable  Al tener sobrepeso usted corre un mayor riesgo de sufrir de hipertensión, diabetes y Anna Wiggins enfermedad de las arterias coronarías   Estas condiciones pueden empeorar christ síntomas  Consulte con ware médico cuánto debería pesar  [place] que lo ayude a crear un plan para bajar de peso si tiene sobrepeso  · Manténgase activo  La actividad puede ayudarlo a prevenir el empeoramiento de los síntomas  La caminata es un tipo de actividad física que ayuda a mantener ware fortaleza y mejorar ware estado de ánimo  La actividad física también ayuda a controlar ware peso corporal  Colabore con ware médico para desarrollar un plan de ejercicios que sea adecuado para usted  · Vaya a que lo vacunen antonio se le indique  La gripe y la neumonía pueden ser Jose Francisco Art condición grave para bertin persona que tiene insuficiencia cardíaca  Las vacunas lo protegen de estas infecciones  Vacúnese contra la gripe todos los años tan pronto antonio se recomiende, generalmente en septiembre u octubre  Es posible que también necesite bertin vacuna contra la neumonía  Ware médico puede indicarle si necesita otras vacunas, y cuándo aplicárselas  Acuda a bertin consulta de control con ware médico dentro de los 7 días o según le hayan indicaron  Es posible que necesite regresar para que le terry otros exámenes  Puede que necesite atención médica domiciliaria  Un médico controlará christ signos vitales y ware peso y se asegurará de que los medicamentos están funcionando  Anote christ preguntas para que se acuerde de hacerlas chip christ visitas  Únase a un jocelin de apoyo: La insuficiencia cardíaca puede ser difícil de manejar  Puede ser de ayuda hablar con otras personas que tienen insuficiencia cardíaca  Usted podría aprender a controlar mejor ware condición o recibir apoyo emocional  Para más información:  · [address] [place]   Phone: [phone]  Web Address: [email or web address] [email or web address]/  [address] [place] [address] Michelle Zamarripa 2021 Information is for End User's use only and may not be sold, redistributed or otherwise used for commercial purposes   All illustrations and images included in CareNotes® are the copyrighted property of KRISTEL FRIEDMAN , tradeNOW  or [address] [place] [address] es sólo para uso en educación  Ware intención no es darle un consejo médico sobre enfermedades o tratamientos  Colsulte con ware Jacquelyn [employer] farmacéutico antes de seguir cualquier régimen médico para saber si es seguro y efectivo para usted   ==========================================================    Discharge instructions    Please take your medication as directed on your discharge papers  Continue the water pill Lasix/furosemide 40 mg tablet twice a day  Weigh yourself daily and call your heart doctor if you gain or lose more than 2 lb in a week, have any swelling, or mild difficulty breathing  Return to the ER with any chest pain, or significant difficulty breathing    Please continue the Coumadin 4 mg once a day  You need to continue the Lovenox shots every 12 hours, at 11:00 a m  and 11:00 p m  for the next 3 days  You need to have your lab work done on Monday 9/6 to recheck your Coumadin level as well as your kidneys, with results to your heart doctor and family doctor    Please return to the ER with any problems at all, especially any chest pain, difficulty breathing, dizziness, weakness, lightheadedness, or any other problems    Please return with any bleeding, especially any red or black blood in your bowel movements or vomit   ============================================================================

## 2021-09-03 NOTE — ASSESSMENT & PLAN NOTE
· S/p BiV ICD 2015  · Patient follows with the cardiologist Dr Nishant Pabon for ischemic cardiomyopathy with biventricular ICD in place  · Most recent office note from 05/25/2021 was reviewed: Most recent device interrogation revealed appropriate functioning with Bi V pacing    Patient previously been on amiodarone, since discontinued  · Continue beta-blocker  · ICD interrogated by Cardiology today, no significant arrhythmias

## 2021-09-03 NOTE — ASSESSMENT & PLAN NOTE
· Patient presented with dyspnea, and a cough, and initially there were concerns for possible community-acquired pneumonia  · CXR revealed moderate interstitial edema  No focal infiltrate  · Patient was admitted to the hospital and started on empiric antibiotics with ceftriaxone, while workup in progress  · COVID-19 PCR negative  · Blood cultures pending x2  · Negative Legionella/strep pneumoniae antigen  · Procalcitonin negative  · suspect patient's presentation with dyspnea and cough was secondary to congestive heart failure further than infectious etiology  · Patient notes symptoms have resolved after diuresis    Antibiotics will be discontinued and he will be discharged home

## 2021-09-03 NOTE — ASSESSMENT & PLAN NOTE
-patient was afebrile, however tachycardic on admission  -no leukocytosis  -presentation not likely secondary to sepsis, rather secondary to SIRS, from congestive heart failure exacerbation    No evidence of an acute infection

## 2021-09-03 NOTE — UTILIZATION REVIEW
Initial Clinical Review    Admission: Date/Time/Statement:   Admission Orders (From admission, onward)     Ordered        09/02/21 0510  Inpatient Admission  Once                   Orders Placed This Encounter   Procedures    Inpatient Admission     Standing Status:   Standing     Number of Occurrences:   1     Order Specific Question:   Level of Care     Answer:   Med Surg [16]     Order Specific Question:   Estimated length of stay     Answer:   More than 2 Midnights     Order Specific Question:   Certification     Answer:   I certify that inpatient services are medically necessary for this patient for a duration of greater than two midnights  See H&P and MD Progress Notes for additional information about the patient's course of treatment  ED Arrival Information     Expected Arrival Acuity    - 9/2/2021 03:20 Emergent         Means of arrival Escorted by Service Admission type    [facility] ([address]) Hospitalist Emergency         Arrival complaint    palpitations        Chief Complaint   Patient presents with    Shortness of Breath     Pt states sob starting around 11pm  Denies cp  Per EMS, diaphoretic, tachypnic, tachycardic  15mg cardizem given in route  Initial Presentation:  29-year-old male from home who presents to the emergency department via EMS for complaint of shortness of breath  PMH significant for ischemic cardiomyopathy and secondary HFrEF (EF 15-20% on ECHO from 03/21), coronary artery disease s/p PCI with BMS to LAD and LCx (2014) on warfarin, rheumatic heart disease and aortic stenosis s/p AVR, ventricular fibrillation and tachycardia, BiV medtronic ICD in place, hyperlipidemia, hypertension, colon cancer (resection in 2016), and drug abuse (1990s)  Patient reports laying down last night and developing progressively worsening shortness of breath  Reports symptoms were sudden and associated with palpitations   Reports compliance with medications and low-salt diet  No h/o Covid vaccine  Physical Exam: Rhonchi present R lung, dry cough, 1+ trace edema b/l LLE  Plan: Inpatient Med Surg admission for evaluation and treatment of acute on chronic combines systolic and diastolic heart failure and community acquired pneumonia of right lung: furosemide 40mg BID, ACEI and BB  IV Lasix 40mg BID, monitor daily weight, I&O, low Na diet, 1500 mL fluid restriction, cardiology consult, will monitor off antibiotics, Follow-up procalcitonin and blood cx, Urine antigens ordered  9/02 Cardiology Consult: Problems:   Decompensated combined systolic and diastolic heart failure, Ischemic cardiomyopathy with severely reduced left ventricular systolic function, Subtherapeutic INR Medical noncompliance, Substance abuse with cocaine and marijuana, Essential hypertension, Coronary artery disease without angina, History of VT/VF, Non MI troponin elevation  PLAN-   Bridging with low-molecular weight heparin can be considered if INR is less than 1 8 and patient is ready for discharge  Counseled patient about  his drug use and need for close medical follow-up  Will reassess heart rate control tomorrow  repeat renal function and electrolytes in a m        Pre-procedure Diagnoses   Paroxysmal atrial fibrillation (HCC) [I48 0]   Post-procedure Diagnoses   Acute on chronic systolic heart failure (HCC) [I50 23]   Supraventricular arrhythmia [I49 9]   Procedures   VT PRGRMG EVAL IMPLANTABLE IN PERSON MULTI LEAD DFB [76044 (CPT®)]      Normal functioning biventricular ICD with evidence of nonsustained less than 1 minute waited short bursts of what looks like atrial tachycardia along with ongoing heart failure  9/03 Cardiology note: Plan: Transitioning patient to oral diuretic at home dose of 40 mg furosemide p o  twice daily  Will continue metoprolol succinate at 50 mg twice daily, lisinopril,  Eplerenone and other meds  Potassium repleted  Giving additional dose of warfarin 4 mg this morning  Continue 4 mg daily evening beginning tonight  If INR is at 1 8 or greater then no bridging is needed however if INR remains  Subtherapeutic can be discharged on once a day bridging dose of Lovenox for 1 more day  Continued CHF precaution  outpatient cardiology following upon discharge  9/03 Discharge summary: Patient notes he feels back to his baseline  He is ambulating and denies any shortness of breath or dyspnea on exertion  He denies any cough  Patient notes he self administer Lovenox injections in the past without any difficulty and is agreeable to continue  INR improved to 1 3:  Cardiology dosed patient's Coumadin with 4 mg b i d  times today  At discharge he will continue 4 mg daily  Will be discharged on bridging therapy with Lovenox  Will need INR 9/6:  Patient relates he goes to [facility] for his INR draws, he declines home nursing    Patient notes he has self administer Lovenox in the past without any difficulties    9/03 1411 Discharge to home with Home Health Care      ED Triage Vitals   Temperature Pulse Respirations Blood Pressure SpO2   09/02/21 0328 09/02/21 0328 09/02/21 0328 09/02/21 0328 09/02/21 0328   97 9 °F (36 6 °C) (!) 110 20 (!) 161/102 100 %  2lpm O2      Temp Source Heart Rate Source Patient Position - Orthostatic VS BP Location FiO2 (%)   09/02/21 0328 09/02/21 0328 09/02/21 0328 09/02/21 0328 --   Oral Monitor Sitting Right arm       Pain Score       09/02/21 0353       No Pain          Wt Readings from Last 1 Encounters:   09/03/21 62 6 kg (138 lb 0 1 oz)     Additional Vital Signs:   Date/Time  Temp  Pulse  Resp  BP  MAP (mmHg)  SpO2  Calculated FIO2 (%) - Nasal Cannula  Nasal Cannula O2 Flow Rate (L/min)  O2 Device    09/03/21 07:40:13  98 7 °F (37 1 °C)  69  16  125/68  87  100 %  --  --  None (Room air)    09/03/21 00:17:09  98 4 °F (36 9 °C)  62  --  119/71  87  95 %  --  --  --    09/03/21 00:16:49  98 4 °F (36 9 °C)  85  16  119/71  87  95 %  --  --  --    09/02/21 22:17:31  98 °F (36 7 °C)  79  18  118/72  87  97 %  30  2 5 L/min  Nasal cannula    09/02/21 15:28:10  99 4 °F (37 4 °C)  90  20  131/85  100  98 %  --  --  --    09/02/21 07:31:35  98 °F (36 7 °C)  102  20  132/86  101  95 %  --  --  --    09/02/21 0600  --  88  20  132/80  100  95 %  36  4 L/min  Nasal cannula    09/02/21 0500  --  88  20  125/83  99  94 %  36  4 L/min  Nasal cannula    09/02/21 0353  --  102  20  149/81  --  90 %  28  2 L/min  Nasal cannula          Pertinent Labs/Diagnostic Test Results:   9/02  Chest:   Cardiomegaly with moderate CHF/pulmonary edema    9/02 EKG result:   Atrial sensed, ventricular paced rhythm  with occasional Premature ventricular complexes  in 1st half of tracing   in 2nd half of tracing, atrial fibrillation with a tendency towards a rapid ventricular response   during atrial fibrillation, there is an interventricular conduction delay with secondary ST T-wave abnormalities and  occasional premature  ventricular contractions with fusion complexes  Abnormal ECG   in comparison to the tracing of 0 5-December-2017 09:27:09   the patient had an atrial and ventricular paced rhythm     9/02 EKG result:   Sinus tachycardia   With ventricular paced rhythm  with occasional Premature ventricular complexes and Fusion complexes  Abnormal ECG   in comparison to tracing of 02- September -2021 03:23:05   there is no longer atrial fibrillation    Results from last 7 days   Lab Units 09/02/21  0345   SARS-COV-2  Negative     Results from last 7 days   Lab Units 09/03/21  0608 09/02/21  0345   WBC Thousand/uL 9 72 10 61*   HEMOGLOBIN g/dL 13 5 14 0   HEMATOCRIT % 42 0 43 7   PLATELETS Thousands/uL 155 169   NEUTROS ABS Thousands/µL 7 20 7 73*         Results from last 7 days   Lab Units 09/03/21  0608 09/02/21  0345   SODIUM mmol/L 140 137   POTASSIUM mmol/L 3 3* 3 9   CHLORIDE mmol/L 102 98*   CO2 mmol/L 30 26   ANION GAP mmol/L 8 13   BUN mg/dL 10 11   CREATININE mg/dL 0 83 1 14   EGFR ml/min/1 73sq m 98 71   CALCIUM mg/dL 8 4 8 9     Results from last 7 days   Lab Units 09/02/21  0345   AST U/L 24   ALT U/L 19   ALK PHOS U/L 105   TOTAL PROTEIN g/dL 7 7   ALBUMIN g/dL 3 6   TOTAL BILIRUBIN mg/dL 0 95   BILIRUBIN DIRECT mg/dL 0 35*         Results from last 7 days   Lab Units 09/03/21  0608 09/02/21  0345   GLUCOSE RANDOM mg/dL 96 214*       Results from last 7 days   Lab Units 09/02/21  1052 09/02/21  0734 09/02/21  0345   TROPONIN I ng/mL 0 05* 0 08* 0 05*         Results from last 7 days   Lab Units 09/03/21  0744 09/03/21  0608 09/03/21  0019 09/02/21  1752 09/02/21  0345   PROTIME seconds  --  16 1*  --   --  14 8*   INR   --  1 32*  --   --  1 18   PTT seconds 42*  --  79* 52* 35         Results from last 7 days   Lab Units 09/02/21  0406   PROCALCITONIN ng/ml <0 05     Results from last 7 days   Lab Units 09/02/21  0734 09/02/21  0605 09/02/21  0405   LACTIC ACID mmol/L 1 2 1 2 2 4*             Results from last 7 days   Lab Units 09/02/21  0345   NT-PRO BNP pg/mL 10,329*           Results from last 7 days   Lab Units 09/02/21  0915   STREP PNEUMONIAE ANTIGEN, URINE  Negative   LEGIONELLA URINARY ANTIGEN  Negative         Results from last 7 days   Lab Units 09/02/21  0915   AMPH/METH  Negative   BARBITURATE UR  Negative   BENZODIAZEPINE UR  Negative   COCAINE UR  Positive*   METHADONE URINE  Negative   OPIATE UR  Negative   PCP UR  Negative   THC UR  Positive*         Results from last 7 days   Lab Units 09/02/21  0406 09/02/21  0400   BLOOD CULTURE  No Growth at 24 hrs  No Growth at 24 hrs           ED Treatment:   Medication Administration from 09/02/2021 0319 to 09/02/2021 0636       Date/Time Order Dose Route Action     09/02/2021 0328 diltiazem (CARDIZEM) injection 15 mg 0 mg Intravenous Given to EMS     09/02/2021 0343 aspirin chewable tablet 324 mg 324 mg Oral Given     09/02/2021 0502 cefepime (MAXIPIME) 2 g/50 mL dextrose IVPB 0 mg Intravenous Stopped     09/02/2021 0412 cefepime (MAXIPIME) 2 g/50 mL dextrose IVPB 2,000 mg Intravenous New Bag     09/02/2021 0507 heparin (porcine) injection 3,600 Units 3,600 Units Intravenous Given     09/02/2021 0510 heparin (porcine) 25,000 units in 0 45% NaCl 250 mL infusion (premix) 12 Units/kg/hr Intravenous New Bag        Past Medical History:   Diagnosis Date    AICD (automatic cardioverter/defibrillator) present     medtronic     CAD (coronary artery disease)     Cancer ([facility] )     Cardiac disease     Cardiomyopathy ([place] [address] )     mixed    predominently nonischemic    Colonic mass     Coronary artery disease     Hyperlipidemia     Hypertension     Irregular heart beat     Myocardial infarction ([facility] )     2014 and 2015    Rectal bleeding     S/P AVR (aortic valve replacement)     mechanical    Wears glasses      Present on Admission:   Coronary artery disease involving native coronary artery of native heart with angina pectoris ([facility] )   Biventricular ICD (implantable cardioverter-defibrillator) in place   Acute on chronic combined systolic and diastolic CHF (congestive heart failure) ([facility])   Cough   Lactic acidosis   SIRS (systemic inflammatory response syndrome) ([facility])   Cocaine use      Admitting Diagnosis: Palpitations [R00 2]  Acute on chronic combined systolic and diastolic heart failure (HCC) [I50 43]  SOB (shortness of breath) [R06 02]  Elevated troponin [R77 8]  S/P AVR (aortic valve replacement) [Z95 2]  Severe sepsis (HCC) [A41 9, R65 20]  Biventricular ICD (implantable cardioverter-defibrillator) in place [Z95 810]  Right lower lobe pulmonary infiltrate [R91 8]  Age/Sex: 61 y o  male    Admission Orders:  48 hr telemetry monitoring  Strict I/O  Daily weights- use standing scale to weigh patient  Provide pt with HF education  Cardiac diet: Fld restriction 1500 ml      Scheduled Medications:  aspirin, 81 mg, Oral, Daily  atorvastatin, 80 mg, Oral, Daily With Dinner  cefTRIAXone, 1,000 mg, Intravenous, Q24H  enoxaparin, 1 mg/kg, Subcutaneous, Q12H [address]  eplerenone, 25 mg, Oral, Daily  furosemide, 40 mg, Oral, BID (diuretic)  lisinopril, 40 mg, Oral, HS  metoprolol succinate, 50 mg, Oral, Q12H AISHWARYA  mirtazapine, 45 mg, Oral, HS  potassium chloride, 20 mEq, Oral, Daily  warfarin, 4 mg, Oral, Daily (warfarin)      Continuous IV Infusions:       PRN Meds:  acetaminophen, 650 mg, Oral, Q6H PRN  aluminum-magnesium hydroxide-simethicone, 30 mL, Oral, Q6H PRN  ondansetron, 4 mg, Intravenous, Q6H PRN  simethicone, 80 mg, Oral, 4x Daily PRN  sodium chloride, 1 spray, Each Nare, Q4H PRN        IP CONSULT TO NUTRITION SERVICES  IP CONSULT TO CARDIOLOGY    Network Utilization Review Department  ATTENTION: Please call with any questions or concerns to [phone] and carefully listen to the prompts so that you are directed to the right person  All voicemails are confidential   Carmel Mack all requests for admission clinical reviews, approved or denied determinations and any other requests to dedicated fax number below belonging to the campus where the patient is receiving treatment   List of dedicated fax numbers for the Facilities:  [address] DENIALS (Administrative/Medical Necessity) [phone]   [address] (Maternity/NICU/Pediatrics) [phone]   [address] [place] [address] [address] [place] [address] [address] [facility] [place] [address] [phone]

## 2021-09-03 NOTE — PROGRESS NOTES
CARDIOLOGY INPATIENT FOLLOW-UP PROGRESS NOTE  *-*-*-*-*-*-*-*-*-*-*-*-*-*-*-*-*-*-*-*-*-*-*-*-*-*-*-*-*-*-*-*-*-*-*-*-*-*-*-*-*-*-*-*-*-*-*-*-*-*-*-*-*-*-  [place] DATE: 09/03/21 10:00 AM   AUTHOR: Essie Roy MD  PATIENT: Miguel Golden   1965    [id number]   68 y o    male  INPATIENT HOSPITALIST PHYSICIAN:   DATE OF ADMISSION: 9/2/2021  3:20 AM; LENGTH OF STAY: 1 days  *-*-*-*-*-*-*-*-*-*-*-*-*-*-*-*-*-*-*-*-*-*-*-*-*-*-*-*-*-*-*-*-*-*-*-*-*-*-*-*-*-*-*-*-*-*-*-*-*-*-*-*-*-*-    CARDIOLOGY ASSESSMENT  1  Decompensated combined systolic and diastolic heart failure   2  Ischemic cardiomyopathy with severely reduced left ventricular systolic function   3  Subtherapeutic INR   4  Hypokalemia  5   non MI troponin elevation  6  Medical noncompliance   7  Substance abuse with cocaine and marijuana   8  Essential hypertension  9  Coronary artery disease without angina   10  History of VT/VF Patient Active Problem List   Diagnosis    Colonic mass    Acute on chronic combined systolic and diastolic CHF (congestive heart failure) ([facility] )    Coronary artery disease involving native coronary artery of native heart with angina pectoris ([facility]    S/P AVR (aortic valve replacement)    Ventricular fibrillation ([facility])    Biventricular ICD (implantable cardioverter-defibrillator) in place    Depressed mood    History of ventricular tachycardia    History of ventricular fibrillation    Elevated troponin    Community acquired pneumonia of right lung    Lactic acidosis    SIRS (systemic inflammatory response syndrome) ([facility])    Cocaine use        PLAN:  -- Transitioning patient to oral diuretic at home dose of 40 mg furosemide p o  twice daily  Will continue metoprolol succinate at 50 mg twice daily, lisinopril,  Eplerenone and other meds  -- Potassium repleted  --  Giving additional dose of warfarin 4 mg this morning  Continue 4 mg daily evening beginning tonight    --  If INR is at 1 8 or greater than no bridging is needed however if INR remains  Subtherapeutic can be discharged on once a day bridging dose of Lovenox for 1 more day  Will need INR check on Tuesday  --   Continued CHF precautions  --  Will arrange outpatient cardiology following upon discharge  *-*-*-*-*-*-*-*-*-*-*-*-*-*-*-*-*-*-*-*-*-*-*-*-*-*-*-*-*-*-*-*-*-*-*-*-*-*-*-*-*-*-*-*-*-*-*-*-*-*-*-*-*-*-  INTERVAL CHANGES / HISTORY OF PRESENT ILLNESS:  No acute events overnight  Patient denies any chest pain or shortness of breath or dizziness or lightheadedness or palpitations  *-*-*-*-*-*-*-*-*-*-*-*-*-*-*-*-*-*-*-*-*-*-*-*-*-*-*-*-*-*-*-*-*-*-*-*-*-*-*-*-*-*-*-*-*-*-*-*-*-*-*-*-*-*  REVIEW OF SYMPTOMS:    Positive for:    Resolved palpitations and shortness of breath  Negative for: All remaining as reviewed below and in HPI   SYSTEM SYMPTOMS REVIEWED:  General--weight change, fever, night sweats  Respiratoryl-- Wheezing, shortness of breath, cough, URI symptoms, sputum, blood  Cardiovascular--chest pain, syncope, dyspnea on exertion, edema, decline in exercise tolerance, claudication   Gastrointestinal--persistent vomiting, diarrhea, abdominal distention, blood in stool   Muscular or skeletal--joint pain or swelling   Neurologic--headaches, syncope, abnormal movement  Hematologic--history of easy bruising and bleeding   Endocrine--thyroid enlargement, heat or cold intolerance, polyuria   Psychiatric--anxiety, depression      *-*-*-*-*-*-*-*-*-*-*-*-*-*-*-*-*-*-*-*-*-*-*-*-*-*-*-*-*-*-*-*-*-*-*-*-*-*-*-*-*-*-*-*-*-*-*-*-*-*-*-*-*-*-  VITAL SIGNS:  Vitals:    21 0016 21 0017 21 0600 21 0740   BP: 119/71 119/71  125/68   BP Location:    Right arm   Pulse: 85 62  69   Resp: 16   16   Temp: 98 4 °F (36 9 °C) 98 4 °F (36 9 °C)  98 7 °F (37 1 °C)   TempSrc:    Oral   SpO2: 95% 95%  100%   Weight:   62 6 kg (138 lb 0 1 oz)       Temp (24hrs), Av 6 °F (37 °C), Min:98 °F (36 7 °C), Max:99 4 °F (37 4 °C)  Current: Temperature: 98 7 °F (37 1 °C)    Weight (last 2 days)     Date/Time   Weight    09/03/21 0600   62 6 (138 01)    09/02/21 1719   61 8 (136 24)    09/02/21 0328   62 3 (137 35)            Body mass index is 22 28 kg/m²  Wt Readings from Last 3 Encounters:   09/03/21 62 6 kg (138 lb 0 1 oz)   06/04/21 62 9 kg (138 lb 9 6 oz)   05/25/21 61 7 kg (136 lb)      Intake/Output Summary (Last 24 hours) at 9/3/2021 1000  Last data filed at 9/2/2021 1040  Gross per 24 hour   Intake --   Output 500 ml   Net -500 ml        *-*-*-*-*-*-*-*-*-*-*-*-*-*-*-*-*-*-*-*-*-*-*-*-*-*-*-*-*-*-*-*-*-*-*-*-*-*-*-*-*-*-*-*-*-*-*-*-*-*-*-*-*-*-  PHYSICAL EXAM:  General Appearance:    Alert, cooperative, no distress, appears stated age   Head, Eyes, ENT:    No obvious abnormality, moist mucous mebranes  Neck:   Supple, no carotid bruit or JVD   Back:     Symmetric, no curvature  Lungs:     Respirations unlabored  Clear to auscultation bilaterally,    Chest wall:    No tenderness or deformity   Heart:    Regular rate and rhythm interrupted with occasional premature beat, brisk mechanical heart sounds, unable to appreciate murmur   Abdomen:     Soft, non-tender, No obvious masses, or organomegaly   Extremities:   Extremities warm, no cyanosis or edema    Skin:   Skin color, texture, turgor normal, no rashes or lesions     *-*-*-*-*-*-*-*-*-*-*-*-*-*-*-*-*-*-*-*-*-*-*-*-*-*-*-*-*-*-*-*-*-*-*-*-*-*-*-*-*-*-*-*-*-*-*-*-*-*-*-*-*-*-  TELEMETRY, LAST ECG:  Telemetry reviewed      Atrial sensed ventricular paced rhythm with occasional premature ventricular contractions and short run of what looks like supraventricular tachycardia with aberrant conduction Results for orders placed or performed during the hospital encounter of 09/02/21   ECG 12 lead   Result Value    Ventricular Rate 87    Atrial Rate 87    ND Interval 206    QRSD Interval 158    QT Interval 464    QTC Interval 558    P Axis 57    QRS Axis -89    T Wave Axis 77    Narrative Atrial-sensed ventricular-paced rhythm with occasional Premature ventricular complexes  Abnormal ECG  When compared with ECG of 02-SEP-2021 07:27,  No significant change was found  Confirmed by Padma Pretty ([id number]) on 9/2/2021 11:10:01 AM      *-*-*-*-*-*-*-*-*-*-*-*-*-*-*-*-*-*-*-*-*-*-*-*-*-*-*-*-*-*-*-*-*-*-*-*-*-*-*-*-*-*-*-*-*-*-*-*-*-*-*-*-*-*-    CURRENT SCHEDULED MEDICATIONS:    Current Facility-Administered Medications:     acetaminophen (TYLENOL) tablet 650 mg, 650 mg, Oral, Q6H PRN, NICOLE Nelson    aluminum-magnesium hydroxide-simethicone (MYLANTA) oral suspension 30 mL, 30 mL, Oral, Q6H PRN, NICOLE Nelson    aspirin (ECOTRIN LOW STRENGTH) EC tablet 81 mg, 81 mg, Oral, Daily, NICOLE Nelson, 81 mg at 09/03/21 0956    atorvastatin (LIPITOR) tablet 80 mg, 80 mg, Oral, Daily With NICOLE Ferraro, 80 mg at 09/02/21 1712    cefTRIAXone (ROCEPHIN) 1,000 mg in dextrose 5 % 50 mL IVPB, 1,000 mg, Intravenous, Q24H, Nelda Farr MD, Last Rate: 100 mL/hr at 09/03/21 0956, 1,000 mg at 09/03/21 0956    eplerenone (INSPRA) tablet 25 mg, 25 mg, Oral, Daily, NICOLE Nelson, 25 mg at 09/03/21 0956    furosemide (LASIX) injection 40 mg, 40 mg, Intravenous, BID, NICOLE Nelson, 40 mg at 09/03/21 0956    heparin (porcine) 25,000 units in 0 45% NaCl 250 mL infusion (premix), 3-20 Units/kg/hr (Order-Specific), Intravenous, Titrated, NICOLE Nelson, Last Rate: 12 mL/hr at 09/03/21 0945, 20 Units/kg/hr at 09/03/21 0945    lisinopril (ZESTRIL) tablet 40 mg, 40 mg, Oral, HS, NICOLE Nelson, 40 mg at 09/02/21 2314    metoprolol succinate (TOPROL-XL) 24 hr tablet 50 mg, 50 mg, Oral, Q12H [address], NICOLE Nelson, 50 mg at 09/03/21 0956    mirtazapine (REMERON) tablet 45 mg, 45 mg, Oral, HS, NICOLE Nelson, 45 mg at 09/02/21 2314    ondansetron (ZOFRAN) injection 4 mg, 4 mg, Intravenous, Q6H PRN, Matthias Baxter CRNP    potassium chloride (K-DUR,KLOR-CON) CR tablet 20 mEq, 20 mEq, Oral, Daily, Carole Dress, CRNP, 20 mEq at 09/03/21 0957    simethicone (MYLICON) chewable tablet 80 mg, 80 mg, Oral, 4x Daily PRN, Carole Dress, CRNP    sodium chloride (OCEAN) 0 65 % nasal spray 1 spray, 1 spray, Each Nare, Q4H PRN, Carole Dress, CRNP    warfarin (COUMADIN) tablet 4 mg, 4 mg, Oral, Daily (warfarin), Goldy Charles MD, 4 mg at 09/02/21 1712 ALLERGIES:  No Known Allergies     *-*-*-*-*-*-*-*-*-*-*-*-*-*-*-*-*-*-*-*-*-*-*-*-*-*-*-*-*-*-*-*-*-*-*-*-*-*-*-*-*-*-*-*-*-*-*-*-*-*-*-*-*-*  LABORATORY DATA:  I have personally reviewed pertinent labs  CMP:  Results from last 7 days   Lab Units 09/03/21  0608 09/02/21  0345   SODIUM mmol/L 140 137   POTASSIUM mmol/L 3 3* 3 9   CHLORIDE mmol/L 102 98*   CO2 mmol/L 30 26   BUN mg/dL 10 11   CREATININE mg/dL 0 83 1 14   CALCIUM mg/dL 8 4 8 9   ALK PHOS U/L  --  105   ALT U/L  --  19   AST U/L  --  24               Cardiac Profile:   Results from last 7 days   Lab Units 09/02/21  1052 09/02/21  0734 09/02/21  0345   TROPONIN I ng/mL 0 05* 0 08* 0 05*   NT-PRO BNP pg/mL  --   --  10,329*                CBC:   Results from last 7 days   Lab Units 09/03/21  0608 09/02/21  0345   WBC Thousand/uL 9 72 10 61*   HEMOGLOBIN g/dL 13 5 14 0   HEMATOCRIT % 42 0 43 7   PLATELETS Thousands/uL 155 169     PT/INR:   Lab Results   Component Value Date    INR 1 32 (H) 09/03/2021   , Microbiology:   Results from last 7 days   Lab Units 09/02/21  0915 09/02/21  0406 09/02/21  0400   BLOOD CULTURE   --  No Growth at 24 hrs  No Growth at 24 hrs  STREP PNEUMONIAE ANTIGEN, URINE  Negative  --   --    LEGIONELLA URINARY ANTIGEN  Negative  --   --           *-*-*-*-*-*-*-*-*-*-*-*-*-*-*-*-*-*-*-*-*-*-*-*-*-*-*-*-*-*-*-*-*-*-*-*-*-*-*-*-*-*-*-*-*-*-*-*-*-*-*-*-*-*-  IMAGING STUDIES REPORTS: Imaging studies results reviewed    No valid procedures specified    No Chest XR results available for this patient  *-*-*-*-*-*-*-*-*-*-*-*-*-*-*-*-*-*-*-*-*-*-*-*-*-*-*-*-*-*-*-*-*-*-*-*-*-*-*-*-*-*-*-*-*-*-*-*-*-*-*-*-*-*-  ECHOCARDIOGRAM AND OTHER CARDIAC TESTS RESULTS:  Results for orders placed during the hospital encounter of 21    Echo complete with contrast if indicated    Narrative  [employer]  [facility], [address] [place] [address]    Transthoracic Echocardiogram  2D, M-mode, Doppler, and Color Doppler    Study date:  18-Mar-2021    Patient: Russel Francois  MR number: [id number]  Account number: [de-identified]  : 1965  Age: 63 years  Gender: Male  Status: Outpatient  Location: [facility]  Height: 66 in  Weight: 148 7 lb  BP: 112/ 58 mmHg    Indications: Ischemic Cardiomyopathy    Diagnoses: I25 5 - Ischemic cardiomyopathy    Sonographer:  Stevan Lorenzo  Interpreting Physician:  Shell Dunlap DO  Primary Physician:  Shikha Adair DO  Group:  [facility]    SUMMARY    SUMMARY:  1  This is a technically adequate study  2  Left ventricle severely dilated with severely reduced systolic function  Left ventricular ejection fraction is estimated at 18%  3  Grade 2 diastolic dysfunction with echocardiographic indications of elevated left atrial pressures  4  Regional wall motion abnormalities consistent with coronary artery disease  5  Right ventricle is mildly dilated with mildly reduced systolic function  Probable pacemaker wire present  6  Severe biatrial dilatation  7  Mechanical valve is noted within the aortic position  Valve is well seated  Trace central aortic regurgitation  Normal velocities and gradients for valve with Vmax 1 85 m/s, EOA 1 38 cm2, DVI 0 44, acceleration time 66 ms  8  Mild posteriorly directed mitral regurgitation with fibrocalcific changes of the mitral valve and mild mitral annular calcification  9  Moderate tricuspid regurgitation with pulmonary artery systolic pressure is estimated at 60-65 mm Hg  10  Compared to report from January 8, 7739, grade 3 diastolic dysfunction is noted with elevated filling pressures and pulmonary hypertension    HISTORY: PRIOR HISTORY: CAD, AVR, Ischemic cardiomyopathy, HTN, HLD    PROCEDURE: The study was performed in the [facility]  This was a routine study  The transthoracic approach was used  The study included complete 2D imaging, M-mode, complete spectral Doppler, and color Doppler  The heart rate was 60  bpm, at the start of the study  Image quality was adequate  LEFT VENTRICLE: Left is severely dilated with severely reduced systolic function  Left ventricular ejection fraction is estimated at 18%  Normal wall thickness  Grade 3 diastolic dysfunction with echocardiographic indications of elevated  left atrial pressures  Increased trabeculations noted at the left ventricular apex  There is akinesis of the anterior and anterolateral wall from base to apex with dyskinetic apical wall and hypokinesis of the remaining walls  RIGHT VENTRICLE: Right ventricle is mildly dilated with mildly reduced systolic function  Linear echodensity noted within the right ventricle consistent with pacemaker/catheter    LEFT ATRIUM: Left atrium is severely dilated  ATRIAL SEPTUM: Interatrial septum is bowing toward the right atrium consistent with elevated left atrial pressures  RIGHT ATRIUM: Right atrium is severely dilated  MITRAL VALVE: Mitral valve opens well  Fibrocalcific changes of the mitral valve  No evidence of mitral stenosis  Mild posteriorly directed mitral regurgitation  AORTIC VALVE: There is a mechanical valve in the aortic position  The valve appears well seated  Trace central aortic regurgitation  Velocities and gradients are normal for valve  Vmax 1 85 m/s, mean PG 6 9 mmHg, EOA/ASHLEY 1 38 cm2, DVI  0 44, Acceleration time 66 ms, LVOTd 2cm, LVOT VTI 12 3, AV VTI 28 cm  TRICUSPID VALVE: Tricuspid valve opens well   Moderate tricuspid regurgitation with pulmonary artery systolic pressure is estimated at 60-65 mm Hg consistent with severe pulmonary hypertension  PULMONIC VALVE: Pulmonic valve not well visualized  No evidence of pulmonic stenosis or pulmonic regurgitation  PERICARDIUM: There is no evidence of significant pericardial effusion  AORTA: Aortic root is top-normal in size at 3 4 cm    SYSTEMIC VEINS: IVC: IVC is dilated with greater than 50% respirophasic collapse  SYSTEM MEASUREMENT TABLES    2D  %FS: 8 64 %  Ao Diam: 3 38 cm  EDV(Teich): 235 37 ml  EF(Teich): 18 56 %  ESV(Teich): 191 68 ml  IVSd: 0 97 cm  LA Area: 29 2 cm2  LA Diam: 4 78 cm  LVEDV MOD A4C: 260 34 ml  LVEF MOD A4C: 20 96 %  LVESV MOD A4C: 205 77 ml  LVIDd: 6 75 cm  LVIDs: 6 17 cm  LVLd A4C: 9 93 cm  LVLs A4C: 9 59 cm  LVOT Diam: 1 98 cm  LVPWd: 1 01 cm  RA Area: 23 cm2  RVIDd: 4 cm  SV MOD A4C: 54 57 ml  SV(Teich): 43 69 ml    CW  AV Env  Ti: 235 97 ms  AV VTI: 28 01 cm  AV Vmax: 1 85 m/s  AV Vmean: 1 19 m/s  AV maxP 71 mmHg  AV meanP 86 mmHg  TR Vmax: 3 67 m/s  TR maxP 96 mmHg    MM  TAPSE: 1 36 cm    PW  ASHLEY (VTI): 2 28 cm2  ASHLEY Vmax: 1 59 cm2  AVAI (VTI): 0 cm2/m2  AVAI Vmax: 0 cm2/m2  E' Sept: 0 06 m/s  E/E' Sept: 15 17  LVOT Env  Ti: 308 28 ms  LVOT VTI: 20 81 cm  LVOT Vmax: 0 96 m/s  LVOT Vmean: 0 67 m/s  LVOT maxPG: 3 7 mmHg  LVOT meanP 23 mmHg  LVSI Dopp: 36 28 ml/m2  LVSV Dopp: 63 86 ml  MV A [place]: 0 35 m/s  MV Dec [place]: 3 47 m/s2  MV DecT: 254 1 ms  MV E [place]: 0 88 m/s  MV E/A Ratio: 2 51  MV PHT: 73 69 ms  MVA By PHT: 2 99 cm2    [employer] Accredited Echocardiography [facility]    Prepared and electronically signed by    Huang Car DO  Signed 18-Mar-2021 18:50:34    No results found for this or any previous visit  No results found for this or any previous visit  No results found for this or any previous visit  *-*-*-*-*-*-*-*-*-*-*-*-*-*-*-*-*-*-*-*-*-*-*-*-*-*-*-*-*-*-*-*-*-*-*-*-*-*-*-*-*-*-*-*-*-*-*-*-*-*-*-*-*-*-  SIGNATURES:   [unfilled]   Cari Yu MD

## 2021-09-03 NOTE — ASSESSMENT & PLAN NOTE
· H/o rheumatic heart disease and aortic stenosis s/p mechanical AVR in 1988  · INR goal 2 5-3 5  · Subtherapeutic INR 1 1  On admission  · patient notes that he has not had his blood drawn in approximately 4 months  Outpatient records are reviewed and Coumadin clinic has been attempting on multiple occasions to contact patient for blood work  · He states he is still taking his Coumadin at home 2 mg daily apart from 4 mg on Sunday  · Last Coumadin clinic note recommends he take 4 mg on Tuesday/Sunday and 2 mg all other days  · Patient was placed bridging therapy with IV heparin due to mechanical valve  · Patient was evaluated by the cardiology team  · INR improved to 1 3:  Cardiology dosed patient's Coumadin with 4 mg b i d  times today  · At discharge he will continue 4 mg daily  · Will be discharged on bridging therapy with Lovenox  · Will need INR 9/6:  Patient relates he goes to [facility] for his INR draws, he declines home nursing    Patient notes he has self administer Lovenox in the past without any difficulties

## 2021-09-03 NOTE — ASSESSMENT & PLAN NOTE
· Patient has a history of chronic systolic congestive Heart failure secondary to Ischemic cardiomyopathy  · Most recent 2D echocardiogram 03/2021 with left ventricular ejection fraction 18% and grade  diastolic dysfunction    Patient had severe biatrial dilatation and a appropriate functioning aortic mechanical valve mild MR, moderate TR  · Patient presented with an acute exacerbation of his chronic systolic and diastolic congestive heart failure  · ProBNP 10,000  · Chest x-ray with moderate interstitial edema  · Was diuresed with Lasix 40 mg IV b i d  with significant improvement  · Transitioned to Lasix 40 mg p o  b i d   · Continued home regimen of ACE-inhibitor, beta-blocker, eplerenone  · Patient had symptomatic and clinical improvement  · Appreciate cardiology evaluation recommendations:  Patient be discharged home on current regimen  · Congestive heart failure dietary modifications and lifestyle changes  · Outpatient cardiology follow-up  · Will need a BMP in 1 week

## 2021-09-10 PROBLEM — R73.01 IMPAIRED FASTING GLUCOSE: Status: ACTIVE | Noted: 2021-01-01

## 2021-09-10 NOTE — PROGRESS NOTES
Heart Failure Office Note - [place] 61 y o  male MRN: [id number]    @ Encounter: [id number]      Assessment/Plan:    Patient Active Problem List    Diagnosis Date Noted    Impaired fasting glucose 09/10/2021    History of ventricular fibrillation 09/02/2021    Elevated troponin 09/02/2021    Cough 09/02/2021    Lactic acidosis 09/02/2021    SIRS (systemic inflammatory response syndrome) ([facility] ) 09/02/2021    Cocaine use 09/02/2021    History of ventricular tachycardia 05/25/2021    Depressed mood 03/06/2020    S/P AVR (aortic valve replacement) 06/18/2018    Ventricular fibrillation ([facility] ) 06/18/2018    Biventricular ICD (implantable cardioverter-defibrillator) in place 06/18/2018    Colonic mass 06/03/2016    Acute on chronic combined systolic and diastolic CHF (congestive heart failure) ([facility] ) 06/03/2016    Coronary artery disease involving native coronary artery of native heart with angina pectoris ([place] [id number] ) 06/03/2016     # Chronic HFrEF, Stage C, NYHA II  Etiology: iCM  Last admit 9/2 for decompensation, UDS THC and cocaine    Weight: 137 lbs  NT proBNP:    Studies- personally reviewed by me  Echo 3/18/21:   LVEF: 18%, grade 2 DD  LVEDd:6 8 cm  RV: mildly dilated, mildly reduced  Mechanical AV, normal velocities  Moderate TR  PASP: 60-65 mmHg    Echocardiogram 1/8/20  LVEF: 15-20%  LVIDd: 5 7 cm  RV: mildly enlarged, lower RVSF, TAPSE 1 6  MR: mild to moderate  PASP:  RVOT:   Other: normal mechanical aortic valve function    Nuclear stress test 03/13/2017: Non-diagnostic stress EKG  "Large, severe, fixed myocardial perfusion defect of the entire anterior wall, with a complete fixed perfusion defect of the apex without reversibility and of the entire inferior wall, extending to the mid-basal inferolateral wall without reversibility " LVEF 18%       Lab Results   Component Value Date    NTBNP 10,329 (H) 09/02/2021        Neurohormonal Blockade:  --Beta-Blocker: metoprolol 50 mg Q12  --ACEi, ARB or ARNi: lisinopril 40 mg daily  --Aldosterone Receptor Blocker: epleronone 25 mg daily  --Diuretic: lasix 40 mg BID with potassium 20 meq daily    Sudden Cardiac Death Risk Reduction:  --ICD: MDT BiV    Electrical Resynchronization:  --Medtronic BiV ICD in situ since 02/24/2015  Baseline  msec  Response: negative, good pacing percentage, somewhat wide QRS at 180 msec- wider than native; also large scar burden  Interrogation: 6/2/21: BVP 96%, no sign high rate episodes, optivol normal    Advanced Therapies (if appropriate): --Inotrope:  --LVAD/Transplant Candidacy:  He is now 5 years from colon cancer  UDS was positive for cocaine    # CAD- s/p PCI with BMS to LAD and LCx in 2014  Nuclear stress test 03/13/2017: Non-diagnostic stress EKG  "Large, severe, fixed myocardial perfusion defect of the entire anterior wall, with a complete fixed perfusion defect of the apex without reversibility and of the entire inferior wall, extending to the mid-basal inferolateral wall without reversibility " LVEF 18%       # hyperlipidemia- atorvastatin 80 mg daily  04/26/2019 cholesterol 118; TG 91; HDL 45; direct LDL 55    # Hx of ventricular fibrillation/ ventricular tachycardia  Amiodarone 100 mg daily, metoprolol 50 mg Q12  12/3- had 13 second run of VT treated with ATP (metoprolol since increased)    # Amiodarone use  TSH 9/10/21: 1 99  LFTs 9/10/21: normal  3/9/21: corrected DLCO 72%  2019: DLCO 79%    # Rheumatic heart disease/ aortic stenosis  S/p mechanical AVR in 1980's  coumadin    # hx of colon CA- s/p colon resection on 6/2/16    # Drug use, UDS 9/2 positive for cocaine and THC  Admitted for heart failure    Today's Plan:  GDMT is optimized  Importance of substance abuse abstinence discussed  --2g sodium diet  Weights daily    HPI:      62 yo male with chronic HFrEF, iCM (s/p BiV ICD in 2015), CAD (s/p BMS to LAD and left Cx in 2014), rheumatic heart disease, AS (s/p mechanical AVR), history of VF, HTN and HLD   Patient had a 13 second run of VT on 12/03/2019 that was successfully treated with ATP  Patient reports being asymptomatic with this  Acute visit was then scheduled for 01/13/2019  No family history of SCD  Patient is a never smoker and reports drinking about 3-4 light beers monthly only at social events  Reports remote history of drug use in the 1990s (used heroin, cocaine, marijuana); denies any current drug use  Patient lives with his girlfriend  Has 4 children who all live relatively nearby  Previously worked in construction but now watches his 7 grandchildren (ranging from an infant to 10years old) during the day  From a symptoms standpoint he feels fine, no shortness of breath, no orthopnea  Interim:  Admitted 9/2 with acute onset of SOB and palpitations  Urine was positive for THC and cocaine  Got IV lasix  Admit weight 136 lbs  Breathing is improved, not limited    Past Medical History:   Diagnosis Date    AICD (automatic cardioverter/defibrillator) present     medtronic     CAD (coronary artery disease)     [facility]     Cardiac disease     Cardiomyopathy ([facility] )     mixed  predominently nonischemic    Colonic mass     Coronary artery disease     Hyperlipidemia     Hypertension     Irregular heart beat     Myocardial infarction ([facility] )     2014 and 2015    Rectal bleeding     S/P AVR (aortic valve replacement)     mechanical    Wears glasses        Review of Systems   Constitutional: Negative for activity change, appetite change, fatigue and unexpected weight change (down 5 lbs)  HENT: Negative for congestion and nosebleeds  Eyes: Negative  Respiratory: Negative for cough, chest tightness and shortness of breath  Cardiovascular: Negative for chest pain, palpitations and leg swelling  Gastrointestinal: Negative for abdominal distention  Endocrine: Negative  Genitourinary: Negative  Musculoskeletal: Negative  Skin: Negative      Neurological: Negative for dizziness, syncope and weakness  Hematological: Negative  Psychiatric/Behavioral: Negative  No Known Allergies    Current Outpatient Medications:     aspirin 81 MG tablet, Take 81 mg by mouth daily  , Disp: , Rfl:     atorvastatin (LIPITOR) 80 mg tablet, take 1 tablet by mouth once daily, Disp: 30 tablet, Rfl: 6    enoxaparin (LOVENOX) 60 mg/0 6 mL, Inject 0 6 mL (60 mg total) under the skin every 12 (twelve) hours for 3 days, Disp: 3 6 mL, Rfl: 0    eplerenone (INSPRA) 25 mg tablet, take 1 tablet by mouth once daily, Disp: 30 tablet, Rfl: 3    furosemide (LASIX) 40 mg tablet, Take 1 tablet (40 mg total) by mouth 2 (two) times a day, Disp: 60 tablet, Rfl: 0    lisinopril (ZESTRIL) 40 mg tablet, take 1 tablet by mouth once daily at bedtime, Disp: 30 tablet, Rfl: 11    metoprolol succinate (TOPROL-XL) 50 mg 24 hr tablet, take 1 tablet by mouth every 12 hours, Disp: 60 tablet, Rfl: 8    mirtazapine (REMERON) 45 MG tablet, Take 45 mg by mouth daily at bedtime, Disp: , Rfl: 0    Potassium Chloride ER 20 MEQ TBCR, Take 1 tablet (20 mEq total) by mouth daily, Disp: 90 tablet, Rfl: 3    warfarin (COUMADIN) 4 mg tablet, take 1 tablet by mouth once daily as directed, Disp: 30 tablet, Rfl: 2    Social History     Socioeconomic History    Marital status: /Civil Union     Spouse name: Not on file    Number of children: Not on file    Years of education: Not on file    Highest education level: Not on file   Occupational History    Not on file   Tobacco Use    Smoking status: Never Smoker    Smokeless tobacco: Never Used   Substance and Sexual Activity    Alcohol use: Not Currently    Drug use: No    Sexual activity: Not on file   Other Topics Concern    Not on file   Social History Narrative    Not on file     Social Determinants of Health     Financial Resource Strain:     Difficulty of Paying Living Expenses:    Food Insecurity:     Worried About Running Out of Food in the Last Year:     Ran Out of Food in the Last Year:    Transportation Needs:     Lack of Transportation (Medical):  Lack of Transportation (Non-Medical):    Physical Activity:     Days of Exercise per Week:     Minutes of Exercise per Session:    Stress:     Feeling of Stress :    Social Connections:     Frequency of Communication with Friends and Family:     Frequency of Social Gatherings with Friends and Family:     Attends Congregation Services:     Active Member of Clubs or Organizations:     Attends Club or Organization Meetings:     Marital Status:    Intimate Partner Violence:     Fear of Current or Ex-Partner:     Emotionally Abused:     Physically Abused:     Sexually Abused:        Family History   Problem Relation Age of Onset    Diabetes Mother     Hypertension Mother     Valvular heart disease Mother     Valvular heart disease Father     Alcohol abuse Father        Physical Exam:    Vitals: There were no vitals taken for this visit  , There is no height or weight on file to calculate BMI ,   Wt Readings from Last 3 Encounters:   09/10/21 61 2 kg (135 lb)   09/03/21 62 6 kg (138 lb 0 1 oz)   06/04/21 62 9 kg (138 lb 9 6 oz)       Physical Exam  Constitutional:       Appearance: He is well-developed  HENT:      Head: Normocephalic and atraumatic  Eyes:      Pupils: Pupils are equal, round, and reactive to light  Neck:      Vascular: No JVD  Cardiovascular:      Rate and Rhythm: Normal rate and regular rhythm  Heart sounds: No murmur heard  Pulmonary:      Effort: Pulmonary effort is normal  No respiratory distress  Breath sounds: Normal breath sounds  Abdominal:      General: There is no distension  Palpations: Abdomen is soft  Tenderness: There is no abdominal tenderness  Musculoskeletal:         General: Normal range of motion  Cervical back: Normal range of motion  Skin:     General: Skin is warm and dry  Findings: No rash     Neurological: Mental Status: He is alert and oriented to person, place, and time  Labs & Results:    Lab Results   Component Value Date    WBC 6 50 09/10/2021    HGB 13 0 (L) 09/10/2021    HCT 40 7 (L) 09/10/2021    MCV 87 09/10/2021     09/10/2021     Lab Results   Component Value Date    SODIUM 137 09/10/2021    K 4 9 09/10/2021    CL 99 09/10/2021    CO2 30 09/10/2021    BUN 17 09/10/2021    CREATININE 0 98 09/10/2021    GLUC 96 09/03/2021    CALCIUM 9 6 09/10/2021     Lab Results   Component Value Date    NTBNP 10,329 (H) 09/02/2021      Lab Results   Component Value Date    CHOLESTEROL 118 04/26/2019    CHOLESTEROL 126 07/21/2016     Lab Results   Component Value Date    HDL 45 04/26/2019    HDL 48 07/21/2016    HDL 39 10/29/2015     Lab Results   Component Value Date    TRIG 91 04/26/2019    TRIG 177 (H) 07/21/2016    TRIG 67 10/29/2015     Lab Results   Component Value Date    [place] [facility] 04/26/2019       EKG personally reviewed by Arturo More DO  Counseling / Coordination of Care  Time spent today 25 minutes  Greater than 50% of total time was spent with the patient and / or family counseling and / or coordination of care  We discussed diagnoses, most recent studies, tests and any changes in treatment plan    Thank you for the opportunity to participate in the care of this patient        [facility] PULMONARY HYPERTENSION  MEDICAL DIRECTOR OF [place]

## 2021-09-10 NOTE — PROGRESS NOTES
[unfilled] @AGE@ @EAD@   Date:  [de-identified] records were reviewed  Medications upon discharge reviewed/updated     Discharge Disposition: ***   Follow-Up Visits With Other Specialists: ***      Assessment and Plan:    [unfilled]        HPI:  @HPI@    ROS: [unfilled]    @PMH@    [unfilled]    [unfilled]    [unfilled]    [unfilled]    [unfilled]      Physical Exam:  @VS@    [unfilled]        Labs:  @RESUFAST(WBC,HGB,HCT,MCV,PLT)@  @RESUFAST(NA,K,CL,CO2,ANIONGAP,BUN,CREATININE,GLUCOSE,GLUF,CALCIUM,CORRECTEDCA,AST,ALT,ALKPHOS,PROT,ALBUMIN,BILITOT,EGFR)@      Joseph Briceño MD

## 2021-09-10 NOTE — ASSESSMENT & PLAN NOTE
Wt Readings from Last 3 Encounters:   09/10/21 61 2 kg (135 lb)   09/03/21 62 6 kg (138 lb 0 1 oz)   06/04/21 62 9 kg (138 lb 9 6 oz)     - Patient ran out of Lasix and didn't take the medication for 3 days before having CHF exacerbation  He also mentions a stressful personal event prior of going to the ED   - Patient was discharged from Hospital on Lasix 40 mg BID  - He denies any symptoms of chest pain or SOB today  No cough  - F/u with Cardiology: Patient has an upcoming appointment with Dr Anaya Ear  Plan:  - Continue Lasix 40 mg BID  Refilled Medication today  - Patient educated to avoid stressful events, take medications as prescribed, ask for refills if needed and do not consume cigarettes or any other illicit drugs  Those might be precipitating events to CHF exacerbation  Patient verbalizes understanding   - F/u in 3 months

## 2021-09-10 NOTE — PROGRESS NOTES
Transition of Care  Follow-up After Hospitalization    [address] 61 y o  male   Date:  9/10/2021    TCM Call (since 8/10/2021)     None      TCM Call (since 8/10/2021)     None        Hospital records were reviewed  Medications upon discharge reviewed/updated  Discharge Disposition:  Well  Follow up visits with other specialists: Cardiology (Dr Ginger Crisostomo)  Assessment and Plan:    Acute on chronic combined systolic and diastolic CHF    Wt Readings from Last 3 Encounters:   09/10/21 61 2 kg (135 lb)   09/03/21 62 6 kg (138 lb 0 1 oz)   06/04/21 62 9 kg (138 lb 9 6 oz)     - Patient ran out of Lasix and didn't take the medication for 3 days before having CHF exacerbation  He also mentions a stressful personal event prior of going to the ED   - Patient was discharged from Hospital on Lasix 40 mg BID  - He denies any symptoms of chest pain or SOB today  No cough  - F/u with Cardiology: Patient has an upcoming appointment with Dr Ginger Crisostomo  Plan:  - Continue Lasix 40 mg BID  Refilled Medication today  - Patient educated to avoid stressful events, take medications as prescribed, ask for refills if needed and do not consume cigarettes or any other illicit drugs  Those might be precipitating events to CHF exacerbation  Patient verbalizes understanding   - F/u in 3 months  Impaired Fasting glucose:   - Recent CMP ordered at discharge shows glucose levels of 163  - Most recent Hba1c is from 2015 and it was 6 0  Plan:  - Ordered HbA1c  - F/u in 3 months  Ilia Dasilva was seen today for transition of care management  Diagnoses and all orders for this visit:    Impaired fasting glucose  -     HEMOGLOBIN A1C W/ EAG ESTIMATION; Future    Ischemic cardiomyopathy  -     furosemide (LASIX) 40 mg tablet;  Take 1 tablet (40 mg total) by mouth 2 (two) times a day    Acute on chronic combined systolic and diastolic CHF (congestive heart failure) (HCC)            HPI:  62 y/o male with PMH of Diastolic/systolic CHF, CAD, Aortic Valve Replacement and Biventricular ICD comes to the clinic today for a Transition of Care appointment  He was recently discharged from Hospital due to Acute on Chronic CHF exacerbation, where he stayed for 3 days  Reports that he ran out of Lasix for 3 days and had a personal stressful event, which might have precipitated this event  He says that felt SOB with cough, in addition to swollen legs  Today, he denies any of these symptoms and has no chest pain  He says that has been taking medications as prescribed and has an upcoming appointment with Cardiology  He says that smokes weed, but denies using any other illicit drugs  ROS: Review of Systems   Constitutional: Negative for chills and fever  HENT: Negative for ear pain and sore throat  Eyes: Negative for pain and visual disturbance  Respiratory: Negative for cough and shortness of breath  Cardiovascular: Negative for chest pain and palpitations  Gastrointestinal: Negative for abdominal pain and vomiting  Genitourinary: Negative for dysuria and hematuria  Musculoskeletal: Negative for arthralgias and back pain  Skin: Negative for color change and rash  Neurological: Negative for seizures and syncope  All other systems reviewed and are negative  Past Medical History:   Diagnosis Date    AICD (automatic cardioverter/defibrillator) present     medtronic     CAD (coronary artery disease)     [facility]     Cardiac disease     Cardiomyopathy ([facility]     mixed    predominently nonischemic    Colonic mass     Coronary artery disease     Hyperlipidemia     Hypertension     Irregular heart beat     Myocardial infarction ([address] )     2014 and 2015    Rectal bleeding     S/P AVR (aortic valve replacement)     mechanical    Wears glasses        Past Surgical History:   Procedure Laterality Date    AORTIC VALVE REPLACEMENT      APPENDECTOMY      CARDIAC DEFIBRILLATOR PLACEMENT      COLECTOMY MADELIN Right     Last Assessed: 6/7/2016    COLECTOMY LAPAROSCOPIC      COLON SURGERY Right     colectomy    CORONARY STENT PLACEMENT      INSERT / REPLACE / REMOVE PACEMAKER      MITRAL VALVE REPAIR      MITRAL VALVE REPAIR      MOUTH SURGERY      [place] COLONOSCOPY FLX DX W/COLLJ SPEC WHEN PFRMD N/A 1/15/2018    Procedure: COLONOSCOPY;  Surgeon: Gordon Mazariegos MD;  Location: [facility]; Service: General    [place] LAP,SURG,COLECTOMY, PARTIAL, W/ANAST N/A 6/2/2016    Procedure: RESECTION COLON RIGHT LAPAROSCOPIC HAND-ASSISTED;  Surgeon: Gordon Mazariegos MD;  Location: [facility];  Service: General       Social History     Socioeconomic History    Marital status: /Civil Union     Spouse name: None    Number of children: None    Years of education: None    Highest education level: None   Occupational History    None   Tobacco Use    Smoking status: Never Smoker    Smokeless tobacco: Never Used   Substance and Sexual Activity    Alcohol use: Not Currently    Drug use: No    Sexual activity: None   Other Topics Concern    None   Social History Narrative    None     Social Determinants of Health     Financial Resource Strain:     Difficulty of Paying Living Expenses:    Food Insecurity:     Worried About Running Out of Food in the Last Year:     Ran Out of Food in the Last Year:    Transportation Needs:     Lack of Transportation (Medical):      Lack of Transportation (Non-Medical):    Physical Activity:     Days of Exercise per Week:     Minutes of Exercise per Session:    Stress:     Feeling of Stress :    Social Connections:     Frequency of Communication with Friends and Family:     Frequency of Social Gatherings with Friends and Family:     Attends Yarsanism Services:     Active Member of Clubs or Organizations:     Attends Club or Organization Meetings:     Marital Status:    Intimate Partner Violence:     Fear of Current or Ex-Partner:     Emotionally Abused:     Physically Abused:     Sexually Abused:        Family History   Problem Relation Age of Onset    Diabetes Mother     Hypertension Mother     Valvular heart disease Mother     Valvular heart disease Father     Alcohol abuse Father        No Known Allergies      Current Outpatient Medications:     aspirin 81 MG tablet, Take 81 mg by mouth daily  , Disp: , Rfl:     atorvastatin (LIPITOR) 80 mg tablet, take 1 tablet by mouth once daily, Disp: 30 tablet, Rfl: 6    enoxaparin (LOVENOX) 60 mg/0 6 mL, Inject 0 6 mL (60 mg total) under the skin every 12 (twelve) hours for 3 days, Disp: 3 6 mL, Rfl: 0    eplerenone (INSPRA) 25 mg tablet, take 1 tablet by mouth once daily, Disp: 30 tablet, Rfl: 3    furosemide (LASIX) 40 mg tablet, Take 1 tablet (40 mg total) by mouth 2 (two) times a day, Disp: 60 tablet, Rfl: 0    lisinopril (ZESTRIL) 40 mg tablet, take 1 tablet by mouth once daily at bedtime, Disp: 30 tablet, Rfl: 11    metoprolol succinate (TOPROL-XL) 50 mg 24 hr tablet, take 1 tablet by mouth every 12 hours, Disp: 60 tablet, Rfl: 8    mirtazapine (REMERON) 45 MG tablet, Take 45 mg by mouth daily at bedtime, Disp: , Rfl: 0    Potassium Chloride ER 20 MEQ TBCR, Take 1 tablet (20 mEq total) by mouth daily, Disp: 90 tablet, Rfl: 3    warfarin (COUMADIN) 4 mg tablet, take 1 tablet by mouth once daily as directed, Disp: 30 tablet, Rfl: 2      Physical Exam:  /66 (BP Location: Left arm, Patient Position: Sitting, Cuff Size: Standard)   Pulse 74   Temp 97 6 °F (36 4 °C) (Temporal)   Resp 18   Ht 5' 6" (1 676 m)   Wt 61 2 kg (135 lb)   SpO2 99%   BMI 21 79 kg/m²     Physical Exam  Constitutional:       General: He is not in acute distress  Appearance: Normal appearance  He is normal weight  He is not ill-appearing, toxic-appearing or diaphoretic  HENT:      Head: Normocephalic and atraumatic  Nose: Nose normal    Eyes:      General: No scleral icterus  Right eye: No discharge           Left eye: No discharge  Extraocular Movements: Extraocular movements intact  Cardiovascular:      Rate and Rhythm: Normal rate and regular rhythm  Pulses: Normal pulses  Heart sounds: Normal heart sounds  No murmur heard  No friction rub  No gallop  Pulmonary:      Effort: Pulmonary effort is normal  No respiratory distress  Breath sounds: Normal breath sounds  No stridor  No wheezing, rhonchi or rales  Chest:      Chest wall: No tenderness  Musculoskeletal:         General: No swelling  Normal range of motion  Cervical back: Normal range of motion  Right lower leg: No edema  Left lower leg: No edema  Skin:     General: Skin is warm  Neurological:      General: No focal deficit present  Mental Status: He is alert and oriented to person, place, and time  Psychiatric:         Mood and Affect: Mood normal          Behavior: Behavior normal          Thought Content:  Thought content normal          Judgment: Judgment normal              Labs:  Lab Results   Component Value Date    WBC 6 50 09/10/2021    HGB 13 0 (L) 09/10/2021    HCT 40 7 (L) 09/10/2021    MCV 87 09/10/2021     09/10/2021     Lab Results   Component Value Date     12/23/2015     12/23/2015    K 4 9 09/10/2021    CL 99 09/10/2021    CO2 30 09/10/2021    ANIONGAP 8 12/23/2015    ANIONGAP 8 12/23/2015    BUN 17 09/10/2021    CREATININE 0 98 09/10/2021    GLUCOSE 93 12/23/2015    GLUCOSE 92 12/23/2015    GLUF 163 (H) 09/10/2021    CALCIUM 9 6 09/10/2021    AST 46 09/10/2021    ALT 28 09/10/2021    ALKPHOS 87 09/10/2021    PROT 7 6 12/07/2015    BILITOT 1 23 (H) 12/07/2015    EGFR 85 09/10/2021

## 2021-09-10 NOTE — ASSESSMENT & PLAN NOTE
- Recent CMP ordered at discharge shows glucose levels of 163  - Most recent Hba1c is from 2015 and it was 6 0  Plan:  - Ordered HbA1c  - F/u in 3 months

## 2021-09-10 NOTE — TELEPHONE ENCOUNTER
Tc to patient, called via [employer] Bulgarian interpretor, left message on home phone number, answering machine on [phone], identified them self as rodri  Had Lao interpretor call mobile phone number listed, [phone], also left same message as what was left on home number listed  Message was, we have received your pt/inr results, please call office regarding inr results and current warfarin dose, will wait for a return call back

## 2021-09-10 NOTE — PROGRESS NOTES
Tc to pt, called via [place] interpretor  Via [employer] line  Pt states he speak English, dose not need interpretor  States he has been taking 4 mg daily warfarin, completed Lovenox as per discharge instructions  reveiwed with dr Evert Holland, take 6 mg fri/sat/sun, then resume 4 mg daily  inr due Monday  Patient agreed and understood same

## 2022-01-01 ENCOUNTER — ANESTHESIA EVENT (INPATIENT)
Dept: NON INVASIVE DIAGNOSTICS | Facility: HOSPITAL | Age: 57
DRG: 226 | End: 2022-01-01
Payer: COMMERCIAL

## 2022-01-01 ENCOUNTER — HOSPITAL ENCOUNTER (OUTPATIENT)
Dept: PULMONOLOGY | Facility: HOSPITAL | Age: 57
Discharge: HOME | DRG: 291 | End: 2022-03-29
Payer: COMMERCIAL

## 2022-01-01 ENCOUNTER — APPOINTMENT (INPATIENT)
Dept: CT IMAGING | Facility: HOSPITAL | Age: 57
DRG: 291 | End: 2022-01-01
Payer: COMMERCIAL

## 2022-01-01 ENCOUNTER — APPOINTMENT (EMERGENCY)
Dept: CT IMAGING | Facility: HOSPITAL | Age: 57
DRG: 291 | End: 2022-01-01
Payer: COMMERCIAL

## 2022-01-01 ENCOUNTER — TELEPHONE (OUTPATIENT)
Dept: FAMILY MEDICINE CLINIC | Facility: CLINIC | Age: 57
End: 2022-01-01

## 2022-01-01 ENCOUNTER — RA CDI HCC (OUTPATIENT)
Dept: OTHER | Facility: HOSPITAL | Age: 57
End: 2022-01-01

## 2022-01-01 ENCOUNTER — APPOINTMENT (EMERGENCY)
Dept: RADIOLOGY | Facility: HOSPITAL | Age: 57
DRG: 291 | End: 2022-01-01
Payer: COMMERCIAL

## 2022-01-01 ENCOUNTER — HOSPITAL ENCOUNTER (INPATIENT)
Facility: HOSPITAL | Age: 57
LOS: 3 days | DRG: 291 | End: 2022-04-02
Attending: EMERGENCY MEDICINE | Admitting: INTERNAL MEDICINE
Payer: COMMERCIAL

## 2022-01-01 ENCOUNTER — TELEPHONE (OUTPATIENT)
Dept: CARDIOLOGY CLINIC | Facility: CLINIC | Age: 57
End: 2022-01-01

## 2022-01-01 ENCOUNTER — APPOINTMENT (INPATIENT)
Dept: NON INVASIVE DIAGNOSTICS | Facility: HOSPITAL | Age: 57
DRG: 291 | End: 2022-01-01
Payer: COMMERCIAL

## 2022-01-01 ENCOUNTER — APPOINTMENT (INPATIENT)
Dept: RADIOLOGY | Facility: HOSPITAL | Age: 57
DRG: 291 | End: 2022-01-01
Payer: COMMERCIAL

## 2022-01-01 ENCOUNTER — HOSPITAL ENCOUNTER (INPATIENT)
Facility: HOSPITAL | Age: 57
LOS: 2 days | DRG: 291 | End: 2022-03-17
Attending: EMERGENCY MEDICINE | Admitting: INTERNAL MEDICINE
Payer: COMMERCIAL

## 2022-01-01 ENCOUNTER — HOSPITAL ENCOUNTER (OUTPATIENT)
Dept: CT IMAGING | Facility: HOSPITAL | Age: 57
Discharge: HOME | DRG: 291 | End: 2022-03-29
Payer: COMMERCIAL

## 2022-01-01 ENCOUNTER — APPOINTMENT (EMERGENCY)
Dept: RADIOLOGY | Facility: HOSPITAL | Age: 57
DRG: 208 | End: 2022-01-01
Payer: COMMERCIAL

## 2022-01-01 ENCOUNTER — PATIENT OUTREACH (OUTPATIENT)
Dept: FAMILY MEDICINE CLINIC | Facility: CLINIC | Age: 57
End: 2022-01-01

## 2022-01-01 ENCOUNTER — HOSPITAL ENCOUNTER (INPATIENT)
Facility: HOSPITAL | Age: 57
LOS: 11 days | Discharge: HOME HEALTH | DRG: 291 | End: 2022-02-08
Attending: EMERGENCY MEDICINE | Admitting: INTERNAL MEDICINE
Payer: COMMERCIAL

## 2022-01-01 ENCOUNTER — ANTICOAG VISIT (OUTPATIENT)
Dept: CARDIOLOGY CLINIC | Facility: CLINIC | Age: 57
End: 2022-01-01

## 2022-01-01 ENCOUNTER — APPOINTMENT (INPATIENT)
Dept: RADIOLOGY | Facility: HOSPITAL | Age: 57
DRG: 226 | End: 2022-01-01
Payer: COMMERCIAL

## 2022-01-01 ENCOUNTER — TRANSITIONAL CARE MANAGEMENT (OUTPATIENT)
Dept: FAMILY MEDICINE CLINIC | Facility: CLINIC | Age: 57
End: 2022-01-01

## 2022-01-01 ENCOUNTER — HOSPITAL ENCOUNTER (INPATIENT)
Facility: HOSPITAL | Age: 57
LOS: 3 days | Discharge: LEFT BEFORE DISCHARGE | DRG: 208 | End: 2022-01-12
Attending: EMERGENCY MEDICINE | Admitting: INTERNAL MEDICINE
Payer: COMMERCIAL

## 2022-01-01 ENCOUNTER — OFFICE VISIT (OUTPATIENT)
Dept: PULMONOLOGY | Facility: CLINIC | Age: 57
End: 2022-01-01
Payer: COMMERCIAL

## 2022-01-01 ENCOUNTER — OFFICE VISIT (OUTPATIENT)
Dept: FAMILY MEDICINE CLINIC | Facility: CLINIC | Age: 57
End: 2022-01-01

## 2022-01-01 ENCOUNTER — OFFICE VISIT (OUTPATIENT)
Dept: CARDIOLOGY CLINIC | Facility: CLINIC | Age: 57
End: 2022-01-01
Payer: COMMERCIAL

## 2022-01-01 ENCOUNTER — HOSPITAL ENCOUNTER (INPATIENT)
Facility: HOSPITAL | Age: 57
LOS: 6 days | Discharge: HOME HEALTH | DRG: 291 | End: 2022-01-27
Attending: EMERGENCY MEDICINE | Admitting: INTERNAL MEDICINE
Payer: COMMERCIAL

## 2022-01-01 ENCOUNTER — APPOINTMENT (INPATIENT)
Dept: ULTRASOUND IMAGING | Facility: HOSPITAL | Age: 57
DRG: 208 | End: 2022-01-01
Payer: COMMERCIAL

## 2022-01-01 ENCOUNTER — HOSPITAL ENCOUNTER (INPATIENT)
Facility: HOSPITAL | Age: 57
LOS: 5 days | Discharge: HOME HEALTH | DRG: 226 | End: 2022-03-22
Attending: FAMILY MEDICINE | Admitting: FAMILY MEDICINE
Payer: COMMERCIAL

## 2022-01-01 ENCOUNTER — APPOINTMENT (INPATIENT)
Dept: NON INVASIVE DIAGNOSTICS | Facility: HOSPITAL | Age: 57
DRG: 208 | End: 2022-01-01
Payer: COMMERCIAL

## 2022-01-01 VITALS
BODY MASS INDEX: 18.03 KG/M2 | HEART RATE: 56 BPM | TEMPERATURE: 97.4 F | SYSTOLIC BLOOD PRESSURE: 108 MMHG | WEIGHT: 125.66 LBS | OXYGEN SATURATION: 91 % | RESPIRATION RATE: 18 BRPM | DIASTOLIC BLOOD PRESSURE: 64 MMHG

## 2022-01-01 VITALS
RESPIRATION RATE: 18 BRPM | HEIGHT: 66 IN | HEART RATE: 83 BPM | OXYGEN SATURATION: 98 % | DIASTOLIC BLOOD PRESSURE: 86 MMHG | WEIGHT: 133.16 LBS | SYSTOLIC BLOOD PRESSURE: 126 MMHG | BODY MASS INDEX: 21.4 KG/M2 | TEMPERATURE: 97.1 F

## 2022-01-01 VITALS
RESPIRATION RATE: 18 BRPM | HEART RATE: 63 BPM | HEIGHT: 67 IN | SYSTOLIC BLOOD PRESSURE: 110 MMHG | OXYGEN SATURATION: 99 % | WEIGHT: 134 LBS | BODY MASS INDEX: 21.03 KG/M2 | DIASTOLIC BLOOD PRESSURE: 70 MMHG | TEMPERATURE: 97.6 F

## 2022-01-01 VITALS
OXYGEN SATURATION: 100 % | HEIGHT: 66 IN | DIASTOLIC BLOOD PRESSURE: 65 MMHG | HEART RATE: 71 BPM | WEIGHT: 132.28 LBS | TEMPERATURE: 97.8 F | BODY MASS INDEX: 21.26 KG/M2 | RESPIRATION RATE: 18 BRPM | SYSTOLIC BLOOD PRESSURE: 109 MMHG

## 2022-01-01 VITALS
DIASTOLIC BLOOD PRESSURE: 61 MMHG | BODY MASS INDEX: 16.92 KG/M2 | HEIGHT: 70 IN | RESPIRATION RATE: 18 BRPM | SYSTOLIC BLOOD PRESSURE: 100 MMHG | HEART RATE: 66 BPM | TEMPERATURE: 97.8 F | OXYGEN SATURATION: 97 % | WEIGHT: 118.17 LBS

## 2022-01-01 VITALS
BODY MASS INDEX: 21.03 KG/M2 | WEIGHT: 134 LBS | OXYGEN SATURATION: 92 % | SYSTOLIC BLOOD PRESSURE: 100 MMHG | TEMPERATURE: 97.2 F | DIASTOLIC BLOOD PRESSURE: 62 MMHG | HEART RATE: 85 BPM | RESPIRATION RATE: 18 BRPM | HEIGHT: 67 IN

## 2022-01-01 VITALS
BODY MASS INDEX: 23.03 KG/M2 | HEART RATE: 67 BPM | RESPIRATION RATE: 16 BRPM | DIASTOLIC BLOOD PRESSURE: 71 MMHG | TEMPERATURE: 98.1 F | HEIGHT: 66 IN | OXYGEN SATURATION: 97 % | SYSTOLIC BLOOD PRESSURE: 114 MMHG | WEIGHT: 143.3 LBS

## 2022-01-01 VITALS
HEART RATE: 77 BPM | TEMPERATURE: 98.1 F | OXYGEN SATURATION: 95 % | WEIGHT: 124.78 LBS | DIASTOLIC BLOOD PRESSURE: 85 MMHG | HEIGHT: 70 IN | SYSTOLIC BLOOD PRESSURE: 148 MMHG | BODY MASS INDEX: 17.86 KG/M2 | RESPIRATION RATE: 20 BRPM

## 2022-01-01 VITALS — SYSTOLIC BLOOD PRESSURE: 96 MMHG | BODY MASS INDEX: 22.37 KG/M2 | DIASTOLIC BLOOD PRESSURE: 70 MMHG | WEIGHT: 138.6 LBS

## 2022-01-01 DIAGNOSIS — F14.90 COCAINE USE: ICD-10-CM

## 2022-01-01 DIAGNOSIS — U07.1 COVID-19 VIRUS INFECTION: ICD-10-CM

## 2022-01-01 DIAGNOSIS — T82.198A MALFUNCTION OF IMPLANTABLE DEFIBRILLATOR VENTRICULAR (ICD) LEAD: Primary | ICD-10-CM

## 2022-01-01 DIAGNOSIS — R07.9 CHEST PAIN: ICD-10-CM

## 2022-01-01 DIAGNOSIS — Z95.2 S/P AVR (AORTIC VALVE REPLACEMENT): ICD-10-CM

## 2022-01-01 DIAGNOSIS — I47.2 VENTRICULAR TACHYCARDIA (HCC): ICD-10-CM

## 2022-01-01 DIAGNOSIS — A41.9 SEVERE SEPSIS (HCC): ICD-10-CM

## 2022-01-01 DIAGNOSIS — Z95.2 S/P AVR (AORTIC VALVE REPLACEMENT): Primary | ICD-10-CM

## 2022-01-01 DIAGNOSIS — L97.509 FOOT ULCER (HCC): ICD-10-CM

## 2022-01-01 DIAGNOSIS — R06.00 DYSPNEA: ICD-10-CM

## 2022-01-01 DIAGNOSIS — Z23 FLU VACCINE NEED: ICD-10-CM

## 2022-01-01 DIAGNOSIS — I50.9 ACUTE EXACERBATION OF CHF (CONGESTIVE HEART FAILURE) (HCC): ICD-10-CM

## 2022-01-01 DIAGNOSIS — E44.1 MILD PROTEIN-CALORIE MALNUTRITION (HCC): ICD-10-CM

## 2022-01-01 DIAGNOSIS — R13.19 OTHER DYSPHAGIA: ICD-10-CM

## 2022-01-01 DIAGNOSIS — I50.43 ACUTE ON CHRONIC SYSTOLIC AND DIASTOLIC HEART FAILURE, NYHA CLASS 3 (HCC): ICD-10-CM

## 2022-01-01 DIAGNOSIS — I25.5 ISCHEMIC CARDIOMYOPATHY: ICD-10-CM

## 2022-01-01 DIAGNOSIS — I48.91 ATRIAL FIBRILLATION WITH RAPID VENTRICULAR RESPONSE (HCC): ICD-10-CM

## 2022-01-01 DIAGNOSIS — U07.1 COVID: ICD-10-CM

## 2022-01-01 DIAGNOSIS — I25.5 ISCHEMIC CARDIOMYOPATHY: Chronic | ICD-10-CM

## 2022-01-01 DIAGNOSIS — I50.9 CHF (CONGESTIVE HEART FAILURE) (HCC): ICD-10-CM

## 2022-01-01 DIAGNOSIS — Z23 ENCOUNTER FOR IMMUNIZATION: ICD-10-CM

## 2022-01-01 DIAGNOSIS — I49.01 VENTRICULAR FIBRILLATION (HCC): ICD-10-CM

## 2022-01-01 DIAGNOSIS — Z91.19 MEDICALLY NONCOMPLIANT: Chronic | ICD-10-CM

## 2022-01-01 DIAGNOSIS — E87.1 HYPONATREMIA: ICD-10-CM

## 2022-01-01 DIAGNOSIS — J18.9 PNEUMONIA: ICD-10-CM

## 2022-01-01 DIAGNOSIS — I50.9 ACUTE EXACERBATION OF CHF (CONGESTIVE HEART FAILURE) (HCC): Primary | ICD-10-CM

## 2022-01-01 DIAGNOSIS — I50.42 CHRONIC COMBINED SYSTOLIC AND DIASTOLIC CONGESTIVE HEART FAILURE (HCC): ICD-10-CM

## 2022-01-01 DIAGNOSIS — Z45.02 AICD DISCHARGE: ICD-10-CM

## 2022-01-01 DIAGNOSIS — I50.42 CHRONIC COMBINED SYSTOLIC AND DIASTOLIC HEART FAILURE (HCC): ICD-10-CM

## 2022-01-01 DIAGNOSIS — U07.1 COVID-19 VIRUS INFECTION: Primary | ICD-10-CM

## 2022-01-01 DIAGNOSIS — J18.9 PNEUMONIA OF BOTH UPPER LOBES DUE TO INFECTIOUS ORGANISM: ICD-10-CM

## 2022-01-01 DIAGNOSIS — Z59.9 INADEQUATE COMMUNITY RESOURCES: Primary | ICD-10-CM

## 2022-01-01 DIAGNOSIS — E87.2 LACTIC ACIDOSIS: ICD-10-CM

## 2022-01-01 DIAGNOSIS — I50.9 ACUTE CONGESTIVE HEART FAILURE, UNSPECIFIED HEART FAILURE TYPE (HCC): ICD-10-CM

## 2022-01-01 DIAGNOSIS — R65.20 SEVERE SEPSIS (HCC): ICD-10-CM

## 2022-01-01 DIAGNOSIS — Z95.810 BIVENTRICULAR ICD (IMPLANTABLE CARDIOVERTER-DEFIBRILLATOR) IN PLACE: ICD-10-CM

## 2022-01-01 DIAGNOSIS — J96.01 ACUTE RESPIRATORY FAILURE WITH HYPOXIA (HCC): Primary | ICD-10-CM

## 2022-01-01 DIAGNOSIS — R77.8 ELEVATED TROPONIN: ICD-10-CM

## 2022-01-01 DIAGNOSIS — Z71.89 COMPLEX CARE COORDINATION: Primary | ICD-10-CM

## 2022-01-01 DIAGNOSIS — I50.9 CHF (CONGESTIVE HEART FAILURE) (HCC): Primary | ICD-10-CM

## 2022-01-01 DIAGNOSIS — I50.43 ACUTE ON CHRONIC COMBINED SYSTOLIC AND DIASTOLIC CONGESTIVE HEART FAILURE (HCC): ICD-10-CM

## 2022-01-01 DIAGNOSIS — I25.10 CORONARY ARTERY DISEASE INVOLVING NATIVE CORONARY ARTERY OF NATIVE HEART WITHOUT ANGINA PECTORIS: Primary | ICD-10-CM

## 2022-01-01 DIAGNOSIS — I35.0 AORTIC VALVE STENOSIS, ETIOLOGY OF CARDIAC VALVE DISEASE UNSPECIFIED: ICD-10-CM

## 2022-01-01 DIAGNOSIS — R23.4 ESCHAR OF TOE: ICD-10-CM

## 2022-01-01 DIAGNOSIS — Z09 ENCOUNTER FOR EXAMINATION FOLLOWING TREATMENT AT HOSPITAL: Primary | ICD-10-CM

## 2022-01-01 DIAGNOSIS — S91.309A WOUND OF FOOT: ICD-10-CM

## 2022-01-01 DIAGNOSIS — J96.11 CHRONIC RESPIRATORY FAILURE WITH HYPOXIA (HCC): ICD-10-CM

## 2022-01-01 DIAGNOSIS — J81.0 ACUTE PULMONARY EDEMA (HCC): ICD-10-CM

## 2022-01-01 DIAGNOSIS — I35.0 NONRHEUMATIC AORTIC VALVE STENOSIS: ICD-10-CM

## 2022-01-01 DIAGNOSIS — J96.00 ACUTE RESPIRATORY FAILURE (HCC): Primary | ICD-10-CM

## 2022-01-01 DIAGNOSIS — I46.9 CARDIAC ARREST (HCC): Primary | ICD-10-CM

## 2022-01-01 LAB
2HR DELTA HS TROPONIN: -2 NG/L
2HR DELTA HS TROPONIN: 11 NG/L
2HR DELTA HS TROPONIN: 173 NG/L
2HR DELTA HS TROPONIN: 2 NG/L
2HR DELTA HS TROPONIN: 3 NG/L
4HR DELTA HS TROPONIN: 11 NG/L
4HR DELTA HS TROPONIN: 2 NG/L
4HR DELTA HS TROPONIN: 300 NG/L
4HR DELTA HS TROPONIN: 35 NG/L
4HR DELTA HS TROPONIN: 6 NG/L
ALBUMIN SERPL BCP-MCNC: 1.7 G/DL (ref 3.5–5)
ALBUMIN SERPL BCP-MCNC: 1.9 G/DL (ref 3.5–5)
ALBUMIN SERPL BCP-MCNC: 2 G/DL (ref 3.5–5)
ALBUMIN SERPL BCP-MCNC: 2.4 G/DL (ref 3.5–5)
ALBUMIN SERPL BCP-MCNC: 2.4 G/DL (ref 3.5–5)
ALBUMIN SERPL BCP-MCNC: 2.6 G/DL (ref 3.5–5)
ALBUMIN SERPL BCP-MCNC: 3.2 G/DL (ref 3.5–5)
ALBUMIN SERPL BCP-MCNC: 3.3 G/DL (ref 3.5–5)
ALBUMIN SERPL BCP-MCNC: 3.5 G/DL (ref 3.5–5)
ALP SERPL-CCNC: 112 U/L (ref 46–116)
ALP SERPL-CCNC: 117 U/L (ref 46–116)
ALP SERPL-CCNC: 121 U/L (ref 46–116)
ALP SERPL-CCNC: 137 U/L (ref 46–116)
ALP SERPL-CCNC: 145 U/L (ref 46–116)
ALP SERPL-CCNC: 175 U/L (ref 46–116)
ALP SERPL-CCNC: 181 U/L (ref 46–116)
ALP SERPL-CCNC: 70 U/L (ref 46–116)
ALP SERPL-CCNC: 72 U/L (ref 46–116)
ALT SERPL W P-5'-P-CCNC: 18 U/L (ref 12–78)
ALT SERPL W P-5'-P-CCNC: 18 U/L (ref 12–78)
ALT SERPL W P-5'-P-CCNC: 24 U/L (ref 12–78)
ALT SERPL W P-5'-P-CCNC: 277 U/L (ref 12–78)
ALT SERPL W P-5'-P-CCNC: 391 U/L (ref 12–78)
ALT SERPL W P-5'-P-CCNC: 44 U/L (ref 12–78)
ALT SERPL W P-5'-P-CCNC: 44 U/L (ref 12–78)
ALT SERPL W P-5'-P-CCNC: 70 U/L (ref 12–78)
ALT SERPL W P-5'-P-CCNC: 98 U/L (ref 12–78)
AMPHETAMINES SERPL QL SCN: NEGATIVE
ANION GAP SERPL CALCULATED.3IONS-SCNC: 10 MMOL/L (ref 4–13)
ANION GAP SERPL CALCULATED.3IONS-SCNC: 10 MMOL/L (ref 4–13)
ANION GAP SERPL CALCULATED.3IONS-SCNC: 11 MMOL/L (ref 4–13)
ANION GAP SERPL CALCULATED.3IONS-SCNC: 12 MMOL/L (ref 4–13)
ANION GAP SERPL CALCULATED.3IONS-SCNC: 13 MMOL/L (ref 4–13)
ANION GAP SERPL CALCULATED.3IONS-SCNC: 21 MMOL/L (ref 4–13)
ANION GAP SERPL CALCULATED.3IONS-SCNC: 3 MMOL/L (ref 4–13)
ANION GAP SERPL CALCULATED.3IONS-SCNC: 3 MMOL/L (ref 4–13)
ANION GAP SERPL CALCULATED.3IONS-SCNC: 4 MMOL/L (ref 4–13)
ANION GAP SERPL CALCULATED.3IONS-SCNC: 5 MMOL/L (ref 4–13)
ANION GAP SERPL CALCULATED.3IONS-SCNC: 6 MMOL/L (ref 4–13)
ANION GAP SERPL CALCULATED.3IONS-SCNC: 7 MMOL/L (ref 4–13)
ANION GAP SERPL CALCULATED.3IONS-SCNC: 8 MMOL/L (ref 4–13)
ANION GAP SERPL CALCULATED.3IONS-SCNC: 9 MMOL/L (ref 4–13)
ANISOCYTOSIS BLD QL SMEAR: PRESENT
AORTIC ROOT: 3.9 CM
AORTIC VALVE MEAN VELOCITY: 12.7 M/S
AORTIC VALVE MEAN VELOCITY: 12.7 M/S
APICAL FOUR CHAMBER EJECTION FRACTION: 17 %
APICAL FOUR CHAMBER EJECTION FRACTION: 28 %
APTT PPP: 129 SECONDS (ref 23–37)
APTT PPP: 36 SECONDS (ref 23–37)
APTT PPP: 37 SECONDS (ref 23–37)
APTT PPP: 38 SECONDS (ref 23–37)
APTT PPP: 41 SECONDS (ref 23–37)
APTT PPP: 45 SECONDS (ref 23–37)
APTT PPP: 47 SECONDS (ref 23–37)
APTT PPP: 47 SECONDS (ref 23–37)
APTT PPP: 50 SECONDS (ref 23–37)
APTT PPP: 53 SECONDS (ref 23–37)
APTT PPP: 54 SECONDS (ref 23–37)
APTT PPP: 74 SECONDS (ref 23–37)
APTT PPP: 76 SECONDS (ref 23–37)
APTT PPP: 96 SECONDS (ref 23–37)
APTT PPP: >210 SECONDS (ref 23–37)
APTT PPP: >210 SECONDS (ref 23–37)
ARTERIAL PATENCY WRIST A: YES
ARTERIAL PATENCY WRIST A: YES
ASCENDING AORTA: 3.7 CM (ref 1.86–2.78)
AST SERPL W P-5'-P-CCNC: 126 U/L (ref 5–45)
AST SERPL W P-5'-P-CCNC: 24 U/L (ref 5–45)
AST SERPL W P-5'-P-CCNC: 31 U/L (ref 5–45)
AST SERPL W P-5'-P-CCNC: 383 U/L (ref 5–45)
AST SERPL W P-5'-P-CCNC: 43 U/L (ref 5–45)
AST SERPL W P-5'-P-CCNC: 46 U/L (ref 5–45)
AST SERPL W P-5'-P-CCNC: 475 U/L (ref 5–45)
AST SERPL W P-5'-P-CCNC: 54 U/L (ref 5–45)
AST SERPL W P-5'-P-CCNC: 56 U/L (ref 5–45)
ATRIAL RATE: 101 BPM
ATRIAL RATE: 102 BPM
ATRIAL RATE: 102 BPM
ATRIAL RATE: 103 BPM
ATRIAL RATE: 105 BPM
ATRIAL RATE: 106 BPM
ATRIAL RATE: 227 BPM
ATRIAL RATE: 61 BPM
ATRIAL RATE: 76 BPM
ATRIAL RATE: 79 BPM
ATRIAL RATE: 79 BPM
ATRIAL RATE: 80 BPM
ATRIAL RATE: 82 BPM
ATRIAL RATE: 84 BPM
ATRIAL RATE: 90 BPM
ATRIAL RATE: 91 BPM
ATRIAL RATE: 99 BPM
AV AREA BY CONTINUOUS VTI: 0.9 CM2
AV AREA PEAK VELOCITY: 0.8 CM2
AV LVOT MEAN GRADIENT: 1 MMHG
AV LVOT MEAN GRADIENT: 1 MMHG
AV LVOT PEAK GRADIENT: 1 MMHG
AV LVOT PEAK GRADIENT: 4 MMHG
AV MEAN GRADIENT: 8 MMHG
AV MEAN GRADIENT: 8 MMHG
AV PEAK GRADIENT: 16 MMHG
AV PEAK GRADIENT: 19 MMHG
AV REGURGITATION PRESSURE HALF TIME: 217 MS
AV VALVE AREA: 0.9 CM2
AV VELOCITY RATIO: 0.05
AV VELOCITY RATIO: 0.25
BACTERIA BLD CULT: NORMAL
BACTERIA SPT RESP CULT: ABNORMAL
BACTERIA UR QL AUTO: ABNORMAL /HPF
BARBITURATES UR QL: NEGATIVE
BASE EX.OXY STD BLDV CALC-SCNC: 63.9 % (ref 60–80)
BASE EX.OXY STD BLDV CALC-SCNC: 82.3 % (ref 60–80)
BASE EXCESS BLDA CALC-SCNC: -1.7 MMOL/L
BASE EXCESS BLDA CALC-SCNC: -8.9 MMOL/L
BASE EXCESS BLDV CALC-SCNC: 0.2 MMOL/L
BASE EXCESS BLDV CALC-SCNC: 4 MMOL/L
BASOPHILS # BLD AUTO: 0.01 THOUSANDS/ΜL (ref 0–0.1)
BASOPHILS # BLD AUTO: 0.02 THOUSANDS/ΜL (ref 0–0.1)
BASOPHILS # BLD AUTO: 0.03 THOUSANDS/ΜL (ref 0–0.1)
BASOPHILS # BLD AUTO: 0.04 THOUSANDS/ΜL (ref 0–0.1)
BASOPHILS # BLD AUTO: 0.05 THOUSANDS/ΜL (ref 0–0.1)
BASOPHILS # BLD AUTO: 0.08 THOUSANDS/ΜL (ref 0–0.1)
BASOPHILS # BLD AUTO: 0.08 THOUSANDS/ΜL (ref 0–0.1)
BASOPHILS # BLD AUTO: 0.1 THOUSANDS/ΜL (ref 0–0.1)
BASOPHILS # BLD AUTO: 0.11 THOUSANDS/ΜL (ref 0–0.1)
BASOPHILS # BLD AUTO: 0.11 THOUSANDS/ΜL (ref 0–0.1)
BASOPHILS # BLD MANUAL: 0 THOUSAND/UL (ref 0–0.1)
BASOPHILS NFR BLD AUTO: 0 % (ref 0–1)
BASOPHILS NFR BLD AUTO: 1 % (ref 0–1)
BASOPHILS NFR MAR MANUAL: 0 % (ref 0–1)
BENZODIAZ UR QL: NEGATIVE
BILIRUB DIRECT SERPL-MCNC: 0.32 MG/DL (ref 0–0.2)
BILIRUB DIRECT SERPL-MCNC: 0.43 MG/DL (ref 0–0.2)
BILIRUB SERPL-MCNC: 0.72 MG/DL (ref 0.2–1)
BILIRUB SERPL-MCNC: 0.98 MG/DL (ref 0.2–1)
BILIRUB SERPL-MCNC: 0.98 MG/DL (ref 0.2–1)
BILIRUB SERPL-MCNC: 1.04 MG/DL (ref 0.2–1)
BILIRUB SERPL-MCNC: 1.08 MG/DL (ref 0.2–1)
BILIRUB SERPL-MCNC: 1.23 MG/DL (ref 0.2–1)
BILIRUB SERPL-MCNC: 1.24 MG/DL (ref 0.2–1)
BILIRUB SERPL-MCNC: 1.39 MG/DL (ref 0.2–1)
BILIRUB SERPL-MCNC: 1.49 MG/DL (ref 0.2–1)
BILIRUB UR QL STRIP: NEGATIVE
BUN SERPL-MCNC: 11 MG/DL (ref 5–25)
BUN SERPL-MCNC: 11 MG/DL (ref 5–25)
BUN SERPL-MCNC: 13 MG/DL (ref 5–25)
BUN SERPL-MCNC: 14 MG/DL (ref 5–25)
BUN SERPL-MCNC: 15 MG/DL (ref 5–25)
BUN SERPL-MCNC: 16 MG/DL (ref 5–25)
BUN SERPL-MCNC: 17 MG/DL (ref 5–25)
BUN SERPL-MCNC: 18 MG/DL (ref 5–25)
BUN SERPL-MCNC: 18 MG/DL (ref 5–25)
BUN SERPL-MCNC: 19 MG/DL (ref 5–25)
BUN SERPL-MCNC: 19 MG/DL (ref 5–25)
BUN SERPL-MCNC: 20 MG/DL (ref 5–25)
BUN SERPL-MCNC: 23 MG/DL (ref 5–25)
BUN SERPL-MCNC: 27 MG/DL (ref 5–25)
BUN SERPL-MCNC: 28 MG/DL (ref 5–25)
BUN SERPL-MCNC: 29 MG/DL (ref 5–25)
BUN SERPL-MCNC: 31 MG/DL (ref 5–25)
BUN SERPL-MCNC: 32 MG/DL (ref 5–25)
BUN SERPL-MCNC: 32 MG/DL (ref 5–25)
BUN SERPL-MCNC: 36 MG/DL (ref 5–25)
BUN SERPL-MCNC: 9 MG/DL (ref 5–25)
BUN SERPL-MCNC: 9 MG/DL (ref 5–25)
BURR CELLS BLD QL SMEAR: PRESENT
CA-I BLD-SCNC: 0.97 MMOL/L (ref 1.12–1.32)
CALCIUM ALBUM COR SERPL-MCNC: 10.3 MG/DL (ref 8.3–10.1)
CALCIUM ALBUM COR SERPL-MCNC: 10.3 MG/DL (ref 8.3–10.1)
CALCIUM ALBUM COR SERPL-MCNC: 8.5 MG/DL (ref 8.3–10.1)
CALCIUM ALBUM COR SERPL-MCNC: 9.3 MG/DL (ref 8.3–10.1)
CALCIUM ALBUM COR SERPL-MCNC: 9.5 MG/DL (ref 8.3–10.1)
CALCIUM ALBUM COR SERPL-MCNC: 9.6 MG/DL (ref 8.3–10.1)
CALCIUM SERPL-MCNC: 7.4 MG/DL (ref 8.3–10.1)
CALCIUM SERPL-MCNC: 7.5 MG/DL (ref 8.3–10.1)
CALCIUM SERPL-MCNC: 7.7 MG/DL (ref 8.3–10.1)
CALCIUM SERPL-MCNC: 7.8 MG/DL (ref 8.3–10.1)
CALCIUM SERPL-MCNC: 7.9 MG/DL (ref 8.3–10.1)
CALCIUM SERPL-MCNC: 8 MG/DL (ref 8.3–10.1)
CALCIUM SERPL-MCNC: 8.1 MG/DL (ref 8.3–10.1)
CALCIUM SERPL-MCNC: 8.1 MG/DL (ref 8.3–10.1)
CALCIUM SERPL-MCNC: 8.2 MG/DL (ref 8.3–10.1)
CALCIUM SERPL-MCNC: 8.3 MG/DL (ref 8.3–10.1)
CALCIUM SERPL-MCNC: 8.3 MG/DL (ref 8.3–10.1)
CALCIUM SERPL-MCNC: 8.4 MG/DL (ref 8.3–10.1)
CALCIUM SERPL-MCNC: 8.5 MG/DL (ref 8.3–10.1)
CALCIUM SERPL-MCNC: 8.6 MG/DL (ref 8.3–10.1)
CALCIUM SERPL-MCNC: 8.7 MG/DL (ref 8.3–10.1)
CALCIUM SERPL-MCNC: 8.8 MG/DL (ref 8.3–10.1)
CALCIUM SERPL-MCNC: 8.9 MG/DL (ref 8.3–10.1)
CALCIUM SERPL-MCNC: 9 MG/DL (ref 8.3–10.1)
CALCIUM SERPL-MCNC: 9.1 MG/DL (ref 8.3–10.1)
CARDIAC TROPONIN I PNL SERPL HS: 126 NG/L
CARDIAC TROPONIN I PNL SERPL HS: 221 NG/L
CARDIAC TROPONIN I PNL SERPL HS: 32 NG/L
CARDIAC TROPONIN I PNL SERPL HS: 34 NG/L
CARDIAC TROPONIN I PNL SERPL HS: 348 NG/L
CARDIAC TROPONIN I PNL SERPL HS: 40 NG/L
CARDIAC TROPONIN I PNL SERPL HS: 48 NG/L
CARDIAC TROPONIN I PNL SERPL HS: 54 NG/L
CARDIAC TROPONIN I PNL SERPL HS: 56 NG/L
CARDIAC TROPONIN I PNL SERPL HS: 57 NG/L
CARDIAC TROPONIN I PNL SERPL HS: 62 NG/L
CARDIAC TROPONIN I PNL SERPL HS: 73 NG/L
CARDIAC TROPONIN I PNL SERPL HS: 73 NG/L
CARDIAC TROPONIN I PNL SERPL HS: 91 NG/L
CARDIAC TROPONIN I PNL SERPL HS: 93 NG/L
CHLORIDE SERPL-SCNC: 100 MMOL/L (ref 100–108)
CHLORIDE SERPL-SCNC: 102 MMOL/L (ref 100–108)
CHLORIDE SERPL-SCNC: 104 MMOL/L (ref 100–108)
CHLORIDE SERPL-SCNC: 86 MMOL/L (ref 100–108)
CHLORIDE SERPL-SCNC: 87 MMOL/L (ref 100–108)
CHLORIDE SERPL-SCNC: 88 MMOL/L (ref 100–108)
CHLORIDE SERPL-SCNC: 88 MMOL/L (ref 100–108)
CHLORIDE SERPL-SCNC: 90 MMOL/L (ref 100–108)
CHLORIDE SERPL-SCNC: 91 MMOL/L (ref 100–108)
CHLORIDE SERPL-SCNC: 92 MMOL/L (ref 100–108)
CHLORIDE SERPL-SCNC: 92 MMOL/L (ref 100–108)
CHLORIDE SERPL-SCNC: 93 MMOL/L (ref 100–108)
CHLORIDE SERPL-SCNC: 94 MMOL/L (ref 100–108)
CHLORIDE SERPL-SCNC: 95 MMOL/L (ref 100–108)
CHLORIDE SERPL-SCNC: 95 MMOL/L (ref 100–108)
CHLORIDE SERPL-SCNC: 96 MMOL/L (ref 100–108)
CHLORIDE SERPL-SCNC: 96 MMOL/L (ref 100–108)
CHLORIDE SERPL-SCNC: 97 MMOL/L (ref 100–108)
CHLORIDE SERPL-SCNC: 97 MMOL/L (ref 100–108)
CHLORIDE SERPL-SCNC: 98 MMOL/L (ref 100–108)
CHOLEST SERPL-MCNC: 87 MG/DL
CK SERPL-CCNC: 112 U/L (ref 39–308)
CLARITY UR: CLEAR
CO2 SERPL-SCNC: 19 MMOL/L (ref 21–32)
CO2 SERPL-SCNC: 25 MMOL/L (ref 21–32)
CO2 SERPL-SCNC: 26 MMOL/L (ref 21–32)
CO2 SERPL-SCNC: 27 MMOL/L (ref 21–32)
CO2 SERPL-SCNC: 28 MMOL/L (ref 21–32)
CO2 SERPL-SCNC: 29 MMOL/L (ref 21–32)
CO2 SERPL-SCNC: 30 MMOL/L (ref 21–32)
CO2 SERPL-SCNC: 31 MMOL/L (ref 21–32)
CO2 SERPL-SCNC: 32 MMOL/L (ref 21–32)
CO2 SERPL-SCNC: 32 MMOL/L (ref 21–32)
CO2 SERPL-SCNC: 33 MMOL/L (ref 21–32)
CO2 SERPL-SCNC: 34 MMOL/L (ref 21–32)
CO2 SERPL-SCNC: 35 MMOL/L (ref 21–32)
CO2 SERPL-SCNC: 36 MMOL/L (ref 21–32)
COCAINE UR QL: NEGATIVE
COCAINE UR QL: POSITIVE
COLOR UR: ABNORMAL
CORTIS AM PEAK SERPL-MCNC: 34.8 UG/DL (ref 4.2–22.4)
CORTIS AM PEAK SERPL-MCNC: 34.8 UG/DL (ref 4.2–22.4)
CREAT SERPL-MCNC: 0.72 MG/DL (ref 0.6–1.3)
CREAT SERPL-MCNC: 0.75 MG/DL (ref 0.6–1.3)
CREAT SERPL-MCNC: 0.75 MG/DL (ref 0.6–1.3)
CREAT SERPL-MCNC: 0.8 MG/DL (ref 0.6–1.3)
CREAT SERPL-MCNC: 0.82 MG/DL (ref 0.6–1.3)
CREAT SERPL-MCNC: 0.85 MG/DL (ref 0.6–1.3)
CREAT SERPL-MCNC: 0.85 MG/DL (ref 0.6–1.3)
CREAT SERPL-MCNC: 0.86 MG/DL (ref 0.6–1.3)
CREAT SERPL-MCNC: 0.89 MG/DL (ref 0.6–1.3)
CREAT SERPL-MCNC: 0.9 MG/DL (ref 0.6–1.3)
CREAT SERPL-MCNC: 0.91 MG/DL (ref 0.6–1.3)
CREAT SERPL-MCNC: 0.93 MG/DL (ref 0.6–1.3)
CREAT SERPL-MCNC: 0.96 MG/DL (ref 0.6–1.3)
CREAT SERPL-MCNC: 0.97 MG/DL (ref 0.6–1.3)
CREAT SERPL-MCNC: 0.98 MG/DL (ref 0.6–1.3)
CREAT SERPL-MCNC: 0.99 MG/DL (ref 0.6–1.3)
CREAT SERPL-MCNC: 1 MG/DL (ref 0.6–1.3)
CREAT SERPL-MCNC: 1 MG/DL (ref 0.6–1.3)
CREAT SERPL-MCNC: 1.03 MG/DL (ref 0.6–1.3)
CREAT SERPL-MCNC: 1.04 MG/DL (ref 0.6–1.3)
CREAT SERPL-MCNC: 1.04 MG/DL (ref 0.6–1.3)
CREAT SERPL-MCNC: 1.06 MG/DL (ref 0.6–1.3)
CREAT SERPL-MCNC: 1.07 MG/DL (ref 0.6–1.3)
CREAT SERPL-MCNC: 1.07 MG/DL (ref 0.6–1.3)
CREAT SERPL-MCNC: 1.11 MG/DL (ref 0.6–1.3)
CREAT SERPL-MCNC: 1.11 MG/DL (ref 0.6–1.3)
CREAT SERPL-MCNC: 1.12 MG/DL (ref 0.6–1.3)
CREAT SERPL-MCNC: 1.2 MG/DL (ref 0.6–1.3)
CREAT SERPL-MCNC: 1.22 MG/DL (ref 0.6–1.3)
CREAT SERPL-MCNC: 1.23 MG/DL (ref 0.6–1.3)
CREAT SERPL-MCNC: 1.3 MG/DL (ref 0.6–1.3)
CREAT SERPL-MCNC: 1.32 MG/DL (ref 0.6–1.3)
CREAT SERPL-MCNC: 1.41 MG/DL (ref 0.6–1.3)
CRP SERPL QL: 14.5 MG/L
CRP SERPL QL: 269.6 MG/L
CRP SERPL QL: 386.8 MG/L
D DIMER PPP FEU-MCNC: 2.64 UG/ML FEU
D DIMER PPP FEU-MCNC: 9.61 UG/ML FEU
D DIMER PPP FEU-MCNC: >20 UG/ML FEU
DME PARACHUTE DELIVERY DATE ACTUAL: NORMAL
DME PARACHUTE DELIVERY DATE EXPECTED: NORMAL
DME PARACHUTE DELIVERY DATE REQUESTED: NORMAL
DME PARACHUTE DELIVERY NOTE: NORMAL
DME PARACHUTE ITEM DESCRIPTION: NORMAL
DME PARACHUTE ORDER STATUS: NORMAL
DME PARACHUTE SUPPLIER NAME: NORMAL
DME PARACHUTE SUPPLIER PHONE: NORMAL
DOP CALC AO PEAK VEL: 2.13 M/S
DOP CALC AO PEAK VEL: 20.3 M/S
DOP CALC AO VTI: 23.59 CM
DOP CALC AO VTI: 28.06 CM
DOP CALC LVOT AREA: 3.14 CM2
DOP CALC LVOT DIAMETER: 2 CM
DOP CALC LVOT PEAK VEL VTI: 14.26 CM
DOP CALC LVOT PEAK VEL VTI: 6.74 CM
DOP CALC LVOT PEAK VEL: 0.54 M/S
DOP CALC LVOT PEAK VEL: 0.96 M/S
DOP CALC LVOT STROKE INDEX: 12.4 ML/M2
DOP CALC LVOT STROKE VOLUME: 21.16 CM3
EOSINOPHIL # BLD AUTO: 0 THOUSAND/ΜL (ref 0–0.61)
EOSINOPHIL # BLD AUTO: 0.01 THOUSAND/ΜL (ref 0–0.61)
EOSINOPHIL # BLD AUTO: 0.02 THOUSAND/ΜL (ref 0–0.61)
EOSINOPHIL # BLD AUTO: 0.02 THOUSAND/ΜL (ref 0–0.61)
EOSINOPHIL # BLD AUTO: 0.03 THOUSAND/ΜL (ref 0–0.61)
EOSINOPHIL # BLD AUTO: 0.04 THOUSAND/ΜL (ref 0–0.61)
EOSINOPHIL # BLD AUTO: 0.1 THOUSAND/ΜL (ref 0–0.61)
EOSINOPHIL # BLD AUTO: 0.14 THOUSAND/ΜL (ref 0–0.61)
EOSINOPHIL # BLD AUTO: 0.22 THOUSAND/ΜL (ref 0–0.61)
EOSINOPHIL # BLD MANUAL: 0 THOUSAND/UL (ref 0–0.4)
EOSINOPHIL NFR BLD AUTO: 0 % (ref 0–6)
EOSINOPHIL NFR BLD AUTO: 1 % (ref 0–6)
EOSINOPHIL NFR BLD AUTO: 1 % (ref 0–6)
EOSINOPHIL NFR BLD AUTO: 2 % (ref 0–6)
EOSINOPHIL NFR BLD MANUAL: 0 % (ref 0–6)
ERYTHROCYTE [DISTWIDTH] IN BLOOD BY AUTOMATED COUNT: 14.4 % (ref 11.6–15.1)
ERYTHROCYTE [DISTWIDTH] IN BLOOD BY AUTOMATED COUNT: 14.5 % (ref 11.6–15.1)
ERYTHROCYTE [DISTWIDTH] IN BLOOD BY AUTOMATED COUNT: 14.6 % (ref 11.6–15.1)
ERYTHROCYTE [DISTWIDTH] IN BLOOD BY AUTOMATED COUNT: 14.7 % (ref 11.6–15.1)
ERYTHROCYTE [DISTWIDTH] IN BLOOD BY AUTOMATED COUNT: 14.7 % (ref 11.6–15.1)
ERYTHROCYTE [DISTWIDTH] IN BLOOD BY AUTOMATED COUNT: 14.8 % (ref 11.6–15.1)
ERYTHROCYTE [DISTWIDTH] IN BLOOD BY AUTOMATED COUNT: 14.9 % (ref 11.6–15.1)
ERYTHROCYTE [DISTWIDTH] IN BLOOD BY AUTOMATED COUNT: 15 % (ref 11.6–15.1)
ERYTHROCYTE [DISTWIDTH] IN BLOOD BY AUTOMATED COUNT: 15.1 % (ref 11.6–15.1)
ERYTHROCYTE [DISTWIDTH] IN BLOOD BY AUTOMATED COUNT: 15.2 % (ref 11.6–15.1)
ERYTHROCYTE [DISTWIDTH] IN BLOOD BY AUTOMATED COUNT: 17.9 % (ref 11.6–15.1)
ERYTHROCYTE [DISTWIDTH] IN BLOOD BY AUTOMATED COUNT: 18.1 % (ref 11.6–15.1)
ERYTHROCYTE [DISTWIDTH] IN BLOOD BY AUTOMATED COUNT: 18.2 % (ref 11.6–15.1)
ERYTHROCYTE [DISTWIDTH] IN BLOOD BY AUTOMATED COUNT: 18.5 % (ref 11.6–15.1)
ERYTHROCYTE [DISTWIDTH] IN BLOOD BY AUTOMATED COUNT: 18.5 % (ref 11.6–15.1)
ERYTHROCYTE [DISTWIDTH] IN BLOOD BY AUTOMATED COUNT: 18.9 % (ref 11.6–15.1)
ERYTHROCYTE [DISTWIDTH] IN BLOOD BY AUTOMATED COUNT: 19 % (ref 11.6–15.1)
ERYTHROCYTE [DISTWIDTH] IN BLOOD BY AUTOMATED COUNT: 19.2 % (ref 11.6–15.1)
ERYTHROCYTE [DISTWIDTH] IN BLOOD BY AUTOMATED COUNT: 19.3 % (ref 11.6–15.1)
ERYTHROCYTE [DISTWIDTH] IN BLOOD BY AUTOMATED COUNT: 19.4 % (ref 11.6–15.1)
FERRITIN SERPL-MCNC: 342 NG/ML (ref 8–388)
FLUAV RNA RESP QL NAA+PROBE: NEGATIVE
FLUAV RNA RESP QL NAA+PROBE: NEGATIVE
FLUBV RNA RESP QL NAA+PROBE: NEGATIVE
FLUBV RNA RESP QL NAA+PROBE: NEGATIVE
FRACTIONAL SHORTENING: 11 % (ref 28–44)
FRACTIONAL SHORTENING: 11 % (ref 28–44)
GFR SERPL CREATININE-BSD FRML MDRD: 101 ML/MIN/1.73SQ M
GFR SERPL CREATININE-BSD FRML MDRD: 101 ML/MIN/1.73SQ M
GFR SERPL CREATININE-BSD FRML MDRD: 103 ML/MIN/1.73SQ M
GFR SERPL CREATININE-BSD FRML MDRD: 30 ML/MIN/1.73SQ M
GFR SERPL CREATININE-BSD FRML MDRD: 54 ML/MIN/1.73SQ M
GFR SERPL CREATININE-BSD FRML MDRD: 60 ML/MIN/1.73SQ M
GFR SERPL CREATININE-BSD FRML MDRD: 64 ML/MIN/1.73SQ M
GFR SERPL CREATININE-BSD FRML MDRD: 65 ML/MIN/1.73SQ M
GFR SERPL CREATININE-BSD FRML MDRD: 66 ML/MIN/1.73SQ M
GFR SERPL CREATININE-BSD FRML MDRD: 72 ML/MIN/1.73SQ M
GFR SERPL CREATININE-BSD FRML MDRD: 73 ML/MIN/1.73SQ M
GFR SERPL CREATININE-BSD FRML MDRD: 73 ML/MIN/1.73SQ M
GFR SERPL CREATININE-BSD FRML MDRD: 76 ML/MIN/1.73SQ M
GFR SERPL CREATININE-BSD FRML MDRD: 76 ML/MIN/1.73SQ M
GFR SERPL CREATININE-BSD FRML MDRD: 77 ML/MIN/1.73SQ M
GFR SERPL CREATININE-BSD FRML MDRD: 79 ML/MIN/1.73SQ M
GFR SERPL CREATININE-BSD FRML MDRD: 79 ML/MIN/1.73SQ M
GFR SERPL CREATININE-BSD FRML MDRD: 80 ML/MIN/1.73SQ M
GFR SERPL CREATININE-BSD FRML MDRD: 83 ML/MIN/1.73SQ M
GFR SERPL CREATININE-BSD FRML MDRD: 83 ML/MIN/1.73SQ M
GFR SERPL CREATININE-BSD FRML MDRD: 84 ML/MIN/1.73SQ M
GFR SERPL CREATININE-BSD FRML MDRD: 85 ML/MIN/1.73SQ M
GFR SERPL CREATININE-BSD FRML MDRD: 86 ML/MIN/1.73SQ M
GFR SERPL CREATININE-BSD FRML MDRD: 87 ML/MIN/1.73SQ M
GFR SERPL CREATININE-BSD FRML MDRD: 90 ML/MIN/1.73SQ M
GFR SERPL CREATININE-BSD FRML MDRD: 93 ML/MIN/1.73SQ M
GFR SERPL CREATININE-BSD FRML MDRD: 94 ML/MIN/1.73SQ M
GFR SERPL CREATININE-BSD FRML MDRD: 94 ML/MIN/1.73SQ M
GFR SERPL CREATININE-BSD FRML MDRD: 96 ML/MIN/1.73SQ M
GFR SERPL CREATININE-BSD FRML MDRD: 98 ML/MIN/1.73SQ M
GFR SERPL CREATININE-BSD FRML MDRD: 99 ML/MIN/1.73SQ M
GLUCOSE SERPL-MCNC: 102 MG/DL (ref 65–140)
GLUCOSE SERPL-MCNC: 104 MG/DL (ref 65–140)
GLUCOSE SERPL-MCNC: 105 MG/DL (ref 65–140)
GLUCOSE SERPL-MCNC: 106 MG/DL (ref 65–140)
GLUCOSE SERPL-MCNC: 109 MG/DL (ref 65–140)
GLUCOSE SERPL-MCNC: 110 MG/DL (ref 65–140)
GLUCOSE SERPL-MCNC: 113 MG/DL (ref 65–140)
GLUCOSE SERPL-MCNC: 115 MG/DL (ref 65–140)
GLUCOSE SERPL-MCNC: 115 MG/DL (ref 65–140)
GLUCOSE SERPL-MCNC: 118 MG/DL (ref 65–140)
GLUCOSE SERPL-MCNC: 119 MG/DL (ref 65–140)
GLUCOSE SERPL-MCNC: 119 MG/DL (ref 65–140)
GLUCOSE SERPL-MCNC: 124 MG/DL (ref 65–140)
GLUCOSE SERPL-MCNC: 125 MG/DL (ref 65–140)
GLUCOSE SERPL-MCNC: 125 MG/DL (ref 65–140)
GLUCOSE SERPL-MCNC: 126 MG/DL (ref 65–140)
GLUCOSE SERPL-MCNC: 133 MG/DL (ref 65–140)
GLUCOSE SERPL-MCNC: 139 MG/DL (ref 65–140)
GLUCOSE SERPL-MCNC: 139 MG/DL (ref 65–140)
GLUCOSE SERPL-MCNC: 141 MG/DL (ref 65–140)
GLUCOSE SERPL-MCNC: 142 MG/DL (ref 65–140)
GLUCOSE SERPL-MCNC: 149 MG/DL (ref 65–140)
GLUCOSE SERPL-MCNC: 149 MG/DL (ref 65–140)
GLUCOSE SERPL-MCNC: 153 MG/DL (ref 65–140)
GLUCOSE SERPL-MCNC: 157 MG/DL (ref 65–140)
GLUCOSE SERPL-MCNC: 170 MG/DL (ref 65–140)
GLUCOSE SERPL-MCNC: 176 MG/DL (ref 65–140)
GLUCOSE SERPL-MCNC: 182 MG/DL (ref 65–140)
GLUCOSE SERPL-MCNC: 202 MG/DL (ref 65–140)
GLUCOSE SERPL-MCNC: 203 MG/DL (ref 65–140)
GLUCOSE SERPL-MCNC: 79 MG/DL (ref 65–140)
GLUCOSE SERPL-MCNC: 81 MG/DL (ref 65–140)
GLUCOSE SERPL-MCNC: 83 MG/DL (ref 65–140)
GLUCOSE SERPL-MCNC: 86 MG/DL (ref 65–140)
GLUCOSE SERPL-MCNC: 89 MG/DL (ref 65–140)
GLUCOSE SERPL-MCNC: 89 MG/DL (ref 65–140)
GLUCOSE SERPL-MCNC: 92 MG/DL (ref 65–140)
GLUCOSE SERPL-MCNC: 93 MG/DL (ref 65–140)
GLUCOSE SERPL-MCNC: 93 MG/DL (ref 65–140)
GLUCOSE SERPL-MCNC: 95 MG/DL (ref 65–140)
GLUCOSE SERPL-MCNC: 99 MG/DL (ref 65–140)
GLUCOSE SERPL-MCNC: 99 MG/DL (ref 65–140)
GLUCOSE UR STRIP-MCNC: NEGATIVE MG/DL
GRAM STN SPEC: ABNORMAL
HCO3 BLDA-SCNC: 17 MMOL/L (ref 22–28)
HCO3 BLDA-SCNC: 21 MMOL/L (ref 22–28)
HCO3 BLDV-SCNC: 23 MMOL/L (ref 24–30)
HCO3 BLDV-SCNC: 28.5 MMOL/L (ref 24–30)
HCT VFR BLD AUTO: 28.1 % (ref 36.5–49.3)
HCT VFR BLD AUTO: 30 % (ref 36.5–49.3)
HCT VFR BLD AUTO: 30 % (ref 36.5–49.3)
HCT VFR BLD AUTO: 30.9 % (ref 36.5–49.3)
HCT VFR BLD AUTO: 32 % (ref 36.5–49.3)
HCT VFR BLD AUTO: 32.9 % (ref 36.5–49.3)
HCT VFR BLD AUTO: 33.1 % (ref 36.5–49.3)
HCT VFR BLD AUTO: 33.2 % (ref 36.5–49.3)
HCT VFR BLD AUTO: 33.3 % (ref 36.5–49.3)
HCT VFR BLD AUTO: 33.8 % (ref 36.5–49.3)
HCT VFR BLD AUTO: 34.4 % (ref 36.5–49.3)
HCT VFR BLD AUTO: 34.4 % (ref 36.5–49.3)
HCT VFR BLD AUTO: 34.5 % (ref 36.5–49.3)
HCT VFR BLD AUTO: 34.9 % (ref 36.5–49.3)
HCT VFR BLD AUTO: 35.1 % (ref 36.5–49.3)
HCT VFR BLD AUTO: 36.2 % (ref 36.5–49.3)
HCT VFR BLD AUTO: 36.4 % (ref 36.5–49.3)
HCT VFR BLD AUTO: 36.5 % (ref 36.5–49.3)
HCT VFR BLD AUTO: 36.9 % (ref 36.5–49.3)
HCT VFR BLD AUTO: 38.7 % (ref 36.5–49.3)
HCT VFR BLD AUTO: 38.7 % (ref 36.5–49.3)
HCT VFR BLD AUTO: 40.6 % (ref 36.5–49.3)
HCT VFR BLD AUTO: 40.7 % (ref 36.5–49.3)
HCT VFR BLD AUTO: 41 % (ref 36.5–49.3)
HCT VFR BLD AUTO: 41 % (ref 36.5–49.3)
HCT VFR BLD AUTO: 41.8 % (ref 36.5–49.3)
HCT VFR BLD AUTO: 43.4 % (ref 36.5–49.3)
HCT VFR BLD AUTO: 44.6 % (ref 36.5–49.3)
HCT VFR BLD AUTO: 47.2 % (ref 36.5–49.3)
HDLC SERPL-MCNC: 13 MG/DL
HELMET CELLS BLD QL SMEAR: PRESENT
HGB BLD-MCNC: 10 G/DL (ref 12–17)
HGB BLD-MCNC: 10.2 G/DL (ref 12–17)
HGB BLD-MCNC: 10.4 G/DL (ref 12–17)
HGB BLD-MCNC: 10.6 G/DL (ref 12–17)
HGB BLD-MCNC: 10.7 G/DL (ref 12–17)
HGB BLD-MCNC: 11 G/DL (ref 12–17)
HGB BLD-MCNC: 11.1 G/DL (ref 12–17)
HGB BLD-MCNC: 11.3 G/DL (ref 12–17)
HGB BLD-MCNC: 11.4 G/DL (ref 12–17)
HGB BLD-MCNC: 11.5 G/DL (ref 12–17)
HGB BLD-MCNC: 11.5 G/DL (ref 12–17)
HGB BLD-MCNC: 11.7 G/DL (ref 12–17)
HGB BLD-MCNC: 11.9 G/DL (ref 12–17)
HGB BLD-MCNC: 12.1 G/DL (ref 12–17)
HGB BLD-MCNC: 12.2 G/DL (ref 12–17)
HGB BLD-MCNC: 12.3 G/DL (ref 12–17)
HGB BLD-MCNC: 12.3 G/DL (ref 12–17)
HGB BLD-MCNC: 12.8 G/DL (ref 12–17)
HGB BLD-MCNC: 12.9 G/DL (ref 12–17)
HGB BLD-MCNC: 13.5 G/DL (ref 12–17)
HGB BLD-MCNC: 13.7 G/DL (ref 12–17)
HGB BLD-MCNC: 13.8 G/DL (ref 12–17)
HGB BLD-MCNC: 13.9 G/DL (ref 12–17)
HGB BLD-MCNC: 13.9 G/DL (ref 12–17)
HGB BLD-MCNC: 14.1 G/DL (ref 12–17)
HGB BLD-MCNC: 14.6 G/DL (ref 12–17)
HGB BLD-MCNC: 14.9 G/DL (ref 12–17)
HGB BLD-MCNC: 9.1 G/DL (ref 12–17)
HGB BLD-MCNC: 9.1 G/DL (ref 12–17)
HGB BLD-MCNC: 9.4 G/DL (ref 12–17)
HGB BLD-MCNC: 9.6 G/DL (ref 12–17)
HGB UR QL STRIP.AUTO: ABNORMAL
HOROWITZ INDEX BLDA+IHG-RTO: 100 MM[HG]
HYPERCHROMIA BLD QL SMEAR: PRESENT
IMM GRANULOCYTES # BLD AUTO: 0.05 THOUSAND/UL (ref 0–0.2)
IMM GRANULOCYTES # BLD AUTO: 0.07 THOUSAND/UL (ref 0–0.2)
IMM GRANULOCYTES # BLD AUTO: 0.07 THOUSAND/UL (ref 0–0.2)
IMM GRANULOCYTES # BLD AUTO: 0.08 THOUSAND/UL (ref 0–0.2)
IMM GRANULOCYTES # BLD AUTO: 0.12 THOUSAND/UL (ref 0–0.2)
IMM GRANULOCYTES # BLD AUTO: 0.17 THOUSAND/UL (ref 0–0.2)
IMM GRANULOCYTES # BLD AUTO: 0.18 THOUSAND/UL (ref 0–0.2)
IMM GRANULOCYTES # BLD AUTO: 0.31 THOUSAND/UL (ref 0–0.2)
IMM GRANULOCYTES # BLD AUTO: 0.33 THOUSAND/UL (ref 0–0.2)
IMM GRANULOCYTES # BLD AUTO: 0.34 THOUSAND/UL (ref 0–0.2)
IMM GRANULOCYTES # BLD AUTO: 0.37 THOUSAND/UL (ref 0–0.2)
IMM GRANULOCYTES # BLD AUTO: 0.39 THOUSAND/UL (ref 0–0.2)
IMM GRANULOCYTES # BLD AUTO: 0.42 THOUSAND/UL (ref 0–0.2)
IMM GRANULOCYTES NFR BLD AUTO: 0 % (ref 0–2)
IMM GRANULOCYTES NFR BLD AUTO: 1 % (ref 0–2)
IMM GRANULOCYTES NFR BLD AUTO: 2 % (ref 0–2)
IMM GRANULOCYTES NFR BLD AUTO: 3 % (ref 0–2)
IMM GRANULOCYTES NFR BLD AUTO: 3 % (ref 0–2)
INR PPP: 1.38 (ref 0.84–1.19)
INR PPP: 1.42 (ref 0.84–1.19)
INR PPP: 1.46 (ref 0.84–1.19)
INR PPP: 1.53 (ref 0.84–1.19)
INR PPP: 1.63 (ref 0.84–1.19)
INR PPP: 1.65 (ref 0.84–1.19)
INR PPP: 1.67 (ref 0.84–1.19)
INR PPP: 1.68 (ref 0.84–1.19)
INR PPP: 1.71 (ref 0.84–1.19)
INR PPP: 1.8 (ref 0.84–1.19)
INR PPP: 1.81 (ref 0.84–1.19)
INR PPP: 1.89 (ref 0.84–1.19)
INR PPP: 1.95 (ref 0.84–1.19)
INR PPP: 1.96 (ref 0.84–1.19)
INR PPP: 1.99 (ref 0.84–1.19)
INR PPP: 2.05 (ref 0.84–1.19)
INR PPP: 2.1 (ref 0.84–1.19)
INR PPP: 2.18 (ref 0.84–1.19)
INR PPP: 2.26 (ref 0.84–1.19)
INR PPP: 2.28 (ref 0.84–1.19)
INR PPP: 2.49 (ref 0.84–1.19)
INR PPP: 2.5 (ref 0.84–1.19)
INR PPP: 2.64 (ref 0.84–1.19)
INR PPP: 3.12 (ref 0.84–1.19)
INR PPP: 3.29 (ref 0.84–1.19)
INR PPP: 3.33 (ref 0.84–1.19)
INR PPP: 3.62 (ref 0.84–1.19)
INR PPP: 3.88 (ref 0.84–1.19)
INR PPP: 4 (ref 0.84–1.19)
INR PPP: 4.04 (ref 0.84–1.19)
INR PPP: 4.04 (ref 0.84–1.19)
INR PPP: 4.05 (ref 0.84–1.19)
INR PPP: 4.07 (ref 0.84–1.19)
INR PPP: 4.45 (ref 0.84–1.19)
INR PPP: 4.84 (ref 0.84–1.19)
INR PPP: 5.04 (ref 0.84–1.19)
INR PPP: 5.06 (ref 0.84–1.19)
INTERVENTRICULAR SEPTUM IN DIASTOLE (PARASTERNAL SHORT AXIS VIEW): 0.8 CM
INTERVENTRICULAR SEPTUM IN DIASTOLE (PARASTERNAL SHORT AXIS VIEW): 0.8 CM
INTERVENTRICULAR SEPTUM: 0.8 CM (ref 0.5–0.93)
IRON SATN MFR SERPL: 13 % (ref 20–50)
IRON SERPL-MCNC: 40 UG/DL (ref 65–175)
IVC: 24.8 MM
KETONES UR STRIP-MCNC: NEGATIVE MG/DL
LAAS-AP2: 28 CM2
LAAS-AP4: 29.3 CM2
LACTATE SERPL-SCNC: 2.6 MMOL/L (ref 0.5–2)
LACTATE SERPL-SCNC: 2.7 MMOL/L (ref 0.5–2)
LACTATE SERPL-SCNC: 3.2 MMOL/L (ref 0.5–2)
LACTATE SERPL-SCNC: 3.9 MMOL/L (ref 0.5–2)
LACTATE SERPL-SCNC: 4.3 MMOL/L (ref 0.5–2)
LDLC SERPL CALC-MCNC: 57 MG/DL (ref 0–100)
LEFT ATRIUM SIZE: 3.7 CM
LEFT INTERNAL DIMENSION IN SYSTOLE: 5.6 CM (ref 2.36–3.57)
LEFT INTERNAL DIMENSION IN SYSTOLE: 5.8 CM (ref 2.1–4)
LEFT VENTRICLE DIASTOLIC VOLUME (MOD BIPLANE): 317 ML (ref 74.87–168.61)
LEFT VENTRICLE SYSTOLIC VOLUME (MOD BIPLANE): 264 ML
LEFT VENTRICULAR INTERNAL DIMENSION IN DIASTOLE: 6.3 CM (ref 3.84–5.72)
LEFT VENTRICULAR INTERNAL DIMENSION IN DIASTOLE: 6.5 CM (ref 3.86–5.74)
LEFT VENTRICULAR POSTERIOR WALL IN END DIASTOLE: 0.6 CM (ref 0.48–0.92)
LEFT VENTRICULAR POSTERIOR WALL IN END DIASTOLE: 0.9 CM
LEFT VENTRICULAR STROKE VOLUME: 48 ML
LEFT VENTRICULAR STROKE VOLUME: 54 ML
LEUKOCYTE ESTERASE UR QL STRIP: NEGATIVE
LV EF: 17 %
LVSV (TEICH): 48 ML
LYMPHOCYTES # BLD AUTO: 0.21 THOUSAND/UL (ref 0.6–4.47)
LYMPHOCYTES # BLD AUTO: 0.25 THOUSAND/UL (ref 0.6–4.47)
LYMPHOCYTES # BLD AUTO: 0.37 THOUSAND/UL (ref 0.6–4.47)
LYMPHOCYTES # BLD AUTO: 0.45 THOUSANDS/ΜL (ref 0.6–4.47)
LYMPHOCYTES # BLD AUTO: 0.49 THOUSANDS/ΜL (ref 0.6–4.47)
LYMPHOCYTES # BLD AUTO: 0.52 THOUSANDS/ΜL (ref 0.6–4.47)
LYMPHOCYTES # BLD AUTO: 0.62 THOUSANDS/ΜL (ref 0.6–4.47)
LYMPHOCYTES # BLD AUTO: 0.64 THOUSANDS/ΜL (ref 0.6–4.47)
LYMPHOCYTES # BLD AUTO: 0.77 THOUSANDS/ΜL (ref 0.6–4.47)
LYMPHOCYTES # BLD AUTO: 0.81 THOUSANDS/ΜL (ref 0.6–4.47)
LYMPHOCYTES # BLD AUTO: 0.83 THOUSANDS/ΜL (ref 0.6–4.47)
LYMPHOCYTES # BLD AUTO: 0.91 THOUSANDS/ΜL (ref 0.6–4.47)
LYMPHOCYTES # BLD AUTO: 0.91 THOUSANDS/ΜL (ref 0.6–4.47)
LYMPHOCYTES # BLD AUTO: 0.97 THOUSANDS/ΜL (ref 0.6–4.47)
LYMPHOCYTES # BLD AUTO: 1.11 THOUSAND/UL (ref 0.6–4.47)
LYMPHOCYTES # BLD AUTO: 1.35 THOUSANDS/ΜL (ref 0.6–4.47)
LYMPHOCYTES # BLD AUTO: 1.63 THOUSANDS/ΜL (ref 0.6–4.47)
LYMPHOCYTES # BLD AUTO: 1.78 THOUSANDS/ΜL (ref 0.6–4.47)
LYMPHOCYTES # BLD AUTO: 2 % (ref 14–44)
LYMPHOCYTES # BLD AUTO: 2 % (ref 14–44)
LYMPHOCYTES # BLD AUTO: 2.33 THOUSANDS/ΜL (ref 0.6–4.47)
LYMPHOCYTES # BLD AUTO: 3 % (ref 14–44)
LYMPHOCYTES # BLD AUTO: 8 % (ref 14–44)
LYMPHOCYTES NFR BLD AUTO: 12 % (ref 14–44)
LYMPHOCYTES NFR BLD AUTO: 13 % (ref 14–44)
LYMPHOCYTES NFR BLD AUTO: 16 % (ref 14–44)
LYMPHOCYTES NFR BLD AUTO: 22 % (ref 14–44)
LYMPHOCYTES NFR BLD AUTO: 4 % (ref 14–44)
LYMPHOCYTES NFR BLD AUTO: 5 % (ref 14–44)
LYMPHOCYTES NFR BLD AUTO: 9 % (ref 14–44)
MAGNESIUM SERPL-MCNC: 1.5 MG/DL (ref 1.6–2.6)
MAGNESIUM SERPL-MCNC: 1.5 MG/DL (ref 1.6–2.6)
MAGNESIUM SERPL-MCNC: 1.6 MG/DL (ref 1.6–2.6)
MAGNESIUM SERPL-MCNC: 1.7 MG/DL (ref 1.6–2.6)
MAGNESIUM SERPL-MCNC: 1.8 MG/DL (ref 1.6–2.6)
MAGNESIUM SERPL-MCNC: 2 MG/DL (ref 1.6–2.6)
MAGNESIUM SERPL-MCNC: 2.1 MG/DL (ref 1.6–2.6)
MAGNESIUM SERPL-MCNC: 2.2 MG/DL (ref 1.6–2.6)
MCH RBC QN AUTO: 24.6 PG (ref 26.8–34.3)
MCH RBC QN AUTO: 24.8 PG (ref 26.8–34.3)
MCH RBC QN AUTO: 24.9 PG (ref 26.8–34.3)
MCH RBC QN AUTO: 25.3 PG (ref 26.8–34.3)
MCH RBC QN AUTO: 25.3 PG (ref 26.8–34.3)
MCH RBC QN AUTO: 25.4 PG (ref 26.8–34.3)
MCH RBC QN AUTO: 25.6 PG (ref 26.8–34.3)
MCH RBC QN AUTO: 25.7 PG (ref 26.8–34.3)
MCH RBC QN AUTO: 25.8 PG (ref 26.8–34.3)
MCH RBC QN AUTO: 25.9 PG (ref 26.8–34.3)
MCH RBC QN AUTO: 26 PG (ref 26.8–34.3)
MCH RBC QN AUTO: 26 PG (ref 26.8–34.3)
MCH RBC QN AUTO: 26.1 PG (ref 26.8–34.3)
MCH RBC QN AUTO: 26.2 PG (ref 26.8–34.3)
MCH RBC QN AUTO: 26.4 PG (ref 26.8–34.3)
MCH RBC QN AUTO: 26.4 PG (ref 26.8–34.3)
MCH RBC QN AUTO: 26.7 PG (ref 26.8–34.3)
MCH RBC QN AUTO: 26.8 PG (ref 26.8–34.3)
MCH RBC QN AUTO: 26.8 PG (ref 26.8–34.3)
MCH RBC QN AUTO: 26.9 PG (ref 26.8–34.3)
MCH RBC QN AUTO: 27 PG (ref 26.8–34.3)
MCH RBC QN AUTO: 27 PG (ref 26.8–34.3)
MCH RBC QN AUTO: 27.1 PG (ref 26.8–34.3)
MCH RBC QN AUTO: 27.4 PG (ref 26.8–34.3)
MCH RBC QN AUTO: 27.5 PG (ref 26.8–34.3)
MCH RBC QN AUTO: 28.1 PG (ref 26.8–34.3)
MCH RBC QN AUTO: 28.2 PG (ref 26.8–34.3)
MCH RBC QN AUTO: 28.3 PG (ref 26.8–34.3)
MCH RBC QN AUTO: 28.3 PG (ref 26.8–34.3)
MCHC RBC AUTO-ENTMCNC: 30.3 G/DL (ref 31.4–37.4)
MCHC RBC AUTO-ENTMCNC: 30.4 G/DL (ref 31.4–37.4)
MCHC RBC AUTO-ENTMCNC: 30.4 G/DL (ref 31.4–37.4)
MCHC RBC AUTO-ENTMCNC: 30.9 G/DL (ref 31.4–37.4)
MCHC RBC AUTO-ENTMCNC: 31.3 G/DL (ref 31.4–37.4)
MCHC RBC AUTO-ENTMCNC: 31.4 G/DL (ref 31.4–37.4)
MCHC RBC AUTO-ENTMCNC: 31.8 G/DL (ref 31.4–37.4)
MCHC RBC AUTO-ENTMCNC: 31.9 G/DL (ref 31.4–37.4)
MCHC RBC AUTO-ENTMCNC: 32 G/DL (ref 31.4–37.4)
MCHC RBC AUTO-ENTMCNC: 32 G/DL (ref 31.4–37.4)
MCHC RBC AUTO-ENTMCNC: 32.3 G/DL (ref 31.4–37.4)
MCHC RBC AUTO-ENTMCNC: 32.4 G/DL (ref 31.4–37.4)
MCHC RBC AUTO-ENTMCNC: 32.5 G/DL (ref 31.4–37.4)
MCHC RBC AUTO-ENTMCNC: 32.8 G/DL (ref 31.4–37.4)
MCHC RBC AUTO-ENTMCNC: 32.8 G/DL (ref 31.4–37.4)
MCHC RBC AUTO-ENTMCNC: 33.1 G/DL (ref 31.4–37.4)
MCHC RBC AUTO-ENTMCNC: 33.2 G/DL (ref 31.4–37.4)
MCHC RBC AUTO-ENTMCNC: 33.2 G/DL (ref 31.4–37.4)
MCHC RBC AUTO-ENTMCNC: 33.3 G/DL (ref 31.4–37.4)
MCHC RBC AUTO-ENTMCNC: 33.4 G/DL (ref 31.4–37.4)
MCHC RBC AUTO-ENTMCNC: 33.5 G/DL (ref 31.4–37.4)
MCHC RBC AUTO-ENTMCNC: 33.7 G/DL (ref 31.4–37.4)
MCHC RBC AUTO-ENTMCNC: 33.7 G/DL (ref 31.4–37.4)
MCHC RBC AUTO-ENTMCNC: 33.9 G/DL (ref 31.4–37.4)
MCHC RBC AUTO-ENTMCNC: 34.2 G/DL (ref 31.4–37.4)
MCHC RBC AUTO-ENTMCNC: 34.2 G/DL (ref 31.4–37.4)
MCV RBC AUTO: 78 FL (ref 82–98)
MCV RBC AUTO: 79 FL (ref 82–98)
MCV RBC AUTO: 80 FL (ref 82–98)
MCV RBC AUTO: 81 FL (ref 82–98)
MCV RBC AUTO: 82 FL (ref 82–98)
MCV RBC AUTO: 83 FL (ref 82–98)
MCV RBC AUTO: 84 FL (ref 82–98)
MCV RBC AUTO: 85 FL (ref 82–98)
MCV RBC AUTO: 91 FL (ref 82–98)
METAMYELOCYTES NFR BLD MANUAL: 2 % (ref 0–1)
METHADONE UR QL: NEGATIVE
MONOCYTES # BLD AUTO: 0.21 THOUSAND/UL (ref 0–1.22)
MONOCYTES # BLD AUTO: 0.21 THOUSAND/ΜL (ref 0.17–1.22)
MONOCYTES # BLD AUTO: 0.24 THOUSAND/ΜL (ref 0.17–1.22)
MONOCYTES # BLD AUTO: 0.25 THOUSAND/UL (ref 0–1.22)
MONOCYTES # BLD AUTO: 0.37 THOUSAND/UL (ref 0–1.22)
MONOCYTES # BLD AUTO: 0.44 THOUSAND/ΜL (ref 0.17–1.22)
MONOCYTES # BLD AUTO: 0.46 THOUSAND/ΜL (ref 0.17–1.22)
MONOCYTES # BLD AUTO: 0.92 THOUSAND/ΜL (ref 0.17–1.22)
MONOCYTES # BLD AUTO: 0.92 THOUSAND/ΜL (ref 0.17–1.22)
MONOCYTES # BLD AUTO: 0.93 THOUSAND/ΜL (ref 0.17–1.22)
MONOCYTES # BLD AUTO: 0.97 THOUSAND/UL (ref 0–1.22)
MONOCYTES # BLD AUTO: 1.25 THOUSAND/ΜL (ref 0.17–1.22)
MONOCYTES # BLD AUTO: 1.27 THOUSAND/ΜL (ref 0.17–1.22)
MONOCYTES # BLD AUTO: 1.29 THOUSAND/ΜL (ref 0.17–1.22)
MONOCYTES # BLD AUTO: 1.39 THOUSAND/ΜL (ref 0.17–1.22)
MONOCYTES # BLD AUTO: 1.64 THOUSAND/ΜL (ref 0.17–1.22)
MONOCYTES # BLD AUTO: 1.94 THOUSAND/ΜL (ref 0.17–1.22)
MONOCYTES # BLD AUTO: 2.11 THOUSAND/ΜL (ref 0.17–1.22)
MONOCYTES # BLD AUTO: 2.57 THOUSAND/ΜL (ref 0.17–1.22)
MONOCYTES NFR BLD AUTO: 11 % (ref 4–12)
MONOCYTES NFR BLD AUTO: 12 % (ref 4–12)
MONOCYTES NFR BLD AUTO: 13 % (ref 4–12)
MONOCYTES NFR BLD AUTO: 13 % (ref 4–12)
MONOCYTES NFR BLD AUTO: 2 % (ref 4–12)
MONOCYTES NFR BLD AUTO: 2 % (ref 4–12)
MONOCYTES NFR BLD AUTO: 3 % (ref 4–12)
MONOCYTES NFR BLD AUTO: 5 % (ref 4–12)
MONOCYTES NFR BLD AUTO: 5 % (ref 4–12)
MONOCYTES NFR BLD AUTO: 6 % (ref 4–12)
MONOCYTES NFR BLD AUTO: 9 % (ref 4–12)
MONOCYTES NFR BLD AUTO: 9 % (ref 4–12)
MONOCYTES NFR BLD: 2 % (ref 4–12)
MONOCYTES NFR BLD: 2 % (ref 4–12)
MONOCYTES NFR BLD: 3 % (ref 4–12)
MONOCYTES NFR BLD: 7 % (ref 4–12)
MRSA NOSE QL CULT: NORMAL
NASAL CANNULA: 6
NEUTROPHILS # BLD AUTO: 10.31 THOUSANDS/ΜL (ref 1.85–7.62)
NEUTROPHILS # BLD AUTO: 10.75 THOUSANDS/ΜL (ref 1.85–7.62)
NEUTROPHILS # BLD AUTO: 12.78 THOUSANDS/ΜL (ref 1.85–7.62)
NEUTROPHILS # BLD AUTO: 12.93 THOUSANDS/ΜL (ref 1.85–7.62)
NEUTROPHILS # BLD AUTO: 13.53 THOUSANDS/ΜL (ref 1.85–7.62)
NEUTROPHILS # BLD AUTO: 13.7 THOUSANDS/ΜL (ref 1.85–7.62)
NEUTROPHILS # BLD AUTO: 13.95 THOUSANDS/ΜL (ref 1.85–7.62)
NEUTROPHILS # BLD AUTO: 15.89 THOUSANDS/ΜL (ref 1.85–7.62)
NEUTROPHILS # BLD AUTO: 16.41 THOUSANDS/ΜL (ref 1.85–7.62)
NEUTROPHILS # BLD AUTO: 6.93 THOUSANDS/ΜL (ref 1.85–7.62)
NEUTROPHILS # BLD AUTO: 7.35 THOUSANDS/ΜL (ref 1.85–7.62)
NEUTROPHILS # BLD AUTO: 7.39 THOUSANDS/ΜL (ref 1.85–7.62)
NEUTROPHILS # BLD AUTO: 8.21 THOUSANDS/ΜL (ref 1.85–7.62)
NEUTROPHILS # BLD AUTO: 8.61 THOUSANDS/ΜL (ref 1.85–7.62)
NEUTROPHILS # BLD AUTO: 9.34 THOUSANDS/ΜL (ref 1.85–7.62)
NEUTROPHILS # BLD MANUAL: 10.06 THOUSAND/UL (ref 1.85–7.62)
NEUTROPHILS # BLD MANUAL: 11.49 THOUSAND/UL (ref 1.85–7.62)
NEUTROPHILS # BLD MANUAL: 11.63 THOUSAND/UL (ref 1.85–7.62)
NEUTROPHILS # BLD MANUAL: 11.92 THOUSAND/UL (ref 1.85–7.62)
NEUTS BAND NFR BLD MANUAL: 1 % (ref 0–8)
NEUTS BAND NFR BLD MANUAL: 1 % (ref 0–8)
NEUTS BAND NFR BLD MANUAL: 26 % (ref 0–8)
NEUTS SEG NFR BLD AUTO: 63 % (ref 43–75)
NEUTS SEG NFR BLD AUTO: 68 % (ref 43–75)
NEUTS SEG NFR BLD AUTO: 70 % (ref 43–75)
NEUTS SEG NFR BLD AUTO: 71 % (ref 43–75)
NEUTS SEG NFR BLD AUTO: 76 % (ref 43–75)
NEUTS SEG NFR BLD AUTO: 80 % (ref 43–75)
NEUTS SEG NFR BLD AUTO: 81 % (ref 43–75)
NEUTS SEG NFR BLD AUTO: 81 % (ref 43–75)
NEUTS SEG NFR BLD AUTO: 83 % (ref 43–75)
NEUTS SEG NFR BLD AUTO: 88 % (ref 43–75)
NEUTS SEG NFR BLD AUTO: 88 % (ref 43–75)
NEUTS SEG NFR BLD AUTO: 89 % (ref 43–75)
NEUTS SEG NFR BLD AUTO: 92 % (ref 43–75)
NEUTS SEG NFR BLD AUTO: 92 % (ref 43–75)
NEUTS SEG NFR BLD AUTO: 93 % (ref 43–75)
NEUTS SEG NFR BLD AUTO: 94 % (ref 43–75)
NEUTS SEG NFR BLD AUTO: 95 % (ref 43–75)
NITRITE UR QL STRIP: NEGATIVE
NON-SQ EPI CELLS URNS QL MICRO: ABNORMAL /HPF
NRBC BLD AUTO-RTO: 0 /100 WBCS
NT-PROBNP SERPL-MCNC: 7331 PG/ML
NT-PROBNP SERPL-MCNC: 7674 PG/ML
NT-PROBNP SERPL-MCNC: ABNORMAL PG/ML
O2 CT BLDA-SCNC: 16.1 ML/DL (ref 16–23)
O2 CT BLDA-SCNC: 18.4 ML/DL (ref 16–23)
O2 CT BLDV-SCNC: 12.8 ML/DL
O2 CT BLDV-SCNC: 15.7 ML/DL
OPIATES UR QL SCN: NEGATIVE
OSMOLALITY UR/SERPL-RTO: 271 MMOL/KG (ref 282–298)
OSMOLALITY UR/SERPL-RTO: 290 MMOL/KG (ref 282–298)
OSMOLALITY UR: 317 MMOL/KG
OSMOLALITY UR: 505 MMOL/KG
OVALOCYTES BLD QL SMEAR: PRESENT
OXYCODONE+OXYMORPHONE UR QL SCN: NEGATIVE
OXYHGB MFR BLDA: 87.7 % (ref 94–97)
OXYHGB MFR BLDA: 97.1 % (ref 94–97)
P AXIS: -20 DEGREES
P AXIS: 0 DEGREES
P AXIS: 101 DEGREES
P AXIS: 48 DEGREES
P AXIS: 5 DEGREES
P AXIS: 59 DEGREES
P AXIS: 65 DEGREES
P AXIS: 67 DEGREES
P AXIS: 8 DEGREES
P AXIS: 81 DEGREES
P AXIS: 84 DEGREES
P AXIS: 85 DEGREES
P AXIS: 91 DEGREES
P AXIS: 98 DEGREES
PA SYSTOLIC PRESSURE: 92 MMHG
PCO2 BLDA: 29.2 MM HG (ref 36–44)
PCO2 BLDA: 36.8 MM HG (ref 36–44)
PCO2 BLDV: 31.9 MM HG (ref 42–50)
PCO2 BLDV: 42.3 MM HG (ref 42–50)
PCP UR QL: NEGATIVE
PEEP RESPIRATORY: 10 CM[H2O]
PH BLDA: 7.28 [PH] (ref 7.35–7.45)
PH BLDA: 7.47 [PH] (ref 7.35–7.45)
PH BLDV: 7.45 [PH] (ref 7.3–7.4)
PH BLDV: 7.47 [PH] (ref 7.3–7.4)
PH UR STRIP.AUTO: 5.5 [PH] (ref 4.5–8)
PLATELET # BLD AUTO: 104 THOUSANDS/UL (ref 149–390)
PLATELET # BLD AUTO: 121 THOUSANDS/UL (ref 149–390)
PLATELET # BLD AUTO: 130 THOUSANDS/UL (ref 149–390)
PLATELET # BLD AUTO: 145 THOUSANDS/UL (ref 149–390)
PLATELET # BLD AUTO: 160 THOUSANDS/UL (ref 149–390)
PLATELET # BLD AUTO: 167 THOUSANDS/UL (ref 149–390)
PLATELET # BLD AUTO: 179 THOUSANDS/UL (ref 149–390)
PLATELET # BLD AUTO: 179 THOUSANDS/UL (ref 149–390)
PLATELET # BLD AUTO: 185 THOUSANDS/UL (ref 149–390)
PLATELET # BLD AUTO: 188 THOUSANDS/UL (ref 149–390)
PLATELET # BLD AUTO: 199 THOUSANDS/UL (ref 149–390)
PLATELET # BLD AUTO: 212 THOUSANDS/UL (ref 149–390)
PLATELET # BLD AUTO: 221 THOUSANDS/UL (ref 149–390)
PLATELET # BLD AUTO: 221 THOUSANDS/UL (ref 149–390)
PLATELET # BLD AUTO: 222 THOUSANDS/UL (ref 149–390)
PLATELET # BLD AUTO: 230 THOUSANDS/UL (ref 149–390)
PLATELET # BLD AUTO: 233 THOUSANDS/UL (ref 149–390)
PLATELET # BLD AUTO: 241 THOUSANDS/UL (ref 149–390)
PLATELET # BLD AUTO: 261 THOUSANDS/UL (ref 149–390)
PLATELET # BLD AUTO: 264 THOUSANDS/UL (ref 149–390)
PLATELET # BLD AUTO: 272 THOUSANDS/UL (ref 149–390)
PLATELET # BLD AUTO: 276 THOUSANDS/UL (ref 149–390)
PLATELET # BLD AUTO: 278 THOUSANDS/UL (ref 149–390)
PLATELET # BLD AUTO: 281 THOUSANDS/UL (ref 149–390)
PLATELET # BLD AUTO: 292 THOUSANDS/UL (ref 149–390)
PLATELET # BLD AUTO: 298 THOUSANDS/UL (ref 149–390)
PLATELET # BLD AUTO: 311 THOUSANDS/UL (ref 149–390)
PLATELET # BLD AUTO: 316 THOUSANDS/UL (ref 149–390)
PLATELET # BLD AUTO: 317 THOUSANDS/UL (ref 149–390)
PLATELET # BLD AUTO: 319 THOUSANDS/UL (ref 149–390)
PLATELET # BLD AUTO: 319 THOUSANDS/UL (ref 149–390)
PLATELET # BLD AUTO: 338 THOUSANDS/UL (ref 149–390)
PLATELET # BLD AUTO: 348 THOUSANDS/UL (ref 149–390)
PLATELET # BLD AUTO: 372 THOUSANDS/UL (ref 149–390)
PLATELET BLD QL SMEAR: ABNORMAL
PLATELET BLD QL SMEAR: ADEQUATE
PMV BLD AUTO: 10.1 FL (ref 8.9–12.7)
PMV BLD AUTO: 10.1 FL (ref 8.9–12.7)
PMV BLD AUTO: 10.3 FL (ref 8.9–12.7)
PMV BLD AUTO: 10.4 FL (ref 8.9–12.7)
PMV BLD AUTO: 10.4 FL (ref 8.9–12.7)
PMV BLD AUTO: 10.5 FL (ref 8.9–12.7)
PMV BLD AUTO: 10.7 FL (ref 8.9–12.7)
PMV BLD AUTO: 10.8 FL (ref 8.9–12.7)
PMV BLD AUTO: 10.9 FL (ref 8.9–12.7)
PMV BLD AUTO: 10.9 FL (ref 8.9–12.7)
PMV BLD AUTO: 11.2 FL (ref 8.9–12.7)
PMV BLD AUTO: 8.6 FL (ref 8.9–12.7)
PMV BLD AUTO: 8.9 FL (ref 8.9–12.7)
PMV BLD AUTO: 9.1 FL (ref 8.9–12.7)
PMV BLD AUTO: 9.2 FL (ref 8.9–12.7)
PMV BLD AUTO: 9.3 FL (ref 8.9–12.7)
PMV BLD AUTO: 9.4 FL (ref 8.9–12.7)
PMV BLD AUTO: 9.4 FL (ref 8.9–12.7)
PMV BLD AUTO: 9.5 FL (ref 8.9–12.7)
PMV BLD AUTO: 9.5 FL (ref 8.9–12.7)
PMV BLD AUTO: 9.6 FL (ref 8.9–12.7)
PMV BLD AUTO: 9.6 FL (ref 8.9–12.7)
PMV BLD AUTO: 9.8 FL (ref 8.9–12.7)
PMV BLD AUTO: 9.9 FL (ref 8.9–12.7)
PMV BLD AUTO: 9.9 FL (ref 8.9–12.7)
PO2 BLDA: 105.7 MM HG (ref 75–129)
PO2 BLDA: 60.8 MM HG (ref 75–129)
PO2 BLDV: 34 MM HG (ref 35–45)
PO2 BLDV: 47.2 MM HG (ref 35–45)
POTASSIUM SERPL-SCNC: 2.9 MMOL/L (ref 3.5–5.3)
POTASSIUM SERPL-SCNC: 3.2 MMOL/L (ref 3.5–5.3)
POTASSIUM SERPL-SCNC: 3.3 MMOL/L (ref 3.5–5.3)
POTASSIUM SERPL-SCNC: 3.5 MMOL/L (ref 3.5–5.3)
POTASSIUM SERPL-SCNC: 3.5 MMOL/L (ref 3.5–5.3)
POTASSIUM SERPL-SCNC: 3.6 MMOL/L (ref 3.5–5.3)
POTASSIUM SERPL-SCNC: 3.7 MMOL/L (ref 3.5–5.3)
POTASSIUM SERPL-SCNC: 3.8 MMOL/L (ref 3.5–5.3)
POTASSIUM SERPL-SCNC: 3.9 MMOL/L (ref 3.5–5.3)
POTASSIUM SERPL-SCNC: 4 MMOL/L (ref 3.5–5.3)
POTASSIUM SERPL-SCNC: 4 MMOL/L (ref 3.5–5.3)
POTASSIUM SERPL-SCNC: 4.1 MMOL/L (ref 3.5–5.3)
POTASSIUM SERPL-SCNC: 4.2 MMOL/L (ref 3.5–5.3)
POTASSIUM SERPL-SCNC: 4.3 MMOL/L (ref 3.5–5.3)
POTASSIUM SERPL-SCNC: 4.4 MMOL/L (ref 3.5–5.3)
POTASSIUM SERPL-SCNC: 4.4 MMOL/L (ref 3.5–5.3)
POTASSIUM SERPL-SCNC: 4.5 MMOL/L (ref 3.5–5.3)
POTASSIUM SERPL-SCNC: 4.9 MMOL/L (ref 3.5–5.3)
POTASSIUM SERPL-SCNC: 5.4 MMOL/L (ref 3.5–5.3)
POTASSIUM SERPL-SCNC: 5.5 MMOL/L (ref 3.5–5.3)
POTASSIUM SERPL-SCNC: 5.6 MMOL/L (ref 3.5–5.3)
PR INTERVAL: 114 MS
PR INTERVAL: 118 MS
PR INTERVAL: 128 MS
PR INTERVAL: 134 MS
PR INTERVAL: 136 MS
PR INTERVAL: 160 MS
PR INTERVAL: 168 MS
PR INTERVAL: 188 MS
PR INTERVAL: 192 MS
PR INTERVAL: 210 MS
PR INTERVAL: 232 MS
PR INTERVAL: 248 MS
PROCALCITONIN SERPL-MCNC: 0.7 NG/ML
PROCALCITONIN SERPL-MCNC: 0.72 NG/ML
PROCALCITONIN SERPL-MCNC: 1 NG/ML
PROCALCITONIN SERPL-MCNC: 1.06 NG/ML
PROCALCITONIN SERPL-MCNC: 1.49 NG/ML
PROCALCITONIN SERPL-MCNC: 2.12 NG/ML
PROCALCITONIN SERPL-MCNC: 2.59 NG/ML
PROCALCITONIN SERPL-MCNC: 2.85 NG/ML
PROCALCITONIN SERPL-MCNC: 4.65 NG/ML
PROCALCITONIN SERPL-MCNC: <0.05 NG/ML
PROT SERPL-MCNC: 5.4 G/DL (ref 6.4–8.2)
PROT SERPL-MCNC: 5.9 G/DL (ref 6.4–8.2)
PROT SERPL-MCNC: 5.9 G/DL (ref 6.4–8.2)
PROT SERPL-MCNC: 6.5 G/DL (ref 6.4–8.2)
PROT SERPL-MCNC: 6.6 G/DL (ref 6.4–8.2)
PROT SERPL-MCNC: 6.6 G/DL (ref 6.4–8.2)
PROT SERPL-MCNC: 7.4 G/DL (ref 6.4–8.2)
PROT SERPL-MCNC: 7.8 G/DL (ref 6.4–8.2)
PROT SERPL-MCNC: 8.1 G/DL (ref 6.4–8.2)
PROT UR STRIP-MCNC: NEGATIVE MG/DL
PROTHROMBIN TIME: 16.5 SECONDS (ref 11.6–14.5)
PROTHROMBIN TIME: 16.7 SECONDS (ref 11.6–14.5)
PROTHROMBIN TIME: 17.3 SECONDS (ref 11.6–14.5)
PROTHROMBIN TIME: 17.7 SECONDS (ref 11.6–14.5)
PROTHROMBIN TIME: 18.7 SECONDS (ref 11.6–14.5)
PROTHROMBIN TIME: 19.1 SECONDS (ref 11.6–14.5)
PROTHROMBIN TIME: 19.1 SECONDS (ref 11.6–14.5)
PROTHROMBIN TIME: 19.2 SECONDS (ref 11.6–14.5)
PROTHROMBIN TIME: 19.3 SECONDS (ref 11.6–14.5)
PROTHROMBIN TIME: 20.1 SECONDS (ref 11.6–14.5)
PROTHROMBIN TIME: 20.2 SECONDS (ref 11.6–14.5)
PROTHROMBIN TIME: 20.9 SECONDS (ref 11.6–14.5)
PROTHROMBIN TIME: 21.3 SECONDS (ref 11.6–14.5)
PROTHROMBIN TIME: 21.6 SECONDS (ref 11.6–14.5)
PROTHROMBIN TIME: 21.7 SECONDS (ref 11.6–14.5)
PROTHROMBIN TIME: 22.2 SECONDS (ref 11.6–14.5)
PROTHROMBIN TIME: 23.3 SECONDS (ref 11.6–14.5)
PROTHROMBIN TIME: 24.2 SECONDS (ref 11.6–14.5)
PROTHROMBIN TIME: 24.2 SECONDS (ref 11.6–14.5)
PROTHROMBIN TIME: 25.9 SECONDS (ref 11.6–14.5)
PROTHROMBIN TIME: 27.1 SECONDS (ref 11.6–14.5)
PROTHROMBIN TIME: 30.7 SECONDS (ref 11.6–14.5)
PROTHROMBIN TIME: 32.3 SECONDS (ref 11.6–14.5)
PROTHROMBIN TIME: 32.3 SECONDS (ref 11.6–14.5)
PROTHROMBIN TIME: 34.5 SECONDS (ref 11.6–14.5)
PROTHROMBIN TIME: 36.6 SECONDS (ref 11.6–14.5)
PROTHROMBIN TIME: 37.7 SECONDS (ref 11.6–14.5)
PROTHROMBIN TIME: 37.7 SECONDS (ref 11.6–14.5)
PROTHROMBIN TIME: 37.8 SECONDS (ref 11.6–14.5)
PROTHROMBIN TIME: 38 SECONDS (ref 11.6–14.5)
PROTHROMBIN TIME: 40.6 SECONDS (ref 11.6–14.5)
PROTHROMBIN TIME: 43 SECONDS (ref 11.6–14.5)
PROTHROMBIN TIME: 44.7 SECONDS (ref 11.6–14.5)
PROTHROMBIN TIME: 44.8 SECONDS (ref 11.6–14.5)
QRS AXIS: -32 DEGREES
QRS AXIS: -67 DEGREES
QRS AXIS: -74 DEGREES
QRS AXIS: -76 DEGREES
QRS AXIS: -84 DEGREES
QRS AXIS: -89 DEGREES
QRS AXIS: 135 DEGREES
QRS AXIS: 137 DEGREES
QRS AXIS: 254 DEGREES
QRS AXIS: 257 DEGREES
QRS AXIS: 265 DEGREES
QRS AXIS: 266 DEGREES
QRS AXIS: 266 DEGREES
QRS AXIS: 267 DEGREES
QRS AXIS: 267 DEGREES
QRS AXIS: 270 DEGREES
QRS AXIS: 4 DEGREES
QRSD INTERVAL: 138 MS
QRSD INTERVAL: 140 MS
QRSD INTERVAL: 144 MS
QRSD INTERVAL: 148 MS
QRSD INTERVAL: 148 MS
QRSD INTERVAL: 156 MS
QRSD INTERVAL: 158 MS
QRSD INTERVAL: 158 MS
QRSD INTERVAL: 162 MS
QRSD INTERVAL: 162 MS
QRSD INTERVAL: 166 MS
QRSD INTERVAL: 182 MS
QRSD INTERVAL: 186 MS
QRSD INTERVAL: 188 MS
QRSD INTERVAL: 196 MS
QT INTERVAL: 362 MS
QT INTERVAL: 366 MS
QT INTERVAL: 366 MS
QT INTERVAL: 382 MS
QT INTERVAL: 384 MS
QT INTERVAL: 408 MS
QT INTERVAL: 434 MS
QT INTERVAL: 434 MS
QT INTERVAL: 436 MS
QT INTERVAL: 444 MS
QT INTERVAL: 444 MS
QT INTERVAL: 450 MS
QT INTERVAL: 452 MS
QT INTERVAL: 456 MS
QT INTERVAL: 500 MS
QT INTERVAL: 504 MS
QT INTERVAL: 542 MS
QTC INTERVAL: 467 MS
QTC INTERVAL: 471 MS
QTC INTERVAL: 472 MS
QTC INTERVAL: 479 MS
QTC INTERVAL: 500 MS
QTC INTERVAL: 507 MS
QTC INTERVAL: 509 MS
QTC INTERVAL: 533 MS
QTC INTERVAL: 545 MS
QTC INTERVAL: 545 MS
QTC INTERVAL: 550 MS
QTC INTERVAL: 571 MS
QTC INTERVAL: 575 MS
QTC INTERVAL: 576 MS
QTC INTERVAL: 580 MS
QTC INTERVAL: 588 MS
QTC INTERVAL: 600 MS
RA PRESSURE ESTIMATED: 20 MMHG
RBC # BLD AUTO: 3.54 MILLION/UL (ref 3.88–5.62)
RBC # BLD AUTO: 3.56 MILLION/UL (ref 3.88–5.62)
RBC # BLD AUTO: 3.7 MILLION/UL (ref 3.88–5.62)
RBC # BLD AUTO: 3.79 MILLION/UL (ref 3.88–5.62)
RBC # BLD AUTO: 3.89 MILLION/UL (ref 3.88–5.62)
RBC # BLD AUTO: 3.96 MILLION/UL (ref 3.88–5.62)
RBC # BLD AUTO: 4.07 MILLION/UL (ref 3.88–5.62)
RBC # BLD AUTO: 4.08 MILLION/UL (ref 3.88–5.62)
RBC # BLD AUTO: 4.1 MILLION/UL (ref 3.88–5.62)
RBC # BLD AUTO: 4.14 MILLION/UL (ref 3.88–5.62)
RBC # BLD AUTO: 4.15 MILLION/UL (ref 3.88–5.62)
RBC # BLD AUTO: 4.22 MILLION/UL (ref 3.88–5.62)
RBC # BLD AUTO: 4.25 MILLION/UL (ref 3.88–5.62)
RBC # BLD AUTO: 4.26 MILLION/UL (ref 3.88–5.62)
RBC # BLD AUTO: 4.27 MILLION/UL (ref 3.88–5.62)
RBC # BLD AUTO: 4.34 MILLION/UL (ref 3.88–5.62)
RBC # BLD AUTO: 4.39 MILLION/UL (ref 3.88–5.62)
RBC # BLD AUTO: 4.41 MILLION/UL (ref 3.88–5.62)
RBC # BLD AUTO: 4.48 MILLION/UL (ref 3.88–5.62)
RBC # BLD AUTO: 4.54 MILLION/UL (ref 3.88–5.62)
RBC # BLD AUTO: 4.58 MILLION/UL (ref 3.88–5.62)
RBC # BLD AUTO: 4.59 MILLION/UL (ref 3.88–5.62)
RBC # BLD AUTO: 4.61 MILLION/UL (ref 3.88–5.62)
RBC # BLD AUTO: 4.62 MILLION/UL (ref 3.88–5.62)
RBC # BLD AUTO: 4.94 MILLION/UL (ref 3.88–5.62)
RBC # BLD AUTO: 5.06 MILLION/UL (ref 3.88–5.62)
RBC # BLD AUTO: 5.07 MILLION/UL (ref 3.88–5.62)
RBC # BLD AUTO: 5.12 MILLION/UL (ref 3.88–5.62)
RBC # BLD AUTO: 5.13 MILLION/UL (ref 3.88–5.62)
RBC # BLD AUTO: 5.2 MILLION/UL (ref 3.88–5.62)
RBC # BLD AUTO: 5.2 MILLION/UL (ref 3.88–5.62)
RBC # BLD AUTO: 5.24 MILLION/UL (ref 3.88–5.62)
RBC # BLD AUTO: 5.56 MILLION/UL (ref 3.88–5.62)
RBC #/AREA URNS AUTO: ABNORMAL /HPF
RBC MORPH BLD: NORMAL
RIGHT ATRIAL 2D VOLUME: 163 ML
RIGHT ATRIUM AREA SYSTOLE A4C: 36.9 CM2
RIGHT VENTRICLE ID DIMENSION: 5.4 CM
RSV RNA RESP QL NAA+PROBE: NEGATIVE
RSV RNA RESP QL NAA+PROBE: NEGATIVE
RV PSP: 92 MMHG
SARS-COV-2 RNA RESP QL NAA+PROBE: NEGATIVE
SARS-COV-2 RNA RESP QL NAA+PROBE: POSITIVE
SL CV AV DECELERATION TIME RETROGRADE: 748 MS
SL CV AV PEAK GRADIENT RETROGRADE: 81 MMHG
SL CV LEFT ATRIUM LENGTH A2C: 6.3 CM
SL CV LV DIAS VOL ENDO Z SCORE: 8.33
SL CV LV EF: 17
SL CV PED ECHO LEFT VENTRICLE DIASTOLIC VOLUME (MOD BIPLANE) 2D: 200 ML
SL CV PED ECHO LEFT VENTRICLE DIASTOLIC VOLUME (MOD BIPLANE) 2D: 219 ML
SL CV PED ECHO LEFT VENTRICLE SYSTOLIC VOLUME (MOD BIPLANE) 2D: 151 ML
SL CV PED ECHO LEFT VENTRICLE SYSTOLIC VOLUME (MOD BIPLANE) 2D: 164 ML
SODIUM 24H UR-SCNC: 53 MOL/L
SODIUM 24H UR-SCNC: 76 MOL/L
SODIUM SERPL-SCNC: 126 MMOL/L (ref 136–145)
SODIUM SERPL-SCNC: 127 MMOL/L (ref 136–145)
SODIUM SERPL-SCNC: 128 MMOL/L (ref 136–145)
SODIUM SERPL-SCNC: 129 MMOL/L (ref 136–145)
SODIUM SERPL-SCNC: 130 MMOL/L (ref 136–145)
SODIUM SERPL-SCNC: 131 MMOL/L (ref 136–145)
SODIUM SERPL-SCNC: 132 MMOL/L (ref 136–145)
SODIUM SERPL-SCNC: 133 MMOL/L (ref 136–145)
SODIUM SERPL-SCNC: 133 MMOL/L (ref 136–145)
SODIUM SERPL-SCNC: 134 MMOL/L (ref 136–145)
SODIUM SERPL-SCNC: 135 MMOL/L (ref 136–145)
SODIUM SERPL-SCNC: 137 MMOL/L (ref 136–145)
SODIUM SERPL-SCNC: 137 MMOL/L (ref 136–145)
SODIUM SERPL-SCNC: 138 MMOL/L (ref 136–145)
SODIUM SERPL-SCNC: 138 MMOL/L (ref 136–145)
SODIUM SERPL-SCNC: 141 MMOL/L (ref 136–145)
SP GR UR STRIP.AUTO: 1.02 (ref 1–1.03)
SPECIMEN SOURCE: ABNORMAL
SPECIMEN SOURCE: ABNORMAL
T WAVE AXIS: -46 DEGREES
T WAVE AXIS: -55 DEGREES
T WAVE AXIS: 100 DEGREES
T WAVE AXIS: 102 DEGREES
T WAVE AXIS: 107 DEGREES
T WAVE AXIS: 109 DEGREES
T WAVE AXIS: 61 DEGREES
T WAVE AXIS: 71 DEGREES
T WAVE AXIS: 72 DEGREES
T WAVE AXIS: 78 DEGREES
T WAVE AXIS: 83 DEGREES
T WAVE AXIS: 83 DEGREES
T WAVE AXIS: 86 DEGREES
T WAVE AXIS: 88 DEGREES
T WAVE AXIS: 90 DEGREES
T WAVE AXIS: 94 DEGREES
T WAVE AXIS: 96 DEGREES
THC UR QL: NEGATIVE
THC UR QL: POSITIVE
THC UR QL: POSITIVE
TIBC SERPL-MCNC: 317 UG/DL (ref 250–450)
TR MAX PG: 72 MMHG
TR PEAK VELOCITY: 4.3 M/S
TRICUSPID ANNULAR PLANE SYSTOLIC EXCURSION: 1.2 CM
TRICUSPID VALVE PEAK REGURGITATION VELOCITY: 4.25 M/S
TRIGL SERPL-MCNC: 86 MG/DL
TSH SERPL DL<=0.05 MIU/L-ACNC: 0.83 UIU/ML (ref 0.36–3.74)
TSH SERPL DL<=0.05 MIU/L-ACNC: 4.72 UIU/ML (ref 0.36–3.74)
URATE SERPL-MCNC: 6.5 MG/DL (ref 4.2–8)
URATE SERPL-MCNC: 8.1 MG/DL (ref 4.2–8)
UROBILINOGEN UR QL STRIP.AUTO: 0.2 E.U./DL
VENT AC: 16
VENT- AC: AC
VENTRICULAR RATE: 100 BPM
VENTRICULAR RATE: 101 BPM
VENTRICULAR RATE: 102 BPM
VENTRICULAR RATE: 102 BPM
VENTRICULAR RATE: 103 BPM
VENTRICULAR RATE: 103 BPM
VENTRICULAR RATE: 106 BPM
VENTRICULAR RATE: 115 BPM
VENTRICULAR RATE: 61 BPM
VENTRICULAR RATE: 79 BPM
VENTRICULAR RATE: 79 BPM
VENTRICULAR RATE: 80 BPM
VENTRICULAR RATE: 82 BPM
VENTRICULAR RATE: 86 BPM
VENTRICULAR RATE: 90 BPM
VENTRICULAR RATE: 91 BPM
VENTRICULAR RATE: 99 BPM
VT SETTING VENT: 400 ML
WBC # BLD AUTO: 10.16 THOUSAND/UL (ref 4.31–10.16)
WBC # BLD AUTO: 10.22 THOUSAND/UL (ref 4.31–10.16)
WBC # BLD AUTO: 10.4 THOUSAND/UL (ref 4.31–10.16)
WBC # BLD AUTO: 10.48 THOUSAND/UL (ref 4.31–10.16)
WBC # BLD AUTO: 10.55 THOUSAND/UL (ref 4.31–10.16)
WBC # BLD AUTO: 10.81 THOUSAND/UL (ref 4.31–10.16)
WBC # BLD AUTO: 10.88 THOUSAND/UL (ref 4.31–10.16)
WBC # BLD AUTO: 10.89 THOUSAND/UL (ref 4.31–10.16)
WBC # BLD AUTO: 11 THOUSAND/UL (ref 4.31–10.16)
WBC # BLD AUTO: 11.6 THOUSAND/UL (ref 4.31–10.16)
WBC # BLD AUTO: 11.62 THOUSAND/UL (ref 4.31–10.16)
WBC # BLD AUTO: 11.84 THOUSAND/UL (ref 4.31–10.16)
WBC # BLD AUTO: 12.02 THOUSAND/UL (ref 4.31–10.16)
WBC # BLD AUTO: 12.09 THOUSAND/UL (ref 4.31–10.16)
WBC # BLD AUTO: 12.28 THOUSAND/UL (ref 4.31–10.16)
WBC # BLD AUTO: 12.37 THOUSAND/UL (ref 4.31–10.16)
WBC # BLD AUTO: 12.42 THOUSAND/UL (ref 4.31–10.16)
WBC # BLD AUTO: 12.54 THOUSAND/UL (ref 4.31–10.16)
WBC # BLD AUTO: 13.59 THOUSAND/UL (ref 4.31–10.16)
WBC # BLD AUTO: 13.84 THOUSAND/UL (ref 4.31–10.16)
WBC # BLD AUTO: 14.38 THOUSAND/UL (ref 4.31–10.16)
WBC # BLD AUTO: 15.14 THOUSAND/UL (ref 4.31–10.16)
WBC # BLD AUTO: 15.37 THOUSAND/UL (ref 4.31–10.16)
WBC # BLD AUTO: 15.59 THOUSAND/UL (ref 4.31–10.16)
WBC # BLD AUTO: 15.65 THOUSAND/UL (ref 4.31–10.16)
WBC # BLD AUTO: 15.81 THOUSAND/UL (ref 4.31–10.16)
WBC # BLD AUTO: 16.07 THOUSAND/UL (ref 4.31–10.16)
WBC # BLD AUTO: 16.86 THOUSAND/UL (ref 4.31–10.16)
WBC # BLD AUTO: 17.01 THOUSAND/UL (ref 4.31–10.16)
WBC # BLD AUTO: 17.92 THOUSAND/UL (ref 4.31–10.16)
WBC # BLD AUTO: 18.52 THOUSAND/UL (ref 4.31–10.16)
WBC # BLD AUTO: 19.85 THOUSAND/UL (ref 4.31–10.16)
WBC # BLD AUTO: 9.59 THOUSAND/UL (ref 4.31–10.16)
WBC #/AREA URNS AUTO: ABNORMAL /HPF
Z-SCORE OF ASCENDING AORTA: 5.91
Z-SCORE OF INTERVENTRICULAR SEPTUM IN END DIASTOLE: 0.78
Z-SCORE OF LEFT VENTRICULAR DIMENSION IN END DIASTOLE: 2.96
Z-SCORE OF LEFT VENTRICULAR DIMENSION IN END SYSTOLE: 3.23
Z-SCORE OF LEFT VENTRICULAR DIMENSION IN END SYSTOLE: 5.19
Z-SCORE OF LEFT VENTRICULAR POSTERIOR WALL IN END DIASTOLE: -0.92

## 2022-01-01 PROCEDURE — 83735 ASSAY OF MAGNESIUM: CPT | Performed by: INTERNAL MEDICINE

## 2022-01-01 PROCEDURE — 3008F BODY MASS INDEX DOCD: CPT | Performed by: NURSE PRACTITIONER

## 2022-01-01 PROCEDURE — 85025 COMPLETE CBC W/AUTO DIFF WBC: CPT | Performed by: INTERNAL MEDICINE

## 2022-01-01 PROCEDURE — 84300 ASSAY OF URINE SODIUM: CPT | Performed by: INTERNAL MEDICINE

## 2022-01-01 PROCEDURE — 80048 BASIC METABOLIC PNL TOTAL CA: CPT | Performed by: PHYSICIAN ASSISTANT

## 2022-01-01 PROCEDURE — 5A1935Z RESPIRATORY VENTILATION, LESS THAN 24 CONSECUTIVE HOURS: ICD-10-PCS | Performed by: EMERGENCY MEDICINE

## 2022-01-01 PROCEDURE — 94761 N-INVAS EAR/PLS OXIMETRY MLT: CPT

## 2022-01-01 PROCEDURE — 33264 RMVL & RPLCMT DFB GEN MLT LD: CPT | Performed by: INTERNAL MEDICINE

## 2022-01-01 PROCEDURE — 85610 PROTHROMBIN TIME: CPT | Performed by: INTERNAL MEDICINE

## 2022-01-01 PROCEDURE — 80048 BASIC METABOLIC PNL TOTAL CA: CPT | Performed by: INTERNAL MEDICINE

## 2022-01-01 PROCEDURE — C1898 LEAD, PMKR, OTHER THAN TRANS: HCPCS | Performed by: INTERNAL MEDICINE

## 2022-01-01 PROCEDURE — 80307 DRUG TEST PRSMV CHEM ANLYZR: CPT | Performed by: INTERNAL MEDICINE

## 2022-01-01 PROCEDURE — 36415 COLL VENOUS BLD VENIPUNCTURE: CPT | Performed by: PHYSICIAN ASSISTANT

## 2022-01-01 PROCEDURE — 80048 BASIC METABOLIC PNL TOTAL CA: CPT | Performed by: FAMILY MEDICINE

## 2022-01-01 PROCEDURE — 80048 BASIC METABOLIC PNL TOTAL CA: CPT | Performed by: NURSE PRACTITIONER

## 2022-01-01 PROCEDURE — 84443 ASSAY THYROID STIM HORMONE: CPT | Performed by: INTERNAL MEDICINE

## 2022-01-01 PROCEDURE — 0JH609Z INSERTION OF CARDIAC RESYNCHRONIZATION DEFIBRILLATOR PULSE GENERATOR INTO CHEST SUBCUTANEOUS TISSUE AND FASCIA, OPEN APPROACH: ICD-10-PCS | Performed by: INTERNAL MEDICINE

## 2022-01-01 PROCEDURE — 85730 THROMBOPLASTIN TIME PARTIAL: CPT | Performed by: PHYSICIAN ASSISTANT

## 2022-01-01 PROCEDURE — 84145 PROCALCITONIN (PCT): CPT | Performed by: EMERGENCY MEDICINE

## 2022-01-01 PROCEDURE — 94762 N-INVAS EAR/PLS OXIMTRY CONT: CPT

## 2022-01-01 PROCEDURE — NC001 PR NO CHARGE: Performed by: NURSE PRACTITIONER

## 2022-01-01 PROCEDURE — 85027 COMPLETE CBC AUTOMATED: CPT | Performed by: NURSE PRACTITIONER

## 2022-01-01 PROCEDURE — 0BH17EZ INSERTION OF ENDOTRACHEAL AIRWAY INTO TRACHEA, VIA NATURAL OR ARTIFICIAL OPENING: ICD-10-PCS | Performed by: NURSE PRACTITIONER

## 2022-01-01 PROCEDURE — 82805 BLOOD GASES W/O2 SATURATION: CPT | Performed by: PHYSICIAN ASSISTANT

## 2022-01-01 PROCEDURE — 99232 SBSQ HOSP IP/OBS MODERATE 35: CPT | Performed by: PHYSICIAN ASSISTANT

## 2022-01-01 PROCEDURE — 82728 ASSAY OF FERRITIN: CPT | Performed by: NURSE PRACTITIONER

## 2022-01-01 PROCEDURE — 93005 ELECTROCARDIOGRAM TRACING: CPT

## 2022-01-01 PROCEDURE — 82948 REAGENT STRIP/BLOOD GLUCOSE: CPT

## 2022-01-01 PROCEDURE — 96365 THER/PROPH/DIAG IV INF INIT: CPT

## 2022-01-01 PROCEDURE — 83735 ASSAY OF MAGNESIUM: CPT

## 2022-01-01 PROCEDURE — 3074F SYST BP LT 130 MM HG: CPT

## 2022-01-01 PROCEDURE — 97163 PT EVAL HIGH COMPLEX 45 MIN: CPT

## 2022-01-01 PROCEDURE — 93321 DOPPLER ECHO F-UP/LMTD STD: CPT | Performed by: INTERNAL MEDICINE

## 2022-01-01 PROCEDURE — 99223 1ST HOSP IP/OBS HIGH 75: CPT | Performed by: PHYSICIAN ASSISTANT

## 2022-01-01 PROCEDURE — 84484 ASSAY OF TROPONIN QUANT: CPT | Performed by: PHYSICIAN ASSISTANT

## 2022-01-01 PROCEDURE — 83880 ASSAY OF NATRIURETIC PEPTIDE: CPT | Performed by: PHYSICIAN ASSISTANT

## 2022-01-01 PROCEDURE — G1004 CDSM NDSC: HCPCS

## 2022-01-01 PROCEDURE — 82330 ASSAY OF CALCIUM: CPT | Performed by: NURSE PRACTITIONER

## 2022-01-01 PROCEDURE — 71045 X-RAY EXAM CHEST 1 VIEW: CPT

## 2022-01-01 PROCEDURE — C1769 GUIDE WIRE: HCPCS | Performed by: INTERNAL MEDICINE

## 2022-01-01 PROCEDURE — 82533 TOTAL CORTISOL: CPT | Performed by: INTERNAL MEDICINE

## 2022-01-01 PROCEDURE — 99232 SBSQ HOSP IP/OBS MODERATE 35: CPT | Performed by: INTERNAL MEDICINE

## 2022-01-01 PROCEDURE — 80307 DRUG TEST PRSMV CHEM ANLYZR: CPT | Performed by: EMERGENCY MEDICINE

## 2022-01-01 PROCEDURE — 83930 ASSAY OF BLOOD OSMOLALITY: CPT | Performed by: INTERNAL MEDICINE

## 2022-01-01 PROCEDURE — 84145 PROCALCITONIN (PCT): CPT | Performed by: NURSE PRACTITIONER

## 2022-01-01 PROCEDURE — 93325 DOPPLER ECHO COLOR FLOW MAPG: CPT | Performed by: INTERNAL MEDICINE

## 2022-01-01 PROCEDURE — C9113 INJ PANTOPRAZOLE SODIUM, VIA: HCPCS | Performed by: INTERNAL MEDICINE

## 2022-01-01 PROCEDURE — 84145 PROCALCITONIN (PCT): CPT | Performed by: STUDENT IN AN ORGANIZED HEALTH CARE EDUCATION/TRAINING PROGRAM

## 2022-01-01 PROCEDURE — 99285 EMERGENCY DEPT VISIT HI MDM: CPT

## 2022-01-01 PROCEDURE — 74177 CT ABD & PELVIS W/CONTRAST: CPT

## 2022-01-01 PROCEDURE — 85027 COMPLETE CBC AUTOMATED: CPT | Performed by: INTERNAL MEDICINE

## 2022-01-01 PROCEDURE — 80053 COMPREHEN METABOLIC PANEL: CPT | Performed by: INTERNAL MEDICINE

## 2022-01-01 PROCEDURE — 85730 THROMBOPLASTIN TIME PARTIAL: CPT | Performed by: NURSE PRACTITIONER

## 2022-01-01 PROCEDURE — 83550 IRON BINDING TEST: CPT | Performed by: NURSE PRACTITIONER

## 2022-01-01 PROCEDURE — 85379 FIBRIN DEGRADATION QUANT: CPT | Performed by: INTERNAL MEDICINE

## 2022-01-01 PROCEDURE — 94002 VENT MGMT INPAT INIT DAY: CPT

## 2022-01-01 PROCEDURE — 90471 IMMUNIZATION ADMIN: CPT

## 2022-01-01 PROCEDURE — 71046 X-RAY EXAM CHEST 2 VIEWS: CPT

## 2022-01-01 PROCEDURE — 83880 ASSAY OF NATRIURETIC PEPTIDE: CPT | Performed by: INTERNAL MEDICINE

## 2022-01-01 PROCEDURE — 96366 THER/PROPH/DIAG IV INF ADDON: CPT

## 2022-01-01 PROCEDURE — 02HV33Z INSERTION OF INFUSION DEVICE INTO SUPERIOR VENA CAVA, PERCUTANEOUS APPROACH: ICD-10-PCS | Performed by: FAMILY MEDICINE

## 2022-01-01 PROCEDURE — 81001 URINALYSIS AUTO W/SCOPE: CPT

## 2022-01-01 PROCEDURE — 3078F DIAST BP <80 MM HG: CPT

## 2022-01-01 PROCEDURE — 83605 ASSAY OF LACTIC ACID: CPT | Performed by: PHYSICIAN ASSISTANT

## 2022-01-01 PROCEDURE — 83935 ASSAY OF URINE OSMOLALITY: CPT | Performed by: INTERNAL MEDICINE

## 2022-01-01 PROCEDURE — 85025 COMPLETE CBC W/AUTO DIFF WBC: CPT | Performed by: STUDENT IN AN ORGANIZED HEALTH CARE EDUCATION/TRAINING PROGRAM

## 2022-01-01 PROCEDURE — 83605 ASSAY OF LACTIC ACID: CPT | Performed by: EMERGENCY MEDICINE

## 2022-01-01 PROCEDURE — 80053 COMPREHEN METABOLIC PANEL: CPT | Performed by: EMERGENCY MEDICINE

## 2022-01-01 PROCEDURE — 85610 PROTHROMBIN TIME: CPT | Performed by: PHYSICIAN ASSISTANT

## 2022-01-01 PROCEDURE — 99222 1ST HOSP IP/OBS MODERATE 55: CPT | Performed by: FAMILY MEDICINE

## 2022-01-01 PROCEDURE — 87081 CULTURE SCREEN ONLY: CPT | Performed by: INTERNAL MEDICINE

## 2022-01-01 PROCEDURE — 83880 ASSAY OF NATRIURETIC PEPTIDE: CPT

## 2022-01-01 PROCEDURE — 85027 COMPLETE CBC AUTOMATED: CPT

## 2022-01-01 PROCEDURE — 99239 HOSP IP/OBS DSCHRG MGMT >30: CPT | Performed by: PHYSICIAN ASSISTANT

## 2022-01-01 PROCEDURE — 99214 OFFICE O/P EST MOD 30 MIN: CPT | Performed by: INTERNAL MEDICINE

## 2022-01-01 PROCEDURE — 93010 ELECTROCARDIOGRAM REPORT: CPT | Performed by: INTERNAL MEDICINE

## 2022-01-01 PROCEDURE — 99239 HOSP IP/OBS DSCHRG MGMT >30: CPT | Performed by: INTERNAL MEDICINE

## 2022-01-01 PROCEDURE — 3008F BODY MASS INDEX DOCD: CPT

## 2022-01-01 PROCEDURE — 99285 EMERGENCY DEPT VISIT HI MDM: CPT | Performed by: EMERGENCY MEDICINE

## 2022-01-01 PROCEDURE — 87205 SMEAR GRAM STAIN: CPT | Performed by: NURSE PRACTITIONER

## 2022-01-01 PROCEDURE — 99024 POSTOP FOLLOW-UP VISIT: CPT | Performed by: PHYSICIAN ASSISTANT

## 2022-01-01 PROCEDURE — 93925 LOWER EXTREMITY STUDY: CPT | Performed by: SURGERY

## 2022-01-01 PROCEDURE — 93922 UPR/L XTREMITY ART 2 LEVELS: CPT | Performed by: SURGERY

## 2022-01-01 PROCEDURE — 83880 ASSAY OF NATRIURETIC PEPTIDE: CPT | Performed by: EMERGENCY MEDICINE

## 2022-01-01 PROCEDURE — 86140 C-REACTIVE PROTEIN: CPT | Performed by: NURSE PRACTITIONER

## 2022-01-01 PROCEDURE — 83735 ASSAY OF MAGNESIUM: CPT | Performed by: NURSE PRACTITIONER

## 2022-01-01 PROCEDURE — 97167 OT EVAL HIGH COMPLEX 60 MIN: CPT

## 2022-01-01 PROCEDURE — 93306 TTE W/DOPPLER COMPLETE: CPT

## 2022-01-01 PROCEDURE — 94760 N-INVAS EAR/PLS OXIMETRY 1: CPT

## 2022-01-01 PROCEDURE — 94010 BREATHING CAPACITY TEST: CPT

## 2022-01-01 PROCEDURE — 80048 BASIC METABOLIC PNL TOTAL CA: CPT

## 2022-01-01 PROCEDURE — 93308 TTE F-UP OR LMTD: CPT

## 2022-01-01 PROCEDURE — 93308 TTE F-UP OR LMTD: CPT | Performed by: INTERNAL MEDICINE

## 2022-01-01 PROCEDURE — 0BH17EZ INSERTION OF ENDOTRACHEAL AIRWAY INTO TRACHEA, VIA NATURAL OR ARTIFICIAL OPENING: ICD-10-PCS | Performed by: EMERGENCY MEDICINE

## 2022-01-01 PROCEDURE — NC001 PR NO CHARGE: Performed by: FAMILY MEDICINE

## 2022-01-01 PROCEDURE — 92610 EVALUATE SWALLOWING FUNCTION: CPT

## 2022-01-01 PROCEDURE — C1882 AICD, OTHER THAN SING/DUAL: HCPCS | Performed by: INTERNAL MEDICINE

## 2022-01-01 PROCEDURE — 90732 PPSV23 VACC 2 YRS+ SUBQ/IM: CPT

## 2022-01-01 PROCEDURE — 36600 WITHDRAWAL OF ARTERIAL BLOOD: CPT

## 2022-01-01 PROCEDURE — 36415 COLL VENOUS BLD VENIPUNCTURE: CPT | Performed by: EMERGENCY MEDICINE

## 2022-01-01 PROCEDURE — 99232 SBSQ HOSP IP/OBS MODERATE 35: CPT | Performed by: FAMILY MEDICINE

## 2022-01-01 PROCEDURE — 99223 1ST HOSP IP/OBS HIGH 75: CPT | Performed by: INTERNAL MEDICINE

## 2022-01-01 PROCEDURE — 96375 TX/PRO/DX INJ NEW DRUG ADDON: CPT

## 2022-01-01 PROCEDURE — 85027 COMPLETE CBC AUTOMATED: CPT | Performed by: PHYSICIAN ASSISTANT

## 2022-01-01 PROCEDURE — 85007 BL SMEAR W/DIFF WBC COUNT: CPT | Performed by: NURSE PRACTITIONER

## 2022-01-01 PROCEDURE — 84145 PROCALCITONIN (PCT): CPT | Performed by: INTERNAL MEDICINE

## 2022-01-01 PROCEDURE — 85610 PROTHROMBIN TIME: CPT

## 2022-01-01 PROCEDURE — 33241 REMOVE PULSE GENERATOR: CPT | Performed by: INTERNAL MEDICINE

## 2022-01-01 PROCEDURE — C1894 INTRO/SHEATH, NON-LASER: HCPCS | Performed by: INTERNAL MEDICINE

## 2022-01-01 PROCEDURE — 80053 COMPREHEN METABOLIC PANEL: CPT | Performed by: PHYSICIAN ASSISTANT

## 2022-01-01 PROCEDURE — 97166 OT EVAL MOD COMPLEX 45 MIN: CPT

## 2022-01-01 PROCEDURE — 85730 THROMBOPLASTIN TIME PARTIAL: CPT | Performed by: EMERGENCY MEDICINE

## 2022-01-01 PROCEDURE — 33216 INSERT 1 ELECTRODE PM-DEFIB: CPT | Performed by: INTERNAL MEDICINE

## 2022-01-01 PROCEDURE — 83540 ASSAY OF IRON: CPT | Performed by: NURSE PRACTITIONER

## 2022-01-01 PROCEDURE — 85027 COMPLETE CBC AUTOMATED: CPT | Performed by: STUDENT IN AN ORGANIZED HEALTH CARE EDUCATION/TRAINING PROGRAM

## 2022-01-01 PROCEDURE — 85007 BL SMEAR W/DIFF WBC COUNT: CPT | Performed by: INTERNAL MEDICINE

## 2022-01-01 PROCEDURE — 5A12012 PERFORMANCE OF CARDIAC OUTPUT, SINGLE, MANUAL: ICD-10-PCS | Performed by: INTERNAL MEDICINE

## 2022-01-01 PROCEDURE — 84484 ASSAY OF TROPONIN QUANT: CPT | Performed by: EMERGENCY MEDICINE

## 2022-01-01 PROCEDURE — 99285 EMERGENCY DEPT VISIT HI MDM: CPT | Performed by: PHYSICIAN ASSISTANT

## 2022-01-01 PROCEDURE — 76705 ECHO EXAM OF ABDOMEN: CPT

## 2022-01-01 PROCEDURE — 99231 SBSQ HOSP IP/OBS SF/LOW 25: CPT | Performed by: INTERNAL MEDICINE

## 2022-01-01 PROCEDURE — 93925 LOWER EXTREMITY STUDY: CPT

## 2022-01-01 PROCEDURE — 99024 POSTOP FOLLOW-UP VISIT: CPT | Performed by: INTERNAL MEDICINE

## 2022-01-01 PROCEDURE — 85610 PROTHROMBIN TIME: CPT | Performed by: FAMILY MEDICINE

## 2022-01-01 PROCEDURE — 93306 TTE W/DOPPLER COMPLETE: CPT | Performed by: INTERNAL MEDICINE

## 2022-01-01 PROCEDURE — 86140 C-REACTIVE PROTEIN: CPT | Performed by: INTERNAL MEDICINE

## 2022-01-01 PROCEDURE — 85025 COMPLETE CBC W/AUTO DIFF WBC: CPT

## 2022-01-01 PROCEDURE — 85730 THROMBOPLASTIN TIME PARTIAL: CPT | Performed by: FAMILY MEDICINE

## 2022-01-01 PROCEDURE — NC001 PR NO CHARGE: Performed by: INTERNAL MEDICINE

## 2022-01-01 PROCEDURE — 0241U HB NFCT DS VIR RESP RNA 4 TRGT: CPT | Performed by: PHYSICIAN ASSISTANT

## 2022-01-01 PROCEDURE — 0JPT0PZ REMOVAL OF CARDIAC RHYTHM RELATED DEVICE FROM TRUNK SUBCUTANEOUS TISSUE AND FASCIA, OPEN APPROACH: ICD-10-PCS | Performed by: INTERNAL MEDICINE

## 2022-01-01 PROCEDURE — 99215 OFFICE O/P EST HI 40 MIN: CPT | Performed by: NURSE PRACTITIONER

## 2022-01-01 PROCEDURE — 85730 THROMBOPLASTIN TIME PARTIAL: CPT | Performed by: INTERNAL MEDICINE

## 2022-01-01 PROCEDURE — 85610 PROTHROMBIN TIME: CPT | Performed by: STUDENT IN AN ORGANIZED HEALTH CARE EDUCATION/TRAINING PROGRAM

## 2022-01-01 PROCEDURE — 85025 COMPLETE CBC W/AUTO DIFF WBC: CPT | Performed by: EMERGENCY MEDICINE

## 2022-01-01 PROCEDURE — 80048 BASIC METABOLIC PNL TOTAL CA: CPT | Performed by: STUDENT IN AN ORGANIZED HEALTH CARE EDUCATION/TRAINING PROGRAM

## 2022-01-01 PROCEDURE — 36415 COLL VENOUS BLD VENIPUNCTURE: CPT

## 2022-01-01 PROCEDURE — 85027 COMPLETE CBC AUTOMATED: CPT | Performed by: FAMILY MEDICINE

## 2022-01-01 PROCEDURE — 80076 HEPATIC FUNCTION PANEL: CPT | Performed by: NURSE PRACTITIONER

## 2022-01-01 PROCEDURE — 84484 ASSAY OF TROPONIN QUANT: CPT

## 2022-01-01 PROCEDURE — 1036F TOBACCO NON-USER: CPT

## 2022-01-01 PROCEDURE — 99223 1ST HOSP IP/OBS HIGH 75: CPT | Performed by: NURSE PRACTITIONER

## 2022-01-01 PROCEDURE — 99223 1ST HOSP IP/OBS HIGH 75: CPT | Performed by: STUDENT IN AN ORGANIZED HEALTH CARE EDUCATION/TRAINING PROGRAM

## 2022-01-01 PROCEDURE — 99291 CRITICAL CARE FIRST HOUR: CPT

## 2022-01-01 PROCEDURE — 83735 ASSAY OF MAGNESIUM: CPT | Performed by: PHYSICIAN ASSISTANT

## 2022-01-01 PROCEDURE — 87040 BLOOD CULTURE FOR BACTERIA: CPT | Performed by: PHYSICIAN ASSISTANT

## 2022-01-01 PROCEDURE — 99222 1ST HOSP IP/OBS MODERATE 55: CPT | Performed by: INTERNAL MEDICINE

## 2022-01-01 PROCEDURE — 84550 ASSAY OF BLOOD/URIC ACID: CPT | Performed by: INTERNAL MEDICINE

## 2022-01-01 PROCEDURE — 99221 1ST HOSP IP/OBS SF/LOW 40: CPT | Performed by: PODIATRIST

## 2022-01-01 PROCEDURE — 96374 THER/PROPH/DIAG INJ IV PUSH: CPT

## 2022-01-01 PROCEDURE — 80061 LIPID PANEL: CPT | Performed by: NURSE PRACTITIONER

## 2022-01-01 PROCEDURE — 99291 CRITICAL CARE FIRST HOUR: CPT | Performed by: INTERNAL MEDICINE

## 2022-01-01 PROCEDURE — 3008F BODY MASS INDEX DOCD: CPT | Performed by: INTERNAL MEDICINE

## 2022-01-01 PROCEDURE — 80076 HEPATIC FUNCTION PANEL: CPT | Performed by: PHYSICIAN ASSISTANT

## 2022-01-01 PROCEDURE — 94640 AIRWAY INHALATION TREATMENT: CPT

## 2022-01-01 PROCEDURE — 94060 EVALUATION OF WHEEZING: CPT | Performed by: INTERNAL MEDICINE

## 2022-01-01 PROCEDURE — 85610 PROTHROMBIN TIME: CPT | Performed by: EMERGENCY MEDICINE

## 2022-01-01 PROCEDURE — 85610 PROTHROMBIN TIME: CPT | Performed by: NURSE PRACTITIONER

## 2022-01-01 PROCEDURE — 33249 INSJ/RPLCMT DEFIB W/LEAD(S): CPT | Performed by: INTERNAL MEDICINE

## 2022-01-01 PROCEDURE — 31500 INSERT EMERGENCY AIRWAY: CPT | Performed by: EMERGENCY MEDICINE

## 2022-01-01 PROCEDURE — 99232 SBSQ HOSP IP/OBS MODERATE 35: CPT | Performed by: NURSE PRACTITIONER

## 2022-01-01 PROCEDURE — 02H63KZ INSERTION OF DEFIBRILLATOR LEAD INTO RIGHT ATRIUM, PERCUTANEOUS APPROACH: ICD-10-PCS | Performed by: INTERNAL MEDICINE

## 2022-01-01 PROCEDURE — C1751 CATH, INF, PER/CENT/MIDLINE: HCPCS

## 2022-01-01 PROCEDURE — 82805 BLOOD GASES W/O2 SATURATION: CPT | Performed by: INTERNAL MEDICINE

## 2022-01-01 PROCEDURE — 99233 SBSQ HOSP IP/OBS HIGH 50: CPT | Performed by: INTERNAL MEDICINE

## 2022-01-01 PROCEDURE — 31500 INSERT EMERGENCY AIRWAY: CPT | Performed by: NURSE PRACTITIONER

## 2022-01-01 PROCEDURE — 85025 COMPLETE CBC W/AUTO DIFF WBC: CPT | Performed by: PHYSICIAN ASSISTANT

## 2022-01-01 PROCEDURE — 71275 CT ANGIOGRAPHY CHEST: CPT

## 2022-01-01 PROCEDURE — 83605 ASSAY OF LACTIC ACID: CPT | Performed by: INTERNAL MEDICINE

## 2022-01-01 PROCEDURE — 1036F TOBACCO NON-USER: CPT | Performed by: NURSE PRACTITIONER

## 2022-01-01 PROCEDURE — 96376 TX/PRO/DX INJ SAME DRUG ADON: CPT

## 2022-01-01 PROCEDURE — 90682 RIV4 VACC RECOMBINANT DNA IM: CPT

## 2022-01-01 PROCEDURE — 99223 1ST HOSP IP/OBS HIGH 75: CPT

## 2022-01-01 PROCEDURE — NC001 PR NO CHARGE: Performed by: SURGERY

## 2022-01-01 PROCEDURE — 87070 CULTURE OTHR SPECIMN AEROBIC: CPT | Performed by: NURSE PRACTITIONER

## 2022-01-01 PROCEDURE — C1892 INTRO/SHEATH,FIXED,PEEL-AWAY: HCPCS | Performed by: INTERNAL MEDICINE

## 2022-01-01 PROCEDURE — 94726 PLETHYSMOGRAPHY LUNG VOLUMES: CPT | Performed by: INTERNAL MEDICINE

## 2022-01-01 PROCEDURE — 85379 FIBRIN DEGRADATION QUANT: CPT | Performed by: EMERGENCY MEDICINE

## 2022-01-01 PROCEDURE — 85025 COMPLETE CBC W/AUTO DIFF WBC: CPT | Performed by: NURSE PRACTITIONER

## 2022-01-01 PROCEDURE — 71250 CT THORAX DX C-: CPT

## 2022-01-01 PROCEDURE — 94729 DIFFUSING CAPACITY: CPT

## 2022-01-01 PROCEDURE — 85730 THROMBOPLASTIN TIME PARTIAL: CPT

## 2022-01-01 PROCEDURE — 36569 INSJ PICC 5 YR+ W/O IMAGING: CPT

## 2022-01-01 PROCEDURE — 80053 COMPREHEN METABOLIC PANEL: CPT | Performed by: NURSE PRACTITIONER

## 2022-01-01 PROCEDURE — 99239 HOSP IP/OBS DSCHRG MGMT >30: CPT | Performed by: STUDENT IN AN ORGANIZED HEALTH CARE EDUCATION/TRAINING PROGRAM

## 2022-01-01 PROCEDURE — 94726 PLETHYSMOGRAPHY LUNG VOLUMES: CPT

## 2022-01-01 PROCEDURE — 99214 OFFICE O/P EST MOD 30 MIN: CPT

## 2022-01-01 PROCEDURE — 84145 PROCALCITONIN (PCT): CPT | Performed by: PHYSICIAN ASSISTANT

## 2022-01-01 PROCEDURE — 85007 BL SMEAR W/DIFF WBC COUNT: CPT | Performed by: STUDENT IN AN ORGANIZED HEALTH CARE EDUCATION/TRAINING PROGRAM

## 2022-01-01 PROCEDURE — 99232 SBSQ HOSP IP/OBS MODERATE 35: CPT | Performed by: STUDENT IN AN ORGANIZED HEALTH CARE EDUCATION/TRAINING PROGRAM

## 2022-01-01 PROCEDURE — 85049 AUTOMATED PLATELET COUNT: CPT | Performed by: NURSE PRACTITIONER

## 2022-01-01 PROCEDURE — 90715 TDAP VACCINE 7 YRS/> IM: CPT

## 2022-01-01 PROCEDURE — G0009 ADMIN PNEUMOCOCCAL VACCINE: HCPCS

## 2022-01-01 PROCEDURE — 94729 DIFFUSING CAPACITY: CPT | Performed by: INTERNAL MEDICINE

## 2022-01-01 PROCEDURE — 84132 ASSAY OF SERUM POTASSIUM: CPT | Performed by: INTERNAL MEDICINE

## 2022-01-01 PROCEDURE — 94003 VENT MGMT INPAT SUBQ DAY: CPT

## 2022-01-01 PROCEDURE — 99238 HOSP IP/OBS DSCHRG MGMT 30/<: CPT | Performed by: FAMILY MEDICINE

## 2022-01-01 PROCEDURE — 1036F TOBACCO NON-USER: CPT | Performed by: INTERNAL MEDICINE

## 2022-01-01 PROCEDURE — G0008 ADMIN INFLUENZA VIRUS VAC: HCPCS

## 2022-01-01 PROCEDURE — 82805 BLOOD GASES W/O2 SATURATION: CPT | Performed by: EMERGENCY MEDICINE

## 2022-01-01 PROCEDURE — 82550 ASSAY OF CK (CPK): CPT | Performed by: NURSE PRACTITIONER

## 2022-01-01 PROCEDURE — 99239 HOSP IP/OBS DSCHRG MGMT >30: CPT | Performed by: FAMILY MEDICINE

## 2022-01-01 PROCEDURE — 85379 FIBRIN DEGRADATION QUANT: CPT | Performed by: NURSE PRACTITIONER

## 2022-01-01 PROCEDURE — 3E0102A INTRODUCTION OF ANTI-INFECTIVE ENVELOPE INTO SUBCUTANEOUS TISSUE, OPEN APPROACH: ICD-10-PCS | Performed by: INTERNAL MEDICINE

## 2022-01-01 PROCEDURE — 93923 UPR/LXTR ART STDY 3+ LVLS: CPT

## 2022-01-01 PROCEDURE — 99291 CRITICAL CARE FIRST HOUR: CPT | Performed by: EMERGENCY MEDICINE

## 2022-01-01 PROCEDURE — 87040 BLOOD CULTURE FOR BACTERIA: CPT | Performed by: NURSE PRACTITIONER

## 2022-01-01 PROCEDURE — 85007 BL SMEAR W/DIFF WBC COUNT: CPT | Performed by: PHYSICIAN ASSISTANT

## 2022-01-01 PROCEDURE — 0241U HB NFCT DS VIR RESP RNA 4 TRGT: CPT | Performed by: EMERGENCY MEDICINE

## 2022-01-01 PROCEDURE — 99232 SBSQ HOSP IP/OBS MODERATE 35: CPT

## 2022-01-01 DEVICE — ENVELOPE CMRM6133 ABSORB LRG MR
Type: IMPLANTABLE DEVICE | Site: CHEST | Status: FUNCTIONAL
Brand: TYRX™

## 2022-01-01 DEVICE — LEAD 5076-52 MRI US RCMCRD
Type: IMPLANTABLE DEVICE | Site: CHEST | Status: FUNCTIONAL
Brand: CAPSUREFIX NOVUS MRI™ SURESCAN®

## 2022-01-01 DEVICE — CRTD DTPA2QQ COBALT XT HF QUAD MRI DF4
Type: IMPLANTABLE DEVICE | Site: CHEST | Status: FUNCTIONAL
Brand: COBALT™ XT HF QUAD CRT-D MRI SURESCAN™

## 2022-01-01 RX ORDER — ASPIRIN 81 MG/1
81 TABLET ORAL DAILY
Status: DISCONTINUED | OUTPATIENT
Start: 2022-01-01 | End: 2022-01-01 | Stop reason: HOSPADM

## 2022-01-01 RX ORDER — LISINOPRIL 10 MG/1
10 TABLET ORAL
Qty: 30 TABLET | Refills: 3 | Status: SHIPPED | OUTPATIENT
Start: 2022-01-01 | End: 2022-01-01 | Stop reason: HOSPADM

## 2022-01-01 RX ORDER — HEPARIN SODIUM 10000 [USP'U]/100ML
3-30 INJECTION, SOLUTION INTRAVENOUS
Status: DISCONTINUED | OUTPATIENT
Start: 2022-01-01 | End: 2022-01-01

## 2022-01-01 RX ORDER — CALCIUM CARBONATE 200(500)MG
500 TABLET,CHEWABLE ORAL DAILY
Status: DISCONTINUED | OUTPATIENT
Start: 2022-01-01 | End: 2022-01-01 | Stop reason: HOSPADM

## 2022-01-01 RX ORDER — ETOMIDATE 2 MG/ML
20 INJECTION INTRAVENOUS ONCE
Status: COMPLETED | OUTPATIENT
Start: 2022-01-01 | End: 2022-01-01

## 2022-01-01 RX ORDER — ONDANSETRON 2 MG/ML
4 INJECTION INTRAMUSCULAR; INTRAVENOUS EVERY 6 HOURS PRN
Status: DISCONTINUED | OUTPATIENT
Start: 2022-01-01 | End: 2022-01-01 | Stop reason: HOSPADM

## 2022-01-01 RX ORDER — ASPIRIN 81 MG/1
81 TABLET, CHEWABLE ORAL DAILY
Status: DISCONTINUED | OUTPATIENT
Start: 2022-01-01 | End: 2022-01-01 | Stop reason: HOSPADM

## 2022-01-01 RX ORDER — POTASSIUM CHLORIDE 20 MEQ/1
20 TABLET, EXTENDED RELEASE ORAL ONCE
Status: COMPLETED | OUTPATIENT
Start: 2022-01-01 | End: 2022-01-01

## 2022-01-01 RX ORDER — ALBUMIN (HUMAN) 12.5 G/50ML
50 SOLUTION INTRAVENOUS ONCE
Status: COMPLETED | OUTPATIENT
Start: 2022-01-01 | End: 2022-01-01

## 2022-01-01 RX ORDER — POTASSIUM CHLORIDE 20 MEQ/1
40 TABLET, EXTENDED RELEASE ORAL ONCE
Status: COMPLETED | OUTPATIENT
Start: 2022-01-01 | End: 2022-01-01

## 2022-01-01 RX ORDER — FENTANYL CITRATE/PF 50 MCG/ML
25 SYRINGE (ML) INJECTION
Status: CANCELLED | OUTPATIENT
Start: 2022-01-01

## 2022-01-01 RX ORDER — LANOLIN ALCOHOL/MO/W.PET/CERES
3 CREAM (GRAM) TOPICAL
Status: DISCONTINUED | OUTPATIENT
Start: 2022-01-01 | End: 2022-01-01 | Stop reason: HOSPADM

## 2022-01-01 RX ORDER — HEPARIN SODIUM 5000 [USP'U]/ML
7500 INJECTION, SOLUTION INTRAVENOUS; SUBCUTANEOUS EVERY 8 HOURS SCHEDULED
Status: DISCONTINUED | OUTPATIENT
Start: 2022-01-01 | End: 2022-01-01

## 2022-01-01 RX ORDER — FUROSEMIDE 10 MG/ML
60 INJECTION INTRAMUSCULAR; INTRAVENOUS 2 TIMES DAILY
Status: DISCONTINUED | OUTPATIENT
Start: 2022-01-01 | End: 2022-01-01

## 2022-01-01 RX ORDER — ATORVASTATIN CALCIUM 80 MG/1
80 TABLET, FILM COATED ORAL DAILY
Status: CANCELLED | OUTPATIENT
Start: 2022-01-01

## 2022-01-01 RX ORDER — DEXAMETHASONE SODIUM PHOSPHATE 4 MG/ML
0.1 INJECTION, SOLUTION INTRA-ARTICULAR; INTRALESIONAL; INTRAMUSCULAR; INTRAVENOUS; SOFT TISSUE EVERY 12 HOURS
Status: DISCONTINUED | OUTPATIENT
Start: 2022-01-01 | End: 2022-01-01

## 2022-01-01 RX ORDER — WARFARIN SODIUM 3 MG/1
3 TABLET ORAL
Status: DISCONTINUED | OUTPATIENT
Start: 2022-01-01 | End: 2022-01-01 | Stop reason: HOSPADM

## 2022-01-01 RX ORDER — DEXAMETHASONE SODIUM PHOSPHATE 4 MG/ML
6 INJECTION, SOLUTION INTRA-ARTICULAR; INTRALESIONAL; INTRAMUSCULAR; INTRAVENOUS; SOFT TISSUE EVERY 24 HOURS
Status: DISCONTINUED | OUTPATIENT
Start: 2022-01-01 | End: 2022-01-01 | Stop reason: HOSPADM

## 2022-01-01 RX ORDER — FUROSEMIDE 40 MG/1
40 TABLET ORAL
Status: DISCONTINUED | OUTPATIENT
Start: 2022-01-01 | End: 2022-01-01

## 2022-01-01 RX ORDER — WARFARIN SODIUM 2.5 MG/1
2.5 TABLET ORAL
Status: DISCONTINUED | OUTPATIENT
Start: 2022-01-01 | End: 2022-01-01 | Stop reason: HOSPADM

## 2022-01-01 RX ORDER — FLUTICASONE FUROATE AND VILANTEROL 100; 25 UG/1; UG/1
1 POWDER RESPIRATORY (INHALATION) DAILY
Status: DISCONTINUED | OUTPATIENT
Start: 2022-01-01 | End: 2022-01-01 | Stop reason: HOSPADM

## 2022-01-01 RX ORDER — LORAZEPAM 2 MG/ML
2 INJECTION INTRAMUSCULAR ONCE
Status: COMPLETED | OUTPATIENT
Start: 2022-01-01 | End: 2022-01-01

## 2022-01-01 RX ORDER — LISINOPRIL 20 MG/1
40 TABLET ORAL
Status: DISCONTINUED | OUTPATIENT
Start: 2022-01-01 | End: 2022-01-01

## 2022-01-01 RX ORDER — LEVALBUTEROL 1.25 MG/.5ML
1.25 SOLUTION, CONCENTRATE RESPIRATORY (INHALATION)
Status: DISCONTINUED | OUTPATIENT
Start: 2022-01-01 | End: 2022-01-01 | Stop reason: HOSPADM

## 2022-01-01 RX ORDER — WARFARIN SODIUM 4 MG/1
4 TABLET ORAL
Status: DISCONTINUED | OUTPATIENT
Start: 2022-01-01 | End: 2022-01-01

## 2022-01-01 RX ORDER — WARFARIN SODIUM 5 MG/1
TABLET ORAL
Start: 2022-01-01 | End: 2022-01-01 | Stop reason: HOSPADM

## 2022-01-01 RX ORDER — HEPARIN SODIUM 1000 [USP'U]/ML
4400 INJECTION, SOLUTION INTRAVENOUS; SUBCUTANEOUS ONCE
Status: COMPLETED | OUTPATIENT
Start: 2022-01-01 | End: 2022-01-01

## 2022-01-01 RX ORDER — EPLERENONE 25 MG/1
25 TABLET, FILM COATED ORAL DAILY
Status: DISCONTINUED | OUTPATIENT
Start: 2022-01-01 | End: 2022-01-01 | Stop reason: HOSPADM

## 2022-01-01 RX ORDER — POLYETHYLENE GLYCOL 3350 17 G/17G
17 POWDER, FOR SOLUTION ORAL DAILY PRN
Status: DISCONTINUED | OUTPATIENT
Start: 2022-01-01 | End: 2022-01-01 | Stop reason: HOSPADM

## 2022-01-01 RX ORDER — POTASSIUM CHLORIDE 20 MEQ/1
40 TABLET, EXTENDED RELEASE ORAL EVERY 4 HOURS
Status: DISCONTINUED | OUTPATIENT
Start: 2022-01-01 | End: 2022-01-01

## 2022-01-01 RX ORDER — LISINOPRIL 10 MG/1
10 TABLET ORAL DAILY
Status: DISCONTINUED | OUTPATIENT
Start: 2022-01-01 | End: 2022-01-01 | Stop reason: HOSPADM

## 2022-01-01 RX ORDER — METOPROLOL SUCCINATE 50 MG/1
50 TABLET, EXTENDED RELEASE ORAL EVERY 12 HOURS
Status: DISCONTINUED | OUTPATIENT
Start: 2022-01-01 | End: 2022-01-01

## 2022-01-01 RX ORDER — MAGNESIUM HYDROXIDE/ALUMINUM HYDROXICE/SIMETHICONE 120; 1200; 1200 MG/30ML; MG/30ML; MG/30ML
30 SUSPENSION ORAL EVERY 6 HOURS PRN
Status: DISCONTINUED | OUTPATIENT
Start: 2022-01-01 | End: 2022-01-01 | Stop reason: HOSPADM

## 2022-01-01 RX ORDER — WARFARIN SODIUM 1 MG/1
3 TABLET ORAL
Status: COMPLETED | OUTPATIENT
Start: 2022-01-01 | End: 2022-01-01

## 2022-01-01 RX ORDER — LISINOPRIL 40 MG/1
TABLET ORAL
Qty: 30 TABLET | Refills: 11 | Status: SHIPPED | OUTPATIENT
Start: 2022-01-01 | End: 2022-01-01 | Stop reason: HOSPADM

## 2022-01-01 RX ORDER — FUROSEMIDE 10 MG/ML
10 SYRINGE (ML) INJECTION CONTINUOUS
Status: DISCONTINUED | OUTPATIENT
Start: 2022-01-01 | End: 2022-01-01

## 2022-01-01 RX ORDER — SUCCINYLCHOLINE/SOD CL,ISO/PF 100 MG/5ML
100 SYRINGE (ML) INTRAVENOUS ONCE
Status: COMPLETED | OUTPATIENT
Start: 2022-01-01 | End: 2022-01-01

## 2022-01-01 RX ORDER — WARFARIN SODIUM 2.5 MG/1
2.5 TABLET ORAL
Status: DISCONTINUED | OUTPATIENT
Start: 2022-01-01 | End: 2022-01-01

## 2022-01-01 RX ORDER — AMIODARONE HYDROCHLORIDE 100 MG/1
100 TABLET ORAL
Status: DISCONTINUED | OUTPATIENT
Start: 2022-01-01 | End: 2022-01-01 | Stop reason: HOSPADM

## 2022-01-01 RX ORDER — METOPROLOL SUCCINATE 25 MG/1
75 TABLET, EXTENDED RELEASE ORAL DAILY
Qty: 90 TABLET | Refills: 0 | Status: SHIPPED | OUTPATIENT
Start: 2022-01-01 | End: 2022-01-01 | Stop reason: HOSPADM

## 2022-01-01 RX ORDER — ATORVASTATIN CALCIUM 80 MG/1
80 TABLET, FILM COATED ORAL
Status: DISCONTINUED | OUTPATIENT
Start: 2022-01-01 | End: 2022-01-01 | Stop reason: HOSPADM

## 2022-01-01 RX ORDER — PREDNISONE 20 MG/1
40 TABLET ORAL DAILY
Status: DISCONTINUED | OUTPATIENT
Start: 2022-01-01 | End: 2022-01-01

## 2022-01-01 RX ORDER — LANOLIN ALCOHOL/MO/W.PET/CERES
6 CREAM (GRAM) TOPICAL
Status: DISCONTINUED | OUTPATIENT
Start: 2022-01-01 | End: 2022-01-01 | Stop reason: HOSPADM

## 2022-01-01 RX ORDER — POTASSIUM CHLORIDE 750 MG/1
20 CAPSULE, EXTENDED RELEASE ORAL DAILY
Qty: 60 CAPSULE | Refills: 0 | Status: SHIPPED | OUTPATIENT
Start: 2022-01-01 | End: 2022-01-01 | Stop reason: HOSPADM

## 2022-01-01 RX ORDER — AMIODARONE HYDROCHLORIDE 100 MG/1
100 TABLET ORAL
Qty: 30 TABLET | Refills: 0 | Status: SHIPPED | OUTPATIENT
Start: 2022-01-01 | End: 2022-01-01 | Stop reason: HOSPADM

## 2022-01-01 RX ORDER — METOPROLOL SUCCINATE 25 MG/1
75 TABLET, EXTENDED RELEASE ORAL EVERY 12 HOURS
Qty: 180 TABLET | Refills: 3 | Status: SHIPPED | OUTPATIENT
Start: 2022-01-01 | End: 2022-01-01 | Stop reason: HOSPADM

## 2022-01-01 RX ORDER — HEPARIN SODIUM 1000 [USP'U]/ML
3600 INJECTION, SOLUTION INTRAVENOUS; SUBCUTANEOUS
Status: DISCONTINUED | OUTPATIENT
Start: 2022-01-01 | End: 2022-01-01

## 2022-01-01 RX ORDER — SODIUM CHLORIDE 9 MG/ML
75 INJECTION, SOLUTION INTRAVENOUS ONCE
Status: COMPLETED | OUTPATIENT
Start: 2022-01-01 | End: 2022-01-01

## 2022-01-01 RX ORDER — HEPARIN SODIUM 1000 [USP'U]/ML
3600 INJECTION, SOLUTION INTRAVENOUS; SUBCUTANEOUS ONCE
Status: DISCONTINUED | OUTPATIENT
Start: 2022-01-01 | End: 2022-01-01

## 2022-01-01 RX ORDER — FUROSEMIDE 10 MG/ML
60 INJECTION INTRAMUSCULAR; INTRAVENOUS
Status: DISCONTINUED | OUTPATIENT
Start: 2022-01-01 | End: 2022-01-01

## 2022-01-01 RX ORDER — ACETAMINOPHEN 325 MG/1
650 TABLET ORAL EVERY 4 HOURS PRN
Status: CANCELLED | OUTPATIENT
Start: 2022-01-01

## 2022-01-01 RX ORDER — ATORVASTATIN CALCIUM 80 MG/1
80 TABLET, FILM COATED ORAL DAILY
Status: DISCONTINUED | OUTPATIENT
Start: 2022-01-01 | End: 2022-01-01 | Stop reason: HOSPADM

## 2022-01-01 RX ORDER — FUROSEMIDE 10 MG/ML
40 INJECTION INTRAMUSCULAR; INTRAVENOUS
Status: DISCONTINUED | OUTPATIENT
Start: 2022-01-01 | End: 2022-01-01

## 2022-01-01 RX ORDER — AMIODARONE HYDROCHLORIDE 200 MG/1
200 TABLET ORAL
Status: DISCONTINUED | OUTPATIENT
Start: 2022-01-01 | End: 2022-01-01

## 2022-01-01 RX ORDER — WARFARIN SODIUM 2.5 MG/1
2.5 TABLET ORAL
Qty: 30 TABLET | Refills: 0 | Status: SHIPPED | OUTPATIENT
Start: 2022-01-01 | End: 2022-01-01 | Stop reason: HOSPADM

## 2022-01-01 RX ORDER — ACETAMINOPHEN 325 MG/1
650 TABLET ORAL EVERY 4 HOURS PRN
Status: DISCONTINUED | OUTPATIENT
Start: 2022-01-01 | End: 2022-01-01 | Stop reason: HOSPADM

## 2022-01-01 RX ORDER — EPLERENONE 25 MG/1
TABLET, FILM COATED ORAL
Qty: 30 TABLET | Refills: 3 | Status: SHIPPED | OUTPATIENT
Start: 2022-01-01 | End: 2022-01-01 | Stop reason: HOSPADM

## 2022-01-01 RX ORDER — PROPOFOL 10 MG/ML
INJECTION, EMULSION INTRAVENOUS CONTINUOUS PRN
Status: DISCONTINUED | OUTPATIENT
Start: 2022-01-01 | End: 2022-01-01

## 2022-01-01 RX ORDER — AMIODARONE HYDROCHLORIDE 200 MG/1
200 TABLET ORAL
Status: DISCONTINUED | OUTPATIENT
Start: 2022-01-01 | End: 2022-01-01 | Stop reason: HOSPADM

## 2022-01-01 RX ORDER — HEPARIN SODIUM 5000 [USP'U]/ML
5000 INJECTION, SOLUTION INTRAVENOUS; SUBCUTANEOUS EVERY 8 HOURS SCHEDULED
Status: DISCONTINUED | OUTPATIENT
Start: 2022-01-01 | End: 2022-01-01

## 2022-01-01 RX ORDER — FUROSEMIDE 20 MG/1
20 TABLET ORAL
Status: DISCONTINUED | OUTPATIENT
Start: 2022-01-01 | End: 2022-01-01 | Stop reason: HOSPADM

## 2022-01-01 RX ORDER — WARFARIN SODIUM 6 MG/1
6 TABLET ORAL
Status: DISCONTINUED | OUTPATIENT
Start: 2022-01-01 | End: 2022-01-01 | Stop reason: HOSPADM

## 2022-01-01 RX ORDER — METOPROLOL TARTRATE 5 MG/5ML
2.5 INJECTION INTRAVENOUS ONCE
Status: COMPLETED | OUTPATIENT
Start: 2022-01-01 | End: 2022-01-01

## 2022-01-01 RX ORDER — BENZONATATE 100 MG/1
100 CAPSULE ORAL 2 TIMES DAILY
Status: DISCONTINUED | OUTPATIENT
Start: 2022-01-01 | End: 2022-01-01

## 2022-01-01 RX ORDER — AMIODARONE HYDROCHLORIDE 100 MG/1
100 TABLET ORAL
Status: DISCONTINUED | OUTPATIENT
Start: 2022-01-01 | End: 2022-01-01

## 2022-01-01 RX ORDER — FUROSEMIDE 10 MG/ML
40 INJECTION INTRAMUSCULAR; INTRAVENOUS ONCE
Status: COMPLETED | OUTPATIENT
Start: 2022-01-01 | End: 2022-01-01

## 2022-01-01 RX ORDER — LISINOPRIL 10 MG/1
10 TABLET ORAL DAILY
Status: DISCONTINUED | OUTPATIENT
Start: 2022-01-01 | End: 2022-01-01

## 2022-01-01 RX ORDER — VANCOMYCIN HYDROCHLORIDE 1 G/200ML
INJECTION, SOLUTION INTRAVENOUS AS NEEDED
Status: DISCONTINUED | OUTPATIENT
Start: 2022-01-01 | End: 2022-01-01

## 2022-01-01 RX ORDER — METOPROLOL SUCCINATE 25 MG/1
25 TABLET, EXTENDED RELEASE ORAL ONCE
Status: COMPLETED | OUTPATIENT
Start: 2022-01-01 | End: 2022-01-01

## 2022-01-01 RX ORDER — POTASSIUM CHLORIDE 14.9 MG/ML
20 INJECTION INTRAVENOUS
Status: DISCONTINUED | OUTPATIENT
Start: 2022-01-01 | End: 2022-01-01

## 2022-01-01 RX ORDER — METOPROLOL TARTRATE 50 MG/1
50 TABLET, FILM COATED ORAL EVERY 12 HOURS SCHEDULED
Status: DISCONTINUED | OUTPATIENT
Start: 2022-01-01 | End: 2022-01-01 | Stop reason: HOSPADM

## 2022-01-01 RX ORDER — ACETAMINOPHEN 325 MG/1
650 TABLET ORAL EVERY 6 HOURS PRN
Status: DISCONTINUED | OUTPATIENT
Start: 2022-01-01 | End: 2022-01-01 | Stop reason: HOSPADM

## 2022-01-01 RX ORDER — ALBUTEROL SULFATE 2.5 MG/3ML
2.5 SOLUTION RESPIRATORY (INHALATION) ONCE AS NEEDED
Status: COMPLETED | OUTPATIENT
Start: 2022-01-01 | End: 2022-01-01

## 2022-01-01 RX ORDER — ADENOSINE 3 MG/ML
1 INJECTION, SOLUTION INTRAVENOUS ONCE
Status: COMPLETED | OUTPATIENT
Start: 2022-01-01 | End: 2022-01-01

## 2022-01-01 RX ORDER — DEXAMETHASONE SODIUM PHOSPHATE 4 MG/ML
6 INJECTION, SOLUTION INTRA-ARTICULAR; INTRALESIONAL; INTRAMUSCULAR; INTRAVENOUS; SOFT TISSUE DAILY
Status: DISCONTINUED | OUTPATIENT
Start: 2022-01-01 | End: 2022-01-01 | Stop reason: HOSPADM

## 2022-01-01 RX ORDER — DILTIAZEM HYDROCHLORIDE 5 MG/ML
15 INJECTION INTRAVENOUS ONCE
Status: COMPLETED | OUTPATIENT
Start: 2022-01-01 | End: 2022-01-01

## 2022-01-01 RX ORDER — WARFARIN SODIUM 2 MG/1
2 TABLET ORAL
Status: DISCONTINUED | OUTPATIENT
Start: 2022-01-01 | End: 2022-01-01

## 2022-01-01 RX ORDER — FUROSEMIDE 20 MG/1
20 TABLET ORAL 2 TIMES DAILY
Qty: 60 TABLET | Refills: 0 | Status: SHIPPED | OUTPATIENT
Start: 2022-01-01 | End: 2022-01-01 | Stop reason: SDUPTHER

## 2022-01-01 RX ORDER — EPHEDRINE SULFATE 50 MG/ML
INJECTION INTRAVENOUS AS NEEDED
Status: DISCONTINUED | OUTPATIENT
Start: 2022-01-01 | End: 2022-01-01

## 2022-01-01 RX ORDER — MAGNESIUM HYDROXIDE/ALUMINUM HYDROXICE/SIMETHICONE 120; 1200; 1200 MG/30ML; MG/30ML; MG/30ML
30 SUSPENSION ORAL ONCE
Status: COMPLETED | OUTPATIENT
Start: 2022-01-01 | End: 2022-01-01

## 2022-01-01 RX ORDER — MAGNESIUM HYDROXIDE/ALUMINUM HYDROXICE/SIMETHICONE 120; 1200; 1200 MG/30ML; MG/30ML; MG/30ML
30 SUSPENSION ORAL EVERY 4 HOURS PRN
Status: DISCONTINUED | OUTPATIENT
Start: 2022-01-01 | End: 2022-01-01 | Stop reason: HOSPADM

## 2022-01-01 RX ORDER — LEVALBUTEROL 1.25 MG/.5ML
1.25 SOLUTION, CONCENTRATE RESPIRATORY (INHALATION)
Status: DISCONTINUED | OUTPATIENT
Start: 2022-01-01 | End: 2022-01-01

## 2022-01-01 RX ORDER — HEPARIN SODIUM 1000 [USP'U]/ML
1800 INJECTION, SOLUTION INTRAVENOUS; SUBCUTANEOUS
Status: DISCONTINUED | OUTPATIENT
Start: 2022-01-01 | End: 2022-01-01

## 2022-01-01 RX ORDER — POTASSIUM CHLORIDE 20MEQ/15ML
40 LIQUID (ML) ORAL ONCE
Status: COMPLETED | OUTPATIENT
Start: 2022-01-01 | End: 2022-01-01

## 2022-01-01 RX ORDER — FUROSEMIDE 40 MG/1
40 TABLET ORAL
Status: DISCONTINUED | OUTPATIENT
Start: 2022-01-01 | End: 2022-01-01 | Stop reason: HOSPADM

## 2022-01-01 RX ORDER — CALCIUM CARBONATE 200(500)MG
1000 TABLET,CHEWABLE ORAL DAILY PRN
Status: DISCONTINUED | OUTPATIENT
Start: 2022-01-01 | End: 2022-01-01

## 2022-01-01 RX ORDER — METOPROLOL TARTRATE 5 MG/5ML
2.5 INJECTION INTRAVENOUS EVERY 6 HOURS
Status: DISCONTINUED | OUTPATIENT
Start: 2022-01-01 | End: 2022-01-01

## 2022-01-01 RX ORDER — POTASSIUM CHLORIDE 20 MEQ/1
40 TABLET, EXTENDED RELEASE ORAL 2 TIMES DAILY
Status: DISCONTINUED | OUTPATIENT
Start: 2022-01-01 | End: 2022-01-01 | Stop reason: HOSPADM

## 2022-01-01 RX ORDER — FUROSEMIDE 10 MG/ML
40 INJECTION INTRAMUSCULAR; INTRAVENOUS 2 TIMES DAILY
Status: DISCONTINUED | OUTPATIENT
Start: 2022-01-01 | End: 2022-01-01

## 2022-01-01 RX ORDER — FUROSEMIDE 20 MG/1
20 TABLET ORAL 2 TIMES DAILY
Qty: 60 TABLET | Refills: 3 | Status: SHIPPED | OUTPATIENT
Start: 2022-01-01 | End: 2022-01-01 | Stop reason: HOSPADM

## 2022-01-01 RX ORDER — ALBUTEROL SULFATE 2.5 MG/3ML
2.5 SOLUTION RESPIRATORY (INHALATION) EVERY 4 HOURS PRN
Status: DISCONTINUED | OUTPATIENT
Start: 2022-01-01 | End: 2022-01-01 | Stop reason: HOSPADM

## 2022-01-01 RX ORDER — FUROSEMIDE 10 MG/ML
20 INJECTION INTRAMUSCULAR; INTRAVENOUS ONCE
Status: COMPLETED | OUTPATIENT
Start: 2022-01-01 | End: 2022-01-01

## 2022-01-01 RX ORDER — SIMETHICONE 80 MG
80 TABLET,CHEWABLE ORAL 4 TIMES DAILY PRN
Status: DISCONTINUED | OUTPATIENT
Start: 2022-01-01 | End: 2022-01-01

## 2022-01-01 RX ORDER — MAGNESIUM SULFATE HEPTAHYDRATE 40 MG/ML
2 INJECTION, SOLUTION INTRAVENOUS ONCE
Status: COMPLETED | OUTPATIENT
Start: 2022-01-01 | End: 2022-01-01

## 2022-01-01 RX ORDER — MAGNESIUM HYDROXIDE/ALUMINUM HYDROXICE/SIMETHICONE 120; 1200; 1200 MG/30ML; MG/30ML; MG/30ML
30 SUSPENSION ORAL EVERY 6 HOURS PRN
Status: CANCELLED | OUTPATIENT
Start: 2022-01-01

## 2022-01-01 RX ORDER — LORAZEPAM 2 MG/ML
1 INJECTION INTRAMUSCULAR ONCE
Status: COMPLETED | OUTPATIENT
Start: 2022-01-01 | End: 2022-01-01

## 2022-01-01 RX ORDER — AMIODARONE HYDROCHLORIDE 200 MG/1
200 TABLET ORAL
Status: CANCELLED | OUTPATIENT
Start: 2022-01-01

## 2022-01-01 RX ORDER — DOXYCYCLINE HYCLATE 100 MG/1
100 CAPSULE ORAL EVERY 12 HOURS
Status: DISCONTINUED | OUTPATIENT
Start: 2022-01-01 | End: 2022-01-01

## 2022-01-01 RX ORDER — LISINOPRIL 20 MG/1
20 TABLET ORAL
Status: DISCONTINUED | OUTPATIENT
Start: 2022-01-01 | End: 2022-01-01 | Stop reason: HOSPADM

## 2022-01-01 RX ORDER — WARFARIN SODIUM 3 MG/1
3 TABLET ORAL
Status: CANCELLED | OUTPATIENT
Start: 2022-01-01

## 2022-01-01 RX ORDER — AMIODARONE HYDROCHLORIDE 200 MG/1
200 TABLET ORAL
Qty: 30 TABLET | Refills: 0 | Status: SHIPPED | OUTPATIENT
Start: 2022-01-01 | End: 2022-01-01 | Stop reason: HOSPADM

## 2022-01-01 RX ORDER — LEVALBUTEROL 1.25 MG/.5ML
1.25 SOLUTION, CONCENTRATE RESPIRATORY (INHALATION) EVERY 8 HOURS PRN
Status: DISCONTINUED | OUTPATIENT
Start: 2022-01-01 | End: 2022-01-01 | Stop reason: HOSPADM

## 2022-01-01 RX ORDER — AMIODARONE HYDROCHLORIDE 100 MG/1
100 TABLET ORAL ONCE
Status: COMPLETED | OUTPATIENT
Start: 2022-01-01 | End: 2022-01-01

## 2022-01-01 RX ORDER — SODIUM CHLORIDE 9 MG/ML
INJECTION, SOLUTION INTRAVENOUS CONTINUOUS PRN
Status: DISCONTINUED | OUTPATIENT
Start: 2022-01-01 | End: 2022-01-01

## 2022-01-01 RX ORDER — PANTOPRAZOLE SODIUM 40 MG/1
40 TABLET, DELAYED RELEASE ORAL
Status: DISCONTINUED | OUTPATIENT
Start: 2022-01-01 | End: 2022-01-01 | Stop reason: HOSPADM

## 2022-01-01 RX ORDER — METHYLPREDNISOLONE 4 MG/1
TABLET ORAL
Qty: 1 EACH | Refills: 0 | Status: SHIPPED | OUTPATIENT
Start: 2022-01-01 | End: 2022-01-01

## 2022-01-01 RX ORDER — HEPARIN SODIUM 1000 [USP'U]/ML
4400 INJECTION, SOLUTION INTRAVENOUS; SUBCUTANEOUS
Status: DISCONTINUED | OUTPATIENT
Start: 2022-01-01 | End: 2022-01-01

## 2022-01-01 RX ORDER — CEFDINIR 300 MG/1
300 CAPSULE ORAL EVERY 12 HOURS SCHEDULED
Status: DISCONTINUED | OUTPATIENT
Start: 2022-01-01 | End: 2022-01-01

## 2022-01-01 RX ORDER — PREDNISONE 10 MG/1
10 TABLET ORAL DAILY
Status: COMPLETED | OUTPATIENT
Start: 2022-01-01 | End: 2022-01-01

## 2022-01-01 RX ORDER — POTASSIUM CHLORIDE 20 MEQ/1
40 TABLET, EXTENDED RELEASE ORAL
Status: DISCONTINUED | OUTPATIENT
Start: 2022-01-01 | End: 2022-01-01 | Stop reason: HOSPADM

## 2022-01-01 RX ORDER — HEPARIN SODIUM 1000 [USP'U]/ML
2200 INJECTION, SOLUTION INTRAVENOUS; SUBCUTANEOUS
Status: DISCONTINUED | OUTPATIENT
Start: 2022-01-01 | End: 2022-01-01

## 2022-01-01 RX ORDER — LORAZEPAM 2 MG/ML
INJECTION INTRAMUSCULAR
Status: COMPLETED
Start: 2022-01-01 | End: 2022-01-01

## 2022-01-01 RX ORDER — PREDNISONE 20 MG/1
20 TABLET ORAL DAILY
Status: COMPLETED | OUTPATIENT
Start: 2022-01-01 | End: 2022-01-01

## 2022-01-01 RX ORDER — EPLERENONE 25 MG/1
25 TABLET, FILM COATED ORAL DAILY
Status: DISCONTINUED | OUTPATIENT
Start: 2022-01-01 | End: 2022-01-01

## 2022-01-01 RX ORDER — LISINOPRIL 40 MG/1
40 TABLET ORAL
COMMUNITY
Start: 2022-01-01 | End: 2022-01-01 | Stop reason: HOSPADM

## 2022-01-01 RX ORDER — SODIUM CHLORIDE FOR INHALATION 0.9 %
3 VIAL, NEBULIZER (ML) INHALATION
Status: DISCONTINUED | OUTPATIENT
Start: 2022-01-01 | End: 2022-01-01 | Stop reason: HOSPADM

## 2022-01-01 RX ORDER — SODIUM CHLORIDE 30 MG/ML INHALATION SOLUTION 30 MG/ML
4 SOLUTION INHALANT
Status: DISCONTINUED | OUTPATIENT
Start: 2022-01-01 | End: 2022-01-01

## 2022-01-01 RX ORDER — ASPIRIN 81 MG/1
81 TABLET ORAL DAILY
Status: CANCELLED | OUTPATIENT
Start: 2022-01-01

## 2022-01-01 RX ORDER — FUROSEMIDE 10 MG/ML
40 INJECTION INTRAMUSCULAR; INTRAVENOUS 2 TIMES DAILY
Status: DISCONTINUED | OUTPATIENT
Start: 2022-01-01 | End: 2022-01-01 | Stop reason: HOSPADM

## 2022-01-01 RX ORDER — WARFARIN SODIUM 2 MG/1
4 TABLET ORAL
Status: DISCONTINUED | OUTPATIENT
Start: 2022-01-01 | End: 2022-01-01

## 2022-01-01 RX ORDER — WARFARIN SODIUM 2.5 MG/1
2.5 TABLET ORAL
Qty: 30 TABLET | Refills: 0 | Status: ON HOLD | OUTPATIENT
Start: 2022-01-01 | End: 2022-01-01

## 2022-01-01 RX ORDER — WARFARIN SODIUM 3 MG/1
3 TABLET ORAL
Status: DISCONTINUED | OUTPATIENT
Start: 2022-01-01 | End: 2022-01-01

## 2022-01-01 RX ORDER — METOPROLOL SUCCINATE 25 MG/1
75 TABLET, EXTENDED RELEASE ORAL EVERY 12 HOURS
Qty: 180 TABLET | Refills: 0 | Status: SHIPPED | OUTPATIENT
Start: 2022-01-01 | End: 2022-01-01 | Stop reason: SDUPTHER

## 2022-01-01 RX ORDER — POTASSIUM CHLORIDE 20 MEQ/1
20 TABLET, EXTENDED RELEASE ORAL DAILY
Status: DISCONTINUED | OUTPATIENT
Start: 2022-01-01 | End: 2022-01-01 | Stop reason: ALTCHOICE

## 2022-01-01 RX ORDER — WARFARIN SODIUM 1 MG/1
3 TABLET ORAL
Status: DISCONTINUED | OUTPATIENT
Start: 2022-01-01 | End: 2022-01-01

## 2022-01-01 RX ORDER — LISINOPRIL 20 MG/1
20 TABLET ORAL DAILY
Status: DISCONTINUED | OUTPATIENT
Start: 2022-01-01 | End: 2022-01-01

## 2022-01-01 RX ORDER — LISINOPRIL 10 MG/1
10 TABLET ORAL
Status: DISCONTINUED | OUTPATIENT
Start: 2022-01-01 | End: 2022-01-01

## 2022-01-01 RX ORDER — LORAZEPAM 0.5 MG/1
0.5 TABLET ORAL EVERY 8 HOURS PRN
Status: DISCONTINUED | OUTPATIENT
Start: 2022-01-01 | End: 2022-01-01 | Stop reason: HOSPADM

## 2022-01-01 RX ORDER — FUROSEMIDE 80 MG
80 TABLET ORAL
Status: DISCONTINUED | OUTPATIENT
Start: 2022-01-01 | End: 2022-01-01

## 2022-01-01 RX ORDER — FUROSEMIDE 40 MG/1
40 TABLET ORAL DAILY
Qty: 30 TABLET | Refills: 0 | Status: SHIPPED | OUTPATIENT
Start: 2022-01-01 | End: 2022-01-01 | Stop reason: HOSPADM

## 2022-01-01 RX ORDER — LORAZEPAM 2 MG/ML
0.5 INJECTION INTRAMUSCULAR ONCE
Status: COMPLETED | OUTPATIENT
Start: 2022-01-01 | End: 2022-01-01

## 2022-01-01 RX ORDER — GENTAMICIN SULFATE 40 MG/ML
INJECTION, SOLUTION INTRAMUSCULAR; INTRAVENOUS AS NEEDED
Status: DISCONTINUED | OUTPATIENT
Start: 2022-01-01 | End: 2022-01-01 | Stop reason: HOSPADM

## 2022-01-01 RX ORDER — LIDOCAINE HYDROCHLORIDE 10 MG/ML
INJECTION, SOLUTION EPIDURAL; INFILTRATION; INTRACAUDAL; PERINEURAL AS NEEDED
Status: DISCONTINUED | OUTPATIENT
Start: 2022-01-01 | End: 2022-01-01 | Stop reason: HOSPADM

## 2022-01-01 RX ORDER — AMIODARONE HYDROCHLORIDE 200 MG/1
400 TABLET ORAL EVERY 8 HOURS
Status: DISCONTINUED | OUTPATIENT
Start: 2022-01-01 | End: 2022-01-01 | Stop reason: HOSPADM

## 2022-01-01 RX ORDER — FLUTICASONE FUROATE AND VILANTEROL 100; 25 UG/1; UG/1
1 POWDER RESPIRATORY (INHALATION) DAILY
Qty: 60 BLISTER | Refills: 0 | Status: SHIPPED | OUTPATIENT
Start: 2022-01-01 | End: 2022-01-01 | Stop reason: HOSPADM

## 2022-01-01 RX ORDER — PREDNISONE 10 MG/1
40 TABLET ORAL DAILY
Qty: 3 TABLET | Refills: 0 | Status: SHIPPED | OUTPATIENT
Start: 2022-01-01 | End: 2022-01-01

## 2022-01-01 RX ORDER — POTASSIUM CHLORIDE 14.9 MG/ML
20 INJECTION INTRAVENOUS ONCE
Status: COMPLETED | OUTPATIENT
Start: 2022-01-01 | End: 2022-01-01

## 2022-01-01 RX ORDER — LISINOPRIL 10 MG/1
10 TABLET ORAL
Qty: 30 TABLET | Refills: 0 | Status: SHIPPED | OUTPATIENT
Start: 2022-01-01 | End: 2022-01-01 | Stop reason: SDUPTHER

## 2022-01-01 RX ORDER — CEFDINIR 300 MG/1
300 CAPSULE ORAL EVERY 12 HOURS SCHEDULED
Qty: 8 CAPSULE | Refills: 0 | Status: SHIPPED | OUTPATIENT
Start: 2022-01-01 | End: 2022-01-01 | Stop reason: HOSPADM

## 2022-01-01 RX ORDER — ATORVASTATIN CALCIUM 80 MG/1
TABLET, FILM COATED ORAL
Qty: 30 TABLET | Refills: 6 | Status: SHIPPED | OUTPATIENT
Start: 2022-01-01 | End: 2022-01-01 | Stop reason: HOSPADM

## 2022-01-01 RX ORDER — FUROSEMIDE 40 MG/1
40 TABLET ORAL DAILY
Status: DISCONTINUED | OUTPATIENT
Start: 2022-01-01 | End: 2022-01-01

## 2022-01-01 RX ORDER — ONDANSETRON 2 MG/ML
4 INJECTION INTRAMUSCULAR; INTRAVENOUS EVERY 6 HOURS PRN
Status: CANCELLED | OUTPATIENT
Start: 2022-01-01

## 2022-01-01 RX ORDER — FUROSEMIDE 10 MG/ML
80 INJECTION INTRAMUSCULAR; INTRAVENOUS ONCE
Status: COMPLETED | OUTPATIENT
Start: 2022-01-01 | End: 2022-01-01

## 2022-01-01 RX ORDER — FUROSEMIDE 10 MG/ML
40 INJECTION INTRAMUSCULAR; INTRAVENOUS 2 TIMES DAILY
Status: CANCELLED | OUTPATIENT
Start: 2022-01-01

## 2022-01-01 RX ORDER — WARFARIN SODIUM 3 MG/1
6 TABLET ORAL
Status: COMPLETED | OUTPATIENT
Start: 2022-01-01 | End: 2022-01-01

## 2022-01-01 RX ORDER — ATORVASTATIN CALCIUM 40 MG/1
80 TABLET, FILM COATED ORAL
Status: DISCONTINUED | OUTPATIENT
Start: 2022-01-01 | End: 2022-01-01

## 2022-01-01 RX ORDER — PROPOFOL 10 MG/ML
5-50 INJECTION, EMULSION INTRAVENOUS
Status: DISCONTINUED | OUTPATIENT
Start: 2022-01-01 | End: 2022-01-01

## 2022-01-01 RX ORDER — ALBUMIN (HUMAN) 12.5 G/50ML
25 SOLUTION INTRAVENOUS ONCE
Status: COMPLETED | OUTPATIENT
Start: 2022-01-01 | End: 2022-01-01

## 2022-01-01 RX ORDER — BUMETANIDE 0.25 MG/ML
2 INJECTION, SOLUTION INTRAMUSCULAR; INTRAVENOUS 2 TIMES DAILY
Status: DISCONTINUED | OUTPATIENT
Start: 2022-01-01 | End: 2022-01-01

## 2022-01-01 RX ORDER — MAGNESIUM SULFATE 1 G/100ML
1 INJECTION INTRAVENOUS ONCE
Status: COMPLETED | OUTPATIENT
Start: 2022-01-01 | End: 2022-01-01

## 2022-01-01 RX ORDER — MAGNESIUM HYDROXIDE/ALUMINUM HYDROXICE/SIMETHICONE 120; 1200; 1200 MG/30ML; MG/30ML; MG/30ML
30 SUSPENSION ORAL ONCE
Status: DISCONTINUED | OUTPATIENT
Start: 2022-01-01 | End: 2022-01-01

## 2022-01-01 RX ORDER — PANTOPRAZOLE SODIUM 40 MG/1
40 INJECTION, POWDER, FOR SOLUTION INTRAVENOUS
Status: DISCONTINUED | OUTPATIENT
Start: 2022-01-01 | End: 2022-01-01

## 2022-01-01 RX ORDER — FENTANYL CITRATE 50 UG/ML
INJECTION, SOLUTION INTRAMUSCULAR; INTRAVENOUS AS NEEDED
Status: DISCONTINUED | OUTPATIENT
Start: 2022-01-01 | End: 2022-01-01

## 2022-01-01 RX ORDER — ACETAMINOPHEN 160 MG/5ML
650 SUSPENSION, ORAL (FINAL DOSE FORM) ORAL EVERY 4 HOURS PRN
Status: DISCONTINUED | OUTPATIENT
Start: 2022-01-01 | End: 2022-01-01 | Stop reason: HOSPADM

## 2022-01-01 RX ADMIN — AMIODARONE HYDROCHLORIDE 100 MG: 100 TABLET ORAL at 08:00

## 2022-01-01 RX ADMIN — FLUTICASONE FUROATE AND VILANTEROL TRIFENATATE 1 PUFF: 100; 25 POWDER RESPIRATORY (INHALATION) at 08:48

## 2022-01-01 RX ADMIN — LORAZEPAM 0.5 MG: 0.5 TABLET ORAL at 20:51

## 2022-01-01 RX ADMIN — FLUTICASONE FUROATE AND VILANTEROL TRIFENATATE 1 PUFF: 100; 25 POWDER RESPIRATORY (INHALATION) at 08:30

## 2022-01-01 RX ADMIN — FUROSEMIDE 40 MG: 40 TABLET ORAL at 09:13

## 2022-01-01 RX ADMIN — FUROSEMIDE 40 MG: 10 INJECTION, SOLUTION INTRAVENOUS at 15:06

## 2022-01-01 RX ADMIN — WARFARIN SODIUM 3 MG: 3 TABLET ORAL at 17:49

## 2022-01-01 RX ADMIN — SODIUM CHLORIDE SOLN NEBU 3% 4 ML: 3 NEBU SOLN at 13:40

## 2022-01-01 RX ADMIN — ATORVASTATIN CALCIUM 80 MG: 80 TABLET, FILM COATED ORAL at 08:31

## 2022-01-01 RX ADMIN — DEXAMETHASONE SODIUM PHOSPHATE 6 MG: 4 INJECTION INTRA-ARTICULAR; INTRALESIONAL; INTRAMUSCULAR; INTRAVENOUS; SOFT TISSUE at 15:57

## 2022-01-01 RX ADMIN — FUROSEMIDE 60 MG: 10 INJECTION, SOLUTION INTRAMUSCULAR; INTRAVENOUS at 09:21

## 2022-01-01 RX ADMIN — WARFARIN SODIUM 2 MG: 2 TABLET ORAL at 17:22

## 2022-01-01 RX ADMIN — ACETAMINOPHEN 650 MG: 325 TABLET, FILM COATED ORAL at 22:12

## 2022-01-01 RX ADMIN — FUROSEMIDE 80 MG: 10 INJECTION, SOLUTION INTRAVENOUS at 21:56

## 2022-01-01 RX ADMIN — EPLERENONE 25 MG: 25 TABLET, FILM COATED ORAL at 08:57

## 2022-01-01 RX ADMIN — WARFARIN SODIUM 2.5 MG: 2.5 TABLET ORAL at 17:04

## 2022-01-01 RX ADMIN — PREDNISONE 10 MG: 10 TABLET ORAL at 08:48

## 2022-01-01 RX ADMIN — ASPIRIN 81 MG CHEWABLE TABLET 81 MG: 81 TABLET CHEWABLE at 07:41

## 2022-01-01 RX ADMIN — AMIODARONE HYDROCHLORIDE 400 MG: 200 TABLET ORAL at 13:23

## 2022-01-01 RX ADMIN — ACETAMINOPHEN 650 MG: 325 TABLET, FILM COATED ORAL at 13:31

## 2022-01-01 RX ADMIN — Medication 10 MG/HR: at 12:21

## 2022-01-01 RX ADMIN — LORAZEPAM 1 MG: 2 INJECTION INTRAMUSCULAR; INTRAVENOUS at 09:44

## 2022-01-01 RX ADMIN — METOPROLOL SUCCINATE 75 MG: 25 TABLET, EXTENDED RELEASE ORAL at 05:14

## 2022-01-01 RX ADMIN — ATORVASTATIN CALCIUM 80 MG: 80 TABLET, FILM COATED ORAL at 18:34

## 2022-01-01 RX ADMIN — CEFDINIR 300 MG: 300 CAPSULE ORAL at 21:34

## 2022-01-01 RX ADMIN — FUROSEMIDE 40 MG: 40 TABLET ORAL at 08:24

## 2022-01-01 RX ADMIN — FUROSEMIDE 20 MG: 20 TABLET ORAL at 16:31

## 2022-01-01 RX ADMIN — LISINOPRIL 40 MG: 20 TABLET ORAL at 21:00

## 2022-01-01 RX ADMIN — BUMETANIDE 2 MG: 0.25 INJECTION INTRAMUSCULAR; INTRAVENOUS at 09:05

## 2022-01-01 RX ADMIN — ASPIRIN 81 MG: 81 TABLET, COATED ORAL at 09:25

## 2022-01-01 RX ADMIN — ASPIRIN 81 MG: 81 TABLET, COATED ORAL at 08:37

## 2022-01-01 RX ADMIN — METOPROLOL SUCCINATE 75 MG: 50 TABLET, EXTENDED RELEASE ORAL at 20:45

## 2022-01-01 RX ADMIN — FUROSEMIDE 80 MG: 80 TABLET ORAL at 08:05

## 2022-01-01 RX ADMIN — WARFARIN SODIUM 2.5 MG: 2.5 TABLET ORAL at 17:17

## 2022-01-01 RX ADMIN — ACETAMINOPHEN 650 MG: 325 TABLET, FILM COATED ORAL at 21:58

## 2022-01-01 RX ADMIN — CALCIUM CARBONATE (ANTACID) CHEW TAB 500 MG 1000 MG: 500 CHEW TAB at 11:42

## 2022-01-01 RX ADMIN — POTASSIUM CHLORIDE 40 MEQ: 20 TABLET, EXTENDED RELEASE ORAL at 17:11

## 2022-01-01 RX ADMIN — FLUTICASONE FUROATE AND VILANTEROL TRIFENATATE 1 PUFF: 100; 25 POWDER RESPIRATORY (INHALATION) at 08:24

## 2022-01-01 RX ADMIN — DEXAMETHASONE SODIUM PHOSPHATE 6 MG: 4 INJECTION INTRA-ARTICULAR; INTRALESIONAL; INTRAMUSCULAR; INTRAVENOUS; SOFT TISSUE at 16:59

## 2022-01-01 RX ADMIN — BENZONATATE 100 MG: 100 CAPSULE ORAL at 21:48

## 2022-01-01 RX ADMIN — AMIODARONE HYDROCHLORIDE 100 MG: 100 TABLET ORAL at 09:25

## 2022-01-01 RX ADMIN — ATORVASTATIN CALCIUM 80 MG: 80 TABLET, FILM COATED ORAL at 17:32

## 2022-01-01 RX ADMIN — MAGNESIUM SULFATE HEPTAHYDRATE 2 G: 40 INJECTION, SOLUTION INTRAVENOUS at 10:28

## 2022-01-01 RX ADMIN — CALCIUM CHLORIDE 1 G: 100 INJECTION INTRAVENOUS; INTRAVENTRICULAR at 10:44

## 2022-01-01 RX ADMIN — POTASSIUM CHLORIDE 20 MEQ: 1500 TABLET, EXTENDED RELEASE ORAL at 08:13

## 2022-01-01 RX ADMIN — INSULIN LISPRO 2 UNITS: 100 INJECTION, SOLUTION INTRAVENOUS; SUBCUTANEOUS at 23:49

## 2022-01-01 RX ADMIN — BENZONATATE 100 MG: 100 CAPSULE ORAL at 21:09

## 2022-01-01 RX ADMIN — ATORVASTATIN CALCIUM 80 MG: 80 TABLET, FILM COATED ORAL at 16:59

## 2022-01-01 RX ADMIN — FUROSEMIDE 40 MG: 10 INJECTION, SOLUTION INTRAMUSCULAR; INTRAVENOUS at 08:31

## 2022-01-01 RX ADMIN — ATORVASTATIN CALCIUM 80 MG: 80 TABLET, FILM COATED ORAL at 08:08

## 2022-01-01 RX ADMIN — ATORVASTATIN CALCIUM 80 MG: 80 TABLET, FILM COATED ORAL at 17:17

## 2022-01-01 RX ADMIN — METOPROLOL SUCCINATE 75 MG: 25 TABLET, FILM COATED, EXTENDED RELEASE ORAL at 08:24

## 2022-01-01 RX ADMIN — SODIUM CHLORIDE 75 ML/HR: 0.9 INJECTION, SOLUTION INTRAVENOUS at 19:00

## 2022-01-01 RX ADMIN — ATORVASTATIN CALCIUM 80 MG: 80 TABLET, FILM COATED ORAL at 08:24

## 2022-01-01 RX ADMIN — ACETAMINOPHEN 650 MG: 325 TABLET ORAL at 00:20

## 2022-01-01 RX ADMIN — AMIODARONE HYDROCHLORIDE 100 MG: 100 TABLET ORAL at 09:13

## 2022-01-01 RX ADMIN — ALUMINUM HYDROXIDE, MAGNESIUM HYDROXIDE, AND SIMETHICONE 30 ML: 200; 200; 20 SUSPENSION ORAL at 13:54

## 2022-01-01 RX ADMIN — ACETAMINOPHEN 650 MG: 325 TABLET, FILM COATED ORAL at 21:13

## 2022-01-01 RX ADMIN — POTASSIUM CHLORIDE 40 MEQ: 1500 TABLET, EXTENDED RELEASE ORAL at 10:29

## 2022-01-01 RX ADMIN — AMIODARONE HYDROCHLORIDE 100 MG: 100 TABLET ORAL at 08:49

## 2022-01-01 RX ADMIN — CALCIUM CHLORIDE 1 G: 100 INJECTION INTRAVENOUS; INTRAVENTRICULAR at 10:45

## 2022-01-01 RX ADMIN — BUMETANIDE 2 MG: 0.25 INJECTION INTRAMUSCULAR; INTRAVENOUS at 17:07

## 2022-01-01 RX ADMIN — FUROSEMIDE 40 MG: 10 INJECTION, SOLUTION INTRAMUSCULAR; INTRAVENOUS at 17:32

## 2022-01-01 RX ADMIN — ACETAMINOPHEN 650 MG: 325 TABLET, FILM COATED ORAL at 07:42

## 2022-01-01 RX ADMIN — ASPIRIN 81 MG: 81 TABLET, COATED ORAL at 08:48

## 2022-01-01 RX ADMIN — POTASSIUM CHLORIDE 40 MEQ: 1500 TABLET, EXTENDED RELEASE ORAL at 09:59

## 2022-01-01 RX ADMIN — VANCOMYCIN HYDROCHLORIDE 1000 MG: 1 INJECTION, SOLUTION INTRAVENOUS at 16:45

## 2022-01-01 RX ADMIN — METOPROLOL SUCCINATE 75 MG: 50 TABLET, EXTENDED RELEASE ORAL at 19:40

## 2022-01-01 RX ADMIN — EPLERENONE 25 MG: 25 TABLET, FILM COATED ORAL at 08:49

## 2022-01-01 RX ADMIN — ATORVASTATIN CALCIUM 80 MG: 80 TABLET, FILM COATED ORAL at 17:22

## 2022-01-01 RX ADMIN — DEXAMETHASONE SODIUM PHOSPHATE 6 MG: 4 INJECTION, SOLUTION INTRA-ARTICULAR; INTRALESIONAL; INTRAMUSCULAR; INTRAVENOUS; SOFT TISSUE at 08:17

## 2022-01-01 RX ADMIN — POTASSIUM CHLORIDE 20 MEQ: 14.9 INJECTION, SOLUTION INTRAVENOUS at 12:24

## 2022-01-01 RX ADMIN — METOPROLOL SUCCINATE 75 MG: 25 TABLET, EXTENDED RELEASE ORAL at 05:25

## 2022-01-01 RX ADMIN — HEPARIN SODIUM 1800 UNITS: 1000 INJECTION INTRAVENOUS; SUBCUTANEOUS at 00:37

## 2022-01-01 RX ADMIN — AMIODARONE HYDROCHLORIDE 100 MG: 100 TABLET ORAL at 08:07

## 2022-01-01 RX ADMIN — DEXAMETHASONE SODIUM PHOSPHATE 6.52 MG: 4 INJECTION, SOLUTION INTRA-ARTICULAR; INTRALESIONAL; INTRAMUSCULAR; INTRAVENOUS; SOFT TISSUE at 08:00

## 2022-01-01 RX ADMIN — LISINOPRIL 20 MG: 20 TABLET ORAL at 08:05

## 2022-01-01 RX ADMIN — FUROSEMIDE 40 MG: 10 INJECTION, SOLUTION INTRAVENOUS at 15:58

## 2022-01-01 RX ADMIN — POTASSIUM CHLORIDE 20 MEQ: 1500 TABLET, EXTENDED RELEASE ORAL at 09:23

## 2022-01-01 RX ADMIN — METOPROLOL SUCCINATE 75 MG: 25 TABLET, EXTENDED RELEASE ORAL at 05:23

## 2022-01-01 RX ADMIN — BENZONATATE 100 MG: 100 CAPSULE ORAL at 08:37

## 2022-01-01 RX ADMIN — AMIODARONE HYDROCHLORIDE 100 MG: 100 TABLET ORAL at 08:16

## 2022-01-01 RX ADMIN — ATORVASTATIN CALCIUM 80 MG: 80 TABLET, FILM COATED ORAL at 18:04

## 2022-01-01 RX ADMIN — DEXAMETHASONE SODIUM PHOSPHATE 6 MG: 4 INJECTION INTRA-ARTICULAR; INTRALESIONAL; INTRAMUSCULAR; INTRAVENOUS; SOFT TISSUE at 17:02

## 2022-01-01 RX ADMIN — METOROPROLOL TARTRATE 2.5 MG: 5 INJECTION, SOLUTION INTRAVENOUS at 17:10

## 2022-01-01 RX ADMIN — HEPARIN SODIUM 5000 UNITS: 5000 INJECTION INTRAVENOUS; SUBCUTANEOUS at 17:30

## 2022-01-01 RX ADMIN — HEPARIN SODIUM 18 UNITS/KG/HR: 10000 INJECTION, SOLUTION INTRAVENOUS; SUBCUTANEOUS at 00:36

## 2022-01-01 RX ADMIN — FUROSEMIDE 40 MG: 10 INJECTION, SOLUTION INTRAVENOUS at 12:57

## 2022-01-01 RX ADMIN — MELATONIN TAB 3 MG 6 MG: 3 TAB at 00:54

## 2022-01-01 RX ADMIN — ASPIRIN 81 MG: 81 TABLET, COATED ORAL at 08:29

## 2022-01-01 RX ADMIN — ALBUMIN (HUMAN) 50 G: 0.25 INJECTION, SOLUTION INTRAVENOUS at 23:32

## 2022-01-01 RX ADMIN — METOROPROLOL TARTRATE 2.5 MG: 5 INJECTION, SOLUTION INTRAVENOUS at 21:33

## 2022-01-01 RX ADMIN — FLUTICASONE FUROATE AND VILANTEROL TRIFENATATE 1 PUFF: 100; 25 POWDER RESPIRATORY (INHALATION) at 08:08

## 2022-01-01 RX ADMIN — ASPIRIN 81 MG: 81 TABLET, COATED ORAL at 09:21

## 2022-01-01 RX ADMIN — METOPROLOL SUCCINATE 75 MG: 50 TABLET, EXTENDED RELEASE ORAL at 21:30

## 2022-01-01 RX ADMIN — ETOMIDATE 20 MG: 20 INJECTION, SOLUTION INTRAVENOUS at 15:16

## 2022-01-01 RX ADMIN — METOPROLOL SUCCINATE 75 MG: 25 TABLET, EXTENDED RELEASE ORAL at 17:08

## 2022-01-01 RX ADMIN — METOPROLOL SUCCINATE 75 MG: 50 TABLET, EXTENDED RELEASE ORAL at 08:56

## 2022-01-01 RX ADMIN — PREDNISONE 10 MG: 10 TABLET ORAL at 09:25

## 2022-01-01 RX ADMIN — Medication 100 MG: at 15:19

## 2022-01-01 RX ADMIN — FUROSEMIDE 20 MG: 20 TABLET ORAL at 09:25

## 2022-01-01 RX ADMIN — METOPROLOL SUCCINATE 75 MG: 50 TABLET, EXTENDED RELEASE ORAL at 00:34

## 2022-01-01 RX ADMIN — METOPROLOL SUCCINATE 75 MG: 25 TABLET, EXTENDED RELEASE ORAL at 18:14

## 2022-01-01 RX ADMIN — ATORVASTATIN CALCIUM 80 MG: 80 TABLET, FILM COATED ORAL at 16:31

## 2022-01-01 RX ADMIN — ACETAMINOPHEN 650 MG: 325 TABLET, FILM COATED ORAL at 08:49

## 2022-01-01 RX ADMIN — INSULIN LISPRO 4 UNITS: 100 INJECTION, SOLUTION INTRAVENOUS; SUBCUTANEOUS at 18:06

## 2022-01-01 RX ADMIN — BENZONATATE 100 MG: 100 CAPSULE ORAL at 21:03

## 2022-01-01 RX ADMIN — PANTOPRAZOLE SODIUM 40 MG: 40 TABLET, DELAYED RELEASE ORAL at 05:25

## 2022-01-01 RX ADMIN — DOXYCYCLINE 100 MG: 100 CAPSULE ORAL at 22:02

## 2022-01-01 RX ADMIN — ATORVASTATIN CALCIUM 80 MG: 80 TABLET, FILM COATED ORAL at 17:04

## 2022-01-01 RX ADMIN — AMIODARONE HYDROCHLORIDE 400 MG: 200 TABLET ORAL at 21:40

## 2022-01-01 RX ADMIN — WARFARIN SODIUM 2.5 MG: 2.5 TABLET ORAL at 21:30

## 2022-01-01 RX ADMIN — ACETAMINOPHEN 650 MG: 325 TABLET, FILM COATED ORAL at 05:21

## 2022-01-01 RX ADMIN — FUROSEMIDE 60 MG: 10 INJECTION, SOLUTION INTRAMUSCULAR; INTRAVENOUS at 08:38

## 2022-01-01 RX ADMIN — WARFARIN SODIUM 4 MG: 4 TABLET ORAL at 18:09

## 2022-01-01 RX ADMIN — BENZONATATE 100 MG: 100 CAPSULE ORAL at 08:29

## 2022-01-01 RX ADMIN — FUROSEMIDE 40 MG: 40 TABLET ORAL at 08:00

## 2022-01-01 RX ADMIN — ATORVASTATIN CALCIUM 80 MG: 80 TABLET, FILM COATED ORAL at 17:03

## 2022-01-01 RX ADMIN — METOPROLOL SUCCINATE 75 MG: 25 TABLET, EXTENDED RELEASE ORAL at 17:10

## 2022-01-01 RX ADMIN — POTASSIUM CHLORIDE 20 MEQ: 1500 TABLET, EXTENDED RELEASE ORAL at 11:52

## 2022-01-01 RX ADMIN — POTASSIUM CHLORIDE 40 MEQ: 1500 TABLET, EXTENDED RELEASE ORAL at 08:48

## 2022-01-01 RX ADMIN — ACETAMINOPHEN 650 MG: 325 TABLET ORAL at 23:29

## 2022-01-01 RX ADMIN — PANTOPRAZOLE SODIUM 40 MG: 40 TABLET, DELAYED RELEASE ORAL at 06:28

## 2022-01-01 RX ADMIN — POTASSIUM CHLORIDE 40 MEQ: 20 TABLET, EXTENDED RELEASE ORAL at 08:23

## 2022-01-01 RX ADMIN — PROPOFOL 40 MCG/KG/MIN: 10 INJECTION, EMULSION INTRAVENOUS at 16:45

## 2022-01-01 RX ADMIN — CALCIUM CARBONATE (ANTACID) CHEW TAB 500 MG 500 MG: 500 CHEW TAB at 08:16

## 2022-01-01 RX ADMIN — HEPARIN SODIUM 12 UNITS/KG/HR: 10000 INJECTION, SOLUTION INTRAVENOUS; SUBCUTANEOUS at 20:51

## 2022-01-01 RX ADMIN — ACETAMINOPHEN 650 MG: 325 TABLET ORAL at 22:13

## 2022-01-01 RX ADMIN — ASPIRIN 81 MG: 81 TABLET, COATED ORAL at 09:56

## 2022-01-01 RX ADMIN — FUROSEMIDE 40 MG: 40 TABLET ORAL at 15:20

## 2022-01-01 RX ADMIN — ACETAMINOPHEN 650 MG: 325 TABLET ORAL at 01:04

## 2022-01-01 RX ADMIN — METOPROLOL SUCCINATE 75 MG: 50 TABLET, EXTENDED RELEASE ORAL at 20:34

## 2022-01-01 RX ADMIN — LISINOPRIL 40 MG: 20 TABLET ORAL at 21:34

## 2022-01-01 RX ADMIN — ATORVASTATIN CALCIUM 80 MG: 80 TABLET, FILM COATED ORAL at 16:50

## 2022-01-01 RX ADMIN — CEFTRIAXONE SODIUM 1000 MG: 10 INJECTION, POWDER, FOR SOLUTION INTRAVENOUS at 20:01

## 2022-01-01 RX ADMIN — WARFARIN SODIUM 3 MG: 1 TABLET ORAL at 20:00

## 2022-01-01 RX ADMIN — POLYETHYLENE GLYCOL 3350 17 G: 17 POWDER, FOR SOLUTION ORAL at 22:04

## 2022-01-01 RX ADMIN — METOROPROLOL TARTRATE 2.5 MG: 5 INJECTION, SOLUTION INTRAVENOUS at 12:26

## 2022-01-01 RX ADMIN — WARFARIN SODIUM 3 MG: 1 TABLET ORAL at 17:44

## 2022-01-01 RX ADMIN — CEFTRIAXONE SODIUM 1000 MG: 10 INJECTION, POWDER, FOR SOLUTION INTRAVENOUS at 15:57

## 2022-01-01 RX ADMIN — ASPIRIN 81 MG: 81 TABLET, COATED ORAL at 09:23

## 2022-01-01 RX ADMIN — METOPROLOL TARTRATE 50 MG: 50 TABLET, FILM COATED ORAL at 10:52

## 2022-01-01 RX ADMIN — FUROSEMIDE 80 MG: 80 TABLET ORAL at 16:07

## 2022-01-01 RX ADMIN — PROPOFOL 40 MCG/KG/MIN: 10 INJECTION, EMULSION INTRAVENOUS at 20:03

## 2022-01-01 RX ADMIN — BENZONATATE 100 MG: 100 CAPSULE ORAL at 20:33

## 2022-01-01 RX ADMIN — AMIODARONE HYDROCHLORIDE 150 MG: 50 INJECTION, SOLUTION INTRAVENOUS at 10:40

## 2022-01-01 RX ADMIN — IOHEXOL 85 ML: 350 INJECTION, SOLUTION INTRAVENOUS at 03:50

## 2022-01-01 RX ADMIN — ATORVASTATIN CALCIUM 80 MG: 80 TABLET, FILM COATED ORAL at 08:29

## 2022-01-01 RX ADMIN — PREDNISONE 40 MG: 20 TABLET ORAL at 14:58

## 2022-01-01 RX ADMIN — LIDOCAINE HYDROCHLORIDE 100 MG: 20 INJECTION, SOLUTION EPIDURAL; INFILTRATION; INTRACAUDAL at 10:49

## 2022-01-01 RX ADMIN — AMIODARONE HYDROCHLORIDE 100 MG: 100 TABLET ORAL at 08:31

## 2022-01-01 RX ADMIN — METOPROLOL SUCCINATE 75 MG: 50 TABLET, EXTENDED RELEASE ORAL at 09:25

## 2022-01-01 RX ADMIN — ASPIRIN 81 MG CHEWABLE TABLET 81 MG: 81 TABLET CHEWABLE at 08:38

## 2022-01-01 RX ADMIN — BENZONATATE 100 MG: 100 CAPSULE ORAL at 08:52

## 2022-01-01 RX ADMIN — FUROSEMIDE 80 MG: 80 TABLET ORAL at 17:20

## 2022-01-01 RX ADMIN — PREDNISONE 20 MG: 20 TABLET ORAL at 09:21

## 2022-01-01 RX ADMIN — METOPROLOL SUCCINATE 75 MG: 50 TABLET, EXTENDED RELEASE ORAL at 08:06

## 2022-01-01 RX ADMIN — NOREPINEPHRINE BITARTRATE 3 MCG/MIN: 1 INJECTION, SOLUTION, CONCENTRATE INTRAVENOUS at 16:45

## 2022-01-01 RX ADMIN — LISINOPRIL 10 MG: 10 TABLET ORAL at 00:34

## 2022-01-01 RX ADMIN — ASPIRIN 81 MG: 81 TABLET, COATED ORAL at 08:08

## 2022-01-01 RX ADMIN — AMIODARONE HYDROCHLORIDE 400 MG: 200 TABLET ORAL at 05:36

## 2022-01-01 RX ADMIN — ATORVASTATIN CALCIUM 80 MG: 80 TABLET, FILM COATED ORAL at 17:35

## 2022-01-01 RX ADMIN — EPLERENONE 25 MG: 25 TABLET, FILM COATED ORAL at 09:22

## 2022-01-01 RX ADMIN — METOPROLOL SUCCINATE 25 MG: 25 TABLET, EXTENDED RELEASE ORAL at 20:20

## 2022-01-01 RX ADMIN — LORAZEPAM 0.5 MG: 2 INJECTION INTRAMUSCULAR; INTRAVENOUS at 23:55

## 2022-01-01 RX ADMIN — ALUMINUM HYDROXIDE, MAGNESIUM HYDROXIDE, AND SIMETHICONE 30 ML: 200; 200; 20 SUSPENSION ORAL at 09:44

## 2022-01-01 RX ADMIN — MELATONIN 3 MG: at 23:47

## 2022-01-01 RX ADMIN — CEFEPIME HYDROCHLORIDE 2000 MG: 2 INJECTION, POWDER, FOR SOLUTION INTRAVENOUS at 04:57

## 2022-01-01 RX ADMIN — ASPIRIN 81 MG: 81 TABLET, COATED ORAL at 08:06

## 2022-01-01 RX ADMIN — Medication 3 MG: at 22:29

## 2022-01-01 RX ADMIN — FUROSEMIDE 40 MG: 10 INJECTION, SOLUTION INTRAMUSCULAR; INTRAVENOUS at 17:36

## 2022-01-01 RX ADMIN — FLUTICASONE FUROATE AND VILANTEROL TRIFENATATE 1 PUFF: 100; 25 POWDER RESPIRATORY (INHALATION) at 08:57

## 2022-01-01 RX ADMIN — FUROSEMIDE 40 MG: 10 INJECTION, SOLUTION INTRAVENOUS at 16:35

## 2022-01-01 RX ADMIN — PREDNISONE 30 MG: 20 TABLET ORAL at 09:13

## 2022-01-01 RX ADMIN — EPLERENONE 25 MG: 25 TABLET, FILM COATED ORAL at 08:24

## 2022-01-01 RX ADMIN — FUROSEMIDE 40 MG: 10 INJECTION, SOLUTION INTRAMUSCULAR; INTRAVENOUS at 17:00

## 2022-01-01 RX ADMIN — METOPROLOL SUCCINATE 75 MG: 50 TABLET, EXTENDED RELEASE ORAL at 09:58

## 2022-01-01 RX ADMIN — AMIODARONE HYDROCHLORIDE 200 MG: 200 TABLET ORAL at 08:28

## 2022-01-01 RX ADMIN — ACETAMINOPHEN 650 MG: 325 TABLET, FILM COATED ORAL at 20:55

## 2022-01-01 RX ADMIN — AMIODARONE HYDROCHLORIDE 100 MG: 100 TABLET ORAL at 08:37

## 2022-01-01 RX ADMIN — SODIUM BICARBONATE 25 MEQ: 84 INJECTION INTRAVENOUS at 10:44

## 2022-01-01 RX ADMIN — ALBUTEROL SULFATE 2.5 MG: 2.5 SOLUTION RESPIRATORY (INHALATION) at 13:03

## 2022-01-01 RX ADMIN — POTASSIUM CHLORIDE 20 MEQ: 1500 TABLET, EXTENDED RELEASE ORAL at 08:00

## 2022-01-01 RX ADMIN — FUROSEMIDE 40 MG: 10 INJECTION, SOLUTION INTRAVENOUS at 19:14

## 2022-01-01 RX ADMIN — ATORVASTATIN CALCIUM 80 MG: 80 TABLET, FILM COATED ORAL at 17:34

## 2022-01-01 RX ADMIN — HEPARIN SODIUM 1800 UNITS: 1000 INJECTION INTRAVENOUS; SUBCUTANEOUS at 16:59

## 2022-01-01 RX ADMIN — AMIODARONE HYDROCHLORIDE 150 MG: 50 INJECTION, SOLUTION INTRAVENOUS at 10:45

## 2022-01-01 RX ADMIN — WARFARIN SODIUM 4 MG: 2 TABLET ORAL at 16:59

## 2022-01-01 RX ADMIN — CALCIUM CARBONATE (ANTACID) CHEW TAB 500 MG 500 MG: 500 CHEW TAB at 12:16

## 2022-01-01 RX ADMIN — SODIUM BICARBONATE 25 MEQ: 84 INJECTION INTRAVENOUS at 10:45

## 2022-01-01 RX ADMIN — PREDNISONE 30 MG: 20 TABLET ORAL at 08:37

## 2022-01-01 RX ADMIN — FUROSEMIDE 60 MG: 10 INJECTION, SOLUTION INTRAMUSCULAR; INTRAVENOUS at 17:34

## 2022-01-01 RX ADMIN — AMIODARONE HYDROCHLORIDE 100 MG: 100 TABLET ORAL at 08:30

## 2022-01-01 RX ADMIN — PROPOFOL 20 MCG/KG/MIN: 10 INJECTION, EMULSION INTRAVENOUS at 15:33

## 2022-01-01 RX ADMIN — DOXYCYCLINE 100 MG: 100 CAPSULE ORAL at 08:00

## 2022-01-01 RX ADMIN — ATORVASTATIN CALCIUM 80 MG: 80 TABLET, FILM COATED ORAL at 17:10

## 2022-01-01 RX ADMIN — METOPROLOL SUCCINATE 75 MG: 25 TABLET, EXTENDED RELEASE ORAL at 05:42

## 2022-01-01 RX ADMIN — LORAZEPAM 0.5 MG: 0.5 TABLET ORAL at 22:22

## 2022-01-01 RX ADMIN — DEXAMETHASONE SODIUM PHOSPHATE 6.52 MG: 4 INJECTION, SOLUTION INTRA-ARTICULAR; INTRALESIONAL; INTRAMUSCULAR; INTRAVENOUS; SOFT TISSUE at 06:02

## 2022-01-01 RX ADMIN — EPLERENONE 25 MG: 25 TABLET, FILM COATED ORAL at 09:13

## 2022-01-01 RX ADMIN — PREDNISONE 40 MG: 20 TABLET ORAL at 08:49

## 2022-01-01 RX ADMIN — METOROPROLOL TARTRATE 2.5 MG: 5 INJECTION, SOLUTION INTRAVENOUS at 04:58

## 2022-01-01 RX ADMIN — HEPARIN SODIUM 1800 UNITS: 1000 INJECTION INTRAVENOUS; SUBCUTANEOUS at 11:08

## 2022-01-01 RX ADMIN — EPLERENONE 25 MG: 25 TABLET, FILM COATED ORAL at 08:00

## 2022-01-01 RX ADMIN — AMIODARONE HYDROCHLORIDE 200 MG: 200 TABLET ORAL at 08:08

## 2022-01-01 RX ADMIN — SIMETHICONE 80 MG: 80 TABLET, CHEWABLE ORAL at 18:45

## 2022-01-01 RX ADMIN — LISINOPRIL 20 MG: 20 TABLET ORAL at 22:22

## 2022-01-01 RX ADMIN — FUROSEMIDE 40 MG: 10 INJECTION, SOLUTION INTRAMUSCULAR; INTRAVENOUS at 08:07

## 2022-01-01 RX ADMIN — FUROSEMIDE 40 MG: 10 INJECTION, SOLUTION INTRAMUSCULAR; INTRAVENOUS at 17:49

## 2022-01-01 RX ADMIN — LORAZEPAM 2 MG: 2 INJECTION INTRAMUSCULAR at 14:39

## 2022-01-01 RX ADMIN — ASPIRIN 81 MG: 81 TABLET, COATED ORAL at 08:30

## 2022-01-01 RX ADMIN — SIMETHICONE 80 MG: 80 TABLET, CHEWABLE ORAL at 23:23

## 2022-01-01 RX ADMIN — METOPROLOL SUCCINATE 75 MG: 50 TABLET, EXTENDED RELEASE ORAL at 06:10

## 2022-01-01 RX ADMIN — METOPROLOL SUCCINATE 75 MG: 50 TABLET, EXTENDED RELEASE ORAL at 08:05

## 2022-01-01 RX ADMIN — POTASSIUM CHLORIDE 40 MEQ: 20 TABLET, EXTENDED RELEASE ORAL at 10:52

## 2022-01-01 RX ADMIN — METOPROLOL SUCCINATE 75 MG: 50 TABLET, EXTENDED RELEASE ORAL at 08:52

## 2022-01-01 RX ADMIN — CEFTRIAXONE SODIUM 1000 MG: 10 INJECTION, POWDER, FOR SOLUTION INTRAVENOUS at 17:01

## 2022-01-01 RX ADMIN — ISODIUM CHLORIDE 3 ML: 0.03 SOLUTION RESPIRATORY (INHALATION) at 09:07

## 2022-01-01 RX ADMIN — WARFARIN SODIUM 2.5 MG: 2.5 TABLET ORAL at 17:11

## 2022-01-01 RX ADMIN — FLUTICASONE FUROATE AND VILANTEROL TRIFENATATE 1 PUFF: 100; 25 POWDER RESPIRATORY (INHALATION) at 17:20

## 2022-01-01 RX ADMIN — FUROSEMIDE 40 MG: 10 INJECTION, SOLUTION INTRAMUSCULAR; INTRAVENOUS at 17:33

## 2022-01-01 RX ADMIN — LORAZEPAM 2 MG: 2 INJECTION INTRAMUSCULAR; INTRAVENOUS at 14:39

## 2022-01-01 RX ADMIN — AMIODARONE HYDROCHLORIDE 200 MG: 200 TABLET ORAL at 08:24

## 2022-01-01 RX ADMIN — ATORVASTATIN CALCIUM 80 MG: 80 TABLET, FILM COATED ORAL at 08:49

## 2022-01-01 RX ADMIN — ASPIRIN 81 MG CHEWABLE TABLET 81 MG: 81 TABLET CHEWABLE at 09:05

## 2022-01-01 RX ADMIN — CEFTRIAXONE SODIUM 1000 MG: 10 INJECTION, POWDER, FOR SOLUTION INTRAVENOUS at 17:02

## 2022-01-01 RX ADMIN — ASPIRIN 81 MG: 81 TABLET, COATED ORAL at 08:24

## 2022-01-01 RX ADMIN — ASPIRIN 81 MG CHEWABLE TABLET 81 MG: 81 TABLET CHEWABLE at 08:00

## 2022-01-01 RX ADMIN — HEPARIN SODIUM 18 UNITS/KG/HR: 10000 INJECTION, SOLUTION INTRAVENOUS at 22:41

## 2022-01-01 RX ADMIN — ACETAMINOPHEN 650 MG: 325 TABLET ORAL at 05:39

## 2022-01-01 RX ADMIN — ASPIRIN 81 MG: 81 TABLET, COATED ORAL at 08:49

## 2022-01-01 RX ADMIN — METOPROLOL SUCCINATE 75 MG: 50 TABLET, EXTENDED RELEASE ORAL at 07:56

## 2022-01-01 RX ADMIN — ATORVASTATIN CALCIUM 80 MG: 80 TABLET, FILM COATED ORAL at 17:00

## 2022-01-01 RX ADMIN — FUROSEMIDE 40 MG: 10 INJECTION, SOLUTION INTRAMUSCULAR; INTRAVENOUS at 08:38

## 2022-01-01 RX ADMIN — POTASSIUM CHLORIDE 20 MEQ: 14.9 INJECTION, SOLUTION INTRAVENOUS at 10:27

## 2022-01-01 RX ADMIN — BENZONATATE 100 MG: 100 CAPSULE ORAL at 20:52

## 2022-01-01 RX ADMIN — FUROSEMIDE 40 MG: 40 TABLET ORAL at 08:16

## 2022-01-01 RX ADMIN — POTASSIUM CHLORIDE 20 MEQ: 14.9 INJECTION, SOLUTION INTRAVENOUS at 20:08

## 2022-01-01 RX ADMIN — AMIODARONE HYDROCHLORIDE 100 MG: 100 TABLET ORAL at 08:29

## 2022-01-01 RX ADMIN — AMIODARONE HYDROCHLORIDE 100 MG: 100 TABLET ORAL at 08:06

## 2022-01-01 RX ADMIN — AMIODARONE HYDROCHLORIDE 100 MG: 100 TABLET ORAL at 09:06

## 2022-01-01 RX ADMIN — LORAZEPAM 0.5 MG: 0.5 TABLET ORAL at 14:28

## 2022-01-01 RX ADMIN — EPHEDRINE SULFATE 5 MG: 50 INJECTION INTRAVENOUS at 16:59

## 2022-01-01 RX ADMIN — SODIUM CHLORIDE: 0.9 INJECTION, SOLUTION INTRAVENOUS at 16:45

## 2022-01-01 RX ADMIN — MAGNESIUM SULFATE HEPTAHYDRATE 2 G: 40 INJECTION, SOLUTION INTRAVENOUS at 10:47

## 2022-01-01 RX ADMIN — ACETAMINOPHEN 650 MG: 325 TABLET, FILM COATED ORAL at 08:38

## 2022-01-01 RX ADMIN — FUROSEMIDE 20 MG: 20 TABLET ORAL at 08:06

## 2022-01-01 RX ADMIN — AMIODARONE HYDROCHLORIDE 100 MG: 100 TABLET ORAL at 08:38

## 2022-01-01 RX ADMIN — FUROSEMIDE 40 MG: 10 INJECTION, SOLUTION INTRAMUSCULAR; INTRAVENOUS at 08:08

## 2022-01-01 RX ADMIN — ATORVASTATIN CALCIUM 80 MG: 80 TABLET, FILM COATED ORAL at 09:56

## 2022-01-01 RX ADMIN — LORAZEPAM 0.5 MG: 0.5 TABLET ORAL at 22:24

## 2022-01-01 RX ADMIN — FUROSEMIDE 40 MG: 40 TABLET ORAL at 08:13

## 2022-01-01 RX ADMIN — PANTOPRAZOLE SODIUM 40 MG: 40 TABLET, DELAYED RELEASE ORAL at 05:21

## 2022-01-01 RX ADMIN — AMIODARONE HYDROCHLORIDE 200 MG: 200 TABLET ORAL at 08:48

## 2022-01-01 RX ADMIN — FUROSEMIDE 40 MG: 10 INJECTION, SOLUTION INTRAMUSCULAR; INTRAVENOUS at 09:58

## 2022-01-01 RX ADMIN — BENZONATATE 100 MG: 100 CAPSULE ORAL at 09:12

## 2022-01-01 RX ADMIN — FUROSEMIDE 60 MG: 10 INJECTION, SOLUTION INTRAVENOUS at 09:38

## 2022-01-01 RX ADMIN — ACETAMINOPHEN 650 MG: 325 TABLET, FILM COATED ORAL at 20:56

## 2022-01-01 RX ADMIN — ASPIRIN 81 MG: 81 TABLET, COATED ORAL at 08:13

## 2022-01-01 RX ADMIN — PANTOPRAZOLE SODIUM 40 MG: 40 TABLET, DELAYED RELEASE ORAL at 07:41

## 2022-01-01 RX ADMIN — METOPROLOL SUCCINATE 50 MG: 50 TABLET, EXTENDED RELEASE ORAL at 18:04

## 2022-01-01 RX ADMIN — ACETAMINOPHEN 650 MG: 650 SUSPENSION ORAL at 22:02

## 2022-01-01 RX ADMIN — METOPROLOL SUCCINATE 75 MG: 25 TABLET, EXTENDED RELEASE ORAL at 17:00

## 2022-01-01 RX ADMIN — AMIODARONE HYDROCHLORIDE 100 MG: 100 TABLET ORAL at 08:13

## 2022-01-01 RX ADMIN — ATORVASTATIN CALCIUM 80 MG: 80 TABLET, FILM COATED ORAL at 08:05

## 2022-01-01 RX ADMIN — AMIODARONE HYDROCHLORIDE 1 MG/MIN: 50 INJECTION, SOLUTION INTRAVENOUS at 12:08

## 2022-01-01 RX ADMIN — FENTANYL CITRATE 25 MCG: 50 INJECTION INTRAMUSCULAR; INTRAVENOUS at 16:52

## 2022-01-01 RX ADMIN — METOPROLOL SUCCINATE 75 MG: 50 TABLET, EXTENDED RELEASE ORAL at 08:00

## 2022-01-01 RX ADMIN — METOPROLOL SUCCINATE 75 MG: 50 TABLET, EXTENDED RELEASE ORAL at 18:36

## 2022-01-01 RX ADMIN — ACETAMINOPHEN 650 MG: 325 TABLET, FILM COATED ORAL at 04:56

## 2022-01-01 RX ADMIN — LISINOPRIL 40 MG: 20 TABLET ORAL at 22:19

## 2022-01-01 RX ADMIN — WARFARIN SODIUM 2.5 MG: 2.5 TABLET ORAL at 17:10

## 2022-01-01 RX ADMIN — WARFARIN SODIUM 2 MG: 2 TABLET ORAL at 18:34

## 2022-01-01 RX ADMIN — ATORVASTATIN CALCIUM 80 MG: 80 TABLET, FILM COATED ORAL at 17:30

## 2022-01-01 RX ADMIN — ACETAMINOPHEN 650 MG: 325 TABLET, FILM COATED ORAL at 12:16

## 2022-01-01 RX ADMIN — METOPROLOL SUCCINATE 75 MG: 50 TABLET, EXTENDED RELEASE ORAL at 18:33

## 2022-01-01 RX ADMIN — MAGNESIUM SULFATE HEPTAHYDRATE 1 G: 1 INJECTION, SOLUTION INTRAVENOUS at 22:02

## 2022-01-01 RX ADMIN — FUROSEMIDE 60 MG: 10 INJECTION, SOLUTION INTRAMUSCULAR; INTRAVENOUS at 16:50

## 2022-01-01 RX ADMIN — PANTOPRAZOLE SODIUM 40 MG: 40 INJECTION, POWDER, FOR SOLUTION INTRAVENOUS at 08:38

## 2022-01-01 RX ADMIN — ATORVASTATIN CALCIUM 80 MG: 80 TABLET, FILM COATED ORAL at 08:48

## 2022-01-01 RX ADMIN — BENZONATATE 100 MG: 100 CAPSULE ORAL at 09:21

## 2022-01-01 RX ADMIN — WARFARIN SODIUM 6 MG: 3 TABLET ORAL at 18:34

## 2022-01-01 RX ADMIN — CALCIUM CARBONATE (ANTACID) CHEW TAB 500 MG 1000 MG: 500 CHEW TAB at 23:24

## 2022-01-01 RX ADMIN — ATORVASTATIN CALCIUM 80 MG: 80 TABLET, FILM COATED ORAL at 15:58

## 2022-01-01 RX ADMIN — ATORVASTATIN CALCIUM 80 MG: 80 TABLET, FILM COATED ORAL at 08:23

## 2022-01-01 RX ADMIN — SODIUM CHLORIDE 0.5 MCG/KG/HR: 9 INJECTION, SOLUTION INTRAVENOUS at 08:03

## 2022-01-01 RX ADMIN — METOPROLOL SUCCINATE 75 MG: 50 TABLET, EXTENDED RELEASE ORAL at 19:50

## 2022-01-01 RX ADMIN — METOPROLOL SUCCINATE 75 MG: 25 TABLET, EXTENDED RELEASE ORAL at 17:35

## 2022-01-01 RX ADMIN — HEPARIN SODIUM 17 UNITS/KG/HR: 10000 INJECTION, SOLUTION INTRAVENOUS; SUBCUTANEOUS at 23:31

## 2022-01-01 RX ADMIN — EPLERENONE 25 MG: 25 TABLET, FILM COATED ORAL at 09:59

## 2022-01-01 RX ADMIN — ACETAMINOPHEN 650 MG: 325 TABLET, FILM COATED ORAL at 04:12

## 2022-01-01 RX ADMIN — WARFARIN SODIUM 4 MG: 2 TABLET ORAL at 17:30

## 2022-01-01 RX ADMIN — MELATONIN TAB 3 MG 6 MG: 3 TAB at 20:31

## 2022-01-01 RX ADMIN — SODIUM BICARBONATE 25 MEQ: 84 INJECTION INTRAVENOUS at 10:49

## 2022-01-01 RX ADMIN — WARFARIN SODIUM 3 MG: 3 TABLET ORAL at 17:34

## 2022-01-01 RX ADMIN — POTASSIUM CHLORIDE 40 MEQ: 1500 TABLET, EXTENDED RELEASE ORAL at 18:06

## 2022-01-01 RX ADMIN — POTASSIUM CHLORIDE 40 MEQ: 1500 TABLET, EXTENDED RELEASE ORAL at 08:16

## 2022-01-01 RX ADMIN — METOPROLOL SUCCINATE 75 MG: 50 TABLET, EXTENDED RELEASE ORAL at 09:21

## 2022-01-01 RX ADMIN — AMIODARONE HYDROCHLORIDE 100 MG: 100 TABLET ORAL at 07:41

## 2022-01-01 RX ADMIN — WARFARIN SODIUM 2.5 MG: 2.5 TABLET ORAL at 18:36

## 2022-01-01 RX ADMIN — CEFDINIR 300 MG: 300 CAPSULE ORAL at 08:12

## 2022-01-01 RX ADMIN — EPLERENONE 25 MG: 25 TABLET, FILM COATED ORAL at 09:25

## 2022-01-01 RX ADMIN — PROPOFOL 30 MCG/KG/MIN: 10 INJECTION, EMULSION INTRAVENOUS at 03:45

## 2022-01-01 RX ADMIN — PREDNISONE 20 MG: 20 TABLET ORAL at 08:29

## 2022-01-01 RX ADMIN — ALBUMIN (HUMAN) 25 G: 0.25 INJECTION, SOLUTION INTRAVENOUS at 17:03

## 2022-01-01 RX ADMIN — EPLERENONE 25 MG: 25 TABLET, FILM COATED ORAL at 08:06

## 2022-01-01 RX ADMIN — Medication 3 MG: at 00:20

## 2022-01-01 RX ADMIN — AMIODARONE HYDROCHLORIDE 100 MG: 100 TABLET ORAL at 09:23

## 2022-01-01 RX ADMIN — SODIUM CHLORIDE SOLN NEBU 3% 4 ML: 3 NEBU SOLN at 21:37

## 2022-01-01 RX ADMIN — EPLERENONE 25 MG: 25 TABLET, FILM COATED ORAL at 08:08

## 2022-01-01 RX ADMIN — WARFARIN SODIUM 3 MG: 3 TABLET ORAL at 21:02

## 2022-01-01 RX ADMIN — FUROSEMIDE 40 MG: 40 TABLET ORAL at 08:49

## 2022-01-01 RX ADMIN — MAGNESIUM SULFATE HEPTAHYDRATE 2 G: 40 INJECTION, SOLUTION INTRAVENOUS at 12:24

## 2022-01-01 RX ADMIN — METOROPROLOL TARTRATE 2.5 MG: 5 INJECTION, SOLUTION INTRAVENOUS at 12:05

## 2022-01-01 RX ADMIN — IOHEXOL 100 ML: 350 INJECTION, SOLUTION INTRAVENOUS at 13:02

## 2022-01-01 RX ADMIN — FUROSEMIDE 40 MG: 10 INJECTION, SOLUTION INTRAVENOUS at 18:05

## 2022-01-01 RX ADMIN — AMIODARONE HYDROCHLORIDE 150 MG: 50 INJECTION, SOLUTION INTRAVENOUS at 11:42

## 2022-01-01 RX ADMIN — LORAZEPAM 1 MG: 2 INJECTION INTRAMUSCULAR; INTRAVENOUS at 13:51

## 2022-01-01 RX ADMIN — METOPROLOL SUCCINATE 75 MG: 50 TABLET, EXTENDED RELEASE ORAL at 08:08

## 2022-01-01 RX ADMIN — METOPROLOL SUCCINATE 75 MG: 50 TABLET, EXTENDED RELEASE ORAL at 19:28

## 2022-01-01 RX ADMIN — EPLERENONE 25 MG: 25 TABLET, FILM COATED ORAL at 08:38

## 2022-01-01 RX ADMIN — HEPARIN SODIUM 1800 UNITS: 1000 INJECTION INTRAVENOUS; SUBCUTANEOUS at 18:14

## 2022-01-01 RX ADMIN — METOPROLOL SUCCINATE 75 MG: 50 TABLET, EXTENDED RELEASE ORAL at 19:26

## 2022-01-01 RX ADMIN — PROPOFOL 40.51 MCG/KG/MIN: 10 INJECTION, EMULSION INTRAVENOUS at 15:54

## 2022-01-01 RX ADMIN — WARFARIN SODIUM 4 MG: 4 TABLET ORAL at 20:31

## 2022-01-01 RX ADMIN — DILTIAZEM HYDROCHLORIDE 5 MG/HR: 5 INJECTION INTRAVENOUS at 14:16

## 2022-01-01 RX ADMIN — METOPROLOL SUCCINATE 75 MG: 50 TABLET, EXTENDED RELEASE ORAL at 20:52

## 2022-01-01 RX ADMIN — ASPIRIN 81 MG: 81 TABLET, COATED ORAL at 08:05

## 2022-01-01 RX ADMIN — ACETAMINOPHEN 650 MG: 325 TABLET, FILM COATED ORAL at 21:32

## 2022-01-01 RX ADMIN — LISINOPRIL 10 MG: 10 TABLET ORAL at 08:24

## 2022-01-01 RX ADMIN — ASPIRIN 81 MG CHEWABLE TABLET 81 MG: 81 TABLET CHEWABLE at 08:30

## 2022-01-01 RX ADMIN — LORAZEPAM 0.5 MG: 0.5 TABLET ORAL at 22:17

## 2022-01-01 RX ADMIN — AMIODARONE HYDROCHLORIDE 200 MG: 200 TABLET ORAL at 09:56

## 2022-01-01 RX ADMIN — METOPROLOL SUCCINATE 75 MG: 50 TABLET, EXTENDED RELEASE ORAL at 09:13

## 2022-01-01 RX ADMIN — AMIODARONE HYDROCHLORIDE 100 MG: 100 TABLET ORAL at 11:21

## 2022-01-01 RX ADMIN — POTASSIUM CHLORIDE 40 MEQ: 20 SOLUTION ORAL at 14:04

## 2022-01-01 RX ADMIN — BUMETANIDE 2 MG: 0.25 INJECTION INTRAMUSCULAR; INTRAVENOUS at 18:14

## 2022-01-01 RX ADMIN — ACETAMINOPHEN 650 MG: 325 TABLET, FILM COATED ORAL at 20:21

## 2022-01-01 RX ADMIN — PANTOPRAZOLE SODIUM 40 MG: 40 TABLET, DELAYED RELEASE ORAL at 05:23

## 2022-01-01 RX ADMIN — LISINOPRIL 20 MG: 20 TABLET ORAL at 21:48

## 2022-01-01 RX ADMIN — AMIODARONE HYDROCHLORIDE 100 MG: 100 TABLET ORAL at 09:21

## 2022-01-01 RX ADMIN — ASPIRIN 81 MG CHEWABLE TABLET 81 MG: 81 TABLET CHEWABLE at 08:23

## 2022-01-01 RX ADMIN — FUROSEMIDE 20 MG: 10 INJECTION, SOLUTION INTRAMUSCULAR; INTRAVENOUS at 01:32

## 2022-01-01 RX ADMIN — HEPARIN SODIUM 4400 UNITS: 1000 INJECTION INTRAVENOUS; SUBCUTANEOUS at 22:42

## 2022-01-01 RX ADMIN — AMIODARONE HYDROCHLORIDE 100 MG: 100 TABLET ORAL at 07:57

## 2022-01-01 RX ADMIN — ASPIRIN 81 MG: 81 TABLET, COATED ORAL at 09:12

## 2022-01-01 RX ADMIN — Medication 10 MG/HR: at 12:57

## 2022-01-01 RX ADMIN — WARFARIN SODIUM 2.5 MG: 2.5 TABLET ORAL at 17:20

## 2022-01-01 RX ADMIN — DEXAMETHASONE SODIUM PHOSPHATE 6.52 MG: 4 INJECTION, SOLUTION INTRA-ARTICULAR; INTRALESIONAL; INTRAMUSCULAR; INTRAVENOUS; SOFT TISSUE at 19:55

## 2022-01-01 RX ADMIN — ACETAMINOPHEN 650 MG: 325 TABLET, FILM COATED ORAL at 05:45

## 2022-01-01 RX ADMIN — LORAZEPAM 0.5 MG: 0.5 TABLET ORAL at 00:00

## 2022-01-01 RX ADMIN — LEVALBUTEROL HYDROCHLORIDE 1.25 MG: 1.25 SOLUTION, CONCENTRATE RESPIRATORY (INHALATION) at 21:37

## 2022-01-01 RX ADMIN — PANTOPRAZOLE SODIUM 40 MG: 40 INJECTION, POWDER, FOR SOLUTION INTRAVENOUS at 18:04

## 2022-01-01 RX ADMIN — ASPIRIN 81 MG CHEWABLE TABLET 81 MG: 81 TABLET CHEWABLE at 08:48

## 2022-01-01 RX ADMIN — METOPROLOL TARTRATE 50 MG: 50 TABLET, FILM COATED ORAL at 08:23

## 2022-01-01 RX ADMIN — EPHEDRINE SULFATE 5 MG: 50 INJECTION INTRAVENOUS at 17:33

## 2022-01-01 RX ADMIN — DEXAMETHASONE SODIUM PHOSPHATE 6.52 MG: 4 INJECTION, SOLUTION INTRA-ARTICULAR; INTRALESIONAL; INTRAMUSCULAR; INTRAVENOUS; SOFT TISSUE at 18:02

## 2022-01-01 RX ADMIN — WARFARIN SODIUM 2.5 MG: 2.5 TABLET ORAL at 21:33

## 2022-01-01 RX ADMIN — LEVALBUTEROL HYDROCHLORIDE 1.25 MG: 1.25 SOLUTION, CONCENTRATE RESPIRATORY (INHALATION) at 13:40

## 2022-01-01 RX ADMIN — LISINOPRIL 40 MG: 20 TABLET ORAL at 22:11

## 2022-01-01 RX ADMIN — DILTIAZEM HYDROCHLORIDE 15 MG: 5 INJECTION INTRAVENOUS at 13:56

## 2022-01-01 RX ADMIN — METOPROLOL SUCCINATE 75 MG: 50 TABLET, EXTENDED RELEASE ORAL at 21:48

## 2022-01-01 RX ADMIN — AMIODARONE HYDROCHLORIDE 200 MG: 200 TABLET ORAL at 08:05

## 2022-01-01 RX ADMIN — LEVALBUTEROL 1.25 MG: 1.25 SOLUTION, CONCENTRATE RESPIRATORY (INHALATION) at 09:07

## 2022-01-01 RX ADMIN — ATORVASTATIN CALCIUM 80 MG: 80 TABLET, FILM COATED ORAL at 15:57

## 2022-01-01 RX ADMIN — AMIODARONE HYDROCHLORIDE 0.5 MG/MIN: 50 INJECTION, SOLUTION INTRAVENOUS at 17:56

## 2022-01-01 RX ADMIN — ACETAMINOPHEN 650 MG: 325 TABLET ORAL at 22:56

## 2022-01-01 RX ADMIN — ASPIRIN 81 MG: 81 TABLET, COATED ORAL at 08:00

## 2022-01-01 SDOH — ECONOMIC STABILITY - INCOME SECURITY: PROBLEM RELATED TO HOUSING AND ECONOMIC CIRCUMSTANCES, UNSPECIFIED: Z59.9

## 2022-01-05 NOTE — PROGRESS NOTES
[place] [address]  coding opportunities          Number of diagnosis code(s) already on the problem list added to FYI flag: 3                     Patients insurance company: [employer]     Visit status: Patient ""no showed"" to the scheduled appointment        [place] [address]  coding opportunities     DX: I25 119 Coronary artery disease involving native coronary artery of native heart with angina pectoris- on problem list  DX: I50 43 Acute on chronic combined systolic and diastolic CHF (congestive heart failure)- on problem list  DX: I49 01Ventricular fibrillation- on problem list     Number of diagnosis code(s) already on the problem list added to FYI flag: 3                     Patients insurance company: [employer]

## 2022-01-09 PROBLEM — F19.10 DRUG ABUSE (HCC): Status: ACTIVE | Noted: 2022-01-01

## 2022-01-09 PROBLEM — I50.43 ACUTE ON CHRONIC SYSTOLIC AND DIASTOLIC HEART FAILURE, NYHA CLASS 3 (HCC): Status: ACTIVE | Noted: 2022-01-01

## 2022-01-09 PROBLEM — I35.0 AORTIC STENOSIS: Status: ACTIVE | Noted: 2022-01-01

## 2022-01-09 PROBLEM — U07.1 COVID-19 VIRUS INFECTION: Status: ACTIVE | Noted: 2022-01-01

## 2022-01-09 PROBLEM — I25.5 ISCHEMIC CARDIOMYOPATHY: Status: ACTIVE | Noted: 2022-01-01

## 2022-01-09 PROBLEM — I49.01 VENTRICULAR FIBRILLATION (HCC): Status: ACTIVE | Noted: 2022-01-01

## 2022-01-09 NOTE — ED NOTES
Patient's niece called for update   Update given and contact information added to chart     Larisa Ojeda RN  01/09/22 1814

## 2022-01-09 NOTE — ED PROVIDER NOTES
History  Chief Complaint   Patient presents with    Respiratory Distress     patient with respiratory distress and rapid HR pre hospital  2 rounds of sdenosine pre hosp with no break in rhythm  provider at bedside for arrival     Pt biba for tachycardia and severe respiratory distress  Unable to get much history due to his distress  Pt able to nod/shake head  Per EMS presumed to be in SVT w/ pulse ox reported ?in the 80s  Pt arrived on non-rebreather, audible crackes and severe distress/tripoding  pts rate somewhat irregular and pt nodded yes when asked if he had a history of an irregular heart beat  Unable to obtain any other history from patient at this time  History provided by:  Patient and EMS personnel  History limited by:  Severe respiratory distress   used: No    Shortness of Breath  Severity:  Severe  Onset quality:  Unable to specify  Timing:  Unable to specify  Progression:  Unable to specify  Chronicity: unknown  Context comment:  Unable to specify  Relieved by:  Sitting up  Worsened by: Movement and activity (laying down)  Associated symptoms: chest pain, cough and diaphoresis        None       No past medical history on file  No past surgical history on file  No family history on file  I have reviewed and agree with the history as documented  No existing history information found  No existing history information found  Social History     Tobacco Use    Smoking status: Not on file    Smokeless tobacco: Not on file   Substance Use Topics    Alcohol use: Not on file    Drug use: Not on file       Review of Systems   Unable to perform ROS: Severe respiratory distress   Constitutional: Positive for diaphoresis  Respiratory: Positive for cough and shortness of breath  Cardiovascular: Positive for chest pain  Physical Exam  Physical Exam  Vitals and nursing note reviewed  Constitutional:       General: He is in acute distress  Appearance: He is diaphoretic  HENT:      Nose: Nose normal       Mouth/Throat:      Mouth: Mucous membranes are dry  Eyes:      Conjunctiva/sclera: Conjunctivae normal       Pupils: Pupils are equal, round, and reactive to light  Neck:      Vascular: JVD present  Cardiovascular:      Rate and Rhythm: Tachycardia present  Rhythm irregular  Comments: Exam limited 2/2 pt severe respiratory distress / lung findings  Pulmonary:      Effort: Tachypnea, accessory muscle usage and respiratory distress present  Breath sounds: Decreased air movement present  Rales (coarse crackles throughout) present  Comments: Audible crackles noted  Abdominal:      Palpations: Abdomen is soft  Tenderness: There is no abdominal tenderness  Musculoskeletal:         General: No swelling or tenderness  Cervical back: Normal range of motion  Skin:     Comments: Hands and feet cold   Neurological:      Mental Status: He is alert  GCS: GCS eye subscore is 4  GCS verbal subscore is 3  GCS motor subscore is 5  Comments: Moves all extremities equally, no gross motot/sensory deficits           Vital Signs  ED Triage Vitals   Temperature Pulse Respirations Blood Pressure SpO2   01/09/22 1729 01/09/22 1348 01/09/22 1348 01/09/22 1348 01/09/22 1407   (!) 100 8 °F (38 2 °C) (!) 145 (!) 36 156/92 95 %      Temp Source Heart Rate Source Patient Position - Orthostatic VS BP Location FiO2 (%)   01/09/22 1532 01/09/22 1348 01/09/22 1407 01/09/22 1407 --   Oral Monitor Lying Right arm       Pain Score       01/09/22 1434       No Pain           Vitals:    01/09/22 1532 01/09/22 1625 01/09/22 1651 01/09/22 1724   BP: 137/93 151/93 127/71 135/77   Pulse: 101 100 100 99   Patient Position - Orthostatic VS: Lying Lying Lying Lying         Visual Acuity      ED Medications  Medications   propofol (DIPRIVAN) 1000 mg in 100 mL infusion (premix) (40 513 mcg/kg/min × 65 kg Intravenous New Bag 1/9/22 6765)   heparin (porcine) subcutaneous injection 5,000 Units (5,000 Units Subcutaneous Given 1/9/22 1730)   LORazepam (ATIVAN) injection 1 mg (1 mg Intravenous Given 1/9/22 1351)   diltiazem (CARDIZEM) injection 15 mg (15 mg Intravenous Given 1/9/22 1356)   adenosine (FOR EMS ONLY) (ADENOCARD) 6 mg/2 mL injection 6 mg (0 mg Does not apply Given to EMS 1/9/22 1357)   diltiazem (CARDIZEM) 125 mg in sodium chloride 0 9 % 125 mL infusion (5 mg/hr Intravenous New Bag 1/9/22 1416)   LORazepam (ATIVAN) injection 2 mg (2 mg Intravenous Given 1/9/22 1439)   furosemide (LASIX) injection 40 mg (40 mg Intravenous Given 1/9/22 1506)   etomidate (AMIDATE) 2 mg/mL injection 20 mg (20 mg Intravenous Given 1/9/22 1516)   Succinylcholine Chloride 100 mg/5 mL syringe 100 mg (100 mg Intravenous Given 1/9/22 1519)   furosemide (LASIX) injection 40 mg (40 mg Intravenous Given 1/9/22 1635)       Diagnostic Studies  Results Reviewed     Procedure Component Value Units Date/Time    HS Troponin I 4hr [[id number]] Collected: 01/09/22 1726    Lab Status: No result Specimen: Blood from Arm, Right     Platelet count [[id number]] Collected: 01/09/22 1726    Lab Status: No result Specimen: Blood from Arm, Right     Rapid drug screen, urine [[id number]]  (Abnormal) Collected: 01/09/22 1653    Lab Status: Final result Specimen: Urine, Catheter Updated: 01/09/22 1722     Amph/Meth UR Negative     Barbiturate Ur Negative     Benzodiazepine Urine Negative     Cocaine Urine Positive     Methadone Urine Negative     Opiate Urine Negative     PCP Ur Negative     THC Urine Positive     Oxycodone Urine Negative    Narrative:      Presumptive report  If requested, specimen will be sent to reference lab for confirmation  FOR MEDICAL PURPOSES ONLY  IF CONFIRMATION NEEDED PLEASE CONTACT THE LAB WITHIN 5 DAYS      Drug Screen Cutoff Levels:  AMPHETAMINE/METHAMPHETAMINES  1000 ng/mL  BARBITURATES     200 ng/mL  BENZODIAZEPINES     200 ng/mL  COCAINE      300 ng/mL  METHADONE      300 ng/mL  OPIATES      300 ng/mL  PHENCYCLIDINE     25 ng/mL  THC       50 ng/mL  OXYCODONE      100 ng/mL    HS Troponin I 2hr [[id number]]  (Abnormal) Collected: 01/09/22 1631    Lab Status: Final result Specimen: Blood from Arm, Right Updated: 01/09/22 1721     hs TnI 2hr 221 ng/L      Delta 2hr hsTnI 173 ng/L     COVID/FLU/RSV - 2 hour TAT [[id number]]  (Abnormal) Collected: 01/09/22 1631    Lab Status: Final result Specimen: Nares from Nose Updated: 01/09/22 1720     SARS-CoV-2 Positive     INFLUENZA A PCR Negative     INFLUENZA B PCR Negative     RSV PCR Negative    Narrative:      FOR PEDIATRIC PATIENTS - copy/paste COVID Guidelines URL to browser: [email or web address]  [email or web address]    SARS-CoV-2 assay is a Nucleic Acid Amplification assay intended for the  qualitative detection of nucleic acid from SARS-CoV-2 in nasopharyngeal  swabs  Results are for the presumptive identification of SARS-CoV-2 RNA  Positive results are indicative of infection with SARS-CoV-2, the virus  causing COVID-19, but do not rule out bacterial infection or co-infection  with other viruses  Laboratories within the [place] and its  territories are required to report all positive results to the appropriate  public health authorities  Negative results do not preclude SARS-CoV-2  infection and should not be used as the sole basis for treatment or other  patient management decisions  Negative results must be combined with  clinical observations, patient history, and epidemiological information  This test has not been FDA cleared or approved  This test has been authorized by FDA under an Emergency Use Authorization  (EUA)   This test is only authorized for the duration of time the  declaration that circumstances exist justifying the authorization of the  emergency use of an in vitro diagnostic tests for detection of SARS-CoV-2  virus and/or diagnosis of COVID-19 infection under section 564(b)(1) of  the Act, 21 U  S C  892GZY-3(L)(4), unless the authorization is terminated  or revoked sooner  The test has been validated but independent review by FDA  and CLIA is pending  Test performed using [employer] Gene[employer]pert: This RT-PCR assay targets N2,  a region unique to SARS-CoV-2  A conserved region in the E-gene was chosen  for pan-Sarbecovirus detection which includes SARS-CoV-2      Blood gas, arterial [[id number]]  (Abnormal) Collected: 01/09/22 1627    Lab Status: Final result Specimen: Blood, Arterial from Brachial, Right Updated: 01/09/22 1634     pH, Arterial 7 282     pCO2, Arterial 36 8 mm Hg      pO2, Arterial 60 8 mm Hg      HCO3, Arterial 17 0 mmol/L      Base Excess, Arterial -8 9 mmol/L      O2 Content, Arterial 18 4 mL/dL      O2 HGB,Arterial  87 7 %      SOURCE Brachial, Right     LUCY TEST Yes     Vent Type- AC AC     AC Rate 16     Tidal Volume 400 ml      Inspired Air (FIO2) 100     PEEP 10    Protime-INR [[id number]]  (Abnormal) Collected: 01/09/22 1355    Lab Status: Final result Specimen: Blood from Arm, Right Updated: 01/09/22 1442     Protime 19 1 seconds      INR 1 65    APTT [[id number]]  (Abnormal) Collected: 01/09/22 1355    Lab Status: Final result Specimen: Blood from Arm, Right Updated: 01/09/22 1442     PTT 38 seconds     NT-BNP PRO [[id number]]  (Abnormal) Collected: 01/09/22 1355    Lab Status: Final result Specimen: Blood from Arm, Right Updated: 01/09/22 1440     NT-proBNP 13,741 pg/mL     Comprehensive metabolic panel [[id number]]  (Abnormal) Collected: 01/09/22 1355    Lab Status: Final result Specimen: Blood from Arm, Right Updated: 01/09/22 1432     Sodium 138 mmol/L      Potassium 4 5 mmol/L      Chloride 98 mmol/L      CO2 19 mmol/L      ANION GAP 21 mmol/L      BUN 11 mg/dL      Creatinine 1 32 mg/dL      Glucose 203 mg/dL      Calcium 9 0 mg/dL      AST 31 U/L      ALT 18 U/L      Alkaline Phosphatase 112 U/L      Total Protein 7 8 g/dL Albumin 3 5 g/dL      Total Bilirubin 1 49 mg/dL      eGFR 30 ml/min/1 73sq m     Narrative:      [place] guidelines for Chronic Kidney Disease (CKD):     Stage 1 with normal or high GFR (GFR > 90 mL/min/1 73 square meters)    Stage 2 Mild CKD (GFR = 60-89 mL/min/1 73 square meters)    Stage 3A Moderate CKD (GFR = 45-59 mL/min/1 73 square meters)    Stage 3B Moderate CKD (GFR = 30-44 mL/min/1 73 square meters)    Stage 4 Severe CKD (GFR = 15-29 mL/min/1 73 square meters)    Stage 5 End Stage CKD (GFR <15 mL/min/1 73 square meters)  Note: GFR calculation is accurate only with a steady state creatinine    HS Troponin 0hr (reflex protocol) [[id number]]  (Normal) Collected: 01/09/22 1355    Lab Status: Final result Specimen: Blood from Arm, Right Updated: 01/09/22 1426     hs TnI 0hr 48 ng/L     CBC and differential [[id number]]  (Abnormal) Collected: 01/09/22 1355    Lab Status: Final result Specimen: Blood from Arm, Right Updated: 01/09/22 1401     WBC 10 81 Thousand/uL      RBC 5 20 Million/uL      Hemoglobin 14 6 g/dL      Hematocrit 47 2 %      MCV 91 fL      MCH 28 1 pg      MCHC 30 9 g/dL      RDW 15 0 %      MPV 10 7 fL      Platelets 351 Thousands/uL      nRBC 0 /100 WBCs      Neutrophils Relative 63 %      Immat GRANS % 1 %      Lymphocytes Relative 22 %      Monocytes Relative 13 %      Eosinophils Relative 0 %      Basophils Relative 1 %      Neutrophils Absolute 6 93 Thousands/µL      Immature Grans Absolute 0 07 Thousand/uL      Lymphocytes Absolute 2 33 Thousands/µL      Monocytes Absolute 1 39 Thousand/µL      Eosinophils Absolute 0 01 Thousand/µL      Basophils Absolute 0 08 Thousands/µL                  XR chest 1 view portable   ED Interpretation by Flory Giordano DO (01/09 1418)   Abnormal   Cardiomegaly, pulmonary edema w/ consolidation in the right base      XR chest 1 view portable    (Results Pending)              Procedures  Intubation    Date/Time: 1/9/2022 3:15 PM  Performed by: Jone Sosa DO  Authorized by: Jone Sosa DO     Patient location:  ED  Other Assisting Provider: Yes (comment)    Consent:     Consent obtained:  Emergent situation  Universal protocol:     Patient identity confirmed:  Arm band  Pre-procedure details:     Patient status:  Altered mental status    Mallampati score:  4    Pretreatment medications:  Etomidate    Paralytics:  Succinylcholine  Indications:     Indications for intubation: respiratory distress, respiratory failure and airway protection    Procedure details:     Preoxygenation:  BiPAP    CPR in progress: no      Intubation method:  Oral    Oral intubation technique:  Direct    Laryngoscope blade:   Mac 3    Tube size (mm):  7 5    Tube type:  Cuffed    Number of attempts:  1    Cricoid pressure: yes      Tube visualized through cords: yes    Placement assessment:     ETT to lip:  24    ETT to teeth:  23    Tube secured with:  ETT penn    Breath sounds:  Equal and absent over the epigastrium    Placement verification: chest rise, condensation, CXR verification, ETCO2 detector and tube exhalation      ECG 12 Lead Documentation Only    Date/Time: 1/9/2022 1:55 PM  Performed by: Jone Sosa DO  Authorized by: Jone Sosa DO     ECG reviewed by me, the ED Provider: yes    Patient location:  ED  Previous ECG:     Previous ECG:  Unavailable  Interpretation:     Interpretation: abnormal    Rate:     ECG rate:  147    ECG rate assessment: tachycardic    Rhythm:     Rhythm: atrial fibrillation and atrial flutter    QRS:     QRS axis:  Right  Conduction:     Conduction: abnormal      Abnormal conduction: complete LBBB    ST segments:     ST segments:  Non-specific  T waves:     T waves: non-specific    ECG 12 Lead Documentation Only    Date/Time: 1/9/2022 4:01 PM  Performed by: Jone Sosa DO  Authorized by: Jone Sosa DO     ECG reviewed by me, the ED Provider: yes    Patient location:  ED  Previous ECG: Previous ECG:  Compared to current    Similarity:  Changes noted  Interpretation:     Interpretation: abnormal    Rate:     ECG rate:  103    ECG rate assessment: tachycardic    Rhythm:     Rhythm: atrial fibrillation    QRS:     QRS axis:  Right  Conduction:     Conduction: abnormal      Abnormal conduction: complete LBBB    ST segments:     ST segments:  Abnormal    Depression:  II, III and aVF  T waves:     T waves: inverted      Inverted:  II, III and aVF  CriticalCare Time  Performed by: Zackary Jewell DO  Authorized by: Zackary Jewell DO     Critical care provider statement:     Critical care time (minutes):  60    Critical care time was exclusive of:  Separately billable procedures and treating other patients and teaching time    Critical care was necessary to treat or prevent imminent or life-threatening deterioration of the following conditions:  Cardiac failure, circulatory failure and respiratory failure    Critical care was time spent personally by me on the following activities:  Blood draw for specimens, obtaining history from patient or surrogate, development of treatment plan with patient or surrogate, evaluation of patient's response to treatment, examination of patient, review of old charts, re-evaluation of patient's condition, ordering and review of radiographic studies, ordering and review of laboratory studies and ordering and performing treatments and interventions    I assumed direction of critical care for this patient from another provider in my specialty: no               ED Course  ED Course as of 01/09/22 1734   Angy Lopez Jan 09, 2022   1415 Will attempt to interrogate ICD to see if we can find a name that way      1425 Just informed that interrogation unit was not charged - they'll do the interrogation as soon as they can    1443 NT-proBNP(!): 13,741  PCR w/ sig respiratory edema and cardiomegaly     1444 Having staff attempt to contact EMS unit that brought pt in to find out some more information   1502 Information obtained and chart reviewed  Pt still quite agitated despite a total of 3mg of ativan IV  Soft restraints on - still bending over to pull tube/bipap mask off    1510 Pt still significantly agitated, still pulling at BiPAP, leads and other monitoring equipment  Will intubation at this point for his severe respiratory distress, airway protection and delirium/enceophalopathy  Once ETT placed and on ventilator, will get ABG in about 15 minutes to eval   1531 Intubation successful  HR improved now that pt no longer agitated  Will get cxr to confirm placement, repeat EKG now that HR improved and get ABG at 1545   1535 Pt information obtained and chart marked for MERGE    Additional hx:    Studies- personally reviewed by me  Echo 3/18/21:   LVEF: 18%, grade 2 DD  LVEDd:6 8 cm  RV: mildly dilated, mildly reduced  Mechanical AV, normal velocities  Moderate TR  PASP: 60-65 mmHg     Echocardiogram 1/8/20  LVEF: 15-20%  LVIDd: 5 7 cm  RV: mildly enlarged, lower RVSF, TAPSE 1 6  MR: mild to moderate  PASP:  RVOT:   Other: normal mechanical aortic valve function     Nuclear stress test 03/13/2017: Non-diagnostic stress EKG  "Large, severe, fixed myocardial perfusion defect of the entire anterior wall, with a complete fixed perfusion defect of the apex without reversibility and of the entire inferior wall, extending to the mid-basal inferolateral wall without reversibility " LVEF 18%  [address] w/ girlfriend, Smiley Anton - [phone]  She said a neighbor called 911 b/c pt went to her and said he was doing well  Smiley Anton doesn't live w/ patient - notes he lives alone  She said he seemed fine the last few days with no recent illness, but then said that he doesn't tell her anything  Tiera Chase is sister's name is Alethea - doesn't have phone number     2588 Attemtpted to find emergency contact information in pt's original chart - none listed     5777 XR chest 1 view portable  ETT position good, OGT below the diaphragm  Noted to have worsening pulmonary edema  Getting repeat EKG now that HR below 100   1627 Reviewed some more of patient's chart  No emergency contact  Pt w/ hx of substance abuse - UDS ordered w/ last admittion +for THC and cocaine - added lab    Contacted [facility] for admission                                             MDM  Number of Diagnoses or Management Options  Acute pulmonary edema [facility]): new and requires workup  Acute respiratory failure [facility]): new and requires workup  Atrial fibrillation with rapid ventricular response [facility]): new and requires workup  Diagnosis management comments: 59y M - eventually able to obtain pt name and old charts, d/w girlfriend  Seemed to a relatively sudden onset of severe dyspnea and chest discomfort - neighbor called 911  Girlfriend denies recent illness, no recent med changes  Had been doing well      Pt will be admitted to the ICU       Amount and/or Complexity of Data Reviewed  Clinical lab tests: ordered and reviewed  Tests in the radiology section of CPT®: ordered and reviewed  Tests in the medicine section of CPT®: ordered and reviewed  Obtain history from someone other than the patient: yes  Independent visualization of images, tracings, or specimens: yes    Patient Progress  Patient progress: stable      Disposition  Final diagnoses:   Acute respiratory failure ([address] )   Acute pulmonary edema ([facility] )   At[facility] fibrillation with rapid ventricular response [facility] )     Time reflects when diagnosis was documented in both MDM as applicable and the Disposition within this note     Time User Action Codes Description Comment    1/9/2022  4:29 PM Emma Alamo Add [J96 00] Acute respiratory failure ([facility] [address] )     1/9/2022  4:29 PM Emma Alamo Add [J81 0] Acute pulmonary edema ([address] )     1/9/2022  4:29 PM Emma Alamo Add [I48 91] Atrial fibrillation with rapid ventricular response [facility])       ED Disposition     ED Disposition Condition Date/Time Comment    Admit [facility] Sun Jan 9, 2022  4:29 PM ICU        Follow-up Information    None         Patient's Medications    No medications on file       No discharge procedures on file      PDMP Review       Value Time User    PDMP Reviewed  Yes 1/9/2022  4:07 PM Karen Garay DO          ED Provider  Electronically Signed by           Karen Garay DO  01/09/22 1736

## 2022-01-09 NOTE — ASSESSMENT & PLAN NOTE
· Start patient on the very severe pathway as he is intubated  However, I believe the patient's hypoxia is more related to his congestive heart failure    · White blood cell count is 10  · COVID markers pending

## 2022-01-09 NOTE — H&P
[address] 1965, 61 y o  male MRN: [id number]  Unit/Bed#: ED 16 Encounter: [id number]  Primary Care Provider: No primary care provider on file  Date and time admitted to hospital: 1/9/2022  1:44 PM    COVID-19 virus infection  Assessment & Plan  · Start patient on the very severe pathway as he is intubated  However, I believe the patient's hypoxia is more related to his congestive heart failure  · White blood cell count is 10  · COVID markers pending    Ventricular fibrillation (HCC)  Assessment & Plan  · History of V-tach and ventricular fibrillation  · Status post biventricular ICD    Drug abuse ([facility] [address] )  Assessment & Plan  · UDS positive for THC and cocaine    Ischemic cardiomyopathy  Assessment & Plan  · Status post ICD    Aortic stenosis  Assessment & Plan  · Status post AVR in the 1980s    * Acute on chronic systolic and diastolic heart failure, NYHA class 3 ([facility])  Assessment & Plan  Wt Readings from Last 3 Encounters:   01/09/22 65 kg (143 lb 4 8 oz)   · Chest x-ray demonstrates volume overload  · Left ventricular ejection fraction equal to 18%  · Status post ICD  · Pro BNP greater than 13,000  · Status post PTCA with BMS to the left anterior descending artery and left circumflex  · Continue diuresis  · Monitor kidney indices  · Monitor electrolytes, replete as needed        HPI:    Drake Almaraz is a 61 y o  male who was home at home and developed respiratory distress  EMS was activated  Upon their arrival patient was found to be in sinus ventricular tachycardia  He was given 2 rounds of adenosine with no affect  Patient was subsequently transported to [facility] for further evaluation treatment  Her emergency department personnel the patient was in significant distress when he arrived he was unable to speak he was only able to nod his head and shake his head no    He was on a non-rebreather upon arrival with loud crackles  Given the patient's severe respiratory distress she was intubated and sedated  Upon my arrival to the emergency department the patient remains intubated and sedated  He does not respond to questions  He has a known past medical history significant for coronary artery disease, systolic and diastolic heart failure, ischemic cardiomyopathy, AVR, biventricular IC and V-tach, VFib  He will be admitted to the intensive care unit where he will require greater than 2 midnight stay  We will continue diuresis  As this is most likely an exacerbation of his congestive heart failure  Review of Systems:  Unable to obtain as the patient is intubated and sedated      Historical Information   No past medical history on file  No past surgical history on file  Social History   Social History     Substance and Sexual Activity   Alcohol Use Not on file     Social History     Substance and Sexual Activity   Drug Use Not on file     Social History     Tobacco Use   Smoking Status Not on file   Smokeless Tobacco Not on file       Family History:   No family history on file  Medications:  Pertinent medications were reviewed  Current Facility-Administered Medications   Medication Dose Route Frequency Provider Last Rate    heparin (porcine)  5,000 Units Subcutaneous [facility] Imagene NICOLE Garcia      propofol  5-50 mcg/kg/min Intravenous Titrated Emma Alamo DO 40 513 mcg/kg/min (01/09/22 1554)         No Known Allergies      Vitals:   /77 (BP Location: Right arm)   Pulse 99   Temp (!) 100 8 °F (38 2 °C) (Probe)   Resp 20   Wt 65 kg (143 lb 4 8 oz)   SpO2 96%   There is no height or weight on file to calculate BMI    SpO2: 96 %,   SpO2 Activity: At Rest,   O2 Device: Ventilator    No intake or output data in the 24 hours ending 01/09/22 4003  Invasive Devices  Report    Peripheral Intravenous Line            Peripheral IV 01/09/22 Left Antecubital <1 day    Peripheral IV 01/09/22 Right Antecubital <1 day          Drain            NG/OG/Enteral Tube Orogastric 16 Fr Center mouth <1 day    Urethral Catheter Temperature probe <1 day          Airway            ETT  Cuffed 7 5 mm <1 day                Physical Exam:  Gen:  Intubated and sedated  HEENT:  Atraumatic normocephalic pupils equal round reactive to light extraocular movements intact sclerae anicteric oral pharynx is intubated  Neck:  Supple no lymphadenopathy, positive JVD at 15°  Chest:  Respirations per the vent, bibasilar crackles  Cor:  Tachycardic but regular  Abd:  Soft nontender with positive bowel sounds  Ext:  No clubbing cyanosis or edema  Neuro:  Sedated but moves all extremities prior to being intubated  Skin:  Warm dry and intact no rash      Diagnostic Data:  Lab: I have personally reviewed pertinent lab results  ,   CBC:  Results from last 7 days   Lab Units 01/09/22  1726 01/09/22  1355 01/09/22  1355   WBC Thousand/uL  --   --  10 81*   HEMOGLOBIN g/dL  --   --  14 6   HEMATOCRIT %  --   --  47 2   PLATELETS Thousands/uL 188   < > 179    < > = values in this interval not displayed        CMP:   Lab Results   Component Value Date    SODIUM 138 01/09/2022    K 4 5 01/09/2022    CL 98 (L) 01/09/2022    CO2 19 (L) 01/09/2022    BUN 11 01/09/2022    CREATININE 1 32 (H) 01/09/2022    CALCIUM 9 0 01/09/2022    AST 31 01/09/2022    ALT 18 01/09/2022    ALKPHOS 112 01/09/2022    EGFR 30 01/09/2022   ,   PT/INR:   Lab Results   Component Value Date    INR 1 65 (H) 01/09/2022   ,   Magnesium: No components found for: MAG,  Phosphorous: No results found for: PHOS    ABG:   Lab Results   Component Value Date    PHART 7 282 (L) 01/09/2022    QRA2UJV 36 8 01/09/2022    PO2ART 60 8 (L) 01/09/2022    XKR8SOT 17 0 (L) 01/09/2022    BEART -8 9 01/09/2022    SOURCE Brachial, Right 01/09/2022   ,     Microbiology:  COVID-19 positive    Imaging: I have personally reviewed the pertinent imaging studies on the PACS system  Chest x-ray shows bilateral ground-glass opacities    Cardiac/EKG/telemetry/Echo:   Results from last 7 days   Lab Units 01/09/22  1355   NT-PRO BNP pg/mL 13,741*     Sinus tachycardia      VTE Prophylaxis: Sequential compression device (Venodyne)     Code Status: Level 1 - Full Code    Peggy Fleischer, CRNP    Portions of the record may have been created with voice recognition software  Occasional wrong word or "sound a like" substitutions may have occurred due to the inherent limitations of voice recognition software  Read the chart carefully and recognize, using context, where substitutions have occurred

## 2022-01-09 NOTE — ED NOTES
Pt  Assessed, VSS, NAD, remains on vent  Urine sample sent to lab   Will continue to monitor     Bhavya Ludwig RN  01/09/22 6316

## 2022-01-09 NOTE — ASSESSMENT & PLAN NOTE
Wt Readings from Last 3 Encounters:   01/09/22 65 kg (143 lb 4 8 oz)   · Chest x-ray demonstrates volume overload  · Left ventricular ejection fraction equal to 18%  · Status post ICD  · Pro BNP greater than 13,000  · Status post PTCA with BMS to the left anterior descending artery and left circumflex  · Continue diuresis  · Monitor kidney indices  · Monitor electrolytes, replete as needed

## 2022-01-10 NOTE — PROGRESS NOTES
TF recommendation  [email or web address], advance by 10mL every 6hrs to goal of 48mL/hr, water flushes 145mL every 4hrs provides total of 1728cal, 73 5g, 1745mL

## 2022-01-10 NOTE — ASSESSMENT & PLAN NOTE
Wt Readings from Last 3 Encounters:   01/09/22 58 8 kg (129 lb 10 1 oz)   · Chest x-ray demonstrates volume overload  · Left ventricular ejection fraction equal to 18%  · Status post ICD  · Pro BNP greater than 13,000  · Status post PTCA with BMS to the left anterior descending artery and left circumflex  · Continue diuresis  · Monitor kidney indices  · Monitor electrolytes, replete as needed

## 2022-01-10 NOTE — ASSESSMENT & PLAN NOTE
· Start patient on the very severe pathway as he is intubated  However, I believe the patient's hypoxia is more related to his congestive heart failure    · His D dimer was also significantly elevated raising the possibility of PE as a contributing factor  · Started on a heparin infusion  · Will get lower extremity doppler studies  · May be able to get a CTA if his creatinine is improved today  · White blood cell count is 10  · COVID markers are elevated  · Placed on dexamethasone  · Placed on heparin as noted above  · Placed on ceftriaxone and doxycycline pending his procalcitonin levels

## 2022-01-10 NOTE — PLAN OF CARE
Problem: Potential for Falls  Goal: Patient will remain free of falls  Description: INTERVENTIONS:  - Educate patient/family on patient safety including physical limitations  - Instruct patient to call for assistance with activity   - Consult OT/PT to assist with strengthening/mobility   - Keep Call bell within reach  - Keep bed low and locked with side rails adjusted as appropriate  - Keep care items and personal belongings within reach  - Initiate and maintain comfort rounds  - Make Fall Risk Sign visible to staff  - Apply yellow socks and bracelet for high fall risk patients  - Consider moving patient to room near nurses station  Outcome: Progressing     Problem: MOBILITY - ADULT  Goal: Maintain or return to baseline ADL function  Description: INTERVENTIONS:  -  Assess patient's ability to carry out ADLs; assess patient's baseline for ADL function and identify physical deficits which impact ability to perform ADLs (bathing, care of mouth/teeth, toileting, grooming, dressing, etc )  - Assess/evaluate cause of self-care deficits   - Assess range of motion  - Assess patient's mobility; develop plan if impaired  - Assess patient's need for assistive devices and provide as appropriate  - Encourage maximum independence but intervene and supervise when necessary  - Involve family in performance of ADLs  - Assess for home care needs following discharge   - Consider OT consult to assist with ADL evaluation and planning for discharge  - Provide patient education as appropriate  Outcome: Progressing  Goal: Maintains/Returns to pre admission functional level  Description: INTERVENTIONS:  - Perform BMAT or MOVE assessment daily    - Set and communicate daily mobility goal to care team and patient/family/caregiver     - Collaborate with rehabilitation services on mobility goals if consulted  - Out of bed for toileting  - Record patient progress and toleration of activity level   Outcome: Progressing     Problem: Prexisting or High Potential for Compromised Skin Integrity  Goal: Skin integrity is maintained or improved  Description: INTERVENTIONS:  - Identify patients at risk for skin breakdown  - Assess and monitor skin integrity  - Assess and monitor nutrition and hydration status  - Monitor labs   - Assess for incontinence   - Turn and reposition patient  - Assist with mobility/ambulation  - Relieve pressure over bony prominences  - Avoid friction and shearing  - Provide appropriate hygiene as needed including keeping skin clean and dry  - Evaluate need for skin moisturizer/barrier cream  - Collaborate with interdisciplinary team   - Patient/family teaching  - Consider wound care consult   Outcome: Progressing     Problem: PAIN - ADULT  Goal: Verbalizes/displays adequate comfort level or baseline comfort level  Description: Interventions:  - Encourage patient to monitor pain and request assistance  - Assess pain using appropriate pain scale  - Administer analgesics based on type and severity of pain and evaluate response  - Implement non-pharmacological measures as appropriate and evaluate response  - Consider cultural and social influences on pain and pain management  - Notify physician/advanced practitioner if interventions unsuccessful or patient reports new pain  Outcome: Progressing     Problem: INFECTION - ADULT  Goal: Absence or prevention of progression during hospitalization  Description: INTERVENTIONS:  - Assess and monitor for signs and symptoms of infection  - Monitor lab/diagnostic results  - Monitor all insertion sites, i e  indwelling lines, tubes, and drains  - Monitor endotracheal if appropriate and nasal secretions for changes in amount and color  - [place] appropriate cooling/warming therapies per order  - Administer medications as ordered  - Instruct and encourage patient and family to use good hand hygiene technique  - Identify and instruct in appropriate isolation precautions for identified infection/condition  Outcome: Progressing  Goal: Absence of fever/infection during neutropenic period  Description: INTERVENTIONS:  - Monitor WBC    Outcome: Progressing     Problem: SAFETY ADULT  Goal: Patient will remain free of falls  Description: INTERVENTIONS:  - Educate patient/family on patient safety including physical limitations  - Instruct patient to call for assistance with activity   - Consult OT/PT to assist with strengthening/mobility   - Keep Call bell within reach  - Keep bed low and locked with side rails adjusted as appropriate  - Keep care items and personal belongings within reach  - Initiate and maintain comfort rounds  - Make Fall Risk Sign visible to staff  - Apply yellow socks and bracelet for high fall risk patients  - Consider moving patient to room near nurses station  Outcome: Progressing  Goal: Maintain or return to baseline ADL function  Description: INTERVENTIONS:  -  Assess patient's ability to carry out ADLs; assess patient's baseline for ADL function and identify physical deficits which impact ability to perform ADLs (bathing, care of mouth/teeth, toileting, grooming, dressing, etc )  - Assess/evaluate cause of self-care deficits   - Assess range of motion  - Assess patient's mobility; develop plan if impaired  - Assess patient's need for assistive devices and provide as appropriate  - Encourage maximum independence but intervene and supervise when necessary  - Involve family in performance of ADLs  - Assess for home care needs following discharge   - Consider OT consult to assist with ADL evaluation and planning for discharge  - Provide patient education as appropriate  Outcome: Progressing  Goal: Maintains/Returns to pre admission functional level  Description: INTERVENTIONS:  - Perform BMAT or MOVE assessment daily    - Set and communicate daily mobility goal to care team and patient/family/caregiver     - Collaborate with rehabilitation services on mobility goals if consulted  - Out of bed for toileting  - Record patient progress and toleration of activity level   Outcome: Progressing     Problem: DISCHARGE PLANNING  Goal: Discharge to home or other facility with appropriate resources  Description: INTERVENTIONS:  - Identify barriers to discharge w/patient and caregiver  - Arrange for needed discharge resources and transportation as appropriate  - Identify discharge learning needs (meds, wound care, etc )  - Arrange for interpretive services to assist at discharge as needed  - Refer to Case Management Department for coordinating discharge planning if the patient needs post-hospital services based on physician/advanced practitioner order or complex needs related to functional status, cognitive ability, or social support system  Outcome: Progressing     Problem: Knowledge Deficit  Goal: Patient/family/caregiver demonstrates understanding of disease process, treatment plan, medications, and discharge instructions  Description: Complete learning assessment and assess knowledge base    Interventions:  - Provide teaching at level of understanding  - Provide teaching via preferred learning methods  Outcome: Progressing     Problem: SAFETY,RESTRAINT: NV/NON-SELF DESTRUCTIVE BEHAVIOR  Goal: Remains free of harm/injury (restraint for non violent/non self-detsructive behavior)  Description: INTERVENTIONS:  - Instruct patient/family regarding restraint use   - Assess and monitor physiologic and psychological status   - Provide interventions and comfort measures to meet assessed patient needs   - Identify and implement measures to help patient regain control  - Assess readiness for release of restraint   Outcome: Progressing  Goal: Returns to optimal restraint-free functioning  Description: INTERVENTIONS:  - Assess the patient's behavior and symptoms that indicate continued need for restraint  - Identify and implement measures to help patient regain control  - Assess readiness for release of restraint Outcome: Progressing

## 2022-01-10 NOTE — UTILIZATION REVIEW
Initial Clinical Review    Admission: Date/Time/Statement:   Admission Orders (From admission, onward)     Ordered        01/09/22 1653  Inpatient Admission  Once                      Orders Placed This Encounter   Procedures    Inpatient Admission     Standing Status:   Standing     Number of Occurrences:   1     Order Specific Question:   Level of Care     Answer:   Critical Care [15]     Order Specific Question:   Estimated length of stay     Answer:   More than 2 Midnights     Order Specific Question:   Certification     Answer:   I certify that inpatient services are medically necessary for this patient for a duration of greater than two midnights  See H&P and MD Progress Notes for additional information about the patient's course of treatment  ED Arrival Information     Expected Arrival Acuity    - 1/9/2022 13:43 Immediate         Means of arrival Escorted by Service Admission type    [facility] [place] ([address]) Critical Care/ICU Emergency         Arrival complaint    Chest Pain        Chief Complaint   Patient presents with    Respiratory Distress     patient with respiratory distress and rapid HR pre hospital  2 rounds of sdenosine pre hosp with no break in rhythm  provider at bedside for arrival       Initial Presentation: 61  Y O male presents to ED via EMS after developing respiratory distress  On EMS arrival, found in SVT, given 2 rounds of  Adenosine without relief  In significant distress on ED arrival, unable to speak, was on  NRB  Intubated and sedated in ED given severe respiratory distress  PMH is  CAD,  Systolic and diastolic heart failure, ICM, AVR, biventricular IC and Vtach  CXR shows  Volume overload  Labs reveal  BNP  >  13,000  UDS positive  THC and cocaine  CoVID  Positive      Admit  Ip ICU LOC  Wit COVID  19 virus infection, Ventricular fibrillation, Acute/chronic systolic and diastolic heart failure and plan is vent support, monitor labs,   IV  Decadron, IV lasix  Date:  1/10    Day 2:   D Dimer significantly elevated raising possibility of  PE and started on  IV heparin  Wait  LE  Dopplers  Can obtain   CTA when creatinine improves  Continue  IV decadron and PHILIP  Remains intubated/sedated      ED Triage Vitals   Temperature Pulse Respirations Blood Pressure SpO2   01/09/22 1729 01/09/22 1348 01/09/22 1348 01/09/22 1348 01/09/22 1407   (!) 100 8 °F (38 2 °C) (!) 145 (!) 36 156/92 95 %      Temp Source Heart Rate Source Patient Position - Orthostatic VS BP Location FiO2 (%)   01/09/22 1532 01/09/22 1348 01/09/22 1407 01/09/22 1407 --   Oral Monitor Lying Right arm       Pain Score       01/09/22 1434       No Pain          Wt Readings from Last 1 Encounters:   01/10/22 58 8 kg (129 lb 10 1 oz)     Additional Vital Signs:    97 9 °F (36 6 °C) 64 18 108/66 87 100 % -- --   01/10/22 0700 97 2 °F (36 2 °C) Abnormal  62 20 129/72 96 100 % -- --   01/10/22 0600 96 4 °F (35 8 °C) Abnormal  58 19 102/63 82 100 % -- --   01/10/22 0500 96 1 °F (35 6 °C) Abnormal  60 18 103/60 73 100 % -- --   01/10/22 0400 96 8 °F (36 °C) Abnormal  60 20 95/59 81 100 % -- --   01/10/22 0330 97 2 °F (36 2 °C) Abnormal  58 19 96/59 77 100 % -- --   01/10/22 0300 97 5 °F (36 4 °C) 58 20 95/58 77 100 % -- --   01/10/22 0200 98 2 °F (36 8 °C) 60 21 94/54 68 100 % -- --   01/10/22 0100 99 3 °F (37 4 °C) 66 25 Abnormal  85/54 Abnormal  65 100 % -- --   01/10/22 0000 99 7 °F (37 6 °C) 70 25 Abnormal  93/54 69 100 % -- --   01/09/22 2300 100 8 °F (38 2 °C) Abnormal  78 28 Abnormal  94/54 67 100 % -- --   01/09/22 2200 101 5 °F (38 6 °C) Abnormal  78 30 Abnormal  86/51 Abnormal  61 100 % -- --   01/09/22 2138 -- -- -- -- -- 100 % -- --   01/09/22 2115 -- 87 32 Abnormal  72/61 Abnormal  66 100 % Ventilator Lying   01/09/22 2045 -- 88 20 105/62 75 100 % Ventilator Lying   01/09/22 1930 -- 92 20 91/59 69 100 % Ventilator Lying   01/09/22 1816 -- 101 20 107/65 75 99 % Ventilator Lying   01/09/22 1729 100 8 °F (38 2 °C) Abnormal  -- -- -- -- -- -- --   01/09/22 1724 -- 99 20 135/77 98 96 % Ventilator Lying   01/09/22 1651 -- 100 20 127/71 95 94 % Ventilator Lying   01/09/22 1625 -- 100 22 151/93 117 92 % Ventilator Lying   01/09/22 1532 -- 101 20 137/93 110 96 % Ventilator Lying   01/09/22 1507 -- 110 Abnormal  24 Abnormal  -- -- 98 % Bi[facility]AP Lying   01/09/22 1447 -- 111 Abnormal  24 Abnormal  168/98 127 100 % [facility] Lying   01/09/22 1434 -- 111 Abnormal  24 Abnormal  139/74 100 100 % [facility] Lying   01/09/22 1423 -- -- -- -- -- -- [facility] --   01/09/22 1407 -- 109 Abnormal  24 Abnormal  148/93 115 95 % [facility] Lying   01/09/22 1348 -- 145 Abnormal  36 Abnormal  156/92 -- -- -- --           Pertinent Labs/Diagnostic Test Results:   CXR  ( 1/10)     Pulmonary edema  Cannot exclude superimposed infiltrate at the right lung base  EKG   Atrial fibrillation    HR  103    Abnormal conduction: complete LBBB    ST segments:     ST segments:  Abnormal    Depression:  II, III and aVF  T waves:     T waves: inverted      Inverted:  II, III and aVF    Results from last 7 days   Lab Units 01/09/22  1631   SARS-COV-2  Positive*     Results from last 7 days   Lab Units 01/10/22  0456 01/09/22  2206 01/09/22  1726 01/09/22  1355 01/09/22  1355   WBC Thousand/uL 12 42* 15 81*  --   --  10 81*   HEMOGLOBIN g/dL 11 0* 12 8  --   --  14 6   HEMATOCRIT % 33 1* 38 7  --   --  47 2   PLATELETS Thousands/uL 104* 130* 188   < > 179   NEUTROS ABS Thousands/µL  --   --   --   --  6 93   BANDS PCT % 26*  --   --   --   --     < > = values in this interval not displayed           Results from last 7 days   Lab Units 01/10/22  0456 01/09/22  1355   SODIUM mmol/L 138 138   POTASSIUM mmol/L 3 6 4 5   CHLORIDE mmol/L 102 98*   CO2 mmol/L 27 19*   ANION GAP mmol/L 9 21*   BUN mg/dL 20 11   CREATININE mg/dL 1 23 1 32*   EGFR ml/min/1 73sq m 64 30   CALCIUM mg/dL 7 9* 9 0     Results from last 7 days   Lab Units 01/10/22  0456 01/09/22  1355   AST U/L 475* 31   ALT U/L 277* 18   ALK PHOS U/L 72 112   TOTAL PROTEIN g/dL 5 9* 7 8   ALBUMIN g/dL 3 2* 3 5   TOTAL BILIRUBIN mg/dL 1 08* 1 49*         Results from last 7 days   Lab Units 01/10/22  0456 01/09/22  1355   GLUCOSE RANDOM mg/dL 176* 203*      Results from last 7 days   Lab Units 01/09/22  1627   PH ART  7 282*   PCO2 ART mm Hg 36 8   PO2 ART mm Hg 60 8*   HCO3 ART mmol/L 17 0*   BASE EXC ART mmol/L -8 9   O2 CONTENT ART mL/dL 18 4   O2 HGB, ARTERIAL % 87 7*   ABG SOURCE  Brachial, Right             Results from last 7 days   Lab Units 01/10/22  0456   CK TOTAL U/L 112     Results from last 7 days   Lab Units 01/09/22  1726 01/09/22  1631 01/09/22  1355   HS TNI 0HR ng/L  --   --  48   HS TNI 2HR ng/L  --  221*  --    HSTNI D2 ng/L  --  173  --    HS TNI 4HR ng/L 348*  --   --    HSTNI D4 ng/L 300  --   --      Results from last 7 days   Lab Units 01/09/22  1820   D-DIMER QUANTITATIVE ug/ml FEU >20 00*     Results from last 7 days   Lab Units 01/10/22  0456 01/09/22  2206 01/09/22  1355   PROTIME seconds  --  24 2* 19 1*   INR   --  2 26* 1 65*   PTT seconds >210* 41* 38*                         Results from last 7 days   Lab Units 01/09/22  1355   NT-PRO BNP pg/mL 13,741*                 Results from last 7 days   Lab Units 01/09/22  1820   CRP mg/L 14 5*                 Results from last 7 days   Lab Units 01/09/22  1631   INFLUENZA A PCR  Negative   INFLUENZA B PCR  Negative   RSV PCR  Negative         Results from last 7 days   Lab Units 01/09/22  1653   AMPH/METH  Negative   BARBITURATE UR  Negative   BENZODIAZEPINE UR  Negative   COCAINE UR  Positive*   METHADONE URINE  Negative   OPIATE UR  Negative   PCP UR  Negative   THC UR  Positive*               Results from last 7 days   Lab Units 01/09/22  2350   BLOOD CULTURE  Received in Microbiology Lab  Culture in Progress  Received in Microbiology Lab  Culture in Progress                 ED Treatment:   Medication Administration from 01/09/2022 1343 to 01/09/2022 2133       Date/Time Order Dose Route Action Comments     01/09/2022 1351 LORazepam (ATIVAN) injection 1 mg 1 mg Intravenous Given      01/09/2022 1356 diltiazem (CARDIZEM) injection 15 mg 15 mg Intravenous Given      01/09/2022 1357 adenosine (FOR EMS ONLY) (ADENOCARD) 6 mg/2 mL injection 6 mg 0 mg Does not apply Given to EMS      01/09/2022 1416 diltiazem (CARDIZEM) 125 mg in sodium chloride 0 9 % 125 mL infusion 5 mg/hr Intravenous New Bag      01/09/2022 1439 LORazepam (ATIVAN) injection 2 mg 2 mg Intravenous Given      01/09/2022 1506 furosemide (LASIX) injection 40 mg 40 mg Intravenous Given      01/09/2022 1516 etomidate (AMIDATE) 2 mg/mL injection 20 mg 20 mg Intravenous Given      01/09/2022 1519 Succinylcholine Chloride 100 mg/5 mL syringe 100 mg 100 mg Intravenous Given      01/09/2022 2130 propofol (DIPRIVAN) 1000 mg in 100 mL infusion (premix) 26 mcg/kg/min Intravenous Rate/Dose Change      01/09/2022 2003 propofol (DIPRIVAN) 1000 mg in 100 mL infusion (premix) 40 mcg/kg/min Intravenous New Bag      01/09/2022 1554 propofol (DIPRIVAN) 1000 mg in 100 mL infusion (premix) 40 513 mcg/kg/min Intravenous New Bag      01/09/2022 1533 propofol (DIPRIVAN) 1000 mg in 100 mL infusion (premix) 20 mcg/kg/min Intravenous New Bag      01/09/2022 1635 furosemide (LASIX) injection 40 mg 40 mg Intravenous Given      01/09/2022 1730 heparin (porcine) subcutaneous injection 5,000 Units 5,000 Units Subcutaneous Given      01/09/2022 2048 ceftriaxone (ROCEPHIN) 1 g/50 mL in dextrose IVPB 0 mg Intravenous Stopped      01/09/2022 2001 ceftriaxone (ROCEPHIN) 1 g/50 mL in dextrose IVPB 1,000 mg Intravenous New Bag      01/09/2022 1955 dexamethasone (DECADRON) injection 6 52 mg 6 52 mg Intravenous Given         No past medical history on file    Present on Admission:   Acute on chronic systolic and diastolic heart failure, NYHA class 3 (HCC)   Aortic stenosis   Ischemic cardiomyopathy      Admitting Diagnosis: Acute respiratory failure (HCC) [J96 00]  Acute pulmonary edema (HCC) [J81 0]  Respiratory distress [R06 03]  Atrial fibrillation with rapid ventricular response (HCC) [I48 91]  Age/Sex: 61 y o  male  Admission Orders:  Scheduled Medications:  amiodarone, 100 mg, Oral, Daily With Breakfast  dexamethasone, 0 1 mg/kg, Intravenous, Q12H  furosemide, 40 mg, Intravenous, BID (diuretic)  insulin lispro, 2-12 Units, Subcutaneous, Q6H AISHWARYA  levalbuterol, 1 25 mg, Nebulization, TID  sodium chloride, 4 mL, Nebulization, TID      Continuous IV Infusions:  dexmedetomidine, 0 1-0 7 mcg/kg/hr, Intravenous, Titrated  heparin (porcine), 3-30 Units/kg/hr (Order-Specific), Intravenous, Titrated      PRN Meds:  acetaminophen, 650 mg, Oral, Q4H PRN  heparin (porcine), 2,200 Units, Intravenous, Q1H PRN  heparin (porcine), 4,400 Units, Intravenous, Q1H PRN        IP CONSULT TO CASE MANAGEMENT    Network Utilization Review Department  ATTENTION: Please call with any questions or concerns to [phone] and carefully listen to the prompts so that you are directed to the right person  All voicemails are confidential   Sudie Crigler all requests for admission clinical reviews, approved or denied determinations and any other requests to dedicated fax number below belonging to the campus where the patient is receiving treatment   List of dedicated fax numbers for the Facilities:  [address] DENIALS (Administrative/Medical Necessity) [phone]   [address] (Maternity/NICU/Pediatrics) [phone]   [address] [place] [address] [phone]   [address]  [address] [facility] [place] [address] [id number] [address] 85 [facility] Candace Melvin 1481 [address] [facility] [phone]

## 2022-01-10 NOTE — PLAN OF CARE
Problem: Potential for Falls  Goal: Patient will remain free of falls  Description: INTERVENTIONS:  - Educate patient/family on patient safety including physical limitations  - Instruct patient to call for assistance with activity   - Consult OT/PT to assist with strengthening/mobility   - Keep Call bell within reach  - Keep bed low and locked with side rails adjusted as appropriate  - Keep care items and personal belongings within reach  - Initiate and maintain comfort rounds  - Make Fall Risk Sign visible to staff  - Apply yellow socks and bracelet for high fall risk patients  - Consider moving patient to room near nurses station  Outcome: Progressing     Problem: MOBILITY - ADULT  Goal: Maintain or return to baseline ADL function  Description: INTERVENTIONS:  -  Assess patient's ability to carry out ADLs; assess patient's baseline for ADL function and identify physical deficits which impact ability to perform ADLs (bathing, care of mouth/teeth, toileting, grooming, dressing, etc )  - Assess/evaluate cause of self-care deficits   - Assess range of motion  - Assess patient's mobility; develop plan if impaired  - Assess patient's need for assistive devices and provide as appropriate  - Encourage maximum independence but intervene and supervise when necessary  - Involve family in performance of ADLs  - Assess for home care needs following discharge   - Consider OT consult to assist with ADL evaluation and planning for discharge  - Provide patient education as appropriate  Outcome: Progressing  Goal: Maintains/Returns to pre admission functional level  Description: INTERVENTIONS:  - Perform BMAT or MOVE assessment daily    - Set and communicate daily mobility goal to care team and patient/family/caregiver     - Collaborate with rehabilitation services on mobility goals if consulted  - Out of bed for toileting  - Record patient progress and toleration of activity level   Outcome: Progressing     Problem: Prexisting or High Potential for Compromised Skin Integrity  Goal: Skin integrity is maintained or improved  Description: INTERVENTIONS:  - Identify patients at risk for skin breakdown  - Assess and monitor skin integrity  - Assess and monitor nutrition and hydration status  - Monitor labs   - Assess for incontinence   - Turn and reposition patient  - Assist with mobility/ambulation  - Relieve pressure over bony prominences  - Avoid friction and shearing  - Provide appropriate hygiene as needed including keeping skin clean and dry  - Evaluate need for skin moisturizer/barrier cream  - Collaborate with interdisciplinary team   - Patient/family teaching  - Consider wound care consult   Outcome: Progressing     Problem: PAIN - ADULT  Goal: Verbalizes/displays adequate comfort level or baseline comfort level  Description: Interventions:  - Encourage patient to monitor pain and request assistance  - Assess pain using appropriate pain scale  - Administer analgesics based on type and severity of pain and evaluate response  - Implement non-pharmacological measures as appropriate and evaluate response  - Consider cultural and social influences on pain and pain management  - Notify physician/advanced practitioner if interventions unsuccessful or patient reports new pain  Outcome: Progressing     Problem: INFECTION - ADULT  Goal: Absence or prevention of progression during hospitalization  Description: INTERVENTIONS:  - Assess and monitor for signs and symptoms of infection  - Monitor lab/diagnostic results  - Monitor all insertion sites, i e  indwelling lines, tubes, and drains  - Monitor endotracheal if appropriate and nasal secretions for changes in amount and color  - [place] appropriate cooling/warming therapies per order  - Administer medications as ordered  - Instruct and encourage patient and family to use good hand hygiene technique  - Identify and instruct in appropriate isolation precautions for identified infection/condition  Outcome: Progressing  Goal: Absence of fever/infection during neutropenic period  Description: INTERVENTIONS:  - Monitor WBC    Outcome: Progressing     Problem: SAFETY ADULT  Goal: Patient will remain free of falls  Description: INTERVENTIONS:  - Educate patient/family on patient safety including physical limitations  - Instruct patient to call for assistance with activity   - Consult OT/PT to assist with strengthening/mobility   - Keep Call bell within reach  - Keep bed low and locked with side rails adjusted as appropriate  - Keep care items and personal belongings within reach  - Initiate and maintain comfort rounds  - Make Fall Risk Sign visible to staff  - Apply yellow socks and bracelet for high fall risk patients  - Consider moving patient to room near nurses station  Outcome: Progressing  Goal: Maintain or return to baseline ADL function  Description: INTERVENTIONS:  -  Assess patient's ability to carry out ADLs; assess patient's baseline for ADL function and identify physical deficits which impact ability to perform ADLs (bathing, care of mouth/teeth, toileting, grooming, dressing, etc )  - Assess/evaluate cause of self-care deficits   - Assess range of motion  - Assess patient's mobility; develop plan if impaired  - Assess patient's need for assistive devices and provide as appropriate  - Encourage maximum independence but intervene and supervise when necessary  - Involve family in performance of ADLs  - Assess for home care needs following discharge   - Consider OT consult to assist with ADL evaluation and planning for discharge  - Provide patient education as appropriate  Outcome: Progressing  Goal: Maintains/Returns to pre admission functional level  Description: INTERVENTIONS:  - Perform BMAT or MOVE assessment daily    - Set and communicate daily mobility goal to care team and patient/family/caregiver     - Collaborate with rehabilitation services on mobility goals if consulted  - Out of bed for toileting  - Record patient progress and toleration of activity level   Outcome: Progressing     Problem: DISCHARGE PLANNING  Goal: Discharge to home or other facility with appropriate resources  Description: INTERVENTIONS:  - Identify barriers to discharge w/patient and caregiver  - Arrange for needed discharge resources and transportation as appropriate  - Identify discharge learning needs (meds, wound care, etc )  - Arrange for interpretive services to assist at discharge as needed  - Refer to Case Management Department for coordinating discharge planning if the patient needs post-hospital services based on physician/advanced practitioner order or complex needs related to functional status, cognitive ability, or social support system  Outcome: Progressing     Problem: Knowledge Deficit  Goal: Patient/family/caregiver demonstrates understanding of disease process, treatment plan, medications, and discharge instructions  Description: Complete learning assessment and assess knowledge base    Interventions:  - Provide teaching at level of understanding  - Provide teaching via preferred learning methods  Outcome: Progressing     Problem: SAFETY,RESTRAINT: NV/NON-SELF DESTRUCTIVE BEHAVIOR  Goal: Remains free of harm/injury (restraint for non violent/non self-detsructive behavior)  Description: INTERVENTIONS:  - Instruct patient/family regarding restraint use   - Assess and monitor physiologic and psychological status   - Provide interventions and comfort measures to meet assessed patient needs   - Identify and implement measures to help patient regain control  - Assess readiness for release of restraint   Outcome: Progressing  Goal: Returns to optimal restraint-free functioning  Description: INTERVENTIONS:  - Assess the patient's behavior and symptoms that indicate continued need for restraint  - Identify and implement measures to help patient regain control  - Assess readiness for release of restraint Outcome: Progressing

## 2022-01-10 NOTE — PROGRESS NOTES
[address] [facility]  Progress Note - Logan Tavares 1965, 61 y o  male MRN: [id number]  Unit/Bed#: ICU 07 Encounter: [id number]  Primary Care Provider: No primary care provider on file  Date and time admitted to hospital: 1/9/2022  1:44 PM    COVID-19 virus infection  Assessment & Plan  · Start patient on the very severe pathway as he is intubated  However, I believe the patient's hypoxia is more related to his congestive heart failure    · His D dimer was also significantly elevated raising the possibility of PE as a contributing factor  · Started on a heparin infusion  · Will get lower extremity doppler studies  · May be able to get a CTA if his creatinine is improved today  · White blood cell count is 10  · COVID markers are elevated  · Placed on dexamethasone  · Placed on heparin as noted above  · Placed on ceftriaxone and doxycycline pending his procalcitonin levels    * Acute on chronic systolic and diastolic heart failure, NYHA class 3 ([facility])  Assessment & Plan  Wt Readings from Last 3 Encounters:   01/09/22 58 8 kg (129 lb 10 1 oz)   · Chest x-ray demonstrates volume overload  · Left ventricular ejection fraction equal to 18%  · Status post ICD  · Pro BNP greater than 13,000  · Status post PTCA with BMS to the left anterior descending artery and left circumflex  · Continue diuresis  · Monitor kidney indices  · Monitor electrolytes, replete as needed          Ischemic cardiomyopathy  Assessment & Plan  · Status post ICD    Ventricular fibrillation ([facility] )  Assessment & Plan  · History of V-tach and ventricular fibrillation  · Status post biventricular ICD    Drug abuse ([facility] )  Assessment & Plan  · UDS positive for THC and cocaine    Aortic stenosis  Assessment & Plan  · Status post AVR in the 1980s      ----------------------------------------------------------------------------------------  HPI/24hr events: Events of admission are noted, no other events overnight    Patient appropriate for transfer out of the ICU today?: No  Disposition: Continue Critical Care   Code Status: Level 1 - Full Code  ---------------------------------------------------------------------------------------  SUBJECTIVE  Intubated and sedated    Review of Systems  Review of systems was unable to be performed secondary to intubated and sedated  ---------------------------------------------------------------------------------------  OBJECTIVE    Vitals   Vitals:    22 1930 225 22   BP: 91/59 105/62 (!) 72/61    BP Location: Right arm Right arm Left arm    Pulse: 92 88 87    Resp: 20 20 (!) 32    Temp:       TempSrc:       SpO2: 100% 100% 100% 100%   Weight:   58 8 kg (129 lb 10 1 oz)      Temp (24hrs), Av 8 °F (38 2 °C), Min:100 8 °F (38 2 °C), Max:100 8 °F (38 2 °C)  Current: Temperature: (!) 100 8 °F (38 2 °C)          Respiratory:  SpO2: SpO2: 100 %, SpO2 Activity: SpO2 Activity: At Rest, SpO2 Device: O2 Device: Ventilator       Invasive/non-invasive ventilation settings   Respiratory  Report   Lab Data (Last 4 hours)    None         O2/Vent Data (Last 4 hours)    None                Physical Exam  Constitutional:       Appearance: He is ill-appearing  HENT:      Head: Normocephalic  Mouth/Throat:      Mouth: Mucous membranes are moist    Eyes:      Pupils: Pupils are equal, round, and reactive to light  Cardiovascular:      Rate and Rhythm: Normal rate  Pulmonary:      Effort: Pulmonary effort is normal       Breath sounds: Rales present  Abdominal:      Tenderness: There is no abdominal tenderness  Musculoskeletal:         General: No swelling  Cervical back: Neck supple  Lymphadenopathy:      Cervical: No cervical adenopathy     Neurological:      Comments: Intubated and sedated             Laboratory and Diagnostics:  Results from last 7 days   Lab Units 22  1726 22  1355   WBC Thousand/uL  --  10 81*   HEMOGLOBIN g/dL  --  14 6 HEMATOCRIT %  --  47 2   PLATELETS Thousands/uL 188 179   NEUTROS PCT %  --  63   MONOS PCT %  --  13*     Results from last 7 days   Lab Units 01/09/22  1355   SODIUM mmol/L 138   POTASSIUM mmol/L 4 5   CHLORIDE mmol/L 98*   CO2 mmol/L 19*   ANION GAP mmol/L 21*   BUN mg/dL 11   CREATININE mg/dL 1 32*   CALCIUM mg/dL 9 0   GLUCOSE RANDOM mg/dL 203*   ALT U/L 18   AST U/L 31   ALK PHOS U/L 112   ALBUMIN g/dL 3 5   TOTAL BILIRUBIN mg/dL 1 49*          Results from last 7 days   Lab Units 01/09/22  1355   INR  1 65*   PTT seconds 38*              ABG:  Results from last 7 days   Lab Units 01/09/22  1627   PH ART  7 282*   PCO2 ART mm Hg 36 8   PO2 ART mm Hg 60 8*   HCO3 ART mmol/L 17 0*   BASE EXC ART mmol/L -8 9   ABG SOURCE  Brachial, Right     VBG:  Results from last 7 days   Lab Units 01/09/22  1627   ABG SOURCE  Brachial, Right           Micro        EKG:   Imaging: I have personally reviewed pertinent reports  and I have personally reviewed pertinent films in PACS    Intake and Output  I/O       01/08 0701 01/09 0700 01/09 0701  01/10 0700    IV Piggyback  50    Total Intake(mL/kg)  50 (0 9)    Urine (mL/kg/hr)  750    Total Output  750    Net  -700                Height and Weights         There is no height or weight on file to calculate BMI  Weight (last 2 days)     Date/Time Weight    01/09/22 2115 58 8 (129 63)    01/09/22 1348 65 (143 3)            Nutrition       Diet Orders   (From admission, onward)             Start     Ordered    01/09/22 1652  Diet NPO  Diet effective now        References:    Nutrtion Support Algorithm Enteral vs  Parenteral   Question Answer Comment   Diet Type NPO    RD to adjust diet per protocol?  No        01/09/22 1653                  Active Medications  Scheduled Meds:  Current Facility-Administered Medications   Medication Dose Route Frequency Provider Last Rate    acetaminophen  650 mg Oral Q4H PRN Ashtyn Hamper, CRNP      cefTRIAXone  1,000 mg Intravenous Q24H NICOLE Quintana Stopped (01/09/22 2048)    dexamethasone  0 1 mg/kg Intravenous Q12H NICOLE Quintana      doxycycline hyclate  100 mg Per NG Tube Q12H NICOLE Quintana      heparin (porcine)  3-30 Units/kg/hr (Order-Specific) Intravenous Titrated Essie Severs, CRNP      heparin (porcine)  2,200 Units Intravenous Q1H PRN Essie Severs, CRNP      heparin (porcine)  4,400 Units Intravenous Once Kell Severs, CRNP      heparin (porcine)  4,400 Units Intravenous Q1H PRN Essie Severs, CRNP      levalbuterol  1 25 mg Nebulization TID NICOLE Quintana      propofol  5-50 mcg/kg/min Intravenous Titrated Emma Alamo, DO 26 mcg/kg/min (01/09/22 2130)    sodium chloride  4 mL Nebulization TID NICOLE Quintana       Continuous Infusions:  heparin (porcine), 3-30 Units/kg/hr (Order-Specific)  propofol, 5-50 mcg/kg/min, Last Rate: 26 mcg/kg/min (01/09/22 2130)      PRN Meds:   acetaminophen, 650 mg, Q4H PRN  heparin (porcine), 2,200 Units, Q1H PRN  heparin (porcine), 4,400 Units, Q1H PRN        Invasive Devices Review  Invasive Devices  Report    Peripheral Intravenous Line            Peripheral IV 01/09/22 Left Antecubital <1 day    Peripheral IV 01/09/22 Left Forearm <1 day    Peripheral IV 01/09/22 Right Antecubital <1 day          Drain            NG/OG/Enteral Tube Orogastric 16 Fr Center mouth <1 day    Urethral Catheter Temperature probe <1 day          Airway            ETT  Cuffed 7 5 mm <1 day                Rationale for remaining devices:   ---------------------------------------------------------------------------------------  Advance Directive and Living Will:      Power of :    POLST:    ---------------------------------------------------------------------------------------  Care Time Delivered:         Essie Severs, CRNP      Portions of the record may have been created with voice recognition software    Occasional wrong word or "sound a like" substitutions may have occurred due to the inherent limitations of voice recognition software    Read the chart carefully and recognize, using context, where substitutions have occurred

## 2022-01-10 NOTE — CASE MANAGEMENT
Case Management Assessment    Patient name Ying Diaz  Location ICU 07/ICU 07 MRN [id number]  : 1965 Date 1/10/2022       Current Admission Date: 2022  Current Admission Diagnosis:Acute on chronic systolic and diastolic heart failure, NYHA class 3 ([facility] )   Patient Active Problem List    Diagnosis Date Noted    Acute on chronic systolic and diastolic heart failure, NYHA class 3 ([facility] ) 2022    Aortic stenosis 2022    Ischemic cardiomyopathy 2022    Drug abuse ([facility] ) 2022    Ventricular fibrillation ([facility] ) 2022    COVID-19 virus infection 2022      LOS (days): 1  Geometric Mean LOS (GMLOS) (days):   Days to GMLOS:     OBJECTIVE:    Risk of Unplanned Readmission Score: 19         Current admission status: Inpatient  Referral Reason: Other (CM consulted for disposition planning)    Preferred Pharmacy:   [address] [place] [address] [address] [address]  Phone: [phone] Fax: [phone]    Primary Care Provider: No primary care provider on file  Primary Insurance: [facility]  Secondary Insurance:     ASSESSMENT:  Active Health Care Agents    There are no active Health Care Agents on file         Advance Directives  Does patient have a [address]?: No  Was patient offered paperwork?: Yes (5 Wishes)  Does patient currently have a Health Care decision maker?: Yes, please see Health Care Proxy section  Does patient have Advance Directives?: No  Was patient offered paperwork?: Yes (5 Wishes)  Primary Contact: [facility]    Readmission Root Cause  30 Day Readmission: No    Patient Information  Admitted from[de-identified] Home  Mental Status: Other (Comment) (Recently extubated, still requiring oxygen and on COVID 19 precautions)  During Assessment patient was accompanied by: Not accompanied during assessment  Assessment information provided by[de-identified] Other - please comment ([place])  Primary Caregiver: Self  Support Systems: [address] of Residence: [address] [facility] [address] [facility] [address] [facility]  [facility] options  Select all that apply : Stairs  Number of steps to enter home : 2  Do the steps have railings?: No  Type of Current Residence: Apartment  Floor Level: 1  Upon entering residence, is there a bedroom on the main floor (no further steps)?: Yes  Upon entering residence, is there a bathroom on the main floor (no further steps)?: Yes  In the last 12 months, was there a time when you were not able to pay the mortgage or rent on time?: No  In the last 12 months, how many places have you lived?: 1  In the last 12 months, was there a time when you did not have a steady place to sleep or slept in a shelter (including now)?: No  Homeless/housing insecurity resource given?: N/A  Living Arrangements: Lives Alone  Is patient a ?: No    Activities of Daily Living Prior to Admission  Functional Status: Independent  Completes ADLs independently?: Yes  Ambulates independently?: Yes  Does patient use assisted devices?: No  Does patient currently own DME?: No  Does patient have a history of Outpatient Therapy (PT/OT)?: No  Does the patient have a history of Short-Term Rehab?: No  Does patient have a history of HHC?: No  Does patient currently have [facility]?: No    Patient Information Continued  Income Source: Employed  Does patient have prescription coverage?: Yes  Within the past 12 months, you worried that your food would run out before you got the money to buy more : Never true  Within the past 12 months, the food you bought just didnt last and you didnt have money to get more : Never true  Food insecurity resource given?: N/A  Does patient receive dialysis treatments?: No  Does patient have a history of substance abuse?: Yes  Historical substance use preference: Heroin,Marijuana (Niece reports patient used to use Heroin, however she is not sure when he last used   Currently smokes THC)  Is patient currently in treatment for substance abuse?:  (Not currently in treatment, CM to f/u w/ patient regarding HOST)  Does patient have a history of Mental Health Diagnosis?: No    Means of Transportation  Means of Transport to Appts[de-identified] Family transport (Niece)  In the past 12 months, has lack of transportation kept you from medical appointments or from getting medications?: No  In the past 12 months, has lack of transportation kept you from meetings, work, or from getting things needed for daily living?: No  Was application for public transport provided?: N/A

## 2022-01-10 NOTE — RESPIRATORY THERAPY NOTE
Pt placed on PS 8/6 50% with RSBI of 73  Pt appears to be tolerating well with Sat of 100%          01/10/22 0958   Vent Information   Vent type Peacock C3   Peacock C3/G5 Vent Mode SPONT   SpO2 100 %   SPONT Settings   FIO2 (%) 50 %   PEEP (cmH2O) 6 cmH2O   Pressure Support (cmH2O) 8 cmH20   Flow Trigger (LPM) 2 LPM   P-ramp (ms) 100 ms   ETS  (%) 25 %   Humidification Heater   Heater Temp 98 6 °F (37 °C)   SPONT Actuals   Resp Rate (BPM) 24 BPM   VT (mL) 372 mL   MV (Obs) 8 2   MAP (cmH2O) 7 8 cmH2O   Peak Pressure (cmH2O) 14 cmH2O   I:E Ratio (Obs) 1:2 3   RSBI (f/vt) 73 f/Vt

## 2022-01-11 PROBLEM — R73.9 HYPERGLYCEMIA: Status: ACTIVE | Noted: 2022-01-01

## 2022-01-11 NOTE — ASSESSMENT & PLAN NOTE
-Patient does not have PMH DM  - found to have increased glucose  -continue insuline lispro sliding scale   -  Check Hg A 1C

## 2022-01-11 NOTE — PLAN OF CARE
Problem: Potential for Falls  Goal: Patient will remain free of falls  Description: INTERVENTIONS:  - Educate patient/family on patient safety including physical limitations  - Instruct patient to call for assistance with activity   - Consult OT/PT to assist with strengthening/mobility   - Keep Call bell within reach  - Keep bed low and locked with side rails adjusted as appropriate  - Keep care items and personal belongings within reach  - Initiate and maintain comfort rounds  - Make Fall Risk Sign visible to staff  - Apply yellow socks and bracelet for high fall risk patients  - Consider moving patient to room near nurses station  Outcome: Progressing     Problem: MOBILITY - ADULT  Goal: Maintain or return to baseline ADL function  Description: INTERVENTIONS:  -  Assess patient's ability to carry out ADLs; assess patient's baseline for ADL function and identify physical deficits which impact ability to perform ADLs (bathing, care of mouth/teeth, toileting, grooming, dressing, etc )  - Assess/evaluate cause of self-care deficits   - Assess range of motion  - Assess patient's mobility; develop plan if impaired  - Assess patient's need for assistive devices and provide as appropriate  - Encourage maximum independence but intervene and supervise when necessary  - Involve family in performance of ADLs  - Assess for home care needs following discharge   - Consider OT consult to assist with ADL evaluation and planning for discharge  - Provide patient education as appropriate  Outcome: Progressing  Goal: Maintains/Returns to pre admission functional level  Description: INTERVENTIONS:  - Perform BMAT or MOVE assessment daily    - Set and communicate daily mobility goal to care team and patient/family/caregiver     - Collaborate with rehabilitation services on mobility goals if consulted  - Out of bed for toileting  - Record patient progress and toleration of activity level   Outcome: Progressing     Problem: Prexisting or High Potential for Compromised Skin Integrity  Goal: Skin integrity is maintained or improved  Description: INTERVENTIONS:  - Identify patients at risk for skin breakdown  - Assess and monitor skin integrity  - Assess and monitor nutrition and hydration status  - Monitor labs   - Assess for incontinence   - Turn and reposition patient  - Assist with mobility/ambulation  - Relieve pressure over bony prominences  - Avoid friction and shearing  - Provide appropriate hygiene as needed including keeping skin clean and dry  - Evaluate need for skin moisturizer/barrier cream  - Collaborate with interdisciplinary team   - Patient/family teaching  - Consider wound care consult   Outcome: Progressing     Problem: PAIN - ADULT  Goal: Verbalizes/displays adequate comfort level or baseline comfort level  Description: Interventions:  - Encourage patient to monitor pain and request assistance  - Assess pain using appropriate pain scale  - Administer analgesics based on type and severity of pain and evaluate response  - Implement non-pharmacological measures as appropriate and evaluate response  - Consider cultural and social influences on pain and pain management  - Notify physician/advanced practitioner if interventions unsuccessful or patient reports new pain  Outcome: Progressing     Problem: INFECTION - ADULT  Goal: Absence or prevention of progression during hospitalization  Description: INTERVENTIONS:  - Assess and monitor for signs and symptoms of infection  - Monitor lab/diagnostic results  - Monitor all insertion sites, i e  indwelling lines, tubes, and drains  - Monitor endotracheal if appropriate and nasal secretions for changes in amount and color  - [place] appropriate cooling/warming therapies per order  - Administer medications as ordered  - Instruct and encourage patient and family to use good hand hygiene technique  - Identify and instruct in appropriate isolation precautions for identified infection/condition  Outcome: Progressing  Goal: Absence of fever/infection during neutropenic period  Description: INTERVENTIONS:  - Monitor WBC    Outcome: Progressing     Problem: SAFETY ADULT  Goal: Patient will remain free of falls  Description: INTERVENTIONS:  - Educate patient/family on patient safety including physical limitations  - Instruct patient to call for assistance with activity   - Consult OT/PT to assist with strengthening/mobility   - Keep Call bell within reach  - Keep bed low and locked with side rails adjusted as appropriate  - Keep care items and personal belongings within reach  - Initiate and maintain comfort rounds  - Make Fall Risk Sign visible to staff  - Apply yellow socks and bracelet for high fall risk patients  - Consider moving patient to room near nurses station  Outcome: Progressing  Goal: Maintain or return to baseline ADL function  Description: INTERVENTIONS:  -  Assess patient's ability to carry out ADLs; assess patient's baseline for ADL function and identify physical deficits which impact ability to perform ADLs (bathing, care of mouth/teeth, toileting, grooming, dressing, etc )  - Assess/evaluate cause of self-care deficits   - Assess range of motion  - Assess patient's mobility; develop plan if impaired  - Assess patient's need for assistive devices and provide as appropriate  - Encourage maximum independence but intervene and supervise when necessary  - Involve family in performance of ADLs  - Assess for home care needs following discharge   - Consider OT consult to assist with ADL evaluation and planning for discharge  - Provide patient education as appropriate  Outcome: Progressing  Goal: Maintains/Returns to pre admission functional level  Description: INTERVENTIONS:  - Perform BMAT or MOVE assessment daily    - Set and communicate daily mobility goal to care team and patient/family/caregiver     - Collaborate with rehabilitation services on mobility goals if consulted  - Out of bed for toileting  - Record patient progress and toleration of activity level   Outcome: Progressing     Problem: DISCHARGE PLANNING  Goal: Discharge to home or other facility with appropriate resources  Description: INTERVENTIONS:  - Identify barriers to discharge w/patient and caregiver  - Arrange for needed discharge resources and transportation as appropriate  - Identify discharge learning needs (meds, wound care, etc )  - Arrange for interpretive services to assist at discharge as needed  - Refer to Case Management Department for coordinating discharge planning if the patient needs post-hospital services based on physician/advanced practitioner order or complex needs related to functional status, cognitive ability, or social support system  Outcome: Progressing     Problem: Knowledge Deficit  Goal: Patient/family/caregiver demonstrates understanding of disease process, treatment plan, medications, and discharge instructions  Description: Complete learning assessment and assess knowledge base    Interventions:  - Provide teaching at level of understanding  - Provide teaching via preferred learning methods  Outcome: Progressing     Problem: SAFETY,RESTRAINT: NV/NON-SELF DESTRUCTIVE BEHAVIOR  Goal: Remains free of harm/injury (restraint for non violent/non self-detsructive behavior)  Description: INTERVENTIONS:  - Instruct patient/family regarding restraint use   - Assess and monitor physiologic and psychological status   - Provide interventions and comfort measures to meet assessed patient needs   - Identify and implement measures to help patient regain control  - Assess readiness for release of restraint   Outcome: Progressing  Goal: Returns to optimal restraint-free functioning  Description: INTERVENTIONS:  - Assess the patient's behavior and symptoms that indicate continued need for restraint  - Identify and implement measures to help patient regain control  - Assess readiness for release of restraint Outcome: Progressing     Problem: Nutrition/Hydration-ADULT  Goal: Nutrient/Hydration intake appropriate for improving, restoring or maintaining nutritional needs  Description: Monitor and assess patient's nutrition/hydration status for malnutrition  Collaborate with interdisciplinary team and initiate plan and interventions as ordered  Monitor patient's weight and dietary intake as ordered or per policy  Utilize nutrition screening tool and intervene as necessary  Determine patient's food preferences and provide high-protein, high-caloric foods as appropriate       INTERVENTIONS:  - Monitor oral intake, urinary output, labs, and treatment plans  - Assess nutrition and hydration status and recommend course of action  - Evaluate amount of meals eaten  - Assist patient with eating if necessary   - Allow adequate time for meals  - Recommend/ encourage appropriate diets, oral nutritional supplements, and vitamin/mineral supplements  - Order, calculate, and assess calorie counts as needed  - Recommend, monitor, and adjust tube feedings and TPN/PPN based on assessed needs  - Assess need for intravenous fluids  - Provide specific nutrition/hydration education as appropriate  - Include patient/family/caregiver in decisions related to nutrition  Outcome: Progressing

## 2022-01-11 NOTE — UTILIZATION REVIEW
Continued Stay Review    Date: 1/11/22                          Current Patient Class: Inpatient  Current Level of Care: ICU    HPI:56 y o  male initially admitted on 1/9/22  COVID-19    Assessment/Plan: 1/10 he was extubated to 4 L NC without any incident sat mid 90s, overnight pt was on RA sating mid 90s  D-dimer elevated, f/up on lab results and blood cxs, iv abx dc'd 1/10 and will f/up on 1/11 with procal level, kidney function has improved, dc heparin d/t possible HIT and started on warfarin, continue neb txs and lasix, strict IO and daily wts, continue ssi and check HgbA1c, continue amiodarone and closely monitor LFT  Continue ICU level of care      Vital Signs:   Date/Time Temp Pulse Resp BP MAP (mmHg) SpO2 Calculated FIO2 (%) - Nasal Cannula Nasal Cannula O2 Flow Rate (L/min) O2 Device Patient Position - Orthostatic VS   01/11/22 1100 99 1 °F (37 3 °C) -- -- -- -- -- -- -- -- --   01/11/22 0800 97 7 °F (36 5 °C) 74 19 -- -- 91 % -- -- -- --   01/11/22 0600 -- 70 21 123/68 91 94 % -- -- -- --   01/11/22 0500 -- 76 17 -- -- 92 % -- -- -- --   01/11/22 0400 97 8 °F (36 6 °C) 76 16 123/68 92 93 % -- -- None (Room air) --   01/11/22 0200 -- 84 20 -- -- 93 % -- -- -- --   01/11/22 0100 -- 102 29 Abnormal  -- -- 95 % -- -- -- --   01/11/22 0000 -- 96 19 149/80 97 92 % -- -- None (Room air) Lying   01/10/22 2327 98 6 °F (37 °C) -- -- -- -- -- -- -- -- --   01/10/22 2300 -- 98 18 -- -- 95 % -- -- -- --   01/10/22 2227 98 1 °F (36 7 °C) -- -- -- -- -- -- -- -- --   01/10/22 2200 -- 98 18 -- -- 93 % -- -- -- --   01/10/22 2100 -- 88 21 -- -- 93 % -- -- -- --   01/10/22 2000 98 9 °F (37 2 °C) 86 25 Abnormal  149/82 115 91 % -- -- None (Room air) Lying   01/10/22 1900 -- 88 20 -- -- 90 % -- -- -- --   01/10/22 1825 -- 90 21 -- -- 94 % -- -- None (Room air) --   01/10/22 1600 -- 78 21 120/70 89 96 % 36 4 L/min -- Lying   01/10/22 1519 98 7 °F (37 1 °C) -- -- -- -- -- -- -- -- --   01/10/22 1400 -- 74 19 128/65 96 97 % -- -- -- --   01/10/22 1300 -- 56 17 110/59 87 98 % -- -- -- --   01/10/22 1200 -- 72 29 Abnormal  99/63 76 94 % -- -- -- --   01/10/22 1107 -- -- -- -- -- 98 % 36 4 L/min Nasal cannula --   01/10/22 1100 99 3 °F (37 4 °C) 62 20 93/59 74 99 % -- -- -- --   01/10/22 1030 -- 62 -- 93/59 -- -- -- -- -- --   01/10/22 1000 98 6 °F (37 °C) 64 20 100/62 80 100 % -- -- -- --   01/10/22 0958 -- -- -- -- -- 100 % -- -- -- --   01/10/22 0900 98 2 °F (36 8 °C) 60 19 102/60 77 100 % -- -- -- --   01/10/22 0800 97 9 °F (36 6 °C) 64 18 108/66 87 100 % -- -- -- --   01/10/22 0700 97 2 °F (36 2 °C) Abnormal  62 20 129/72 96 100 % -- -- -- --   01/10/22 0600 96 4 °F (35 8 °C) Abnormal  58 19 102/63 82 100 % -- -- -- --   01/10/22 0500 96 1 °F (35 6 °C) Abnormal  60 18 103/60 73 100 % -- -- -- --   01/10/22 0400 96 8 °F (36 °C) Abnormal  60 20 95/59 81 100 % -- -- -- --   01/10/22 0330 97 2 °F (36 2 °C) Abnormal  58 19 96/59 77 100 % -- -- -- --   01/10/22 0300 97 5 °F (36 4 °C) 58 20 95/58 77 100 % -- -- -- --   01/10/22 0200 98 2 °F (36 8 °C) 60 21 94/54 68 100 % -- -- -- --   01/10/22 0100 99 3 °F (37 4 °C) 66 25 Abnormal  85/54 Abnormal  65 100 % -- -- -- --   01/10/22 0000 99 7 °F (37 6 °C) 70 25 Abnormal  93/54 69 100 % -- -- -- --   01/09/22 2300 100 8 °F (38 2 °C) Abnormal  78 28 Abnormal  94/54 67 100 % -- -- -- --   01/09/22 2200 101 5 °F (38 6 °C) Abnormal  78 30 Abnormal  86/51 Abnormal  61 100 % -- -- -- --   01/09/22 2138 -- -- -- -- -- 100 % -- -- -- --   01/09/22 2115 -- 87 32 Abnormal  72/61 Abnormal  66 100 % -- -- Ventilator Lying   01/09/22 2045 -- 88 20 105/62 75 100 % -- -- Ventilator Lying   01/09/22 1930 -- 92 20 91/59 69 100 % -- -- Ventilator Lying   01/09/22 1816 -- 101 20 107/65 75 99 % -- -- Ventilator Lying   01/09/22 1729 100 8 °F (38 2 °C) Abnormal  -- -- -- -- -- -- -- -- --   01/09/22 1724 -- 99 20 135/77 98 96 % -- -- Ventilator Lying   01/09/22 1651 -- 100 20 127/71 95 94 % -- -- Ventilator Lying 01/09/22 1625 -- 100 22 151/93 117 92 % -- -- Ventilator Lying   01/09/22 1532 -- 101 20 137/93 110 96 % -- -- Ventilator Lying   01/09/22 1507 -- 110 Abnormal  24 Abnormal  -- -- 98 % -- -- [facility] Lying   01/09/22 1447 -- 111 Abnormal  24 Abnormal  168/98 127 100 % -- -- [facility] Lying   01/09/22 1434 -- 111 Abnormal  24 Abnormal  139/74 100 100 % -- -- [facility] Lying   01/09/22 1423 -- -- -- -- -- -- -- -- [facility] --   01/09/22 1407 -- 109 Abnormal  24 Abnormal  148/93 115 95 % -- -- [facility] Lying       Pertinent Labs/Diagnostic Results:   Results from last 7 days   Lab Units 01/09/22  1631   SARS-COV-2  Positive*     Results from last 7 days   Lab Units 01/11/22  0603 01/10/22  0456 01/09/22  2206 01/09/22  1355   WBC Thousand/uL 16 86* 12 42* 15 81* 10 81*   HEMOGLOBIN g/dL 11 7* 11 0* 12 8 14 6   HEMATOCRIT % 35 1* 33 1* 38 7 47 2   PLATELETS Thousands/uL 145* 104* 130* 179   NEUTROS ABS Thousands/µL  --   --   --  6 93   BANDS PCT %  --  26*  --   --      Results from last 7 days   Lab Units 01/11/22  0603 01/10/22  0456 01/09/22  1355   SODIUM mmol/L 141 138 138   POTASSIUM mmol/L 2 9* 3 6 4 5   CHLORIDE mmol/L 104 102 98*   CO2 mmol/L 27 27 19*   ANION GAP mmol/L 10 9 21*   BUN mg/dL 29* 20 11   CREATININE mg/dL 0 98 1 23 1 32*   EGFR ml/min/1 73sq m 85 64 30   CALCIUM mg/dL 7 4* 7 9* 9 0   CALCIUM, IONIZED mmol/L 0 97*  --   --    MAGNESIUM mg/dL 1 6  --   --      Results from last 7 days   Lab Units 01/11/22  0603 01/10/22  0456 01/09/22  1355   AST U/L 383* 475* 31   ALT U/L 391* 277* 18   ALK PHOS U/L 70 72 112   TOTAL PROTEIN g/dL 5 4* 5 9* 7 8   ALBUMIN g/dL 2 6* 3 2* 3 5   TOTAL BILIRUBIN mg/dL 1 23* 1 08* 1 49*   BILIRUBIN DIRECT mg/dL 0 43*  --   --      Results from last 7 days   Lab Units 01/11/22  1125 01/11/22  0557 01/10/22  2324 01/10/22  1815 01/10/22  1519   POC GLUCOSE mg/dl 141* 139 182* 104 118     Results from last 7 days   Lab Units 01/11/22  0603 01/10/22  0456 01/09/22  1355   GLUCOSE RANDOM mg/dL 124 176* 203*      Results from last 7 days   Lab Units 01/09/22  1627   PH ART  7 282*   PCO2 ART mm Hg 36 8   PO2 ART mm Hg 60 8*   HCO3 ART mmol/L 17 0*   BASE EXC ART mmol/L -8 9   O2 CONTENT ART mL/dL 18 4   O2 HGB, ARTERIAL % 87 7*   ABG SOURCE  Brachial, Right     Results from last 7 days   Lab Units 01/10/22  0456   CK TOTAL U/L 112     Results from last 7 days   Lab Units 01/09/22  1726 01/09/22  1631 01/09/22  1355   HS TNI 0HR ng/L  --   --  48   HS TNI 2HR ng/L  --  221*  --    HSTNI D2 ng/L  --  173  --    HS TNI 4HR ng/L 348*  --   --    HSTNI D4 ng/L 300  --   --      Results from last 7 days   Lab Units 01/09/22  1820   D-DIMER QUANTITATIVE ug/ml FEU >20 00*     Results from last 7 days   Lab Units 01/11/22  0603 01/10/22  1519 01/10/22  0456 01/09/22  2206   PROTIME seconds 19 2* 20 2*  --  24 2*   INR  1 68* 1 80*  --  2 26*   PTT seconds  --  >210* >210* 41*     Results from last 7 days   Lab Units 01/11/22  0603 01/10/22  0456   PROCALCITONIN ng/ml 2 85* 4 65*     Results from last 7 days   Lab Units 01/09/22  1355   NT-PRO BNP pg/mL 13,741*     Results from last 7 days   Lab Units 01/09/22  1820   CRP mg/L 14 5*     Results from last 7 days   Lab Units 01/09/22  1631   INFLUENZA A PCR  Negative   INFLUENZA B PCR  Negative   RSV PCR  Negative     Results from last 7 days   Lab Units 01/09/22  1653   AMPH/METH  Negative   BARBITURATE UR  Negative   BENZODIAZEPINE UR  Negative   COCAINE UR  Positive*   METHADONE URINE  Negative   OPIATE UR  Negative   PCP UR  Negative   THC UR  Positive*     Results from last 7 days   Lab Units 01/09/22  2350   BLOOD CULTURE  No Growth at 24 hrs  No Growth at 24 hrs       Medications:   Scheduled Medications:  amiodarone, 100 mg, Oral, Daily With Breakfast  calcium carbonate, 500 mg, Oral, Daily  [START ON 1/12/2022] dexamethasone, 6 mg, Intravenous, Daily  furosemide, 40 mg, Oral, BID (diuretic)  insulin lispro, 2-12 Units, Subcutaneous, Q6H [facility]  magnesium sulfate, 2 g, Intravenous, Once  melatonin, 6 mg, Oral, HS  potassium chloride, 40 mEq, Oral, Q4H  potassium chloride, 20 mEq, Intravenous, Q2H  warfarin, 4 mg, Oral, Daily (warfarin)      Continuous IV Infusions: none     PRN Meds:  acetaminophen, 650 mg, Oral, Q4H PRN  ipratropium, 0 5 mg, Nebulization, Q8H PRN  levalbuterol, 1 25 mg, Nebulization, Q8H PRN        Discharge Plan: [facility]    Network Utilization Review Department  ATTENTION: Please call with any questions or concerns to [phone] and carefully listen to the prompts so that you are directed to the right person  All voicemails are confidential   Bonita Blue all requests for admission clinical reviews, approved or denied determinations and any other requests to dedicated fax number below belonging to the campus where the patient is receiving treatment   List of dedicated fax numbers for the Facilities:  [address] DENIALS (Administrative/Medical Necessity) [phone]   [address] (Maternity/NICU/Pediatrics) [phone]   [address] [place] [address]  [address] [address] [place] [address] [address] [facility] [place] [address] [phone]

## 2022-01-11 NOTE — PROGRESS NOTES
[address] [facility]  Progress Note - Xu Katz 1965, 61 y o  male MRN: [id number]  Unit/Bed#: ICU 07 Encounter: [id number]  Primary Care Provider: No primary care provider on file  Date and time admitted to hospital: 1/9/2022  1:44 PM    COVID-19 virus infection  Assessment & Plan  -Patient found to be covid positive( not vaccinated) and on the severe pathway as he was intubated,   -1/10 he was extubated to 4 L NC without any incident sat mid 90s, overnight pt was on RA sating mid 90s  -D - dimer was elevated , awaiting for  1/11/22 AM blood results for follow up  -WBC trending up on 1/11/22 16 86 from 12 42 on 1/10, x ray does not show pneumonia and blood culture in progress ,   -patient was on ceftriaxone and doxycyline , d/c yesterday will follow up1/11/22 Am  procal level,   - consider CTA, patient kidney function has been improved  -continue dexamethasone  - D/c heparin due to possible HIT and started on warfarin , INR goal 2-3  - Continue PRN levalbuterol and atrovent     * Acute on chronic systolic and diastolic heart failure, NYHA class 3 (HCC)  Assessment & Plan  Wt Readings from Last 3 Encounters:   01/11/22 56 6 kg (124 lb 12 5 oz)      -Chest x ray demonstrates volume overload    - Left ventricular EF- 20 % 1/10/22  - pro BNP greater than 13 000  -s/p PTCA with BMS to the left  Anterior descending artery and left circumflex   - s/p biventricular ICD  - patient lost weight since 1/9/22 01/11/22 0600 56 6 kg (124 lb 12 5 oz) Bed scale    01/10/22 1126 -- --    01/10/22 1030 58 5 kg (129 lb) --    01/10/22 0600 58 8 kg (129 lb 10 1 oz) Bed scale    01/09/22 2115 58 8 kg (129 lb 10 1 oz) Bed scale    - continue lasix   - strict I/O and weight check  -monitor electrolytes ,potassium was low , supplement with K          Hyperglycemia  Assessment & Plan  -Patient does not have PMH DM  - found to have increased glucose  -continue insuline lispro sliding scale   - Check Hg A 1C       Ventricular fibrillation (HCC)  Assessment & Plan  PMH of V tach and V fib   S/p biventricular ICD  Continue amiodarone and closely monitor LFT, patient ALT/ AST significantly increased  Continue warfarin and closely monitor INR , goal 2-3      Drug abuse [facility])  Assessment & Plan  UDS positive for THC and cocaine    Ischemic cardiomyopathy  Assessment & Plan  S/P  Biventricular ICD    Aortic stenosis  Assessment & Plan  S/P AVR in the 1980s      ----------------------------------------------------------------------------------------  HPI/24hr events:  Events of admission are noted  Overnight was extubated and currently on RA sating mid 90s  Patient appropriate for transfer out of the ICU today?: No  Disposition:[facility] critical care   Code Status: Level 1 - Full Code  ---------------------------------------------------------------------------------------  SUBJECTIVE  Patient has been seen and examined today at the beside   He does not offer aki new complaints  patient reports that he took nighttime sleeping aid- zzzQuil after which he started to have SOB and called EMS  Her reports that he did not take anything new medication or over the counter medication  It was the first time that he took this sleeping aid  Review of Systems   Constitutional: Negative for activity change, chills, diaphoresis, fatigue and fever  HENT: Negative for drooling, mouth sores, rhinorrhea and sore throat  Respiratory: Negative for cough, choking, chest tightness, shortness of breath, wheezing and stridor  Cardiovascular: Negative for chest pain, palpitations and leg swelling  Gastrointestinal: Negative for abdominal distention, abdominal pain, nausea and vomiting  Genitourinary: Negative for difficulty urinating, dysuria, flank pain and hematuria  Skin: Negative for color change and rash  Psychiatric/Behavioral: Negative for behavioral problems and confusion     All other systems reviewed and are negative       ---------------------------------------------------------------------------------------  OBJECTIVE    Vitals   Vitals:    22 0400 22 0500 22 0600 22 0800   BP: 123/68  123/68    BP Location:       Pulse: 76 76 70 74   Resp: 16 17  19   Temp: 97 8 °F (36 6 °C)   97 7 °F (36 5 °C)   TempSrc: Temporal   Temporal   SpO2: 93% 92% 94% 91%   Weight:   56 6 kg (124 lb 12 5 oz)    Height:         Temp (24hrs), Av 4 °F (36 9 °C), Min:97 7 °F (36 5 °C), Max:99 3 °F (37 4 °C)  Current: Temperature: 97 7 °F (36 5 °C)          Respiratory:  SpO2 Device: O2 Device: None (Room air)  Nasal Cannula O2 Flow Rate (L/min): 4 L/min    Invasive/non-invasive ventilation settings   Respiratory  Report   Lab Data (Last 4 hours)    None         O2/Vent Data (Last 4 hours)    None                Physical Exam  Constitutional:       Appearance: Normal appearance  HENT:      Head: Normocephalic and atraumatic  Nose: Nose normal    Eyes:      Conjunctiva/sclera: Conjunctivae normal       Pupils: Pupils are equal, round, and reactive to light  Cardiovascular:      Rate and Rhythm: Normal rate and regular rhythm  Pulses: Normal pulses  Heart sounds: Normal heart sounds  Pulmonary:      Effort: Pulmonary effort is normal  No respiratory distress  Breath sounds: Normal breath sounds  No stridor  No wheezing, rhonchi or rales  Chest:      Chest wall: No tenderness  Abdominal:      General: Abdomen is flat  Palpations: Abdomen is soft  Neurological:      Mental Status: He is alert     Psychiatric:         Mood and Affect: Mood normal              Laboratory and Diagnostics:  Results from last 7 days   Lab Units 22  0603 01/10/22  0456 22  2206 22  1726 22  1355   WBC Thousand/uL 16 86* 12 42* 15 81*  --  10 81*   HEMOGLOBIN g/dL 11 7* 11 0* 12 8  --  14 6   HEMATOCRIT % 35 1* 33 1* 38 7  --  47 2   PLATELETS Thousands/uL 145* 104* 130* 188 179   NEUTROS PCT %  --   --   --   --  63   BANDS PCT %  --  26*  --   --   --    MONOS PCT %  --   --   --   --  13*   MONO PCT %  --  2*  --   --   --      Results from last 7 days   Lab Units 01/11/22  0603 01/10/22  0456 01/09/22  1355   SODIUM mmol/L 141 138 138   POTASSIUM mmol/L 2 9* 3 6 4 5   CHLORIDE mmol/L 104 102 98*   CO2 mmol/L 27 27 19*   ANION GAP mmol/L 10 9 21*   BUN mg/dL 29* 20 11   CREATININE mg/dL 0 98 1 23 1 32*   CALCIUM mg/dL 7 4* 7 9* 9 0   GLUCOSE RANDOM mg/dL 124 176* 203*   ALT U/L 391* 277* 18   AST U/L 383* 475* 31   ALK PHOS U/L 70 72 112   ALBUMIN g/dL 2 6* 3 2* 3 5   TOTAL BILIRUBIN mg/dL 1 23* 1 08* 1 49*     Results from last 7 days   Lab Units 01/11/22  0603   MAGNESIUM mg/dL 1 6      Results from last 7 days   Lab Units 01/11/22  0603 01/10/22  1519 01/10/22  0456 01/09/22  2206 01/09/22  1355   INR  1 68* 1 80*  --  2 26* 1 65*   PTT seconds  --  >210* >210* 41* 38*              ABG:  Results from last 7 days   Lab Units 01/09/22  1627   PH ART  7 282*   PCO2 ART mm Hg 36 8   PO2 ART mm Hg 60 8*   HCO3 ART mmol/L 17 0*   BASE EXC ART mmol/L -8 9   ABG SOURCE  Brachial, Right     VBG:  Results from last 7 days   Lab Units 01/09/22  1627   ABG SOURCE  Brachial, Right     Results from last 7 days   Lab Units 01/10/22  0456   PROCALCITONIN ng/ml 4 65*       Micro  Results from last 7 days   Lab Units 01/09/22  2350   BLOOD CULTURE  No Growth at 24 hrs  No Growth at 24 hrs  EKG: reviewed   Imaging: I have personally reviewed pertinent reports        Intake and Output  I/O       01/09 0701  01/10 0700 01/10 0701 01/11 0700 01/11 0701 01/12 0700    I V  (mL/kg) 262 3 (4 5) 77 1 (1 4)     NG/GT  60     IV Piggyback 250      Total Intake(mL/kg) 512 3 (8 7) 137 1 (2 4)     Urine (mL/kg/hr) 1275 3650 (2 7)     Total Output 1275 3650     Net -762 6 -7018 9                  Height and Weights   Height: 5' 10" (177 8 cm)  IBW (Ideal Body Weight): 73 kg  Body mass index is 17 9 kg/m²  Weight (last 2 days)     Date/Time Weight    01/11/22 0600 56 6 (124 78)    01/10/22 1030 58 5 (129)    01/10/22 0600 58 8 (129 63)    01/09/22 2115 58 8 (129 63)    01/09/22 1348 65 (143 3)            Nutrition       Diet Orders   (From admission, onward)             Start     Ordered    01/11/22 0625  Diet Cardiovascular; Cardiac  Diet effective now        References:    Nutrtion Support Algorithm Enteral vs  Parenteral   Question Answer Comment   Diet Type Cardiovascular    Cardiac Cardiac    RD to adjust diet per protocol?  No        01/11/22 0624                  Active Medications  Scheduled Meds:  Current Facility-Administered Medications   Medication Dose Route Frequency Provider Last Rate    acetaminophen  650 mg Oral Q4H PRN Essie Severs, CRNP      amiodarone  100 mg Oral Daily With Breakfast NICOLE Becerra      dexamethasone  0 1 mg/kg Intravenous Q12H NICOLE Quintana      furosemide  40 mg Intravenous BID (diuretic) NICOLE Becerra      insulin lispro  2-12 Units Subcutaneous Q6H [address] NICOLE Becerra      ipratropium  0 5 mg Nebulization Q8H PRN Bonny Silvestre MD      levalbuterol  1 25 mg Nebulization Q8H PRN Bonny Silvestre MD      magnesium sulfate  2 g Intravenous Once Kacy Liu MD      melatonin  6 mg Oral HS Essie Severs, CRNP      potassium chloride  40 mEq Oral Q4H [place] MD Noe      potassium chloride  20 mEq Intravenous Q2H Kacy Liu MD      warfarin  4 mg Oral Daily (warfarin) NICOLE Becerra       Continuous Infusions:     PRN Meds:   acetaminophen, 650 mg, Q4H PRN  ipratropium, 0 5 mg, Q8H PRN  levalbuterol, 1 25 mg, Q8H PRN        Invasive Devices Review  Invasive Devices  Report    Peripheral Intravenous Line            Peripheral IV 01/09/22 Left Antecubital 1 day    Peripheral IV 01/09/22 Left Forearm 1 day    Peripheral IV 01/09/22 Right Antecubital 1 day ---------------------------------------------------------------------------------------  Advance Directive and Living Will:      Power of :    POLST:    ---------------------------------------------------------------------------------------  Care Time Delivered:   25 min      Stephanie Herbert MD      Portions of the record may have been created with voice recognition software  Occasional wrong word or "sound a like" substitutions may have occurred due to the inherent limitations of voice recognition software    Read the chart carefully and recognize, using context, where substitutions have occurred

## 2022-01-11 NOTE — ASSESSMENT & PLAN NOTE
PMH of V tach and V fib   S/p biventricular ICD  Continue amiodarone and closely monitor LFT, patient ALT/ AST significantly increased     Continue warfarin and closely monitor INR , goal 2-3

## 2022-01-11 NOTE — ASSESSMENT & PLAN NOTE
Wt Readings from Last 3 Encounters:   01/11/22 56 6 kg (124 lb 12 5 oz)      -Chest x ray demonstrates volume overload    - Left ventricular EF- 20 % 1/10/22  - pro BNP greater than 13 000  -s/p PTCA with BMS to the left  Anterior descending artery and left circumflex   - s/p biventricular ICD  - patient lost weight since 1/9/22 01/11/22 0600 56 6 kg (124 lb 12 5 oz) Bed scale    01/10/22 1126 -- --    01/10/22 1030 58 5 kg (129 lb) --    01/10/22 0600 58 8 kg (129 lb 10 1 oz) Bed scale    01/09/22 2115 58 8 kg (129 lb 10 1 oz) Bed scale    - continue lasix   - strict I/O and weight check  -monitor electrolytes ,potassium was low , supplement with K

## 2022-01-11 NOTE — ASSESSMENT & PLAN NOTE
-Patient found to be covid positive( not vaccinated) and on the severe pathway as he was intubated,   -1/10 he was extubated to 4 L NC without any incident sat mid 90s, overnight pt was on RA sating mid 90s  -D - dimer was elevated , awaiting for  1/11/22 AM blood results for follow up  -WBC trending up on 1/11/22 16 86 from 12 42 on 1/10, x ray does not show pneumonia and blood culture in progress ,   -patient was on ceftriaxone and doxycyline , d/c yesterday will follow up1/11/22 Am  procal level,   - consider CTA, patient kidney function has been improved     -continue dexamethasone  - D/c heparin due to possible HIT and started on warfarin , INR goal 2-3  - Continue PRN levalbuterol and atrovent

## 2022-01-12 PROBLEM — I50.43 ACUTE ON CHRONIC SYSTOLIC AND DIASTOLIC HEART FAILURE, NYHA CLASS 3 (HCC): Status: RESOLVED | Noted: 2022-01-01 | Resolved: 2022-01-01

## 2022-01-12 PROBLEM — R73.9 HYPERGLYCEMIA: Status: RESOLVED | Noted: 2022-01-01 | Resolved: 2022-01-01

## 2022-01-12 NOTE — ASSESSMENT & PLAN NOTE
-Patient found to be covid positive( not vaccinated) and on the severe pathway as he was intubated,   -1/10 he was extubated to 4 L NC without any incident sat mid 90s, overnight pt was on RA sating mid 90s  -D - dimer was elevated , awaiting for  1/11/22 AM blood results for follow up  -WBC trending up on 1/11/22 16 86 from 12 42 on 1/10, x ray does not show pneumonia and blood culture in progress ,   -patient was on ceftriaxone and doxycyline , d/c yesterday will follow up1/11/22 Am  procal level,   - consider CTA, patient kidney function has been improved  -continue dexamethasone  - D/c heparin due to possible HIT and started on warfarin , patient had a significantly elevated D-dimer  Patient's INR was subtherapeutic and 1 keep him 1 more day to kind of monitor his INR and increase his Coumadin however the patient wanted sign against medical advice  He was aware of the risks involved with worsening respiratory failure and possible sudden death  Patient also had transaminitis which could be COVID related but needed monitoring

## 2022-01-12 NOTE — PROGRESS NOTES
Patient requested something to assist with sleep  On call provider notified of request   Pending response

## 2022-01-12 NOTE — DISCHARGE SUMMARY
[address] [facility]  Discharge- 4[address] Jessee Nieto 1965, 61 y o  male MRN: [id number]  Unit/Bed#: Trent Mcdaniel Luite Rosalino 87 221-02 Encounter: [id number]  Primary Care Provider: No primary care provider on file  Date and time admitted to hospital: 1/9/2022  1:44 PM    COVID-19 virus infection  Assessment & Plan  -Patient found to be covid positive( not vaccinated) and on the severe pathway as he was intubated,   -1/10 he was extubated to 4 L NC without any incident sat mid 90s, overnight pt was on RA sating mid 90s  -D - dimer was elevated , awaiting for  1/11/22 AM blood results for follow up  -WBC trending up on 1/11/22 16 86 from 12 42 on 1/10, x ray does not show pneumonia and blood culture in progress ,   -patient was on ceftriaxone and doxycyline , d/c yesterday will follow up1/11/22 Am  procal level,   - consider CTA, patient kidney function has been improved  -continue dexamethasone  - D/c heparin due to possible HIT and started on warfarin , patient had a significantly elevated D-dimer  Patient's INR was subtherapeutic and 1 keep him 1 more day to kind of monitor his INR and increase his Coumadin however the patient wanted sign against medical advice  He was aware of the risks involved with worsening respiratory failure and possible sudden death  Patient also had transaminitis which could be COVID related but needed monitoring  Ventricular fibrillation (HCC)  Assessment & Plan  PMH of V tach and V fib   S/p biventricular ICD  Continue amiodarone and closely monitor LFT, patient ALT/ AST significantly increased  Continue warfarin and closely monitor INR , goal 2-3    Drug abuse [facility])  Assessment & Plan  UDS positive for THC and cocaine    Ischemic cardiomyopathy  Assessment & Plan  S/P  Biventricular ICD  Patient needs to follow up with Cardiology    Aortic stenosis  Assessment & Plan  S/P AVR in the 1980s  Continue with the diuretics      * Acute on chronic systolic and diastolic heart failure, NYHA class 3 (HCC)-resolved as of 1/12/2022  Assessment & Plan  Wt Readings from Last 3 Encounters:   01/12/22 56 6 kg (124 lb 12 5 oz)      -Chest x ray demonstrates volume overload    - Left ventricular EF- 20 % 1/10/22  - pro BNP greater than 13 000  -s/p PTCA with BMS to the left  Anterior descending artery and left circumflex   - s/p biventricular ICD  - patient lost weight since 1/9/22 01/11/22 0600 56 6 kg (124 lb 12 5 oz) Bed scale    01/10/22 1126 -- --    01/10/22 1030 58 5 kg (129 lb) --    01/10/22 0600 58 8 kg (129 lb 10 1 oz) Bed scale    01/09/22 2115 58 8 kg (129 lb 10 1 oz) Bed scale    Switched over to p o  Lasix after the patient was in the ICU  Patient from that standpoint was doing okay  I did put him on Lasix on discharge with supplemental potassium  Patient needs to follow up with Cardiology as an outpatient  Patient has significant cardiomyopathy  Further workup here did reveal ejection fraction of 20 percent  Discharging Physician / Practitioner: Andrea Diehl MD  PCP: No primary care provider on file  Admission Date:   Admission Orders (From admission, onward)     Ordered        01/09/22 1653  Inpatient Admission  Once                      Discharge Date: 01/12/22    Medical Problems             Resolved Problems  Date Reviewed: 1/12/2022          Resolved    * (Principal) Acute on chronic systolic and diastolic heart failure, NYHA class 3 ([facility] [address] ) 1/12/2022     Resolved by  Andrea Diehl MD    Hyperglycemia 1/12/2022     Resolved by  Andrea Diehl MD                Consultations During Hospital Stay:  · None    Procedures Performed:   · Echocardiogram showing ejection fraction of 20 percent  · Right upper quadrant ultrasound which showed no significant gallbladder abnormality  Echogenic foci within the corticomedullary junctions of the right kidney, possibly nephrocalcinosis, however of uncertain etiology    Recommend either follow-up renal ultrasound or unenhanced CT in 1-3 months    Chest x-ray showing pulmonary edema  Patient was intubated on admission     Significant Findings / Test Results:   · See above  · AST and ALT yesterday were 383 at 391 respectively  On 1/9/2020 to they were 31 and 18 respectively  Incidental Findings:   · See ultrasound report     Test Results Pending at Discharge (will require follow up): · None     Outpatient Tests Requested:  · CMP in 1 week  Patient will some full need renal ultrasound the next couple months    Complications:  None    Reason for Admission:  Respiratory distress    Hospital Course:     Charanjit Cazares is a 61 y o  male patient who originally presented to the hospital on 1/9/2022 due to respiratory distress  History presenting illness:Compa Chnag is a 61 y o  male who was home at home and developed respiratory distress  EMS was activated  Upon their arrival patient was found to be in sinus ventricular tachycardia  He was given 2 rounds of adenosine with no affect  Patient was subsequently transported to [facility] Emergency Department for further evaluation treatment  Her emergency department personnel the patient was in significant distress when he arrived he was unable to speak he was only able to nod his head and shake his head no  He was on a non-rebreather upon arrival with loud crackles      Given the patient's severe respiratory distress she was intubated and sedated  Upon my arrival to the emergency department the patient remains intubated and sedated  He does not respond to questions  He has a known past medical history significant for coronary artery disease, systolic and diastolic heart failure, ischemic cardiomyopathy, AVR, biventricular IC and V-tach, [facility] course:  Patient admitted for above reasons  The patient was in the ICU for couple of days  He was ultimately extubated    The patient was put on aggressive diuresis given his significant cardiomyopathy and evidence of volume overload  Patient was also put on steroids for COVID-19 infection and acute respiratory distress  Patient did have a pretty significant transaminitis which was being monitored and right upper quadrant ultrasound was done for which results are as above  Patient was transferred to our service yesterday and I saw the patient today  I recommended the patient stay here and be monitored as far as his transaminitis goes  His INR is also subtherapeutic but the patient has known noncompliance and checking his INR as an outpatient  His INR was 1 6 and his D-dimer was greater than 20  That could be secondary to the acute respiratory distress he was on but he also is COVID positive with subtherapeutic INR  Every time they put the patient on heparin his platelet counts dropped a little bit  I wanted to monitor the patient least 1 more day but this patient signed out against medical advice  He did understand that signing out against medical advice can possibly lead to worsening hepatic failure  Worsening respiratory distress  Sudden death  Please see above list of diagnoses and related plan for additional information  Condition at Discharge: fair     Discharge Day Visit / Exam:     Subjective:  Patient seen examined    He has no complaints and states he is feeling okay  Vitals: Blood Pressure: 148/85 (01/12/22 0719)  Pulse: 77 (01/12/22 0719)  Temperature: 98 1 °F (36 7 °C) (01/12/22 0719)  Temp Source: Temporal (01/11/22 1500)  Respirations: 20 (01/12/22 0719)  Height: 5' 10" (177 8 cm) (01/10/22 1126)  Weight - Scale: 56 6 kg (124 lb 12 5 oz) (01/12/22 0557)  SpO2: 95 % (01/12/22 0719)  Exam:   Physical Exam  (   General Appearance:    Alert, cooperative, no distress, appears stated age                               Lungs:     Clear to auscultation bilaterally, respirations unlabored       Heart:    Regular rate and rhythm, S1 and S2 normal, no murmur, rub    or gallop Abdomen:     Soft, non-tender, bowel sounds active all four quadrants,     no masses, no organomegaly           Extremities:   Extremities normal, atraumatic, no cyanosis or edema       Discharge instructions/Information to patient and family:   See after visit summary for information provided to patient and family  Provisions for Follow-Up Care:  See after visit summary for information related to follow-up care and any pertinent home health orders  Disposition:     Home    For Discharges to [facility] SNF:   · Not Applicable to this Patient - Not Applicable to this Patient    Planned Readmission:  None anticipated     Discharge Statement:  I spent 35 minutes discharging the patient  This time was spent on the day of discharge  I had direct contact with the patient on the day of discharge  Greater than 50% of the total time was spent examining patient, answering all patient questions, arranging and discussing plan of care with patient as well as directly providing post-discharge instructions  Additional time then spent on discharge activities  Discharge Medications:  See after visit summary for reconciled discharge medications provided to patient and family        ** Please Note: This note has been constructed using a voice recognition system **

## 2022-01-12 NOTE — ASSESSMENT & PLAN NOTE
Wt Readings from Last 3 Encounters:   01/12/22 56 6 kg (124 lb 12 5 oz)      -Chest x ray demonstrates volume overload    - Left ventricular EF- 20 % 1/10/22  - pro BNP greater than 13 000  -s/p PTCA with BMS to the left  Anterior descending artery and left circumflex   - s/p biventricular ICD  - patient lost weight since 1/9/22 01/11/22 0600 56 6 kg (124 lb 12 5 oz) Bed scale    01/10/22 1126 -- --    01/10/22 1030 58 5 kg (129 lb) --    01/10/22 0600 58 8 kg (129 lb 10 1 oz) Bed scale    01/09/22 2115 58 8 kg (129 lb 10 1 oz) Bed scale    Switched over to p o  Lasix after the patient was in the ICU  Patient from that standpoint was doing okay  I did put him on Lasix on discharge with supplemental potassium  Patient needs to follow up with Cardiology as an outpatient  Patient has significant cardiomyopathy  Further workup here did reveal ejection fraction of 20 percent

## 2022-01-14 NOTE — UTILIZATION REVIEW
Notification of Discharge   This is a Notification of Discharge from our facility [address]  Please be advised that this patient has been discharge from our facility  Below you will find the admission and discharge date and time including the patients disposition  UTILIZATION REVIEW CONTACT:  Willian Raphael  Utilization   Network Utilization Review Department  Phone: [phone] x carefully listen to the prompts  All voicemails are confidential   Email: [email or web address]     PHYSICIAN ADVISORY SERVICES:  FOR [id number] REVIEW - MEDICAL NECESSITY DENIAL  Phone: [phone]  Fax: [phone]  Email: [email or web address]     PRESENTATION DATE: 1/9/2022  1:44 PM    INPATIENT ADMISSION DATE: 1/9/22  4:50 PM   DISCHARGE DATE: 1/12/2022 11:47 AM  DISPOSITION: Left against medical advice or discontinued care Left against medical advice or discontinued care      IMPORTANT INFORMATION:  Send all requests for admission clinical reviews, approved or denied determinations and any other requests to dedicated fax number below belonging to the campus where the patient is receiving treatment   List of dedicated fax numbers:  [address] DENIALS (Administrative/Medical Necessity) [phone]   [address] (Maternity/NICU/Pediatrics) [phone]   [facility] [phone]   [address] [facility] [phone]   [address] [place] [address] [phone]   2000 [facility] [address] [place] [address],[address] [phone]   [facility]  [phone]   [address] [facility] [address] [facility]  [address] [facility] 1000 [address] [facility] [phone]

## 2022-01-21 PROBLEM — E87.1 HYPONATREMIA: Status: ACTIVE | Noted: 2022-01-01

## 2022-01-21 PROBLEM — R10.9 ABDOMINAL PAIN: Status: ACTIVE | Noted: 2022-01-01

## 2022-01-21 NOTE — ASSESSMENT & PLAN NOTE
Recently diagnosed with COVID-19 1/9/2022  Patient was intubated and ultimately extubated 1/10/2022    No indication for continued COVID directed therapy at this time  Continue to monitor off precautions given severe infection hospital protocol 20 days

## 2022-01-21 NOTE — ASSESSMENT & PLAN NOTE
History of VFib/V-tach With Bi V ICD  Continue metoprolol succinate-increased doses 7 5 mg b i d   Due to ongoing episodes transdermally, PVCs, V-tach, currently asymptomatic denying any chest pain or shortness of breath  -repleting magnesium and potassium  Monitor on telemetry

## 2022-01-21 NOTE — H&P
[address] 1965, 61 y o  male MRN: [id number]  Unit/Bed#: ED 21 Encounter: [id number]  Primary Care Provider: Travis Faulkner PA-C   Date and time admitted to hospital: 1/21/2022  1:33 PM    * Acute on chronic combined systolic and diastolic CHF (congestive heart failure) (HCC)  Assessment & Plan  Wt Readings from Last 3 Encounters:   01/21/22 57 1 kg (125 lb 14 1 oz)   01/12/22 56 6 kg (124 lb 12 5 oz)   09/13/21 62 4 kg (137 lb 8 oz)     Chest x-ray with bilateral pulmonary infiltrates edema verses residual from COVID  Elevated proBNP  Unclear compliance with medications on discharge  Patient reports he has not taking any meds for 3 days due to stomach discomfort  Will monitor on telemetry to see if arrhythmias are contributing  Patient's weight is 1 lb up from most recent discharge  Given that patient presented with pillow orthopnea and shortness of breath will opt to diurese  Will trial 1 time dose Lasix 40 IV and follow response  Consult Cardiology for evaluation        Hyponatremia  Assessment & Plan  Patient is a poor historian with unclear history following most recent hospitalization  Patient does have elevated proBNP and chest x-ray with pulmonary edema pointing towards volume overload  He also does report history of poor oral intake due to abdominal discomfort  Will check hyponatremia panel labs  Will give 1 time dose IV Lasix and follow response    Abdominal pain  Assessment & Plan  Patient reports epigastric pain since discharge from hospital   He gets postprandial nausea and discomfort  Concern for gastritis versus peptic ulcer disease  Likely stress induced with recent hospitalization and intubation possible component of steroid induced for COVID treatment  Give IV Protonix, p r n   Mylanta    COVID-19 virus infection  Assessment & Plan  Recently diagnosed with COVID-19 1/9/2022  Patient was intubated and ultimately extubated 1/10/2022  No indication for continued COVID directed therapy at this time  Continue to monitor on precautions given severe infection hospital protocol 20 days    Cocaine use  Assessment & Plan  UDS positive on previous admission for cocaine    Ventricular fibrillation [facility])  Assessment & Plan  History of VFib/V-tach With Bi V ICD  Continue metoprolol succinate  Monitor on telemetry    S/P AVR (aortic valve replacement)  Assessment & Plan  Patient currently on Coumadin but supratherapeutic INR  Will hold Coumadin, recheck INR    Coronary artery disease involving native coronary artery of native heart with angina pectoris (HCC)  Assessment & Plan  Continue aspirin, atorvastatin, metoprolol    VTE Prophylaxis:  Coumadin on hold  Code Status:  Level 1 full code    Anticipated Length of Stay:  Patient will be admitted on an Inpatient basis with an anticipated length of stay of  greater than 2 midnights  Justification for Hospital Stay:  Inpatient evaluation acute heart failure requiring IV medications    Total Time for Visit, including Counseling / Coordination of Care: 60 minutes  Greater than 50% of this total time spent on direct patient counseling and coordination of care  Chief Complaint:   Shortness of breath    History of Present Illness:    Jr Wheeler is a 61 y o  male With past medical history of ischemic cardiomyopathy EF 20%, aortic stenosis status post AVR, history of cocaine use, history of V-tach/VFib with Bi V ICD presents the hospital with shortness of breath  Patient was recently admitted to [facility] due to significant respiratory distress and was intubated on admission  Patient was ultimately extubated 1/10 and titrated to room air  Patient did leave AMA from prior admission  Patient reports 3 day history of shortness of breath especially when lying flat  He also reports epigastric discomfort and postprandial nausea and early satiety    He says that he is not taking any medications over the last few days due to the discomfort in his abdomen  Patient is saturating 93% on room air in ED  Review of Systems:    Review of Systems   Constitutional: Negative for chills and fever  HENT: Negative for sore throat and trouble swallowing  Eyes: Negative for photophobia and visual disturbance  Respiratory: Positive for shortness of breath  Negative for wheezing  Cardiovascular: Negative for chest pain and palpitations  Gastrointestinal: Negative for constipation, diarrhea, nausea and vomiting  Genitourinary: Negative for difficulty urinating and dysuria  Musculoskeletal: Negative for arthralgias and myalgias  Skin: Negative for rash and wound  Neurological: Negative for dizziness, light-headedness and headaches  Past Medical and Surgical History:     Past Medical History:   Diagnosis Date    AICD (automatic cardioverter/defibrillator) present     medtronic     CAD (coronary artery disease)     Cancer ([facility] [address] )     Cardiac disease     Cardiomyopathy ([facility] [address] )     mixed  predominently nonischemic    Colonic mass     Coronary artery disease     Hyperlipidemia     Hypertension     Irregular heart beat     Myocardial infarction ([facility] [address] )     2014 and 2015    Rectal bleeding     S/P AVR (aortic valve replacement)     mechanical    Wears glasses        Past Surgical History:   Procedure Laterality Date    AORTIC VALVE REPLACEMENT      APPENDECTOMY      CARDIAC DEFIBRILLATOR PLACEMENT      COLECTOMY MADELIN Right     Last Assessed: 6/7/2016    COLECTOMY LAPAROSCOPIC      COLON SURGERY Right     colectomy    CORONARY STENT PLACEMENT      INSERT / REPLACE / REMOVE PACEMAKER      MITRAL VALVE REPAIR      MITRAL VALVE REPAIR      MOUTH SURGERY      MA COLONOSCOPY FLX DX W/COLLJ [facility] WHEN PFRMD N/A 1/15/2018    Procedure: COLONOSCOPY;  Surgeon: Marcelino Eller MD;  Location: [facility];   Service: General    MA LAP,SURG,COLECTOMY, PARTIAL, W/ANAST N/A 6/2/2016    Procedure: RESECTION COLON RIGHT LAPAROSCOPIC HAND-ASSISTED;  Surgeon: Mei Maharaj MD;  Location: [facility];  Service: General       Meds/Allergies:    Prior to Admission medications    Medication Sig Start Date End Date Taking? Authorizing Provider   amiodarone 100 mg tablet Take 1 tablet (100 mg total) by mouth daily with breakfast 1/13/22  Yes Divine Cline MD   atorvastatin (LIPITOR) 80 mg tablet take 1 tablet by mouth once daily 8/11/21  Yes Angeles Lord MD   eplerenone (INSPRA) 25 mg tablet take 1 tablet by mouth once daily 6/4/21  Yes Adebayo Mayorga,    furosemide (LASIX) 40 mg tablet Take 1 tablet (40 mg total) by mouth daily Or take as previously taken 1/12/22  Yes Divine Cline MD   lisinopril (ZESTRIL) 40 mg tablet take 1 tablet by mouth once daily at bedtime 3/6/21  Yes Magan Hernandez, DO   metoprolol succinate (TOPROL-XL) 50 mg 24 hr tablet take 1 tablet by mouth every 12 hours 7/21/21  Yes Marcelina Bobo MD   potassium chloride (MICRO-K) 10 MEQ CR capsule Take 2 capsules (20 mEq total) by mouth daily 1/12/22  Yes Divine Cline MD   warfarin (COUMADIN) 4 mg tablet take 1 tablet by mouth once daily as directed 12/8/21  Yes Sandoval Escalera,    warfarin (COUMADIN) 5 mg tablet Take 7 5 mg tonight then as previously taken ,  Get your inr checked tomorrow 1/13 1/12/22  Yes Divine Cline MD   aspirin 81 MG tablet Take 81 mg by mouth daily    Patient not taking: Reported on 1/21/2022     Historical Provider, MD   Potassium Chloride ER 20 MEQ TBCR Take 1 tablet (20 mEq total) by mouth daily 12/8/21   Magan Hernandez DO   enoxaparin (LOVENOX) 60 mg/0 6 mL Inject 0 6 mL (60 mg total) under the skin every 12 (twelve) hours for 3 days  Patient not taking: Reported on 9/13/2021 9/3/21 1/21/22  Brennon Jose MD   furosemide (LASIX) 40 mg tablet take 1 tablet by mouth twice a day 12/8/21 1/21/22  Richele Pica, PA-C   mirtazapine (REMERON) 45 MG tablet Take 45 mg by mouth daily at bedtime  Patient not taking: Reported on 9/13/2021 12/4/18 1/21/22  Historical Provider, MD       Allergies: No Known Allergies    Social History:     Marital Status: /Civil Union   Substance Use History:   Social History     Substance and Sexual Activity   Alcohol Use Not Currently     Social History     Tobacco Use   Smoking Status Never Smoker   Smokeless Tobacco Never Used     Social History     Substance and Sexual Activity   Drug Use No       Family History:    non-contributory    Physical Exam:     Vitals:   Blood Pressure: 126/75 (01/21/22 1645)  Pulse: 96 (01/21/22 1645)  Temperature: 98 3 °F (36 8 °C) (01/21/22 1245)  Temp Source: Oral (01/21/22 1245)  Respirations: 20 (01/21/22 1645)  Weight - Scale: 57 1 kg (125 lb 14 1 oz) (01/21/22 1241)  SpO2: 98 % (01/21/22 1645)    Physical Exam  Vitals reviewed  Constitutional:       General: He is not in acute distress  Appearance: He is well-developed  He is not ill-appearing, toxic-appearing or diaphoretic  HENT:      Head: Normocephalic and atraumatic  Mouth/Throat:      Mouth: Mucous membranes are moist       Pharynx: No oropharyngeal exudate  Eyes:      General: No scleral icterus  Extraocular Movements: Extraocular movements intact  Conjunctiva/sclera: Conjunctivae normal    Cardiovascular:      Rate and Rhythm: Normal rate and regular rhythm  Heart sounds: Normal heart sounds  Pulmonary:      Effort: Pulmonary effort is normal  No respiratory distress  Breath sounds: Normal breath sounds  No wheezing or rales  Abdominal:      General: There is no distension  Palpations: Abdomen is soft  Tenderness: There is no abdominal tenderness  There is no guarding or rebound  Musculoskeletal:         General: No swelling, tenderness or deformity  Skin:     General: Skin is warm and dry  Neurological:      General: No focal deficit present  Mental Status: He is alert  Mental status is at baseline     Psychiatric:         Mood and Affect: Mood normal          Behavior: Behavior normal          Thought Content: Thought content normal          Judgment: Judgment normal           Additional Data:     Lab Results: I have reviewed pertinent results     Results from last 7 days   Lab Units 01/21/22  1413   WBC Thousand/uL 10 48*   HEMOGLOBIN g/dL 13 7   HEMATOCRIT % 41 0   PLATELETS Thousands/uL 221   BANDS PCT % 1   LYMPHO PCT % 2*   MONO PCT % 2*   EOS PCT % 0     Results from last 7 days   Lab Units 01/21/22  1413   SODIUM mmol/L 130*   POTASSIUM mmol/L 3 7   CHLORIDE mmol/L 93*   CO2 mmol/L 28   BUN mg/dL 13   CREATININE mg/dL 1 12   ANION GAP mmol/L 9   CALCIUM mg/dL 8 0*   ALBUMIN g/dL 2 4*   TOTAL BILIRUBIN mg/dL 1 24*   ALK PHOS U/L 121*   ALT U/L 70   AST U/L 56*   GLUCOSE RANDOM mg/dL 149*     Results from last 7 days   Lab Units 01/21/22  1439   INR  5 06*                   Imaging: I have reviewed pertinent imaging     XR chest 1 view portable   Final Result by Radhika Saldaña MD (01/21 1443)      Mild groundglass opacities of the lower lung zones  The appearance is much improved since January 9  The differential diagnosis can include mild edema or pneumonia  Workstation performed: [id number]             EKG, Pathology, and Other Studies Reviewed on Admission:   · EKG: Reviewed     Allscripts / Epic Records Reviewed    ** Please Note: This note has been constructed using a voice recognition system   **

## 2022-01-21 NOTE — ED PROVIDER NOTES
History  Chief Complaint   Patient presents with    Shortness of Breath     Pt reports worsening shortness of breath over the last couple of days  Pt is Covid positive since 1/9/21  This is a 29-year-old male patient who on January 9, 2022 was diagnosed with COVID-19 he is unvaccinated and subsequently admitted for congestive heart failure and shortness of breath to COVID-19  He is on Coumadin which he states he is taking appropriately for an aortic valve repair  Over last 3 nights he states that he has become more short of breath especially at night when he lays flat and feels that I am filling up with water patient is laying semi-Morales's in no acute distress  His initial SpO2 is 93% on room air  States he has pain with inspiration  Nothing makes it better or worse  Denies any fever chills headache blurred vision double vision cough congestion sore throat no nausea vomiting diarrhea abdominal pain  He has tried nothing over-the-counter  Patient has defibrillator history coronary artery disease aortic valve repair  Differential diagnosis includes not limited to congestive heart failure, COVID with pulmonary embolus  Will check PT INR in a from therapeutic will consider CTA , ACS, pneumonia          Prior to Admission Medications   Prescriptions Last Dose Informant Patient Reported? Taking? Potassium Chloride ER 20 MEQ TBCR   No No   Sig: Take 1 tablet (20 mEq total) by mouth daily   amiodarone 100 mg tablet 1/21/2022 at Unknown time  No Yes   Sig: Take 1 tablet (100 mg total) by mouth daily with breakfast   aspirin 81 MG tablet Not Taking at Unknown time Self Yes No   Sig: Take 81 mg by mouth daily     Patient not taking: Reported on 1/21/2022    atorvastatin (LIPITOR) 80 mg tablet 1/21/2022 at Unknown time Self No Yes   Sig: take 1 tablet by mouth once daily   eplerenone (INSPRA) 25 mg tablet 1/21/2022 at Unknown time Self No Yes   Sig: take 1 tablet by mouth once daily   furosemide (LASIX) 40 mg tablet 1/21/2022 at Unknown time  No Yes   Sig: Take 1 tablet (40 mg total) by mouth daily Or take as previously taken   lisinopril (ZESTRIL) 40 mg tablet 1/20/2022 at Unknown time Self No Yes   Sig: take 1 tablet by mouth once daily at bedtime   metoprolol succinate (TOPROL-XL) 50 mg 24 hr tablet 1/21/2022 at Unknown time Self No Yes   Sig: take 1 tablet by mouth every 12 hours   potassium chloride (MICRO-K) 10 MEQ CR capsule 1/21/2022 at Unknown time  No Yes   Sig: Take 2 capsules (20 mEq total) by mouth daily   warfarin (COUMADIN) 4 mg tablet   No Yes   Sig: take 1 tablet by mouth once daily as directed   warfarin (COUMADIN) 5 mg tablet   No Yes   Sig: Take 7 5 mg tonight then as previously taken ,  Get your inr checked tomorrow 1/13      Facility-Administered Medications: None       Past Medical History:   Diagnosis Date    AICD (automatic cardioverter/defibrillator) present     Lightera     CAD (coronary artery disease)     [facility]     Cardiac disease     Cardiomyopathy ([facility]     mixed  predominently nonischemic    Colonic mass     Coronary artery disease     Hyperlipidemia     Hypertension     Irregular heart beat     Myocardial infarction ([facility] [facility]     2014 and 2015    Rectal bleeding     S/P AVR (aortic valve replacement)     mechanical    Wears glasses        Past Surgical History:   Procedure Laterality Date    AORTIC VALVE REPLACEMENT      APPENDECTOMY      CARDIAC DEFIBRILLATOR PLACEMENT      COLECTOMY MADELIN Right     Last Assessed: 6/7/2016    COLECTOMY LAPAROSCOPIC      COLON SURGERY Right     colectomy    CORONARY STENT PLACEMENT      INSERT / REPLACE / REMOVE PACEMAKER      MITRAL VALVE REPAIR      MITRAL VALVE REPAIR      MOUTH SURGERY      OK COLONOSCOPY FLX DX W/COLLJ [facility] WHEN PFRMD N/A 1/15/2018    Procedure: COLONOSCOPY;  Surgeon: Rafal Armstrong MD;  Location: [facility];   Service: General    OK LAP,SURG,COLECTOMY, PARTIAL, W/ANAST N/A 6/2/2016    Procedure: RESECTION COLON RIGHT LAPAROSCOPIC HAND-ASSISTED;  Surgeon: Kirk Cox MD;  Location: [facility];  Service: General       Family History   Problem Relation Age of Onset    Diabetes Mother     Hypertension Mother     Valvular heart disease Mother     Valvular heart disease Father     Alcohol abuse Father      I have reviewed and agree with the history as documented  E-Cigarette/Vaping    E-Cigarette Use Never User      E-Cigarette/Vaping Substances     Social History     Tobacco Use    Smoking status: Never Smoker    Smokeless tobacco: Never Used   Vaping Use    Vaping Use: Never used   Substance Use Topics    Alcohol use: Not Currently    Drug use: No       Review of Systems   Constitutional: Negative for chills, diaphoresis, fatigue and fever  HENT: Negative  Negative for congestion, ear pain, nosebleeds and sore throat  Eyes: Negative for photophobia, pain, discharge and visual disturbance  Respiratory: Positive for shortness of breath  Negative for cough, choking, chest tightness, wheezing and stridor  Cardiovascular: Positive for chest pain  Negative for palpitations and leg swelling  Gastrointestinal: Negative for abdominal distention, abdominal pain, diarrhea and vomiting  Genitourinary: Negative for dysuria, flank pain, frequency and hematuria  Musculoskeletal: Negative for arthralgias, back pain, gait problem and joint swelling  Skin: Negative for color change and rash  Neurological: Negative for dizziness, seizures, syncope and headaches  Psychiatric/Behavioral: Negative for behavioral problems and confusion  The patient is not nervous/anxious  All other systems reviewed and are negative  Physical Exam  Physical Exam  Vitals and nursing note reviewed  Constitutional:       General: He is not in acute distress  Appearance: He is well-developed  He is not ill-appearing, toxic-appearing or diaphoretic  HENT:      Head: Normocephalic and atraumatic  Right Ear: Tympanic membrane, ear canal and external ear normal       Left Ear: Tympanic membrane, ear canal and external ear normal       Nose: Nose normal       Mouth/Throat:      Mouth: Mucous membranes are moist       Pharynx: Oropharynx is clear  No oropharyngeal exudate or posterior oropharyngeal erythema  Eyes:      General: No scleral icterus  Right eye: No discharge  Left eye: No discharge  Conjunctiva/sclera: Conjunctivae normal       Pupils: Pupils are equal, round, and reactive to light  Cardiovascular:      Rate and Rhythm: Normal rate and regular rhythm  Pulmonary:      Effort: Pulmonary effort is normal       Breath sounds: Normal breath sounds  Abdominal:      General: Bowel sounds are normal       Palpations: Abdomen is soft  Tenderness: There is no abdominal tenderness  Musculoskeletal:         General: Normal range of motion  Cervical back: Normal range of motion and neck supple  Right lower leg: No edema  Left lower leg: No edema  Skin:     General: Skin is warm  Capillary Refill: Capillary refill takes less than 2 seconds  Neurological:      General: No focal deficit present  Mental Status: He is alert and oriented to person, place, and time  Mental status is at baseline     Psychiatric:         Mood and Affect: Mood normal          Behavior: Behavior normal          Vital Signs  ED Triage Vitals   Temperature Pulse Respirations Blood Pressure SpO2   01/21/22 1245 01/21/22 1241 01/21/22 1241 01/21/22 1241 01/21/22 1241   98 3 °F (36 8 °C) (!) 107 22 115/71 93 %      Temp Source Heart Rate Source Patient Position - Orthostatic VS BP Location FiO2 (%)   01/21/22 1245 01/21/22 1439 01/21/22 1241 01/21/22 1241 --   Oral Monitor Sitting Left arm       Pain Score       01/21/22 1241       No Pain           Vitals:    01/26/22 1531 01/26/22 2021 01/27/22 0516 01/27/22 0613   BP: 113/61 124/68 107/64 107/65   Pulse: 59 78 60 59   Patient Position - Orthostatic VS: Lying            Visual Acuity      ED Medications  Medications   amiodarone tablet 100 mg (100 mg Oral Given 1/26/22 0830)   aspirin chewable tablet 81 mg (81 mg Oral Given 1/26/22 0830)   atorvastatin (LIPITOR) tablet 80 mg (80 mg Oral Given 1/26/22 1703)   acetaminophen (TYLENOL) tablet 650 mg (650 mg Oral Given 1/26/22 2021)   ondansetron (ZOFRAN) injection 4 mg (has no administration in time range)   aluminum-magnesium hydroxide-simethicone (MYLANTA) oral suspension 30 mL (has no administration in time range)   pantoprazole (PROTONIX) EC tablet 40 mg (40 mg Oral Given 1/27/22 0628)   metoprolol succinate (TOPROL-XL) 24 hr tablet 75 mg (0 mg Oral Hold 1/27/22 0628)   dexamethasone (DECADRON) injection 6 mg (6 mg Intravenous Given 1/26/22 1702)   bumetanide (BUMEX) injection 2 mg (2 mg Intravenous Given 1/26/22 1707)   cefTRIAXone (ROCEPHIN) 1,000 mg in dextrose 5 % 50 mL IVPB (1,000 mg Intravenous New Bag 1/26/22 1702)   melatonin tablet 3 mg (3 mg Oral Given 1/26/22 2347)   furosemide (LASIX) injection 40 mg (40 mg Intravenous Given 1/21/22 1805)   magnesium sulfate IVPB (premix) SOLN 1 g (1 g Intravenous New Bag 1/21/22 2202)   potassium chloride 20 mEq IVPB (premix) (20 mEq Intravenous New Bag 1/21/22 2008)   metoprolol succinate (TOPROL-XL) 24 hr tablet 25 mg (25 mg Oral Given 1/21/22 2020)   magnesium sulfate 2 g/50 mL IVPB (premix) 2 g (0 g Intravenous Stopped 1/22/22 1608)   potassium chloride 20 mEq IVPB (premix) (0 mEq Intravenous Stopped 1/22/22 1728)   metoprolol (LOPRESSOR) injection 2 5 mg (2 5 mg Intravenous Given 1/22/22 1226)   magnesium sulfate 2 g/50 mL IVPB (premix) 2 g (0 g Intravenous Stopped 1/23/22 1247)   potassium chloride oral solution 40 mEq (40 mEq Oral Given 1/23/22 1404)   albumin human (FLEXBUMIN) 25 % injection 25 g (25 g Intravenous New Bag 1/25/22 1706)       Diagnostic Studies  Results Reviewed     Procedure Component Value Units Date/Time    Cortisol Level, AM Specimen [[id number]]  (Abnormal) Collected: 01/22/22 0746    Lab Status: Final result Specimen: Arm, Right Updated: 01/22/22 1204     Cortisol - AM 34 8 ug/dL     Narrative:      Reference ranges established for specimens drawn between 7 and 9 am  Results may be inaccurate if timing is not correct      Basic metabolic panel [[id number]]  (Abnormal) Collected: 01/22/22 0746    Lab Status: Final result Specimen: Blood from Arm, Right Updated: 01/22/22 0845     Sodium 129 mmol/L      Potassium 3 9 mmol/L      Chloride 93 mmol/L      CO2 29 mmol/L      ANION GAP 7 mmol/L      BUN 13 mg/dL      Creatinine 1 00 mg/dL      Glucose 92 mg/dL      Calcium 7 9 mg/dL      eGFR 83 ml/min/1 73sq m     Narrative:      [place] guidelines for Chronic Kidney Disease (CKD):     Stage 1 with normal or high GFR (GFR > 90 mL/min/1 73 square meters)    Stage 2 Mild CKD (GFR = 60-89 mL/min/1 73 square meters)    Stage 3A Moderate CKD (GFR = 45-59 mL/min/1 73 square meters)    Stage 3B Moderate CKD (GFR = 30-44 mL/min/1 73 square meters)    Stage 4 Severe CKD (GFR = 15-29 mL/min/1 73 square meters)    Stage 5 End Stage CKD (GFR <15 mL/min/1 73 square meters)  Note: GFR calculation is accurate only with a steady state creatinine    Magnesium [[id number]]  (Abnormal) Collected: 01/22/22 0746    Lab Status: Final result Specimen: Blood from Arm, Right Updated: 01/22/22 0845     Magnesium 1 5 mg/dL     Protime-INR [[id number]]  (Abnormal) Collected: 01/22/22 0746    Lab Status: Final result Specimen: Blood from Arm, Right Updated: 01/22/22 0837     Protime 40 6 seconds      INR 4 45    CBC (With Platelets) [[id number]]  (Abnormal) Collected: 01/22/22 0746    Lab Status: Final result Specimen: Blood from Arm, Right Updated: 01/22/22 0756     WBC 12 28 Thousand/uL      RBC 5 07 Million/uL      Hemoglobin 13 9 g/dL      Hematocrit 40 7 %      MCV 80 fL      MCH 27 4 pg      MCHC 34 2 g/dL      RDW 14 6 % Platelets 388 Thousands/uL      MPV 9 6 fL     HS Troponin I 4hr [[id number]] Collected: 01/22/22 0026    Lab Status: Final result Specimen: Blood from Arm, Right Updated: 01/22/22 0056     hs TnI 4hr 73 ng/L      Delta 4hr hsTnI 11 ng/L     HS Troponin I 2hr [[id number]] Collected: 01/21/22 2212    Lab Status: Final result Specimen: Blood from Arm, Right Updated: 01/21/22 2239     hs TnI 2hr 73 ng/L      Delta 2hr hsTnI 11 ng/L     Osmolality, urine [[id number]]  (Normal) Collected: 01/21/22 1835    Lab Status: Final result Specimen: Urine, Clean Catch Updated: 01/21/22 2219     Osmolality, Ur 505 mmol/KG     Osmolality-If this is regarding a toxic alcohol, STOP  Test is not routinely indicated  Please consult medical  on call for further guidance  [[id number]]  (Abnormal) Collected: 01/21/22 1748    Lab Status: Final result Specimen: Blood from Arm, Right Updated: 01/21/22 2218     Osmolality Serum 271 mmol/KG     TSH, 3rd generation [[id number]]  (Normal) Collected: 01/21/22 1413    Lab Status: Final result Specimen: Blood from Arm, Left Updated: 01/21/22 2001     TSH 3RD GENERATON 0 833 uIU/mL     Narrative:      Patients undergoing fluorescein dye angiography may retain small amounts of fluorescein in the body for 48-72 hours post procedure  Samples containing fluorescein can produce falsely depressed TSH values  If the patient had this procedure,a specimen should be resubmitted post fluorescein clearance        Uric acid [[id number]]  (Normal) Collected: 01/21/22 1413    Lab Status: Final result Specimen: Blood from Arm, Left Updated: 01/21/22 2001     Uric Acid 6 5 mg/dL     Sodium, urine, random [[id number]] Collected: 01/21/22 1835    Lab Status: Final result Specimen: Urine, Clean Catch Updated: 01/21/22 1925     Sodium, Ur 53    HS Troponin 0hr (reflex protocol) [[id number]] Collected: 01/21/22 1719    Lab Status: Final result Specimen: Blood from Arm, Right Updated: 01/21/22 1753     hs TnI 0hr 62 ng/L     Manual Differential(PHLEBS Do Not Order) [[id number]]  (Abnormal) Collected: 01/21/22 1413    Lab Status: Final result Specimen: Blood from Arm, Left Updated: 01/21/22 1620     Segmented % 95 %      Bands % 1 %      Lymphocytes % 2 %      Monocytes % 2 %      Eosinophils, % 0 %      Basophils % 0 %      Absolute Neutrophils 10 06 Thousand/uL      Lymphocytes Absolute 0 21 Thousand/uL      Monocytes Absolute 0 21 Thousand/uL      Eosinophils Absolute 0 00 Thousand/uL      Basophils Absolute 0 00 Thousand/uL      Total Counted --     Anisocytosis Present     Osyka Cells Present     Helmet Cells Present     Ovalocytes Present     Platelet Estimate Adequate    Protime-INR [[id number]]  (Abnormal) Collected: 01/21/22 1439    Lab Status: Final result Specimen: Blood from Arm, Left Updated: 01/21/22 1549     Protime 44 8 seconds      INR 5 06    APTT [[id number]]  (Abnormal) Collected: 01/21/22 1439    Lab Status: Final result Specimen: Blood from Arm, Left Updated: 01/21/22 1549     PTT 96 seconds     NT-BNP PRO [[id number]]  (Abnormal) Collected: 01/21/22 1413    Lab Status: Final result Specimen: Blood from Arm, Left Updated: 01/21/22 1446     NT-proBNP 7,331 pg/mL     Magnesium [[id number]]  (Abnormal) Collected: 01/21/22 1413    Lab Status: Final result Specimen: Blood from Arm, Left Updated: 01/21/22 1446     Magnesium 1 5 mg/dL     Comprehensive metabolic panel [[id number]]  (Abnormal) Collected: 01/21/22 1413    Lab Status: Final result Specimen: Blood from Arm, Left Updated: 01/21/22 1440     Sodium 130 mmol/L      Potassium 3 7 mmol/L      Chloride 93 mmol/L      CO2 28 mmol/L      ANION GAP 9 mmol/L      BUN 13 mg/dL      Creatinine 1 12 mg/dL      Glucose 149 mg/dL      Calcium 8 0 mg/dL      Corrected Calcium 9 3 mg/dL      AST 56 U/L      ALT 70 U/L      Alkaline Phosphatase 121 U/L      Total Protein 6 6 g/dL      Albumin 2 4 g/dL      Total Bilirubin 1 24 mg/dL      eGFR 72 ml/min/1 73sq m     Narrative: [place] guidelines for Chronic Kidney Disease (CKD):     Stage 1 with normal or high GFR (GFR > 90 mL/min/1 73 square meters)    Stage 2 Mild CKD (GFR = 60-89 mL/min/1 73 square meters)    Stage 3A Moderate CKD (GFR = 45-59 mL/min/1 73 square meters)    Stage 3B Moderate CKD (GFR = 30-44 mL/min/1 73 square meters)    Stage 4 Severe CKD (GFR = 15-29 mL/min/1 73 square meters)    Stage 5 End Stage CKD (GFR <15 mL/min/1 73 square meters)  Note: GFR calculation is accurate only with a steady state creatinine    CBC and differential [[id number]]  (Abnormal) Collected: 01/21/22 1413    Lab Status: Final result Specimen: Blood from Arm, Left Updated: 01/21/22 1432     WBC 10 48 Thousand/uL      RBC 5 06 Million/uL      Hemoglobin 13 7 g/dL      Hematocrit 41 0 %      MCV 81 fL      MCH 27 1 pg      MCHC 33 4 g/dL      RDW 14 6 %      MPV 9 4 fL      Platelets 940 Thousands/uL     Narrative: This is an appended report  These results have been appended to a previously verified report  XR chest portable   Final Result by Souleymane Stoll MD (01/26 4511)      Improving bilateral airspace disease which may be due to pulmonary edema versus multifocal pneumonia  Workstation performed: [id number]         XR chest portable   Final Result by Laquita Clifford MD (01/24 7872)      Pulmonary edema or, possibly, multifocal pneumonia, as well as pulmonary vascular congestion, developing since 1/21/2022  Workstation performed: [id number]         XR chest 1 view portable   Final Result by Luke Shukla MD (01/21 0188)      Mild groundglass opacities of the lower lung zones  The appearance is much improved since January 9  The differential diagnosis can include mild edema or pneumonia                    Workstation performed: [id number]                    Procedures  Procedures         ED Course  ED Course as of 01/27/22 0714   Fri Jan 21, 2022 1453 Initial EKG interpreted by me 91 beats per minute rhythm is paced he does have multiple unifocal PVCs right bundle-branch block patient has similar EKG findings with less PVCs previously                               SBIRT 20yo+      Most Recent Value   SBIRT (22 yo +)    In order to provide better care to our patients, we are screening all of our patients for alcohol and drug use  Would it be okay to ask you these screening questions?  No Filed at: 01/21/2022 1344                    MDM    Disposition  Final diagnoses:   Dyspnea   CHF (congestive heart failure) ([facility] )   COVID     Time reflects when diagnosis was documented in both MDM as applicable and the Disposition within this note     Time User Action Codes Description Comment    1/21/2022  4:43 PM Lovetta Daisy A Add [R06 00] Dyspnea     1/21/2022  4:43 PM Lovetta Daisy A Add [I50 9] CHF (congestive heart failure) ([facility] [address] )     1/21/2022  4:43 PM Monica Lovett Add [U07 1] COVID     1/21/2022  5:21 PM Doneen Meeter Modify [R06 00] Dyspnea     1/21/2022  5:21 PM Doneen Meeter Modify [I50 9] [facility]     1/21/2022  5:21 PM Doneen Meeter Modify [U07 1] [facility]       ED Disposition     ED Disposition Condition Date/Time Comment    Admit Stable Fri Jan 21, 2022  2:18 PM        Follow-up Information    None         Current Discharge Medication List      CONTINUE these medications which have NOT CHANGED    Details   amiodarone 100 mg tablet Take 1 tablet (100 mg total) by mouth daily with breakfast  Qty: 30 tablet, Refills: 0    Associated Diagnoses: Atrial fibrillation with rapid ventricular response (HCC)      atorvastatin (LIPITOR) 80 mg tablet take 1 tablet by mouth once daily  Qty: 30 tablet, Refills: 6    Associated Diagnoses: Ischemic cardiomyopathy      eplerenone (INSPRA) 25 mg tablet take 1 tablet by mouth once daily  Qty: 30 tablet, Refills: 3    Associated Diagnoses: Ischemic cardiomyopathy      furosemide (LASIX) 40 mg tablet Take 1 tablet (40 mg total) by mouth daily Or take as previously taken  Qty: 30 tablet, Refills: 0    Associated Diagnoses: Ischemic cardiomyopathy; Aortic valve stenosis, etiology of cardiac valve disease unspecified; Acute on chronic systolic and diastolic heart failure, NYHA class 3 ([facility])      lisinopril (ZESTRIL) 40 mg tablet take 1 tablet by mouth once daily at bedtime  Qty: 30 tablet, Refills: 11    Associated Diagnoses: Ischemic cardiomyopathy      metoprolol succinate (TOPROL-XL) 50 mg 24 hr tablet take 1 tablet by mouth every 12 hours  Qty: 60 tablet, Refills: 8    Associated Diagnoses: Chronic HFrEF (heart failure with reduced ejection fraction) ([facility])      potassium chloride (MICRO-K) 10 MEQ CR capsule Take 2 capsules (20 mEq total) by mouth daily  Qty: 60 capsule, Refills: 0    Associated Diagnoses: Ischemic cardiomyopathy; Acute on chronic systolic and diastolic heart failure, NYHA class 3 ([address] )      ! ! warfarin (COUMADIN) 4 mg tablet take 1 tablet by mouth once daily as directed  Qty: 30 tablet, Refills: 2    Associated Diagnoses: S/P AVR (aortic valve replacement)      ! ! warfarin (COUMADIN) 5 mg tablet Take 7 5 mg tonight then as previously taken ,  Get your inr checked tomorrow 1/13    Associated Diagnoses: Atrial fibrillation with rapid ventricular response ([facility])      aspirin 81 MG tablet Take 81 mg by mouth daily  Potassium Chloride ER 20 MEQ TBCR Take 1 tablet (20 mEq total) by mouth daily  Qty: 90 tablet, Refills: 3    Associated Diagnoses: Benign essential HTN       !! - Potential duplicate medications found  Please discuss with provider  No discharge procedures on file      PDMP Review       Value Time User    PDMP Reviewed  Yes 1/9/2022  4:07 PM Soledad Elena DO          ED Provider  Electronically Signed by           Benita Canavan, PA-C  01/27/22 9849

## 2022-01-21 NOTE — ASSESSMENT & PLAN NOTE
Wt Readings from Last 3 Encounters:   01/21/22 57 1 kg (125 lb 14 1 oz)   01/12/22 56 6 kg (124 lb 12 5 oz)   09/13/21 62 4 kg (137 lb 8 oz)     Chest x-ray with bilateral pulmonary infiltrates edema verses residual from COVID  Elevated proBNP  Unclear compliance with medications on discharge  Patient reports he has not taking any meds for 3 days due to stomach discomfort  Will monitor on telemetry to see if arrhythmias are contributing  Patient's weight is 1 lb up from most recent discharge  Given that patient presented with pillow orthopnea and shortness of breath will opt to diurese  Will trial 1 time dose Lasix 40 IV and follow response  Blood pressure well controlled hold lisinopril and spironolactone given plan for diuresis  Consult Cardiology for evaluation      1/22/22:  Patient appears mildly volume overloaded on 4 L nasal cannula saturating well, denies any shortness of breath at rest denies any chest pain at rest but does experience some dyspnea on exertion when ambulating to the bathroom this afternoon    -will start Lasix IV 40 mg b i d   And assess response, monitor urine output closely especially 6 hours after dose given  -monitor daily weights

## 2022-01-21 NOTE — ASSESSMENT & PLAN NOTE
Patient reports epigastric pain since discharge from hospital   He gets postprandial nausea and discomfort  Concern for gastritis versus peptic ulcer disease  Likely stress induced with recent hospitalization and intubation possible component of steroid induced for COVID treatment  Give IV Protonix, p r n   Mylanta    -improved this morning on 01/22/2022

## 2022-01-21 NOTE — ASSESSMENT & PLAN NOTE
Patient reports epigastric pain since discharge from hospital   He gets postprandial nausea and discomfort  Concern for gastritis versus peptic ulcer disease  Likely stress induced with recent hospitalization and intubation possible component of steroid induced for COVID treatment  Give IV Protonix, p r n   Mylanta

## 2022-01-21 NOTE — ED CARE HANDOFF
Emergency Department Sign Out Note        Sign out and transfer of care from [facility]  See Separate Emergency Department note  The patient, Xu Katz, was evaluated by the previous provider for worsening shortness of breath for 3 days  Workup Completed:  CBC  CMP  BNP  Magnesium  CXR  EKG    ED Course / Workup Pending (followup):  PT/INR       1536 If INR is subtherapeutic, he will need CTA PE study  If not subtherapeutic then admit to [address] [facility] with Dr America Dominguez from [facility]  Will admit to inpatient  Will add a troponin to work up  1820 hs TnI 0hr: 58 - Dr America Dominguez from [facility] aware  Patient stable at time of transfer to [facility] care  ED Course as of 01/21/22 2201 Fri Jan 21, 2022   1536 If INR is subtherapeutic, he will need CTA  If not then admit  [address] with Dr America Dominguez from [facility]  Will admit to inpatient  Will add a troponin to work up     1820 hs TnI 0hr: 58     Procedures  MDM  Number of Diagnoses or Management Options  CHF (congestive heart failure) ([facility] ): new and requires workup  COVID: new and requires workup  Dyspnea: new and requires workup     Amount and/or Complexity of Data Reviewed  Clinical lab tests: ordered and reviewed  Tests in the radiology section of CPT®: ordered and reviewed  Review and summarize past medical records: yes  Discuss the patient with other providers: yes  Independent visualization of images, tracings, or specimens: yes    Patient Progress  Patient progress: stable          Disposition  Final diagnoses:   Dyspnea   CHF (congestive heart failure) ([facility] )   COVID     Time reflects when diagnosis was documented in both MDM as applicable and the Disposition within this note     Time User Action Codes Description Comment    1/21/2022  4:43 PM Valiant Eva A Add [R06 00] Dyspnea     1/21/2022  4:43 PM Valiant Itzel A Add [I50 9] CHF (congestive heart failure) ([facility] )     1/21/2022  4:43 PM Marium Whitney Tish HUMPHRIES Add [U07 1] COVID       ED Disposition     ED Disposition Condition Date/Time Comment    Admit Stable Fri Jan 21, 2022  2:18 PM        Follow-up Information    None       Patient's Medications   Discharge Prescriptions    No medications on file     No discharge procedures on file         ED Provider  Electronically Signed by     Colby Mccurdy PA-C  01/21/22 9696

## 2022-01-21 NOTE — ASSESSMENT & PLAN NOTE
Recently diagnosed with COVID-19 1/9/2022  Patient was intubated and ultimately extubated 1/10/2022    No indication for continued COVID directed therapy at this time  Continue to monitor off precautions given severe infection hospital protocol 20 days English

## 2022-01-21 NOTE — ED NOTES
Patient aware of the need for a urine sample  Urinal at bedside  Patient states he will urinate when able        Windy Mays RN  01/21/22 9717

## 2022-01-21 NOTE — ASSESSMENT & PLAN NOTE
Wt Readings from Last 3 Encounters:   01/21/22 57 1 kg (125 lb 14 1 oz)   01/12/22 56 6 kg (124 lb 12 5 oz)   09/13/21 62 4 kg (137 lb 8 oz)     Chest x-ray with bilateral pulmonary infiltrates edema verses residual from COVID  Elevated proBNP  Unclear compliance with medications on discharge  Patient reports he has not taking any meds for 3 days due to stomach discomfort  Will monitor on telemetry to see if arrhythmias are contributing    Patient's weight is 1 lb up from most recent discharge  Will trial 1 time dose Lasix 40 IV and follow response  Consult Cardiology for evaluation

## 2022-01-21 NOTE — ASSESSMENT & PLAN NOTE
Patient is a poor historian with unclear history following most recent hospitalization  Patient does have elevated proBNP and chest x-ray with pulmonary edema pointing towards volume overload  He also does report history of poor oral intake due to abdominal discomfort    Will check hyponatremia panel labs  Will give 1 time dose IV Lasix and follow response    -suspect likely secondary to congestive heart failure, no further workup needed as it is mild

## 2022-01-21 NOTE — ASSESSMENT & PLAN NOTE
Patient currently on Coumadin but supratherapeutic INR  -continues to have supratherapeutic INR with no bleeding, does not require any reversal currently, will monitor off warfarin and restart once within therapeutic range

## 2022-01-22 NOTE — QUICK NOTE
Notified by pt nurse he has trigeminy on tele  On exam patient denies CP, SOB  ICD in place  Check EKG  Will increase metoprolol XL to 75mg BID, give addition 25mg now  Add potassium sulfate 20meq and Mag 1gm now

## 2022-01-22 NOTE — UTILIZATION REVIEW
Initial Clinical Review    Admission: Date/Time/Statement:   Admission Orders (From admission, onward)     Ordered        01/21/22 1645  Inpatient Admission  Once                      Orders Placed This Encounter   Procedures    Inpatient Admission     Standing Status:   Standing     Number of Occurrences:   1     Order Specific Question:   Level of Care     Answer:   Med Surg [16]     Order Specific Question:   Estimated length of stay     Answer:   More than 2 Midnights     Order Specific Question:   Certification     Answer:   I certify that inpatient services are medically necessary for this patient for a duration of greater than two midnights  See H&P and MD Progress Notes for additional information about the patient's course of treatment  ED Arrival Information     Expected Arrival Acuity    - 1/21/2022 12:34 Urgent         Means of arrival Escorted by Service Admission type    Walk-In Self Hospitalist Urgent         Arrival complaint    SOB         Chief Complaint   Patient presents with    Shortness of Breath     Pt reports worsening shortness of breath over the last couple of days  Pt is Covid positive since 1/9/21  Initial Presentation:   59y Male to ED presents with shortness of breath x3 days, especially when lying flat  Also c/o postprandial nausea and early satiety cent hospitalization for  significant respiratory distress and was intubated on admit  Extubated on 1/10 and titrated to room air  Pt left AMA  In ED saturating 93% on room air  PMH for Ischemic  cardiomyopathy EF 20%, aortic stenosis status post AVR, Cocaine use, V-tach/VFib with Bi V ICD  Admit Inpatient level of care for Acute on chronic combined systolic and diastolic CHF, Hyponatremia, Abdominal pain, COVID-19 virus infection - diagnosed on 1/9/2022 and Cocaine use  CXR with bilateral pulmonary infiltrates edema verses residual from COVID  Elevated BNP  Tele monitoring  Wt up 1 lb from most recent d/c   Trial 1 time diuresis with Iv Lasix 40 mg and follow response  Cardiology consult  Na 130  Give Iv Protonix, Mylanta prn  Continue to monitor on precautions given severe infection hospital protocol 20 days for COVID  UDS positive on prior admit for cocaine  Tele monitoring  Continue home meds  1/21  Progress notes; Per nursing; pt with trigeminy on tele  On exam patient denies CP, SOB  ICD in place  Check EKG  Will increase metoprolol XL to 75mg BID, give addition 25mg now  Add potassium sulfate 20meq and Mag 1gm now  Date: 1/22  Day 2:     ED Triage Vitals   Temperature Pulse Respirations Blood Pressure SpO2   01/21/22 1245 01/21/22 1241 01/21/22 1241 01/21/22 1241 01/21/22 1241   98 3 °F (36 8 °C) (!) 107 22 115/71 93 %      Temp Source Heart Rate Source Patient Position - Orthostatic VS BP Location FiO2 (%)   01/21/22 1245 01/21/22 1439 01/21/22 1241 01/21/22 1241 --   Oral Monitor Sitting Left arm       Pain Score       01/21/22 1241       No Pain          Wt Readings from Last 1 Encounters:   01/22/22 57 1 kg (125 lb 14 1 oz)     Additional Vital Signs:   01/22/22 07:29:13 100 °F (37 8 °C) 83 20 99/49   Abnormal  66 87 %   Abnormal  -- -- None (Room air) Lying   01/22/22 0556 -- 91 -- -- -- -- -- -- -- --   01/21/22 2300 -- -- -- -- -- -- 40 5 L/min Nasal cannula --   01/21/22 19:37:04 -- 92 22 123/68 86 85 %   Abnormal  -- -- -- --   01/21/22 1845 -- 102 22 138/76 100 92 % 28 2 L/min Nasal cannula Sitting   01/21/22 1815 -- 108   Abnormal  20 137/98 115 95 % 28 2 L/min Nasal cannula Lying   01/21/22 1715 -- 96 20 129/82 97 94 % 28 2 L/min Nasal cannula Lying     Pertinent Labs/Diagnostic Test Results:   1/21  PCXR -  Mild groundglass opacities of the lower lung zones  The appearance is much improved since January 9  The differential diagnosis can include mild edema or pneumonia      EKG - Atrial synchronized ventricular paced rhythm with Premature ventricular complexes      Results from last 7 days   Lab Units 01/22/22  0746 01/21/22  1413   WBC Thousand/uL 12 28* 10 48*   HEMOGLOBIN g/dL 13 9 13 7   HEMATOCRIT % 40 7 41 0   PLATELETS Thousands/uL 222 221   BANDS PCT %  --  1         Results from last 7 days   Lab Units 01/22/22  0746 01/21/22  1413   SODIUM mmol/L 129* 130*   POTASSIUM mmol/L 3 9 3 7   CHLORIDE mmol/L 93* 93*   CO2 mmol/L 29 28   ANION GAP mmol/L 7 9   BUN mg/dL 13 13   CREATININE mg/dL 1 00 1 12   EGFR ml/min/1 73sq m 83 72   CALCIUM mg/dL 7 9* 8 0*   MAGNESIUM mg/dL 1 5* 1 5*     Results from last 7 days   Lab Units 01/21/22  1413   AST U/L 56*   ALT U/L 70   ALK PHOS U/L 121*   TOTAL PROTEIN g/dL 6 6   ALBUMIN g/dL 2 4*   TOTAL BILIRUBIN mg/dL 1 24*         Results from last 7 days   Lab Units 01/22/22  0746 01/21/22  1413   GLUCOSE RANDOM mg/dL 92 149*     Results from last 7 days   Lab Units 01/21/22  1748   OSMOLALITY, SERUM mmol/*       Results from last 7 days   Lab Units 01/21/22  2244   PH BEE  7 446*   PCO2 BEE mm Hg 42 3   PO2 BEE mm Hg 34 0*   HCO3 BEE mmol/L 28 5   BASE EXC BEE mmol/L 4 0   O2 CONTENT BEE ml/dL 12 8   O2 HGB, VENOUS % 63 9             Results from last 7 days   Lab Units 01/22/22  0026 01/21/22  2212 01/21/22  1719   HS TNI 0HR ng/L  --   --  62   HS TNI 2HR ng/L  --  73  --    HSTNI D2 ng/L  --  11  --    HS TNI 4HR ng/L 73  --   --    HSTNI D4 ng/L 11  --   --          Results from last 7 days   Lab Units 01/22/22  0746 01/21/22  1439   PROTIME seconds 40 6* 44 8*   INR  4 45* 5 06*   PTT seconds  --  96*     Results from last 7 days   Lab Units 01/21/22  1413   TSH 3RD GENERATON uIU/mL 0 833         Results from last 7 days   Lab Units 01/21/22  1413   NT-PRO BNP pg/mL 7,331*         Results from last 7 days   Lab Units 01/21/22  1835 01/21/22  1748   OSMOLALITY, SERUM mmol/KG  --  271*   OSMO UR mmol/  --      Results from last 7 days   Lab Units 01/21/22  1835   SODIUM UR  53       ED Treatment:   Medication Administration from 01/21/2022 1234 to 01/21/2022 1928       Date/Time Order Dose Route Action     01/21/2022 1804 atorvastatin (LIPITOR) tablet 80 mg 80 mg Oral Given     01/21/2022 1804 metoprolol succinate (TOPROL-XL) 24 hr tablet 50 mg 50 mg Oral Given     01/21/2022 1805 furosemide (LASIX) injection 40 mg 40 mg Intravenous Given     01/21/2022 1804 pantoprazole (PROTONIX) injection 40 mg 40 mg Intravenous Given        Past Medical History:   Diagnosis Date    AICD (automatic cardioverter/defibrillator) present     medtronic     CAD (coronary artery disease)     Cancer ([facility] )     Cardiac disease     Cardiomyopathy ([place] [address] )     mixed  predominently nonischemic    Colonic mass     Coronary artery disease     Hyperlipidemia     Hypertension     Irregular heart beat     Myocardial infarction ([facility] )     2014 and 2015    Rectal bleeding     S/P AVR (aortic valve replacement)     mechanical    Wears glasses      Present on Admission:   Coronary artery disease involving native coronary artery of native heart with angina pectoris ([facility] )   Acute on chronic combined systolic and diastolic CHF (congestive heart failure) ([facility])   Ventricular fibrillation ([facility])   Cocaine use   COVID-19 virus infection      Admitting Diagnosis: CHF (congestive heart failure) ([facility]) [I50 9]  Dyspnea [R06 00]  SOB (shortness of breath) [R06 02]  COVID [U07 1]  Age/Sex: 61 y o  male     Admission Orders:  Scheduled Medications:  amiodarone, 100 mg, Oral, Daily With Breakfast  aspirin, 81 mg, Oral, Daily  atorvastatin, 80 mg, Oral, Daily With Dinner  metoprolol succinate, 75 mg, Oral, Q12H  [START ON 1/23/2022] pantoprazole, 40 mg, Oral, Early Morning    magnesium sulfate IVPB (premix) SOLN 1 g  Dose: 1 g  Freq: Once Route: IV  Start: 01/21/22 1945 End: 01/21/22 2302    potassium chloride 20 mEq IVPB (premix)  Dose: 20 mEq  Freq:  Once Route: IV  Last Dose: 20 mEq (01/21/22 2008)  Start: 01/21/22 1945 End: 01/21/22 2208      Continuous IV Infusions: None     PRN Meds:  acetaminophen, 650 mg, Oral, Q6H PRN  aluminum-magnesium hydroxide-simethicone, 30 mL, Oral, Q6H PRN  ondansetron, 4 mg, Intravenous, Q6H PRN      Tele monitoring  IP CONSULT TO CARDIOLOGY  IP CONSULT TO CASE MANAGEMENT    Network Utilization Review Department  ATTENTION: Please call with any questions or concerns to [phone] and carefully listen to the prompts so that you are directed to the right person  All voicemails are confidential   [place] all requests for admission clinical reviews, approved or denied determinations and any other requests to dedicated fax number below belonging to the campus where the patient is receiving treatment   List of dedicated fax numbers for the Facilities:  [address] DENIALS (Administrative/Medical Necessity) [phone]   [address] (Maternity/NICU/Pediatrics) [phone]   [address] [place] [address]  [address] [place] [address] [address] [place] [address] [address] [facility] [place] [address] [phone]

## 2022-01-22 NOTE — PROGRESS NOTES
This RN received new admission and immediately telemetry was hooked up  Patient shown to be in V-tach and running up to 38 PVC's  Patient having complaints of chest pain on and off  O2 sautrations up and down as low as 46%  Admitting provider notified of initial assessment  New orders for Mag and K along with Metoprolol given  Provider indicated she would be up to assess patient shortly

## 2022-01-22 NOTE — PROGRESS NOTES
[address] [facility]  Progress Note - [address] [place] 1965, 61 y o  male MRN: [id number]  [facility] Encounter: [id number]  Primary Care Provider: Tonio Alcantar PA-C   Date and time admitted to hospital: 1/21/2022  1:33 PM    Hyponatremia  Assessment & Plan  Patient is a poor historian with unclear history following most recent hospitalization  Patient does have elevated proBNP and chest x-ray with pulmonary edema pointing towards volume overload  He also does report history of poor oral intake due to abdominal discomfort  Will check hyponatremia panel labs  Will give 1 time dose IV Lasix and follow response    -suspect likely secondary to congestive heart failure, no further workup needed as it is mild    Abdominal pain  Assessment & Plan  Patient reports epigastric pain since discharge from hospital   He gets postprandial nausea and discomfort  Concern for gastritis versus peptic ulcer disease  Likely stress induced with recent hospitalization and intubation possible component of steroid induced for COVID treatment  Give IV Protonix, p r n  Mylanta    -improved this morning on 01/22/2022    COVID-19 virus infection  Assessment & Plan  Recently diagnosed with COVID-19 1/9/2022  Patient was intubated and ultimately extubated 1/10/2022  No indication for continued COVID directed therapy at this time  Continue to monitor off precautions given severe infection hospital protocol 20 days    Cocaine use  Assessment & Plan  UDS positive on previous admission for cocaine    Ventricular fibrillation [facility])  Assessment & Plan  History of VFib/V-tach With Bi V ICD  Continue metoprolol succinate-increased doses 7 5 mg b i d   Due to ongoing episodes transdermally, PVCs, V-tach, currently asymptomatic denying any chest pain or shortness of breath  -repleting magnesium and potassium  Monitor on telemetry    S/P AVR (aortic valve replacement)  Assessment & Plan  Patient currently on Coumadin but supratherapeutic INR  -continues to have supratherapeutic INR with no bleeding, does not require any reversal currently, will monitor off warfarin and restart once within therapeutic range    Coronary artery disease involving native coronary artery of native heart with angina pectoris [facility])  Assessment & Plan  Continue aspirin, atorvastatin, metoprolol    * Acute on chronic combined systolic and diastolic CHF (congestive heart failure) ([facility])  Assessment & Plan  Wt Readings from Last 3 Encounters:   01/21/22 57 1 kg (125 lb 14 1 oz)   01/12/22 56 6 kg (124 lb 12 5 oz)   09/13/21 62 4 kg (137 lb 8 oz)     Chest x-ray with bilateral pulmonary infiltrates edema verses residual from COVID  Elevated proBNP  Unclear compliance with medications on discharge  Patient reports he has not taking any meds for 3 days due to stomach discomfort  Will monitor on telemetry to see if arrhythmias are contributing  Patient's weight is 1 lb up from most recent discharge  Given that patient presented with pillow orthopnea and shortness of breath will opt to diurese  Will trial 1 time dose Lasix 40 IV and follow response  Blood pressure well controlled hold lisinopril and spironolactone given plan for diuresis  Consult Cardiology for evaluation      1/22/22:  Patient appears mildly volume overloaded on 4 L nasal cannula saturating well, denies any shortness of breath at rest denies any chest pain at rest but does experience some dyspnea on exertion when ambulating to the bathroom this afternoon    -will start Lasix IV 40 mg b i d  And assess response, monitor urine output closely especially 6 hours after dose given  -monitor daily weights           VTE Pharmacologic Prophylaxis: VTE Score: 3 High Risk (Score >/= 5) - Pharmacological DVT Prophylaxis Contraindicated  Sequential Compression Devices Ordered  Patient Centered Rounds: I performed bedside rounds with nursing staff today    Discussions with Specialists or Other Care Team Provider: jena    Education and Discussions with Family / Patient: Patient declined call to   Time Spent for Care: 30 minutes  More than 50% of total time spent on counseling and coordination of care as described above  Current Length of Stay: 1 day(s)  Current Patient Status: Inpatient   Certification Statement: The patient will continue to require additional inpatient hospital stay due to IV diuretics for heart failure uptitration of beta blockers  Discharge Plan: Anticipate discharge in 24-48 hrs to discharge location to be determined pending rehab evaluations  Code Status: Level 1 - Full Code    Subjective:   Patient denies any acute complaints other than some mild dyspnea on exertion he experience while walking to the bathroom this morning, he is alert oriented x4, denies any current chest pain or shortness of breath at rest    Objective:     Vitals:   Temp (24hrs), Av °F (37 8 °C), Min:100 °F (37 8 °C), Max:100 °F (37 8 °C)    Temp:  [100 °F (37 8 °C)] 100 °F (37 8 °C)  HR:  [] 80  Resp:  [20-22] 20  BP: ()/(49-98) 111/67  SpO2:  [85 %-98 %] 86 %  Body mass index is 18 06 kg/m²  Input and Output Summary (last 24 hours): Intake/Output Summary (Last 24 hours) at 2022 1448  Last data filed at 2022 0328  Gross per 24 hour   Intake 720 ml   Output 550 ml   Net 170 ml       Physical Exam:   Physical Exam  Vitals and nursing note reviewed  Constitutional:       General: He is not in acute distress  Appearance: He is well-developed  He is not ill-appearing, toxic-appearing or diaphoretic  HENT:      Head: Normocephalic and atraumatic  Eyes:      General: No scleral icterus  Conjunctiva/sclera: Conjunctivae normal    Cardiovascular:      Rate and Rhythm: Normal rate and regular rhythm  Heart sounds: No murmur heard  No friction rub  No gallop  Pulmonary:      Effort: Pulmonary effort is normal  No respiratory distress  Breath sounds: No stridor  Rales present  No wheezing or rhonchi  Comments: Mild rales at bases bilaterally  Chest:      Chest wall: No tenderness  Abdominal:      General: There is no distension  Palpations: Abdomen is soft  There is no mass  Tenderness: There is no abdominal tenderness  There is no guarding or rebound  Musculoskeletal:         General: No swelling, tenderness, deformity or signs of injury  Cervical back: Neck supple  Right lower leg: Edema present  Left lower leg: Edema present  Comments: Plus one pitting edema bilaterally   Skin:     General: Skin is warm and dry  Coloration: Skin is not jaundiced or pale  Findings: No bruising, erythema, lesion or rash  Neurological:      Mental Status: He is alert and oriented to person, place, and time  Additional Data:     Labs:  Results from last 7 days   Lab Units 01/22/22  0746 01/21/22  1413 01/21/22  1413   WBC Thousand/uL 12 28*   < > 10 48*   HEMOGLOBIN g/dL 13 9   < > 13 7   HEMATOCRIT % 40 7   < > 41 0   PLATELETS Thousands/uL 222   < > 221   BANDS PCT %  --   --  1   LYMPHO PCT %  --   --  2*   MONO PCT %  --   --  2*   EOS PCT %  --   --  0    < > = values in this interval not displayed  Results from last 7 days   Lab Units 01/22/22  0746 01/21/22  1413 01/21/22  1413   SODIUM mmol/L 129*   < > 130*   POTASSIUM mmol/L 3 9   < > 3 7   CHLORIDE mmol/L 93*   < > 93*   CO2 mmol/L 29   < > 28   BUN mg/dL 13   < > 13   CREATININE mg/dL 1 00   < > 1 12   ANION GAP mmol/L 7   < > 9   CALCIUM mg/dL 7 9*   < > 8 0*   ALBUMIN g/dL  --   --  2 4*   TOTAL BILIRUBIN mg/dL  --   --  1 24*   ALK PHOS U/L  --   --  121*   ALT U/L  --   --  70   AST U/L  --   --  56*   GLUCOSE RANDOM mg/dL 92   < > 149*    < > = values in this interval not displayed       Results from last 7 days   Lab Units 01/22/22  0746   INR  4 45*                   Lines/Drains:  Invasive Devices  Report    Peripheral Intravenous Line            Peripheral IV 01/21/22 Left Forearm 1 day                  Telemetry:  Telemetry Orders (From admission, onward)             48 Hour Telemetry Monitoring  Continuous x 48 hours        References:    Telemetry Guidelines   Question:  Reason for 48 Hour Telemetry  Answer:  Arrhythmias Requiring Medical Therapy (eg  SVT, Vtach/fib, Bradycardia, Uncontrolled A-fib)                 Telemetry Reviewed: Episode of V-tach, trigeminy, PVCs  Indication for Continued Telemetry Use: Arrthymias requiring medical therapy             Imaging: No pertinent imaging reviewed  Recent Cultures (last 7 days):         Last 24 Hours Medication List:   Current Facility-Administered Medications   Medication Dose Route Frequency Provider Last Rate    acetaminophen  650 mg Oral Q6H PRN Amando Franklin DO      aluminum-magnesium hydroxide-simethicone  30 mL Oral Q6H PRN Amando Franklin DO      amiodarone  100 mg Oral Daily With Breakfast Amando Franklin DO      aspirin  81 mg Oral Daily Amando Franklin DO      atorvastatin  80 mg Oral Daily With Waizy, DO      furosemide  40 mg Intravenous BID (diuretic) Corey Madison DO      metoprolol succinate  75 mg Oral Q12H Corey Madison DO      ondansetron  4 mg Intravenous Q6H PRN Amando Franklin DO      [START ON 1/23/2022] pantoprazole  40 mg Oral Early Morning Junior Ihsan DO          Today, Patient Was Seen By: Junior Ihsan DO    **Please Note: This note may have been constructed using a voice recognition system  **

## 2022-01-22 NOTE — PROGRESS NOTES
Patient seen and evaluated at bedside  He feels well up in eating dinner  He is having aches and pains in his belly as well as ribs worse with deep breaths and with palpations  Continues to have some abdominal discomfort but tolerated dinner well  Two EKGs performed which showed ventricular paced rhythm  Review telemetry  Patient with increased ectopy as well as and NSVT  Agree with repleting electrolytes and increasing metoprolol  Given patient's significant ischemic cardiomyopathy and history of VFib/V-tach would recommend to continue telemetry overnight to be reviewed by Cardiology in the morning  Discussed with Nursing  Will attempt to move patient to cardiac floor  To discuss with hospital supervisor

## 2022-01-22 NOTE — UTILIZATION REVIEW
Continued Stay Review    Date: 01/22/22                          Current Patient Class: IP  Current Level of Care: Med/Surg    HPI:56 y o  male initially admitted on 01/21/22 for acute on chronic combined systolic and diastolic CHF, Hyponatremia, Abdominal pain, COVID-19 virus infection - diagnosed on 1/9/2022, and Cocaine use  Assessment/Plan:  Pt reports ongoing SORENSEN  On exam, rales b/l, b/l LE edema +1  Telemetry shows one episode of v-tach, trigeminy, PVCs  Plan: PPI, no indication for continued COVID therapy at this time, continue isolation precautions, telemetry, continue metoprolol, given supratherapeutic INR holding warfarin, continue ASA/statin, start IV lasix 40 BID, I&O, DW      Cardiology Consult: Pt w/ acute on chronic CHF w/ recent COVID-19 infection  EKG:  Atrial sensed and ventricular paced rhythm with biventricular pacemaker; frequent PVCs, premature ventricular contractions and non sustained ventricular tachycardia are not new  Plan: IV lasix 40 BID, likely will require increased dose; 1500mL fluid restriction if sodium does not improve can be decreased to 1200mL; trend labs and replete electrolytes to keep Na > 130, K 3 8-4 8, and Mag > 2        Vital Signs:   Date/Time Pulse Resp BP MAP (mmHg) SpO2 O2 Device   01/22/22 15:22:58 83 19 118/69 85 89 % Abnormal --   01/22/22 12:29:15 80 20 111/67 82 86 % Abnormal  --       Pertinent Labs/Diagnostic Results:   Results from last 7 days   Lab Units 01/22/22  0746 01/21/22  1413   WBC Thousand/uL 12 28* 10 48*   HEMOGLOBIN g/dL 13 9 13 7   HEMATOCRIT % 40 7 41 0   PLATELETS Thousands/uL 222 221   BANDS PCT %  --  1     Results from last 7 days   Lab Units 01/22/22  0746 01/21/22  1413   SODIUM mmol/L 129* 130*   POTASSIUM mmol/L 3 9 3 7   CHLORIDE mmol/L 93* 93*   CO2 mmol/L 29 28   ANION GAP mmol/L 7 9   BUN mg/dL 13 13   CREATININE mg/dL 1 00 1 12   EGFR ml/min/1 73sq m 83 72   CALCIUM mg/dL 7 9* 8 0*   MAGNESIUM mg/dL 1 5* 1 5*     Results from last 7 days   Lab Units 01/21/22  1413   AST U/L 56*   ALT U/L 70   ALK PHOS U/L 121*   TOTAL PROTEIN g/dL 6 6   ALBUMIN g/dL 2 4*   TOTAL BILIRUBIN mg/dL 1 24*     Results from last 7 days   Lab Units 01/22/22  0746 01/21/22  1413   GLUCOSE RANDOM mg/dL 92 149*     Results from last 7 days   Lab Units 01/21/22  1748   OSMOLALITY, SERUM mmol/*     Results from last 7 days   Lab Units 01/21/22  2244   PH BEE  7 446*   PCO2 BEE mm Hg 42 3   PO2 BEE mm Hg 34 0*   HCO3 BEE mmol/L 28 5   BASE EXC BEE mmol/L 4 0   O2 CONTENT BEE ml/dL 12 8   O2 HGB, VENOUS % 63 9     Results from last 7 days   Lab Units 01/22/22  0026 01/21/22  2212 01/21/22  1719   HS TNI 0HR ng/L  --   --  62   HS TNI 2HR ng/L  --  73  --    HSTNI D2 ng/L  --  11  --    HS TNI 4HR ng/L 73  --   --    HSTNI D4 ng/L 11  --   --      Results from last 7 days   Lab Units 01/22/22  0746 01/21/22  1439   PROTIME seconds 40 6* 44 8*   INR  4 45* 5 06*   PTT seconds  --  96*     Results from last 7 days   Lab Units 01/21/22  1413   TSH 3RD GENERATON uIU/mL 0 833     Results from last 7 days   Lab Units 01/21/22  1413   NT-PRO BNP pg/mL 7,331*     Results from last 7 days   Lab Units 01/21/22  1835 01/21/22  1748   OSMOLALITY, SERUM mmol/KG  --  271*   OSMO UR mmol/  --      Results from last 7 days   Lab Units 01/21/22  1835   SODIUM UR  53       Medications:   Scheduled Medications:  amiodarone, 100 mg, Oral, Daily With Breakfast  aspirin, 81 mg, Oral, Daily  atorvastatin, 80 mg, Oral, Daily With Dinner  furosemide, 40 mg, Intravenous, BID (diuretic)  metoprolol succinate, 75 mg, Oral, Q12H  pantoprazole, 40 mg, Oral, Early Morning    Continuous IV Infusions: none     PRN Meds:  acetaminophen, 650 mg, Oral, Q6H PRN; 1/22 x1  aluminum-magnesium hydroxide-simethicone, 30 mL, Oral, Q6H PRN  Magnesium sulfate, 2g, IV, 1/22 x1  Metoprolol, 2 5 mg, IV, 1/22 x1  ondansetron, 4 mg, Intravenous, Q6H PRN  Potassium chloride, 20 mEq, IV, 1/22 x1        Discharge Plan: TBD    Network Utilization Review Department  ATTENTION: Please call with any questions or concerns to [phone] and carefully listen to the prompts so that you are directed to the right person  All voicemails are confidential   Harlene Pair all requests for admission clinical reviews, approved or denied determinations and any other requests to dedicated fax number below belonging to the campus where the patient is receiving treatment   List of dedicated fax numbers for the Facilities:  [address] DENIALS (Administrative/Medical Necessity) [phone]   [address] (Maternity/NICU/Pediatrics) [phone]   [address] [place] [address]  [address] [place] [phone] [place] [address] [address] [address] [facility] [place] [address] [phone]

## 2022-01-22 NOTE — PLAN OF CARE
Problem: Potential for Falls  Goal: Patient will remain free of falls  Description: INTERVENTIONS:  - Educate patient/family on patient safety including physical limitations  - Instruct patient to call for assistance with activity   - Consult OT/PT to assist with strengthening/mobility   - Keep Call bell within reach  - Keep bed low and locked with side rails adjusted as appropriate  - Keep care items and personal belongings within reach  - Initiate and maintain comfort rounds  - Make Fall Risk Sign visible to staff  - Offer Toileting every  Hours, in advance of need  - Initiate/Maintain alarm  - Obtain necessary fall risk management equipment:   - Apply yellow socks and bracelet for high fall risk patients  - Consider moving patient to room near nurses station  Outcome: Progressing     Problem: PAIN - ADULT  Goal: Verbalizes/displays adequate comfort level or baseline comfort level  Description: Interventions:  - Encourage patient to monitor pain and request assistance  - Assess pain using appropriate pain scale  - Administer analgesics based on type and severity of pain and evaluate response  - Implement non-pharmacological measures as appropriate and evaluate response  - Consider cultural and social influences on pain and pain management  - Notify physician/advanced practitioner if interventions unsuccessful or patient reports new pain  Outcome: Progressing     Problem: INFECTION - ADULT  Goal: Absence or prevention of progression during hospitalization  Description: INTERVENTIONS:  - Assess and monitor for signs and symptoms of infection  - Monitor lab/diagnostic results  - Monitor all insertion sites, i e  indwelling lines, tubes, and drains  - Monitor endotracheal if appropriate and nasal secretions for changes in amount and color  - [place] appropriate cooling/warming therapies per order  - Administer medications as ordered  - Instruct and encourage patient and family to use good hand hygiene technique  - Identify and instruct in appropriate isolation precautions for identified infection/condition  Outcome: Progressing     Problem: Knowledge Deficit  Goal: Patient/family/caregiver demonstrates understanding of disease process, treatment plan, medications, and discharge instructions  Description: Complete learning assessment and assess knowledge base    Interventions:  - Provide teaching at level of understanding  - Provide teaching via preferred learning methods  Outcome: Progressing     Problem: CARDIOVASCULAR - ADULT  Goal: Maintains optimal cardiac output and hemodynamic stability  Description: INTERVENTIONS:  - Monitor I/O, vital signs and rhythm  - Monitor for S/S and trends of decreased cardiac output  - Administer and titrate ordered vasoactive medications to optimize hemodynamic stability  - Assess quality of pulses, skin color and temperature  - Assess for signs of decreased coronary artery perfusion  - Instruct patient to report change in severity of symptoms  Outcome: Progressing  Goal: Absence of cardiac dysrhythmias or at baseline rhythm  Description: INTERVENTIONS:  - Continuous cardiac monitoring, vital signs, obtain 12 lead EKG if ordered  - Administer antiarrhythmic and heart rate control medications as ordered  - Monitor electrolytes and administer replacement therapy as ordered  Outcome: Progressing

## 2022-01-22 NOTE — CONSULTS
Consultation - Cardiology   [facility] A Akosua 61 y o  male MRN: [id number]  Unit/Bed#: [place] 68 2 [address] Rosal[address] 87 [id number] Encounter: [id number]    Physician Requesting Consult: Amauri Caldera DO  Reason for Consult / Principal Problem:     Congestive heart failure    Consulting physician:  Kimber Lazar MD      Assessment/Plan     Assessment:  · Acute on chronic systolic and diastolic congestive Heart failure  · Recent COVID-19 infection diagnosed 01/09/2022  · Ischemic cardiomyopathy with LVEF 18% and prior extensive anterior MI and inferior MI   · Mechanical aortic valve replacement in the 1980s on Coumadin anticoagulation  · Dual chamber biventricular pacemaker in Situ functioning appropriately but has not been interrogated since June 2021  · Noncompliance  · Cocaine and marijuana use  · Mild hyponatremia  · Low magnesium    Plan:  · Presently on furosemide 40 mg b i d  IV  Suspect the patient will need a higher dose of diuretics and if he does not respond quickly would increase furosemide 80 mg b i d  IV  · Presently on 1500 mL daily fluid restriction  If sodium does not improve may need to decrease restriction further to 1200 or a 1000 mL  · Keep sodium over 130  · Keep potassium between 3 8 and 4 8  · Keep magnesium over 2 0    History of Present Illness   HPI: Victorina Recinos is a 61y o  year old male who was admitted on 01/21 with a 3 day history of progressive shortness of breath and orthopnea  He also reported epigastric discomfort and post prandial nausea  He has not been taking his medications  He was admitted on 01/09/2022 and diagnosed with a COVID infection  He was severely short of breath and intubated on arrival   He had a ventricular fibrillation cardiac arrest but was resuscitated  He was extubated on the following day  He was in significant heart failure and was aggressively diuresed  Before diuresis was completed, he subsequently signed out of the hospital AMA      He has a history of mechanical aortic valve replacement in the 1980s on Coumadin, coronary artery disease with significant infarctions of the anterior and inferior walls, severe ischemic cardiomyopathy with left ventricular ejection fraction approximately 18%, is a long history of acute on chronic systolic and diastolic heart failure, history of noncompliance with both cocaine and marijuana use  Chest x-ray demonstrates mild congestive heart failure        Patient Active Problem List    Diagnosis Date Noted    Abdominal pain 01/21/2022    Hyponatremia 01/21/2022    Aortic stenosis 01/09/2022    Ischemic cardiomyopathy 01/09/2022    Drug abuse ([place] [address] ) 01/09/2022    Ventricular fibrillation ([place] [address] ) 01/09/2022    COVID-19 virus infection 01/09/2022    Impaired fasting glucose 09/10/2021    History of ventricular fibrillation 09/02/2021    Elevated troponin 09/02/2021    Cough 09/02/2021    Lactic acidosis 09/02/2021    SIRS (systemic inflammatory response syndrome) ([place] [address] ) 09/02/2021    Cocaine use 09/02/2021    History of ventricular tachycardia 05/25/2021    Depressed mood 03/06/2020    S/P AVR (aortic valve replacement) 06/18/2018    Ventricular fibrillation (HCC) 06/18/2018    Biventricular ICD (implantable cardioverter-defibrillator) in place 06/18/2018    Colonic mass 06/03/2016    Acute on chronic combined systolic and diastolic CHF (congestive heart failure) ([place] [address] ) 06/03/2016    Coronary artery disease involving native coronary artery of native heart with angina pectoris ([place] [address] ) 06/03/2016       Vitals: /67   Pulse 80   Temp 100 °F (37 8 °C) (Oral)   Resp 20   Wt 57 1 kg (125 lb 14 1 oz)   SpO2 (!) 86%   BMI 18 06 kg/m²   PREVIOUS WEIGHTS:   Wt Readings from Last 5 Encounters:   01/22/22 57 1 kg (125 lb 14 1 oz)   01/12/22 56 6 kg (124 lb 12 5 oz)   09/13/21 62 4 kg (137 lb 8 oz)   09/10/21 61 2 kg (135 lb)   09/03/21 62 6 kg (138 lb 0 1 oz)       Labs/Data:        Results from last 7 days   Lab Units 01/22/22  0746 01/21/22  1413   WBC Thousand/uL 12 28* 10 48*   HEMOGLOBIN g/dL 13 9 13 7   HEMATOCRIT % 40 7 41 0   MCV fL 80* 81*   MCH pg 27 4 27 1   MCHC g/dL 34 2 33 4   PLATELETS Thousands/uL 222 221     Results from last 7 days   Lab Units 01/22/22  0746 01/21/22  1413   EGFR ml/min/1 73sq m 83 72   SODIUM mmol/L 129* 130*   POTASSIUM mmol/L 3 9 3 7   CHLORIDE mmol/L 93* 93*   CO2 mmol/L 29 28   BUN mg/dL 13 13   CREATININE mg/dL 1 00 1 12     Results from last 7 days   Lab Units 01/22/22  0746 01/21/22  1413   CALCIUM mg/dL 7 9* 8 0*   AST U/L  --  56*   ALT U/L  --  70   ALK PHOS U/L  --  121*   MAGNESIUM mg/dL 1 5* 1 5*         Lab Results   Component Value Date    NTBNP 7,331 (H) 01/21/2022    NTBNP 13,741 (H) 01/09/2022    NTBNP 10,329 (H) 09/02/2021     Lab Results   Component Value Date    CHOLESTEROL 118 04/26/2019    TRIG 91 04/26/2019    HDL 45 04/26/2019    LDLCALC 55 04/26/2019    HGBA1C 5 8 (H) 09/10/2021       Results from last 7 days   Lab Units 01/22/22  0746 01/21/22  1439   INR  4 45* 5 06*   PROTIME seconds 40 6* 44 8*          Current Facility-Administered Medications:     acetaminophen (TYLENOL) tablet 650 mg, 650 mg, Oral, Q6H PRN, Mardelle Pancoast, DO, 650 mg at 01/22/22 2060    aluminum-magnesium hydroxide-simethicone (MYLANTA) oral suspension 30 mL, 30 mL, Oral, Q6H PRN, Mardelle Pancoast, DO    amiodarone tablet 100 mg, 100 mg, Oral, Daily With Breakfast, Mardelle Pancoast, DO, 100 mg at 01/22/22 6726    aspirin chewable tablet 81 mg, 81 mg, Oral, Daily, Mardelle Pancoast, DO, 81 mg at 01/22/22 9421    atorvastatin (LIPITOR) tablet 80 mg, 80 mg, Oral, Daily With Eleanor Delgado DO, 80 mg at 01/21/22 1804    furosemide (LASIX) injection 40 mg, 40 mg, Intravenous, BID (diuretic), Corey Madison DO    metoprolol succinate (TOPROL-XL) 24 hr tablet 75 mg, 75 mg, Oral, Q12H, Corey Madison DO    ondansetron (ZOFRAN) injection 4 mg, 4 mg, Intravenous, Q6H PRN, Yrn John DO Eli    [START ON 1/23/2022] pantoprazole (PROTONIX) EC tablet 40 mg, 40 mg, Oral, Early Morning, Corey Madison DO  No Known Allergies    Historical Information   Past Medical History:   Diagnosis Date    AICD (automatic cardioverter/defibrillator) present     medtronic     CAD (coronary artery disease)     [facility]     Cardiac disease     Cardiomyopathy ([facility] )     mixed  predominently nonischemic    Colonic mass     Coronary artery disease     Hyperlipidemia     Hypertension     Irregular heart beat     Myocardial infarction ([facility] )     2014 and 2015    Rectal bleeding     S/P AVR (aortic valve replacement)     mechanical    Wears glasses      Past Surgical History:   Procedure Laterality Date    AORTIC VALVE REPLACEMENT      APPENDECTOMY      CARDIAC DEFIBRILLATOR PLACEMENT      COLECTOMY MADELIN Right     Last Assessed: 6/7/2016    COLECTOMY LAPAROSCOPIC      COLON SURGERY Right     colectomy    CORONARY STENT PLACEMENT      INSERT / REPLACE / REMOVE PACEMAKER      MITRAL VALVE REPAIR      MITRAL VALVE REPAIR      MOUTH SURGERY      [place] COLONOSCOPY FLX DX W/COLLJ [facility] PFRMD N/A 1/15/2018    Procedure: COLONOSCOPY;  Surgeon: Dawit Suggs MD;  Location: [facility];   Service: General    [place] LAP,SURG,COLECTOMY, PARTIAL, W/ANAST N/A 6/2/2016    Procedure: RESECTION COLON RIGHT LAPAROSCOPIC HAND-ASSISTED;  Surgeon: Dawit Suggs MD;  Location: [facility];  Service: General     Past Surgical History:   Procedure Laterality Date    AORTIC VALVE REPLACEMENT      APPENDECTOMY      CARDIAC DEFIBRILLATOR PLACEMENT      COLECTOMY MADELIN Right     Last Assessed: 6/7/2016    COLECTOMY LAPAROSCOPIC      COLON SURGERY Right     colectomy    CORONARY STENT PLACEMENT      INSERT / [place] [facility] [place] MITRAL VALVE REPAIR      MITRAL VALVE REPAIR      MOUTH SURGERY      AZ COLONOSCOPY FLX DX W/COLLJ [facility] 1979 PFRMD N/A 1/15/2018    Procedure: COLONOSCOPY;  Surgeon: Olvin Delacruz MD;  Location: [facility]; Service: General    [place] LAP,SURG,COLECTOMY, PARTIAL, W/ANAST N/A 6/2/2016    Procedure: RESECTION COLON RIGHT LAPAROSCOPIC HAND-ASSISTED;  Surgeon: Olvin Delacruz MD;  Location: [facility] OR;  Service: General     Social History:  Social History     Substance and Sexual Activity   Alcohol Use Not Currently     Social History     Substance and Sexual Activity   Drug Use No     Social History     Tobacco Use   Smoking Status Never Smoker   Smokeless Tobacco Never Used     Social History     Socioeconomic History    Marital status: /Civil Union     Spouse name: Not on file    Number of children: Not on file    Years of education: Not on file    Highest education level: Not on file   Occupational History    Not on file   Tobacco Use    Smoking status: Never Smoker    Smokeless tobacco: Never Used   Vaping Use    Vaping Use: Never used   Substance and Sexual Activity    Alcohol use: Not Currently    Drug use: No    Sexual activity: Not on file   Other Topics Concern    Not on file   Social History Narrative    Not on file     Social Determinants of Health     Financial Resource Strain: Not on file   Food Insecurity: No Food Insecurity    Worried About Running Out of Food in the Last Year: Never true    Bud of Food in the Last Year: Never true   Transportation Needs: No Transportation Needs    Lack of Transportation (Medical): No    Lack of Transportation (Non-Medical):  No   Physical Activity: Not on file   Stress: Not on file   Social Connections: Not on file   Intimate Partner Violence: Not on file   Housing Stability: Low Risk     Unable to Pay for Housing in the Last Year: No    Number of Places Lived in the Last Year: 1    Unstable Housing in the Last Year: No      Family History:   family history includes Alcohol abuse in his father; Diabetes in his mother; Hypertension in his mother; Valvular heart disease in his father and mother  Intake/Output Summary (Last 24 hours) at 2022 1454  Last data filed at 2022 0642  Gross per 24 hour   Intake 720 ml   Output 550 ml   Net 170 ml       Invasive Devices  Report    Peripheral Intravenous Line            Peripheral IV 22 Left Forearm 1 day                I did not examine the patient in person to avoid contact with patient with COVID-19  I discussed patient with attending physician, reviewed chart and made recommendations      ----------------------------------------------------------------------------------------------  NUCLEAR STRESS TEST: No results found for this or any previous visit  Results for orders placed during the hospital encounter of 17    NM myocardial perfusion spect (rx stress and/or rest)    [address]  [phone]    Rest/Stress Gated SPECT Myocardial Perfusion Imaging After Regadenoson    Patient: Ken Montesinos  MR number: [id number]  Account number: [de-identified]  : 1965  Age: 45 years  Gender: Male  Status: Outpatient  Location: Stress lab  Height: 66 in  Weight: 162 lb  BP: 122/ 64 mmHg    Allergies: NO KNOWN ALLERGIES    Diagnosis: I25 10 - Atherosclerotic heart disease of native coronary artery without angina pectoris, Z01 810 - Encounter for preprocedural cardiovascular examination    Primary Physician:  Amara Gomez PA-C  Technician:  Darin Mcgee  RN:  Emi James RN BSN  Referring Physician:  Zhou Feng MD  Group:  [facility]  Report Prepared By[de-identified]  Emi James RN BSN  Interpreting Physician:  Joanne Marie DO    INDICATIONS: Evaluation of known coronary artery disease  HISTORY: The patient is a 45year old  male  Chest pain status: no chest pain  Coronary artery disease risk factors: hypertension   Cardiovascular history: coronary artery disease, prior myocardial infarction( VT/VF arrest 2014),  congestive heart failure( EF 20%), mitral valve disease(MV repair), and aortic valve disease( AV [id number])  Prior cardiovascular procedures: angioplasty of left anterior descending (on 11/2014), angioplasty of left circumflex (on  11/2014), pacemaker, and implantable cardioverter defibrillator procedure  Medications: a nitrate, a beta blocker, an ACE inhibitor/ARB, a diuretic, aspirin, and a lipid lowering agent  PHYSICAL EXAM: Baseline physical exam screening: normal lung exam     REST ECG: Paced rhythm    PROCEDURE: The study was performed in the stress lab  A regadenoson infusion pharmacologic stress test was performed  Gated SPECT myocardial perfusion imaging was performed after stress and at rest  Systolic blood pressure was 122 mmHg, at  the start of the study  Diastolic blood pressure was 64 mmHg, at the start of the study  The heart rate was 50 bpm, at the start of the study  IV double checked  Regadenoson protocol:  HR bpm SBP mmHg DBP mmHg Symptoms  Baseline 50 122 64 none  Immediate 83 100 60 mild dyspnea  5 min 52 118 72 none  No medications or fluids given  The patient also performed low level exercise  STRESS SUMMARY: Duration of pharmacologic stress was 3 min  Maximal heart rate during stress was 83 bpm  The rate-pressure product for the peak heart rate and blood pressure was 11589  There was no chest pain during stress  The stress test  was terminated due to protocol completion  Pre oxygen saturation: 96 %  Peak oxygen saturation: 100 %  Arrhythmia during stress: isolated premature ventricular beats  The stress ECG was non-diagnostic due to paced rhythm and resting ST/T  wave abnormality      ISOTOPE ADMINISTRATION:  Resting isotope administration Stress isotope administration  Agent Tetrofosmin Tetrofosmin  Dose 11 mCi 33 mCi  Date 03/13/2017 03/13/2017  Injection time 09:07 10:17  Imaging time 09:37 10:47  Injection-image interval 30 min 30 min    The radiopharmaceutical was injected at the peak effect of pharmacologic stress  MYOCARDIAL PERFUSION IMAGING:  The image quality was fair  The left ventricle was dilated  PERFUSION DEFECTS:  -  1) There was a large, severe, fixed myocardial perfusion defect of the entire anterior wall, with a complete fixed perfusion defect of the apex without reversibility  2) There was a large, severe, fixed myocardial perfusion defect of the entire inferior wall, extending to the mid-basal inferolateral wall without reversibility  GATED SPECT:  The calculated left ventricular ejection fraction was 18 %  Left ventricular ejection fraction was markedly decreased by visual estimate  There was severe global left ventricular hypokinesis  SUMMARY:  -  Stress results: There was no chest pain during stress  -  ECG conclusions: The stress ECG was non-diagnostic due to paced rhythm and resting ST/T wave abnormality   -  Perfusion imagin) There was a large, severe, fixed myocardial perfusion defect of the entire anterior wall, with a complete fixed perfusion defect of the apex without reversibility  2) There was a large, severe, fixed myocardial perfusion defect of the entire inferior wall, extending to the mid-basal inferolateral wall without reversibility   -  Gated SPECT: The calculated left ventricular ejection fraction was 18 %  Left ventricular ejection fraction was markedly decreased by visual estimate  There was severe global left ventricular hypokinesis  IMPRESSIONS: Abnormal study after pharmacologic vasodilation  The left ventricle was dilated  There was evidence of prior myocardial infarction of the entire anterior wall extending to the apex, and the inferior wall exending to the  mid-basal inferolateral myocardial wall segments with no evidence of myocardial ischemia  Left ventricular systolic function was reduced, without distinct regional wall motion abnormalities      Prepared and signed by    Issac Merlin, DO  Signed 2017 14:20:12      --------------------------------------------------------------------------------  ECHO:   Results for orders placed during the hospital encounter of 21    Echo complete with contrast if indicated    [place] [address] [address]    Transthoracic Echocardiogram  2D, M-mode, Doppler, and Color Doppler    Study date:  18-Mar-2021    Patient: Beth Ernandez  MR number: [id number]  Account number: [de-identified]  : 1965  Age: 63 years  Gender: Male  Status: Outpatient  Location: [place]   Height: 66 in  Weight: 148 7 lb  BP: 112/ 58 mmHg    Indications: Ischemic Cardiomyopathy    Diagnoses: I25 5 - Ischemic cardiomyopathy    Sonographer:  Alysia Douglas  Interpreting Physician:  Mac Gleason DO  Primary Physician:  Ellis Lepe DO  Group:  [facility]    SUMMARY    SUMMARY:  1  This is a technically adequate study  2  Left ventricle severely dilated with severely reduced systolic function  Left ventricular ejection fraction is estimated at 18%  3  Grade 2 diastolic dysfunction with echocardiographic indications of elevated left atrial pressures  4  Regional wall motion abnormalities consistent with coronary artery disease  5  Right ventricle is mildly dilated with mildly reduced systolic function  Probable pacemaker wire present  6  Severe biatrial dilatation  7  Mechanical valve is noted within the aortic position  Valve is well seated  Trace central aortic regurgitation  Normal velocities and gradients for valve with Vmax 1 85 m/s, EOA 1 38 cm2, DVI 0 44, acceleration time 66 ms  8  Mild posteriorly directed mitral regurgitation with fibrocalcific changes of the mitral valve and mild mitral annular calcification  9  Moderate tricuspid regurgitation with pulmonary artery systolic pressure is estimated at 60-65 mm Hg  10   Compared to report from 7326, grade 3 diastolic dysfunction is noted with elevated filling pressures and pulmonary hypertension    HISTORY: PRIOR HISTORY: CAD, AVR, Ischemic cardiomyopathy, HTN, HLD    PROCEDURE: The [facility] performed in [facility]  This was a routine study  The transthoracic approach was used  The study included complete 2D imaging, M-mode, complete spectral Doppler, and color Doppler  The heart rate was 60  bpm, at the start of the study  Image quality was adequate  LEFT VENTRICLE: Left is severely dilated with severely reduced systolic function  Left ventricular ejection fraction is estimated at 18%  Normal wall thickness  Grade 3 diastolic dysfunction with echocardiographic indications of elevated  left atrial pressures  Increased trabeculations noted at the left ventricular apex  There is akinesis of the anterior and anterolateral wall from base to apex with dyskinetic apical wall and hypokinesis of the remaining walls  RIGHT VENTRICLE: Right ventricle is mildly dilated with mildly reduced systolic function  Linear echodensity noted within the right ventricle consistent with pacemaker/catheter    LEFT ATRIUM: Left atrium is severely dilated  ATRIAL SEPTUM: Interatrial septum is bowing toward the right atrium consistent with elevated left atrial pressures  RIGHT ATRIUM: Right atrium is severely dilated  MITRAL VALVE: Mitral valve opens well  Fibrocalcific changes of the mitral valve  No evidence of mitral stenosis  Mild posteriorly directed mitral regurgitation  AORTIC VALVE: There is a mechanical valve in the aortic position  The valve appears well seated  Trace central aortic regurgitation  Velocities and gradients are normal for valve  Vmax 1 85 m/s, mean PG 6 9 mmHg, EOA/ASHLEY 1 38 cm2, DVI  0 44, Acceleration time 66 ms, LVOTd 2cm, LVOT VTI 12 3, AV VTI 28 cm  TRICUSPID VALVE: Tricuspid valve opens well   Moderate tricuspid regurgitation with pulmonary artery systolic pressure is estimated at 60-65 mm Hg consistent with severe pulmonary hypertension  PULMONIC VALVE: Pulmonic valve not well visualized  No evidence of pulmonic stenosis or pulmonic regurgitation  PERICARDIUM: There is no evidence of significant pericardial effusion  AORTA: Aortic root is top-normal in size at 3 4 cm    SYSTEMIC VEINS: IVC: IVC is dilated with greater than 50% respirophasic collapse  SYSTEM MEASUREMENT TABLES    2D  %FS: 8 64 %  Ao Diam: 3 38 cm  EDV(Teich): 235 37 ml  EF(Teich): 18 56 %  ESV(Teich): 191 68 ml  IVSd: 0 97 cm  LA Area: 29 2 cm2  LA Diam: 4 78 cm  LVEDV MOD A4C: 260 34 ml  LVEF MOD A4C: 20 96 %  LVESV MOD A4C: 205 77 ml  LVIDd: 6 75 cm  LVIDs: 6 17 cm  LVLd A4C: 9 93 cm  LVLs A4C: 9 59 cm  LVOT Diam: 1 98 cm  LVPWd: 1 01 cm  RA Area: 23 cm2  RVIDd: 4 cm  SV MOD A4C: 54 57 ml  SV(Teich): 43 69 ml    CW  AV Env  Ti: 235 97 ms  AV VTI: 28 01 cm  AV Vmax: 1 85 m/s  AV Vmean: 1 19 m/s  AV maxP 71 mmHg  AV meanP 86 mmHg  TR Vmax: 3 67 m/s  TR maxP 96 mmHg    MM  TAPSE: 1 36 cm    PW  ASHLEY (VTI): 2 28 cm2  ASHLEY Vmax: 1 59 cm2  AVAI (VTI): 0 cm2/m2  AVAI Vmax: 0 cm2/m2  E' Sept: 0 06 m/s  E/E' Sept: 15 17  LVOT Env  Ti: 308 28 ms  LVOT VTI: 20 81 cm  LVOT Vmax: 0 96 m/s  LVOT Vmean: 0 67 m/s  LVOT maxPG: 3 7 mmHg  LVOT meanP 23 mmHg  LVSI Dopp: 36 28 ml/m2  LVSV Dopp: 63 86 ml  MV A [place]: 0 35 m/s  MV Dec [place]: 3 47 m/s2  MV DecT: 254 1 ms  MV E [place]: 0 88 m/s  MV E/A Ratio: 2 51  MV PHT: 73 69 ms  MVA By PHT: 2 99 cm2    [employer] Accredited Echocardiography Laboratory    Prepared and electronically signed by    Jeffry Chandra DO  Signed 18-Mar-2021 18:50:34    No results found for this or any previous visit     -----------------------------------  [email or web address]@   ======================================================     Imaging:   I have personally reviewed pertinent reports  EKG:  Atrial sensed and ventricular paced rhythm with biventricular pacemaker    Frequent premature ventricular contractions  Premature ventricular contractions and non sustained ventricular tachycardia are not new  Portions of the record may have been created with voice recognition software  Occasional wrong word or "sound a like" substitutions may have occurred due to the inherent limitations of voice recognition software  Read the chart carefully and recognize, using context, where substitutions have occurred      Tan Mills MD

## 2022-01-22 NOTE — PROGRESS NOTES
Provider [address] DO arrived on unit and was updated on patient  Provider believes that chest pain is musculo/skeletal in origin and not cardiac related  Provider agrees that patients fluctuating pulse ox and low perfusion is directed related to his cardiac EF of 20%

## 2022-01-22 NOTE — PROGRESS NOTES
The pantoprazole IV has / have been converted to Oral per [facility] IV-to-PO Auto-Conversion Protocol for Adults as approved by the Pharmacy and Therapeutics Committee  The patient met all eligible criteria:  1) Age = > 25years old   2) Received at least one dose of the IV form   3) Receiving at least one other scheduled oral/enteral medication   4) Tolerating an oral/enteral diet   and did not have any exclusions:   1) Critical care patient   2) Active GI bleed (IF assessing H2RAs or PPIs)   3) Continuous tube feeding (IF assessing cipro, doxycycline, levofloxacin, minocycline, rifampin, or voriconazole)   4) Receiving PO vancomycin (IF assessing metronidazole)   5) Persistent nausea and/or vomiting   6) Ileus or gastrointestinal obstruction   7) Jacob/nasogastric tube set for continuous suction   8) Specific order not to automatically convert to PO (in the order's comments or if discussed in the most recent Infectious Disease or primary team's progress notes)

## 2022-01-23 NOTE — UTILIZATION REVIEW
Continued Stay Review    Date: 01/23/22                          Current Patient Class: IP  Current Level of Care: Med/Surg    HPI:56 y o  male initially admitted on 01/21/22 for acute on chronic combined systolic and diastolic CHF, Hyponatremia, Abdominal pain, COVID-19 virus infection - diagnosed on 1/9/2022, and Cocaine use  Assessment/Plan: Titrated down to 2L O2 nasal cannula  Pt ambulating in room without significant SOB, but reports worsening fatigue  On exam, b/l LE +1 edema  INR supra-therapeutic @ 4 45  Plan: IV lasix BID, IV PPI, telemetry monitoring, trend labs, replete electrolytes as needed, continue to hold coumadin, continue ASA/statin/metoprolol  Cardiac diet  1500 mL fluid restriction  DW  I&O  PT/OT evals pending        Vital Signs:   Date/Time Temp Pulse Resp BP MAP (mmHg) SpO2 Calculated FIO2 (%) - Nasal Cannula Nasal Cannula O2 Flow Rate (L/min) O2 Device   01/23/22 07:42:41 98 5 °F (36 9 °C) 86 20 107/56 73 93 % 28 2 L/min Nasal cannula   01/22/22 21:32:54 -- 89 22 111/53 72 100 % -- -- --   01/22/22 20:21:35 -- 96 -- 128/64 85 91 % -- -- --   01/22/22 17:47:46 -- 76 -- 119/69 86 90 % -- -- --   01/22/22 15:22:58 97 2 °F (36 2 °C)   Abnormal  83 19 118/69 85 96 % -- -- --   01/22/22 12:29:15 -- 80 -- 111/67 82 86 % Abnormal  -- -- None (Room air)       Pertinent Labs/Diagnostic Results:   Results from last 7 days   Lab Units 01/23/22  0532 01/22/22  0746 01/21/22  1413   WBC Thousand/uL 15 14* 12 28* 10 48*   HEMOGLOBIN g/dL 13 5 13 9 13 7   HEMATOCRIT % 40 6 40 7 41 0   PLATELETS Thousands/uL 230 222 221   NEUTROS ABS Thousands/µL 13 95*  --   --    BANDS PCT %  --   --  1     Results from last 7 days   Lab Units 01/23/22  0532 01/22/22  0746 01/21/22  1413   SODIUM mmol/L 130* 129* 130*   POTASSIUM mmol/L 3 5 3 9 3 7   CHLORIDE mmol/L 91* 93* 93*   CO2 mmol/L 27 29 28   ANION GAP mmol/L 12 7 9   BUN mg/dL 14 13 13   CREATININE mg/dL 1 00 1 00 1 12   EGFR ml/min/1 73sq m 83 83 72   CALCIUM mg/dL 7 8* 7 9* 8 0*   MAGNESIUM mg/dL 1 8 1 5* 1 5*     Results from last 7 days   Lab Units 01/21/22  1413   AST U/L 56*   ALT U/L 70   ALK PHOS U/L 121*   TOTAL PROTEIN g/dL 6 6   ALBUMIN g/dL 2 4*   TOTAL BILIRUBIN mg/dL 1 24*     Results from last 7 days   Lab Units 01/23/22  0532 01/22/22  0746 01/21/22  1413   GLUCOSE RANDOM mg/dL 79 92 149*     Results from last 7 days   Lab Units 01/21/22  1748   OSMOLALITY, SERUM mmol/*     Results from last 7 days   Lab Units 01/21/22  2244   PH BEE  7 446*   PCO2 BEE mm Hg 42 3   PO2 BEE mm Hg 34 0*   HCO3 BEE mmol/L 28 5   BASE EXC BEE mmol/L 4 0   O2 CONTENT BEE ml/dL 12 8   O2 HGB, VENOUS % 63 9     Results from last 7 days   Lab Units 01/22/22  0026 01/21/22  2212 01/21/22  1719   HS TNI 0HR ng/L  --   --  62   HS TNI 2HR ng/L  --  73  --    HSTNI D2 ng/L  --  11  --    HS TNI 4HR ng/L 73  --   --    HSTNI D4 ng/L 11  --   --      Results from last 7 days   Lab Units 01/22/22  0746 01/21/22  1439   PROTIME seconds 40 6* 44 8*   INR  4 45* 5 06*   PTT seconds  --  96*     Results from last 7 days   Lab Units 01/21/22  1413   TSH 3RD GENERATON uIU/mL 0 833     Results from last 7 days   Lab Units 01/21/22  1413   NT-PRO BNP pg/mL 7,331*     Results from last 7 days   Lab Units 01/21/22  1835 01/21/22  1748   OSMOLALITY, SERUM mmol/KG  --  271*   OSMO UR mmol/  --      Results from last 7 days   Lab Units 01/21/22  1835   SODIUM UR  53       Medications:   Scheduled Medications:  amiodarone, 100 mg, Oral, Daily With Breakfast  aspirin, 81 mg, Oral, Daily  atorvastatin, 80 mg, Oral, Daily With Dinner  furosemide, 40 mg, Intravenous, BID (diuretic)  metoprolol succinate, 75 mg, Oral, Q12H  pantoprazole, 40 mg, Oral, Early Morning    Continuous IV Infusions: none     PRN Meds:  acetaminophen, 650 mg, Oral, Q6H PRN; 1/23 x1  aluminum-magnesium hydroxide-simethicone, 30 mL, Oral, Q6H PRN  magnesium sulfate, 2g, IV, 1/23 x1  ondansetron, 4 mg, Intravenous, Q6H PRN  potassium chloride, 40 mEq, Oral, 1/23 x1        Discharge Plan: TBD    Network Utilization Review Department  ATTENTION: Please call with any questions or concerns to [phone] and carefully listen to the prompts so that you are directed to the right person  All voicemails are confidential   Chance Lopez all requests for admission clinical reviews, approved or denied determinations and any other requests to dedicated fax number below belonging to the campus where the patient is receiving treatment   List of dedicated fax numbers for the Facilities:  [address] DENIALS (Administrative/Medical Necessity) [phone]   [address] (Maternity/NICU/Pediatrics) [phone]   [address]  [address] [place] [id number] [place] [address] [address] [facility] [place] [address] [phone]

## 2022-01-23 NOTE — ASSESSMENT & PLAN NOTE
Wt Readings from Last 3 Encounters:   01/23/22 57 kg (125 lb 10 6 oz)   01/12/22 56 6 kg (124 lb 12 5 oz)   09/13/21 62 4 kg (137 lb 8 oz)     Chest x-ray with bilateral pulmonary infiltrates edema verses residual from COVID  Elevated proBNP  Unclear compliance with medications on discharge  Patient reports he has not taking any meds for 3 days due to stomach discomfort  Will monitor on telemetry to see if arrhythmias are contributing  Patient's weight is 1 lb up from most recent discharge  Given that patient presented with pillow orthopnea and shortness of breath will opt to diurese  Will trial 1 time dose Lasix 40 IV and follow response  Blood pressure well controlled hold lisinopril and spironolactone given plan for diuresis  Consult Cardiology for evaluation      1/22/22:  Patient appears mildly volume overloaded on 4 L nasal cannula saturating well, denies any shortness of breath at rest denies any chest pain at rest but does experience some dyspnea on exertion when ambulating to the bathroom this afternoon    -will start Lasix IV 40 mg b i d  And assess response, monitor urine output closely especially 6 hours after dose given  -monitor daily weights     1/23/22: patient down to 2l nc, reports he was able to ambulate about the room on his own without feeling short of breath  Denies any chest pain or palpitations lightheadedness dizziness    He does report feeling more fatigued than yesterday, has a poor appetite  -continue iv diuretic with plan to transition to oral tomorrow

## 2022-01-23 NOTE — PLAN OF CARE
Problem: Potential for Falls  Goal: Patient will remain free of falls  Description: INTERVENTIONS:  - Educate patient/family on patient safety including physical limitations  - Instruct patient to call for assistance with activity   - Consult OT/PT to assist with strengthening/mobility   - Keep Call bell within reach  - Keep bed low and locked with side rails adjusted as appropriate  - Keep care items and personal belongings within reach  - Initiate and maintain comfort rounds  - Make Fall Risk Sign visible to staff  - Offer Toileting every  Hours, in advance of need  - Initiate/Maintain alarm  - Obtain necessary fall risk management equipm  - Apply yellow socks and bracelet for high fall risk patients  - Consider moving patient to room near nurses station  Outcome: Progressing     Problem: PAIN - ADULT  Goal: Verbalizes/displays adequate comfort level or baseline comfort level  Description: Interventions:  - Encourage patient to monitor pain and request assistance  - Assess pain using appropriate pain scale  - Administer analgesics based on type and severity of pain and evaluate response  - Implement non-pharmacological measures as appropriate and evaluate response  - Consider cultural and social influences on pain and pain management  - Notify physician/advanced practitioner if interventions unsuccessful or patient reports new pain  Outcome: Progressing     Problem: INFECTION - ADULT  Goal: Absence or prevention of progression during hospitalization  Description: INTERVENTIONS:  - Assess and monitor for signs and symptoms of infection  - Monitor lab/diagnostic results  - Monitor all insertion sites, i e  indwelling lines, tubes, and drains  - Monitor endotracheal if appropriate and nasal secretions for changes in amount and color  - [place] appropriate cooling/warming therapies per order  - Administer medications as ordered  - Instruct and encourage patient and family to use good hand hygiene technique  - Identify and instruct in appropriate isolation precautions for identified infection/condition  Outcome: Progressing     Problem: Knowledge Deficit  Goal: Patient/family/caregiver demonstrates understanding of disease process, treatment plan, medications, and discharge instructions  Description: Complete learning assessment and assess knowledge base    Interventions:  - Provide teaching at level of understanding  - Provide teaching via preferred learning methods  Outcome: Progressing     Problem: CARDIOVASCULAR - ADULT  Goal: Maintains optimal cardiac output and hemodynamic stability  Description: INTERVENTIONS:  - Monitor I/O, vital signs and rhythm  - Monitor for S/S and trends of decreased cardiac output  - Administer and titrate ordered vasoactive medications to optimize hemodynamic stability  - Assess quality of pulses, skin color and temperature  - Assess for signs of decreased coronary artery perfusion  - Instruct patient to report change in severity of symptoms  Outcome: Progressing  Goal: Absence of cardiac dysrhythmias or at baseline rhythm  Description: INTERVENTIONS:  - Continuous cardiac monitoring, vital signs, obtain 12 lead EKG if ordered  - Administer antiarrhythmic and heart rate control medications as ordered  - Monitor electrolytes and administer replacement therapy as ordered  Outcome: Progressing

## 2022-01-23 NOTE — PROGRESS NOTES
Progress Note - Cardiology   Francis Trung LivingstoncelRitrini 61 y o  male MRN: [id number]  Unit/Bed#: [facility] 2 -01 Encounter: [id number]    Assessment:    · Acute on chronic systolic and diastolic congestive Heart failure  · Recent COVID-19 infection diagnosed 01/09/2022  · Ischemic cardiomyopathy with LVEF 18% and prior extensive anterior and inferior myocardial infarctions  · Mechanical aortic valve replacement in the 1980s on Coumadin anticoagulation  · Dual chamber biventricular Medtronic pacemaker in Situ functioning appropriately but not been interrogated since June 2021  · Noncompliance  · Cocaine and marijuana use  · Frequent ventricular ectopy couplets and triplets  No nonsustained ventricular tachycardia noted    Plan:    · Diuresis as needed  Weight not significantly changed since admission  · Fluid restriction for hyponatremia as needed  · Potassium replacement to keep potassium between 3 8 and 4 8  · Magnesium replacement to keep magnesium over 2 0      Interval history:  Cardiac status appears to remain stable      PROBLEM LIST:    Patient Active Problem List    Diagnosis Date Noted    Abdominal pain 01/21/2022    Hyponatremia 01/21/2022    Aortic stenosis 01/09/2022    Ischemic cardiomyopathy 01/09/2022    Drug abuse ([facility] ) 01/09/2022    Ventricular fibrillation ([facility] ) 01/09/2022    COVID-19 virus infection 01/09/2022    Impaired fasting glucose 09/10/2021    History of ventricular fibrillation 09/02/2021    Elevated troponin 09/02/2021    Cough 09/02/2021    Lactic acidosis 09/02/2021    SIRS (systemic inflammatory response syndrome) ([facility] ) 09/02/2021    Cocaine use 09/02/2021    History of ventricular tachycardia 05/25/2021    Depressed mood 03/06/2020    S/P AVR (aortic valve replacement) 06/18/2018    Ventricular fibrillation ([facility] ) 06/18/2018    Biventricular ICD (implantable cardioverter-defibrillator) in place 06/18/2018    Colonic mass 06/03/2016    Acute on chronic combined systolic and diastolic CHF (congestive heart failure) ([facility] ) 06/03/2016    Coronary artery disease involving native coronary artery of native heart with angina pectoris ([facility] ) 06/03/2016       Vitals: /56 (BP Location: Right arm)   Pulse 86   Temp 98 5 °F (36 9 °C) (Oral)   Resp 20   Wt 57 kg (125 lb 10 6 oz)   SpO2 93%   BMI 18 03 kg/m²   PREVIOUS WEIGHTS:   Weight (last 2 days)     Date/Time Weight    01/23/22 0600 57 (125 66)    01/22/22 0600 57 1 (125 88)    01/21/22 1241 57 1 (125 88)          Wt Readings from Last 2 Encounters:   01/23/22 57 kg (125 lb 10 6 oz)   01/12/22 56 6 kg (124 lb 12 5 oz)       Labs/Data:        Results from last 7 days   Lab Units 01/23/22  0532 01/22/22  0746 01/21/22  1413   WBC Thousand/uL 15 14* 12 28* 10 48*   HEMOGLOBIN g/dL 13 5 13 9 13 7   HEMATOCRIT % 40 6 40 7 41 0   MCV fL 79* 80* 81*   MCH pg 26 4* 27 4 27 1   MCHC g/dL 33 3 34 2 33 4   PLATELETS Thousands/uL 230 222 221     Results from last 7 days   Lab Units 01/23/22  0532 01/22/22  0746 01/21/22  1413   EGFR ml/min/1 73sq m 83 83 72   SODIUM mmol/L 130* 129* 130*   POTASSIUM mmol/L 3 5 3 9 3 7   CHLORIDE mmol/L 91* 93* 93*   CO2 mmol/L 27 29 28   BUN mg/dL 14 13 13   CREATININE mg/dL 1 00 1 00 1 12     Results from last 7 days   Lab Units 01/23/22  0532 01/22/22  0746 01/21/22  1413   CALCIUM mg/dL 7 8* 7 9* 8 0*   AST U/L  --   --  56*   ALT U/L  --   --  70   ALK PHOS U/L  --   --  121*   MAGNESIUM mg/dL 1 8 1 5* 1 5*         Lab Results   Component Value Date    NTBNP 7,331 (H) 01/21/2022    NTBNP 13,741 (H) 01/09/2022    NTBNP 10,329 (H) 09/02/2021     Lab Results   Component Value Date    CHOLESTEROL 118 04/26/2019    TRIG 91 04/26/2019    HDL 45 04/26/2019    LDLCALC 55 04/26/2019    HGBA1C 5 8 (H) 09/10/2021       Results from last 7 days   Lab Units 01/22/22  0746 01/21/22  1439   INR  4 45* 5 06*   PROTIME seconds 40 6* 44 8*          Current Facility-Administered Medications:     acetaminophen (TYLENOL) tablet 650 mg, 650 mg, Oral, Q6H PRN, Saray Rack, DO, 650 mg at 01/23/22 0742    aluminum-magnesium hydroxide-simethicone (MYLANTA) oral suspension 30 mL, 30 mL, Oral, Q6H PRN, Sarya Rack, DO    amiodarone tablet 100 mg, 100 mg, Oral, Daily With Breakfast, Saray Rack, DO, 100 mg at 01/23/22 0741    aspirin chewable tablet 81 mg, 81 mg, Oral, Daily, Saray Rack, DO, 81 mg at 01/23/22 0741    atorvastatin (LIPITOR) tablet 80 mg, 80 mg, Oral, Daily With Donne Munch, DO, 80 mg at 01/22/22 1735    furosemide (LASIX) injection 40 mg, 40 mg, Intravenous, BID (diuretic), Corey Banai, DO, 40 mg at 01/22/22 1736    metoprolol succinate (TOPROL-XL) 24 hr tablet 75 mg, 75 mg, Oral, Q12H, Corey Banai, DO, 75 mg at 01/23/22 0514    ondansetron (ZOFRAN) injection 4 mg, 4 mg, Intravenous, Q6H PRN, Diedre Rack, DO    pantoprazole (PROTONIX) EC tablet 40 mg, 40 mg, Oral, Early Morning, Corey Banai, DO, 40 mg at 01/23/22 0741  No Known Allergies    No intake or output data in the 24 hours ending 01/23/22 1442    Invasive Devices  Report    Peripheral Intravenous Line            Peripheral IV 01/23/22 Left Forearm <1 day              I did not examine the patient in person to avoid contact with patient with COVID-19  Discussed patient with nurse and review chart  Cardiac status appears stable    ======================================================     Imaging:   I have personally reviewed pertinent reports  EKG:  Atrial sensed ventricular paced rhythm  Frequent premature ventricular contractions with couplets and triplets  No longer runs of nonsustained ventricular tachycardia

## 2022-01-23 NOTE — PROGRESS NOTES
[facility]  Progress Note - [address] [place] 1965, 61 y o  male MRN: [id number]  Unit/Bed#: [place] [address] -01 Encounter: [id number]  Primary Care Provider: Tonio Alcantar PA-C   Date and time admitted to hospital: 1/21/2022  1:33 PM    Hyponatremia  Assessment & Plan  Patient is a poor historian with unclear history following most recent hospitalization  Patient does have elevated proBNP and chest x-ray with pulmonary edema pointing towards volume overload  He also does report history of poor oral intake due to abdominal discomfort  Will check hyponatremia panel labs  Will give 1 time dose IV Lasix and follow response    -suspect likely secondary to congestive heart failure, no further workup needed as it is mild    Abdominal pain  Assessment & Plan  Patient reports epigastric pain since discharge from hospital   He gets postprandial nausea and discomfort  Concern for gastritis versus peptic ulcer disease  Likely stress induced with recent hospitalization and intubation possible component of steroid induced for COVID treatment  Give IV Protonix, p r n  Mylanta    -resolved    COVID-19 virus infection  Assessment & Plan  Recently diagnosed with COVID-19 1/9/2022  Patient was intubated and ultimately extubated 1/10/2022  No indication for continued COVID directed therapy at this time  Continue to monitor off precautions given severe infection hospital protocol 20 days    Cocaine use  Assessment & Plan  UDS positive on previous admission for cocaine    Ventricular fibrillation [facility])  Assessment & Plan  History of VFib/V-tach With Bi V ICD  Continue metoprolol succinate-increased doses 7 5 mg b i d   Due to ongoing episodes transdermally, PVCs, V-tach, currently asymptomatic denying any chest pain or shortness of breath  -repleting magnesium and potassium  Monitor on telemetry    S/P AVR (aortic valve replacement)  Assessment & Plan  Patient currently on Coumadin but supratherapeutic INR  -continues to have supratherapeutic INR with no bleeding, does not require any reversal currently, will monitor off warfarin and restart once within therapeutic range  -recheck INR tomorrow    Coronary artery disease involving native coronary artery of native heart with angina pectoris [facility])  Assessment & Plan  Continue aspirin, atorvastatin, metoprolol    * Acute on chronic combined systolic and diastolic CHF (congestive heart failure) (HCC)  Assessment & Plan  Wt Readings from Last 3 Encounters:   01/23/22 57 kg (125 lb 10 6 oz)   01/12/22 56 6 kg (124 lb 12 5 oz)   09/13/21 62 4 kg (137 lb 8 oz)     Chest x-ray with bilateral pulmonary infiltrates edema verses residual from COVID  Elevated proBNP  Unclear compliance with medications on discharge  Patient reports he has not taking any meds for 3 days due to stomach discomfort  Will monitor on telemetry to see if arrhythmias are contributing  Patient's weight is 1 lb up from most recent discharge  Given that patient presented with pillow orthopnea and shortness of breath will opt to diurese  Will trial 1 time dose Lasix 40 IV and follow response  Blood pressure well controlled hold lisinopril and spironolactone given plan for diuresis  Consult Cardiology for evaluation      1/22/22:  Patient appears mildly volume overloaded on 4 L nasal cannula saturating well, denies any shortness of breath at rest denies any chest pain at rest but does experience some dyspnea on exertion when ambulating to the bathroom this afternoon    -will start Lasix IV 40 mg b i d  And assess response, monitor urine output closely especially 6 hours after dose given  -monitor daily weights     1/23/22: patient down to 2l nc, reports he was able to ambulate about the room on his own without feeling short of breath  Denies any chest pain or palpitations lightheadedness dizziness    He does report feeling more fatigued than yesterday, has a poor appetite  -continue iv diuretic with plan to transition to oral tomorrow        VTE Pharmacologic Prophylaxis: VTE Score: 3 patient is on warfarin    Patient Centered Rounds: I performed bedside rounds with nursing staff today  Discussions with Specialists or Other Care Team Provider: n/a    Education and Discussions with Family / Patient: Patient declined call to   Time Spent for Care: 30 minutes  More than 50% of total time spent on counseling and coordination of care as described above  Current Length of Stay: 2 day(s)  Current Patient Status: Inpatient   Certification Statement: The patient will continue to require additional inpatient hospital stay due to iv diuretics for chf  Discharge Plan: Anticipate discharge in 24-48 hrs to discharge location to be determined pending rehab evaluations  Code Status: Level 1 - Full Code    Subjective:   Patient denies any acute complaints    Objective:     Vitals:   Temp (24hrs), Av 9 °F (36 6 °C), Min:97 2 °F (36 2 °C), Max:98 5 °F (36 9 °C)    Temp:  [97 2 °F (36 2 °C)-98 5 °F (36 9 °C)] 98 5 °F (36 9 °C)  HR:  [76-96] 86  Resp:  [19-22] 20  BP: (107-128)/(53-69) 107/56  SpO2:  [90 %-100 %] 93 %  Body mass index is 18 03 kg/m²  Input and Output Summary (last 24 hours):   No intake or output data in the 24 hours ending 22 1407    Physical Exam:   Physical Exam  Vitals and nursing note reviewed  Constitutional:       General: He is not in acute distress  Appearance: He is well-developed  He is not ill-appearing, toxic-appearing or diaphoretic  HENT:      Head: Normocephalic and atraumatic  Eyes:      General: No scleral icterus  Conjunctiva/sclera: Conjunctivae normal    Cardiovascular:      Rate and Rhythm: Normal rate and regular rhythm  Heart sounds: No murmur heard  No friction rub  No gallop  Pulmonary:      Effort: Pulmonary effort is normal  No respiratory distress  Breath sounds: No stridor  No wheezing, rhonchi or rales  Chest:      Chest wall: No tenderness  Abdominal:      General: There is no distension  Palpations: Abdomen is soft  There is no mass  Tenderness: There is no abdominal tenderness  There is no guarding or rebound  Musculoskeletal:         General: No swelling, tenderness, deformity or signs of injury  Cervical back: Neck supple  Right lower leg: No edema  Left lower leg: No edema  Comments: Plus one pitting edema bilaterally   Skin:     General: Skin is warm and dry  Coloration: Skin is not jaundiced or pale  Findings: No bruising, erythema, lesion or rash  Neurological:      Mental Status: He is alert and oriented to person, place, and time  Additional Data:     Labs:  Results from last 7 days   Lab Units 01/23/22  0532 01/22/22  0746 01/21/22  1413   WBC Thousand/uL 15 14*   < > 10 48*   HEMOGLOBIN g/dL 13 5   < > 13 7   HEMATOCRIT % 40 6   < > 41 0   PLATELETS Thousands/uL 230   < > 221   BANDS PCT %  --   --  1   NEUTROS PCT % 92*  --   --    LYMPHS PCT % 4*  --   --    LYMPHO PCT %  --   --  2*   MONOS PCT % 3*  --   --    MONO PCT %  --   --  2*   EOS PCT % 0  --  0    < > = values in this interval not displayed  Results from last 7 days   Lab Units 01/23/22  0532 01/22/22  0746 01/21/22  1413   SODIUM mmol/L 130*   < > 130*   POTASSIUM mmol/L 3 5   < > 3 7   CHLORIDE mmol/L 91*   < > 93*   CO2 mmol/L 27   < > 28   BUN mg/dL 14   < > 13   CREATININE mg/dL 1 00   < > 1 12   ANION GAP mmol/L 12   < > 9   CALCIUM mg/dL 7 8*   < > 8 0*   ALBUMIN g/dL  --   --  2 4*   TOTAL BILIRUBIN mg/dL  --   --  1 24*   ALK PHOS U/L  --   --  121*   ALT U/L  --   --  70   AST U/L  --   --  56*   GLUCOSE RANDOM mg/dL 79   < > 149*    < > = values in this interval not displayed       Results from last 7 days   Lab Units 01/22/22  0746   INR  4 45*                   Lines/Drains:  Invasive Devices  Report    Peripheral Intravenous Line            Peripheral IV 01/23/22 Left Forearm <1 day                  Telemetry:  Telemetry Orders (From admission, onward)             48 Hour Telemetry Monitoring  Continuous x 48 hours        Expiring   References:    Telemetry Guidelines   Question:  Reason for 48 Hour Telemetry  Answer:  Arrhythmias Requiring Medical Therapy (eg  SVT, Vtach/fib, Bradycardia, Uncontrolled A-fib)                            Imaging: No pertinent imaging reviewed  Recent Cultures (last 7 days):         Last 24 Hours Medication List:   Current Facility-Administered Medications   Medication Dose Route Frequency Provider Last Rate    acetaminophen  650 mg Oral Q6H PRN Valentino Zoey, DO      aluminum-magnesium hydroxide-simethicone  30 mL Oral Q6H PRN Valentino Zoey, DO      amiodarone  100 mg Oral Daily With Breakfast Valentino Zoey, DO      aspirin  81 mg Oral Daily Valentino Zoey, DO      atorvastatin  80 mg Oral Daily With [employer], DO      furosemide  40 mg Intravenous BID (diuretic) Corey Madison,       metoprolol succinate  75 mg Oral Q12H Corey [facility], DO      ondansetron  4 mg Intravenous Q6H PRN Valentino Zoey, DO      pantoprazole  40 mg Oral Early Morning Madeline Frazier DO          Today, Patient Was Seen By: Madeline Frazier DO    **Please Note: This note may have been constructed using a voice recognition system  **

## 2022-01-23 NOTE — PLAN OF CARE
Problem: Potential for Falls  Goal: Patient will remain free of falls  Description: INTERVENTIONS:  - Educate patient/family on patient safety including physical limitations  - Instruct patient to call for assistance with activity   - Consult OT/PT to assist with strengthening/mobility   - Keep Call bell within reach  - Keep bed low and locked with side rails adjusted as appropriate  - Keep care items and personal belongings within reach  - Initiate and maintain comfort rounds  - Make Fall Risk Sign visible to staff  - Offer Toileting every  Hours, in advance of need  - Initiate/Maintain alarm  - Obtain necessary fall risk management equipment:   - Apply yellow socks and bracelet for high fall risk patients  - Consider moving patient to room near nurses station  Outcome: Progressing     Problem: PAIN - ADULT  Goal: Verbalizes/displays adequate comfort level or baseline comfort level  Description: Interventions:  - Encourage patient to monitor pain and request assistance  - Assess pain using appropriate pain scale  - Administer analgesics based on type and severity of pain and evaluate response  - Implement non-pharmacological measures as appropriate and evaluate response  - Consider cultural and social influences on pain and pain management  - Notify physician/advanced practitioner if interventions unsuccessful or patient reports new pain  Outcome: Progressing     Problem: INFECTION - ADULT  Goal: Absence or prevention of progression during hospitalization  Description: INTERVENTIONS:  - Assess and monitor for signs and symptoms of infection  - Monitor lab/diagnostic results  - Monitor all insertion sites, i e  indwelling lines, tubes, and drains  - Monitor endotracheal if appropriate and nasal secretions for changes in amount and color  - [place] appropriate cooling/warming therapies per order  - Administer medications as ordered  - Instruct and encourage patient and family to use good hand hygiene technique  - Identify and instruct in appropriate isolation precautions for identified infection/condition  Outcome: Progressing     Problem: Knowledge Deficit  Goal: Patient/family/caregiver demonstrates understanding of disease process, treatment plan, medications, and discharge instructions  Description: Complete learning assessment and assess knowledge base    Interventions:  - Provide teaching at level of understanding  - Provide teaching via preferred learning methods  Outcome: Progressing     Problem: CARDIOVASCULAR - ADULT  Goal: Maintains optimal cardiac output and hemodynamic stability  Description: INTERVENTIONS:  - Monitor I/O, vital signs and rhythm  - Monitor for S/S and trends of decreased cardiac output  - Administer and titrate ordered vasoactive medications to optimize hemodynamic stability  - Assess quality of pulses, skin color and temperature  - Assess for signs of decreased coronary artery perfusion  - Instruct patient to report change in severity of symptoms  Outcome: Progressing  Goal: Absence of cardiac dysrhythmias or at baseline rhythm  Description: INTERVENTIONS:  - Continuous cardiac monitoring, vital signs, obtain 12 lead EKG if ordered  - Administer antiarrhythmic and heart rate control medications as ordered  - Monitor electrolytes and administer replacement therapy as ordered  Outcome: Progressing

## 2022-01-23 NOTE — ASSESSMENT & PLAN NOTE
Patient currently on Coumadin but supratherapeutic INR  -continues to have supratherapeutic INR with no bleeding, does not require any reversal currently, will monitor off warfarin and restart once within therapeutic range  -recheck INR tomorrow

## 2022-01-23 NOTE — ASSESSMENT & PLAN NOTE
Patient reports epigastric pain since discharge from hospital   He gets postprandial nausea and discomfort  Concern for gastritis versus peptic ulcer disease  Likely stress induced with recent hospitalization and intubation possible component of steroid induced for COVID treatment  Give IV Protonix, p r n   Mylanta    -resolved

## 2022-01-24 NOTE — ASSESSMENT & PLAN NOTE
Recently diagnosed with COVID-19 1/9/2022  Patient was intubated and ultimately extubated 1/10/2022    Continue to monitor off precautions given severe infection hospital protocol 20 day  -start steroids for increasing oxygen requirements

## 2022-01-24 NOTE — PROGRESS NOTES
Progress Note - Cardiology   Gordon LillycarcelRivera 61 y o  male MRN: [id number]  Unit/Bed#: [place] [address] -01 Encounter: [id number]      Asessment:  Acute COVID-19 infection  Acute on chronic systolic and diastolic congestive heart failure  Ischemic cardiomyopathy   - LVEF 20%, moderately dilated LV cavity with markedly reduced LV function with global hypokinesis, indeterminate diastolic dysfunction grade, moderate biatrial enlargement, normal function mechanical AV prosthesis with mild prostatic valve regurgitation, MAC with sclerosis and mild MR, trace to mild TR/GA, 1/10/22  Coronary artery disease   - S/P PCI with BMS to LAD and LCx in 2014    - stress test completed on 3/13/17: evidence of prior myocardial infarction of the entire anterior wall  extending to the apex, and the inferior wall extending to the mid- basal inferolateral myocardial wall segments with no evidence of myocardial ischemia, LV reduced at 18%  Aortic stenosis   - S/P mechanical aortic valve replacement in the 1980's  - on anticoagulation with warfarin  Dual-chamber biventricular [employer]onic ICD in-situ, 2/24/2015  Hx of ventricular fibrillation/ventricular tachycardia    - OP Rx, amiodarone 100 mg daily, Toprol XL 50 mg BID  Hyperlipidemia    - Rx,atorvastatin 80 mg daily  - most recent lipid panel 4/26/19: cholesterol 118, triglycerides 91, HDL 45, LDL 55  Other:  - noncompliance  - cocaine and marijuana use  - history of colon cancer     Plan/ Discussion:  · Continue amiodarone 100 mg daily, aspirin 81 mg daily, atorvastatin 80 mg daily, Toprol XL 75 mg BID  · S/P furosemide 40 mg IV BID with transition to Bumex 2 mg IV BID today; monitor response  Agree to check NT pro BNP in a m  to assess volume status  · INR remains supra- therapeutic; warfarin on hold  · Monitor volume status with strict intake/output, daily weight  · Monitor renal function and electrolytes closely; maintain potassium > 4, magnesium > 2  · Repeat fasting lipid panel in the a m  Subjective:  Patient resting in bed  He has complaints of shortness of breath and dyspnea on exertion  He denies chest pain, palpitations       Vitals:  Vitals:    01/23/22 0600 01/24/22 0559   Weight: 57 kg (125 lb 10 6 oz) 57 kg (125 lb 10 6 oz)   ,  Vitals:    01/24/22 0524 01/24/22 0559 01/24/22 0833 01/24/22 0838   BP: 125/71  122/71    BP Location:       Pulse: 85  69 89   Resp:   22    Temp:   (!) 97 2 °F (36 2 °C)    TempSrc:   Oral    SpO2: (!) 85%  (!) 87% 91%   Weight:  57 kg (125 lb 10 6 oz)         Exam:  General: awake, alert, interactive   Heart: regular rate and regular rhythm   Respiratory effort/ Lungs: diminished breath sounds bilateral bases   Abdominal: Non-tender to palpation, + bowel sounds, soft, no masses or distension  Lower Limbs: No bilateral lower extremity edema           Telemetry:       Atrial sensed, ventricular paced rhythm with ectopy     Medications:    Current Facility-Administered Medications:     acetaminophen (TYLENOL) tablet 650 mg, 650 mg, Oral, Q6H PRN, Wandalee Lobe, DO, 650 mg at 01/24/22 0521    aluminum-magnesium hydroxide-simethicone (MYLANTA) oral suspension 30 mL, 30 mL, Oral, Q6H PRN, Wandalee Lobe, DO    amiodarone tablet 100 mg, 100 mg, Oral, Daily With Breakfast, Wandalee Lobe, DO, 100 mg at 01/24/22 3021    aspirin chewable tablet 81 mg, 81 mg, Oral, Daily, Wandalee Lobe, DO, 81 mg at 01/24/22 1014    atorvastatin (LIPITOR) tablet 80 mg, 80 mg, Oral, Daily With [address] [place], DO, 80 mg at 01/23/22 1710    furosemide (LASIX) injection 40 mg, 40 mg, Intravenous, BID (diuretic), Corey Banai, DO, 40 mg at 01/24/22 0838    metoprolol succinate (TOPROL-XL) 24 hr tablet 75 mg, 75 mg, Oral, Q12H, Corey Banai, DO, 75 mg at 01/24/22 0542    ondansetron (ZOFRAN) injection 4 mg, 4 mg, Intravenous, Q6H PRN, [place] Lobe, DO    pantoprazole (PROTONIX) EC tablet 40 mg, 40 mg, Oral, Early Morning, Corey [place], DO, 40 mg at 01/24/22 0521      Labs/Data:        Results from last 7 days   Lab Units 01/24/22  0540 01/23/22  0532 01/22/22  0746   WBC Thousand/uL 11 60* 15 14* 12 28*   HEMOGLOBIN g/dL 14 9 13 5 13 9   HEMATOCRIT % 44 6 40 6 40 7   PLATELETS Thousands/uL 221 230 222     Results from last 7 days   Lab Units 01/24/22  0540 01/23/22  0532 01/22/22  0746   POTASSIUM mmol/L 4 3 3 5 3 9   CHLORIDE mmol/L 92* 91* 93*   CO2 mmol/L 28 27 29   BUN mg/dL 23 14 13

## 2022-01-24 NOTE — ASSESSMENT & PLAN NOTE
Wt Readings from Last 3 Encounters:   01/24/22 57 kg (125 lb 10 6 oz)   01/12/22 56 6 kg (124 lb 12 5 oz)   09/13/21 62 4 kg (137 lb 8 oz)     Chest x-ray with bilateral pulmonary infiltrates edema verses residual from COVID  Elevated proBNP  Unclear compliance with medications on discharge  Patient reports he has not taking any meds for 3 days due to stomach discomfort  Will monitor on telemetry to see if arrhythmias are contributing  Patient's weight is 1 lb up from most recent discharge  Given that patient presented with pillow orthopnea and shortness of breath will opt to diurese  Will trial 1 time dose Lasix 40 IV and follow response  Blood pressure well controlled hold lisinopril and spironolactone given plan for diuresis  Consult Cardiology for evaluation      1/24/22; transition to bumex 2mg bid IV, patient now on 4lnc increasing oxygen requirments since admission   Repeat cxr in am

## 2022-01-24 NOTE — ASSESSMENT & PLAN NOTE
Patient currently on Coumadin but supratherapeutic INR    -recheck INR tomorrow, this AM inr of 3 88, continue to hold warfarin

## 2022-01-24 NOTE — PLAN OF CARE
Problem: Potential for Falls  Goal: Patient will remain free of falls  Description: INTERVENTIONS:  - Educate patient/family on patient safety including physical limitations  - Instruct patient to call for assistance with activity   - Consult OT/PT to assist with strengthening/mobility   - Keep Call bell within reach  - Keep bed low and locked with side rails adjusted as appropriate  - Keep care items and personal belongings within reach  - Initiate and maintain comfort rounds  - Make Fall Risk Sign visible to staff  - Obtain necessary fall risk management equipment: bed rails  - Apply yellow socks and bracelet for high fall risk patients  - Consider moving patient to room near nurses station  Outcome: Progressing     Problem: PAIN - ADULT  Goal: Verbalizes/displays adequate comfort level or baseline comfort level  Description: Interventions:  - Encourage patient to monitor pain and request assistance  - Assess pain using appropriate pain scale  - Administer analgesics based on type and severity of pain and evaluate response  - Implement non-pharmacological measures as appropriate and evaluate response  - Consider cultural and social influences on pain and pain management  - Notify physician/advanced practitioner if interventions unsuccessful or patient reports new pain  Outcome: Progressing     Problem: INFECTION - ADULT  Goal: Absence or prevention of progression during hospitalization  Description: INTERVENTIONS:  - Assess and monitor for signs and symptoms of infection  - Monitor lab/diagnostic results  - Monitor all insertion sites, i e  indwelling lines, tubes, and drains  - Monitor endotracheal if appropriate and nasal secretions for changes in amount and color  - [place] appropriate cooling/warming therapies per order  - Administer medications as ordered  - Instruct and encourage patient and family to use good hand hygiene technique  - Identify and instruct in appropriate isolation precautions for identified infection/condition  Outcome: Progressing     Problem: Knowledge Deficit  Goal: Patient/family/caregiver demonstrates understanding of disease process, treatment plan, medications, and discharge instructions  Description: Complete learning assessment and assess knowledge base    Interventions:  - Provide teaching at level of understanding  - Provide teaching via preferred learning methods  Outcome: Progressing     Problem: CARDIOVASCULAR - ADULT  Goal: Maintains optimal cardiac output and hemodynamic stability  Description: INTERVENTIONS:  - Monitor I/O, vital signs and rhythm  - Monitor for S/S and trends of decreased cardiac output  - Administer and titrate ordered vasoactive medications to optimize hemodynamic stability  - Assess quality of pulses, skin color and temperature  - Assess for signs of decreased coronary artery perfusion  - Instruct patient to report change in severity of symptoms  Outcome: Progressing  Goal: Absence of cardiac dysrhythmias or at baseline rhythm  Description: INTERVENTIONS:  - Continuous cardiac monitoring, vital signs, obtain 12 lead EKG if ordered  - Administer antiarrhythmic and heart rate control medications as ordered  - Monitor electrolytes and administer replacement therapy as ordered  Outcome: Progressing

## 2022-01-24 NOTE — UTILIZATION REVIEW
Inpatient Admission Authorization Request   NOTIFICATION OF INPATIENT ADMISSION/INPATIENT AUTHORIZATION REQUEST   SERVICING FACILITY:   [address] [place] [address], [facility], [facility]  Tax ID: [id number]  NPI: [id number]  Place of Service: Inpatient [address] [facility]  [address]  Place of Service Code: 24     ATTENDING PROVIDER:  Attending Name and NPI#: Bebeto Juan [[id number]]  Address: [address] [place] [address], [facility], [facility]  Phone: [phone]     UTILIZATION REVIEW CONTACT:  Author Tran, Utilization   Network Utilization Review Department  Phone: [phone]  Fax: [phone]  Email: [email or web address]     PHYSICIAN ADVISORY SERVICES:  FOR PUIE-YZ-KTRU REVIEW - MEDICAL NECESSITY DENIAL  Phone: [phone]  Fax: [phone]  Email: [email or web address]     TYPE OF REQUEST:  Inpatient Status     ADMISSION INFORMATION:  ADMISSION DATE/TIME: 1/21/22  4:45 PM  PATIENT DIAGNOSIS CODE/DESCRIPTION:  CHF (congestive heart failure) ([facility]) [I50 9]  Dyspnea [R06 00]  SOB (shortness of breath) [R06 02]  COVID [U07 1]  DISCHARGE DATE/TIME: No discharge date for patient encounter  DISCHARGE DISPOSITION (IF DISCHARGED): Left against medical advice or discontinued care     IMPORTANT INFORMATION:  Please contact the Author Tran directly with any questions or concerns regarding this request  Department voicemails are confidential     Send requests for admission clinical reviews, concurrent reviews, approvals, and administrative denials due to lack of clinical to fax [phone]

## 2022-01-24 NOTE — PROGRESS NOTES
L[facility] 47  Progress Note - [address] [place] 1965, 61 y o  male MRN: [id number]  Unit/Bed#: [facility] -01 Encounter: [id number]  Primary Care Provider: Tonio Alcantar PA-C   Date and time admitted to hospital: 1/21/2022  1:33 PM    Hyponatremia  Assessment & Plan  Patient is a poor historian with unclear history following most recent hospitalization  Patient does have elevated proBNP and chest x-ray with pulmonary edema pointing towards volume overload  He also does report history of poor oral intake due to abdominal discomfort  Will check hyponatremia panel labs  Will give 1 time dose IV Lasix and follow response    -suspect likely secondary to congestive heart failure, no further workup needed as it is mild    Abdominal pain  Assessment & Plan  Patient reports epigastric pain since discharge from hospital   He gets postprandial nausea and discomfort  Concern for gastritis versus peptic ulcer disease  Likely stress induced with recent hospitalization and intubation possible component of steroid induced for COVID treatment  Give IV Protonix, p r n  Mylanta    -resolved    COVID-19 virus infection  Assessment & Plan  Recently diagnosed with COVID-19 1/9/2022  Patient was intubated and ultimately extubated 1/10/2022  Continue to monitor off precautions given severe infection hospital protocol 20 day  -start steroids for increasing oxygen requirements    Cocaine use  Assessment & Plan  UDS positive on previous admission for cocaine    Ventricular fibrillation [facility])  Assessment & Plan  History of VFib/V-tach With Bi V ICD  Continue metoprolol succinate-increased doses 7 5 mg b i d   Due to ongoing episodes transdermally, PVCs, V-tach, currently asymptomatic denying any chest pain or shortness of breath  -repleting magnesium and potassium  Monitor on telemetry    S/P AVR (aortic valve replacement)  Assessment & Plan  Patient currently on Coumadin but supratherapeutic INR    -recheck INR tomorrow, this AM inr of 3 88, continue to hold warfarin  Coronary artery disease involving native coronary artery of native heart with angina pectoris [facility])  Assessment & Plan  Continue aspirin, atorvastatin, metoprolol    * Acute on chronic combined systolic and diastolic CHF (congestive heart failure) (Formerly [facility])  Assessment & Plan  Wt Readings from Last 3 Encounters:   01/24/22 57 kg (125 lb 10 6 oz)   01/12/22 56 6 kg (124 lb 12 5 oz)   09/13/21 62 4 kg (137 lb 8 oz)     Chest x-ray with bilateral pulmonary infiltrates edema verses residual from COVID  Elevated proBNP  Unclear compliance with medications on discharge  Patient reports he has not taking any meds for 3 days due to stomach discomfort  Will monitor on telemetry to see if arrhythmias are contributing  Patient's weight is 1 lb up from most recent discharge  Given that patient presented with pillow orthopnea and shortness of breath will opt to diurese  Will trial 1 time dose Lasix 40 IV and follow response  Blood pressure well controlled hold lisinopril and spironolactone given plan for diuresis  Consult Cardiology for evaluation      1/24/22; transition to bumex 2mg bid IV, patient now on 4lnc increasing oxygen requirments since admission  Repeat cxr in am           VTE Pharmacologic Prophylaxis: VTE Score: 3 High Risk (Score >/= 5) - Pharmacological DVT Prophylaxis Ordered: warfarin (Coumadin)  Sequential Compression Devices Ordered  Patient Centered Rounds: I performed bedside rounds with nursing staff today  Discussions with Specialists or Other Care Team Provider: n/a    Education and Discussions with Family / Patient: Patient declined call to   Time Spent for Care: 30 minutes  More than 50% of total time spent on counseling and coordination of care as described above      Current Length of Stay: 3 day(s)  Current Patient Status: Inpatient   Certification Statement: The patient will continue to require additional inpatient hospital stay due to acute heart failure 4l nc iv diuretics  Discharge Plan: Anticipate discharge in 48 hrs to home  Code Status: Level 1 - Full Code    Subjective:   Patient reports worsening fatigue, poor appetite  Report shortness of breath when ambulating to the bathroom today  Objective:     Vitals:   Temp (24hrs), Av 2 °F (36 2 °C), Min:97 2 °F (36 2 °C), Max:97 2 °F (36 2 °C)    Temp:  [97 2 °F (36 2 °C)] 97 2 °F (36 2 °C)  HR:  [69-89] 87  Resp:  [20-22] 21  BP: ()/(59-71) 105/64  SpO2:  [79 %-91 %] 88 %  Body mass index is 18 03 kg/m²  Input and Output Summary (last 24 hours): Intake/Output Summary (Last 24 hours) at 2022 1614  Last data filed at 2022 1605  Gross per 24 hour   Intake --   Output 1250 ml   Net -1250 ml       Physical Exam:   Physical Exam  Vitals and nursing note reviewed  Constitutional:       General: He is not in acute distress  Appearance: He is well-developed  He is ill-appearing  He is not toxic-appearing or diaphoretic  HENT:      Head: Normocephalic and atraumatic  Eyes:      General: No scleral icterus  Conjunctiva/sclera: Conjunctivae normal    Cardiovascular:      Rate and Rhythm: Normal rate and regular rhythm  Heart sounds: No murmur heard  No friction rub  No gallop  Pulmonary:      Effort: Pulmonary effort is normal  No respiratory distress  Breath sounds: No stridor  Rales present  No wheezing or rhonchi  Chest:      Chest wall: No tenderness  Abdominal:      General: There is no distension  Palpations: Abdomen is soft  There is no mass  Tenderness: There is no abdominal tenderness  There is no guarding or rebound  Hernia: No hernia is present  Musculoskeletal:         General: No swelling, tenderness, deformity or signs of injury  Cervical back: Neck supple  Right lower leg: No edema  Left lower leg: No edema  Skin:     General: Skin is warm and dry  Coloration: Skin is not jaundiced or pale  Findings: No bruising, erythema, lesion or rash  Neurological:      Mental Status: He is alert and oriented to person, place, and time  Additional Data:     Labs:  Results from last 7 days   Lab Units 01/24/22  0540 01/22/22  0746 01/21/22  1413   WBC Thousand/uL 11 60*   < > 10 48*   HEMOGLOBIN g/dL 14 9   < > 13 7   HEMATOCRIT % 44 6   < > 41 0   PLATELETS Thousands/uL 221   < > 221   BANDS PCT %  --   --  1   NEUTROS PCT % 94*   < >  --    LYMPHS PCT % 4*   < >  --    LYMPHO PCT %  --   --  2*   MONOS PCT % 2*   < >  --    MONO PCT %  --   --  2*   EOS PCT % 0   < > 0    < > = values in this interval not displayed  Results from last 7 days   Lab Units 01/24/22  0540 01/22/22  0746 01/21/22  1413   SODIUM mmol/L 128*   < > 130*   POTASSIUM mmol/L 4 3   < > 3 7   CHLORIDE mmol/L 92*   < > 93*   CO2 mmol/L 28   < > 28   BUN mg/dL 23   < > 13   CREATININE mg/dL 1 06   < > 1 12   ANION GAP mmol/L 8   < > 9   CALCIUM mg/dL 8 1*   < > 8 0*   ALBUMIN g/dL  --   --  2 4*   TOTAL BILIRUBIN mg/dL  --   --  1 24*   ALK PHOS U/L  --   --  121*   ALT U/L  --   --  70   AST U/L  --   --  56*   GLUCOSE RANDOM mg/dL 104   < > 149*    < > = values in this interval not displayed       Results from last 7 days   Lab Units 01/24/22  0540   INR  3 88*             Results from last 7 days   Lab Units 01/24/22  0540   PROCALCITONIN ng/ml 2 59*       Lines/Drains:  Invasive Devices  Report    Peripheral Intravenous Line            Peripheral IV 01/23/22 Left Forearm 1 day                  Telemetry:  Telemetry Orders (From admission, onward)             48 Hour Telemetry Monitoring  Continuous x 48 hours        References:    Telemetry Guidelines   Question:  Reason for 48 Hour Telemetry  Answer:  Arrhythmias Requiring Medical Therapy (eg  SVT, Vtach/fib, Bradycardia, Uncontrolled A-fib)                 Telemetry Reviewed: atrial v paced rhythm noted ectopy  Indication for Continued Telemetry Use: Arrthymias requiring medical therapy             Imaging: Reviewed radiology reports from this admission including: chest xray    Recent Cultures (last 7 days):         Last 24 Hours Medication List:   Current Facility-Administered Medications   Medication Dose Route Frequency Provider Last Rate    acetaminophen  650 mg Oral Q6H PRN Jermaine Romero, DO      aluminum-magnesium hydroxide-simethicone  30 mL Oral Q6H PRN Jermaine Romero, DO      amiodarone  100 mg Oral Daily With Breakfast Jermaine oRmero, DO      aspirin  81 mg Oral Daily Jermaine Romero, DO      atorvastatin  80 mg Oral Daily With [employer], DO      bumetanide  2 mg Intravenous BID Corey Banai, DO      cefTRIAXone  2,000 mg Intravenous Q24H Corey Los, DO      dexamethasone  6 mg Intravenous Q24H Corey Los, DO      metoprolol succinate  75 mg Oral Q12H Corey Banai, DO      ondansetron  4 mg Intravenous Q6H PRN Jermaine Romero, DO      pantoprazole  40 mg Oral Early Morning Tanya Carmona DO          Today, Patient Was Seen By: Tanya Carmona DO    **Please Note: This note may have been constructed using a voice recognition system  **

## 2022-01-24 NOTE — PLAN OF CARE
Problem: Potential for Falls  Goal: Patient will remain free of falls  Description: INTERVENTIONS:  - Educate patient/family on patient safety including physical limitations  - Instruct patient to call for assistance with activity   - Consult OT/PT to assist with strengthening/mobility   - Keep Call bell within reach  - Keep bed low and locked with side rails adjusted as appropriate  - Keep care items and personal belongings within reach  - Initiate and maintain comfort rounds  - Make Fall Risk Sign visible to staff  - Apply yellow socks and bracelet for high fall risk patients  - Consider moving patient to room near nurses station  Outcome: Progressing     Problem: PAIN - ADULT  Goal: Verbalizes/displays adequate comfort level or baseline comfort level  Description: Interventions:  - Encourage patient to monitor pain and request assistance  - Assess pain using appropriate pain scale  - Administer analgesics based on type and severity of pain and evaluate response  - Implement non-pharmacological measures as appropriate and evaluate response  - Consider cultural and social influences on pain and pain management  - Notify physician/advanced practitioner if interventions unsuccessful or patient reports new pain  Outcome: Progressing     Problem: INFECTION - ADULT  Goal: Absence or prevention of progression during hospitalization  Description: INTERVENTIONS:  - Assess and monitor for signs and symptoms of infection  - Monitor lab/diagnostic results  - Monitor all insertion sites, i e  indwelling lines, tubes, and drains  - Monitor endotracheal if appropriate and nasal secretions for changes in amount and color  - [place] appropriate cooling/warming therapies per order  - Administer medications as ordered  - Instruct and encourage patient and family to use good hand hygiene technique  - Identify and instruct in appropriate isolation precautions for identified infection/condition  Outcome: Progressing     Problem: Knowledge Deficit  Goal: Patient/family/caregiver demonstrates understanding of disease process, treatment plan, medications, and discharge instructions  Description: Complete learning assessment and assess knowledge base    Interventions:  - Provide teaching at level of understanding  - Provide teaching via preferred learning methods  Outcome: Progressing     Problem: CARDIOVASCULAR - ADULT  Goal: Maintains optimal cardiac output and hemodynamic stability  Description: INTERVENTIONS:  - Monitor I/O, vital signs and rhythm  - Monitor for S/S and trends of decreased cardiac output  - Administer and titrate ordered vasoactive medications to optimize hemodynamic stability  - Assess quality of pulses, skin color and temperature  - Assess for signs of decreased coronary artery perfusion  - Instruct patient to report change in severity of symptoms  Outcome: Progressing  Goal: Absence of cardiac dysrhythmias or at baseline rhythm  Description: INTERVENTIONS:  - Continuous cardiac monitoring, vital signs, obtain 12 lead EKG if ordered  - Administer antiarrhythmic and heart rate control medications as ordered  - Monitor electrolytes and administer replacement therapy as ordered  Outcome: Progressing

## 2022-01-25 PROBLEM — E44.1 MILD PROTEIN-CALORIE MALNUTRITION (HCC): Status: ACTIVE | Noted: 2022-01-01

## 2022-01-25 PROBLEM — R10.9 ABDOMINAL PAIN: Status: RESOLVED | Noted: 2022-01-01 | Resolved: 2022-01-01

## 2022-01-25 NOTE — MALNUTRITION/BMI
This medical record reflects one or more clinical indicators suggestive of malnutrition and/or morbid obesity  Malnutrition Findings:      Adult Degree of Malnutrition: Malnutrition of mild degree (Unable to physically assess PT d/t COVID restriction to identify signs of muscle/fat depletion, but PT meets criteria for mild malnutrition d/t recent poor po, and BMI 18 03, currently treated with diet and will add supplements to aid with maximizing po)  Malnutrition Characteristics: Inadequate energy    BMI Findings:  Adult BMI Classifications: Underweight < 18 5     Body mass index is 18 03 kg/m²  See Nutrition note dated 1/25/22 for additional details  Completed nutrition assessment is viewable in the nutrition documentation

## 2022-01-25 NOTE — PLAN OF CARE
Problem: PHYSICAL THERAPY ADULT  Goal: Performs mobility at highest level of function for planned discharge setting  See evaluation for individualized goals  Description: Treatment/Interventions: Gait training,Compensatory technique education,Spoke to MD,Spoke to nursing,OT,Functional transfer training,Endurance training,Patient/family training          See flowsheet documentation for full assessment, interventions and recommendations  Outcome: Completed  Note: Prognosis: Good  Problem List: Decreased endurance,Impaired balance,Decreased mobility,Impaired judgement,Decreased safety awareness  Assessment: Aditi Fuchs is a 61 y o  male With past medical history of ischemic cardiomyopathy EF 20%, aortic stenosis status post AVR, history of cocaine use, colon CA, noncompliance,  history of V-tach/VFib with Bi V ICD presents the hospital with shortness of breath  Admitted with acute on chronic CHF, hyponatremia, COVID + 1/9 with VDRF and extubation on 1/10  PT consulted  Up and OOB as tolerated orders  Currently on 2L NC  Prior to admission independent with occasional use of cane  + working  Lives alone  Denies falls  Currently presents with mild functional limitations related to medical conditions of CHF and COVID hx requiring O2 at 2L  Decreased activity tolerance, mild decrease in balance, functional mobility and locomotion compared to baseline  Despite is S/I for all mobility  No LOB with ambulation with and without cane  O2 sats on 2L 91-93%  At this time anticipate can continue to mobilize with nursing assistance in order to facilitate optimal outcomes  The patient's AM-PAC Basic Mobility Inpatient Short Form Raw Score is 24  A Raw score of greater than 16 suggests the patient may benefit from discharge to home  Please also refer to the recommendation of the Physical Therapist for safe discharge planning  Anticipate ability to return home when medically stable  Will d/c PT    Encourage continued ambulation and mobilization with staff to prevent functional decline  PT Discharge Recommendation: No rehabilitation needs          See flowsheet documentation for full assessment

## 2022-01-25 NOTE — PROGRESS NOTES
Progress Note - Cardiology   [facility] A Akosua 61 y o  male MRN: [id number]  Unit/Bed#: [place] [address] -01 Encounter: [id number]      Asessment:  Acute COVID-19 infection  Acute on chronic systolic and diastolic congestive heart failure  Ischemic cardiomyopathy              - LVEF 20%, moderately dilated LV cavity with markedly reduced LV function with global hypokinesis, indeterminate diastolic dysfunction grade, moderate biatrial enlargement, normal function mechanical AV prosthesis with mild prostatic valve regurgitation, MAC with sclerosis and mild MR, trace to mild TR/NJ, 1/10/22  Coronary artery disease              - S/P PCI with BMS to LAD and LCx in 2014           - stress test completed on 3/13/17: evidence of prior myocardial infarction of the entire anterior wall  extending to the apex, and the inferior wall extending to the mid- basal inferolateral myocardial wall segments with no evidence of myocardial ischemia, LV reduced at 18%  Aortic stenosis              - S/P mechanical aortic valve replacement in the 1980's  - on anticoagulation with warfarin  Dual-chamber biventricular [employer] ICD in-situ, 2/24/2015  Hx of ventricular fibrillation/ventricular tachycardia               - OP Rx, amiodarone 100 mg daily, Toprol XL 50 mg BID  Hyperlipidemia               - Rx,atorvastatin 80 mg daily  - most recent lipid panel 1/25/22: cholesterol 87, triglycerides 86, HDL 13, LDL 57       Other:  - noncompliance  - cocaine and marijuana use  - history of colon cancer     Plan/ Discussion:  · Continue amiodarone 100 mg daily, aspirin 81 mg daily, atorvastatin 80 mg daily, Toprol XL 75 mg BID  · Currently on Bumex 2 mg IV BID, continue same  NT pro BNP from today, 67505  · Will provide albumin 25 g with this evenings dose of Bumex  · INR remains supratherapeutic, warfarin on hold  · Hyponatremia persists, sodium remains at 128     · Monitor volume status closely with strict intake/output, daily weight  · Monitor renal function and electrolytes; maintain potassium > 4, magnesium > 2  Check magnesium in the a m  Subjective:  Patient events reviewed with primary RN  Patient is still requiring supplemental oxygen, however has made significant improvements       Vitals:  Vitals:    01/24/22 0559 01/25/22 0600   Weight: 57 kg (125 lb 10 6 oz) 57 kg (125 lb 10 6 oz)   ,  Vitals:    01/25/22 0521 01/25/22 0600 01/25/22 0736 01/25/22 0740   BP: 121/73  121/72    BP Location:   Right arm    Pulse: 82  91    Resp: 20  16    Temp:   98 °F (36 7 °C)    TempSrc:   Temporal    SpO2: 94%  (!) 87% 92%   Weight:  57 kg (125 lb 10 6 oz)         Exam:  General: awake and alert   Heart:  Regular rate and rhythm  Respiratory effort/ Lungs: supplemental oxygenation requirements improving            Telemetry:       Atrial senses, ventricular paced rhythm with ectopy  4 beat run of NSVT noted overnight     Medications:    Current Facility-Administered Medications:     acetaminophen (TYLENOL) tablet 650 mg, 650 mg, Oral, Q6H PRN, Jermaine Romero DO, 650 mg at 01/24/22 2158    aluminum-magnesium hydroxide-simethicone (MYLANTA) oral suspension 30 mL, 30 mL, Oral, Q6H PRN, Jermaine Romero DO    amiodarone tablet 100 mg, 100 mg, Oral, Daily With Breakfast, Jermaine Romero DO, 100 mg at 01/25/22 0906    aspirin chewable tablet 81 mg, 81 mg, Oral, Daily, Jermaine Romero DO, 81 mg at 01/25/22 9936    atorvastatin (LIPITOR) tablet 80 mg, 80 mg, Oral, Daily With Talisha Catalino, , 80 mg at 01/24/22 1700    bumetanide (BUMEX) injection 2 mg, 2 mg, Intravenous, BID, Corey Madison DO, 2 mg at 01/25/22 0905    cefTRIAXone (ROCEPHIN) 1,000 mg in dextrose 5 % 50 mL IVPB, 1,000 mg, Intravenous, Q24H, Corey Madison DO, Stopped at 01/24/22 1800    dexamethasone (DECADRON) injection 6 mg, 6 mg, Intravenous, Q24H, Corey Madison DO, 6 mg at 01/24/22 1659    metoprolol succinate (TOPROL-XL) 24 hr tablet 75 mg, 75 mg, Oral, Q12H, Corey Gabrielaai, DO, 75 mg at 01/25/22 0525    ondansetron (ZOFRAN) injection 4 mg, 4 mg, Intravenous, Q6H PRN, Melvina Chen DO    pantoprazole (PROTONIX) EC tablet 40 mg, 40 mg, Oral, Early Morning, Corey Gabrielaai, DO, 40 mg at 01/25/22 0525      Labs/Data:        Results from last 7 days   Lab Units 01/25/22  0529 01/24/22  0540 01/23/22  0532   WBC Thousand/uL 10 16 11 60* 15 14*   HEMOGLOBIN g/dL 13 8 14 9 13 5   HEMATOCRIT % 41 0 44 6 40 6   PLATELETS Thousands/uL 160 221 230     Results from last 7 days   Lab Units 01/25/22  0529 01/24/22  0540 01/23/22  0532   POTASSIUM mmol/L 4 9 4 3 3 5   CHLORIDE mmol/L 90* 92* 91*   CO2 mmol/L 30 28 27   BUN mg/dL 27* 23 14

## 2022-01-25 NOTE — PHYSICAL THERAPY NOTE
PT EVALUATION 14:13-14:26 ( 13 minutes)    61 y o     [id number]    CHF (congestive heart failure) ([facility]) [I50 9]  Dyspnea [R06 00]  SOB (shortness of breath) [R06 02]  COVID [U07 1]    Past Medical History:   Diagnosis Date    AICD (automatic cardioverter/defibrillator) present     medtronic     CAD (coronary artery disease)     Cancer ([facility] )     Cardiac disease     Cardiomyopathy ([facility] )     mixed  predominently nonischemic    Colonic mass     Coronary artery disease     Hyperlipidemia     Hypertension     Irregular heart beat     Myocardial infarction ([facility] )     2014 and 2015    Rectal bleeding     S/P AVR (aortic valve replacement)     mechanical    Wears glasses          Past Surgical History:   Procedure Laterality Date    AORTIC VALVE REPLACEMENT      APPENDECTOMY      CARDIAC DEFIBRILLATOR PLACEMENT      COLECTOMY MADELIN Right     Last Assessed: 6/7/2016    COLECTOMY LAPAROSCOPIC      COLON SURGERY Right     colectomy    CORONARY STENT PLACEMENT      INSERT / REPLACE / REMOVE PACEMAKER      MITRAL VALVE REPAIR      MITRAL VALVE REPAIR      MOUTH SURGERY      [place] COLONOSCOPY FLX DX W/COLLJ [address] WHEN PFRMD N/A 1/15/2018    Procedure: COLONOSCOPY;  Surgeon: Randolph Louie MD;  [facility]: [facility]; Service: General    [place] LAP,SURG,COLECTOMY, PARTIAL, W/ANAST N/A 6/2/2016    Procedure: RESECTION COLON RIGHT LAPAROSCOPIC HAND-ASSISTED;  Surgeon: Randolph Louie MD;  [facility]: [facility];  Service: General      01/25/22 1426   PT Last Visit   PT Visit Date 01/25/22   Note Type   Note type Evaluation   Pain Assessment   Pain Score No Pain   Restrictions/Precautions   Weight Bearing Precautions Per Order No   Other Precautions Contact/isolation; Airborne/isolation;Multiple lines; Fall Risk;O2   Home Living   Type of Home Apartment  ([address])   Home Layout One level   Bathroom Shower/Tub Tub/shower unit   Bathroom Toilet Standard   Home Equipment Cane   Additional Comments resides alone in apt  Working prior to admission at [employer]  Prior Function   Level of [place] Independent with ADLs and functional mobility   Lives With Alone   ADL Assistance Independent   IADLs Needs assistance   Falls in the last 6 months 0   Comments I PTA with occasional use of SPC  General   Additional Pertinent History Pt is 60 y/o male admitted with acute on chronic CHF  Hx of recent COVID  Family/Caregiver Present No   Cognition   Overall Cognitive Status WFL   Arousal/Participation Alert   Attention Attends with cues to redirect   Orientation Level Oriented to person;Oriented to place;Oriented to time   Following Commands Follows all commands and directions without difficulty   Comments Pleasant  ? insight/understanding of medical comorbidities  Subjective   Subjective "I have no problem"  "My heart is good"   RUE Assessment   RUE Assessment WFL   LUE Assessment   LUE Assessment WFL   RLE Assessment   RLE Assessment WFL   LLE Assessment   LLE Assessment WFL   Bed Mobility   Additional Comments seated EOB upon arrival    Transfers   Sit to Stand 5  Supervision   Additional items Impulsive   Stand to Sit 5  Supervision   Additional items Impulsive   Ambulation/Elevation   Gait pattern Decreased foot clearance; Inconsistent ruth ann   Gait Assistance 5  Supervision   Additional items Verbal cues   Assistive Device SPC; None  (cane or none  No LOB   )   Distance AMb in room total distances of 35'x1  O2 sat 91-93% on 2L  No LOB  Moves quickly when turning  Balance   Static Sitting Good   Dynamic Sitting Good   Static Standing Good   Dynamic Standing Fair   Ambulatory Fair   Endurance Deficit   Endurance Deficit Yes   Endurance Deficit Description fatigue   Activity Tolerance   Activity Tolerance Patient limited by fatigue;Patient tolerated treatment well   Medical Staff Made Aware Nurse Raven Rivera   Dr Mg Willis at bedside      Nurse Made Aware yes   Assessment   Prognosis Good   Problem List Decreased endurance; Impaired balance;Decreased mobility; Impaired judgement;Decreased safety awareness   Assessment Francis Romero Husbands is a 61 y o  male With past medical history of ischemic cardiomyopathy EF 20%, aortic stenosis status post AVR, history of cocaine use, colon CA, noncompliance,  history of V-tach/VFib with Bi V ICD presents the hospital with shortness of breath  Admitted with acute on chronic CHF, hyponatremia, COVID + 1/9 with VDRF and extubation on 1/10  PT consulted  Up and OOB as tolerated orders  Currently on 2L NC  Prior to admission independent with occasional use of cane  + working  Lives alone  Denies falls  Currently presents with mild functional limitations related to medical conditions of CHF and COVID hx requiring O2 at 2L  Decreased activity tolerance, mild decrease in balance, functional mobility and locomotion compared to baseline  Despite is S/I for all mobility  No LOB with ambulation with and without cane  O2 sats on 2L 91-93%  At this time anticipate can continue to mobilize with nursing assistance in order to facilitate optimal outcomes  The patient's AM-PAC Basic Mobility Inpatient Short Form Raw Score is 24  A Raw score of greater than 16 suggests the patient may benefit from discharge to home  Please also refer to the recommendation of the Physical Therapist for safe discharge planning  Anticipate ability to return home when medically stable  Will d/c PT  Encourage continued ambulation and mobilization with staff to prevent functional decline  Goals   Patient Goals go home   Plan   Treatment/Interventions Gait training; Compensatory technique education;Spoke to MD;Spoke to nursing;OT;Functional transfer training; Endurance training;Patient/family training   PT Frequency Other (Comment)  (d/c PT)   Recommendation   PT Discharge Recommendation No rehabilitation needs   AM-PAC Basic Mobility Inpatient   Turning in Bed Without Bedrails 4   Lying on Back to Sitting on Edge of Flat Bed 4   Moving Bed to Chair 4   Standing Up From Chair 4   Walk in Room 4   Climb 3-5 Stairs 4   Basic Mobility Inpatient Raw Score 24   Basic Mobility Standardized Score 57 68   Highest Level Of Mobility   -HL Goal 8: Walk 250 feet or more   JH-HLM Highest Level of Mobility 7: Walk 25 feet or more   JH-HL Goal Achieved No   End of Consult   Patient Position at End of Consult Seated edge of bed; All needs within reach     Hx/personal factors: co-morbidities, dec caregiver support, home alone, mutliple lines, use of AD, fall risk and O2, noncompliance, drug use hx  Examination: dec mobility, dec balance, dec endurance, dec amb, risk for falls, assessed body system, balance, endurance, amb, D/C disposition & fall risk  Clinical: unpredictable (ongoing medical status, abnormal lab values and risk for falls),continuous monitoring, increased O2 demand from baseline    Complexity: high      Marylen Cheek, PT

## 2022-01-25 NOTE — SPEECH THERAPY NOTE
Speech Language/Pathology  Speech/Language Pathology  Assessment    Patient Name: Logan Tavares  PRHQC'I Date: 1/25/2022     Problem List  Principal Problem:    Acute on chronic combined systolic and diastolic CHF (congestive heart failure) ([facility] )  Active Problems:    Coronary artery disease involving native coronary artery of native heart with angina pectoris (HCC)    S/P AVR (aortic valve replacement)    Ventricular fibrillation (HCC)    Cocaine use    COVID-19 virus infection    Abdominal pain    Hyponatremia    Past Medical History  Past Medical History:   Diagnosis Date    AICD (automatic cardioverter/defibrillator) present     medtronic     CAD (coronary artery disease)     Cancer ([facility] )     Cardiac disease     Cardiomyopathy [facility] )     mixed  predominently nonischemic    Colonic mass     Coronary artery disease     Hyperlipidemia     Hypertension     Irregular heart beat     [email or web address]     2014 and 2015    Rectal bleeding     S/P AVR (aortic valve replacement)     mechanical    Wears glasses      Past Surgical History  Past Surgical History:   Procedure Laterality Date    AORTIC VALVE REPLACEMENT      APPENDECTOMY      CARDIAC DEFIBRILLATOR PLACEMENT      COLECTOMY MADELIN Right     Last Assessed: 6/7/2016    COLECTOMY LAPAROSCOPIC      COLON SURGERY Right     colectomy    CORONARY STENT PLACEMENT      INSERT / REPLACE / REMOVE PACEMAKER      MITRAL VALVE REPAIR      MITRAL VALVE REPAIR      MOUTH SURGERY      [place] COLONOSCOPY FLX DX W/COLLJ [facility] WHEN PFRMD N/A 1/15/2018    Procedure: COLONOSCOPY;  Surgeon: Carli Marie MD;  [facility]: [facility];   Service: General    [place] LAP,SURG,COLECTOMY, PARTIAL, W/ANAST N/A 6/2/2016    Procedure: RESECTION COLON RIGHT LAPAROSCOPIC HAND-ASSISTED;  Surgeon: Carli Marie MD;  [facility]: [facility];  Service: General          Bedside Swallow Evaluation:    Summary:  Pt presents w/ oral and pharyngeal stages that are WNL for mastication, bolus control, formation, transfer, swallow promptness, and laryngeal rise  Recommendations:  Diet:regular  Rushie Mcdonnell  Meds:as tolerated/desired  Supervision: none  Positioning:Upright  Strategies: Pt to take PO/Meds only when fully alert and upright  Oral care  Eval only, No f/u tx indicated  Consider consult w/:  Nutrition    H&P/Admit info, pertinent provider notes/procedures/studies/PMH:(copied and placed in this report)  Chief Complaint:   Shortness of breath  History of Present Illness:  Guru Kaur is a 61 y o  male With past medical history of ischemic cardiomyopathy EF 20%, aortic stenosis status post AVR, history of cocaine use, history of V-tach/VFib with Bi V ICD presents the hospital with shortness of breath  Patient was recently admitted to [facility] due to significant respiratory distress and was intubated on admission  Patient was ultimately extubated 1/10 and titrated to room air  Patient did leave AMA from prior admission  Patient reports 3 day history of shortness of breath especially when lying flat  He also reports epigastric discomfort and postprandial nausea and early satiety  He says that he is not taking any medications over the last few days due to the discomfort in his abdomen  Patient is saturating 93% on room air in ED  IMPRESSION: cxr  Pulmonary edema or, possibly, multifocal pneumonia, as well as pulmonary vascular congestion, developing since 1/21/2022      Patient's goal: wants to get out of here/get better    Reason for consult:  R/o aspiration  Determine safest and least restrictive diet  respiratory compromise  Current diet:  regular  Premorbid diet[de-identified]  regular  Previous VBS:  none  O2 requirement:  nc  Voice/Speech:  wnl  Social:  Lives alone  Follows commands:  yes                      Cognitive Status:  Alert, appropriate  Oral mech exam:  Dentition:natural, missing a few  Labial strength and ROM:+  Lingual strength and ROM:+  Mandibular strength and ROM:+  Secretion management:+     Items administered:  Chicken breast, green beans, thin liquids       Results d/w:  Pt, nursing

## 2022-01-25 NOTE — ASSESSMENT & PLAN NOTE
Secondary to hypervolemia  Continue diuresis  Continue fluid restriction, increase to 1200 ml  Likely a component of SIADH from [address]     01/23/22  0532 01/24/22  0540 01/25/22  0529   SODIUM 130* 128* 128*

## 2022-01-25 NOTE — PROGRESS NOTES
[facility]  Progress Note - [address] 1965, 61 y o  male MRN: [id number]  Unit/Bed#: [place] [address] -01 Encounter: [id number]  Primary Care Provider: Tonio Alcantar PA-C   Date and time admitted to hospital: 1/21/2022  1:33 PM    * Acute on chronic combined systolic and diastolic CHF (congestive heart failure) (HCC)  Assessment & Plan  Wt Readings from Last 3 Encounters:   01/25/22 57 kg (125 lb 10 6 oz)   01/12/22 56 6 kg (124 lb 12 5 oz)   09/13/21 62 4 kg (137 lb 8 oz)     Chest x-ray with bilateral pulmonary infiltrates edema verses residual from COVID  Elevated proBNP  Lab Results   Component Value Date    NTBNP 15,131 (H) 01/25/2022    NTBNP 7,331 (H) 01/21/2022    NTBNP 13,741 (H) 01/09/2022       Patient is admitted for CHF exacerbation  BNP on admission is above  Will plan IV diuresis with plan for cardiology consulation if applicable  CHF type is systolic, with EF of 72% from January 10th,2022  Trend BMP with diuresis and replete potassium if applicable  Fluid restriction and low sodium diet placed  Troponin reviewed and in the medical chart  Patient will need heart failure follow up within 1 week of discharge    Continue Bumex - 2 mg twice a day, intravenously  Repeat chest x-ray in a m  Hyponatremia  Assessment & Plan  Secondary to hypervolemia  Continue diuresis  Continue fluid restriction, increase to 1200 ml  Likely a component of SIADH from [facility]     01/23/22  0532 01/24/22  0540 01/25/22  0529   SODIUM 130* 128* 128*         COVID-19 virus infection  Assessment & Plan  Recently diagnosed with COVID-19 1/9/2022  Patient was intubated and ultimately extubated 1/10/2022    Meets criteria for discontinuation of COVID precautions on 1/30/2022  Continue to monitor off precautions given severe infection hospital protocol 20 day    Cocaine use  Assessment & Plan  UDS positive on previous admission for cocaine    Ventricular fibrillation [facility])  Assessment & Plan  History of VFib/V-tach With Bi V ICD  Continue metoprolol succinate-increased doses 7 5 mg b i d  Due to ongoing episodes transdermally, PVCs, V-tach, currently asymptomatic denying any chest pain or shortness of breath  -repleting magnesium and potassium    Recent Labs     01/23/22  0532 01/24/22  0540 01/25/22  0529   K 3 5 4 3 4 9   MG 1 8 2 2  --    '    S/P AVR (aortic valve replacement)  Assessment & Plan  Patient currently on Coumadin but supratherapeutic INR  Recent Labs     01/24/22  0540 01/25/22  0529   INR 3 88* 4 84*         Coronary artery disease involving native coronary artery of native heart with angina pectoris [facility])  Assessment & Plan  Continue aspirin, atorvastatin, metoprolol    Abdominal pain-resolved as of 1/25/2022  Assessment & Plan  Patient reports epigastric pain since discharge from hospital   He gets postprandial nausea and discomfort  Concern for gastritis versus peptic ulcer disease  Likely stress induced with recent hospitalization and intubation possible component of steroid induced for COVID treatment  Give IV Protonix, p r n   Mylanta    -resolved    [facility] Internal Medicine Progress Note  Patient: Guru Kaur 61 y o  male   MRN: [id number]  PCP: Bogdan Alcantar PA-C  [facility] Encounter: [id number]  Date Of Visit: 01/25/22    Assessment:    Principal Problem:    Acute on chronic combined systolic and diastolic CHF (congestive heart failure) (HCC)  Active Problems:    Coronary artery disease involving native coronary artery of native heart with angina pectoris (HCC)    S/P AVR (aortic valve replacement)    Ventricular fibrillation (HCC)    Cocaine use    COVID-19 virus infection    Hyponatremia    Mild protein-calorie malnutrition (HCC)      Plan:    · Continue IV diuresis  · Increase fluid restrict  · Discontinue ceftriaxone  ·        VTE Pharmacologic Prophylaxis:   Pharmacologic: Heparin  Mechanical VTE Prophylaxis in Place: Yes    Patient Centered Rounds: I have performed bedside rounds with nursing staff today  Discussions with Specialists or Other Care Team Provider:  Case management    Education and Discussions with Family / Patient:  Patient daughter at 6:30 p m  Time Spent for Care: 45 minutes  More than 50% of total time spent on counseling and coordination of care as described above  Current Length of Stay: 4 day(s)    Current Patient Status: Inpatient   Certification Statement: The patient will continue to require additional inpatient hospital stay due to Treatment of heart failure    Discharge Plan / Estimated Discharge Date:  24-48 hours    Code Status: Level 1 - Full Code      Subjective:   Seen examined, no acute complaint  Timing of diet  No nausea vomiting diarrhea  No abdominal pain    A complete and comprehensive 14 point organ system review has been performed and all other systems are negative other than stated above  Objective:     Vitals:   Temp (24hrs), Av 3 °F (36 8 °C), Min:98 °F (36 7 °C), Max:98 6 °F (37 °C)    Temp:  [98 °F (36 7 °C)-98 6 °F (37 °C)] 98 6 °F (37 °C)  HR:  [82-91] 90  Resp:  [16-20] 18  BP: (117-135)/(64-78) 122/65  SpO2:  [87 %-94 %] 91 %  Body mass index is 18 03 kg/m²  Input and Output Summary (last 24 hours):        Intake/Output Summary (Last 24 hours) at 2022 1837  Last data filed at 2022 1239  Gross per 24 hour   Intake 120 ml   Output 900 ml   Net -780 ml       Physical Exam:     General: well appearing, no acute distress, thin cachectic  HEENT: atraumatic, PERRLA, moist mucosa, normal pharynx, normal tonsils and adenoids, normal tongue, no fluid in sinuses  Neck: Trachea midline, no carotid bruit, no masses  Respiratory:  Rales bilaterally  Cardiovascular: RRR, no m/r/g, Normal S1 and S2  Abdomen: Soft, non-tender, non-distended, normal bowel sounds in all quadrants, no hepatosplenomegaly, no tympany  Rectal: deferred  Musculoskeletal: normal ROM in upper and lower extremities  Integumentary: warm, dry, and pink, with no rash, purpura, or petechia  Heme/Lymph: no lymphadenopathy, no bruises  Neurological: Cranial Nerves II-XII grossly intact  Psychiatric: cooperative with normal mood, affect, and cognition      Additional Data:     Labs:    Results from last 7 days   Lab Units 01/25/22  0529   WBC Thousand/uL 10 16   HEMOGLOBIN g/dL 13 8   HEMATOCRIT % 41 0   PLATELETS Thousands/uL 160   NEUTROS PCT % 92*   LYMPHS PCT % 5*   MONOS PCT % 2*   EOS PCT % 0     Results from last 7 days   Lab Units 01/25/22  0529   POTASSIUM mmol/L 4 9   CHLORIDE mmol/L 90*   CO2 mmol/L 30   BUN mg/dL 27*   CREATININE mg/dL 1 20   CALCIUM mg/dL 8 7   ALK PHOS U/L 175*   ALT U/L 24   AST U/L 43     Results from last 7 days   Lab Units 01/25/22  0529   INR  4 84*       * I Have Reviewed All Lab Data Listed Above  * Additional Pertinent Lab Tests Reviewed:  Phu [address] Admission Reviewed    Imaging:    Imaging Reports Reviewed Today Include:  No new imaging  Imaging Personally Reviewed by Myself Includes:  No new imaging    Recent Cultures (last 7 days):           Last 24 Hours Medication List:   Current Facility-Administered Medications   Medication Dose Route Frequency Provider Last Rate    acetaminophen  650 mg Oral Q6H PRN Sharyle Anita, DO      aluminum-magnesium hydroxide-simethicone  30 mL Oral Q6H PRN Sharyle Anita, DO      amiodarone  100 mg Oral Daily With Breakfast Sharyle Anita, DO      aspirin  81 mg Oral Daily Sharyle Anita, DO      atorvastatin  80 mg Oral Daily With Texert, DO      bumetanide  2 mg Intravenous BID Corey Banai, DO      dexamethasone  6 mg Intravenous Q24H Corey Banai, DO      metoprolol succinate  75 mg Oral Q12H Corey Banai, DO      ondansetron  4 mg Intravenous Q6H PRN Sharyle Anita, DO      pantoprazole  40 mg Oral Early Morning Corey Banai, DO          Today, Patient Was Seen By: Rabia Tierney DO    ** Please Note: This note was completed in part utilizing [employer] Software  Grammatical errors, random word insertions, spelling mistakes, and incomplete sentences may be an occasional consequence of this system secondary to software limitations, ambient noise, and hardware issues  If you have any questions or concerns about the content, text, or information contained within the body of this dictation, please contact the provider for clarification   **

## 2022-01-25 NOTE — OCCUPATIONAL THERAPY NOTE
Occupational Therapy Evaluation(time=1400-1420)     Patient Name: Raffi Enciso  PCZVK'O Date: 1/25/2022  Problem List  Principal Problem:    Acute on chronic combined systolic and diastolic CHF (congestive heart failure) ([facility] )  Active Problems:    Coronary artery disease involving native coronary artery of native heart with angina pectoris (HCC)    S/P AVR (aortic valve replacement)    Ventricular fibrillation (HCC)    Cocaine use    COVID-19 virus infection    Abdominal pain    Hyponatremia    Past Medical History  Past Medical History:   Diagnosis Date    AICD (automatic cardioverter/defibrillator) present     medtronic     CAD (coronary artery disease)     Cancer ([facility] )     Cardiac disease     Cardiomyopathy ([facility] )     mixed  predominently nonischemic    Colonic mass     Coronary artery disease     Hyperlipidemia     Hypertension     Irregular heart beat     Myocardial infarction ([facility] )     2014 and 2015    Rectal bleeding     S/P AVR (aortic valve replacement)     mechanical    Wears glasses      Past Surgical History  Past Surgical History:   Procedure Laterality Date    AORTIC VALVE REPLACEMENT      APPENDECTOMY      CARDIAC DEFIBRILLATOR PLACEMENT      COLECTOMY MADELIN Right     Last Assessed: 6/7/2016    COLECTOMY LAPAROSCOPIC      COLON SURGERY Right     colectomy    CORONARY STENT PLACEMENT      INSERT / REPLACE / REMOVE PACEMAKER      MITRAL VALVE REPAIR      MITRAL VALVE REPAIR      MOUTH SURGERY      DE COLONOSCOPY FLX DX W/COLLJ [facility]IT[facility] WHEN PFRMD N/A 1/15/2018    Procedure: COLONOSCOPY;  Surgeon: Luly Hall MD;  Location: [facility];   Service: General    DE LAP,SURG,COLECTOMY, PARTIAL, W/ANAST N/A 6/2/2016    Procedure: RESECTION COLON RIGHT LAPAROSCOPIC HAND-ASSISTED;  Surgeon: Luly Hall MD;  Location: [facility];  Service: General             01/25/22 1400   Note Type   Note type Evaluation   Restrictions/Precautions   Other Precautions Contact/isolation;Droplet precautions; Fall Risk;O2  (2 liters of O2)   Pain Assessment   Pain Assessment Tool 0-10   Pain Score No Pain   Home Living   Type of Home Apartment  (2 xenia)   Home Layout One level   Bathroom Shower/Tub Tub/shower unit   Bathroom Toilet Standard   Home Equipment Cane   Prior Function   Lives With Alone   ADL Assistance Independent   Falls in the last 6 months 0   Comments PTA pt states independence with all aspects of his ADLs, transfers, ambulation--ocassional use of SPC; neg , neg falls   Lifestyle   Autonomy PTA pt states independence with all aspects of his ADLs, transfers, ambulation--ocassional use of SPC; neg , neg falls   Reciprocal Relationships supportive family   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   [place] [employer] [address] (WDL) X   Patient Behaviors/Mood Anxious; Cooperative   Subjective   Subjective "If it wasn't for this COVID, I would be fine "   ADL   Where Assessed Edge of bed   Eating Assistance 6  Modified independent   Grooming Assistance 6  Modified Independent   UB Bathing Assistance 6  Modified Independent   LB Bathing Assistance 6  Modified Independent   UB Dressing Assistance 6  Modified independent   LB Dressing Assistance 6  Modified independent   Bed Mobility   Rolling R 6  Modified independent   Rolling L 6  Modified independent   Supine to Sit 6  Modified independent   Sit to Supine 6  Modified independent   Transfers   Sit to Stand 5  Supervision   Stand to Sit 5  Supervision   Functional Mobility   Functional Mobility 5  Supervision   Additional items   (w/o device)   Balance   Static Sitting Good   Dynamic Sitting Good   Static Standing Fair +   Dynamic Standing Fair   Activity Tolerance   Activity Tolerance Patient limited by fatigue   Medical Staff Made Aware GABI lock MD   RUE Assessment   RUE Assessment WFL   RUE Strength   RUE Overall Strength Within Functional Limits - strength 5/5   LUE Assessment   LUE Assessment WFL   LUE Strength   LUE Overall Strength Within Functional Limits - strength 5/5   Hand Function   Gross Motor Coordination Functional   Fine Motor Coordination Functional   Sensation   Light Touch No apparent deficits   Proprioception   Proprioception No apparent deficits   Vision-Basic Assessment   Current Vision   (no glasses)   Vision - Complex Assessment   Acuity Able to read clock/calendar on wall without difficulty   Perception   Inattention/Neglect Appears intact   Cognition   Overall Cognitive Status WFL   Arousal/Participation Alert   Attention Within functional limits   Orientation Level Oriented X4   Memory Within functional limits   Following Commands Follows all commands and directions without difficulty   Assessment   Limitation Decreased endurance;Decreased high-level ADLs   Prognosis Good   Assessment Pt is a 57y/o male admitted to the hospital 2* symptoms of SOB(last 3 days), nausea, and epigastric discomfort  Pt noted with CHF  Pt with PMH AVR, cocaine abuse, AICD, CA, EF=20%, MI, colectomy, V-tach, and COVID(1/9/22) with hospitalization and intubation--left AMA  PTA pt states independence with all aspects of his ADLs, transfers, ambulation--ocassional use of SPC; neg , neg falls  During initial eval, pt demonstrated slight deficits with his functional mobility, functional balance, and activity tolerance  Pt was able to demonstrate good ADL status and states no concerns about going directly home  Pt would benefit from a restorative program on the unit to improve his overall endurance, balance, and mobility  Acute OT tx not indicated at this time 2* limited ADL deficits      Goals   Patient Goals "to go home"   Recommendation   OT Discharge Recommendation No rehabilitation needs   OT - OK to Discharge Yes   AM-PAC Daily Activity Inpatient   Lower Body Dressing 4   Bathing 4   Toileting 4   Upper Body Dressing 4   Grooming 4   Eating 4   Daily Activity Raw Score 24 Daily Activity Standardized Score (Calc for Raw Score >=11) 57 54   AM-PAC Applied Cognition Inpatient   Following a Speech/Presentation 4   Understanding Ordinary Conversation 4   Taking Medications 3   Remembering Where Things Are Placed or Put Away 4   Remembering List of 4-5 Errands 4   Taking Care of Complicated Tasks 4   Applied Cognition Raw Score 23   Applied Cognition Standardized Score 53 08   Ronak Gao, OT

## 2022-01-25 NOTE — ASSESSMENT & PLAN NOTE
Patient currently on Coumadin but supratherapeutic INR  Recent Labs     01/24/22  0540 01/25/22  0529   INR 3 88* 4 84*

## 2022-01-25 NOTE — ASSESSMENT & PLAN NOTE
Recently diagnosed with COVID-19 1/9/2022  Patient was intubated and ultimately extubated 1/10/2022    Meets criteria for discontinuation of COVID precautions on 1/30/2022  Continue to monitor off precautions given severe infection hospital protocol 20 day

## 2022-01-25 NOTE — ASSESSMENT & PLAN NOTE
History of VFib/V-tach With Bi V ICD  Continue metoprolol succinate-increased doses 7 5 mg b i d   Due to ongoing episodes transdermally, PVCs, V-tach, currently asymptomatic denying any chest pain or shortness of breath  -repleting magnesium and potassium    Recent Labs     01/23/22  0532 01/24/22  0540 01/25/22  0529   K 3 5 4 3 4 9   MG 1 8 2 2  --    '

## 2022-01-26 PROBLEM — J18.9 PNEUMONIA: Status: ACTIVE | Noted: 2022-01-01

## 2022-01-26 NOTE — ASSESSMENT & PLAN NOTE
History of VFib/V-tach with Bi V ICD  Seen and evaluated by Cardiology here  On metoprolol succinate-increased dose 50 BID --> 75 mg b i d  due to ongoing episodes transdermally, PVCs, V-tach,  Also on amiodarone 100 mg daily  Currently asymptomatic denying any chest pain or shortness of breath  Repleted magnesium and potassium  Recent Labs     01/24/22  0540 01/25/22  0529 01/26/22  0520   K 4 3 4 9 3 8   MG 2 2  --  1 8   Now normalized

## 2022-01-26 NOTE — ASSESSMENT & PLAN NOTE
Results from last 7 days   Lab Units 01/26/22  0520 01/25/22  0529 01/24/22  0540 01/22/22  0746   INR  3 12* 4 84* 3 88* 4 45*   Anticoagulated on Coumadin  Goal INR 2 5-3 5  INR therapeutic supratherapeutic here  Coumadin currently on hold  Continue to monitor and titrate

## 2022-01-26 NOTE — PLAN OF CARE
Problem: Potential for Falls  Goal: Patient will remain free of falls  Description: INTERVENTIONS:  - Educate patient/family on patient safety including physical limitations  - Instruct patient to call for assistance with activity   - Consult OT/PT to assist with strengthening/mobility   - Keep Call bell within reach  - Keep bed low and locked with side rails adjusted as appropriate  - Keep care items and personal belongings within reach  - Initiate and maintain comfort rounds  - Make Fall Risk Sign visible to staff  - Apply yellow socks and bracelet for high fall risk patients  - Consider moving patient to room near nurses station  Outcome: Progressing     Problem: PAIN - ADULT  Goal: Verbalizes/displays adequate comfort level or baseline comfort level  Description: Interventions:  - Encourage patient to monitor pain and request assistance  - Assess pain using appropriate pain scale  - Administer analgesics based on type and severity of pain and evaluate response  - Implement non-pharmacological measures as appropriate and evaluate response  - Consider cultural and social influences on pain and pain management  - Notify physician/advanced practitioner if interventions unsuccessful or patient reports new pain  Outcome: Progressing     Problem: INFECTION - ADULT  Goal: Absence or prevention of progression during hospitalization  Description: INTERVENTIONS:  - Assess and monitor for signs and symptoms of infection  - Monitor lab/diagnostic results  - Monitor all insertion sites, i e  indwelling lines, tubes, and drains  - Monitor endotracheal if appropriate and nasal secretions for changes in amount and color  - [place] appropriate cooling/warming therapies per order  - Administer medications as ordered  - Instruct and encourage patient and family to use good hand hygiene technique  - Identify and instruct in appropriate isolation precautions for identified infection/condition  Outcome: Progressing     Problem: Knowledge Deficit  Goal: Patient/family/caregiver demonstrates understanding of disease process, treatment plan, medications, and discharge instructions  Description: Complete learning assessment and assess knowledge base  Interventions:  - Provide teaching at level of understanding  - Provide teaching via preferred learning methods  Outcome: Progressing     Problem: CARDIOVASCULAR - ADULT  Goal: Maintains optimal cardiac output and hemodynamic stability  Description: INTERVENTIONS:  - Monitor I/O, vital signs and rhythm  - Monitor for S/S and trends of decreased cardiac output  - Administer and titrate ordered vasoactive medications to optimize hemodynamic stability  - Assess quality of pulses, skin color and temperature  - Assess for signs of decreased coronary artery perfusion  - Instruct patient to report change in severity of symptoms  Outcome: Progressing  Goal: Absence of cardiac dysrhythmias or at baseline rhythm  Description: INTERVENTIONS:  - Continuous cardiac monitoring, vital signs, obtain 12 lead EKG if ordered  - Administer antiarrhythmic and heart rate control medications as ordered  - Monitor electrolytes and administer replacement therapy as ordered  Outcome: Progressing     Problem: Nutrition/Hydration-ADULT  Goal: Nutrient/Hydration intake appropriate for improving, restoring or maintaining nutritional needs  Description: Monitor and assess patient's nutrition/hydration status for malnutrition  Collaborate with interdisciplinary team and initiate plan and interventions as ordered  Monitor patient's weight and dietary intake as ordered or per policy  Utilize nutrition screening tool and intervene as necessary  Determine patient's food preferences and provide high-protein, high-caloric foods as appropriate       INTERVENTIONS:  - Monitor oral intake, urinary output, labs, and treatment plans  - Assess nutrition and hydration status and recommend course of action  - Evaluate amount of meals eaten  - Assist patient with eating if necessary   - Allow adequate time for meals  - Recommend/ encourage appropriate diets, oral nutritional supplements, and vitamin/mineral supplements  - Order, calculate, and assess calorie counts as needed  - Recommend, monitor, and adjust tube feedings and TPN/PPN based on assessed needs  - Assess need for intravenous fluids  - Provide specific nutrition/hydration education as appropriate  - Include patient/family/caregiver in decisions related to nutrition  Outcome: Progressing

## 2022-01-26 NOTE — ASSESSMENT & PLAN NOTE
Wt Readings from Last 3 Encounters:   01/26/22 57 kg (125 lb 10 6 oz)   01/12/22 56 6 kg (124 lb 12 5 oz)   09/13/21 62 4 kg (137 lb 8 oz)     Lab Results   Component Value Date    NTBNP 15,131 (H) 01/25/2022    NTBNP 7,331 (H) 01/21/2022    NTBNP 13,741 (H) 01/09/2022   Patient is admitted for CHF exacerbation     BNP on admission is above  Chest x-ray with bilateral pulmonary infiltrates edema verses residual from COVID  CHF type is systolic, with EF of 77% from January 10th, 2022  Fluid restriction and low sodium diet placed  Troponin reviewed and in the medical chart  Patient will need heart failure follow up within 1 week of discharge  Seen and evaluated by Cardiology here  Started on IV Bumex 2 mg b i d-hold in the setting of rising creatinine  Repeat chest x-ray obtained this morning-improving bilateral airspace disease which may be due to pulmonary edema versus multifocal pneumonia

## 2022-01-26 NOTE — ASSESSMENT & PLAN NOTE
Malnutrition Findings:   Adult Degree of Malnutrition: Malnutrition of mild degree (Unable to physically assess PT d/t COVID restriction to identify signs of muscle/fat depletion, but PT meets criteria for mild malnutrition d/t recent poor po, and BMI 18 03, currently treated with diet and will add supplements to aid with maximizing po)  BMI Findings:  Adult BMI Classifications: Underweight < 18 5  Body mass index is 18 03 kg/m²

## 2022-01-26 NOTE — ASSESSMENT & PLAN NOTE
Patient reports epigastric pain since discharge from hospital    He gets postprandial nausea and discomfort  Concern for gastritis versus peptic ulcer disease  Likely stress induced with recent hospitalization and intubation possible component of steroid induced for COVID treatment  Given IV Protonix, p r n   Mylanta  - now resolved

## 2022-01-26 NOTE — ASSESSMENT & PLAN NOTE
Recently diagnosed with COVID-19 1/9/2022  Patient was intubated and ultimately extubated 1/10/2022    Meets criteria for discontinuation of COVID precautions on 1/30/2022

## 2022-01-26 NOTE — ASSESSMENT & PLAN NOTE
With possible multifocal pneumonia according to chest x-ray  Given elevated procalcitonin will continue IV antibiotics at this time  Hopefully transition to oral antibiotics if continues to improve

## 2022-01-26 NOTE — ASSESSMENT & PLAN NOTE
Results from last 7 days   Lab Units 01/26/22  0520 01/25/22  0529 01/24/22  0540 01/23/22  0532   SODIUM mmol/L 130* 128* 128* 130*   Secondary to hypervolemia  Continue fluid restriction  Likely a component of SIADH from COVID

## 2022-01-26 NOTE — PROGRESS NOTES
[facility]  Progress Note - [address] 1965, 61 y o  male MRN: [id number]  Unit/Bed#: [place] [address] -01 Encounter: [id number]  Primary Care Provider: Tonio Alcantar PA-C   Date and time admitted to hospital: 1/21/2022  1:33 PM    * Acute on chronic combined systolic and diastolic CHF (congestive heart failure) [facility])  Assessment & Plan  Wt Readings from Last 3 Encounters:   01/26/22 57 kg (125 lb 10 6 oz)   01/12/22 56 6 kg (124 lb 12 5 oz)   09/13/21 62 4 kg (137 lb 8 oz)     Lab Results   Component Value Date    NTBNP 15,131 (H) 01/25/2022    NTBNP 7,331 (H) 01/21/2022    NTBNP 13,741 (H) 01/09/2022   Patient is admitted for CHF exacerbation     BNP on admission is above  Chest x-ray with bilateral pulmonary infiltrates edema verses residual from COVID  CHF type is systolic, with EF of 10% from January 10th, 2022  Fluid restriction and low sodium diet placed  Troponin reviewed and in the medical chart  Patient will need heart failure follow up within 1 week of discharge  Seen and evaluated by Cardiology here  Started on IV Bumex 2 mg b i d-hold in the setting of rising creatinine  Repeat chest x-ray obtained this morning-improving bilateral airspace disease which may be due to pulmonary edema versus multifocal pneumonia    Ventricular fibrillation ([address] )  Assessment & Plan  History of VFib/V-tach with Bi V ICD  Seen and evaluated by Cardiology here  On metoprolol succinate-increased dose 50 BID --> 75 mg b i d  due to ongoing episodes transdermally, PVCs, V-tach,  Also on amiodarone 100 mg daily  Currently asymptomatic denying any chest pain or shortness of breath  Repleted magnesium and potassium  Recent Labs     01/24/22  0540 01/25/22  0529 01/26/22  0520   K 4 3 4 9 3 8   MG 2 2  --  1 8   Now normalized    S/P AVR (aortic valve replacement)  Assessment & Plan  Results from last 7 days   Lab Units 01/26/22  0520 01/25/22  0529 01/24/22  0540 01/22/22  0746   INR  3 12* 4 84* 3 88* 4 45*   Anticoagulated on Coumadin  Goal INR 2 5-3 5  INR therapeutic supratherapeutic here  Coumadin currently on hold  Continue to monitor and titrate    Pneumonia  Assessment & Plan  With possible multifocal pneumonia according to chest x-ray  Given elevated procalcitonin will continue IV antibiotics at this time  Hopefully transition to oral antibiotics if continues to improve    Mild protein-calorie malnutrition (HCC)  Assessment & Plan  Malnutrition Findings:   Adult Degree of Malnutrition: Malnutrition of mild degree (Unable to physically assess PT d/t COVID restriction to identify signs of muscle/fat depletion, but PT meets criteria for mild malnutrition d/t recent poor po, and BMI 18 03, currently treated with diet and will add supplements to aid with maximizing po)  BMI Findings:  Adult BMI Classifications: Underweight < 18 5  Body mass index is 18 03 kg/m²  Hyponatremia  Assessment & Plan  Results from last 7 days   Lab Units 01/26/22  0520 01/25/22  0529 01/24/22  0540 01/23/22  0532   SODIUM mmol/L 130* 128* 128* 130*   Secondary to hypervolemia  Continue fluid restriction  Likely a component of SIADH from COVID    COVID-19 virus infection  Assessment & Plan  Recently diagnosed with COVID-19 1/9/2022  Patient was intubated and ultimately extubated 1/10/2022  Meets criteria for discontinuation of COVID precautions on 1/30/2022    Cocaine use  Assessment & Plan  UDS positive on previous admission for cocaine    Coronary artery disease involving native coronary artery of native heart with angina pectoris [facility])  Assessment & Plan  Continue aspirin, atorvastatin, metoprolol    Abdominal pain-resolved as of 1/25/2022  Assessment & Plan  Patient reports epigastric pain since discharge from hospital    He gets postprandial nausea and discomfort  Concern for gastritis versus peptic ulcer disease      Likely stress induced with recent hospitalization and intubation possible component of steroid induced for COVID treatment  Given IV Protonix, p r n  Mylanta  - now resolved      VTE Pharmacologic Prophylaxis:   Pharmacologic: Heparin  Mechanical VTE Prophylaxis in Place: Yes    Discharge Plan: With need for continued inpatient stay for IV antibiotics  Suspect discharge in the next 24 hours if continues to improve    Discussions with Specialists or Other Care Team Provider:  Other Care Team Provider: Nursing    Education and Discussions with Family / Patient:  Patient    Time Spent for Care: 30 minutes  More than 50% of total time spent on counseling and coordination of care as described above  Current Length of Stay: 5 day(s)  Current Patient Status: Inpatient   Code Status: Level 1 - Full Code    Subjective:   Patient resting in bed  Reports his breathing is starting to feel better  He was requiring 2 L but has been weaning down  Currently using oxygen only as needed  He is eager to go home tomorrow  Objective:     Vitals:   Temp (24hrs), Av 9 °F (36 6 °C), Min:97 2 °F (36 2 °C), Max:98 6 °F (37 °C)    Temp:  [97 2 °F (36 2 °C)-98 6 °F (37 °C)] 97 2 °F (36 2 °C)  HR:  [52-90] 52  Resp:  [16-18] 16  BP: (105-135)/(57-78) 105/67  SpO2:  [89 %-96 %] 96 %  Body mass index is 18 03 kg/m²  Input and Output Summary (last 24 hours): Intake/Output Summary (Last 24 hours) at 2022 1432  Last data filed at 2022 1001  Gross per 24 hour   Intake 360 ml   Output 240 ml   Net 120 ml       Physical Exam:     Physical Exam  Vitals and nursing note reviewed  Constitutional:       General: He is not in acute distress  Appearance: Normal appearance  He is normal weight  He is not ill-appearing, toxic-appearing or diaphoretic  HENT:      Head: Normocephalic and atraumatic  Eyes:      General: No scleral icterus  Cardiovascular:      Rate and Rhythm: Normal rate and regular rhythm  Pulmonary:      Effort: Pulmonary effort is normal  No respiratory distress        Breath sounds: Rhonchi present  No wheezing  Abdominal:      General: Bowel sounds are normal  There is no distension  Palpations: Abdomen is soft  There is no mass  Tenderness: There is no abdominal tenderness  Hernia: No hernia is present  Musculoskeletal:         General: No swelling  Cervical back: Neck supple  Skin:     General: Skin is warm and dry  Neurological:      Mental Status: He is alert and oriented to person, place, and time  Mental status is at baseline  Psychiatric:         Mood and Affect: Mood normal          Behavior: Behavior normal          Additional Data:     Labs:    Results from last 7 days   Lab Units 01/26/22 0520 01/25/22  0529 01/25/22  0529   WBC Thousand/uL 14 38*   < > 10 16   HEMOGLOBIN g/dL 12 3   < > 13 8   HEMATOCRIT % 36 5   < > 41 0   PLATELETS Thousands/uL 199   < > 160   NEUTROS PCT %  --   --  92*   LYMPHS PCT %  --   --  5*   MONOS PCT %  --   --  2*   EOS PCT %  --   --  0    < > = values in this interval not displayed  Results from last 7 days   Lab Units 01/26/22 0520 01/25/22 0529 01/25/22  0529   POTASSIUM mmol/L 3 8   < > 4 9   CHLORIDE mmol/L 88*   < > 90*   CO2 mmol/L 30   < > 30   BUN mg/dL 36*   < > 27*   CREATININE mg/dL 1 30   < > 1 20   CALCIUM mg/dL 8 0*   < > 8 7   ALK PHOS U/L  --   --  175*   ALT U/L  --   --  24   AST U/L  --   --  43    < > = values in this interval not displayed  Results from last 7 days   Lab Units 01/26/22  0520   INR  3 12*       * I Have Reviewed All Lab Data Listed Above  * Additional Pertinent Lab Tests Reviewed:  All Labs Within Last 24 Hours Reviewed    Imaging:    Imaging Reports Reviewed Today Include:   Imaging Personally Reviewed by Myself Includes:      Recent Cultures (last 7 days):           Last 24 Hours Medication List:   Current Facility-Administered Medications   Medication Dose Route Frequency Provider Last Rate    acetaminophen  650 mg Oral Q6H PRN DO [facility] aluminum-magnesium hydroxide-simethicone  30 mL Oral Q6H PRN Jennifer Batch, DO      amiodarone  100 mg Oral Daily With Breakfast Jennifer Batch, DO      aspirin  81 mg Oral Daily Jennifer Batch, DO      atorvastatin  80 mg Oral Daily With RainStor, DO      bumetanide  2 mg Intravenous BID Corey Banai, DO      cefTRIAXone  1,000 mg Intravenous Q24H Florentino Galindo PA-C      dexamethasone  6 mg Intravenous Q24H Corey Ban, DO      metoprolol succinate  75 mg Oral Q12H Corey Ban, DO      ondansetron  4 mg Intravenous Q6H PRN Jennifer Batch, DO      pantoprazole  40 mg Oral Early Morning Nimo Goodrich,           Today, Patient Was Seen By: Florentino Galindo PA-C    ** Please Note: This note has been constructed using a voice recognition system   **

## 2022-01-27 NOTE — PLAN OF CARE
Problem: Potential for Falls  Goal: Patient will remain free of falls  Description: INTERVENTIONS:  - Educate patient/family on patient safety including physical limitations  - Instruct patient to call for assistance with activity   - Consult OT/PT to assist with strengthening/mobility   - Keep Call bell within reach  - Keep bed low and locked with side rails adjusted as appropriate  - Keep care items and personal belongings within reach  - Initiate and maintain comfort rounds  - Make Fall Risk Sign visible to staff  - Apply yellow socks and bracelet for high fall risk patients  - Consider moving patient to room near nurses station  1/27/2022 1542 by Vasquez Houston RN  Outcome: Adequate for Discharge  1/27/2022 0932 by Vasquez Houtson RN  Outcome: Progressing     Problem: PAIN - ADULT  Goal: Verbalizes/displays adequate comfort level or baseline comfort level  Description: Interventions:  - Encourage patient to monitor pain and request assistance  - Assess pain using appropriate pain scale  - Administer analgesics based on type and severity of pain and evaluate response  - Implement non-pharmacological measures as appropriate and evaluate response  - Consider cultural and social influences on pain and pain management  - Notify physician/advanced practitioner if interventions unsuccessful or patient reports new pain  1/27/2022 1542 by Vasquez Houston RN  Outcome: Adequate for Discharge  1/27/2022 0932 by Vasquez Houston RN  Outcome: Progressing     Problem: INFECTION - ADULT  Goal: Absence or prevention of progression during hospitalization  Description: INTERVENTIONS:  - Assess and monitor for signs and symptoms of infection  - Monitor lab/diagnostic results  - Monitor all insertion sites, i e  indwelling lines, tubes, and drains  - Monitor endotracheal if appropriate and nasal secretions for changes in amount and color  - [place] appropriate cooling/warming therapies per order  - Administer medications as ordered  - Instruct and encourage patient and family to use good hand hygiene technique  - Identify and instruct in appropriate isolation precautions for identified infection/condition  1/27/2022 1542 by Anitra Montaño RN  Outcome: Adequate for Discharge  1/27/2022 0932 by Anitra Montaño RN  Outcome: Progressing     Problem: Knowledge Deficit  Goal: Patient/family/caregiver demonstrates understanding of disease process, treatment plan, medications, and discharge instructions  Description: Complete learning assessment and assess knowledge base    Interventions:  - Provide teaching at level of understanding  - Provide teaching via preferred learning methods  1/27/2022 1542 by Anitra Montaño RN  Outcome: Adequate for Discharge  1/27/2022 0932 by Anitra Montaño RN  Outcome: Progressing     Problem: CARDIOVASCULAR - ADULT  Goal: Maintains optimal cardiac output and hemodynamic stability  Description: INTERVENTIONS:  - Monitor I/O, vital signs and rhythm  - Monitor for S/S and trends of decreased cardiac output  - Administer and titrate ordered vasoactive medications to optimize hemodynamic stability  - Assess quality of pulses, skin color and temperature  - Assess for signs of decreased coronary artery perfusion  - Instruct patient to report change in severity of symptoms  1/27/2022 1542 by Anitra Montaño RN  Outcome: Adequate for Discharge  1/27/2022 0932 by Anitra Montaño RN  Outcome: Progressing  Goal: Absence of cardiac dysrhythmias or at baseline rhythm  Description: INTERVENTIONS:  - Continuous cardiac monitoring, vital signs, obtain 12 lead EKG if ordered  - Administer antiarrhythmic and heart rate control medications as ordered  - Monitor electrolytes and administer replacement therapy as ordered  1/27/2022 1542 by Anitra Montaño RN  Outcome: Adequate for Discharge  1/27/2022 0932 by Anitra Montaño RN  Outcome: Progressing     Problem: Nutrition/Hydration-ADULT  Goal: Nutrient/Hydration intake appropriate for improving, restoring or maintaining nutritional needs  Description: Monitor and assess patient's nutrition/hydration status for malnutrition  Collaborate with interdisciplinary team and initiate plan and interventions as ordered  Monitor patient's weight and dietary intake as ordered or per policy  Utilize nutrition screening tool and intervene as necessary  Determine patient's food preferences and provide high-protein, high-caloric foods as appropriate       INTERVENTIONS:  - Monitor oral intake, urinary output, labs, and treatment plans  - Assess nutrition and hydration status and recommend course of action  - Evaluate amount of meals eaten  - Assist patient with eating if necessary   - Allow adequate time for meals  - Recommend/ encourage appropriate diets, oral nutritional supplements, and vitamin/mineral supplements  - Order, calculate, and assess calorie counts as needed  - Recommend, monitor, and adjust tube feedings and TPN/PPN based on assessed needs  - Assess need for intravenous fluids  - Provide specific nutrition/hydration education as appropriate  - Include patient/family/caregiver in decisions related to nutrition  1/27/2022 1542 by Anitra Montaño RN  Outcome: Adequate for Discharge  1/27/2022 0932 by Anitra Montaño RN  Outcome: Progressing

## 2022-01-27 NOTE — PROGRESS NOTES
BP low  Taken multiple times approx 45-60 minutes apart  On call provider notified  Permission to hold DT hypotension

## 2022-01-27 NOTE — PROGRESS NOTES
Rn noticed an increase in PVC's without observable or reported cardiac SS  Patient requested sleep aid  On call provider notified  No new orders for increase in PVC's  Melatonin ordered PRN

## 2022-01-27 NOTE — CASE MANAGEMENT
Case Management Assessment & Discharge Planning Note    Patient name Pallavi Spaulding  Location [address] George Johns* MRN [id number]  : 1965 Date 2022       Current Admission Date: 2022  Current Admission Diagnosis:Acute on chronic combined systolic and diastolic CHF (congestive heart failure) [facility])   Patient Active Problem List    Diagnosis Date Noted    Pneumonia 2022    Mild protein-calorie malnutrition ([facility] ) 2022    Hyponatremia 2022    Aortic stenosis 2022    Ischemic cardiomyopathy 2022    Drug abuse ([facility] ) 2022    Ventricular fibrillation ([facility] ) 2022    COVID-19 virus infection 2022    Impaired fasting glucose 09/10/2021    History of ventricular fibrillation 2021    Elevated troponin 2021    Cough 2021    Lactic acidosis 2021    SIRS [facility]systemic inflammatory response syndrome) [facility] ) 2021    Cocaine use 2021    History of ventricular tachycardia 2021    Depressed mood 2020    S/P AVR (aortic valve replacement) 2018    [facility] ) 2018    Biventricular ICD (implantable cardioverter-defibrillator) in place 2018    Colonic mass 2016    Acute on chronic combined systolic and diastolic CHF (congestive heart failure) ([facility] ) 2016    Coronary artery disease involving native coronary artery of native heart with angina pectoris ([facility] ) 2016      LOS (days): 6  Geometric Mean LOS (GMLOS) (days): 3 80  Days to GMLOS:-2     OBJECTIVE:  PATIENT READMITTED TO HOSPITAL  Risk of Unplanned Readmission Score: 33     Current admission status: Inpatient  Referral Reason:  (Discharge Planning)    Preferred Pharmacy:   [facility] [address], [place] [address] [place] [address]  Phone: [phone] Fax: [phone] [facility], [facility] Prest[address]ag Jonnathan Carmina,  [facility] 60 [facility] [address]  Phone: [phone] Fax: [phone] [facility] 3015 [facility], [address]eli ADAM [address] [address]  Phone: [phone] Fax: [phone]    Primary Care Provider: Aleena Thomas PA-C    Primary Insurance: Mary Grace Hernandez [facility] REP  Secondary Insurance: [address] [place] [address] Blvd:  Active Health Care Agents    There are no active Health Care Agents on file         Readmission Root Cause  30 Day Readmission: Yes  Who directed you to return to the hospital?: Self  Did you understand whom to contact if you had questions or problems?: Yes  Did you get your prescriptions before you left the hospital?: Yes  Were you able to get your prescriptions filled when you left the hospital?: Yes  Did you take your medications as prescribed?: Yes  Were you able to get to your follow-up appointments?: No  Reason[de-identified] Readmitted prior to appointment  During previous admission, was a post-acute recommendation made?: No  Patient was readmitted due to: ACute on Chronic Systolic and Diastolic CHF  Action Plan: Stabilise Sx and arange OP Follow Up    Patient Information  Admitted from[de-identified] Home  Mental Status: Alert  During Assessment patient was accompanied by: Not accompanied during assessment (CM spoke w/ Pt by telephone)  Assessment information provided by[de-identified] Patient  Primary Caregiver: Self  Support Systems: [facility] Street of Residence: [address] [place] [address] do you live in?: [address] of Daily Living Prior to Admission  Functional Status: Independent    Patient Information Continued  Income Source: SSI/SSD  Does patient have a history of substance abuse?: Yes  Historical substance use preference: Cocaine          DISCHARGE DETAILS:    Discharge planning discussed with[de-identified] Patient  Freedom of Choice: Yes  Comments - Freedom of Choice: Pt will discharge home w/ OP follow up   Pt is agreeable to this plan  CM contacted family/caregiver?: No- see comments (A&O)  Were Treatment Team discharge recommendations reviewed with patient/caregiver?: Yes  Did patient/caregiver verbalize understanding of patient care needs?: Yes  Were patient/caregiver advised of the risks associated with not following Treatment Team discharge recommendations?: Yes    Requested [address]         Is the patient interested in [place] [address] at discharge?: Yes  [address] Glez requested[de-identified] [address] Name[de-identified] 474 [facility] Provider[de-identified] PCP  Home Health Services Needed[de-identified] Evaluate Functional Status and Safety,Heart Failure Management  Oxygen LPM Ordered (if applicable based on home health services needed):: 2 LPM  Homebound Criteria Met[de-identified] Immunosuppressed,Requires the Assistance of Another Person for Safe Ambulation or to Leave the Home  Supporting Clincal Findings[de-identified] Fatigues Easliy in Short Distances,Limited Endurance,Requires Oxygen    DME Referral Provided  Referral made for DME?: Yes  DME referral completed for the following items[de-identified] Portable Oxygen concentrator    Treatment Team Recommendation: Home with [address] [place]  Discharge Destination Plan[de-identified] Home with [address] [place] [address] Given (Date):: 01/27/22  IMM Given to[de-identified] Patient (Discussed over telephone)

## 2022-01-27 NOTE — ASSESSMENT & PLAN NOTE
Wt Readings from Last 3 Encounters:   01/27/22 57 kg (125 lb 10 6 oz)   01/12/22 56 6 kg (124 lb 12 5 oz)   09/13/21 62 4 kg (137 lb 8 oz)     Lab Results   Component Value Date    NTBNP 15,131 (H) 01/25/2022    NTBNP 7,331 (H) 01/21/2022    NTBNP 13,741 (H) 01/09/2022   Patient is admitted for CHF exacerbation     BNP on admission is above  Chest x-ray with bilateral pulmonary infiltrates edema verses residual from COVID  CHF type is systolic, with EF of 19% from January 10th, 2022  Fluid restriction and low sodium diet placed  Seen and evaluated by Cardiology here  Started on IV Bumex 2 mg b i d  Repeat chest x-ray obtained this morning-improving bilateral airspace disease which may be due to pulmonary edema versus multifocal pneumonia  Patient will need heart failure follow up within 1 week of discharge  Transition back to oral home regimen Lasix 40 mg daily, spironolactone 25 mg daily

## 2022-01-27 NOTE — PROGRESS NOTES
Progress Note - Cardiology   Joaan LillycarcelRivera 61 y o  male MRN: [id number]  Unit/Bed#: [facility] 2 -01 Encounter: [id number]      Assessment/Recommendations/Discussion:   Severo Salgado COVID-19    Acute on chronic systolic and diastolic heart failure with ischemic cardiomyopathy EF 20%    Coronary artery disease with bare metal stent to LAD and left circumflex in 2014   Aortic stenosis with mechanical aortic valve replacement in 1980s    Medtronic Bi V ICD    History of VFib/VT   Hyperlipidemia   Cocaine marijuana use   History of colon cancer    Medication noncompliance           PLAN   Spoke with primary team   Patient is on room air, clinically improved, likely ready for discharge today    Reviewed telemetry, pacing appropriately   In's and out's not accurate   Would placed back on home dose Lasix 40 mg , eplerenone 25 mg    Restart lisinopril 40 mg   Continue amiodarone, aspirin, atorvastatin, Toprol XL 75 b i d   Add Jardiance 10 mg per day   Hyponatremia is noted still, was clinically improved with IV diuretics however  Possibly component of syndrome of inappropriate anti-diuretic hormone secretion from COVID infection  Would repeat BMP 1 week as outpatient   Blood pressures been stable    Potassium, magnesium stable   Continue Coumadin as outpatient for mechanical aortic valve   Plan for outpatient follow-up with Advanced Heart failure    Subjective:   HPI  Telemetry has been stable, pacing appropriately, clinically improved, blood pressure stable, heart rate stable  Weights appear to be inaccurate  Spoke to primary team, swelling has improved, breathing improved, now on room air      Review of Systems: As noted in HPI  Rest of ROS is negative      Vitals:   /64   Pulse 56   Temp (!) 97 4 °F (36 3 °C)   Resp 18   Wt 57 kg (125 lb 10 6 oz)   SpO2 91%   BMI 18 03 kg/m²   Vitals:    01/26/22 0600 01/27/22 0600   Weight: 57 kg (125 lb 10 6 oz) 57 kg (125 lb 10 6 oz) Intake/Output Summary (Last 24 hours) at 1/27/2022 1200  Last data filed at 1/26/2022 2101  Gross per 24 hour   Intake 120 ml   Output --   Net 120 ml       Physical Exam   Physical exam was deferred today in order to save exposure and PPE during the coronavirus pandemic  Lab Results:  Results from last 7 days   Lab Units 01/27/22  0522   WBC Thousand/uL 12 02*   HEMOGLOBIN g/dL 12 3   HEMATOCRIT % 36 9   PLATELETS Thousands/uL 179     Results from last 7 days   Lab Units 01/27/22  0522 01/26/22  0520 01/25/22  0529   POTASSIUM mmol/L 3 6   < > 4 9   CHLORIDE mmol/L 90*   < > 90*   CO2 mmol/L 35*   < > 30   BUN mg/dL 32*   < > 27*   CREATININE mg/dL 1 11   < > 1 20   CALCIUM mg/dL 8 0*   < > 8 7   ALK PHOS U/L  --   --  175*   ALT U/L  --   --  24   AST U/L  --   --  43    < > = values in this interval not displayed       Results from last 7 days   Lab Units 01/27/22  0522   POTASSIUM mmol/L 3 6   CHLORIDE mmol/L 90*   CO2 mmol/L 35*   BUN mg/dL 32*   CREATININE mg/dL 1 11   CALCIUM mg/dL 8 0*           Medications:    Current Facility-Administered Medications:     acetaminophen (TYLENOL) tablet 650 mg, 650 mg, Oral, Q6H PRN, Saida Mariscal DO, 650 mg at 01/26/22 2021    aluminum-magnesium hydroxide-simethicone (MYLANTA) oral suspension 30 mL, 30 mL, Oral, Q6H PRN, Saida Mariscal DO    amiodarone tablet 100 mg, 100 mg, Oral, Daily With Breakfast, Saida Mariscal, DO, 100 mg at 01/27/22 0800    aspirin chewable tablet 81 mg, 81 mg, Oral, Daily, Saida Mariscal DO, 81 mg at 01/27/22 0800    atorvastatin (LIPITOR) tablet 80 mg, 80 mg, Oral, Daily With Kisha Anastacio, DO, 80 mg at 01/26/22 1703    cefTRIAXone (ROCEPHIN) 1,000 mg in dextrose 5 % 50 mL IVPB, 1,000 mg, Intravenous, Q24H, Loretta Lopez PA-C, Last Rate: 100 mL/hr at 01/26/22 1702, 1,000 mg at 01/26/22 1702    dexamethasone (DECADRON) injection 6 mg, 6 mg, Intravenous, Q24H, Corey Madison DO, 6 mg at 01/26/22 1702   melatonin tablet 3 mg, 3 mg, Oral, HS PRN, Mendez Hernandez PA-C, 3 mg at 01/26/22 2347    metoprolol succinate (TOPROL-XL) 24 hr tablet 75 mg, 75 mg, Oral, Q12H, Corey Gabrielaai, DO, 75 mg at 01/26/22 1708    ondansetron (ZOFRAN) injection 4 mg, 4 mg, Intravenous, Q6H PRN, Norm [place], DO    pantoprazole (PROTONIX) EC tablet 40 mg, 40 mg, Oral, Early Morning, Corey Gabrielaai, DO, 40 mg at 01/27/22 0319    This note was completed in part utilizing [employer] Fluency Direct Software  Grammatical errors, random word insertions, spelling mistakes, and incomplete sentences may be an occasional consequence of this system secondary to software limitations, ambient noise, and hardware issues  If you have any questions or concerns about the content, text, or information contained within the body of this dictation, please contact the provider for clarification      Lisandro Pereira DO, [facility]  1/27/2022 12:00 PM

## 2022-01-27 NOTE — RESPIRATORY THERAPY NOTE
Home Oxygen Qualifying Test       Patient name: Yuliana Bond        : 1965   Date of Test:  2022  Diagnosis:      Home Oxygen Test:    Medicare Guidelines require item(s) 1-5 on all ambulatory patients or 1 and 2 on non-ambulatory patients  1   Baseline SPO2 on Room Air at rest 93%  2   SPO2 during exercise on Room Air 87%  During exercise monitor SpO2  If SPO2 increases >=89% with ambulation do not add supplemental oxygen  If <= 88% on room air add O2 via NC and titrate patient  Patient must be ambulated with O2 and titrated to > 88% with exertion  3   SPO2 on Oxygen at rest 96% 2 lpm     4   SPO2 during exercise on Oxygen  92% a liter flow of 2 lpm     5   Exercise performed: Pt performed test for 6 minutes requiring 2L O2 with exertion with lowest saturation being 92%  [x]  Supplemental Home Oxygen is indicated  []  Client does not qualify for home oxygen  Respiratory Additional Notes- Test done in pt's room due to COVID-19 protocol      Saurav Amado, RT

## 2022-01-27 NOTE — NURSING NOTE
Patient provided with AVS, pt verbalized understanding  Nurse instructed patient in use of oxygen/pulse oximeter  Pt able to provide teach back of how to turn on and off oxygen concentrator and how to use pulse oximeter  Pt aware goal is to keep oxygen greater than 90%, and instructions provide on what to do if oxygen is less than 90% and if it remains less than with the use of oxygen ( contact PCP/ER visit) Pt aware he needs oxygen with exertion  Patient discharged to home with all belongings

## 2022-01-27 NOTE — CASE MANAGEMENT
Case Management Discharge Planning Note    Patient name Ying Diaz  Location [address] [address] Brigitte Dewey* MRN [id number]  : 1965 Date 2022       Current Admission Date: 2022  Current Admission Diagnosis:Acute on chronic combined systolic and diastolic CHF (congestive heart failure) [facility])   Patient Active Problem List    Diagnosis Date Noted    Pneumonia 2022    Mild protein-calorie malnutrition ([facility] ) 2022    Hyponatremia 2022    Aortic stenosis 2022    Ischemic cardiomyopathy 2022    Drug abuse ([place] [address] ) 2022    Ventricular fibrillation ([place] [address] ) 2022    COVID-19 virus infection 2022    Impaired fasting glucose 09/10/2021    History of ventricular fibrillation 2021    Elevated troponin 2021    Cough 2021    Lactic acidosis 2021    SIRS (systemic inflammatory response syndrome) ([place] [address] ) 2021    Cocaine use 2021    History of ventricular tachycardia 2021    Depressed mood 2020    S/P AVR (aortic valve replacement) 2018    Ventricular fibrillation ([place] [address] ) 2018    Biventricular ICD (implantable cardioverter-defibrillator) in place 2018    Colonic mass 2016    Acute on chronic combined systolic and diastolic CHF (congestive heart failure) ([place] [address] ) 2016    Coronary artery disease involving native coronary artery of native heart with angina pectoris ([place] [address] ) 2016      LOS (days): 6  Geometric Mean LOS (GMLOS) (days): 3 80  Days to GMLOS:-2 2     OBJECTIVE:  Risk of Unplanned Readmission Score: 34     Current admission status: Inpatient   Preferred Pharmacy:   [facility] [address] [facility], [facility] [address] [facility] [address] [place] [address]  Phone: [phone] Fax: [phone] [address] [place], [address] [address]  Phone: [phone] Fax: [address] [place] [address] [facility], [address]  [address] [address]  Phone: [phone] Fax: [phone]    Primary Care Provider: [address] [place], [place] [address]JOSE C    Primary Insurance: LEONORA Mcneal 98 REP  Secondary Insurance: [address]:      Pt is discharging home with [facility] and home O2 set up  O2 ordered through parachute-Prior to day the CM team sent request to [place] [employer] financial counseling for DME  Copay  Pt approved for 100% assistance  DME indingent form completed and uploaded to [address] [place] [address]  CM will cover the DME copay cost for one month  [employer] delivered portable tank to Pts hospital room  RN to educate use of the portable O2  [place] arranging delivery of concentrator to Pts home with Pt  Pt will transport home with family by car

## 2022-01-27 NOTE — DISCHARGE INSTRUCTIONS
Please see PCP and heart failure doctor in followup  [address]    Your healthcare provider and/or public health staff have evaluated you and have determined that you do not need to remain in the hospital at this time  At this time you can be isolated at home where you will be monitored by staff from your local or state health department  You should carefully follow the prevention and isolation steps below until a healthcare provider or local or state health department says that you can return to your normal activities  Stay home except to get medical care    People who are mildly ill with COVID-19 are able to isolate at home during their illness  You should restrict activities outside your home, except for getting medical care  Do not go to work, school, or public areas  Avoid using public transportation, ride-sharing, or taxis  Separate yourself from other people and animals in your home    People: As much as possible, you should stay in a specific room and away from other people in your home  Also, you should use a separate bathroom, if available  Animals: You should restrict contact with pets and other animals while you are sick with COVID-19, just like you would around other people  Although there have not been reports of pets or other animals becoming sick with COVID-19, it is still recommended that people sick with COVID-19 limit contact with animals until more information is known about the virus  When possible, have another member of your household care for your animals while you are sick  If you are sick with COVID-19, avoid contact with your pet, including petting, snuggling, being kissed or licked, and sharing food  If you must care for your pet or be around animals while you are sick, wash your hands before and after you interact with pets and wear a facemask  See COVID-19 and Animals for more information      Call ahead before visiting your doctor    If you have a medical appointment, call the healthcare provider and tell them that you have or may have COVID-19  This will help the healthcare providers office take steps to keep other people from getting infected or exposed  Wear a facemask    You should wear a facemask when you are around other people (e g , sharing a room or vehicle) or pets and before you enter a healthcare providers office  If you are not able to wear a facemask (for example, because it causes trouble breathing), then people who live with you should not stay in the same room with you, or they should wear a facemask if they enter your room  Cover your coughs and sneezes    Cover your mouth and nose with a tissue when you cough or sneeze  Throw used tissues in a lined trash can  Immediately wash your hands with soap and water for at least 20 seconds or, if soap and water are not available, clean your hands with an alcohol-based hand  that contains at least 60% alcohol  Clean your hands often    Wash your hands often with soap and water for at least 20 seconds, especially after blowing your nose, coughing, or sneezing; going to the bathroom; and before eating or preparing food  If soap and water are not readily available, use an alcohol-based hand  with at least 60% alcohol, covering all surfaces of your hands and rubbing them together until they feel dry  Soap and water are the best option if hands are visibly dirty  Avoid touching your eyes, nose, and mouth with unwashed hands  Avoid sharing personal household items    You should not share dishes, drinking glasses, cups, eating utensils, towels, or bedding with other people or pets in your home  After using these items, they should be washed thoroughly with soap and water  Clean all high-touch surfaces everyday    High touch surfaces include counters, tabletops, doorknobs, bathroom fixtures, toilets, phones, keyboards, tablets, and bedside tables   Also, clean any surfaces that may have blood, stool, or body fluids on them  Use a household cleaning spray or wipe, according to the label instructions  Labels contain instructions for safe and effective use of the cleaning product including precautions you should take when applying the product, such as wearing gloves and making sure you have good ventilation during use of the product  Monitor your symptoms    Seek prompt medical attention if your illness is worsening (e g , difficulty breathing)  Before seeking care, call your healthcare provider and tell them that you have, or are being evaluated for, COVID-19  Put on a facemask before you enter the facility  These steps will help the healthcare providers office to keep other people in the office or waiting room from getting infected or exposed  Ask your healthcare provider to call the local or [facility] health department  Persons who are placed under active monitoring or facilitated self-monitoring should follow instructions provided by their local health department or occupational health professionals, as appropriate  If you have a medical emergency and need to call 911, notify the dispatch personnel that you have, or are being evaluated for COVID-19  If possible, put on a facemask before emergency medical services arrive      Discontinuing home isolation    Patients with confirmed COVID-19 should remain under home isolation precautions until the following conditions are met:   - They have had no fever for at least 24 hours (that is one full day of no fever without the use medicine that reduces fevers)  AND  - other symptoms have improved (for example, when their cough or shortness of breath have improved)  AND  - If had mild or moderate illness, at least 10 days have passed since their symptoms first appeared or if severe illness (needed oxygen) or immunosuppressed, at least 20 days have passed since symptoms first appeared  Patients with confirmed COVID-19 should also notify close contacts (including their workplace) and ask that they self-quarantine  Currently, close contact is defined as being within 6 feet for 15 minutes or more from the period 24 hours starting 48 hours before symptom onset to the time at which the patient went into isolation  Close contacts of patients diagnosed with COVID-19 should be instructed by the patient to self-quarantine for 14 days from the last time of their last contact with the patient  Source: [employer] fi    CoVID-19 Aguila de cuidado domiciliario  Ware proveedor de [place] y/o personal de mary lou pública lo/la ha evaluado y ha determinado que no necesita ser hospitalizado/a en andrei momento  ? En andrei momento usted puede ser aislado/a en casa donde será monitoreado/a por el personal de ware departamento de mary lou local o estatal  Debe seguir cuidadosamente los pasos de prevención y aislamiento que se indican a continuación, hasta que un proveedor de atención médica o el departamento de mary lou local o estatal indique que puede volver a christ actividades normales  Quédate en casa excepto si necesita recibir [employer] que están levemente enfermas con COVID-19 pueden aislarse en casa chip ware recuperacion  Usted debe restringir las actividades fuera de ware hogar, excepto para recibir [place]  No se presente al [place], a la escuela o en áreas públicas  Evite el uso del transporte público, el uso compartido de transporte o los taxis  Aislese de [employer] y [place] en ware domicilio  Personas: En la medida de lo posible, debe quedarte en bertin habitación específica y lejos de otras personas en ware hogar  Además, debe usar un baño separado, si está disponible  Animales: Debe restringir el contacto con mascotas y otros animales [address] enfermo/a con COVID-19, al igual que lo haría con [employer]   Aunque no mares habido informes de mascotas u otros animales que se enferman con COVID-19, todavía se recomienda que las personas enfermas con COVID-19 limiten el contacto con los animales hasta que se sepa más información sobre el virus  En lo posible, pida a otro miembro de ware hogar que cuide de christ animales mientras esté enfermo/a  Si está enfermo/a con COVID-19, evite el contacto con ware mascota, incluyendo acariciar, abrazar, besar o ser lamido/a, al igual que compartir alimentos  Si debe cuidar de ware mascota o estar cerca de [employer] está enfermo/a, lávese las unique antes y después de interactuar con las mascotas y use bertin máscara facial  Consulte COVID-19 y Animales para obtener Elicia Salle  Llame antes de visitar a ware médico  Si tiene Anheuserashid Caban al proveedor de [place] [employer] y dígale que tiene o puede tener COVID-19  [place] ayudará al [employer] del proveedor de atención médica a ilana medidas para evitar que otras personas se infecten o expongan  Utilize máscara facial  Debe usar mascara facial cuando esté cerca de otras personas (por ejemplo, compartir bertin habitación o vehículo) o mascotas y antes de entrar en la oficina de un proveedor de [employer]  Si usted no puede usar máscara facial (por ejemplo, porque causa problemas para respirar), entonces las personas que viven con usted no deben permanecer en la misma habitación con usted, o deben usar máscara facial si entran a ware habitación  Arno Cheeks ware tos y/o estornudos   Cúbrase la boca y la [place] con un pañuelo desechable cuando tosa o estornude  Tire los pañuelos usados en un contenedor de basura que tenga cobertura  Lávese las unique inmediatamente con agua y jabón chip al menos 21 segundos o, si no hay agua y jabón disponibles, límpiese las unique con un desinfectante de unique a base de alcohol que contenga al menos un 60% de alcohol    Límpiese las unique con frecuencia  [employer] las unique a menudo con agua y jabón chip al menos 20 segundos, especialmente después de sonarse la [place], toser o estornudar, ir al baño, y antes de comer o preparar alimentos  Si agua y jabón no están disponibles fácilmente, utilize un desinfectante de unique a base de alcohol con al menos un 60% de alcohol, cubriendo todas las superficies de christ unique y frotándolas hasta que se sientan secas  Elis Edu y agua son la mejor opción si las unique están visiblemente sucias  Evite tocarse los ojos, la nariz y la boca con las unique sin [place] Tam  Evite compartir artículos personales en el hogar  No debe compartir platos, vasos para beber, tazas, utensilios para comer, toallas o ropa de cama con otras personas o mascotas en ware hogar  Después de [employer], deben lavarse muy milly con agua y Bc  Limpie todas las superficies "de toque frequente" todos los [address] superficies de toque fecuente incluyen encimeras o [place], mesas, escritorios, pomos o perillas de lee, accesorios de baño, inodoros, teléfonos, teclados, tabletas y [address] de noche  Además, limpie las superficies que puedan contener angeles, heces fecales o líquidos corporales  Utilice un spray de limpieza para el hogar o bertin toallita desechable, de acuerdo con las instrucciones de la etiqueta del product utilizado para limpiar  Las [employer] contienen instrucciones para un uso seguro y eficaz del producto de limpieza, incluidas las precauciones que deben tomarse al aplicar el producto, incluyendo el uso de guantes y asegurarse de que haya bertin buena ventilación chip el uso del producto  Monitorea christ síntomas  Busca atención médica inmediata si tu enfermedad está empeorando (por ejemplo, dificultad para respirar)  Antes de buscar [place] [employer], llame a ware proveedor de [place] [employer] y ashley que tiene, o está siendo evaluado para, COVID-19  Malini bertin mascara facial antes de entrar en las instalaciones   Estos pasos ayudarán al [employer] del proveedor de atención médica a evitar que otras personas en la oficina o la angelique de espera se infecten o expongan  [place] a ware proveedor de [employer] llame al departamento de mary lou local o estatal  Las personas que se sometan a un seguimiento activo o que se les facilite la autosupervisión deben seguir las instrucciones proporcionadas por ware departamento de mary lou local o profesionales de mary lou ocupacional, según sea apropiado  Si tiene bertin emergencia médica y necesita llamar al 911, notifique al personal de despacho que tiene o está siendo evaluado para COVID-19  Si es posible, pongase bertin mascara facial antes de que lleguen los servicios médicos de emergencia  Discontinuar el aislamiento del hogar  Los pacientes con COVID-19 confirmado deben permanecer bajo precauciones de aislamiento en el hogar hasta que se cumplan las siguientes condiciones:   No chance tenido fiebre chip al menos 24 horas (es decir, un día completos sin fiebre sin el medicamento que reduce la fiebre)  Y   otros síntomas chance ghulam (por ejemplo, cuando ware tos o falta de aire chance ghulam)  Y   Si tuvo bertin enfermedad leve o moderada, chance pasado al menos 10 ely desde que aparecieron los síntomas por primera vez o si la enfermedad es grave (necesita oxígeno) o está inmunosuprimido, chance pasado al menos 21 ely desde que aparecieron los primeros síntomas  Los pacientes con COVID-19 confirmado también deben notificar a los contactos cercanos (incluido ware lugar de trabajo) y pedirles que se pongan en cuarentena  Actualmente, el contacto cercano se define antonio estar dentro de los 6 pies chip 15 minutos o más desde el período 24 horas comenzando 48 horas antes del inicio de los síntomas hasta el momento en que el paciente se aisló  El paciente debe indicar a los contactos cercanos de pacientes diagnosticados con COVID-19 que se pongan en cuarentena chip 14 días desde la última vez que tuvieron ware último contacto con el Beat[place]      Source: [employer] fi

## 2022-01-27 NOTE — ASSESSMENT & PLAN NOTE
History of VFib/V-tach with Bi V ICD  Seen and evaluated by Cardiology here  On metoprolol succinate-increased dose 50 BID --> 75 mg b i d  due to ongoing episodes transdermally, PVCs, V-tach,  Also on amiodarone 100 mg daily  Currently asymptomatic denying any chest pain or shortness of breath  Repleted magnesium and potassium  Recent Labs     01/25/22  0529 01/26/22  0520 01/27/22  0522   K 4 9 3 8 3 6   MG  --  1 8  --    Now normalized

## 2022-01-27 NOTE — PLAN OF CARE
Problem: Potential for Falls  Goal: Patient will remain free of falls  Description: INTERVENTIONS:  - Educate patient/family on patient safety including physical limitations  - Instruct patient to call for assistance with activity   - Consult OT/PT to assist with strengthening/mobility   - Keep Call bell within reach  - Keep bed low and locked with side rails adjusted as appropriate  - Keep care items and personal belongings within reach  - Initiate and maintain comfort rounds  - Make Fall Risk Sign visible to staff  - Apply yellow socks and bracelet for high fall risk patients  - Consider moving patient to room near nurses station  Outcome: Progressing     Problem: PAIN - ADULT  Goal: Verbalizes/displays adequate comfort level or baseline comfort level  Description: Interventions:  - Encourage patient to monitor pain and request assistance  - Assess pain using appropriate pain scale  - Administer analgesics based on type and severity of pain and evaluate response  - Implement non-pharmacological measures as appropriate and evaluate response  - Consider cultural and social influences on pain and pain management  - Notify physician/advanced practitioner if interventions unsuccessful or patient reports new pain  Outcome: Progressing     Problem: INFECTION - ADULT  Goal: Absence or prevention of progression during hospitalization  Description: INTERVENTIONS:  - Assess and monitor for signs and symptoms of infection  - Monitor lab/diagnostic results  - Monitor all insertion sites, i e  indwelling lines, tubes, and drains  - Monitor endotracheal if appropriate and nasal secretions for changes in amount and color  - [place] appropriate cooling/warming therapies per order  - Administer medications as ordered  - Instruct and encourage patient and family to use good hand hygiene technique  - Identify and instruct in appropriate isolation precautions for identified infection/condition  Outcome: Progressing     Problem: Knowledge Deficit  Goal: Patient/family/caregiver demonstrates understanding of disease process, treatment plan, medications, and discharge instructions  Description: Complete learning assessment and assess knowledge base  Interventions:  - Provide teaching at level of understanding  - Provide teaching via preferred learning methods  Outcome: Progressing     Problem: CARDIOVASCULAR - ADULT  Goal: Maintains optimal cardiac output and hemodynamic stability  Description: INTERVENTIONS:  - Monitor I/O, vital signs and rhythm  - Monitor for S/S and trends of decreased cardiac output  - Administer and titrate ordered vasoactive medications to optimize hemodynamic stability  - Assess quality of pulses, skin color and temperature  - Assess for signs of decreased coronary artery perfusion  - Instruct patient to report change in severity of symptoms  Outcome: Progressing  Goal: Absence of cardiac dysrhythmias or at baseline rhythm  Description: INTERVENTIONS:  - Continuous cardiac monitoring, vital signs, obtain 12 lead EKG if ordered  - Administer antiarrhythmic and heart rate control medications as ordered  - Monitor electrolytes and administer replacement therapy as ordered  Outcome: Progressing     Problem: Nutrition/Hydration-ADULT  Goal: Nutrient/Hydration intake appropriate for improving, restoring or maintaining nutritional needs  Description: Monitor and assess patient's nutrition/hydration status for malnutrition  Collaborate with interdisciplinary team and initiate plan and interventions as ordered  Monitor patient's weight and dietary intake as ordered or per policy  Utilize nutrition screening tool and intervene as necessary  Determine patient's food preferences and provide high-protein, high-caloric foods as appropriate       INTERVENTIONS:  - Monitor oral intake, urinary output, labs, and treatment plans  - Assess nutrition and hydration status and recommend course of action  - Evaluate amount of meals eaten  - Assist patient with eating if necessary   - Allow adequate time for meals  - Recommend/ encourage appropriate diets, oral nutritional supplements, and vitamin/mineral supplements  - Order, calculate, and assess calorie counts as needed  - Recommend, monitor, and adjust tube feedings and TPN/PPN based on assessed needs  - Assess need for intravenous fluids  - Provide specific nutrition/hydration education as appropriate  - Include patient/family/caregiver in decisions related to nutrition  Outcome: Progressing

## 2022-01-27 NOTE — ASSESSMENT & PLAN NOTE
Results from last 7 days   Lab Units 01/27/22  0522 01/26/22  0520 01/25/22  0529 01/24/22  0540   INR  2 18* 3 12* 4 84* 3 88*   Anticoagulated on Coumadin  Goal INR 2 5-3 5  INR therapeutic supratherapeutic here  Coumadin held but will resume now

## 2022-01-27 NOTE — DISCHARGE INSTR - OTHER ORDERS
If your pulse ox is < 90 - rest for 5 minutes and take deep breaths through your nose with the oxygen in place  Make sure your finger is warm (cold fingers will give falsely low readings)  After 5 minutes, recheck your pulse ox  If your pulse ox remains below 90: If excessively short of breath, call your PCP or pulmonologist AND consider further evaluation in the Emergency Department- mask must be worn to enter ED  if not feeling excessively short of breath, place a call to your primary care physician or pulmonologist 24/7 (if after office hours the answering service  will contact the on call physician who will call you back)

## 2022-01-27 NOTE — DISCHARGE SUMMARY
[facility]  Discharge- [address] Jessee Mason 1965, 61 y o  male MRN: [id number]  Unit/Bed#: [place] 68 2 -01 Encounter: [id number]  Primary Care Provider: Aleena Thomas PA-C   Date and time admitted to hospital: 1/21/2022  1:33 PM    * Acute on chronic combined systolic and diastolic CHF (congestive heart failure) [facility])  Assessment & Plan  Lab Results   Component Value Date    NTBNP 15,131 (H) 01/25/2022    NTBNP 7,331 (H) 01/21/2022    NTBNP 13,741 (H) 01/09/2022   Patient is admitted for CHF exacerbation  BNP on admission is above  Chest x-ray 1/21/22 with bilateral pulmonary infiltrates edema verses residual from COVID  CHF type is systolic, with EF of 91% from January 10th, 2022  Fluid restriction and low sodium diet placed  Seen and evaluated by Cardiology here  Started on IV lasix and then IV Bumex 2 mg b i d  Required IV albumin with bumex dosage  Repeat chest x-ray 1/26/22 improving bilateral airspace disease which may be due to pulmonary edema versus multifocal pneumonia  Appears clinically improved - suspect weights are inaccurate  Patient will need heart failure follow up within 1 week of discharge  Transition back to oral home regimen Lasix 40 mg daily, spironolactone 25 mg daily  Being sent home on oxygen 2 L with rest and exertion    COVID-19 virus infection  Assessment & Plan  Recently diagnosed with COVID-19 1/9/2022  Patient was intubated and ultimately extubated 1/10/2022    Meets criteria for discontinuation of COVID precautions on 1/30/2022  Being sent home on oxygen    Ventricular fibrillation [facility])  Assessment & Plan  History of VFib/V-tach with Bi V ICD  Seen and evaluated by Cardiology here  On metoprolol succinate-increased dose 50 BID --> 75 mg b i d  due to ongoing episodes PVCs, V-tach  Also on amiodarone 100 mg daily  Repleted magnesium and potassium  Recent Labs     01/25/22  0529 01/26/22  0520 01/27/22  0522   K 4 9 3 8 3 6   MG  --  1 8  --    Now normalized    S/P AVR (aortic valve replacement)  Assessment & Plan  Results from last 7 days   Lab Units 01/27/22  0522 01/26/22  0520 01/25/22  0529 01/24/22  0540   INR  2 18* 3 12* 4 84* 3 88*   Anticoagulated on Coumadin  Goal INR 2 0-3 0  INR therapeutic supratherapeutic here  Coumadin held but okay to resume today    Pneumonia  Assessment & Plan  With possible multifocal pneumonia according to chest x-ray  Given elevated procalcitonin continued IV antibiotics  Received a total of 3 days IV ceftriaxone  Will transition to oral abx for an additional 4 days to complete a 7 day course  WBC count improved    Mild protein-calorie malnutrition (HCC)  Assessment & Plan  Malnutrition Findings:   Adult Degree of Malnutrition: Malnutrition of mild degree (Unable to physically assess PT d/t COVID restriction to identify signs of muscle/fat depletion, but PT meets criteria for mild malnutrition d/t recent poor po, and BMI 18 03, currently treated with diet and will add supplements to aid with maximizing po)  BMI Findings:  Adult BMI Classifications: Underweight < 18 5  Body mass index is 18 03 kg/m²  Hyponatremia  Assessment & Plan  Results from last 7 days   Lab Units 01/27/22  0522 01/26/22  0520 01/25/22  0529 01/24/22  0540   SODIUM mmol/L 129* 130* 128* 128*   Secondary to hypervolemia  Continue fluid restriction  Likely a component of SIADH from COVID  Repeat BMP in 1 week as an outpt    Cocaine use  Assessment & Plan  UDS positive on previous admission for cocaine    Coronary artery disease involving native coronary artery of native heart with angina pectoris [facility])  Assessment & Plan  Continue aspirin, atorvastatin, metoprolol    Abdominal pain-resolved as of 1/25/2022  Assessment & Plan  Patient reports epigastric pain since discharge from hospital    He gets postprandial nausea and discomfort  Concern for gastritis versus peptic ulcer disease      Likely stress induced with recent hospitalization and intubation possible component of steroid induced for COVID treatment  Given IV Protonix, p r n  Mylanta  - now resolved      Consultations During Hospital Stay:  · Cardiology    Procedures Performed:   XR chest portable  Result Date: 1/26/2022  Impression: Improving bilateral airspace disease which may be due to pulmonary edema versus multifocal pneumonia  Workstation performed: [id number]     XR chest portable  Result Date: 1/24/2022  Impression: Pulmonary edema or, possibly, multifocal pneumonia, as well as pulmonary vascular congestion, developing since 1/21/2022  Workstation performed: [id number]     XR chest 1 view portable  Result Date: 1/21/2022  · Impression: Mild groundglass opacities of the lower lung zones  The appearance is much improved since January 9  The differential diagnosis can include mild edema or pneumonia  Workstation performed: [id number]     Significant Findings / Test Results:   · Acute on chronic diastolic and systolic CHF  · Recent [id number] infection January 2022  · Pneumonia requiring IV antibiotics  · Hyponatremia with likely component of SIADH    Incidental Findings:   · None    Test Results Pending at Discharge (will require follow up): · None     Outpatient follow-up Requested:  · PCP -1 week  · Advanced heart failure follow-up -ambulatory referral sent    Complications: None    Hospital Course:     Enrrique Casiano is a 61 y o  male patient who originally presented to the hospital on 1/21/2022 due to shortness of breath  Patient with recent diagnosis of COVID in January of 2022 requiring intubation  Patient did end up leaving AMA on 1/9/22  He presented given ongoing shortness of breath at home  There was concern for some medication noncompliance at home  He was found to be in acute CHF and started on IV diuresis  He required L oxygen up to 4 L nasal cannula  He was seen in consultation by Cardiology who helps manage diuresis    Patient clinically improved despite inaccurate weights and I&Os  His oxygen was tapered  Repeat chest x-ray revealing improved vascular congestion although multifocal pneumonia present  In addition, his beta-blocker dosage was increased to 75 mg b i d  He was treated with IV antibiotics with ceftriaxone x3 days given elevated procalcitonin in the 2s  His white blood cell count improved  He is recommended to complete an additional 4 days to finish a 7 day course  Anticoagulated on Coumadin for mechanical aortic valve  INR was noted to be supratherapeutic upon arrival and required holding his Coumadin dosage  He was resumed on Coumadin upon discharge and advised to follow-up with PCP/Coumadin clinic for further outpatient titration  On the day of discharge he underwent home O2 evaluation and does require 2 L oxygen at rest and with exertion  He will require close outpatient follow-up with advanced heart failure clinic  In conclusion, patient is stable for discharge at this time  Condition at Discharge: stable     Discharge Day Visit / Exam:     Subjective:  Resting in bed  Has been using oxygen as needed however respiratory therapist determined patient will need to be sent home on oxygen at rest and with exertion  He feels good today  He is eager to go home  Vitals: Blood Pressure: 108/64 (01/27/22 0806)  Pulse: 56 (01/27/22 0806)  Temperature: (!) 97 4 °F (36 3 °C) (01/27/22 0806)  Temp Source: Oral (01/26/22 1531)  Respirations: 18 (01/27/22 0806)  Weight - Scale: 57 kg (125 lb 10 6 oz) (01/27/22 0600)  SpO2: 91 % (01/27/22 0806)     Exam:   Physical Exam  Vitals and nursing note reviewed  Constitutional:       General: He is not in acute distress  Appearance: Normal appearance  He is normal weight  He is not ill-appearing, toxic-appearing or diaphoretic  HENT:      Head: Normocephalic and atraumatic  Eyes:      General: No scleral icterus  Cardiovascular:      Rate and Rhythm: Normal rate and regular rhythm  Pulmonary:      Effort: Pulmonary effort is normal  No respiratory distress  Breath sounds: Normal breath sounds  No stridor  No wheezing or rhonchi  Comments: Oxygen 2 L nasal cannula  Abdominal:      General: Bowel sounds are normal  There is no distension  Palpations: Abdomen is soft  There is no mass  Tenderness: There is no abdominal tenderness  Hernia: No hernia is present  Musculoskeletal:         General: No swelling  Cervical back: Neck supple  Skin:     General: Skin is warm and dry  Neurological:      Mental Status: He is alert and oriented to person, place, and time  Mental status is at baseline  Psychiatric:         Mood and Affect: Mood normal          Behavior: Behavior normal        Discharge instructions/Information to patient and family:   See after visit summary for information provided to patient and family  Provisions for Follow-Up Care:  See after visit summary for information related to follow-up care and any pertinent home health orders  Planned Readmission: no     Discharge Statement:  I spent 60 minutes discharging the patient  This time was spent on the day of discharge  I had direct contact with the patient on the day of discharge  Greater than 50% of the total time was spent examining patient, answering all patient questions, arranging and discussing plan of care with patient as well as directly providing post-discharge instructions  Additional time then spent on discharge activities  Discharge Medications:  See after visit summary for reconciled discharge medications provided to patient and family        ** Please Note: This note has been constructed using a voice recognition system **

## 2022-01-27 NOTE — ASSESSMENT & PLAN NOTE
With possible multifocal pneumonia according to chest x-ray  Given elevated procalcitonin continued IV antibiotics  Received a total of 3 days IV ceftriaxone  Will transition to oral abx for an additional 4 days to complete a 7 day course  WBC count improving

## 2022-01-27 NOTE — ASSESSMENT & PLAN NOTE
Results from last 7 days   Lab Units 01/27/22  0522 01/26/22  0520 01/25/22  0529 01/24/22  0540   SODIUM mmol/L 129* 130* 128* 128*   Secondary to hypervolemia  Continue fluid restriction  Likely a component of SIADH from COVID  Repeat BMP in 1 week as an outpt

## 2022-01-28 PROBLEM — J96.01 ACUTE RESPIRATORY FAILURE WITH HYPOXIA (HCC): Status: ACTIVE | Noted: 2022-01-01

## 2022-01-28 PROBLEM — I50.42 CHRONIC COMBINED SYSTOLIC AND DIASTOLIC CONGESTIVE HEART FAILURE (HCC): Status: ACTIVE | Noted: 2022-01-01

## 2022-01-28 PROBLEM — I77.810 AORTIC ECTASIA, THORACIC (HCC): Status: ACTIVE | Noted: 2022-01-01

## 2022-01-28 PROBLEM — Z91.19 MEDICALLY NONCOMPLIANT: Chronic | Status: ACTIVE | Noted: 2022-01-01

## 2022-01-28 NOTE — ED NOTES
When RN administered additional medication, oxygen NC was titrated to 4 lpm  Patient is maintaining at 88-91%  Provider aware and was in to access patient       Hui Gordon RN  01/28/22 [id number]

## 2022-01-28 NOTE — ASSESSMENT & PLAN NOTE
· Reports dyspnea, called 911  Per EMS O2 sat in the 40s requiring NRB  · Was just discharged yesterday, treated for CHF exacerbation; recent COVID Jan 9 requiring intubation  Was treated for multifocal pneumonia with 3 days of IV ceftriaxone, discharge home with Rx for cefdinir    Will continue to complete treatment  · Discharged with home oxygen  · CTA study negative for PE; (impression states evidence of pulmonary embolism--> see correction on upper part of report ) Extensive B/L peripheral predominant groundglass airspace disease consistent with patient's known COVID pneumonia

## 2022-01-28 NOTE — ED NOTES
Patient very upset with first interaction, introduced myself and my role, patient reports that he wants to go home, Patient notified that he was admitted and patient angerly saying something  Patient refusing to tell me name and date of birth, reports "why do I have this bracelet on then?" patient given PO potassium  Patient asked if he needed anything, patient denies needing anything at this time        Shereen Shaver RN  01/28/22 [id number]

## 2022-01-28 NOTE — ASSESSMENT & PLAN NOTE
· Chronically elevated troponin; maybe NSTEMI II in setting hypoxic respiratory failure  · Cardiology consult

## 2022-01-28 NOTE — CONSULTS
Consult - Cardiology   [facility] A [place] 61 y o  male MRN: [id number]  Unit/Bed#: ED 15 Encounter: [id number]        Reason For Consult:  Congestive heart failure             ASSESSMENT:  Acute on chronic hypoxic respiratory failure   Elevated troponin   Recent COVID 19, pneumonia   Chronic systolic and diastolic congestive heart failure  Ischemic cardiomyopathy              - LVEF 20%, moderately dilated LV cavity with markedly reduced LV function with global hypokinesis, indeterminate diastolic dysfunction grade, moderate biatrial enlargement, normal function mechanical AV prosthesis with mild prostatic valve regurgitation, MAC with sclerosis and mild MR, trace to mild TR/AK, 1/10/22  Coronary artery disease              - S/P PCI with BMS to LAD and LCx in 2014           - stress test completed on 3/13/17: evidence of prior myocardial infarction of the entire anterior wall  extending to the apex, and the inferior wall extending to the mid- basal inferolateral myocardial wall segments with no evidence of myocardial ischemia, LV reduced at 18%    Aortic stenosis              - S/P mechanical aortic valve replacement in the 1980's               - on anticoagulation with warfarin  Dual-chamber biventricular Medtronic ICD in-situ, 2/24/2015  Hx of ventricular fibrillation/ventricular tachycardia               - OP Rx, amiodarone 100 mg daily, Toprol XL 75 mg BID    Hyperlipidemia               - Rx,atorvastatin 80 mg daily              - most recent lipid panel 1/25/22: cholesterol 87, triglycerides 86, HDL 13, LDL 57      Other:  - ectasia of the ascending thoracic aorta, 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, recommend CT in one year  - hyponatremia  - noncompliance  - cocaine and marijuana use  - history of colon cancer     PLAN/ DISCUSSION:     · Patient does not clinically appear to be in acute heart failure; continue furosemide 40 mg daily; add eplerenone 25 mg daily to regimen  · HS troponin trend: 91, 93, 126   Suspect non- MI troponin elevation in the setting of acute hypoxic respiratory failure, lactic acidosis with leukocytosis  · Continue amiodarone 100 mg daily, aspirin 81 mg daily, atorvastatin 80 mg daily, Toprol XL 75 mg BID, lisinopril 40 mg daily  · Continue anticoagulation with warfarin  · Monitor volume status with strict intake/output, daily weight  · Monitor renal function and electrolytes closely; maintain potassium > 4, magnesium > 2    · Hypokalemic with potassium 3 2; on K-Dur 20 mEq daily, will provide additional K-Dur 60 mEq today  Check magnesium level  History Of Present Illness:  70-year-old male presented to [facility] with acute on chronic respiratory failure  Per EMS, the patient called 911 because he had a pulse oximeter reading of approximately 70%  Patient noted that he had been increasing his oxygen at home due to decreasing numbers on his pulse oximeter  He stated that his oxygen tank ran out of oxygen  Upon EMS arrival, patient with a pulse oximeter reading of 40% and an initial heart rate of 30  He was placed on a non-rebreather en route to he emergency department  Additionally, patient stated that he had intermittent chest pain  To note, all of this information was obtained via Emergency Medicine documentation as the patient would not answer questions regarding his presentation to the emergency department  Upon assessment and evaluation, patient resting comfortably on a stretcher in the emergency department  Patient does not appear to be in respiratory distress  He will not answer questions; he continued to shake his head back and forth  He allowed physical exam to be completed and complied with sitting forward to auscultate lung sounds  Prior to leaving the room, patient became belligerent and began yelling that he was leaving the hospital and began cursing  He was just discharged from the hospital yesterday   He was admitted on 1/21/22 through 1/27/22 due to shortness of breath  Patient had a recent diagnosis of COVID in January requiring intubation and ICU level care  He ended up leaving against medical advice during the hospitalization  He then presented at the above date due to ongoing shortness of breath home  There was concern for medication noncompliance  Patient required IV diuresis in addition to IV antibiotics during hospitalization course  Prior to discharge, diuretics were transition to oral and his heart failure medication regimen had been reinstituted  Additionally, patient underwent home O2 evaluation revealing the patient does require 2 L supplemental oxygen via nasal cannula at rest and with exertion  Please see significant cardiac history and problems enumerated above  Past Medical History:        Past Medical History:   Diagnosis Date    AICD (automatic cardioverter/defibrillator) present     medtronic     CAD (coronary artery disease)     Cancer [facility] [address] [facility]     Cardiac disease     Cardiomyopathy ([facility] [address] )     mixed  predominently nonischemic    Colonic mass     Coronary artery disease     Hyperlipidemia     Hypertension     Irregular heart beat     Myocardial infarction ([facility] [address] [facility]     2014 and 2015    Rectal bleeding     S/P AVR (aortic valve replacement)     mechanical    Wears glasses       Past Surgical History:   Procedure Laterality Date    AORTIC VALVE REPLACEMENT      APPENDECTOMY      CARDIAC DEFIBRILLATOR PLACEMENT      COLECTOMY MADELIN Right     Last Assessed: 6/7/2016    COLECTOMY LAPAROSCOPIC      COLON SURGERY Right     colectomy    CORONARY STENT PLACEMENT      INSERT / REPLACE / REMOVE PACEMAKER      MITRAL VALVE REPAIR      MITRAL VALVE REPAIR      MOUTH SURGERY      WI COLONOSCOPY FLX DX W/COLLJ [address] WHEN PFRMD N/A 1/15/2018    Procedure: COLONOSCOPY;  Surgeon: Dawit Suggs MD;  Location: [facility];   Service: General    WI LAP,SURG,COLECTOMY, PARTIAL, W/ANAST N/A 6/2/2016    Procedure: RESECTION COLON RIGHT LAPAROSCOPIC HAND-ASSISTED;  Surgeon: Brigette Johnson MD;  Location: [facility];  Service: General        Allergy:        No Known Allergies    Medications:       Prior to Admission medications    Medication Sig Start Date End Date Taking? Authorizing Provider   amiodarone 100 mg tablet Take 1 tablet (100 mg total) by mouth daily with breakfast 1/13/22   Uriah Farah MD   aspirin 81 MG tablet Take 81 mg by mouth daily    Patient not taking: Reported on 1/21/2022     Historical Provider, MD   atorvastatin (LIPITOR) 80 mg tablet take 1 tablet by mouth once daily 8/11/21   Anna Broussard MD   cefdinir (OMNICEF) 300 mg capsule Take 1 capsule (300 mg total) by mouth every 12 (twelve) hours for 4 days 1/27/22 1/31/22  Geoffrey Brooks PA-C   eplerenone (INSPRA) 25 mg tablet take 1 tablet by mouth once daily 6/4/21   Chema Cooley DO   furosemide (LASIX) 40 mg tablet Take 1 tablet (40 mg total) by mouth daily Or take as previously taken 1/12/22   Uriah Farah MD   lisinopril (ZESTRIL) 40 mg tablet take 1 tablet by mouth once daily at bedtime 3/6/21   Worthy Cockayne, DO   metoprolol succinate (TOPROL-XL) 25 mg 24 hr tablet Take 3 tablets (75 mg total) by mouth every 12 (twelve) hours 1/27/22   Geoffrey Brooks PA-C   potassium chloride (MICRO-K) 10 MEQ CR capsule Take 2 capsules (20 mEq total) by mouth daily 1/12/22   Uriah Farah MD   warfarin (COUMADIN) 4 mg tablet take 1 tablet by mouth once daily as directed 12/8/21   Antwon Beavers DO       Family History:     Family History   Problem Relation Age of Onset    Diabetes Mother     Hypertension Mother     Valvular heart disease Mother     Valvular heart disease Father     Alcohol abuse Father         Social History:       Social History     Socioeconomic History    Marital status: /Civil Union     Spouse name: None    Number of children: None    Years of education: None    Highest education level: None   Occupational History    None   Tobacco Use    Smoking status: Never Smoker    Smokeless tobacco: Never Used   Vaping Use    Vaping Use: Never used   Substance and Sexual Activity    Alcohol use: Not Currently    Drug use: Not Currently    Sexual activity: None   Other Topics Concern    None   Social History Narrative    None     Social Determinants of Health     Financial Resource Strain: Not on file   Food Insecurity: No Food Insecurity    Worried About Running Out of Food in the Last Year: Never true    Bud of Food in the Last Year: Never true   Transportation Needs: No Transportation Needs    Lack of Transportation (Medical): No    Lack of Transportation (Non-Medical): No   Physical Activity: Not on file   Stress: Not on file   Social Connections: Not on file   Intimate Partner Violence: Not on file   Housing Stability: Low Risk     Unable to Pay for Housing in the Last Year: No    Number of Places Lived in the Last Year: 1    Unstable Housing in the Last Year: No       ROS:  Negative except otherwise aforementioned above  Remainder review of systems is negative    Exam:  General:  alert, oriented and in no distress, cooperative  Head: Normocephalic, atraumatic  Eyes:  EOMI  Pupils - equal, round, reactive to accomodation  No icterus  Normal Conjunctiva  Oropharynx: dry and normal-appearing mucosa  Neck: supple, symmetrical, trachea midline   Heart: regular rate, regular rhythm  Respiratory effort / Chest Inspection: unlabored  Lungs:  normal air entry, lungs clear to auscultation and no rales, rhonchi or wheezing   Abdomen: flat, normal findings: bowel sounds normal and soft, non-tender  Lower Limbs:  no pitting edema  Musculoskeletal: ROM grossly normal    DATA:      ECG:                               Echocardiogram completed on 1/10/22:    1  Moderately dilated left ventricular cavity with markedly reduced left ventricular systolic function with global hypokinesis  Ejection fraction is estimated as around 20%    Indeterminate diastolic dysfunction grade   2  Normal right ventricular size and visually normal right ventricular systolic function  3  Moderate biatrial enlargement  4  Normal functioning mechanical aortic valve prosthesis with mild prosthetic valve regurgitation  5  Mitral annular calcification and leaflet sclerosis and thickening, mild mitral valve regurgitation  6  Trace to mild tricuspid and trace pulmonic valve regurgitation  7  No obvious pulmonary hypertension  8  No pericardial effusion  Ischemic Testing/ stress test completed on 3/13/17:  SUMMARY:  -  Stress results: There was no chest pain during stress  -  ECG conclusions: The stress ECG was non-diagnostic due to paced rhythm and resting ST/T wave abnormality   -  Perfusion imagin) There was a large, severe, fixed myocardial perfusion defect of the entire anterior wall, with a complete fixed perfusion defect of the apex without reversibility  2) There was a large, severe, fixed myocardial perfusion defect of the entire inferior wall, extending to the mid-basal inferolateral wall without reversibility   -  Gated SPECT: The calculated left ventricular ejection fraction was 18 %  Left ventricular ejection fraction was markedly decreased by visual estimate  There was severe global left ventricular hypokinesis      IMPRESSIONS: Abnormal study after pharmacologic vasodilation  The left ventricle was dilated  There was evidence of prior myocardial infarction of the entire anterior wall extending to the apex, and the inferior wall exending to the  mid-basal inferolateral myocardial wall segments with no evidence of myocardial ischemia  Left ventricular systolic function was reduced, without distinct regional wall motion abnormalities  Weights: Wt Readings from Last 3 Encounters:   22 57 kg (125 lb 10 6 oz)   22 56 6 kg (124 lb 12 5 oz)   21 62 4 kg (137 lb 8 oz)   , There is no height or weight on file to calculate BMI           Lab Studies: Results from last 7 days   Lab Units 01/25/22  0529   TRIGLYCERIDES mg/dL 86   HDL mg/dL 13*     Results from last 7 days   Lab Units 01/28/22  0334 01/27/22  0522 01/26/22  0520   WBC Thousand/uL 18 52* 12 02* 14 38*   HEMOGLOBIN g/dL 13 9 12 3 12 3   HEMATOCRIT % 41 8 36 9 36 5   PLATELETS Thousands/uL 212 179 199   ,   Results from last 7 days   Lab Units 01/28/22  0334 01/27/22  0522 01/26/22  0520 01/25/22  0529 01/25/22  0529 01/22/22  0746 01/21/22  1413   POTASSIUM mmol/L 3 2* 3 6 3 8   < > 4 9   < > 3 7   CHLORIDE mmol/L 90* 90* 88*   < > 90*   < > 93*   CO2 mmol/L 31 35* 30   < > 30   < > 28   BUN mg/dL 32* 32* 36*   < > 27*   < > 13   CREATININE mg/dL 1 03 1 11 1 30   < > 1 20   < > 1 12   CALCIUM mg/dL 7 9* 8 0* 8 0*   < > 8 7   < > 8 0*   ALK PHOS U/L 181*  --   --   --  175*  --  121*   ALT U/L 44  --   --   --  24  --  70   AST U/L 54*  --   --   --  43  --  56*    < > = values in this interval not displayed

## 2022-01-28 NOTE — ED NOTES
Patient is very annoyed with any contact from RN  RN went in for morning medication and patient asked when he was getting a room stating "I don't wanna fucking be here! Why did you send me home with an empty oxygen tank? I am going to livia you  Just get out of my room, I don't want to talk to you " RN did needed blood draw and medication  Patient had his non rebreather handing around his neck  RN attempted to [place] patient and he stated he did not like it   RN notified patient we could switch to NC which patient accepted and then told nurse to "turn it down, I don't need it above 3 " RN then went in to provide lunch menu and phone and patient stated "I don't fucking need that just order me cereal, milk and coffee!" When RN asked what kind, patient began rubbing his head and saying "Oh my god "     Paul Stroud RN  01/28/22 [id number]

## 2022-01-28 NOTE — ED PROVIDER NOTES
History  Chief Complaint   Patient presents with    Shortness of Breath     Pt was brought in by EMS and c/o chest tightness that comes and goes, and SOB  Pt states he was "discharged from the hospital with an empty O2 tank"  Pt was d/c from hospital after stay from [facility]  [facility] Au is a 61 y o   male with PMH of hypertension, hyperlipidemia, cardiomyopathy, systolic heart failure with EF 20%, ventricular tachycardia with AICD in place, coronary artery disease who presents to the emergency department with hypoxia/shortness of breath  History is limited from patient as he refuses to answer questions  Per EMS the patient called 911 because he still has pulse oximeter was reading approximately 70%  He notes he had been increasing his oxygen at home due to decreasing numbers on his pulse ox  He states his oxygen tank ran out oxygen  EMS found patient with pulse oximeter readings of 40% and initial heart rate of 30  Patient responded to non-rebreather mask, he is not tachycardic and requiring 15 L non-rebreather  Patient states he has intermittent chest pain  He states he has been compliant with his medications  Denies any abdominal pain, nausea, vomiting diarrhea  No lower extremity pain swelling or edema  No dizziness or lightheadedness            History provided by:  Patient   used: No    Shortness of Breath  Severity:  Moderate  Onset quality:  Sudden  Duration:  1 hour  Timing:  Constant  Progression:  Worsening  Chronicity:  New  Context: URI    Context: not activity, not animal exposure, not emotional upset, not fumes, not known allergens, not occupational exposure, not pollens, not smoke exposure, not strong odors and not weather changes    Relieved by:  Nothing  Ineffective treatments:  None tried  Associated symptoms: chest pain    Associated symptoms: no abdominal pain, no claudication, no cough, no diaphoresis, no ear pain, no fever, no headaches, no hemoptysis, no neck pain, no PND, no rash, no sore throat, no sputum production, no syncope, no swollen glands, no vomiting and no wheezing    Chest pain:     Quality: aching      Severity:  Mild    Onset quality:  Gradual    Duration:  1 day    Timing:  Constant    Progression:  Unchanged    Chronicity:  New  Risk factors: prolonged immobilization    Risk factors: no recent alcohol use, no family hx of DVT, no hx of cancer, no hx of PE/DVT, no obesity, no oral contraceptive use, no recent surgery and no tobacco use        Prior to Admission Medications   Prescriptions Last Dose Informant Patient Reported? Taking?   amiodarone 100 mg tablet   No No   Sig: Take 1 tablet (100 mg total) by mouth daily with breakfast   aspirin 81 MG tablet  Self Yes No   Sig: Take 81 mg by mouth daily     Patient not taking: Reported on 1/21/2022    atorvastatin (LIPITOR) 80 mg tablet  Self No No   Sig: take 1 tablet by mouth once daily   cefdinir (OMNICEF) 300 mg capsule   No No   Sig: Take 1 capsule (300 mg total) by mouth every 12 (twelve) hours for 4 days   eplerenone (INSPRA) 25 mg tablet  Self No No   Sig: take 1 tablet by mouth once daily   furosemide (LASIX) 40 mg tablet   No No   Sig: Take 1 tablet (40 mg total) by mouth daily Or take as previously taken   lisinopril (ZESTRIL) 40 mg tablet  Self No No   Sig: take 1 tablet by mouth once daily at bedtime   metoprolol succinate (TOPROL-XL) 25 mg 24 hr tablet   No No   Sig: Take 3 tablets (75 mg total) by mouth every 12 (twelve) hours   potassium chloride (MICRO-K) 10 MEQ CR capsule   No No   Sig: Take 2 capsules (20 mEq total) by mouth daily   warfarin (COUMADIN) 4 mg tablet   No No   Sig: take 1 tablet by mouth once daily as directed      Facility-Administered Medications: None       Past Medical History:   Diagnosis Date    AICD (automatic cardioverter/defibrillator) present     medtronic     CAD (coronary artery disease)     [facility]     Cardiac disease     Cardiomyopathy ([facility] )     mixed  predominently nonischemic    Colonic mass     Coronary artery disease     Hyperlipidemia     Hypertension     Irregular heart beat     Myocardial infarction ([facility] )     2014 and 2015    Rectal bleeding     S/P AVR (aortic valve replacement)     mechanical    Wears glasses        Past Surgical History:   Procedure Laterality Date    AORTIC VALVE REPLACEMENT      APPENDECTOMY      CARDIAC DEFIBRILLATOR PLACEMENT      COLECTOMY MADELIN Right     Last Assessed: 6/7/2016    COLECTOMY LAPAROSCOPIC      COLON SURGERY Right     colectomy    CORONARY STENT PLACEMENT      INSERT / REPLACE / REMOVE PACEMAKER      MITRAL VALVE REPAIR      MITRAL VALVE REPAIR      MOUTH SURGERY      NH COLONOSCOPY FLX DX W/COLLJ [address] WHEN PFRMD N/A 1/15/2018    Procedure: COLONOSCOPY;  Surgeon: Tea Lackey MD;  Location: [facility]; Service: General    NH LAP,SURG,COLECTOMY, PARTIAL, W/ANAST N/A 6/2/2016    Procedure: RESECTION COLON RIGHT LAPAROSCOPIC HAND-ASSISTED;  Surgeon: Tea Lackey MD;  Location: [facility];  Service: General       Family History   Problem Relation Age of Onset    Diabetes Mother     Hypertension Mother     Valvular heart disease Mother     Valvular heart disease Father     Alcohol abuse Father      I have reviewed and agree with the history as documented  E-Cigarette/Vaping    E-Cigarette Use Never User      E-Cigarette/Vaping Substances     Social History     Tobacco Use    Smoking status: Never Smoker    Smokeless tobacco: Never Used   Vaping Use    Vaping Use: Never used   Substance Use Topics    Alcohol use: Not Currently    Drug use: Not Currently       Review of Systems   Constitutional: Negative for activity change, appetite change, chills, diaphoresis, fever and unexpected weight change  HENT: Negative for congestion, ear discharge, ear pain, postnasal drip, rhinorrhea, sinus pressure, sinus pain and sore throat      Respiratory: Positive for shortness of breath  Negative for apnea, cough, hemoptysis, sputum production, choking, chest tightness, wheezing and stridor  Cardiovascular: Positive for chest pain  Negative for palpitations, claudication, leg swelling, syncope and PND  Gastrointestinal: Negative for abdominal pain, blood in stool, constipation, diarrhea, nausea and vomiting  Genitourinary: Negative for dysuria, flank pain, frequency and urgency  Musculoskeletal: Negative for arthralgias, myalgias, neck pain and neck stiffness  Skin: Negative for color change, pallor, rash and wound  Neurological: Negative for dizziness, tremors, seizures, syncope, light-headedness, numbness and headaches  All other systems reviewed and are negative  Physical Exam  Physical Exam  Vitals and nursing note reviewed  Constitutional:       General: He is not in acute distress  Appearance: He is normal weight  He is ill-appearing  He is not toxic-appearing  Interventions: He is not intubated  Comments: Cachectic male   HENT:      Head: Normocephalic and atraumatic  Mouth/Throat:      Mouth: Mucous membranes are moist       Pharynx: Oropharynx is clear  Eyes:      Extraocular Movements: Extraocular movements intact  Pupils: Pupils are equal, round, and reactive to light  Neck:      Thyroid: No thyromegaly  Cardiovascular:      Rate and Rhythm: Normal rate  Rhythm irregular  No extrasystoles are present  Pulses: No decreased pulses  Heart sounds: No murmur heard  No friction rub  No gallop  Pulmonary:      Effort: Pulmonary effort is normal  No tachypnea, bradypnea, accessory muscle usage or respiratory distress  He is not intubated  Breath sounds: No stridor  Examination of the right-lower field reveals decreased breath sounds  Examination of the left-lower field reveals decreased breath sounds  Decreased breath sounds present  No wheezing, rhonchi or rales        Comments: Coarse breath sounds in the bilateral bases of the lungs  Chest:      Chest wall: No mass, deformity, tenderness, crepitus or edema  There is no dullness to percussion  Abdominal:      Palpations: Abdomen is soft  There is no hepatomegaly, splenomegaly or mass  Tenderness: There is no abdominal tenderness  There is no guarding or rebound  Musculoskeletal:         General: Normal range of motion  Cervical back: Normal range of motion and neck supple  Right lower leg: No tenderness  No edema  Left lower leg: No tenderness  No edema  Lymphadenopathy:      Cervical: No cervical adenopathy  Skin:     General: Skin is warm  Neurological:      General: No focal deficit present  Mental Status: He is alert and oriented to person, place, and time  Cranial Nerves: No cranial nerve deficit  Motor: No weakness     Psychiatric:         Mood and Affect: Mood normal          Behavior: Behavior normal          Vital Signs  ED Triage Vitals   Temperature Pulse Respirations Blood Pressure SpO2   01/28/22 0355 01/28/22 0318 01/28/22 0315 01/28/22 0315 01/28/22 0315   97 6 °F (36 4 °C) 93 20 (!) 147/112 93 %      Temp Source Heart Rate Source Patient Position - Orthostatic VS BP Location FiO2 (%)   01/28/22 0355 01/28/22 0318 01/28/22 0315 01/28/22 0315 --   Oral Monitor Lying Left arm       Pain Score       --                  Vitals:    01/28/22 0315 01/28/22 0318 01/28/22 0500 01/28/22 0509   BP: (!) 147/112  104/67 104/67   Pulse:  93  79   Patient Position - Orthostatic VS: Lying   Lying         Visual Acuity      ED Medications  Medications   iohexol (OMNIPAQUE) 350 MG/ML injection (SINGLE-DOSE) 85 mL (85 mL Intravenous Given 1/28/22 0350)   cefepime (MAXIPIME) 2 g/50 mL dextrose IVPB (2,000 mg Intravenous New Bag 1/28/22 9407)       Diagnostic Studies  Results Reviewed     Procedure Component Value Units Date/Time    HS Troponin I 2hr [[id number]]  (Abnormal) Collected: 01/28/22 0530    Lab Status: Final result Specimen: Blood from Arm, Left Updated: 01/28/22 0559     hs TnI 2hr 93 ng/L      Delta 2hr hsTnI 2 ng/L     Procalcitonin with AM Reflex [[id number]]  (Abnormal) Collected: 01/28/22 0451    Lab Status: Final result Specimen: Blood from Arm, Left Updated: 01/28/22 0528     Procalcitonin 1 00 ng/ml     Procalcitonin Reflex [[id number]]     Lab Status: No result Specimen: Blood     Blood gas, venous [[id number]]  (Abnormal) Collected: 01/28/22 0454    Lab Status: Final result Specimen: Blood from Arm, Left Updated: 01/28/22 0512     pH, Marquis 7 475     pCO2, Marquis 31 9 mm Hg      pO2, Marquis 47 2 mm Hg      HCO3, Marquis 23 0 mmol/L      Base Excess, Marquis 0 2 mmol/L      O2 Content, Marquis 15 7 ml/dL      O2 HGB, VENOUS 82 3 %     Blood culture [[id number]] Collected: 01/28/22 0455    Lab Status: In process Specimen: Blood from Arm, Right Updated: 01/28/22 0459    Blood culture [[id number]] Collected: 01/28/22 0451    Lab Status: In process Specimen: Blood from Arm, Left Updated: 01/28/22 0457    Urine Microscopic [[id number]]  (Abnormal) Collected: 01/28/22 0340    Lab Status: Final result Specimen: Urine, Clean Catch Updated: 01/28/22 0448     RBC, UA 0-1 /hpf      WBC, UA None Seen /hpf      Epithelial Cells Occasional /hpf      Bacteria, UA Occasional /hpf     HS Troponin 0hr (reflex protocol) [[id number]]  (Abnormal) Collected: 01/28/22 0334    Lab Status: Final result Specimen: Blood from Arm, Left Updated: 01/28/22 0441     hs TnI 0hr 91 ng/L     HS Troponin I 4hr [[id number]]     Lab Status: No result Specimen: Blood     Lactic acid [[id number]]  (Abnormal) Collected: 01/28/22 0334    Lab Status: Final result Specimen: Blood from Arm, Left Updated: 01/28/22 0439     LACTIC ACID 3 9 mmol/L     Narrative:      Result may be elevated if tourniquet was used during collection      Lactic acid 2 Hours [[id number]]     Lab Status: No result Specimen: Blood     Basic metabolic panel [[id number]]  (Abnormal) Collected: 01/28/22 0334    Lab Status: Final result Specimen: Blood from Arm, Left Updated: 01/28/22 0439     Sodium 129 mmol/L      Potassium 3 2 mmol/L      Chloride 90 mmol/L      CO2 31 mmol/L      ANION GAP 8 mmol/L      BUN 32 mg/dL      Creatinine 1 03 mg/dL      Glucose 89 mg/dL      Calcium 7 9 mg/dL      eGFR 80 ml/min/1 73sq m     Narrative:      [place] guidelines for Chronic Kidney Disease (CKD):     Stage 1 with normal or high GFR (GFR > 90 mL/min/1 73 square meters)    Stage 2 Mild CKD (GFR = 60-89 mL/min/1 73 square meters)    Stage 3A Moderate CKD (GFR = 45-59 mL/min/1 73 square meters)    Stage 3B Moderate CKD (GFR = 30-44 mL/min/1 73 square meters)    Stage 4 Severe CKD (GFR = 15-29 mL/min/1 73 square meters)    Stage 5 End Stage CKD (GFR <15 mL/min/1 73 square meters)  Note: GFR calculation is accurate only with a steady state creatinine    Hepatic function panel [[id number]]  (Abnormal) Collected: 01/28/22 0334    Lab Status: Final result Specimen: Blood from Arm, Left Updated: 01/28/22 0439     Total Bilirubin 0 98 mg/dL      Bilirubin, Direct 0 32 mg/dL      Alkaline Phosphatase 181 U/L      AST 54 U/L      ALT 44 U/L      Total Protein 6 6 g/dL      Albumin 2 4 g/dL     NT-BNP PRO [[id number]]  (Abnormal) Collected: 01/28/22 0334    Lab Status: Final result Specimen: Blood from Arm, Left Updated: 01/28/22 0439     NT-proBNP 31,172 pg/mL     Protime-INR [[id number]]  (Abnormal) Collected: 01/28/22 0334    Lab Status: Final result Specimen: Blood from Arm, Left Updated: 01/28/22 0427     Protime 20 9 seconds      INR 1 89    APTT [[id number]]  (Normal) Collected: 01/28/22 0334    Lab Status: Final result Specimen: Blood from Arm, Left Updated: 01/28/22 0427     PTT 37 seconds     CBC and differential [[id number]]  (Abnormal) Collected: 01/28/22 0334    Lab Status: Final result Specimen: Blood from Arm, Left Updated: 01/28/22 0410     WBC 18 52 Thousand/uL      RBC 5 20 Million/uL      Hemoglobin 13 9 g/dL      Hematocrit 41 8 %      MCV 80 fL      MCH 26 7 pg      MCHC 33 3 g/dL      RDW 14 4 %      MPV 10 4 fL      Platelets 472 Thousands/uL      nRBC 0 /100 WBCs      Neutrophils Relative 88 %      Immat GRANS % 2 %      Lymphocytes Relative 5 %      Monocytes Relative 5 %      Eosinophils Relative 0 %      Basophils Relative 0 %      Neutrophils Absolute 16 41 Thousands/µL      Immature Grans Absolute 0 33 Thousand/uL      Lymphocytes Absolute 0 83 Thousands/µL      Monocytes Absolute 0 93 Thousand/µL      Eosinophils Absolute 0 00 Thousand/µL      Basophils Absolute 0 02 Thousands/µL     Urine Macroscopic, POC [[id number]]  (Abnormal) Collected: 01/28/22 0340    Lab Status: Final result Specimen: Urine Updated: 01/28/22 0342     Color, UA Karen     Clarity, UA Clear     pH, UA 5 5     Leukocytes, UA Negative     Nitrite, UA Negative     Protein, UA Negative mg/dl      Glucose, UA Negative mg/dl      Ketones, UA Negative mg/dl      Urobilinogen, UA 0 2 E U /dl      Bilirubin, UA Negative     Blood, UA Trace     Specific [place], UA 1 020    Narrative:      CLINITEK RESULT                 CTA ED chest PE Study   Final Result by Analy Lainez MD (01/28 0411)   Addendum 1 of 1 by Analy Lainez MD (01/28 0411)   ADDENDUM:      First line of impression should read:      "No evidence of pulmonary embolus"      Final      Evidence of pulmonary embolus  Extensive bilateral peripheral predominant groundglass airspace disease consistent with patient's known COVID pneumonia      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 measuring up to 41 mm    Recommendation is for follow-up low radiation dose chest CT in one year                  Workstation performed: [id number]                    Procedures  ECG 12 Lead Documentation Only    Date/Time: 1/28/2022 4:00 AM  Performed by: Patrica Velasquez PA-C  Authorized by: Patrica Velasquez PA-C     Indications / Diagnosis:  SOB  ECG reviewed by me, the ED Provider: yes Patient location:  ED  Previous ECG:     Previous ECG:  Compared to current    Similarity:  Changes noted  Interpretation:     Interpretation: abnormal    Rate:     ECG rate:  102    ECG rate assessment: tachycardic    Rhythm:     Rhythm: paced    Pacing:     Capture:  Complete    Type of pacing:  Ventricular  ST segments:     ST segments:  Normal  T waves:     T waves: normal      ECG 12 Lead Documentation Only    Date/Time: 1/28/2022 5:44 AM  Performed by: Andrea George PA-C  Authorized by: Andrea George PA-C     Indications / Diagnosis:  Delta  ECG reviewed by me, the ED Provider: yes    Patient location:  ED  Previous ECG:     Previous ECG:  Compared to current    Similarity:  No change  Interpretation:     Interpretation: abnormal    Rate:     ECG rate:  79    ECG rate assessment: normal    Rhythm:     Rhythm: paced    Pacing:     Capture:  Complete    Type of pacing:  Ventricular  Ectopy:     Ectopy: none    QRS:     QRS axis:  Normal    QRS intervals:  Normal  Conduction:     Conduction: normal    ST segments:     ST segments:  Normal  T waves:     T waves: normal               ED Course  ED Course as of 01/28/22 0610   Fri Jan 28, 2022   5140 Patient seen and examined  Presents with hypoxia and intermittent chest pain  Recently discharged on 1/27 due to CHF exacerbation and COVID  D/c on 2L oxygen  Found to by hypoxic to 40s by EMS with HR in 30s  Nor requiring 10L with Pulse ox at 93%  Complaining of intermittent pleuritic chest pain  Unfortunately history is limited and patient stopped answering questions because "you ask too many questions"   26 Went to reassess- patient states shouted profanities and would not answer questions  10L, 97%   0544 Admitted to [address] [place] [address]                            Initial Sepsis Screening     Row Name 01/28/22 3281                Is the patient's history suggestive of a new or worsening infection?  Yes (Proceed)  -RM        Suspected source of infection pneumonia  -RM Are two or more of the following signs & symptoms of infection both present and new to the patient? Yes (Proceed)  -RM        Indicate SIRS criteria Tachycardia > 90 bpm;Leukocytosis (WBC > 47763 IJL)  -RM        If the answer is yes to both questions, suspicion of sepsis is present --        If severe sepsis is present AND tissue hypoperfusion perists in the hour after fluid resuscitation or lactate > 4, the patient meets criteria for SEPTIC SHOCK --        Are any of the following organ dysfunction criteria present within 6 hours of suspected infection and SIRS criteria that are NOT considered to be chronic conditions? Yes  -RM        Organ dysfunction Lactate > 2 0 mmol/L  -RM        Date of presentation of severe sepsis 01/28/22  -RM        Time of presentation of severe sepsis 0442  -RM        Tissue hypoperfusion persists in the hour after crystalloid fluid administration, evidenced, by either: --        Was hypotension present within one hour of the conclusion of crystalloid fluid administration? --        Date of presentation of septic shock --        Time of presentation of septic shock --              User Key  (r) = Recorded By, (t) = Taken By, (c) = Cosigned By    [facility] [address] Name Provider Type    RM Zhane Reyes PA-C Physician Assistant                SBIRT 22yo+      Most Recent Value   SBIRT (23 yo +)    In order to provide better care to our patients, we are screening all of our patients for alcohol and drug use  Would it be okay to ask you these screening questions?  Unable to answer at this time Filed at: 01/28/2022 6416          Wells' Criteria for PE      Most Recent Value   Wells' Criteria for PE    Clinical signs and symptoms of DVT 0 Filed at: 01/28/2022 8832   PE is primary diagnosis or equally likely 3 Filed at: 01/28/2022 0351   HR >100 1 5 Filed at: 01/28/2022 0351   Immobilization at least 3 days or Surgery in the previous 4 weeks 1 5 Filed at: 01/28/2022 0351   Previous, objectively diagnosed PE or DVT 0 Filed at: 01/28/2022 0351   Hemoptysis 0 Filed at: 01/28/2022 9194   Malignancy with treatment within 6 months or palliative --   Wells' Criteria Total 6 Filed at: 01/28/2022 1406                MDM  Number of Diagnoses or Management Options  Acute respiratory failure with hypoxia [facility]): new and requires workup  Chronic combined systolic and diastolic heart failure ([address] ): established and worsening  Hyponatremia: established and worsening  Lactic acidosis: new and requires workup  Pneumonia: established and worsening  Severe sepsis [facility]): new and requires workup  Diagnosis management comments: Patient was seen and examined  in the emergency department for chief complaint of hypoxia and shortness of breath  The patient presented with reported pulse oximeter of 40% at home as well as heart rate of 30  Patient responded with 15 L non-rebreather  He arrives 15 L of non-rebreather saturating at 90%  He had complaints of shortness of breath and mild chest pain  He was recently discharged from the hospital the night prior due to CHF exacerbation and possible pneumonia, plan was for 4 days of outpatient antibiotics  On arrival patient was ill-appearing but in no acute distress, lung sounds are coarse in the bases and patient was tachycardic  Abdomen soft nontender nondistended  No lower extremity edema or swelling  No signs of skin rashes or erythema  Patient is COVID positive 01/09/2022    DDX including but not limited to: pneumonia, pleural effusion, CHF, PE, PTX, ACS, MI, asthma exacerbation, COPD exacerbation, COVID 19, anemia, metabolic abnormality, renal failure  Workup: Will obtain CBC, CMP, troponin, EKG, BNP, CT PE study, VBG  VBG shows respiratory alkalosis  CBC with leukocytosis to 18, in the setting of tachycardia will initiate sepsis workup  CMP with hyponatremia, no JASON  EKG is V paced but tachycardic  BNP is elevated at [address]    Patient is not clinically overloaded  CT PE study shows ground-glass opacities concerning for COVID pneumonia, no pulmonary embolism    Lactic acidosis to 3 9, no hypotension- will hold off on fluids in the setting of EF of 18%  Due to lactic acidosis patient meets criteria for severe sepsis- possible source being pneumonia  Do not suspect skin source, do not suspect CNS source  At this point there is no evidence to support GI source  Will initiate antibiotics had a possible source of pneumonia, patient will require admission due to hypoxia and severe sepsis    Disposition:  General impression 58-year-old male who presents with acute respiratory failure with hypoxia and tachycardia on arrival   Patient found to be in severe sepsis likely secondary to pneumonia versus other unidentified source  Patient will require admission  The patient was admitted to the internal medicine service for further evaluation, management, and observation  The patient remained stable while under my care   Care was transferred with full report to admitting team         Amount and/or Complexity of Data Reviewed  Clinical lab tests: ordered and reviewed  Tests in the radiology section of CPT®: ordered and reviewed  Tests in the medicine section of CPT®: ordered and reviewed  Obtain history from someone other than the patient: yes (EMS)  Review and summarize past medical records: yes  Discuss the patient with other providers: yes (Internal medicine)  Independent visualization of images, tracings, or specimens: yes    Risk of Complications, Morbidity, and/or Mortality  Presenting problems: high  Diagnostic procedures: high  Management options: moderate    Patient Progress  Patient progress: stable      Disposition  Final diagnoses:   Acute respiratory failure with hypoxia (HCC)   Severe sepsis ([address] )   Chronic combined systolic and diastolic heart failure (HCC)   Hyponatremia   Pneumonia   Lactic acidosis     Time reflects when diagnosis was documented in both MDM as applicable and the Disposition within this note     Time User Action Codes Description Comment    1/28/2022  5:36 AM Francella Ruck Add [J96 01] Acute respiratory failure with [facility]     1/28/2022  5:36 AM Francella Ruck Add [A41 9,  R65 20] Severe sepsis ([facility] )     1/28/2022  5:37 AM Francella Ruck Add [I50 42] Chronic combined systolic and diastolic heart failure ([facility] [facility] )     1/28/2022  5:37 AM Francella Ruck Add [E87 1] Hyponatremia     1/28/2022  5:38 AM Francella Ruck Add [J18 9] Pneumonia     1/28/2022  5:38 AM Francella Ruck Add [E87 2] Lactic acidosis       ED Disposition     ED Disposition Condition Date/Time Comment    Admit Stable Fri Jan 28, 2022  5:36 AM Case was discussed with BRICE and the patient's admission status was agreed to be Admission Status: inpatient status to the service of Dr Verónica Galloway          Follow-up Information    None         Patient's Medications   Discharge Prescriptions    No medications on file       No discharge procedures on file      PDMP Review       Value Time User    PDMP Reviewed  Yes 1/9/2022  4:07 PM Cesar Meek DO          ED Provider  Electronically Signed by           Todd Gunderson PA-C  01/28/22 8030

## 2022-01-28 NOTE — QUICK NOTE
[address]  Cardiology  Code R / Heart Failure Readmission Alert    Cardiology consult service received Code R / Heart failure Readmission alert through [employer] connect system/AutoNavi  Plan:  Patient will require admission, please see Cardiology consultation for further recommendations

## 2022-01-28 NOTE — ASSESSMENT & PLAN NOTE
· History of VFib/Vtac s/p ICD  Continues to have episodes of V-tach per outpatient cardiology note   Maintained on amiodarone and Toprol

## 2022-01-28 NOTE — ASSESSMENT & PLAN NOTE
· Rheumatic heart disease s/p mechanical AVR in 1980's   Anticoagulated on warfarin  · Goal INR 2 5-3 5  · Monitor INR

## 2022-01-28 NOTE — ASSESSMENT & PLAN NOTE
· Incidental finding on CTA study of 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 measuring up to 41 mm    Recommendation is for follow-up low radiation dose chest CT in one year  · Outpatient follow-up

## 2022-01-28 NOTE — H&P
[address] 1965, 61 y o  male MRN: [id number]  Unit/Bed#: ED 15 Encounter: [id number]  Primary Care Provider: Melany Poole PA-C   Date and time admitted to hospital: 1/28/2022  3:13 AM    * Acute respiratory failure with hypoxia (HCC)  Assessment & Plan  · Reports dyspnea, called 911  Per EMS O2 sat in the 40s requiring NRB  · Was just discharged yesterday, treated for CHF exacerbation; recent COVID Jan 9 requiring intubation  Was treated for multifocal pneumonia with 3 days of IV ceftriaxone, discharge home with Rx for cefdinir  Will continue to complete treatment  · Discharged with home oxygen  · CTA study negative for PE; (impression states evidence of pulmonary embolism--> see correction on upper part of report ) Extensive B/L peripheral predominant groundglass airspace disease consistent with patient's known COVID pneumonia    Elevated troponin  Assessment & Plan  · Chronically elevated troponin; maybe NSTEMI II in setting hypoxic respiratory failure  · Cardiology consult    S/P AVR (aortic valve replacement)  Assessment & Plan  · Rheumatic heart disease s/p mechanical AVR in 1980's  Anticoagulated on warfarin  · Goal INR 2 5-3 5  · Monitor INR    Coronary artery disease involving native coronary artery of native heart with angina pectoris [facility])  Assessment & Plan  · CAD s/p PCI in 2014  Continue ASA, BB and statin    History of ventricular fibrillation  Assessment & Plan  · History of VFib/Vtac s/p ICD  Continues to have episodes of V-tach per outpatient cardiology note  Maintained on amiodarone and Toprol    Aortic ectasia, thoracic (HCC)  Assessment & Plan  · Incidental finding on CTA study of 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 measuring up to 41 mm    Recommendation is for follow-up low radiation dose chest CT in one year  · Outpatient follow-up    Chronic combined systolic and diastolic congestive heart failure [facility])  Assessment & Plan  Wt Readings from Last 3 Encounters:   01/27/22 57 kg (125 lb 10 6 oz)   01/12/22 56 6 kg (124 lb 12 5 oz)   09/13/21 62 4 kg (137 lb 8 oz)     · Elevated BNP >31,000  Was just discharged yesterday for CHF exacerbation, BNP on 1/25 15,000  Continue home dose furosemide and monitor daily weight  · Low Na diet with fluid restriction  · Cardiology consult    Medically noncompliant  Assessment & Plan  · History of medical noncompliance  Consider referral for high utilizer plan    Ischemic cardiomyopathy  Assessment & Plan  · Ischemic cardiomyopathy with LVEF 18% s/p ICD    Cocaine use  Assessment & Plan  · History of marijuana and cocaine abuse    VTE Prophylaxis: Warfarin (Coumadin)  / sequential compression device   Code Status: fc by default  POLST: POLST is not applicable to this patient  Discussion with family:     Anticipated Length of Stay:  Patient will be admitted on an Inpatient basis with an anticipated length of stay of  > 2 midnights  Justification for Hospital Stay:  Respiratory failure    Total Time for Visit, including Counseling / Coordination of Care: 60 minutes  Greater than 50% of this total time spent on direct patient counseling and coordination of care  Chief Complaint:   Unable to obtain    History of Present Illness:    Enrrique Casiano is a 61 y o  male who presents with respiratory failure, unable to obtain HPI, patient belligerent, rude and irritable; patient screaming, told me to shut up and leave the room  Review of Systems:    Review of Systems   Unable to perform ROS: Other       Past Medical and Surgical History:     Past Medical History:   Diagnosis Date    AICD (automatic cardioverter/defibrillator) present     medtronic     CAD (coronary artery disease)     [facility]     Cardiac disease     Cardiomyopathy ([facility] )     mixed    predominently nonischemic    Colonic mass     Coronary artery disease     Hyperlipidemia     Hypertension  Irregular heart beat     Myocardial infarction ([facility] )     2014 and 2015    Rectal bleeding     S/P AVR (aortic valve replacement)     mechanical    Wears glasses        Past Surgical History:   Procedure Laterality Date    AORTIC VALVE REPLACEMENT      APPENDECTOMY      CARDIAC DEFIBRILLATOR PLACEMENT      COLECTOMY MADELIN Right     Last Assessed: 6/7/2016    COLECTOMY LAPAROSCOPIC      COLON SURGERY Right     colectomy    CORONARY STENT PLACEMENT      INSERT / REPLACE / REMOVE PACEMAKER      MITRAL VALVE REPAIR      MITRAL VALVE REPAIR      MOUTH SURGERY      FL COLONOSCOPY FLX DX W/COLLJ [facility] WHEN PFRMD N/A 1/15/2018    Procedure: COLONOSCOPY;  Surgeon: Khurram Archer MD;  Location: [facility]; Service: General    FL LAP,SURG,COLECTOMY, PARTIAL, W/ANAST N/A 6/2/2016    Procedure: RESECTION COLON RIGHT LAPAROSCOPIC HAND-ASSISTED;  Surgeon: Khurram Archer MD;  Location: [facility];  Service: General       Meds/Allergies:    Prior to Admission medications    Medication Sig Start Date End Date Taking? Authorizing Provider   amiodarone 100 mg tablet Take 1 tablet (100 mg total) by mouth daily with breakfast 1/13/22   Precious Giraldo MD   aspirin 81 MG tablet Take 81 mg by mouth daily    Patient not taking: Reported on 1/21/2022     Historical Provider, MD   atorvastatin (LIPITOR) 80 mg tablet take 1 tablet by mouth once daily 8/11/21   Gail Lenz MD   cefdinir (OMNICEF) 300 mg capsule Take 1 capsule (300 mg total) by mouth every 12 (twelve) hours for 4 days 1/27/22 1/31/22  [employer]SE C   eplerenone (INSPRA) 25 mg tablet take 1 tablet by mouth once daily 6/4/21   Kinga Foster DO   furosemide (LASIX) 40 mg tablet Take 1 tablet (40 mg total) by mouth daily Or take as previously taken 1/12/22   Precious Giraldo MD   lisinopril (ZESTRIL) 40 mg tablet take 1 tablet by mouth once daily at bedtime 3/6/21   Dayne Hurtado DO   metoprolol succinate (TOPROL-XL) 25 mg 24 hr tablet Take 3 tablets (75 mg total) by mouth every 12 (twelve) hours 1/27/22   Kinga Nair PA-C   potassium chloride (MICRO-K) 10 MEQ CR capsule Take 2 capsules (20 mEq total) by mouth daily 1/12/22   Divine Cline MD   warfarin (COUMADIN) 4 mg tablet take 1 tablet by mouth once daily as directed 12/8/21   Sandoval Escalera DO     I have reviewed home medications using allscripts  Allergies: No Known Allergies    Social History:     Marital Status: /Civil Union   Occupation:   Patient Pre-hospital Living Situation:   Patient Pre-hospital Level of Mobility:   Patient Pre-hospital Diet Restrictions:   Substance Use History:   Social History     Substance and Sexual Activity   Alcohol Use Not Currently     Social History     Tobacco Use   Smoking Status Never Smoker   Smokeless Tobacco Never Used     Social History     Substance and Sexual Activity   Drug Use Not Currently       Family History:    Family History   Problem Relation Age of Onset    Diabetes Mother     Hypertension Mother     Valvular heart disease Mother     Valvular heart disease Father     Alcohol abuse Father        Physical Exam:     Vitals:   Blood Pressure: 104/67 (01/28/22 0509)  Pulse: 79 (01/28/22 0509)  Temperature: 97 6 °F (36 4 °C) (01/28/22 0355)  Temp Source: Oral (01/28/22 0355)  Respirations: 18 (01/28/22 0509)  SpO2: 94 % (01/28/22 0515)    Physical Exam  Constitutional:       General: He is not in acute distress  Appearance: He is not ill-appearing, toxic-appearing or diaphoretic  Comments: Under weight, unkempt   Cardiovascular:      Rate and Rhythm: Normal rate and regular rhythm  Heart sounds: Normal heart sounds  Comments: Left chest ICD  Pulmonary:      Breath sounds: Rales present  Musculoskeletal:      Right lower leg: No edema  Left lower leg: No edema  Neurological:      Mental Status: He is alert  Psychiatric:      Comments: Belligerent        Unable to complete assessment      Additional Data:     Lab Results:  I have personally reviewed pertinent reports  Results from last 7 days   Lab Units 01/28/22  0334 01/22/22  0746 01/21/22  1413   WBC Thousand/uL 18 52*   < > 10 48*   HEMOGLOBIN g/dL 13 9   < > 13 7   HEMATOCRIT % 41 8   < > 41 0   PLATELETS Thousands/uL 212   < > 221   BANDS PCT %  --   --  1   NEUTROS PCT % 88*   < >  --    LYMPHS PCT % 5*   < >  --    LYMPHO PCT %  --   --  2*   MONOS PCT % 5   < >  --    MONO PCT %  --   --  2*   EOS PCT % 0   < > 0    < > = values in this interval not displayed  Results from last 7 days   Lab Units 01/28/22  0334   SODIUM mmol/L 129*   POTASSIUM mmol/L 3 2*   CHLORIDE mmol/L 90*   CO2 mmol/L 31   BUN mg/dL 32*   CREATININE mg/dL 1 03   ANION GAP mmol/L 8   CALCIUM mg/dL 7 9*   ALBUMIN g/dL 2 4*   TOTAL BILIRUBIN mg/dL 0 98   ALK PHOS U/L 181*   ALT U/L 44   AST U/L 54*   GLUCOSE RANDOM mg/dL 89     Results from last 7 days   Lab Units 01/28/22  0334   INR  1 89*             Results from last 7 days   Lab Units 01/28/22  0451 01/28/22  0334 01/25/22  0529 01/24/22  0540   LACTIC ACID mmol/L  --  3 9*  --   --    PROCALCITONIN ng/ml 1 00*  --  2 12* 2 59*       Imaging: I have personally reviewed pertinent reports  CTA ED chest PE Study   Final Result by Dustin Masters MD (01/28 0411)   Addendum 1 of 1 by Dustin Masters MD (01/28 0411)   ADDENDUM:      First line of impression should read:      "No evidence of pulmonary embolus"      Final      Evidence of pulmonary embolus  Extensive bilateral peripheral predominant groundglass airspace disease consistent with patient's known COVID pneumonia      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

## 2022-01-29 NOTE — ASSESSMENT & PLAN NOTE
Wt Readings from Last 3 Encounters:   01/28/22 57 kg (125 lb 10 6 oz)   01/27/22 57 kg (125 lb 10 6 oz)   01/12/22 56 6 kg (124 lb 12 5 oz)     · Elevated BNP >31,000  · Diuretics per Cardiology, currently on Lasix 40 and eplerenone 25mg once daily  · Appears euvolemic

## 2022-01-29 NOTE — PLAN OF CARE
Problem: Potential for Falls  Goal: Patient will remain free of falls  Description: INTERVENTIONS:  - Educate patient/family on patient safety including physical limitations  - Instruct patient to call for assistance with activity   - Consult OT/PT to assist with strengthening/mobility   - Keep Call bell within reach  - Keep bed low and locked with side rails adjusted as appropriate  - Keep care items and personal belongings within reach  - Initiate and maintain comfort rounds  - Make Fall Risk Sign visible to staff  - Offer Toileting every  Hours, in advance of need  - Initiate/Maintain alarm  - Obtain necessary fall risk management equipment  - Apply yellow socks and bracelet for high fall risk patients  - Consider moving patient to room near nurses station  Outcome: Progressing     Problem: PAIN - ADULT  Goal: Verbalizes/displays adequate comfort level or baseline comfort level  Description: Interventions:  - Encourage patient to monitor pain and request assistance  - Assess pain using appropriate pain scale  - Administer analgesics based on type and severity of pain and evaluate response  - Implement non-pharmacological measures as appropriate and evaluate response  - Consider cultural and social influences on pain and pain management  - Notify physician/advanced practitioner if interventions unsuccessful or patient reports new pain  Outcome: Progressing     Problem: INFECTION - ADULT  Goal: Absence or prevention of progression during hospitalization  Description: INTERVENTIONS:  - Assess and monitor for signs and symptoms of infection  - Monitor lab/diagnostic results  - Monitor all insertion sites, i e  indwelling lines, tubes, and drains  - Monitor endotracheal if appropriate and nasal secretions for changes in amount and color  - [place] appropriate cooling/warming therapies per order  - Administer medications as ordered  - Instruct and encourage patient and family to use good hand hygiene technique  - Identify and instruct in appropriate isolation precautions for identified infection/condition  Outcome: Progressing  Goal: Absence of fever/infection during neutropenic period  Description: INTERVENTIONS:  - Monitor WBC    Outcome: Progressing     Problem: SAFETY ADULT  Goal: Patient will remain free of falls  Description: INTERVENTIONS:  - Educate patient/family on patient safety including physical limitations  - Instruct patient to call for assistance with activity   - Consult OT/PT to assist with strengthening/mobility   - Keep Call bell within reach  - Keep bed low and locked with side rails adjusted as appropriate  - Keep care items and personal belongings within reach  - Initiate and maintain comfort rounds  - Make Fall Risk Sign visible to staff  - Offer Toileting every  Hours, in advance of need  - Initiate/Maintain alarm  - Obtain necessary fall risk management equipment:   - Apply yellow socks and bracelet for high fall risk patients  - Consider moving patient to room near nurses station  Outcome: Progressing  Goal: Maintain or return to baseline ADL function  Description: INTERVENTIONS:  -  Assess patient's ability to carry out ADLs; assess patient's baseline for ADL function and identify physical deficits which impact ability to perform ADLs (bathing, care of mouth/teeth, toileting, grooming, dressing, etc )  - Assess/evaluate cause of self-care deficits   - Assess range of motion  - Assess patient's mobility; develop plan if impaired  - Assess patient's need for assistive devices and provide as appropriate  - Encourage maximum independence but intervene and supervise when necessary  - Involve family in performance of ADLs  - Assess for home care needs following discharge   - Consider OT consult to assist with ADL evaluation and planning for discharge  - Provide patient education as appropriate  Outcome: Progressing  Goal: Maintains/Returns to pre admission functional level  Description: INTERVENTIONS:  - Perform BMAT or MOVE assessment daily    - Set and communicate daily mobility goal to care team and patient/family/caregiver  - Collaborate with rehabilitation services on mobility goals if consulted  - Perform Range of Motion  times a day  - Reposition patient every  hours  - Dangle patient  times a day  - Stand patient  times a day  - Ambulate patient  times a day  - Out of bed to chair  times a day   - Out of bed for meals  times a day  - Out of bed for toileting  - Record patient progress and toleration of activity level   Outcome: Progressing     Problem: DISCHARGE PLANNING  Goal: Discharge to home or other facility with appropriate resources  Description: INTERVENTIONS:  - Identify barriers to discharge w/patient and caregiver  - Arrange for needed discharge resources and transportation as appropriate  - Identify discharge learning needs (meds, wound care, etc )  - Arrange for interpretive services to assist at discharge as needed  - Refer to Case Management Department for coordinating discharge planning if the patient needs post-hospital services based on physician/advanced practitioner order or complex needs related to functional status, cognitive ability, or social support system  Outcome: Progressing     Problem: Knowledge Deficit  Goal: Patient/family/caregiver demonstrates understanding of disease process, treatment plan, medications, and discharge instructions  Description: Complete learning assessment and assess knowledge base    Interventions:  - Provide teaching at level of understanding  - Provide teaching via preferred learning methods  Outcome: Progressing     Problem: RESPIRATORY - ADULT  Goal: Achieves optimal ventilation and oxygenation  Description: INTERVENTIONS:  - Assess for changes in respiratory status  - Assess for changes in mentation and behavior  - Position to facilitate oxygenation and minimize respiratory effort  - Oxygen administered by appropriate delivery if ordered  - Initiate smoking cessation education as indicated  - Encourage broncho-pulmonary hygiene including cough, deep breathe, Incentive Spirometry  - Assess the need for suctioning and aspirate as needed  - Assess and instruct to report SOB or any respiratory difficulty  - Respiratory Therapy support as indicated  Outcome: Progressing

## 2022-01-29 NOTE — QUICK NOTE
Notified by rn of anxiety/agitation and hypoxia  Pt improved w/midflow 10L in mid 90% O2 sats  bp normotensive and RR improved when not agitated  Pt family member at bedside  Pt very anxious regarding midflow as this is uncomfortable  There was concern he may AMA  D/w pt r/b of anxiolytic for sleep aide regrding O2 levels  Pt is willing to try low dose ativan  D/w RN who is in agreement

## 2022-01-29 NOTE — ASSESSMENT & PLAN NOTE
· Presented with dyspnea, noted to be hypoxic  · Recently discharge, treated for CHF exacerbation and COVID pneumonia  · CTA study negative for PE, continues shows bilateral ground-glass opacities consistent with patient's known history of COVID  · Previously discharged on 2 L nasal cannula  · Pulmonary consulted, appreciate recommendations  · On 1/28, noted increased O2 req midflow and NRB, suspect likely anxiety component due to improvement with ativan  · Patient appears euvolemic on exam  · Will likely need increased oxygen prior to discharge with concentrator  · Plan for home O2 eval tomorrow

## 2022-01-29 NOTE — QUICK NOTE
Paged by bedside RN, patient hypoxic to 80s on 5L with dyspnea  RR26, other vital signs are stable  Placed on NRB now up to 94%  Paged RT  Asked in house AP to evaluate patient

## 2022-01-29 NOTE — CONSULTS
Consultation - Pulmonary Medicine   [facility] A [place] 61 y o  male MRN: [id number]  Unit/Bed#: E4 -01 Encounter: [id number]      Assessment:  Acute on chronic hypoxic respiratory failure  Status COVID pneumonia prolonged hospitalization  Chronic systolic heart failure with EF of 20%  Hyponatremia    Plan:   It appears the patient decompensated because he did not have oxygen home  He had a portable tank but no concentrator  His oxygen ran overnight in the left breathing room air  His symptoms did not progress reviewed any acute illness  A pulmonary standpoint no COVID directed therapy is indicated at this time  I do not suspect any new acute bacterial process  Continue to wean oxygen to keep sats greater than 90% and reconsultation management to assure patient gets the oxygen needs at home  Cardiology consult appreciated however in the setting [place] [address] perhaps home Lasix dose could be increased chronically for better control  History of Present Illness   Physician Requesting Consult: Billy Swanson MD  Reason for Consult / Principal Problem:  Hypoxia    HPI: Karla Espinoza is a 61y o  year old male who presents with acute shortness of breath  Patient went home 2 days ago after prolonged hospitalization for COVID feeling better  Unfortunately he still required oxygen and was given a portable tank  He went to bed and when he woke up in the night his oxygen tank was empty and he had no home concentrator  At that time he was short of breath breathing room air and called 911  He has no increase in his lower extremity edema chest pain or new cough  He has no new symptoms since discharge from hospital 3 days ago  He does have a very extensive hospitalization with deconditioning and severe cardiac disease      Inpatient consult to Pulmonology  Consult performed by: Kyung Dumont DO  Consult ordered by: Billy Swanson MD          Review of Systems Constitutional: Negative  HENT: Negative  Eyes: Negative  Respiratory: Negative for shortness of breath  Cardiovascular: Negative  Gastrointestinal: Negative  Endocrine: Negative  Genitourinary: Negative  Allergic/Immunologic: Negative  Neurological: Negative  Hematological: Negative  Psychiatric/Behavioral: Negative  Historical Information   Past Medical History:   Diagnosis Date    AICD (automatic cardioverter/defibrillator) present     medtronic     CAD (coronary artery disease)     Cancer [facility] )     Cardiac disease     Cardiomyopathy ([facility] )     mixed  predominently nonischemic    Colonic mass     Coronary artery disease     Hyperlipidemia     Hypertension     Irregular heart beat     Myocardial infarction ([facility] )     2014 and 2015    Rectal bleeding     S/P AVR (aortic valve replacement)     mechanical    Wears glasses      Past Surgical History:   Procedure Laterality Date    AORTIC VALVE REPLACEMENT      APPENDECTOMY      CARDIAC DEFIBRILLATOR PLACEMENT      COLECTOMY MADELIN Right     Last Assessed: 6/7/2016    COLECTOMY LAPAROSCOPIC      COLON SURGERY Right     colectomy    CORONARY STENT PLACEMENT      INSERT / REPLACE / REMOVE PACEMAKER      MITRAL VALVE REPAIR      MITRAL VALVE REPAIR      MOUTH SURGERY      MA COLONOSCOPY FLX DX W/COLLJ [address] WHEN PFRMD N/A 1/15/2018    Procedure: COLONOSCOPY;  Surgeon: Marcelino Eller MD;  Location: [facility];   Service: General    MA LAP,SURG,COLECTOMY, PARTIAL, W/ANAST N/A 6/2/2016    Procedure: RESECTION COLON RIGHT LAPAROSCOPIC HAND-ASSISTED;  Surgeon: Marcelino Eller MD;  Location: [facility];  Service: General     Social History   Social History     Substance and Sexual Activity   Alcohol Use Not Currently     Social History     Substance and Sexual Activity   Drug Use Not Currently     E-Cigarette/Vaping    E-Cigarette Use Never User      E-Cigarette/Vaping Substances     Social History     Tobacco Use Smoking Status Never Smoker   Smokeless Tobacco Never Used     Occupational History:  Not applicable    Family History: non-contributory    Meds/Allergies   all current active meds have been reviewed    No Known Allergies    Objective   Vitals: Blood pressure (!) 88/60, pulse 75, temperature 98 1 °F (36 7 °C), temperature source Temporal, resp  rate 18, height 5' 10" (1 778 m), weight 57 kg (125 lb 10 6 oz), SpO2 100 %  ,Body mass index is 18 03 kg/m²  Intake/Output Summary (Last 24 hours) at 1/29/2022 1043  Last data filed at 1/29/2022 0935  Gross per 24 hour   Intake 120 ml   Output 100 ml   Net 20 ml     Invasive Devices  Report    Peripheral Intravenous Line            Peripheral IV 01/28/22 Left Antecubital 1 day                Physical Exam  Constitutional:       Appearance: He is well-developed  HENT:      Head: Normocephalic and atraumatic  Eyes:      Pupils: Pupils are equal, round, and reactive to light  Cardiovascular:      Rate and Rhythm: Normal rate and regular rhythm  Heart sounds: No murmur heard  Pulmonary:      Effort: Pulmonary effort is normal  No respiratory distress  Breath sounds: Normal breath sounds  No wheezing or rales  Abdominal:      Palpations: Abdomen is soft  Musculoskeletal:      Cervical back: Normal range of motion and neck supple  Skin:     General: Skin is warm and dry  Neurological:      Mental Status: He is alert and oriented to person, place, and time           Lab Results:   BNP: No results found for: BNP, CBC:   Lab Results   Component Value Date    WBC 9 59 01/29/2022    HGB 11 9 (L) 01/29/2022    HCT 35 1 (L) 01/29/2022    MCV 80 (L) 01/29/2022     (L) 01/29/2022    MCH 27 0 01/29/2022    MCHC 33 9 01/29/2022    RDW 14 6 01/29/2022    MPV 10 4 01/29/2022    NRBC 0 01/29/2022   , CMP:   Lab Results   Component Value Date    SODIUM 127 (L) 01/29/2022    K 4 2 01/29/2022    CL 93 (L) 01/29/2022    CO2 28 01/29/2022    BUN 31 (H) 01/29/2022    CREATININE 1 04 01/29/2022    CALCIUM 7 7 (L) 01/29/2022    EGFR 79 01/29/2022     Imaging Studies: I have personally reviewed pertinent films in PACS  EKG, Pathology, and Other Studies: I have personally reviewed pertinent films in PACS  VTE Prophylaxis: Warfarin (Coumadin)    Code Status: Level 1 - Full Code  Advance Directive and Living Will:      Power of :    POLST:      None

## 2022-01-29 NOTE — PROGRESS NOTES
Found patient to be tachypneic with oxygen saturation around 80%  SLIM on call provider and respiratory therapist made aware  Placed on non-rebreather and mid-flow with good result  Pt is normotensive, will continue to monitor

## 2022-01-29 NOTE — PROGRESS NOTES
[address]  Progress Note - [address] [place] 1965, 61 y o  male MRN: [id number]  Unit/Bed#: E4 -01 Encounter: [id number]  Primary Care Provider: Tonio Alcantar PA-C   Date and time admitted to hospital: 1/28/2022  3:13 AM    * Acute respiratory failure with hypoxia (HCC)  Assessment & Plan  · Presented with dyspnea, noted to be hypoxic  · Recently discharge, treated for CHF exacerbation and COVID pneumonia  · CTA study negative for PE, continues shows bilateral ground-glass opacities consistent with patient's known history of COVID  · Previously discharged on 2 L nasal cannula  · Pulmonary consulted, appreciate recommendations  · On 1/28, noted increased O2 req midflow and NRB, suspect likely anxiety component due to improvement with ativan  · Patient appears euvolemic on exam  · Will likely need increased oxygen prior to discharge with concentrator  · Plan for home O2 eval tomorrow    Chronic combined systolic and diastolic congestive heart failure (HCC)  Assessment & Plan  Wt Readings from Last 3 Encounters:   01/28/22 57 kg (125 lb 10 6 oz)   01/27/22 57 kg (125 lb 10 6 oz)   01/12/22 56 6 kg (124 lb 12 5 oz)     · Elevated BNP >31,000  · Diuretics per Cardiology, currently on Lasix 40 and eplerenone 25mg once daily  · Appears euvolemic    Medically noncompliant  Assessment & Plan  · History of medical noncompliance    Hyponatremia  Assessment & Plan  Sodium levels have ranged from 127-130  Fluid restrict, monitor while diuretics    Ischemic cardiomyopathy  Assessment & Plan  · Ischemic cardiomyopathy with LVEF 20% s/p ICD    History of ventricular fibrillation  Assessment & Plan  · History of VFib/Vtac s/p ICD  Continues to have episodes of V-tach per outpatient cardiology note  Maintained on amiodarone and Toprol    S/P AVR (aortic valve replacement)  Assessment & Plan  · Rheumatic heart disease s/p mechanical AVR in 1980's   Anticoagulated on warfarin  · Goal INR 2 5-3 5  · Monitor INR    Coronary artery disease involving native coronary artery of native heart with angina pectoris [facility])  Assessment & Plan  · CAD s/p PCI in 2014  Continue ASA, BB and statin      VTE Pharmacologic Prophylaxis:   Pharmacologic: Warfarin (Coumadin)  Mechanical VTE Prophylaxis in Place: Yes    Patient Centered Rounds: I have performed bedside rounds with nursing staff today  Discussions with Specialists or Other Care Team Provider:  Pulmonology, Cardiology    Education and Discussions with Family / Patient:  Patient    Time Spent for Care: 30 minutes  More than 50% of total time spent on counseling and coordination of care as described above  Current Length of Stay: 1 day(s)    Current Patient Status: Inpatient   Certification Statement: The patient will continue to require additional inpatient hospital stay due to Respiratory failure, weaning oxygen    Discharge Plan:  24-48 hours    Code Status: Level 1 - Full Code      Subjective:   Patient reports his breathing had improved  Overnight he required 15 L mid flow and 15 L non-rebreather  Suspected some type of anxiety with history of cocaine abuse, his symptoms improved after given a dose of IV Ativan  Currently on 70 L mid flow  He denies any chest pains, cough, fevers or chills  Objective:     Vitals:   Temp (24hrs), Av 1 °F (36 7 °C), Min:97 6 °F (36 4 °C), Max:98 5 °F (36 9 °C)    Temp:  [97 6 °F (36 4 °C)-98 5 °F (36 9 °C)] 98 °F (36 7 °C)  HR:  [] 111  Resp:  [18-26] 20  BP: ()/(54-87) 115/84  SpO2:  [90 %-100 %] 92 %  Body mass index is 18 03 kg/m²  Input and Output Summary (last 24 hours): Intake/Output Summary (Last 24 hours) at 2022 1147  Last data filed at 2022 0935  Gross per 24 hour   Intake 120 ml   Output 100 ml   Net 20 ml       Physical Exam:     Physical Exam  Vitals and nursing note reviewed  Constitutional:       Appearance: He is normal weight     HENT:      Head: Normocephalic and atraumatic  Eyes:      General: No scleral icterus  Conjunctiva/sclera: Conjunctivae normal    Cardiovascular:      Rate and Rhythm: Normal rate and regular rhythm  Heart sounds: Normal heart sounds  Pulmonary:      Breath sounds: Normal breath sounds  No wheezing or rhonchi  Abdominal:      General: Bowel sounds are normal  There is no distension  Palpations: Abdomen is soft  Musculoskeletal:         General: No swelling  Right lower leg: No edema  Left lower leg: No edema  Skin:     General: Skin is warm and dry  Neurological:      General: No focal deficit present  Mental Status: He is alert  Mental status is at baseline  Additional Data:     Labs:    Results from last 7 days   Lab Units 01/29/22  0512   WBC Thousand/uL 9 59   HEMOGLOBIN g/dL 11 9*   HEMATOCRIT % 35 1*   PLATELETS Thousands/uL 121*   NEUTROS PCT % 89*   LYMPHS PCT % 5*   MONOS PCT % 5   EOS PCT % 0     Results from last 7 days   Lab Units 01/29/22  0512 01/28/22  0334 01/28/22  0334   SODIUM mmol/L 127*   < > 129*   POTASSIUM mmol/L 4 2   < > 3 2*   CHLORIDE mmol/L 93*   < > 90*   CO2 mmol/L 28   < > 31   BUN mg/dL 31*   < > 32*   CREATININE mg/dL 1 04   < > 1 03   ANION GAP mmol/L 6   < > 8   CALCIUM mg/dL 7 7*   < > 7 9*   ALBUMIN g/dL  --   --  2 4*   TOTAL BILIRUBIN mg/dL  --   --  0 98   ALK PHOS U/L  --   --  181*   ALT U/L  --   --  44   AST U/L  --   --  54*   GLUCOSE RANDOM mg/dL 119   < > 89    < > = values in this interval not displayed  Results from last 7 days   Lab Units 01/29/22  0511   INR  1 46*             Results from last 7 days   Lab Units 01/28/22  0634 01/28/22  0451 01/28/22  0334 01/25/22  0529 01/24/22  0540   LACTIC ACID mmol/L 2 6*  --  3 9*  --   --    PROCALCITONIN ng/ml 1 49* 1 00*  --  2 12* 2 59*           * I Have Reviewed All Lab Data Listed Above  * Additional Pertinent Lab Tests Reviewed:  Phu [address] Admission Reviewed    Imaging:    CTA ED chest PE Study    Addendum Date: 1/28/2022    ADDENDUM: First line of impression should read: "No evidence of pulmonary embolus"    Result Date: 1/28/2022  Impression: Evidence of pulmonary embolus  Extensive bilateral peripheral predominant groundglass airspace disease consistent with patient's known COVID pneumonia 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 measuring up to 41 mm  Recommendation is for follow-up low radiation dose chest CT in one year Workstation performed: [id number]       Recent Cultures (last 7 days):     Results from last 7 days   Lab Units 01/28/22  0455 01/28/22  0451   BLOOD CULTURE  No Growth at 24 hrs  No Growth at 24 hrs  Last 24 Hours Medication List:   Current Facility-Administered Medications   Medication Dose Route Frequency Provider Last Rate    amiodarone  100 mg Oral Daily With Breakfast Janeth Grade, CRNP      aspirin  81 mg Oral Daily Janeth Grade, CRNP      atorvastatin  80 mg Oral Daily With [employer], CRNP      calcium carbonate  1,000 mg Oral Daily PRN Janeth Grade, CRNP      eplerenone  25 mg Oral Daily Evelyn Costa, CRNP      furosemide  40 mg Oral Daily Janeth Grade, CRNP      lisinopril  40 mg Oral HS Janeth Grade, CRNP      LORazepam  0 5 mg Oral Q8H PRN Humza Alvarez MD      metoprolol succinate  75 mg Oral Q12H Janeth Grade, CRNP      potassium chloride  20 mEq Oral Daily Janeth Grade, CRNP      simethicone  80 mg Oral 4x Daily PRN Janeth Grade, CRNP      warfarin  2 mg Oral Once per day on Mon Wed Thu Fri Sat Janeth Grade, CRNP      [START ON 1/30/2022] warfarin  4 mg Oral Once per day on [address]  [facility], CRNP          Today, Patient Was Seen By: Humza Alvarez MD    ** Please Note: Dictation voice to text software may have been used in the creation of this document   **

## 2022-01-30 NOTE — UTILIZATION REVIEW
Initial Clinical Review    Admission: Date/Time/Statement:   Admission Orders (From admission, onward)     Ordered        01/28/22 0539  INPATIENT ADMISSION  Once                      Orders Placed This Encounter   Procedures    INPATIENT ADMISSION     Standing Status:   Standing     Number of Occurrences:   1     Order Specific Question:   Level of Care     Answer:   Med Surg [16]     Order Specific Question:   Estimated length of stay     Answer:   More than 2 Midnights     Order Specific Question:   Certification     Answer:   I certify that inpatient services are medically necessary for this patient for a duration of greater than two midnights  See H&P and MD Progress Notes for additional information about the patient's course of treatment  ED Arrival Information     Expected Arrival Acuity    - 1/28/2022 03:11 Emergent         Means of arrival Escorted by Service Admission type    Ambulance [facility] ([address]) Hospitalist Emergency         Arrival complaint    sob        Chief Complaint   Patient presents with    Shortness of Breath     Pt was brought in by EMS and c/o chest tightness that comes and goes, and SOB  Pt states he was "discharged from the hospital with an empty O2 tank"  Pt was d/c from hospital after stay from covid    Initial Presentation:     62 yo male, just released  From hospital  1/21 - 1/27  For acute on chronic combined systolic and diastolic CHF and COVID PNU  He was diuresed and placed on home Lasix ,  Dc home on 2L NC oxygen w/ambulation  Reportedly went home and became hypoxic, pt states "the tank was not working"   (No home concentrator/  Tank ran out of oxygen)   Called EMS who found pt w/sats in the 40's requiring NRB  Admit IP status  1/28,   Level 2 Stepdown,   for management  of  Acute on Chronic  Respiratory Failure w/Hypoxia  In setting of  Chronic systolic heart failure & Ischemic cardiomyopathy w/ LVEF 18% s/p ICD  Chronic Hyponatremia  Chronically elevated troponin   CTA Neg for PE,  unchanged groundglass disease c/w known COVID PNU  BNP >31,000  (was 15,000 on 1/25)     Will cont home dose lasix, trend daily wgt & I/O,  Implement fluid restriction/ lo na diet and consult cardiology  @ 2240  Pt tachypneic,  Dyspneic   w/sat 80%  on 5L,  RR26:    Placed on NRB - improved to 94%: Normotensive  1/28  CARDIOLOGY  -  does not appear to be in heart failure  He has no lower extremity edema or no significant rales or elevated JVD on exam    Cont home meds add eplerenone 25 milligrams daily to regimen    Elevated trop  W/lactic acidosis 2/2 acute hypoxia  No cardiac workup needed     Date:  1/29     Day 2:    CHANGED TO [facility] level of care   Pulmonary consult : Chronic systolic heart failure with EF of 20%:     do not suspect any new acute bacterial process  Continue to wean oxygen  - perhaps home Lasix dose could be increased for better control  (on  40 mg po daily)    REC Prednisone  (initiated po 40 mg daily)      1/29 @ 1145:   reports his breathing has  improved  Possible anxiety contributing (symptoms improved after given a dose of IV Ativan)  Currently on 7 L mid flow  1/30   Overnight  O2 sats were in the 80s, o2 requirements increased from 10L to 14L  Patient reports more calm today, weaker but tolerating diet  Will add po prednisone 40mg once daily for 5 days     will need home O2 eval    ==================================================================================================      ED Triage Vitals   Temperature Pulse Respirations Blood Pressure SpO2   01/28/22 0355 01/28/22 0318 01/28/22 0315 01/28/22 0315 01/28/22 0315   97 6 °F (36 4 °C) 93 20 (!) 147/112 93 %      Temp Source Heart Rate Source Patient Position - Orthostatic VS BP Location FiO2 (%)   01/28/22 0355 01/28/22 0318 01/28/22 0315 01/28/22 0315 --   Oral Monitor Lying Left arm       Pain Score       01/28/22 0900       No Pain          Wt Readings from Last 1 Encounters:   01/28/22 57 kg (125 lb 10 6 oz)     Additional Vital Signs:   01/30/22 1910 98 1 °F (36 7 °C) 68 20 112/60 91 % 14 L/min Mid flow nasal cannula   01/30/22 1511 97 1 °F (36 2 °C) Abnormal  63 20 104/56 98 % 14 L/min Mid flow nasal cannula   01/30/22 1025 97 7 °F (36 5 °C) 66 20 93/56 96 % 14 L/min Mid flow nasal cannula   01/30/22 0709 98 4 °F (36 9 °C) 77 20 113/60 98 % 14 L/min Mid flow nasal cannula   01/30/22 0245 99 3 °F (37 4 °C) 85 18 95/56 92 % 12 L/min Mid flow nasal cannula   01/29/22 2317 98 5 °F (36 9 °C) 60 20 107/50 90 % -- --   01/29/22 1911 97 7 °F (36 5 °C) 76 20 95/60 92 % 12 L/min Mid flow nasal cannula   01/29/22 1833 -- -- -- -- -- 12 L/min --   01/29/22 1722 -- -- -- -- 89 %  11 L/min  --   01/29/22 1516 98 3 °F (36 8 °C) 69 20 93/60 95 % 10 L/min Mid flow nasal cannula   01/29/22 1224 -- -- -- -- 97 % 8 L / min --   01/29/22 1119 98 °F (36 7 °C) 111   20 115/84 92 %  7L/ min Mid flow nasal cannula       01/29/22 0700 98 1 °F (36 7 °C) 75 18 88/60 Abnormal  100 % 12 L/min Mid flow nasal cannula   01/29/22 0325 98 4 °F (36 9 °C) 60 18 103/55 90 % 12 L/min Mid flow nasal cannula   01/29/22 0300 -- 67 22 91/54 92 % -- --   01/29/22 0100 -- 67 22 91/54 92 % -- --   01/28/22 2319 -- 53 Abnormal  24 Abnormal  129/69 93 % -- --   01/28/22 2159 98 5 °F (36 9 °C) 77 24 Abnormal  129/69 94 % 15 L/min Non-rebreather mask   01/28/22 2100 -- -- 26 Abnormal  -- -- -- --   01/28/22 1700 97 6 °F (36 4 °C) 79 18 126/81 91 % 4 L/min Nasal cannula   01/28/22 1518 -- 75 18 111/55 100 % 5 L/min Nasal cannula   01/28/22 1016 -- 81 16 124/70 94 % 5 L/min Nasal cannula   01/28/22 0915 -- 74 17 -- 93 % 6 L/min Nasal cannula   01/28/22 0900 97 6 °F (36 4 °C) 76 17 120/66 90 % 5 L/min Nasal cannula   01/28/22 0515 -- -- -- -- 94 % 8 L/min Non-rebreather mask   01/28/22 0509 -- 79 18 104/67 93 % -- Non-rebreather mask       Pertinent Labs/Diagnostic Test Results:   1/26  cXR - Improving bilateral airspace disease which may be due to pulmonary edema versus multifocal pneumonia  1/28  CTA  Chest :   NO Evidence of pulmonary embolus  Extensive bilateral peripheral predominant groundglass airspace disease consistent with patient's known COVID pneumonia       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 measuring up to 41 mm  1/28  EKG -  Paced, ventricular tachycardia           Results from last 7 days   Lab Units 01/29/22  0512 01/28/22  0334 01/27/22  0522 01/26/22  0520 01/26/22  0520 01/25/22  0529 01/25/22  0529   WBC Thousand/uL 9 59 18 52* 12 02*   < > 14 38*   < > 10 16   HEMOGLOBIN g/dL 11 9* 13 9 12 3  --  12 3  --  13 8   HEMATOCRIT % 35 1* 41 8 36 9  --  36 5  --  41 0   PLATELETS Thousands/uL 121* 212 179   < > 199   < > 160   NEUTROS ABS Thousands/µL 8 61* 16 41*  --   --   --   --  9 34*    < > = values in this interval not displayed  Results from last 7 days   Lab Units 01/29/22  0512 01/28/22  1931 01/28/22  0334 01/27/22  0522 01/26/22  0520 01/25/22  0529 01/24/22  0540 01/24/22  0540 01/23/22  0532 01/23/22  0532   SODIUM mmol/L 127*  --  129* 129* 130* 128*   < > 128*   < > 130*   POTASSIUM mmol/L 4 2  --  3 2* 3 6 3 8 4 9   < > 4 3   < > 3 5   CHLORIDE mmol/L 93*  --  90* 90* 88* 90*   < > 92*   < > 91*   CO2 mmol/L 28  --  31 35* 30 30   < > 28   < > 27   ANION GAP mmol/L 6  --  8 4 12 8   < > 8   < > 12   BUN mg/dL 31*  --  32* 32* 36* 27*   < > 23   < > 14   CREATININE mg/dL 1 04  --  1 03 1 11 1 30 1 20   < > 1 06   < > 1 00   EGFR ml/min/1 73sq m 79  --  80 73 60 66   < > 77   < > 83   CALCIUM mg/dL 7 7*  --  7 9* 8 0* 8 0* 8 7   < > 8 1*   < > 7 8*   MAGNESIUM mg/dL  --  2 1  --   --  1 8  --   --  2 2  --  1 8    < > = values in this interval not displayed       Results from last 7 days   Lab Units 01/28/22  0334 01/25/22  0529   AST U/L 54* 43   ALT U/L 44 24   ALK PHOS U/L 181* 175*   TOTAL PROTEIN g/dL 6 6 7 4   ALBUMIN g/dL 2 4* 2 0*   TOTAL BILIRUBIN mg/dL 0 98 1 04*   BILIRUBIN DIRECT mg/dL 0 32*  --          Results from last 7 days   Lab Units 01/29/22  0512 01/28/22  0334 01/27/22  0522 01/26/22  0520 01/25/22  0529 01/24/22  0540 01/23/22  0532   GLUCOSE RANDOM mg/dL 119 89 113 142* 153* 104 79     Results from last 7 days   Lab Units 01/28/22  0454   PH BEE  7 475*   PCO2 BEE mm Hg 31 9*   PO2 BEE mm Hg 47 2*   HCO3 BEE mmol/L 23 0*   BASE EXC BEE mmol/L 0 2   O2 CONTENT BEE ml/dL 15 7   O2 HGB, VENOUS % 82 3*     Results from last 7 days   Lab Units 01/28/22  0801 01/28/22  0530 01/28/22  0334   HS TNI 0HR ng/L  --   --  91*   HS TNI 2HR ng/L  --  93*  --    HSTNI D2 ng/L  --  2  --    HS TNI 4HR ng/L 126*  --   --    HSTNI D4 ng/L 35*  --   --          Results from last 7 days   Lab Units 01/29/22  0511 01/28/22  0334 01/27/22  0522   PROTIME seconds 17 3* 20 9* 23 3*   INR  1 46* 1 89* 2 18*   PTT seconds  --  37  --          Results from last 7 days   Lab Units 01/28/22  0634 01/28/22  0451 01/25/22  0529 01/24/22  0540   PROCALCITONIN ng/ml 1 49* 1 00* 2 12* 2 59*     Results from last 7 days   Lab Units 01/28/22  0634 01/28/22  0334   LACTIC ACID mmol/L 2 6* 3 9*             Results from last 7 days   Lab Units 01/28/22  0334 01/25/22  0529   NT-PRO BNP pg/mL 31,172* 15,131*     Results from last 7 days   Lab Units 01/25/22  0529   CRP mg/L 386 8*     Results from last 7 days   Lab Units 01/28/22  0340   CLARITY UA  Clear   COLOR UA  Karen   SPEC GRAV UA  1 020   PH UA  5 5   GLUCOSE UA mg/dl Negative   KETONES UA mg/dl Negative   BLOOD UA  Trace*   PROTEIN UA mg/dl Negative   NITRITE UA  Negative   BILIRUBIN UA  Negative   UROBILINOGEN UA E U /dl 0 2   LEUKOCYTES UA  Negative   WBC UA /hpf None Seen   RBC UA /hpf 0-1*   BACTERIA UA /hpf Occasional   EPITHELIAL CELLS WET PREP /hpf Occasional     Results from last 7 days   Lab Units 01/28/22  0455 01/28/22  0451   BLOOD CULTURE  No Growth at 24 hrs  No Growth at 24 hrs           ED Treatment:   Medication Administration from 01/28/2022 0311 to 01/28/2022 1724       Date/Time Order Dose Route Action     01/28/2022 0457 cefepime (MAXIPIME) 2 g/50 mL dextrose IVPB 2,000 mg Intravenous New Bag     01/28/2022 0813 amiodarone tablet 100 mg 100 mg Oral Given     01/28/2022 0812 cefdinir (OMNICEF) capsule 300 mg 300 mg Oral Given     01/28/2022 0813 furosemide (LASIX) tablet 40 mg 40 mg Oral Given     01/28/2022 0756 metoprolol succinate (TOPROL-XL) 24 hr tablet 75 mg 75 mg Oral Given     01/28/2022 0813 potassium chloride (K-DUR,KLOR-CON) CR tablet 20 mEq 20 mEq Oral Given     01/28/2022 0813 aspirin (ECOTRIN LOW STRENGTH) EC tablet 81 mg 81 mg Oral Given     01/28/2022 0959 potassium chloride (K-DUR,KLOR-CON) CR tablet 40 mEq 40 mEq Oral Given     01/28/2022 1152 potassium chloride (K-DUR,KLOR-CON) CR tablet 20 mEq 20 mEq Oral Given     01/28/2022 0959 eplerenone (INSPRA) tablet 25 mg 25 mg Oral Given     01/28/2022 1428 LORazepam (ATIVAN) tablet 0 5 mg 0 5 mg Oral Given            Past Medical History:   Diagnosis Date    AICD (automatic cardioverter/defibrillator) present     irisnote     CAD (coronary artery disease)     [facility]     Cardiac disease     Cardiomyopathy [facility]     mixed    predominently nonischemic    Colonic mass     Coronary artery disease     Hyperlipidemia     Hypertension     Irregular heart beat     [facility]     2014 and 2015    Rectal bleeding     S/P AVR (aortic valve replacement)     mechanical    Wears glasses      Present on Admission:   Acute respiratory failure with hypoxia ([facility] )   Cocaine use   Coronary artery disease involving native coronary artery of native heart with angina pectoris ([facility]   Ischemic cardiomyopathy   Chronic combined systolic and diastolic congestive heart failure (HCC)   Aortic ectasia, thoracic (HCC)   Elevated troponin   Hyponatremia      Admitting Diagnosis: Chronic combined systolic and diastolic heart failure (HCC) [I50 42]  Lactic acidosis [E87 2]  Hyponatremia [E87 1]  Pneumonia [J18 9]  SOB (shortness of breath) [R06 02]  Elevated troponin [R77 8]  Chronic combined systolic and diastolic congestive heart failure (HCC) [I50 42]  Acute respiratory failure with hypoxia (HCC) [J96 01]  Severe sepsis (HCC) [A41 9, R65 20]  Age/Sex: 61 y o  male  Admission Orders:   Continuous pulse oximetry w/Telemetry;  Daily Standing scale weight;  Strict I/O  (record UO 6 hr after first diuretic)  Fluid restriction 1500 ml/ cardiac diet  IP CONSULT TO CARDIOLOGY  IP CONSULT TO PULMONOLOGY    Scheduled Medications:  amiodarone, 100 mg, Oral, Daily With Breakfast  aspirin, 81 mg, Oral, Daily  atorvastatin, 80 mg, Oral, Daily With Dinner  eplerenone, 25 mg, Oral, Daily  furosemide, 40 mg, Oral, Daily  lisinopril, 40 mg, Oral, HS  metoprolol succinate, 75 mg, Oral, Q12H  potassium chloride, 20 mEq, Oral, Daily  warfarin, 2 mg, Oral, Once per day on Mon Wed Thu Fri Sat  [START ON 1/30/2022] warfarin, 4 mg, Oral, Once per day on Sun Tue      1/28 @ 1430  Given po ativan 0 5 mg   1/28 @ 2355  given IV ativan 0 5 mg         PRN Meds:  calcium carbonate, 1,000 mg, Oral, Daily PRN  LORazepam, 0 5 mg, Oral, Q8H PRN  simethicone, 80 mg, Oral, 4x Daily PRN      Network Utilization Review Department  ATTENTION: Please call with any questions or concerns to [phone] and carefully listen to the prompts so that you are directed to the right person  All voicemails are confidential   Candia Siemens all requests for admission clinical reviews, approved or denied determinations and any other requests to dedicated fax number below belonging to the campus where the patient is receiving treatment   List of dedicated fax numbers for the Facilities:  [address] DENIALS (Administrative/Medical Necessity) [phone]   [address] (Maternity/NICU/Pediatrics) [address] [place] [address],[address] [phone]   Chelsea Osborn [address]  [phone]   Andrzej Napier [address] [facility] [place] [address] [address] [place] [address] [address] [place] [address] [phone]

## 2022-01-30 NOTE — ASSESSMENT & PLAN NOTE
· Presented with dyspnea, noted to be hypoxic  · Recently discharge, treated for CHF exacerbation and COVID pneumonia  · CTA study negative for PE, shows severe bilateral ground-glass opacities consistent with patient's known history of COVID  · Previously discharged on 2 L nasal cannula, now requiring midflow 10-15L with NRB at times  · Pulmonary consulted, appreciate recommendations, no further COVID treatments  · Patient appears euvolemic on exam  · Continues to require significant amount of oxygen, denies cough or orthopnea while laying in bed sleeping  · Will add po prednisone 40mg once daily for 5 days  · Wean O2 as able, will need home O2 eval prior to discharge

## 2022-01-30 NOTE — CASE MANAGEMENT
Case Management Assessment & Discharge Planning Note    Patient name Alberto Bojorquez  Location [address] [place] [address] /E4 Brittany 40-* MRN [id number]  : 1965 Date 2022       Current Admission Date: 2022  Current Admission Diagnosis:Acute respiratory failure with hypoxia [facility])   Patient Active Problem List    Diagnosis Date Noted    Acute respiratory failure with hypoxia ([facility] ) 2022    Medically noncompliant 2022    Chronic combined systolic and diastolic congestive heart failure ([facility] ) 2022    Aortic ectasia, thoracic ([facility] ) 2022    Pneumonia 2022    Mild protein-calorie malnutrition ([facility] ) 2022    Hyponatremia 2022    Aortic stenosis 2022    Ischemic cardiomyopathy 2022    Drug abuse ([facility] ) 2022    Ventricular fibrillation ([facility] ) 2022    COVID-19 virus infection 2022    Impaired fasting glucose 09/10/2021    History of ventricular fibrillation 2021    Elevated troponin 2021    Cough 2021    Lactic acidosis 2021    SIRS (systemic inflammatory response syndrome) ([facility] ) 2021    Cocaine use 2021    History of ventricular tachycardia 2021    Depressed mood 2020    S/P AVR (aortic valve replacement) 2018    Ventricular fibrillation ([facility] ) 2018    Biventricular ICD (implantable cardioverter-defibrillator) in place 2018    Colonic mass 2016    Acute on chronic combined systolic and diastolic CHF (congestive heart failure) ([facility] ) 2016    Coronary artery disease involving native coronary artery of native heart with angina pectoris ([facility] ) 2016      LOS (days): 2  Geometric Mean LOS (GMLOS) (days): 3 60  Days to GMLOS:1 3     OBJECTIVE:  PATIENT READMITTED TO HOSPITAL  Risk of Unplanned Readmission Score: 34         Current admission status: Inpatient       Preferred Pharmacy:   [address] [place] [address] STREET  [address] [address]  Phone: [phone] Fax: [phone] [address] [facility] [place] - [address] ,  [address] [facility] [address]  Phone: [phone] Fax: [phone] [facility], [address] [place]eli AD[place] [address] [address]  Phone: [phone] Fax: [phone]    Primary Care Provider: Akhil Sims PA-C    Primary Insurance: [facility]  Secondary Insurance: [address] [place] [address]    ASSESSMENT:  Active Health Care Agents    There are no active Health Care Agents on file  Readmission Root Cause  30 Day Readmission: Yes  Who directed you to return to the hospital?: Self  Did you understand whom to contact if you had questions or problems?: Yes  Did you get your prescriptions before you left the hospital?: Yes  Were you able to get your prescriptions filled when you left the hospital?: Yes  Did you take your medications as prescribed?: Yes  Were you able to get to your follow-up appointments?: No  Reason[de-identified] Readmitted prior to appointment  During previous admission, was a post-acute recommendation made?: Yes  What post-acute resources were offered?: [facility]  Patient was readmitted due to: Did not obtain home 02  States that portable o2 tank was empty - he states it was working when he left the hospital but "ran out of oxygen within a few minutes after leaving the hospital"    Patient states he did call [facility] to set up the portable concentrator - and they were to deliver concentrator at 6 AM the next day but he became SOB prior to that and needed to return to the hospital   Action Plan: CM will ensure o2 concentrator is delivered to home prior to d/c - patient states he lives alone - but will see if a friend can accept delivery of o2 concentrator at home prior to d/c    Patient Information  Admitted from[de-identified] Home  Mental Status: Alert  During Assessment patient was accompanied by: Not accompanied during assessment  Assessment information provided by[de-identified] Patient  Primary Caregiver: Self  Support Systems: [address] of Residence: [email or web address] [facility] do you live in?: Gal[place] Galindo entry access options  Select all that apply : Stairs  Number of steps to enter home : 2  Do the steps have railings?: No  Type of Current Residence: Apartment  Floor Level: 1  Upon entering residence, is there a bedroom on the main floor (no further steps)?: Yes  Upon entering residence, is there a bathroom on the main floor (no further steps)?: Yes  In the last 12 months, was there a time when you were not able to pay the mortgage or rent on time?: No  In the last 12 months, how many places have you lived?: 1  In the last 12 months, was there a time when you did not have a steady place to sleep or slept in a shelter (including now)?: Yes  Homeless/housing insecurity resource given?: No  Living Arrangements: Lives Alone  Is patient a ?: No    Activities of Daily Living Prior to Admission  Functional Status: Independent  Completes ADLs independently?: Yes  Ambulates independently?: Yes  Does patient use assisted devices?: Yes  Assisted Devices (DME) used: Portable Oxygen tanks  DME Company Name (respiratory supplies):  [employer]  O2 Rate(s): 2L  Does patient currently own DME?: No  Does patient have a history of Outpatient Therapy (PT/OT)?: No  Does the patient have a history of Short-Term Rehab?: No  Does patient have a history of HHC?: Yes  Does patient currently have [place] [address]?: Yes    Current Home Health Care  Type of Current Home Care Services: Nurse visit  [address] [place][de-identified] Other (please enter name in comment) (Mason MORRISSEY)  [facility] Provider[de-identified] PCP    Patient Information Continued  Income Source: Employed  Does patient have prescription coverage?: Yes  Within the past 12 months, you worried that your food would run out before you got the money to buy more : Never true  Within the past 12 months, the food you bought just didnt last and you didnt have money to get more : Never true  Food insecurity resource given?: No  Does patient receive dialysis treatments?: No  Does patient have a history of substance abuse?: Yes  Historical substance use preference: Cocaine         Means of Transportation  Means of Transport to Appts[de-identified] Family transport  In the past 12 months, has lack of transportation kept you from medical appointments or from getting medications?: No  In the past 12 months, has lack of transportation kept you from meetings, work, or from getting things needed for daily living?: No        DISCHARGE DETAILS:    Discharge planning discussed with[de-identified] patient  used [employer] interpretor line # [id number] - pt continually interrupts the  and needed to be redirected by  - CM asked patient if he prefers to speak in [address]  and he said no he needs   Eventually pt states, call my daughter, she speaks really good english  Freedom of Choice: Yes  Comments - Freedom of Choice: pt aware he was set up with [facility] and needs to call for o2 concentrator    Also aware he was set up with [facility] on last admission and is in agreement with same agency  CM contacted family/caregiver?: Yes  Were Treatment Team discharge recommendations reviewed with patient/caregiver?: Yes  Did patient/caregiver verbalize understanding of patient care needs?: Yes  Were patient/caregiver advised of the risks associated with not following Treatment Team discharge recommendations?: Yes    Contacts  Patient Contacts: Daughter, Soto Jensen  Relationship to Patient[de-identified] Family  Contact Method: Phone  Phone Number: [phone] (M)  Reason/Outcome: Continuity of   [address]         Is the patient interested in Mary [place] at discharge?: Yes  [address] requested[de-identified] [address] Agency Name[de-identified] Other Richard Sutherland [facility]UN[facility]har  [address] Provider[de-identified] PCP  Home Health Services Needed[de-identified] COPD Management,Heart Failure Management,Oxygen Via Nasal Cannula  Oxygen LPM Ordered (if applicable based on home health services needed):: 2 LPM  Homebound Criteria Met[de-identified] Communicable Disease  Supporting Clincal Findings[de-identified] Fatigues Easliy in Short Distances,Limited Endurance,Requires Oxygen         Other Referral/Resources/Interventions Provided:  Interventions: DME,HHC,Language/ Cultural/ Spiritual  Referral Comments: CM on Monday will need to follow up with new home o2 order - pt and daughter aware they must contact [facility] upon d/c - CM suggested o2 concentrator is delivered in home prior to d/c         Treatment Team Recommendation: Home with [facility]  Discharge Destination Plan[de-identified] Home with [place] at Discharge : Family                             IMM Given (Date):: 01/30/22  IMM Given to[de-identified] Family  Family notified[de-identified] discussed IMM with pt and daughter over the phone

## 2022-01-30 NOTE — PROGRESS NOTES
[facility]  Progress Note - [address] [place] 1965, 61 y o  male MRN: [id number]  Unit/Bed#: E4 -01 Encounter: [id number]  Primary Care Provider: Tonio Alcantar PA-C   Date and time admitted to hospital: 1/28/2022  3:13 AM    * Acute respiratory failure with hypoxia (HCC)  Assessment & Plan  · Presented with dyspnea, noted to be hypoxic  · Recently discharge, treated for CHF exacerbation and COVID pneumonia  · CTA study negative for PE, shows severe bilateral ground-glass opacities consistent with patient's known history of COVID  · Previously discharged on 2 L nasal cannula, now requiring midflow 10-15L with NRB at times  · Pulmonary consulted, appreciate recommendations, no further COVID treatments  · Patient appears euvolemic on exam  · Continues to require significant amount of oxygen, denies cough or orthopnea while laying in bed sleeping  · Will add po prednisone 40mg once daily for 5 days  · Wean O2 as able, will need home O2 eval prior to discharge    Chronic combined systolic and diastolic congestive heart failure (HCC)  Assessment & Plan  Wt Readings from Last 3 Encounters:   01/28/22 57 kg (125 lb 10 6 oz)   01/27/22 57 kg (125 lb 10 6 oz)   01/12/22 56 6 kg (124 lb 12 5 oz)     · Elevated BNP >31,000  · Diuretics per Cardiology, currently on Lasix 40 and eplerenone 25mg once daily  · Appears euvolemic    Medically noncompliant  Assessment & Plan  · History of medical noncompliance    Hyponatremia  Assessment & Plan  Sodium levels have ranged from 127-130  Fluid restrict, monitor while diuretics    Ischemic cardiomyopathy  Assessment & Plan  · Ischemic cardiomyopathy with LVEF 20% s/p ICD    History of ventricular fibrillation  Assessment & Plan  · History of VFib/Vtac s/p ICD  Continues to have episodes of V-tach per outpatient cardiology note   Maintained on amiodarone and Toprol    S/P AVR (aortic valve replacement)  Assessment & Plan  · Rheumatic heart disease s/p mechanical AVR in   Anticoagulated on warfarin  · Goal INR 2 5-3 5  · Monitor INR, currently subtherapeutic, given additional coumadin on  6mg instead of 2mg    Coronary artery disease involving native coronary artery of native heart with angina pectoris [facility])  Assessment & Plan  · CAD s/p PCI in   Continue ASA, BB and statin        VTE Pharmacologic Prophylaxis:   Pharmacologic: Warfarin (Coumadin)  Mechanical VTE Prophylaxis in Place: Yes    Patient Centered Rounds: I have performed bedside rounds with nursing staff today  Discussions with Specialists or Other Care Team Provider: None    Education and Discussions with Family / Patient: patient    Time Spent for Care: 30 minutes  More than 50% of total time spent on counseling and coordination of care as described above  Current Length of Stay: 2 day(s)    Current Patient Status: Inpatient   Certification Statement: The patient will continue to require additional inpatient hospital stay due to resp failure    Discharge Plan: pending    Code Status: Level 1 - Full Code      Subjective:   Reports overnight that O2 sats were in the 80s, o2 requirements increased from 10L to 14L  Patient reports more calm today, weaker but tolerating diet  Denies any chest pains, cough or fevers  Objective:     Vitals:   Temp (24hrs), Av 3 °F (36 8 °C), Min:97 7 °F (36 5 °C), Max:99 3 °F (37 4 °C)    Temp:  [97 7 °F (36 5 °C)-99 3 °F (37 4 °C)] 97 7 °F (36 5 °C)  HR:  [60-85] 66  Resp:  [18-20] 20  BP: ()/(50-63) 93/56  SpO2:  [89 %-98 %] 96 %  Body mass index is 18 03 kg/m²  Input and Output Summary (last 24 hours): Intake/Output Summary (Last 24 hours) at 2022 1405  Last data filed at 2022 1145  Gross per 24 hour   Intake 120 ml   Output 1350 ml   Net -1230 ml       Physical Exam:     Physical Exam  Vitals and nursing note reviewed  Constitutional:       Appearance: He is normal weight     HENT:      Head: Normocephalic and atraumatic  Eyes:      General: No scleral icterus  Conjunctiva/sclera: Conjunctivae normal    Cardiovascular:      Rate and Rhythm: Normal rate and regular rhythm  Heart sounds: Normal heart sounds  Pulmonary:      Breath sounds: Normal breath sounds  No wheezing or rhonchi  Abdominal:      General: Bowel sounds are normal  There is no distension  Palpations: Abdomen is soft  Musculoskeletal:         General: No swelling  Right lower leg: No edema  Left lower leg: No edema  Skin:     General: Skin is warm and dry  Neurological:      General: No focal deficit present  Mental Status: He is alert  Mental status is at baseline  Additional Data:     Labs:    Results from last 7 days   Lab Units 01/30/22  0639 01/29/22  0512 01/29/22  0512   WBC Thousand/uL 12 37*   < > 9 59   HEMOGLOBIN g/dL 11 4*   < > 11 9*   HEMATOCRIT % 33 3*   < > 35 1*   PLATELETS Thousands/uL 167   < > 121*   BANDS PCT % 1  --   --    NEUTROS PCT %  --   --  89*   LYMPHS PCT %  --   --  5*   LYMPHO PCT % 3*  --   --    MONOS PCT %  --   --  5   MONO PCT % 3*  --   --    EOS PCT % 0  --  0    < > = values in this interval not displayed  Results from last 7 days   Lab Units 01/30/22  0808 01/29/22  0512 01/28/22  0334   SODIUM mmol/L 127*   < > 129*   POTASSIUM mmol/L 5 5*   < > 3 2*   CHLORIDE mmol/L 94*   < > 90*   CO2 mmol/L 27   < > 31   BUN mg/dL 20   < > 32*   CREATININE mg/dL 0 75   < > 1 03   ANION GAP mmol/L 6   < > 8   CALCIUM mg/dL 7 5*   < > 7 9*   ALBUMIN g/dL  --   --  2 4*   TOTAL BILIRUBIN mg/dL  --   --  0 98   ALK PHOS U/L  --   --  181*   ALT U/L  --   --  44   AST U/L  --   --  54*   GLUCOSE RANDOM mg/dL 109   < > 89    < > = values in this interval not displayed       Results from last 7 days   Lab Units 01/30/22  0639   INR  1 67*             Results from last 7 days   Lab Units 01/28/22  3664 01/28/22  0451 01/28/22  0334 01/25/22  0529 01/24/22  0540 LACTIC ACID mmol/L 2 6*  --  3 9*  --   --    PROCALCITONIN ng/ml 1 49* 1 00*  --  2 12* 2 59*           * I Have Reviewed All Lab Data Listed Above  * Additional Pertinent Lab Tests Reviewed: [address] Admission Reviewed    Imaging:    CTA ED chest PE Study    Addendum Date: 1/28/2022    ADDENDUM: First line of impression should read: "No evidence of pulmonary embolus"    Result Date: 1/28/2022  Impression: Evidence of pulmonary embolus  Extensive bilateral peripheral predominant groundglass airspace disease consistent with patient's known COVID pneumonia 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 measuring up 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  Recommendation is for follow-up low radiation dose chest CT in one year Workstation performed: [id number]     Recent Cultures (last 7 days):     Results from last 7 days   Lab Units 01/28/22  0455 01/28/22  0451   BLOOD CULTURE  No Growth at 48 hrs  No Growth at 48 hrs         Last 24 Hours Medication List:   Current Facility-Administered Medications   Medication Dose Route Frequency Provider Last Rate    amiodarone  100 mg Oral Daily With Breakfast Evins Talita, CRNP      aspirin  81 mg Oral Daily Evins Talita, CRNP      atorvastatin  80 mg Oral Daily With Kavitha Jang, CRFELIX      calcium carbonate  1,000 mg Oral Daily PRN Evins Talita, CRNP      eplerenone  25 mg Oral Daily NICOLE Flores      furosemide  40 mg Oral Daily Evins Talita, CRNP      lisinopril  40 mg Oral HS Evins Talita, CRNP      LORazepam  0 5 mg Oral Q8H PRN Kylie Moody MD      metoprolol succinate  75 mg Oral Q12H Evins Talita, CRNP      polyethylene glycol  17 g Oral Daily PRN Zahraa Moore PA-C      potassium chloride  20 mEq Oral Daily Evins Talita, CRNP      predniSONE  40 mg Oral Daily Kylie Moody MD      simethicone  80 mg Oral 4x Daily PRN NICOLE Chin      warfarin  2 mg Oral Once per day on Mon Wed Thu Fri Sat NICOLE Moseley      warfarin  4 mg Oral Once per day on Sun Michael Andrade 3, CRNP          Today, Patient Was Seen By: Gigi Johnson MD    ** Please Note: Dictation voice to text software may have been used in the creation of this document   **

## 2022-01-30 NOTE — PLAN OF CARE
Problem: Potential for Falls  Goal: Patient will remain free of falls  Description: INTERVENTIONS:  - Educate patient/family on patient safety including physical limitations  - Instruct patient to call for assistance with activity   - Consult OT/PT to assist with strengthening/mobility   - Keep Call bell within reach  - Keep bed low and locked with side rails adjusted as appropriate  - Keep care items and personal belongings within reach  - Initiate and maintain comfort rounds  - Make Fall Risk Sign visible to staff  - Offer Toileting every  Hours, in advance of need  - Initiate/Maintain alarm  - Obtain necessary fall risk management equipment:   - Apply yellow socks and bracelet for high fall risk patients  - Consider moving patient to room near nurses station  Outcome: Progressing     Problem: PAIN - ADULT  Goal: Verbalizes/displays adequate comfort level or baseline comfort level  Description: Interventions:  - Encourage patient to monitor pain and request assistance  - Assess pain using appropriate pain scale  - Administer analgesics based on type and severity of pain and evaluate response  - Implement non-pharmacological measures as appropriate and evaluate response  - Consider cultural and social influences on pain and pain management  - Notify physician/advanced practitioner if interventions unsuccessful or patient reports new pain  Outcome: Progressing     Problem: INFECTION - ADULT  Goal: Absence or prevention of progression during hospitalization  Description: INTERVENTIONS:  - Assess and monitor for signs and symptoms of infection  - Monitor lab/diagnostic results  - Monitor all insertion sites, i e  indwelling lines, tubes, and drains  - Monitor endotracheal if appropriate and nasal secretions for changes in amount and color  - [place] appropriate cooling/warming therapies per order  - Administer medications as ordered  - Instruct and encourage patient and family to use good hand hygiene technique  - Identify and instruct in appropriate isolation precautions for identified infection/condition  Outcome: Progressing  Goal: Absence of fever/infection during neutropenic period  Description: INTERVENTIONS:  - Monitor WBC    Outcome: Progressing     Problem: SAFETY ADULT  Goal: Patient will remain free of falls  Description: INTERVENTIONS:  - Educate patient/family on patient safety including physical limitations  - Instruct patient to call for assistance with activity   - Consult OT/PT to assist with strengthening/mobility   - Keep Call bell within reach  - Keep bed low and locked with side rails adjusted as appropriate  - Keep care items and personal belongings within reach  - Initiate and maintain comfort rounds  - Make Fall Risk Sign visible to staff  - Offer Toileting every Hours, in advance of need  - Initiate/Maintain alarm  - Obtain necessary fall risk management equipment:   - Apply yellow socks and bracelet for high fall risk patients  - Consider moving patient to room near nurses station  Outcome: Progressing  Goal: Maintain or return to baseline ADL function  Description: INTERVENTIONS:  -  Assess patient's ability to carry out ADLs; assess patient's baseline for ADL function and identify physical deficits which impact ability to perform ADLs (bathing, care of mouth/teeth, toileting, grooming, dressing, etc )  - Assess/evaluate cause of self-care deficits   - Assess range of motion  - Assess patient's mobility; develop plan if impaired  - Assess patient's need for assistive devices and provide as appropriate  - Encourage maximum independence but intervene and supervise when necessary  - Involve family in performance of ADLs  - Assess for home care needs following discharge   - Consider OT consult to assist with ADL evaluation and planning for discharge  - Provide patient education as appropriate  Outcome: Progressing  Goal: Maintains/Returns to pre admission functional level  Description: INTERVENTIONS:  - Perform BMAT or MOVE assessment daily    - Set and communicate daily mobility goal to care team and patient/family/caregiver  - Collaborate with rehabilitation services on mobility goals if consulted  - Perform Range of Motion  times a day  - Reposition patient every  hours  - Dangle patient  times a day  - Stand patient  times a day  - Ambulate patient  times a day  - Out of bed to chair  times a day   - Out of bed for meals  times a day  - Out of bed for toileting  - Record patient progress and toleration of activity level   Outcome: Progressing     Problem: DISCHARGE PLANNING  Goal: Discharge to home or other facility with appropriate resources  Description: INTERVENTIONS:  - Identify barriers to discharge w/patient and caregiver  - Arrange for needed discharge resources and transportation as appropriate  - Identify discharge learning needs (meds, wound care, etc )  - Arrange for interpretive services to assist at discharge as needed  - Refer to Case Management Department for coordinating discharge planning if the patient needs post-hospital services based on physician/advanced practitioner order or complex needs related to functional status, cognitive ability, or social support system  Outcome: Progressing     Problem: Knowledge Deficit  Goal: Patient/family/caregiver demonstrates understanding of disease process, treatment plan, medications, and discharge instructions  Description: Complete learning assessment and assess knowledge base    Interventions:  - Provide teaching at level of understanding  - Provide teaching via preferred learning methods  Outcome: Progressing     Problem: RESPIRATORY - ADULT  Goal: Achieves optimal ventilation and oxygenation  Description: INTERVENTIONS:  - Assess for changes in respiratory status  - Assess for changes in mentation and behavior  - Position to facilitate oxygenation and minimize respiratory effort  - Oxygen administered by appropriate delivery if ordered  - Initiate smoking cessation education as indicated  - Encourage broncho-pulmonary hygiene including cough, deep breathe, Incentive Spirometry  - Assess the need for suctioning and aspirate as needed  - Assess and instruct to report SOB or any respiratory difficulty  - Respiratory Therapy support as indicated  Outcome: Progressing

## 2022-01-30 NOTE — ASSESSMENT & PLAN NOTE
· Rheumatic heart disease s/p mechanical AVR in 1980's   Anticoagulated on warfarin  · Goal INR 2 5-3 5  · Monitor INR, currently subtherapeutic, given additional coumadin on 01/29 6mg instead of 2mg

## 2022-01-31 NOTE — TELEPHONE ENCOUNTER
I called the pt today for make TCM appt, Pt states he still at the hospital, Pt states he will call the office when they discharge him

## 2022-01-31 NOTE — PLAN OF CARE
Problem: Potential for Falls  Goal: Patient will remain free of falls  Description: INTERVENTIONS:  - Educate patient/family on patient safety including physical limitations  - Instruct patient to call for assistance with activity   - Consult OT/PT to assist with strengthening/mobility   - Keep Call bell within reach  - Keep bed low and locked with side rails adjusted as appropriate  - Keep care items and personal belongings within reach  - Initiate and maintain comfort rounds  - Make Fall Risk Sign visible to staff  - Offer Toileting every  Hours, in advance of need  - Initiate/Maintain alarm  - Obtain necessary fall risk management equipment:   - Apply yellow socks and bracelet for high fall risk patients  - Consider moving patient to room near nurses station  Outcome: Progressing     Problem: PAIN - ADULT  Goal: Verbalizes/displays adequate comfort level or baseline comfort level  Description: Interventions:  - Encourage patient to monitor pain and request assistance  - Assess pain using appropriate pain scale  - Administer analgesics based on type and severity of pain and evaluate response  - Implement non-pharmacological measures as appropriate and evaluate response  - Consider cultural and social influences on pain and pain management  - Notify physician/advanced practitioner if interventions unsuccessful or patient reports new pain  Outcome: Progressing     Problem: INFECTION - ADULT  Goal: Absence or prevention of progression during hospitalization  Description: INTERVENTIONS:  - Assess and monitor for signs and symptoms of infection  - Monitor lab/diagnostic results  - Monitor all insertion sites, i e  indwelling lines, tubes, and drains  - Monitor endotracheal if appropriate and nasal secretions for changes in amount and color  - [place] appropriate cooling/warming therapies per order  - Administer medications as ordered  - Instruct and encourage patient and family to use good hand hygiene technique  - Identify and instruct in appropriate isolation precautions for identified infection/condition  Outcome: Progressing  Goal: Absence of fever/infection during neutropenic period  Description: INTERVENTIONS:  - Monitor WBC    Outcome: Progressing     Problem: SAFETY ADULT  Goal: Patient will remain free of falls  Description: INTERVENTIONS:  - Educate patient/family on patient safety including physical limitations  - Instruct patient to call for assistance with activity   - Consult OT/PT to assist with strengthening/mobility   - Keep Call bell within reach  - Keep bed low and locked with side rails adjusted as appropriate  - Keep care items and personal belongings within reach  - Initiate and maintain comfort rounds  - Make Fall Risk Sign visible to staff  - Offer Toileting every  Hours, in advance of need  - Initiate/Maintain alarm  - Obtain necessary fall risk management equipment:   - Apply yellow socks and bracelet for high fall risk patients  - Consider moving patient to room near nurses station  Outcome: Progressing  Goal: Maintain or return to baseline ADL function  Description: INTERVENTIONS:  -  Assess patient's ability to carry out ADLs; assess patient's baseline for ADL function and identify physical deficits which impact ability to perform ADLs (bathing, care of mouth/teeth, toileting, grooming, dressing, etc )  - Assess/evaluate cause of self-care deficits   - Assess range of motion  - Assess patient's mobility; develop plan if impaired  - Assess patient's need for assistive devices and provide as appropriate  - Encourage maximum independence but intervene and supervise when necessary  - Involve family in performance of ADLs  - Assess for home care needs following discharge   - Consider OT consult to assist with ADL evaluation and planning for discharge  - Provide patient education as appropriate  Outcome: Progressing  Goal: Maintains/Returns to pre admission functional level  Description: INTERVENTIONS:  - Perform BMAT or MOVE assessment daily    - Set and communicate daily mobility goal to care team and patient/family/caregiver  - Collaborate with rehabilitation services on mobility goals if consulted  - Perform Range of Motion  times a day  - Reposition patient every  hours  - Dangle patient  times a day  - Stand patient  times a day  - Ambulate patient  times a day  - Out of bed to chair  times a day   - Out of bed for meals times a day  - Out of bed for toileting  - Record patient progress and toleration of activity level   Outcome: Progressing     Problem: DISCHARGE PLANNING  Goal: Discharge to home or other facility with appropriate resources  Description: INTERVENTIONS:  - Identify barriers to discharge w/patient and caregiver  - Arrange for needed discharge resources and transportation as appropriate  - Identify discharge learning needs (meds, wound care, etc )  - Arrange for interpretive services to assist at discharge as needed  - Refer to Case Management Department for coordinating discharge planning if the patient needs post-hospital services based on physician/advanced practitioner order or complex needs related to functional status, cognitive ability, or social support system  Outcome: Progressing     Problem: Knowledge Deficit  Goal: Patient/family/caregiver demonstrates understanding of disease process, treatment plan, medications, and discharge instructions  Description: Complete learning assessment and assess knowledge base    Interventions:  - Provide teaching at level of understanding  - Provide teaching via preferred learning methods  Outcome: Progressing     Problem: RESPIRATORY - ADULT  Goal: Achieves optimal ventilation and oxygenation  Description: INTERVENTIONS:  - Assess for changes in respiratory status  - Assess for changes in mentation and behavior  - Position to facilitate oxygenation and minimize respiratory effort  - Oxygen administered by appropriate delivery if ordered  - Initiate smoking cessation education as indicated  - Encourage broncho-pulmonary hygiene including cough, deep breathe, Incentive Spirometry  - Assess the need for suctioning and aspirate as needed  - Assess and instruct to report SOB or any respiratory difficulty  - Respiratory Therapy support as indicated  Outcome: Progressing

## 2022-01-31 NOTE — OCCUPATIONAL THERAPY NOTE
Occupational Therapy Evaluation     Patient Name: Prince Gupta  [id number] Date: 1/31/2022     Problem List  Principal Problem:    Acute respiratory failure with hypoxia ([facility] [address] )  Active Problems:    Coronary artery disease involving native coronary artery of native heart with angina pectoris (HCC)    S/P AVR (aortic valve replacement)    History of ventricular fibrillation    Elevated troponin    Cocaine use    Ischemic cardiomyopathy    Hyponatremia    Medically noncompliant    Chronic combined systolic and diastolic congestive heart failure (HCC)    Aortic ectasia, thoracic ([facility] [address] )    Past Medical History  Past Medical History:   Diagnosis Date    AICD (automatic cardioverter/defibrillator) present     medtronic     CAD (coronary artery disease)     Cancer ([facility] [address] )     Cardiac disease     [facility]     mixed  predominently nonischemic    Colonic mass     Coronary artery disease     Hyperlipidemia     Hypertension     Irregular heart beat     Myocardial infarction ([facility] [address] )     2014 and 2015    Rectal bleeding     S/P AVR (aortic valve replacement)     mechanical    Wears glasses      Past Surgical History  Past Surgical History:   Procedure Laterality Date    AORTIC VALVE REPLACEMENT      APPENDECTOMY      CARDIAC DEFIBRILLATOR PLACEMENT      COLECTOMY MADELIN Right     Last Assessed: 6/7/2016    COLECTOMY LAPAROSCOPIC      COLON SURGERY Right     colectomy    CORONARY STENT PLACEMENT      INSERT / REPLACE / REMOVE PACEMAKER      MITRAL VALVE REPAIR      MITRAL VALVE REPAIR      MOUTH SURGERY      CA COLONOSCOPY FLX DX W/COLLJ [address] WHEN PFRMD N/A 1/15/2018    Procedure: COLONOSCOPY;  Surgeon: Troy Matson MD;  Location: [facility];   Service: General    CA LAP,SURG,COLECTOMY, PARTIAL, W/ANAST N/A 6/2/2016    Procedure: RESECTION COLON RIGHT LAPAROSCOPIC HAND-ASSISTED;  Surgeon: Troy Matson MD;  Location: [facility];  Service: General         01/31/22 1037   OT Last Visit OT Visit Date 01/31/22   Note Type   Note type Evaluation   Restrictions/Precautions   Weight Bearing Precautions Per Order No   Other Precautions Telemetry;Multiple lines;O2   Pain Assessment   Pain Assessment Tool 0-10   Pain Score No Pain   Home Living   Type of [address] level  ([address])   Bathroom Shower/Tub Tub/shower unit   Bathroom Toilet Standard   Bathroom Equipment   (none)   [address]   Additional Comments pt lives alone, has multiple family members that live nearby  Pt reports he was functioning well at home without AD/DME, but needed O2   Prior Function   Level of [place] Independent with ADLs and functional mobility   Lives With Alone   Receives Help From Family   ADL Assistance Independent   IADLs Independent   Falls in the last 6 months 0   Vocational   (Previously worked at [employer])   Psychosocial   Psychosocial ([address]) [address]   Patient Behaviors/Mood   (Pt angry re:delayed d/c to home  Pt cooperative with therapy)   Subjective   Subjective Pt agreeable to therapy evaluation    ADL   Eating Assistance 7  Independent   Grooming Assistance 7  Independent   Grooming Deficit   (while standing at sink)   LB Dressing Assistance 7  Independent   Toileting Assistance  7  Independent   Bed Mobility   Supine to Sit 6  Modified independent   Additional items HOB elevated; Bedrails   Sit to Supine 6  Modified independent   Transfers   Sit to Stand 6  Modified independent   Stand to Sit 6  Modified independent   Additional Comments SpO2 91% on 10L O2 NC salter at rest  95% on 10L after ambulation to/from bathroom    Functional Mobility   Functional Mobility 6  Modified independent   Additional Comments pt managing O2 lines appropriately   Activity Tolerance   Activity Tolerance Patient tolerated treatment well  (SpO2 maintained >90% on 10L O2)   Medical Staff Made Aware PT Jocelynn   Nurse Made Aware RN [id number] [facility] Assessment   FRIEDA Assessment WFL   LUE Assessment   LUE Assessment WFL   Hand Function   Gross Motor Coordination Functional   Fine Motor Coordination Functional   Sensation   Light Touch No apparent deficits   Vision-Basic Assessment   Current Vision No visual deficits   Vision - Complex Assessment   Acuity   (WFL for mobility)   Cognition   Overall Cognitive Status WFL   Arousal/Participation Alert; Cooperative   Attention Within functional limits   Orientation Level Oriented X4   Memory Within functional limits   Following Commands Follows all commands and directions without difficulty   Comments Pt initially agitated re: delayed d/c to home  Pt able to be redirected and was cooperative with therapy    Assessment   Limitation Decreased endurance  (Increased O2 requirement)   Assessment Pt is a 61 y o  male seen for OT evaluation s/p admit to [facility] on 1/28/2022 w/ Acute respiratory failure with hypoxia ([facility] [address] )  Comorbidities affecting pt's functional performance at time of assessment include: previous surgery, CHF and hx of vfib s/p ICD, medical noncompliance, CAD  Personal factors affecting pt at time of IE include:steps to enter environment, difficulty performing ADLS, limited insight into deficits and health management   Prior to recent hospital admission, pt was independent with ADLs and functional mobility  Upon evaluation: Pt is modified independent to supervision for ADLs and mobility 2* the following deficits impacting occupational performance: decreased tolerance and increased O2 requirements  Pt receptive to education regarding pacing/energy conservation  Pt is near his functional baseline, with no further acute OT needs indicated  The patient's raw score on the AM-PAC Daily Activity inpatient short form is 24, standardized score is 57 54, greater than 39 4  Patients at this level are likely to benefit from discharge to home  From OT standpoint, recommendation at time of d/c would be no further OT needs      Goals   Patient Goals to go home ASAP   Plan   OT Frequency Eval only   Recommendation   OT Discharge Recommendation No rehabilitation needs   OT - OK to Discharge   (yes, once medically cleared)   AM-PAC Daily Activity Inpatient   Lower Body Dressing 4   Bathing 4   Toileting 4   Upper Body Dressing 4   Grooming 4   Eating 4   Daily Activity Raw Score 24   Daily Activity Standardized Score (Calc for Raw Score >=11) 57 54   AM-PAC Applied Cognition Inpatient   Following a Speech/Presentation 4   Understanding Ordinary Conversation 4   Taking Medications 4   Remembering Where Things Are Placed or Put Away 4   Remembering List of 4-5 Errands 4   Taking Care of Complicated Tasks 4   Applied Cognition Raw Score 24   Applied Cognition Standardized Score 62 21       Yecenia Norris OTR/L

## 2022-01-31 NOTE — ASSESSMENT & PLAN NOTE
Wt Readings from Last 3 Encounters:   01/31/22 57 1 kg (125 lb 14 1 oz)   01/27/22 57 kg (125 lb 10 6 oz)   01/12/22 56 6 kg (124 lb 12 5 oz)     · Elevated BNP >31,000  · Diuretics per Cardiology, currently on Lasix 40mg BID and eplerenone 25mg once daily  · Appears euvolemic

## 2022-01-31 NOTE — PLAN OF CARE
Problem: Potential for Falls  Goal: Patient will remain free of falls  Description: INTERVENTIONS:  - Educate patient/family on patient safety including physical limitations  - Instruct patient to call for assistance with activity   - Consult OT/PT to assist with strengthening/mobility   - Keep Call bell within reach  - Keep bed low and locked with side rails adjusted as appropriate  - Keep care items and personal belongings within reach  - Initiate and maintain comfort rounds  - Make Fall Risk Sign visible to staff  - Apply yellow socks and bracelet for high fall risk patients  - Consider moving patient to room near nurses station  Outcome: Progressing     Problem: PAIN - ADULT  Goal: Verbalizes/displays adequate comfort level or baseline comfort level  Description: Interventions:  - Encourage patient to monitor pain and request assistance  - Assess pain using appropriate pain scale  - Administer analgesics based on type and severity of pain and evaluate response  - Implement non-pharmacological measures as appropriate and evaluate response  - Consider cultural and social influences on pain and pain management  - Notify physician/advanced practitioner if interventions unsuccessful or patient reports new pain  Outcome: Progressing     Problem: INFECTION - ADULT  Goal: Absence or prevention of progression during hospitalization  Description: INTERVENTIONS:  - Assess and monitor for signs and symptoms of infection  - Monitor lab/diagnostic results  - Monitor all insertion sites, i e  indwelling lines, tubes, and drains  - Monitor endotracheal if appropriate and nasal secretions for changes in amount and color  - [place] appropriate cooling/warming therapies per order  - Administer medications as ordered  - Instruct and encourage patient and family to use good hand hygiene technique  - Identify and instruct in appropriate isolation precautions for identified infection/condition  Outcome: Progressing     Problem: SAFETY ADULT  Goal: Patient will remain free of falls  Description: INTERVENTIONS:  - Educate patient/family on patient safety including physical limitations  - Instruct patient to call for assistance with activity   - Consult OT/PT to assist with strengthening/mobility   - Keep Call bell within reach  - Keep bed low and locked with side rails adjusted as appropriate  - Keep care items and personal belongings within reach  - Initiate and maintain comfort rounds  - Make Fall Risk Sign visible to staff  - Apply yellow socks and bracelet for high fall risk patients  - Consider moving patient to room near nurses station  Outcome: Progressing  Goal: Maintain or return to baseline ADL function  Description: INTERVENTIONS:  - Educate patient/family on patient safety including physical limitations  - Instruct patient to call for assistance with activity   - Consult OT/PT to assist with strengthening/mobility   - Keep Call bell within reach  - Keep bed low and locked with side rails adjusted as appropriate  - Keep care items and personal belongings within reach  - Initiate and maintain comfort rounds  - Make Fall Risk Sign visible to staff  - Apply yellow socks and bracelet for high fall risk patients  - Consider moving patient to room near nurses station  Outcome: Progressing  Goal: Maintains/Returns to pre admission functional level  Description: INTERVENTIONS:  - Perform BMAT or MOVE assessment daily    - Set and communicate daily mobility goal to care team and patient/family/caregiver     - Collaborate with rehabilitation services on mobility goals if consulted  - Out of bed for toileting  - Record patient progress and toleration of activity level   Outcome: Progressing     Problem: DISCHARGE PLANNING  Goal: Discharge to home or other facility with appropriate resources  Description: INTERVENTIONS:  - Identify barriers to discharge w/patient and caregiver  - Arrange for needed discharge resources and transportation as appropriate  - Identify discharge learning needs (meds, wound care, etc )  - Arrange for interpretive services to assist at discharge as needed  - Refer to Case Management Department for coordinating discharge planning if the patient needs post-hospital services based on physician/advanced practitioner order or complex needs related to functional status, cognitive ability, or social support system  Outcome: Progressing     Problem: Knowledge Deficit  Goal: Patient/family/caregiver demonstrates understanding of disease process, treatment plan, medications, and discharge instructions  Description: Complete learning assessment and assess knowledge base    Interventions:  - Provide teaching at level of understanding  - Provide teaching via preferred learning methods  Outcome: Progressing     Problem: RESPIRATORY - ADULT  Goal: Achieves optimal ventilation and oxygenation  Description: INTERVENTIONS:  - Assess for changes in respiratory status  - Assess for changes in mentation and behavior  - Position to facilitate oxygenation and minimize respiratory effort  - Oxygen administered by appropriate delivery if ordered  - Initiate smoking cessation education as indicated  - Encourage broncho-pulmonary hygiene including cough, deep breathe, Incentive Spirometry  - Assess the need for suctioning and aspirate as needed  - Assess and instruct to report SOB or any respiratory difficulty  - Respiratory Therapy support as indicated  Outcome: Progressing

## 2022-01-31 NOTE — PROGRESS NOTES
[facility]  Progress Note - [address] [place] 1965, 61 y o  male MRN: [id number]  Unit/Bed#: E4 -01 Encounter: [id number]  Primary Care Provider: Tonio Alcantar PA-C   Date and time admitted to hospital: 1/28/2022  3:13 AM    * Acute respiratory failure with hypoxia (HCC)  Assessment & Plan  · Presented with dyspnea, noted to be hypoxic  · Recently discharge, treated for CHF exacerbation and COVID pneumonia  · CTA study negative for PE, shows severe bilateral ground-glass opacities consistent with patient's known history of COVID  · Previously discharged on 2 L nasal cannula, has required up to 15L midflow  · Pulmonary consulted, appreciate recommendations, no further COVID treatments  · Patient appears euvolemic on exam  · Continue prednisone 40mg, may need long taper, to discuss with pulmonary  · Home O2 eval tomorrow  · Currently on 4L NC with sats at 92%, keep above 91%    Chronic combined systolic and diastolic congestive heart failure (HCC)  Assessment & Plan  Wt Readings from Last 3 Encounters:   01/31/22 57 1 kg (125 lb 14 1 oz)   01/27/22 57 kg (125 lb 10 6 oz)   01/12/22 56 6 kg (124 lb 12 5 oz)     · Elevated BNP >31,000  · Diuretics per Cardiology, currently on Lasix 40mg BID and eplerenone 25mg once daily  · Appears euvolemic    Medically noncompliant  Assessment & Plan  · History of medical noncompliance    Hyponatremia  Assessment & Plan  Sodium levels have ranged from 127-130  Fluid restrict, monitor while diuretics    Ischemic cardiomyopathy  Assessment & Plan  · Ischemic cardiomyopathy with LVEF 20% s/p ICD    History of ventricular fibrillation  Assessment & Plan  · History of VFib/Vtac s/p ICD  Continues to have episodes of V-tach per outpatient cardiology note  Maintained on amiodarone and Toprol    S/P AVR (aortic valve replacement)  Assessment & Plan  · Rheumatic heart disease s/p mechanical AVR in 1980's   Anticoagulated on warfarin  · Goal INR 2 5-3 5  · Monitor INR, currently subtherapeutic, given additional coumadin on  6mg instead of 2mg    Coronary artery disease involving native coronary artery of native heart with angina pectoris [facility])  Assessment & Plan  · CAD s/p PCI in   Continue ASA, BB and statin        VTE Pharmacologic Prophylaxis:   Pharmacologic: Warfarin (Coumadin)  Mechanical VTE Prophylaxis in Place: Yes    Patient Centered Rounds: I have performed bedside rounds with nursing staff today  Discussions with Specialists or Other Care Team Provider: Pulmonary    Education and Discussions with Family / Patient: patient    Time Spent for Care: 30 minutes  More than 50% of total time spent on counseling and coordination of care as described above  Current Length of Stay: 3 day(s)    Current Patient Status: Inpatient   Certification Statement: The patient will continue to require additional inpatient hospital stay due to monitor oxygen levels, increase lasix po to 40BID, repeat BMP in am, home o2 study tmr    Discharge Plan: 24-48 hours with home o2 study tomorrow    Code Status: Level 1 - Full Code      Subjective:   Reports his breathing has improved  Had a good night overall  Good appetite  No complaints  [place] for discharge home  Objective:     Vitals:   Temp (24hrs), Av 5 °F (36 4 °C), Min:97 °F (36 1 °C), Max:98 1 °F (36 7 °C)    Temp:  [97 °F (36 1 °C)-98 1 °F (36 7 °C)] 97 °F (36 1 °C)  HR:  [63-68] 64  Resp:  [18-20] 18  BP: (104-116)/(56-69) 116/69  SpO2:  [91 %-99 %] 92 %  Body mass index is 18 06 kg/m²  Input and Output Summary (last 24 hours): Intake/Output Summary (Last 24 hours) at 2022 1035  Last data filed at 2022 0734  Gross per 24 hour   Intake 600 ml   Output 675 ml   Net -75 ml       Physical Exam:     Physical Exam  Vitals and nursing note reviewed  Constitutional:       Appearance: He is normal weight  HENT:      Head: Normocephalic and atraumatic     Eyes:      General: No scleral icterus  Conjunctiva/sclera: Conjunctivae normal    Cardiovascular:      Rate and Rhythm: Normal rate and regular rhythm  Heart sounds: Normal heart sounds  Pulmonary:      Breath sounds: Rhonchi present  No wheezing  Abdominal:      General: Bowel sounds are normal  There is no distension  Palpations: Abdomen is soft  Musculoskeletal:         General: No swelling  Right lower leg: No edema  Left lower leg: No edema  Skin:     General: Skin is warm and dry  Neurological:      General: No focal deficit present  Mental Status: He is alert  Mental status is at baseline  Additional Data:     Labs:    Results from last 7 days   Lab Units 01/31/22 0512 01/30/22  0639 01/30/22  0639   WBC Thousand/uL 15 59*   < > 12 37*   HEMOGLOBIN g/dL 11 5*   < > 11 4*   HEMATOCRIT % 34 5*   < > 33 3*   PLATELETS Thousands/uL 241   < > 167   BANDS PCT %  --   --  1   NEUTROS PCT % 88*  --   --    LYMPHS PCT % 4*  --   --    LYMPHO PCT %  --   --  3*   MONOS PCT % 6  --   --    MONO PCT %  --   --  3*   EOS PCT % 0  --  0    < > = values in this interval not displayed  Results from last 7 days   Lab Units 01/31/22 0512 01/29/22 0512 01/28/22  0334   SODIUM mmol/L 127*   < > 129*   POTASSIUM mmol/L 4 3   < > 3 2*   CHLORIDE mmol/L 94*   < > 90*   CO2 mmol/L 28   < > 31   BUN mg/dL 18   < > 32*   CREATININE mg/dL 0 80   < > 1 03   ANION GAP mmol/L 5   < > 8   CALCIUM mg/dL 7 8*   < > 7 9*   ALBUMIN g/dL  --   --  2 4*   TOTAL BILIRUBIN mg/dL  --   --  0 98   ALK PHOS U/L  --   --  181*   ALT U/L  --   --  44   AST U/L  --   --  54*   GLUCOSE RANDOM mg/dL 99   < > 89    < > = values in this interval not displayed       Results from last 7 days   Lab Units 01/31/22  0512   INR  2 64*             Results from last 7 days   Lab Units 01/31/22  0512 01/28/22  0634 01/28/22  0451 01/28/22  0334 01/25/22  0529   LACTIC ACID mmol/L  --  2 6*  --  3 9*  --    PROCALCITONIN ng/ml 1 06* 1 49* 1 00*  --  2 12*           * I Have Reviewed All Lab Data Listed Above  * Additional Pertinent Lab Tests Reviewed: Phu 66 Admission Reviewed    Imaging:    CTA ED chest PE Study    Addendum Date: 1/28/2022    ADDENDUM: First line of impression should read: "No evidence of pulmonary embolus"    Result Date: 1/28/2022  Impression: Evidence of pulmonary embolus  Extensive bilateral peripheral predominant groundglass airspace disease consistent with patient's known COVID pneumonia 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 measuring up to 41 mm  Recommendation is for follow-up low radiation dose chest CT in one year Workstation performed: [id number]       Recent Cultures (last 7 days):     Results from last 7 days   Lab Units 01/28/22  0455 01/28/22  0451   BLOOD CULTURE  No Growth at 72 hrs  No Growth at 72 hrs         Last 24 Hours Medication List:   Current Facility-Administered Medications   Medication Dose Route Frequency Provider Last Rate    acetaminophen  650 mg Oral Q6H PRN Meryle Sloop, PA-C      amiodarone  100 mg Oral Daily With Breakfast NICOLE Garcia      aspirin  81 mg Oral Daily NICOLE Garcia      atorvastatin  80 mg Oral Daily With [employer], NICOLE      calcium carbonate  1,000 mg Oral Daily PRN NICOLE Garcia      eplerenone  25 mg Oral Daily NICOLE Flores      furosemide  40 mg Oral BID (diuretic) Joseph Olivares MD      lisinopril  40 mg Oral HS NICOLE Garcia      LORazepam  0 5 mg Oral Q8H PRN Joseph Olivares MD      metoprolol succinate  75 mg Oral Q12H NICOLE Garcia      polyethylene glycol  17 g Oral Daily PRN Bernie Hopper PA-C      predniSONE  40 mg Oral Daily Joseph Olivares MD      simethicone  80 mg Oral 4x Daily PRN NICOLE Garcia      warfarin  2 mg Oral Once per day on Mon Wed Thu Fri Sat NICOLE Garcia      warfarin  4 mg Oral Once per day on [address], Patient Was Seen By: Alexia Newman MD    ** Please Note: Dictation voice to text software may have been used in the creation of this document   **

## 2022-01-31 NOTE — PROGRESS NOTES
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 s/p AVR       -  1/10/2022- Echo- EF 20%       proBNP- 12591     PA pressure 60-65 mmHg       -  home regimen:  Amiodarone, metoprolol, Lasix 40 mg daily, Coumadin       -  Per Dr Glenn Palafox note, he recommends Lasix 40 mg b i d        -  recommend keeping patient on drier side, accurate I&Os and daily weight    3  H/O COVID-19- 1/3/2022- some bronchiectasis noted upon imaging       -  patient required intubation       -  completed course of dexamethasone, antibiotics       -  Currently Day # 2/5- prednisone 40 mg        -  will consider pulse dosing if truly fibrotic    4  Leukocytosis       -  WBC increased- 15 85, patient remains afebrile       -  could be secondary to steroid initiation vs underlying bacterial PNA (HCAP vs atypical)       -  procalcitonin increasing- 1 00-- 1 49       -  will wait for this morning's procalcitonin to initiate antibiotics       -  will order sputum        - pulmonary will sign off  - outpatient follow-up per discharge instructions    Chief Complaint:    "I want to go home"    Subjective:    Karen Murphy was comfortably sitting in his bed  He reports that he would like to be discharged today  No significant overnight events reported    Patient currently denies any fever, chills, hemoptysis, headaches, night sweats, pleuritic chest pain, or palpitations  Objective:    Vitals: Blood pressure 112/64, pulse 66, temperature 97 8 °F (36 6 °C), temperature source Temporal, resp  rate 18, height 5' 10" (1 778 m), weight 57 1 kg (125 lb 14 1 oz), SpO2 96 %  14,Body mass index is 18 06 kg/m²  Intake/Output Summary (Last 24 hours) at 1/31/2022 8636  Last data filed at 1/30/2022 2001  Gross per 24 hour   Intake 600 ml   Output 1025 ml   Net -425 ml       Invasive Devices  Report    Peripheral Intravenous Line            Peripheral IV 01/30/22 Proximal;Right;Ventral (anterior) Forearm 1 day                Physical Exam:   Physical Exam  Constitutional:       General: He is not in acute distress  Appearance: Normal appearance  He is normal weight  He is not ill-appearing  HENT:      Head: Normocephalic and atraumatic  Nose: Nose normal  No congestion or rhinorrhea  Mouth/Throat:      Mouth: Mucous membranes are dry  Pharynx: No oropharyngeal exudate or posterior oropharyngeal erythema  Cardiovascular:      Rate and Rhythm: Normal rate and regular rhythm  Pulses: Normal pulses  Heart sounds: Normal heart sounds  No murmur heard  No friction rub  No gallop  Pulmonary:      Effort: Pulmonary effort is normal  No tachypnea, bradypnea or respiratory distress  Breath sounds: Decreased air movement present  No stridor  Decreased breath sounds present  No wheezing, rhonchi or rales  Comments: Rales at bases diminished breath sounds throughout  Chest:      Chest wall: No tenderness  Abdominal:      General: Abdomen is flat  Bowel sounds are normal  There is no distension  Palpations: Abdomen is soft  There is no mass  Musculoskeletal:         General: No swelling or tenderness  Normal range of motion  Cervical back: Normal range of motion and neck supple  No rigidity or tenderness  Skin:     General: Skin is warm and dry  Coloration: Skin is not jaundiced or pale     Neurological:      General: No focal deficit present  Mental Status: He is alert and oriented to person, place, and time  Mental status is at baseline  Psychiatric:         Mood and Affect: Mood normal          Behavior: Behavior normal          Labs:    I have personally reviewed pertinent lab results, CBC:   Lab Results   Component Value Date    WBC 15 59 (H) 01/31/2022    HGB 11 5 (L) 01/31/2022    HCT 34 5 (L) 01/31/2022    MCV 79 (L) 01/31/2022     01/31/2022    MCH 26 2 (L) 01/31/2022    MCHC 33 3 01/31/2022    RDW 14 7 01/31/2022    MPV 10 8 01/31/2022    NRBC 0 01/31/2022   , CMP:   Lab Results   Component Value Date    SODIUM 127 (L) 01/30/2022    K 5 5 (H) 01/30/2022    CL 94 (L) 01/30/2022    CO2 27 01/30/2022    BUN 20 01/30/2022    CREATININE 0 75 01/30/2022    CALCIUM 7 5 (L) 01/30/2022    EGFR 101 01/30/2022   , PT/INR:   Lab Results   Component Value Date    INR 2 64 (H) 01/31/2022       Imaging and other studies: I have personally reviewed pertinent films in PACS     CTA- 1/218/2022- extensive bilateral peripheral ground-glass opacity

## 2022-01-31 NOTE — PHYSICAL THERAPY NOTE
PT EVALUATION 10:20-10:35 ( 15 minutes)    61 y o     [id number]    Chronic combined systolic and diastolic heart failure (HCC) [I50 42]  Lactic acidosis [E87 2]  Hyponatremia [E87 1]  Pneumonia [J18 9]  SOB (shortness of breath) [R06 02]  Elevated troponin [R77 8]  Chronic combined systolic and diastolic congestive heart failure (HCC) [I50 42]  Acute respiratory failure with hypoxia (HCC) [J96 01]  Severe sepsis ([facility] ) [A41 9, R65 20]    Past Medical History:   Diagnosis Date    AICD (automatic cardioverter/defibrillator) present     medtronic     CAD (coronary artery disease)     [facility]     Cardiac disease     Cardiomyopathy ([facility] )     mixed  predominently nonischemic    Colonic mass     Coronary artery disease     Hyperlipidemia     Hypertension     Irregular heart beat     Myocardial infarction [facility]     2014 and 2015    Rectal bleeding     S/P AVR (aortic valve replacement)     mechanical    Wears glasses          Past Surgical History:   Procedure Laterality Date    AORTIC VALVE REPLACEMENT      APPENDECTOMY      CARDIAC DEFIBRILLATOR PLACEMENT      COLECTOMY MADELIN Right     Last Assessed: 6/7/2016    COLECTOMY LAPAROSCOPIC      COLON SURGERY Right     colectomy    CORONARY STENT PLACEMENT      INSERT / REPLACE / REMOVE PACEMAKER      MITRAL VALVE REPAIR      MITRAL VALVE REPAIR      MOUTH SURGERY      [facility] WHEN PFRMD N/A 1/15/2018    Procedure: COLONOSCOPY;  Surgeon: Troy Matson MD;  Location: [facility];   Service: General    VT LAP,SURG,COLECTOMY, PARTIAL, W/ANAST N/A 6/2/2016    Procedure: RESECTION COLON RIGHT LAPAROSCOPIC HAND-ASSISTED;  Surgeon: Troy Matson MD;  Location: [facility];  Service: General        01/31/22 1020   PT Last Visit   PT Visit Date 01/31/22   Note Type   Note type Evaluation   Pain Assessment   Pain Score No Pain   Restrictions/Precautions   Weight Bearing Precautions Per Order No   Other Precautions Telemetry; Fall Risk;O2;Agitated  (O2 at 10L  Hx of recent COVID  Off prec )   Home Living   Type of [address] level  (2 ELMER)   Bathroom Shower/Tub Tub/shower unit   Bathroom Toilet Standard   Bathroom Equipment   (none)   [address] [facility]   Additional Comments resides alone in apt with 2 ELMER  5 local adult children and supportive local sister  Prior to last hospitalization was working at [employer]  Was supposed to receive home O2 following last hospitalization  Prior Function   Level of [place] Independent with ADLs and functional mobility   Lives With Alone   Receives Help From Family   ADL Assistance Independent   IADLs Independent   Falls in the last 6 months 0   Comments I PTA without AD, occasional use of cane  General   Additional Pertinent History Pt is 61 male admitted with increased SOB   Acute on chronic resp failure   Family/Caregiver Present No   Cognition   Overall Cognitive Status WFL   Arousal/Participation Responsive   Orientation Level Oriented X4   Following Commands Follows all commands and directions without difficulty   Comments Agitated initially regarding going home  Subjective   Subjective They said I was going home yesterday and now I'm not  Very angry  Notes will do what therapy wants him to do      RUE Assessment   RUE Assessment WFL   LUE Assessment   LUE Assessment WFL   RLE Assessment   RLE Assessment WFL   LLE Assessment   LLE Assessment WFL   Coordination   Movements are Fluid and Coordinated 1   Bed Mobility   Supine to Sit 6  Modified independent   Sit to Supine 6  Modified independent   Additional Comments resting O2 sat on 10L 91%   Transfers   Sit to Stand 6  Modified independent   Stand to Sit 6  Modified independent   Ambulation/Elevation   Gait pattern Inconsistent ruth ann   Gait Assistance 5  Supervision   Additional items Verbal cues   Assistive Device None   Distance Amb in room 15'x1, then 30'x1  O2 sat on 10L 95%   Balance   Static Sitting Normal   Dynamic Sitting Good   Static Standing Good   Dynamic Standing Fair +   Ambulatory Fair +   Endurance Deficit   Endurance Deficit Yes   Endurance Deficit Description fatigue   Activity Tolerance   Activity Tolerance Patient tolerated treatment well;Treatment limited secondary to medical complications (Comment)  (10 L O2)   Medical Staff Made Aware Nurse, Darylene Hone OT-RoseAna  Nurse Made Aware yes   Assessment   Prognosis Good   Problem List Decreased endurance;Decreased mobility; Impaired judgement   Assessment Lisa Israel is a 61 y o  male with hx of CHF, CAD, recent COVID hospitalization, AICD, CAD,HTN, s/p AVR, cardiomyopathy, colon mass who presents with acute on chronic respiratory failure  PT consulted  Up as tolerated orders  Prior to admission resides alone in apt with 2 ELMER  Occasional use of cane for ambulation  Independent  Local family support supported  Currently presents with functional limitations related to decreased activity tolerance with need for increased O2 demand-currently at 10L  Decreased functional mobility, balance and locomotion compared to baseline related to endurance  Despite maintains mobility at S to I level  Gait with limited distances but with S   No LOB  O2 sats on 10L resting 91%, p ambulation short distances 95%  SORENSEN observed  Given impairments as noted above with decline from baseline tolerance to activity will benefit from skilled PT in order to optimize outcomes  The patient's AM-PAC Basic Mobility Inpatient Short Form Raw Score is 23  A Raw score of greater than 16 suggests the patient may benefit from discharge to home  Please also refer to the recommendation of the Physical Therapist for safe discharge planning  May benefit from OPPT with cardiopulmonary focus  AT this time encouraged continued ambulation with staff for remainder of hospitalization to prevent functional decline  Will d/c PT  Barriers to Discharge Decreased caregiver support   Barriers to Discharge Comments lives alone  Goals   Patient Goals go home! Plan   Treatment/Interventions Bed mobility;Gait training; Compensatory technique education;Spoke to nursing;OT;Endurance training;Functional transfer training;Patient/family training   PT Frequency Other (Comment)  (d/c PT  )   Recommendation   PT Discharge Recommendation Home with outpatient rehabilitation  (outpatient cardiopulmonary rehab  )   Additional Comments May benefit from outpatient cardiopulmonary rehab  AM-PAC Basic Mobility Inpatient   Turning in Bed Without Bedrails 4   Lying on Back to Sitting on Edge of Flat Bed 4   Moving Bed to Chair 4   Standing Up From Chair 4   Walk in Room 4   Climb 3-5 Stairs 3   Basic Mobility Inpatient Raw Score 23   Basic Mobility Standardized Score 50 88   Highest Level Of Mobility   JH-HLM Goal 7: Walk 25 feet or more   JH-HLM Highest Level of Mobility 7: Walk 25 feet or more   JH-HLM Goal Achieved Yes   End of Consult   Patient Position at End of Consult Supine; All needs within reach     Hx/personal factors: co-morbidities, dec caregiver support, mutliple lines, fall risk and O2, readmission  Examination: dec mobility, dec balance, dec endurance, dec amb, risk for falls, assessed body system, balance, endurance, amb, D/C disposition & fall risk  Clinical: unpredictable (ongoing medical status and risk for falls), O2 at 10L, decreased activity tolerance compared to baseline, readmission    Complexity: high    Catia Michele, PT

## 2022-02-01 PROBLEM — I50.43 ACUTE ON CHRONIC COMBINED SYSTOLIC AND DIASTOLIC CONGESTIVE HEART FAILURE (HCC): Status: ACTIVE | Noted: 2022-01-01

## 2022-02-01 NOTE — PLAN OF CARE
Problem: Potential for Falls  Goal: Patient will remain free of falls  Description: INTERVENTIONS:  - Educate patient/family on patient safety including physical limitations  - Instruct patient to call for assistance with activity   - Consult OT/PT to assist with strengthening/mobility   - Keep Call bell within reach  - Keep bed low and locked with side rails adjusted as appropriate  - Keep care items and personal belongings within reach  - Initiate and maintain comfort rounds  - Make Fall Risk Sign visible to staff  - Apply yellow socks and bracelet for high fall risk patients  - Consider moving patient to room near nurses station  Outcome: Progressing     Problem: PAIN - ADULT  Goal: Verbalizes/displays adequate comfort level or baseline comfort level  Description: Interventions:  - Encourage patient to monitor pain and request assistance  - Assess pain using appropriate pain scale  - Administer analgesics based on type and severity of pain and evaluate response  - Implement non-pharmacological measures as appropriate and evaluate response  - Consider cultural and social influences on pain and pain management  - Notify physician/advanced practitioner if interventions unsuccessful or patient reports new pain  Outcome: Progressing     Problem: INFECTION - ADULT  Goal: Absence or prevention of progression during hospitalization  Description: INTERVENTIONS:  - Assess and monitor for signs and symptoms of infection  - Monitor lab/diagnostic results  - Monitor all insertion sites, i e  indwelling lines, tubes, and drains  - Monitor endotracheal if appropriate and nasal secretions for changes in amount and color  - [place] appropriate cooling/warming therapies per order  - Administer medications as ordered  - Instruct and encourage patient and family to use good hand hygiene technique  - Identify and instruct in appropriate isolation precautions for identified infection/condition  Outcome: Progressing     Problem: SAFETY ADULT  Goal: Patient will remain free of falls  Description: INTERVENTIONS:  - Educate patient/family on patient safety including physical limitations  - Instruct patient to call for assistance with activity   - Consult OT/PT to assist with strengthening/mobility   - Keep Call bell within reach  - Keep bed low and locked with side rails adjusted as appropriate  - Keep care items and personal belongings within reach  - Initiate and maintain comfort rounds  - Make Fall Risk Sign visible to staff  - Apply yellow socks and bracelet for high fall risk patients  - Consider moving patient to room near nurses station  Outcome: Progressing  Goal: Maintain or return to baseline ADL function  Description: INTERVENTIONS:  - Educate patient/family on patient safety including physical limitations  - Instruct patient to call for assistance with activity   - Consult OT/PT to assist with strengthening/mobility   - Keep Call bell within reach  - Keep bed low and locked with side rails adjusted as appropriate  - Keep care items and personal belongings within reach  - Initiate and maintain comfort rounds  - Make Fall Risk Sign visible to staff  - Apply yellow socks and bracelet for high fall risk patients  - Consider moving patient to room near nurses station  Outcome: Progressing  Goal: Maintains/Returns to pre admission functional level  Description: INTERVENTIONS:  - Perform BMAT or MOVE assessment daily    - Set and communicate daily mobility goal to care team and patient/family/caregiver  - Collaborate with rehabilitation services on mobility goals if consulted  - Perform Range of Motion  times a day  - Reposition patient every  hours    - Dangle patient  times a day  - Stand patient  times a day  - Ambulate patient  times a day  - Out of bed to chair  times a day   - Out of bed for meals times a day  - Out of bed for toileting  - Record patient progress and toleration of activity level   Outcome: Progressing     Problem: DISCHARGE PLANNING  Goal: Discharge to home or other facility with appropriate resources  Description: INTERVENTIONS:  - Identify barriers to discharge w/patient and caregiver  - Arrange for needed discharge resources and transportation as appropriate  - Identify discharge learning needs (meds, wound care, etc )  - Arrange for interpretive services to assist at discharge as needed  - Refer to Case Management Department for coordinating discharge planning if the patient needs post-hospital services based on physician/advanced practitioner order or complex needs related to functional status, cognitive ability, or social support system  Outcome: Progressing     Problem: Knowledge Deficit  Goal: Patient/family/caregiver demonstrates understanding of disease process, treatment plan, medications, and discharge instructions  Description: Complete learning assessment and assess knowledge base    Interventions:  - Provide teaching at level of understanding  - Provide teaching via preferred learning methods  Outcome: Progressing     Problem: RESPIRATORY - ADULT  Goal: Achieves optimal ventilation and oxygenation  Description: INTERVENTIONS:  - Assess for changes in respiratory status  - Assess for changes in mentation and behavior  - Position to facilitate oxygenation and minimize respiratory effort  - Oxygen administered by appropriate delivery if ordered  - Initiate smoking cessation education as indicated  - Encourage broncho-pulmonary hygiene including cough, deep breathe, Incentive Spirometry  - Assess the need for suctioning and aspirate as needed  - Assess and instruct to report SOB or any respiratory difficulty  - Respiratory Therapy support as indicated  Outcome: Progressing

## 2022-02-01 NOTE — PROGRESS NOTES
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 s/p AVR       -  1/10/2022- Echo- EF 20%       proBNP- 35155     PA pressure 60-65 mmHg       -  recommend significantly diuresing minimum 1 L per day with IV diuretic       -  per Dr Jonna Conroy home regimen:  He Lasix 40 mg b i d , amiodarone, metoprolol, Coumadin       -  will need accurate I&Os and daily weight    3  H/O COVID-19- 1/3/2022- some bronchiectasis noted upon imaging       -  I do feel patient may have some underlying fibrotic changes       -  after diuresis I feel this is close to patient's new baseline       -  hesitant for pulse dosing as concern for pulmonary edema/volume overload       -  no indication for steroids at this time    4  Leukocytosis       -  WBC       -  sputum unremarkable, procalcitonin trending down- 1 00--1 49--0 72       -  would recommend monitoring off antibiotics      - pulmonary will sign off  - outpatient follow-up per discharge instructions          Chief Complaint:    "I want to go home"    Subjective:    Carolina Jacome was comfortably sitting in his bed  He reports that he wants to be discharged  Patient reports he is still feeling short of breath  No significant overnight events reported  Patient currently denies any fever, chills, hemoptysis, headaches, night sweats, pleuritic chest pain, or palpitations  Objective:    Vitals: Blood pressure 95/63, pulse 72, temperature (!) 97 4 °F (36 3 °C), temperature source Temporal, resp   rate 18, height 5' 10" (1 778 m), weight 55 3 kg (121 lb 14 6 oz), SpO2 91 %  6L,Body mass index is 17 49 kg/m²  Intake/Output Summary (Last 24 hours) at 2/1/2022 1317  Last data filed at 2/1/2022 3972  Gross per 24 hour   Intake 120 ml   Output 1550 ml   Net -1430 ml       Invasive Devices  Report    Peripheral Intravenous Line            Peripheral IV 01/30/22 Proximal;Right;Ventral (anterior) Forearm 2 days                Physical Exam:   Physical Exam  Constitutional:       General: He is not in acute distress  Appearance: Normal appearance  He is normal weight  He is not ill-appearing  HENT:      Head: Normocephalic and atraumatic  Nose: Nose normal  No congestion or rhinorrhea  Mouth/Throat:      Mouth: Mucous membranes are dry  Pharynx: No oropharyngeal exudate or posterior oropharyngeal erythema  Cardiovascular:      Rate and Rhythm: Normal rate and regular rhythm  Pulses: Normal pulses  Heart sounds: Normal heart sounds  No murmur heard  No friction rub  No gallop  Pulmonary:      Effort: Pulmonary effort is normal  No tachypnea, bradypnea, accessory muscle usage or respiratory distress  Breath sounds: Decreased air movement present  No stridor or transmitted upper airway sounds  Decreased breath sounds present  No wheezing, rhonchi or rales  Comments: Significantly diminished aeration throughout lung fields  Chest:      Chest wall: No tenderness  Abdominal:      General: Abdomen is flat  Bowel sounds are normal  There is no distension  Palpations: Abdomen is soft  There is no mass  Musculoskeletal:         General: No swelling or tenderness  Normal range of motion  Cervical back: Normal range of motion and neck supple  No rigidity or tenderness  Skin:     General: Skin is warm and dry  Coloration: Skin is not jaundiced or pale  Neurological:      General: No focal deficit present        Mental Status: He is alert and oriented to person, place, and time  Mental status is at baseline  Psychiatric:         Mood and Affect: Mood normal          Behavior: Behavior normal          Labs:    I have personally reviewed pertinent lab results CBC:   Lab Results   Component Value Date    WBC 15 65 (H) 02/01/2022    HGB 11 3 (L) 02/01/2022    HCT 34 4 (L) 02/01/2022    MCV 79 (L) 02/01/2022     02/01/2022    MCH 26 0 (L) 02/01/2022    MCHC 32 8 02/01/2022    RDW 14 8 02/01/2022    MPV 9 8 02/01/2022    NRBC 0 02/01/2022   , CMP:   Lab Results   Component Value Date    SODIUM 128 (L) 02/01/2022    K 3 8 02/01/2022    CL 94 (L) 02/01/2022    CO2 30 02/01/2022    BUN 14 02/01/2022    CREATININE 0 75 02/01/2022    CALCIUM 7 7 (L) 02/01/2022    EGFR 101 02/01/2022       Imaging and other studies: I have personally reviewed pertinent films in PACS     CTA- 1/218/2022- extensive bilateral peripheral ground-glass opacity

## 2022-02-01 NOTE — PLAN OF CARE
Problem: Potential for Falls  Goal: Patient will remain free of falls  Description: INTERVENTIONS:  - Educate patient/family on patient safety including physical limitations  - Instruct patient to call for assistance with activity   - Consult OT/PT to assist with strengthening/mobility   - Keep Call bell within reach  - Keep bed low and locked with side rails adjusted as appropriate  - Keep care items and personal belongings within reach  - Initiate and maintain comfort rounds  - Make Fall Risk Sign visible to staff  - Apply yellow socks and bracelet for high fall risk patients  - Consider moving patient to room near nurses station  Outcome: Progressing     Problem: PAIN - ADULT  Goal: Verbalizes/displays adequate comfort level or baseline comfort level  Description: Interventions:  - Encourage patient to monitor pain and request assistance  - Assess pain using appropriate pain scale  - Administer analgesics based on type and severity of pain and evaluate response  - Implement non-pharmacological measures as appropriate and evaluate response  - Consider cultural and social influences on pain and pain management  - Notify physician/advanced practitioner if interventions unsuccessful or patient reports new pain  Outcome: Progressing     Problem: INFECTION - ADULT  Goal: Absence or prevention of progression during hospitalization  Description: INTERVENTIONS:  - Assess and monitor for signs and symptoms of infection  - Monitor lab/diagnostic results  - Monitor all insertion sites, i e  indwelling lines, tubes, and drains  - Monitor endotracheal if appropriate and nasal secretions for changes in amount and color  - [place] appropriate cooling/warming therapies per order  - Administer medications as ordered  - Instruct and encourage patient and family to use good hand hygiene technique  - Identify and instruct in appropriate isolation precautions for identified infection/condition  Outcome: Progressing     Problem: SAFETY ADULT  Goal: Patient will remain free of falls  Description: INTERVENTIONS:  - Educate patient/family on patient safety including physical limitations  - Instruct patient to call for assistance with activity   - Consult OT/PT to assist with strengthening/mobility   - Keep Call bell within reach  - Keep bed low and locked with side rails adjusted as appropriate  - Keep care items and personal belongings within reach  - Initiate and maintain comfort rounds  - Make Fall Risk Sign visible to staff  - Apply yellow socks and bracelet for high fall risk patients  - Consider moving patient to room near nurses station  Outcome: Progressing  Goal: Maintain or return to baseline ADL function  Description: INTERVENTIONS:  - Educate patient/family on patient safety including physical limitations  - Instruct patient to call for assistance with activity   - Consult OT/PT to assist with strengthening/mobility   - Keep Call bell within reach  - Keep bed low and locked with side rails adjusted as appropriate  - Keep care items and personal belongings within reach  - Initiate and maintain comfort rounds  - Make Fall Risk Sign visible to staff  - Apply yellow socks and bracelet for high fall risk patients  - Consider moving patient to room near nurses station  Outcome: Progressing  Goal: Maintains/Returns to pre admission functional level  Description: INTERVENTIONS:  - Educate patient/family on patient safety including physical limitations  - Instruct patient to call for assistance with activity   - Consult OT/PT to assist with strengthening/mobility   - Keep Call bell within reach  - Keep bed low and locked with side rails adjusted as appropriate  - Keep care items and personal belongings within reach  - Initiate and maintain comfort rounds  - Make Fall Risk Sign visible to staff  - Apply yellow socks and bracelet for high fall risk patients  - Consider moving patient to room near nurses station  Outcome: Progressing Problem: DISCHARGE PLANNING  Goal: Discharge to home or other facility with appropriate resources  Description: INTERVENTIONS:  - Identify barriers to discharge w/patient and caregiver  - Arrange for needed discharge resources and transportation as appropriate  - Identify discharge learning needs (meds, wound care, etc )  - Arrange for interpretive services to assist at discharge as needed  - Refer to Case Management Department for coordinating discharge planning if the patient needs post-hospital services based on physician/advanced practitioner order or complex needs related to functional status, cognitive ability, or social support system  Outcome: Progressing     Problem: Knowledge Deficit  Goal: Patient/family/caregiver demonstrates understanding of disease process, treatment plan, medications, and discharge instructions  Description: Complete learning assessment and assess knowledge base    Interventions:  - Provide teaching at level of understanding  - Provide teaching via preferred learning methods  Outcome: Progressing     Problem: RESPIRATORY - ADULT  Goal: Achieves optimal ventilation and oxygenation  Description: INTERVENTIONS:  - Assess for changes in respiratory status  - Assess for changes in mentation and behavior  - Position to facilitate oxygenation and minimize respiratory effort  - Oxygen administered by appropriate delivery if ordered  - Initiate smoking cessation education as indicated  - Encourage broncho-pulmonary hygiene including cough, deep breathe, Incentive Spirometry  - Assess the need for suctioning and aspirate as needed  - Assess and instruct to report SOB or any respiratory difficulty  - Respiratory Therapy support as indicated  Outcome: Progressing

## 2022-02-02 NOTE — ASSESSMENT & PLAN NOTE
Wt Readings from Last 3 Encounters:   02/01/22 55 3 kg (121 lb 14 6 oz)   01/27/22 57 kg (125 lb 10 6 oz)   01/12/22 56 6 kg (124 lb 12 5 oz)     · Elevated BNP >31,000, previous proBNP had been 7000-03345  · Pulmonary team noted patient has significant congestion  · Continue diuretics with Lasix 40 mg b i d   · Continue eplerenone 25mg once daily  · Continue beta-blocker  · Patient still with significant hypoxia  · Weight decreased 2 kg  · Net -1 3 L

## 2022-02-02 NOTE — ASSESSMENT & PLAN NOTE
· History of marijuana and cocaine abuse  · Positive drug screen on admission 1/9  · History of treatment noncompliance  · Repeat drug screen

## 2022-02-02 NOTE — PROGRESS NOTES
[facility]  Progress Note - [address] 1965, 61 y o  male MRN: [id number]  Unit/Bed#: E4 -01 Encounter: [id number]  Primary Care Provider: Tonio Alcantar PA-C   Date and time admitted to hospital: 1/28/2022  3:13 AM    * Acute respiratory failure with hypoxia [facility])  Assessment & Plan  · Patient was initially admitted 1/09-1/12 in respiratory distress and was intubated upon arrival to the ER  He was diagnosed with COVID a started on the treatment protocol  Patient unfortunately signed himself out against medical advice on 01/12  · Patient was readmitted 1/21-1/27 for dyspnea hypoxia secondary to acute/chronic systolic/diastolic congestive heart failure exacerbation  He was discharged on 2 L of oxygen  · Patient states the home oxygen was not functioning so he returned to the ER on 01/28  · Patient noted to be significantly hypoxic and requiring 15 L mid flow  · CTA study negative for PE, shows severe bilateral ground-glass opacities consistent with patient's known history of COVID  · Previously discharged on 2 L nasal cannula, has required up to 15L midflow  · He was evaluated by the pulmonary team who felt he had an element of congestive heart failure and recommended increasing diuretics to Lasix 40 mg b i d    · There were concerns that patient had fibrotic changes secondary to COVID  · Pulmonary does not currently recommend pulse dose steroids    Acute on chronic combined systolic and diastolic congestive heart failure (HCC)  Assessment & Plan  Wt Readings from Last 3 Encounters:   02/01/22 55 3 kg (121 lb 14 6 oz)   01/27/22 57 kg (125 lb 10 6 oz)   01/12/22 56 6 kg (124 lb 12 5 oz)     · Elevated BNP >31,000, previous proBNP had been 7000-04876  · Pulmonary team noted patient has significant congestion  · Continue diuretics with Lasix 40 mg b i d   · Continue eplerenone 25mg once daily  · Continue beta-blocker  · Patient still with significant hypoxia  · Weight decreased 2 kg  · Net -1 3 L    COVID-19 virus infection  Assessment & Plan  -Patient was initially hospitalized 1/9 in respiratory distress, requiring intubation and hospitalization in the ICU  -He was diagnosed with bilateral pneumonia secondary to COVID, and started on the COVID treatment pathway  He unfortunately signed out against medical advice on 09/12  -1/28 CT scan with extensive bilateral peripheral ground-glass opacity disease consistent with COVID pneumonia  -patient has completed his isolation for COVID  -per COVID protocol:  does not currently meet criteria for remdesivir, baricitinib, or other treatment modalities  -patient was evaluated pulmonary team:  Did not currently recommend steroid  -there is concerned that patient may developing fibrotic lung secondary to COVID    Cocaine use  Assessment & Plan  · History of marijuana and cocaine abuse  · Positive drug screen on admission 1/9  · History of treatment noncompliance  · Repeat drug screen    Elevated troponin  Assessment & Plan  · Patient is noted to have elevated troponin:  Was evaluated by Cardiology and felt to represent non MI troponin elevation secondary to hypoxia  · No evidence of acute ischemia    History of ventricular fibrillation  Assessment & Plan  · History of VFib/Vtac s/p ICD  Continues to have episodes of V-tach per outpatient cardiology note  · Maintained on amiodarone 100 mg daily and Toprol XL 75 mg q 12 hours    Biventricular ICD (implantable cardioverter-defibrillator) in place  Assessment & Plan  Patient has a history of ventricular fibrillation and ventricular tachycardia  Patient with history of cardiomyopathy  Dual chamber, biventricular Medtronic ICD in place  Continue beta-blocker, and amiodarone    S/P AVR (aortic valve replacement)  Assessment & Plan  · Rheumatic heart disease s/p mechanical AVR in 1980's   Anticoagulated on warfarin  · Goal INR 2 5-3 5  · INR currently therapeutic  · Continue Coumadin  · Continue to monitor    Coronary artery disease involving native coronary artery of native heart without angina pectoris  Assessment & Plan  · Patient has history of, with PCI in 2014  · No evidence of acute ischemia  · Continue ASA, BB and statin  · Caution with beta-blocker in light of patient's cocaine use            VTE Pharmacologic Prophylaxis: Warfarin (Coumadin)  VTE Mechanical Prophylaxis: sequential compression device    Discussed with patient's nurse  Discussed with   Discussed with pulmonary: MANOLO Lawson    Certification Statement: The patient will continue to require additional inpatient hospital stay due to need for further acute intervention for hypoxia    Status: inpatient     ===================================================================    Subjective:  Patient currently sleeping  Have been requesting to be discharged earlier today however still requiring mid flow oxygen  Was ambulating without any difficulty  No complaints of pain  Tolerating p o  [place] Physical Exam:   Temp:  [97 3 °F (36 3 °C)-97 9 °F (36 6 °C)] 97 3 °F (36 3 °C)  HR:  [62-72] 70  Resp:  [18] 18  BP: ()/(59-63) 103/61    Gen:  Non tachypneic, non dyspneic  Heart: regular rate and rhythm, S1S2 present, no murmur, rub or gallop  Lungs:  Decreased breath sounds bilaterally  No current wheezes, crackles  Scattered rhonchi  No accessory muscle use or respiratory distress  Abd: soft, non-tender, non-distended  NABS, no guarding, rebound or peritoneal signs           LABS:   Results from last 7 days   Lab Units 02/01/22  0456 01/31/22  0512 01/30/22  0639   WBC Thousand/uL 15 65* 15 59* 12 37*   HEMOGLOBIN g/dL 11 3* 11 5* 11 4*   HEMATOCRIT % 34 4* 34 5* 33 3*   PLATELETS Thousands/uL 264 241 167     Results from last 7 days   Lab Units 02/01/22  0456 01/31/22  0512 01/30/22  0808   POTASSIUM mmol/L 3 8 4 3 5 5*   CHLORIDE mmol/L 94* 94* 94*   CO2 mmol/L 30 28 27   BUN mg/dL 14 18 20 CREATININE mg/dL 0 75 0 80 0 75   CALCIUM mg/dL 7 7* 7 8* 7 [address] [place] [address] Data:    1/28 CTA chest PE study:  Evidence of pulmonary embolus  Extensive bilateral peripheral predominant groundglass airspace disease consistent with patient's known COVID pneumonia  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 measuring up to 41 mm  Recommendation is for follow-up low radiation dose chest CT in one year    Microbiology  2/1 sputum culture:  Pending  1/28:  Blood culture:  Negative x2        ---------------------------------------------------------------------------------------------------------------  This note has been constructed using a voice recognition system

## 2022-02-02 NOTE — ASSESSMENT & PLAN NOTE
· Patient is noted to have elevated troponin:  Was evaluated by Cardiology and felt to represent non MI troponin elevation secondary to hypoxia  · No evidence of acute ischemia

## 2022-02-02 NOTE — ASSESSMENT & PLAN NOTE
-Patient was initially hospitalized 1/9 in respiratory distress, requiring intubation and hospitalization in the ICU  -He was diagnosed with bilateral pneumonia secondary to COVID, and started on the COVID treatment pathway    He unfortunately signed out against medical advice on 09/12  -1/28 CT scan with extensive bilateral peripheral ground-glass opacity disease consistent with COVID pneumonia  -patient has completed his isolation for COVID  -per COVID protocol:  does not currently meet criteria for remdesivir, baricitinib, or other treatment modalities  -patient was evaluated pulmonary team:  Did not currently recommend steroid  -there is concerned that patient may developing fibrotic lung secondary to COVID

## 2022-02-02 NOTE — ASSESSMENT & PLAN NOTE
· Patient has history of, with PCI in 2014     · No evidence of acute ischemia  · Continue ASA, BB and statin  · Caution with beta-blocker in light of patient's cocaine use

## 2022-02-02 NOTE — ASSESSMENT & PLAN NOTE
· Rheumatic heart disease s/p mechanical AVR in 1980's   Anticoagulated on warfarin  · Goal INR 2 5-3 5  · INR currently therapeutic  · Continue Coumadin  · Continue to monitor

## 2022-02-02 NOTE — PLAN OF CARE
Problem: PAIN - ADULT  Goal: Verbalizes/displays adequate comfort level or baseline comfort level  Description: Interventions:  - Encourage patient to monitor pain and request assistance  - Assess pain using appropriate pain scale  - Administer analgesics based on type and severity of pain and evaluate response  - Implement non-pharmacological measures as appropriate and evaluate response  - Consider cultural and social influences on pain and pain management  - Notify physician/advanced practitioner if interventions unsuccessful or patient reports new pain  Outcome: Not Progressing     Problem: RESPIRATORY - ADULT  Goal: Achieves optimal ventilation and oxygenation  Description: INTERVENTIONS:  - Assess for changes in respiratory status  - Assess for changes in mentation and behavior  - Position to facilitate oxygenation and minimize respiratory effort  - Oxygen administered by appropriate delivery if ordered  - Initiate smoking cessation education as indicated  - Encourage broncho-pulmonary hygiene including cough, deep breathe, Incentive Spirometry  - Assess the need for suctioning and aspirate as needed  - Assess and instruct to report SOB or any respiratory difficulty  - Respiratory Therapy support as indicated  Outcome: Not Progressing

## 2022-02-02 NOTE — ASSESSMENT & PLAN NOTE
Patient has a history of ventricular fibrillation and ventricular tachycardia  Patient with history of cardiomyopathy  Dual chamber, biventricular Medtronic ICD in place  Continue beta-blocker, and amiodarone

## 2022-02-02 NOTE — ASSESSMENT & PLAN NOTE
· Patient was initially admitted 1/09-1/12 in respiratory distress and was intubated upon arrival to the ER  He was diagnosed with COVID a started on the treatment protocol  Patient unfortunately signed himself out against medical advice on 01/12  · Patient was readmitted 1/21-1/27 for dyspnea hypoxia secondary to acute/chronic systolic/diastolic congestive heart failure exacerbation  He was discharged on 2 L of oxygen  · Patient states the home oxygen was not functioning so he returned to the ER on 01/28  · Patient noted to be significantly hypoxic and requiring 15 L mid flow  · CTA study negative for PE, shows severe bilateral ground-glass opacities consistent with patient's known history of COVID  · Previously discharged on 2 L nasal cannula, has required up to 15L midflow  · He was evaluated by the pulmonary team who felt he had an element of congestive heart failure and recommended increasing diuretics to Lasix 40 mg b i d    · There were concerns that patient had fibrotic changes secondary to COVID  · Pulmonary does not currently recommend pulse dose steroids

## 2022-02-02 NOTE — ASSESSMENT & PLAN NOTE
· History of VFib/Vtac s/p ICD  Continues to have episodes of V-tach per outpatient cardiology note     · Maintained on amiodarone 100 mg daily and Toprol XL 75 mg q 12 hours

## 2022-02-03 NOTE — PROGRESS NOTES
[facility]  Progress Note - [address] [place] 1965, 61 y o  male MRN: [id number]  Unit/Bed#: E4 -01 Encounter: [id number]  Primary Care Provider: Tonio Alcantar PA-C   Date and time admitted to hospital: 1/28/2022  3:13 AM    * Acute respiratory failure with hypoxia [facility])  Assessment & Plan  · Patient was initially admitted 1/09-1/12 in respiratory distress and was intubated upon arrival to the ER  He was diagnosed with COVID a started on the treatment protocol  Patient unfortunately signed himself out against medical advice on 01/12  · Patient was readmitted 1/21-1/27 for dyspnea hypoxia secondary to acute/chronic systolic/diastolic congestive heart failure exacerbation  He was discharged on 2 L of oxygen  · Patient states the home oxygen was not functioning so he returned to the ER on 01/28  · Patient noted to be significantly hypoxic and requiring 15 L mid flow  · CTA study negative for PE, shows severe bilateral ground-glass opacities consistent with patient's known history of COVID  · Previously discharged on 2 L nasal cannula, has required up to 15L midflow  · He was evaluated by the pulmonary team who felt he had an element of congestive heart failure and recommended increasing diuretics to Lasix 40 mg b i d    · There were concerns that patient had fibrotic changes secondary to COVID  · Pulmonary does not currently recommend pulse dose steroids  · Procalcitonin continues to improve, off antibiotics  · Sputum cx: mixed pam  · Patient had worsening hypoxia last night requiring increase in his oxygen supply from 8 L mid flow up to 15 L mid flow in the non-rebreather  · Chest x-ray without significant change  · Continue increased diuresis and monitor  · Reviewed with Pulmonary team:  Continue current management  · -check D-dimer, and procalcitonin due to worsening hypoxia    Acute on chronic combined systolic and diastolic congestive heart failure (HCC)  Assessment & Plan  Wt Readings from Last 3 Encounters:   02/02/22 55 5 kg (122 lb 5 7 oz)   01/27/22 57 kg (125 lb 10 6 oz)   01/12/22 56 6 kg (124 lb 12 5 oz)     · Elevated BNP >31,000, previous proBNP had been [id number]  · Pulmonary team noted patient has significant congestion  · Continue eplerenone 25mg once daily  · Continue beta-blocker  · Patient still with significant hypoxia  · No Significant improvement in weight or intake/output:  Diuretics changed to IV-   · Increase dose today due to worsening hypoxia    COVID-19 virus infection  Assessment & Plan  -Patient was initially hospitalized 1/9 in respiratory distress, requiring intubation and hospitalization in the ICU  -He was diagnosed with bilateral pneumonia secondary to COVID, and started on the COVID treatment pathway    He unfortunately signed out against medical advice on 09/12  -1/28 CT scan with extensive bilateral peripheral ground-glass opacity disease consistent with COVID pneumonia  -patient has completed his isolation for COVID  -per COVID protocol:  does not currently meet criteria for remdesivir, baricitinib, or other treatment modalities  -patient was evaluated pulmonary team:  Did not currently recommend steroids-->tapering off prednisone  -there is concern that patient may developing fibrotic lung secondary to COVID  -will need outpatient pulmonary follow-up  -repeat d dimer  -check crp    Hyponatremia  Assessment & Plan  He has history of chronic hyponatremia  Sodium levels have ranged from 127-130  Fluid restrict, monitor while diuretics    Cocaine use  Assessment & Plan  · History of marijuana and cocaine abuse  · Positive drug screen on admission 1/9  · History of treatment noncompliance  · Current drug screen negative for cocaine, however checked 5 days after admission:  Patient relates that he has not used cocaine since his admission on 01/09    Elevated troponin  Assessment & Plan  · Patient is noted to have elevated troponin:  Was evaluated by Cardiology and felt to represent non MI troponin elevation secondary to hypoxia  · No evidence of acute ischemia    History of ventricular fibrillation  Assessment & Plan  · History of VFib/Vtac s/p ICD  Continues to have episodes of V-tach per outpatient cardiology note  · Maintained on amiodarone 100 mg daily and Toprol XL 75 mg q 12 hours    Biventricular ICD (implantable cardioverter-defibrillator) in place  Assessment & Plan  Patient has a history of ventricular fibrillation and ventricular tachycardia  Patient with history of cardiomyopathy  Dual chamber, biventricular Medtronic ICD in place  Continue beta-blocker, and amiodarone    S/P AVR (aortic valve replacement)  Assessment & Plan  · Patient has a history of Rheumatic heart disease s/p mechanical AVR in 1980's  Anticoagulated on warfarin  · Goal INR 2 5-3 5  · INR had been supratherapeutic  · Cont to titrate coumadin dose    Coronary artery disease involving native coronary artery of native heart without angina pectoris  Assessment & Plan  · Patient has history of, with PCI in 2014  · No evidence of acute ischemia  · Continue ASA, BB and statin  · Caution with beta-blocker in light of patient's cocaine use            Family:  Called patient's niece Alaina Tariq, and gave update  Review test results and treatment plan    Discussed with patient's nurse  Discussed with     VTE Pharmacologic Prophylaxis: Warfarin (Coumadin)  VTE Mechanical Prophylaxis: sequential compression device        Certification Statement: The patient will continue to require additional inpatient hospital stay due to need for further acute intervention for hypoxia    Status: inpatient     ===================================================================    Subjective:  Patient notes he feels better today  He relates last night he had the acute onset of difficulty breathing, that improved with neb tx and NRB    He notes his breathing improved today and feels back to normal  Denies any cough  He denies any pain anywhere  He notes he would like to leave the hospital   Denies any nausea, vomiting, diarrhea  He is tolerating p o  Physical Exam:   Temp:  [97 °F (36 1 °C)-97 4 °F (36 3 °C)] 97 °F (36 1 °C)  HR:  [62-79] 62  Resp:  [18-20] 18  BP: ()/(50-67) 94/56    Gen:  Pleasant, non-tachypnic, non-dyspnic  Conversant  Able speak in complete sentences without having to stop for dyspnea  Heart: regular rate and rhythm, S1S2 present, no murmur, rub or gallop  Lungs:  Decreased breath sounds bilaterally  Decreased air movement  No accessory muscle use or respiratory distress  Abd: soft, non-tender, non-distended  NABS, no guarding, rebound or peritoneal signs  Extremities: no clubbing, cyanosis or edema  2+pedal pulses bilaterally  Full range of motion  Neuro: awake, alert  Fluent speech  Moving all 4 extremities     Skin: warm and dry: no petechiae, purpura and rash  LABS:   Results from last 7 days   Lab Units 02/03/22  0509 02/02/22  0518 02/01/22  0456   WBC Thousand/uL 19 85* 17 01* 15 65*   HEMOGLOBIN g/dL 12 9 12 2 11 3*   HEMATOCRIT % 38 7 36 4* 34 4*   PLATELETS Thousands/uL 338 272 264     Results from last 7 days   Lab Units 02/03/22  0509 02/02/22  0518 02/01/22  0456   POTASSIUM mmol/L 4 4 4 5 3 8   CHLORIDE mmol/L 93* 95* 94*   CO2 mmol/L 29 28 30   BUN mg/dL 16 14 14   CREATININE mg/dL 0 90 0 72 0 75   CALCIUM mg/dL 8 6 7 8* 7 [address] [place] [address] Data:    2/3:  Chest x-ray:  No definite change in severe bilateral groundglass opacity, greater on the left, due to Covid-19    1/28 CTA chest PE study:  Evidence of pulmonary embolus    Extensive bilateral peripheral predominant groundglass airspace disease consistent with patient's known COVID pneumonia  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

## 2022-02-03 NOTE — ASSESSMENT & PLAN NOTE
· Patient has a history of Rheumatic heart disease s/p mechanical AVR in 1980's   Anticoagulated on warfarin  · Goal INR 2 5-3 5  · INR currently supra therapeutic  · Continue to titrate Coumadin  · Continue to monitor

## 2022-02-03 NOTE — ASSESSMENT & PLAN NOTE
· Patient was initially admitted 1/09-1/12 in respiratory distress and was intubated upon arrival to the ER  He was diagnosed with COVID a started on the treatment protocol  Patient unfortunately signed himself out against medical advice on 01/12  · Patient was readmitted 1/21-1/27 for dyspnea hypoxia secondary to acute/chronic systolic/diastolic congestive heart failure exacerbation  He was discharged on 2 L of oxygen  · Patient states the home oxygen was not functioning so he returned to the ER on 01/28  · Patient noted to be significantly hypoxic and requiring 15 L mid flow  · CTA study negative for PE, shows severe bilateral ground-glass opacities consistent with patient's known history of COVID  · Previously discharged on 2 L nasal cannula, has required up to 15L midflow  · He was evaluated by the pulmonary team who felt he had an element of congestive heart failure and recommended increasing diuretics to Lasix 40 mg b i d    · There were concerns that patient had fibrotic changes secondary to COVID  · Pulmonary does not currently recommend pulse dose steroids  · Procalcitonin continues to improve, off antibiotics

## 2022-02-03 NOTE — ASSESSMENT & PLAN NOTE
· Patient has a history of Rheumatic heart disease s/p mechanical AVR in 1980's   Anticoagulated on warfarin  · Goal INR 2 5-3 5  · INR had been supratherapeutic  · Cont to titrate coumadin dose

## 2022-02-03 NOTE — PLAN OF CARE
Problem: Potential for Falls  Goal: Patient will remain free of falls  Description: INTERVENTIONS:  - Educate patient/family on patient safety including physical limitations  - Instruct patient to call for assistance with activity   - Consult OT/PT to assist with strengthening/mobility   - Keep Call bell within reach  - Keep bed low and locked with side rails adjusted as appropriate  - Keep care items and personal belongings within reach  - Initiate and maintain comfort rounds  - Make Fall Risk Sign visible to staff  - Apply yellow socks and bracelet for high fall risk patients  - Consider moving patient to room near nurses station  Outcome: Progressing     Problem: PAIN - ADULT  Goal: Verbalizes/displays adequate comfort level or baseline comfort level  Description: Interventions:  - Encourage patient to monitor pain and request assistance  - Assess pain using appropriate pain scale  - Administer analgesics based on type and severity of pain and evaluate response  - Implement non-pharmacological measures as appropriate and evaluate response  - Consider cultural and social influences on pain and pain management  - Notify physician/advanced practitioner if interventions unsuccessful or patient reports new pain  Outcome: Progressing     Problem: INFECTION - ADULT  Goal: Absence or prevention of progression during hospitalization  Description: INTERVENTIONS:  - Assess and monitor for signs and symptoms of infection  - Monitor lab/diagnostic results  - Monitor all insertion sites, i e  indwelling lines, tubes, and drains  - Monitor endotracheal if appropriate and nasal secretions for changes in amount and color  - [place] appropriate cooling/warming therapies per order  - Administer medications as ordered  - Instruct and encourage patient and family to use good hand hygiene technique  - Identify and instruct in appropriate isolation precautions for identified infection/condition  Outcome: Progressing     Problem: SAFETY ADULT  Goal: Patient will remain free of falls  Description: INTERVENTIONS:  - Educate patient/family on patient safety including physical limitations  - Instruct patient to call for assistance with activity   - Consult OT/PT to assist with strengthening/mobility   - Keep Call bell within reach  - Keep bed low and locked with side rails adjusted as appropriate  - Keep care items and personal belongings within reach  - Initiate and maintain comfort rounds  - Make Fall Risk Sign visible to staff  - Apply yellow socks and bracelet for high fall risk patients  - Consider moving patient to room near nurses station  Outcome: Progressing  Goal: Maintain or return to baseline ADL function  Description: INTERVENTIONS:  - Educate patient/family on patient safety including physical limitations  - Instruct patient to call for assistance with activity   - Consult OT/PT to assist with strengthening/mobility   - Keep Call bell within reach  - Keep bed low and locked with side rails adjusted as appropriate  - Keep care items and personal belongings within reach  - Initiate and maintain comfort rounds  - Make Fall Risk Sign visible to staff  - Apply yellow socks and bracelet for high fall risk patients  - Consider moving patient to room near nurses station  Outcome: Progressing    Problem: DISCHARGE PLANNING  Goal: Discharge to home or other facility with appropriate resources  Description: INTERVENTIONS:  - Identify barriers to discharge w/patient and caregiver  - Arrange for needed discharge resources and transportation as appropriate  - Identify discharge learning needs (meds, wound care, etc )  - Arrange for interpretive services to assist at discharge as needed  - Refer to Case Management Department for coordinating discharge planning if the patient needs post-hospital services based on physician/advanced practitioner order or complex needs related to functional status, cognitive ability, or social support system  Outcome: Progressing     Problem: Knowledge Deficit  Goal: Patient/family/caregiver demonstrates understanding of disease process, treatment plan, medications, and discharge instructions  Description: Complete learning assessment and assess knowledge base    Interventions:  - Provide teaching at level of understanding  - Provide teaching via preferred learning methods  Outcome: Progressing     Problem: RESPIRATORY - ADULT  Goal: Achieves optimal ventilation and oxygenation  Description: INTERVENTIONS:  - Assess for changes in respiratory status  - Assess for changes in mentation and behavior  - Position to facilitate oxygenation and minimize respiratory effort  - Oxygen administered by appropriate delivery if ordered  - Initiate smoking cessation education as indicated  - Encourage broncho-pulmonary hygiene including cough, deep breathe, Incentive Spirometry  - Assess the need for suctioning and aspirate as needed  - Assess and instruct to report SOB or any respiratory difficulty  - Respiratory Therapy support as indicated  Outcome: Progressing

## 2022-02-03 NOTE — QUICK NOTE
Notified by RN that patient was currently ED saturating to 78% on 8 L mid flow  Patient was placed back on 15 L mid flow as he originally was when he was admitted and currently saturating above 88%  Seen by respiratory therapy who is in agreement with mid flow 15 L currently  Also per RN patient with crackles in lungs bilaterally  Per RN patient asymptomatic, has no complaints now  Due to crackles, possibility of fluid overload will obtain stat chest x-ray  Chest x-ray, poor quality due to positioning, however, possibly worsening COVID pneumonia on left side verses slightly more vascular congestion? Pending radiology read  2/2 RN reporting diminished lung sounds and fine crackles, hypoxia, and chest x-ray with possibility of vascular congestion on left side, will give a small 1 time dose IV Lasix 20 mg now  BP currently 106/58  However, feel a small dose Lasix will be beneficial for above reasons and per review chart a patient given previous Lasix dose does not appear to drop blood pressure significantly  Discussed with RN to closely monitor BP an hour after giving Lasix

## 2022-02-03 NOTE — ASSESSMENT & PLAN NOTE
· Patient was initially admitted 1/09-1/12 in respiratory distress and was intubated upon arrival to the ER  He was diagnosed with COVID a started on the treatment protocol  Patient unfortunately signed himself out against medical advice on 01/12  · Patient was readmitted 1/21-1/27 for dyspnea hypoxia secondary to acute/chronic systolic/diastolic congestive heart failure exacerbation  He was discharged on 2 L of oxygen  · Patient states the home oxygen was not functioning so he returned to the ER on 01/28  · Patient noted to be significantly hypoxic and requiring 15 L mid flow  · CTA study negative for PE, shows severe bilateral ground-glass opacities consistent with patient's known history of COVID  · Previously discharged on 2 L nasal cannula, has required up to 15L midflow  · He was evaluated by the pulmonary team who felt he had an element of congestive heart failure and recommended increasing diuretics to Lasix 40 mg b i d    · There were concerns that patient had fibrotic changes secondary to COVID  · Pulmonary does not currently recommend pulse dose steroids  · Procalcitonin continues to improve, off antibiotics  · Sputum cx: mixed pam  · Patient had worsening hypoxia last night requiring increase in his oxygen supply from 8 L mid flow up to 15 L mid flow in the non-rebreather  · Chest x-ray without significant change  · Continue increased diuresis and monitor  · Reviewed with Pulmonary team:  Continue current management  · -check D-dimer, and procalcitonin due to worsening hypoxia

## 2022-02-03 NOTE — PLAN OF CARE
Problem: PAIN - ADULT  Goal: Verbalizes/displays adequate comfort level or baseline comfort level  Description: Interventions:  - Encourage patient to monitor pain and request assistance  - Assess pain using appropriate pain scale  - Administer analgesics based on type and severity of pain and evaluate response  - Implement non-pharmacological measures as appropriate and evaluate response  - Consider cultural and social influences on pain and pain management  - Notify physician/advanced practitioner if interventions unsuccessful or patient reports new pain  Outcome: Progressing     Problem: SAFETY ADULT  Goal: Maintain or return to baseline ADL function  Description: INTERVENTIONS:  - Educate patient/family on patient safety including physical limitations  - Instruct patient to call for assistance with activity   - Consult OT/PT to assist with strengthening/mobility   - Keep Call bell within reach  - Keep bed low and locked with side rails adjusted as appropriate  - Keep care items and personal belongings within reach  - Initiate and maintain comfort rounds  - Make Fall Risk Sign visible to staff  - Apply yellow socks and bracelet for high fall risk patients  - Consider moving patient to room near nurses station  Outcome: Progressing    Problem: RESPIRATORY - ADULT  Goal: Achieves optimal ventilation and oxygenation  Description: INTERVENTIONS:  - Assess for changes in respiratory status  - Assess for changes in mentation and behavior  - Position to facilitate oxygenation and minimize respiratory effort  - Oxygen administered by appropriate delivery if ordered  - Initiate smoking cessation education as indicated  - Encourage broncho-pulmonary hygiene including cough, deep breathe, Incentive Spirometry  - Assess the need for suctioning and aspirate as needed  - Assess and instruct to report SOB or any respiratory difficulty  - Respiratory Therapy support as indicated  Outcome: Not Progressing

## 2022-02-03 NOTE — ASSESSMENT & PLAN NOTE
· History of marijuana and cocaine abuse  · Positive drug screen on admission 1/9  · History of treatment noncompliance  · Current drug screen negative for cocaine, however checked 5 days after admission:  Patient relates that he has not used cocaine since his admission on 01/09

## 2022-02-03 NOTE — ASSESSMENT & PLAN NOTE
He has history of chronic hyponatremia  Sodium levels have ranged from 127-130  Fluid restrict, monitor while diuretics

## 2022-02-03 NOTE — CASE MANAGEMENT
Case Management Discharge Planning Note    Patient name Irina Dominguez  Location East 4 /E4 Brittany 40-* MRN [id number]  : 1965 Date 2/3/2022       Current Admission Date: 2022  Current Admission Diagnosis:Acute respiratory failure with hypoxia [facility])   Patient Active Problem List    Diagnosis Date Noted    Acute respiratory failure with hypoxia ([facility] ) 2022    Medically noncompliant 2022    Acute on chronic combined systolic and diastolic congestive heart failure ([facility] ) 2022    Aortic ectasia, thoracic ([facility] ) 2022    Pneumonia 2022    Mild protein-calorie malnutrition ([facility] ) 2022    Hyponatremia 2022    Aortic stenosis 2022    Ischemic cardiomyopathy 2022    Drug abuse ([facility] ) 2022    Ventricular fibrillation ([place] [address] ) 2022    COVID-19 virus infection 2022    Impaired fasting glucose 09/10/2021    History of ventricular fibrillation 2021    Elevated troponin 2021    Cough 2021    Lactic acidosis 2021    SIRS (systemic inflammatory response syndrome) ([facility] ) 2021    Cocaine use 2021    History of ventricular tachycardia 2021    Depressed mood 2020    S/P AVR (aortic valve replacement) 2018    Ventricular fibrillation ([facility] ) 2018    Biventricular ICD (implantable cardioverter-defibrillator) in place 2018    Colonic mass 2016    Acute on chronic combined systolic and diastolic CHF (congestive heart failure) ([facility] ) 2016    Coronary artery disease involving native coronary artery of native heart without angina pectoris 2016      LOS (days): 6  Geometric Mean LOS (GMLOS) (days): 3 80  Days to GMLOS:-2 6     OBJECTIVE:  Risk of Unplanned Readmission Score: 29         Current admission status: Inpatient   Preferred Pharmacy:   [facility] [address] [facility] - [place] - Tim Stock [address] [facility] [address]  Phone: [phone] Fax: [phone] [address] [place], [place] [address] ,  [address] ,  [address] [facility] [address]  Phone: [phone] Fax: [phone] [facility] [address] [facility], [address]  [address] [facility] [address]  Phone: [phone] Fax: [phone]    Primary Care Provider: Travis Faulkner PA-C    Primary Insurance: [facility]  Secondary Insurance: [address] [place] [address]    DISCHARGE DETAILS:    Discharge planning discussed with[de-identified] Patient and Daughter, Terence Simms        CM contacted family/caregiver?: Yes  Were Treatment Team discharge recommendations reviewed with patient/caregiver?: Yes  Did patient/caregiver verbalize understanding of patient care needs?: Yes  Were patient/caregiver advised of the risks associated with not following Treatment Team discharge recommendations?: Yes    Contacts  Patient Contacts: DaughterJavier  Relationship to Patient[de-identified] Family  Contact Method: Phone  Phone Number: [phone] Lorna Trotter)  Reason/Outcome: Continuity of Care,Discharge Planning            Additional Comments: CM spoke with pt's daughter over the phone  SWAPNA explained that the the pt's continue increase in oxygen that he will probably need STR prior to returning home  Pt's daughter was agreeable to the need for STR and reported that she will help in backing the discharge plan of the hospital  SWAPNA explained to the pt that he might need STR before discharging home  Pt became visibly upset and irritated  He expressed wanting to go home and not wanting to discuss this any further

## 2022-02-03 NOTE — ASSESSMENT & PLAN NOTE
Wt Readings from Last 3 Encounters:   02/02/22 55 5 kg (122 lb 5 7 oz)   01/27/22 57 kg (125 lb 10 6 oz)   01/12/22 56 6 kg (124 lb 12 5 oz)     · Elevated BNP >31,000, previous proBNP had been 7000-78373  · Pulmonary team noted patient has significant congestion  · Continue eplerenone 25mg once daily  · Continue beta-blocker  · Patient still with significant hypoxia  · No Significant improvement in weight or intake/output:  Diuretics changed to IV-   · Increase dose today due to worsening hypoxia

## 2022-02-03 NOTE — PROGRESS NOTES
[facility]  Progress Note - [address] 1965, 61 y o  male MRN: [id number]  Unit/Bed#: E4 -01 Encounter: [id number]  Primary Care Provider: Tonio Alcantar PA-C   Date and time admitted to hospital: 1/28/2022  3:13 AM    * Acute respiratory failure with hypoxia [facility])  Assessment & Plan  · Patient was initially admitted 1/09-1/12 in respiratory distress and was intubated upon arrival to the ER  He was diagnosed with COVID a started on the treatment protocol  Patient unfortunately signed himself out against medical advice on 01/12  · Patient was readmitted 1/21-1/27 for dyspnea hypoxia secondary to acute/chronic systolic/diastolic congestive heart failure exacerbation  He was discharged on 2 L of oxygen  · Patient states the home oxygen was not functioning so he returned to the ER on 01/28  · Patient noted to be significantly hypoxic and requiring 15 L mid flow  · CTA study negative for PE, shows severe bilateral ground-glass opacities consistent with patient's known history of COVID  · Previously discharged on 2 L nasal cannula, has required up to 15L midflow  · He was evaluated by the pulmonary team who felt he had an element of congestive heart failure and recommended increasing diuretics to Lasix 40 mg b i d    · There were concerns that patient had fibrotic changes secondary to COVID  · Pulmonary does not currently recommend pulse dose steroids  · Procalcitonin continues to improve, off antibiotics    Acute on chronic combined systolic and diastolic congestive heart failure (HCC)  Assessment & Plan  Wt Readings from Last 3 Encounters:   02/02/22 55 5 kg (122 lb 5 7 oz)   01/27/22 57 kg (125 lb 10 6 oz)   01/12/22 56 6 kg (124 lb 12 5 oz)     · Elevated BNP >31,000, previous proBNP had been 7000-85978  · Pulmonary team noted patient has significant congestion  · Continue diuretics with Lasix 40 mg b i d  · Continue eplerenone 25mg once daily  · Continue beta-blocker  · Patient still with significant hypoxia  · Significant improvement in weight or intake/output  · 2/2:  Discussed with pulmonary team  Change diuretics to IV    COVID-19 virus infection  Assessment & Plan  -Patient was initially hospitalized 1/9 in respiratory distress, requiring intubation and hospitalization in the ICU  -He was diagnosed with bilateral pneumonia secondary to COVID, and started on the COVID treatment pathway  He unfortunately signed out against medical advice on 09/12  -1/28 CT scan with extensive bilateral peripheral ground-glass opacity disease consistent with COVID pneumonia  -patient has completed his isolation for COVID  -per COVID protocol:  does not currently meet criteria for remdesivir, baricitinib, or other treatment modalities  -patient was evaluated pulmonary team:  Did not currently recommend steroids-tapering off prednisone  -there is concern that patient may developing fibrotic lung secondary to COVID  -will need outpatient pulmonary follow-up    Hyponatremia  Assessment & Plan  He has history of chronic hyponatremia  Sodium levels have ranged from 127-130  Fluid restrict, monitor while diuretics    Cocaine use  Assessment & Plan  · History of marijuana and cocaine abuse  · Positive drug screen on admission 1/9  · History of treatment noncompliance  · Current drug screen negative for cocaine, however checked 5 days after admission:  Patient relates that he has not used cocaine since his admission on 01/09    Elevated troponin  Assessment & Plan  · Patient is noted to have elevated troponin:  Was evaluated by Cardiology and felt to represent non MI troponin elevation secondary to hypoxia  · No evidence of acute ischemia    History of ventricular fibrillation  Assessment & Plan  · History of VFib/Vtac s/p ICD  Continues to have episodes of V-tach per outpatient cardiology note     · Maintained on amiodarone 100 mg daily and Toprol XL 75 mg q 12 hours    Biventricular ICD (implantable cardioverter-defibrillator) in place  Assessment & Plan  Patient has a history of ventricular fibrillation and ventricular tachycardia  Patient with history of cardiomyopathy  Dual chamber, biventricular Medtronic ICD in place  Continue beta-blocker, and amiodarone    S/P AVR (aortic valve replacement)  Assessment & Plan  · Patient has a history of Rheumatic heart disease s/p mechanical AVR in 1980's  Anticoagulated on warfarin  · Goal INR 2 5-3 5  · INR currently supra therapeutic  · Continue to titrate Coumadin  · Continue to monitor    Coronary artery disease involving native coronary artery of native heart without angina pectoris  Assessment & Plan  · Patient has history of, with PCI in 2014  · No evidence of acute ischemia  · Continue ASA, BB and statin  · Caution with beta-blocker in light of patient's cocaine use          VTE Pharmacologic Prophylaxis: Warfarin (Coumadin)  VTE Mechanical Prophylaxis: sequential compression device    Discussed with patient's nurse  Discussed with     Certification Statement: The patient will continue to require additional inpatient hospital stay due to need for further acute intervention for hypoxia    Status: inpatient     ===================================================================    Subjective:  Patient notes dyspnea with exertion  He notes dry cough  He feels chest congestion but is unable to expectorate it  He denies any pain anywhere  He denies any chest pain  Denies any abdominal pain  Denies any nausea, vomiting, diarrhea  He is tolerating p o  He is ambulating without any dizziness or lightheadedness  Physical Exam:   Temp:  [95 9 °F (35 5 °C)-98 °F (36 7 °C)] 98 °F (36 7 °C)  HR:  [62-84] 82  Resp:  [18-20] 18  BP: (102-125)/(56-76) 120/60    Gen:  Pleasant, non-tachypnic, non-dyspnic  Conversant  Heart: regular rate and rhythm, S1S2 present, no murmur, rub or gallop  Lungs: clear to ausculatation bilaterally    No wheezing, crackles, or rhonchi  No accessory muscle use or respiratory distress  Abd: soft, non-tender, non-distended  NABS, no guarding, rebound or peritoneal signs  Extremities: no clubbing, cyanosis or edema  2+pedal pulses bilaterally  Full range of motion  Neuro: awake, alert  Fluent speech  Strength globally intact  Skin: warm and dry: no petechiae, purpura and rash  LABS:   Results from last 7 days   Lab Units 02/02/22 0518 02/01/22 0456 01/31/22 0512   WBC Thousand/uL 17 01* 15 65* 15 59*   HEMOGLOBIN g/dL 12 2 11 3* 11 5*   HEMATOCRIT % 36 4* 34 4* 34 5*   PLATELETS Thousands/uL 272 264 241     Results from last 7 days   Lab Units 02/02/22 0518 02/01/22 0456 01/31/22 0512   POTASSIUM mmol/L 4 5 3 8 4 3   CHLORIDE mmol/L 95* 94* 94*   CO2 mmol/L 28 30 28   BUN mg/dL 14 14 18   CREATININE mg/dL 0 72 0 75 0 80   CALCIUM mg/dL 7 8* 7 7* 7 8*       Hospital Data:    1/28 CTA chest PE study:  Evidence of pulmonary embolus  Extensive bilateral peripheral predominant groundglass airspace disease consistent with patient's known COVID pneumonia  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 measuring up to 41 mm   Recommendation is for follow-up low radiation dose chest CT in one year     Microbiology  2/1 sputum culture:  Pending  1/28:  Blood culture:  Negative x2  1/24 MRSA culture:  Negative        ---------------------------------------------------------------------------------------------------------------  This note has been constructed using a voice recognition system

## 2022-02-03 NOTE — ASSESSMENT & PLAN NOTE
-Patient was initially hospitalized 1/9 in respiratory distress, requiring intubation and hospitalization in the ICU  -He was diagnosed with bilateral pneumonia secondary to COVID, and started on the COVID treatment pathway    He unfortunately signed out against medical advice on 09/12  -1/28 CT scan with extensive bilateral peripheral ground-glass opacity disease consistent with COVID pneumonia  -patient has completed his isolation for COVID  -per COVID protocol:  does not currently meet criteria for remdesivir, baricitinib, or other treatment modalities  -patient was evaluated pulmonary team:  Did not currently recommend steroids-->tapering off prednisone  -there is concern that patient may developing fibrotic lung secondary to [place]  -will need outpatient pulmonary follow-up  -repeat d dimer  -check crp

## 2022-02-03 NOTE — ASSESSMENT & PLAN NOTE
Wt Readings from Last 3 Encounters:   02/02/22 55 5 kg (122 lb 5 7 oz)   01/27/22 57 kg (125 lb 10 6 oz)   01/12/22 56 6 kg (124 lb 12 5 oz)     · Elevated BNP >31,000, previous proBNP had been [id number]  · Pulmonary team noted patient has significant congestion  · Continue diuretics with Lasix 40 mg b i d  · Continue eplerenone 25mg once daily  · Continue beta-blocker  · Patient still with significant hypoxia  · Significant improvement in weight or intake/output  · 2/2:  Discussed with pulmonary team  Change diuretics to IV

## 2022-02-03 NOTE — ASSESSMENT & PLAN NOTE
-Patient was initially hospitalized 1/9 in respiratory distress, requiring intubation and hospitalization in the ICU  -He was diagnosed with bilateral pneumonia secondary to COVID, and started on the COVID treatment pathway    He unfortunately signed out against medical advice on 09/12  -1/28 CT scan with extensive bilateral peripheral ground-glass opacity disease consistent with COVID pneumonia  -patient has completed his isolation for COVID  -per COVID protocol:  does not currently meet criteria for remdesivir, baricitinib, or other treatment modalities  -patient was evaluated pulmonary team:  Did not currently recommend steroids-tapering off prednisone  -there is concern that patient may developing fibrotic lung secondary to [place]  -will need outpatient pulmonary follow-up

## 2022-02-03 NOTE — PROGRESS NOTES
Pt on 8L midflow satting at 78%  Pt not SOB, asymptomatic  No pain  Increased Midflow to 15L currently satting at 91%  Lungs are diminished with fine crackles  Called RT, came to bedside  RT states there is nothing from there standpoint that they can do currently  Dr Isac Mckenzie ordered STAT CXR, waiting to be completed  Will Page Dr Isac Mckenzie when done, to see next steps  CXR complete, Dr Isac Mckenzie ordered 20 IV push lasixs, administered  Will recheck Pt BP in an hour  Will continue to monitor pt

## 2022-02-04 NOTE — PLAN OF CARE
Problem: Potential for Falls  Goal: Patient will remain free of falls  Description: INTERVENTIONS:  - Educate patient/family on patient safety including physical limitations  - Instruct patient to call for assistance with activity   - Consult OT/PT to assist with strengthening/mobility   - Keep Call bell within reach  - Keep bed low and locked with side rails adjusted as appropriate  - Keep care items and personal belongings within reach  - Initiate and maintain comfort rounds  - Make Fall Risk Sign visible to staff  - Apply yellow socks and bracelet for high fall risk patients  - Consider moving patient to room near nurses station  Outcome: Progressing     Problem: PAIN - ADULT  Goal: Verbalizes/displays adequate comfort level or baseline comfort level  Description: Interventions:  - Encourage patient to monitor pain and request assistance  - Assess pain using appropriate pain scale  - Administer analgesics based on type and severity of pain and evaluate response  - Implement non-pharmacological measures as appropriate and evaluate response  - Consider cultural and social influences on pain and pain management  - Notify physician/advanced practitioner if interventions unsuccessful or patient reports new pain  Outcome: Progressing     Problem: INFECTION - ADULT  Goal: Absence or prevention of progression during hospitalization  Description: INTERVENTIONS:  - Assess and monitor for signs and symptoms of infection  - Monitor lab/diagnostic results  - Monitor all insertion sites, i e  indwelling lines, tubes, and drains  - Monitor endotracheal if appropriate and nasal secretions for changes in amount and color  - [place] appropriate cooling/warming therapies per order  - Administer medications as ordered  - Instruct and encourage patient and family to use good hand hygiene technique  - Identify and instruct in appropriate isolation precautions for identified infection/condition  Outcome: Progressing     Problem: SAFETY ADULT  Goal: Patient will remain free of falls  Description: INTERVENTIONS:  - Educate patient/family on patient safety including physical limitations  - Instruct patient to call for assistance with activity   - Consult OT/PT to assist with strengthening/mobility   - Keep Call bell within reach  - Keep bed low and locked with side rails adjusted as appropriate  - Keep care items and personal belongings within reach  - Initiate and maintain comfort rounds  - Make Fall Risk Sign visible to staff  - Apply yellow socks and bracelet for high fall risk patients  - Consider moving patient to room near nurses station  Outcome: Progressing  Goal: Maintain or return to baseline ADL function  Description: INTERVENTIONS:  - Educate patient/family on patient safety including physical limitations  - Instruct patient to call for assistance with activity   - Consult OT/PT to assist with strengthening/mobility   - Keep Call bell within reach  - Keep bed low and locked with side rails adjusted as appropriate  - Keep care items and personal belongings within reach  - Initiate and maintain comfort rounds  - Make Fall Risk Sign visible to staff  - Apply yellow socks and bracelet for high fall risk patients  - Consider moving patient to room near nurses station  Outcome: Progressing  Goal: Maintains/Returns to pre admission functional level  Description: INTERVENTIONS:  - Perform BMAT or MOVE assessment daily    - Set and communicate daily mobility goal to care team and patient/family/caregiver     - Collaborate with rehabilitation services on mobility goals if consulted  - Out of bed for toileting  - Record patient progress and toleration of activity level   Outcome: Progressing     Problem: DISCHARGE PLANNING  Goal: Discharge to home or other facility with appropriate resources  Description: INTERVENTIONS:  - Identify barriers to discharge w/patient and caregiver  - Arrange for needed discharge resources and transportation as appropriate  - Identify discharge learning needs (meds, wound care, etc )  - Arrange for interpretive services to assist at discharge as needed  - Refer to Case Management Department for coordinating discharge planning if the patient needs post-hospital services based on physician/advanced practitioner order or complex needs related to functional status, cognitive ability, or social support system  Outcome: Progressing     Problem: Knowledge Deficit  Goal: Patient/family/caregiver demonstrates understanding of disease process, treatment plan, medications, and discharge instructions  Description: Complete learning assessment and assess knowledge base    Interventions:  - Provide teaching at level of understanding  - Provide teaching via preferred learning methods  Outcome: Progressing     Problem: RESPIRATORY - ADULT  Goal: Achieves optimal ventilation and oxygenation  Description: INTERVENTIONS:  - Assess for changes in respiratory status  - Assess for changes in mentation and behavior  - Position to facilitate oxygenation and minimize respiratory effort  - Oxygen administered by appropriate delivery if ordered  - Initiate smoking cessation education as indicated  - Encourage broncho-pulmonary hygiene including cough, deep breathe, Incentive Spirometry  - Assess the need for suctioning and aspirate as needed  - Assess and instruct to report SOB or any respiratory difficulty  - Respiratory Therapy support as indicated  Outcome: Progressing

## 2022-02-04 NOTE — PROGRESS NOTES
Progress Note - Trung Castellano 61 y o  male MRN: [id number]    Unit/Bed#: E4 -01 Encounter: [id number]    Assessment/Plan:    Acute hypoxic resp failure  possibly related to heart failure concern for fibrotic changes related to recent COVID still requiring mid flow 8 liters, monitor with Pulmonary on steroids    Acute/chronic combined HF  continue IV Lasix with metoprolol, lisinopril, and eplerenone    History of COVID   diagnosis 1/9, monitor with Pulmonary    Hyponatremia   appears chronic but stable sodium 129    AVR     continue warfarin INR 4 04 decrease to 3 milligrams daily    History of VFib   biventricular Medtronic ICD continue amiodarone and beta-blocker with warfarin anticoagulant    Subjective:   Less short of breath, denies chest pain no nausea vomiting no fevers chills appetite good       Objective:     Vitals: Blood pressure 117/55, pulse 86, temperature 97 5 °F (36 4 °C), temperature source Temporal, resp  rate 20, height 5' 10" (1 778 m), weight 52 4 kg (115 lb 8 3 oz), SpO2 (!) 89 %  ,Body mass index is 16 58 kg/m²  Results from last 7 days   Lab Units 02/04/22  0624 02/04/22  0623 02/03/22  0509   WBC Thousand/uL 16 07*  --    < >   HEMOGLOBIN g/dL 11 5*  --    < >   HEMATOCRIT % 35 1*  --    < >   PLATELETS Thousands/uL 298  --    < >   INR   --  4 04*  --     < > = values in this interval not displayed       Results from last 7 days   Lab Units 02/03/22  0509   POTASSIUM mmol/L 4 4   CHLORIDE mmol/L 93*   CO2 mmol/L 29   BUN mg/dL 16   CREATININE mg/dL 0 90   CALCIUM mg/dL 8 6   ALK PHOS U/L 145*   ALT U/L 44   AST U/L 46*       Scheduled Meds:  Current Facility-Administered Medications   Medication Dose Route Frequency Provider Last Rate    acetaminophen  650 mg Oral Q6H PRN GovDeliveryJOSE C      amiodarone  100 mg Oral Daily With Breakfast NICOLE Wellington      aspirin  81 mg Oral Daily NICOLE Wellington      atorvastatin  80 mg Oral Daily With 202-206 [facility], CRFELIX      benzonatate  100 mg Oral BID Maryanne Norris MD      calcium carbonate  1,000 mg Oral Daily PRN Omega Crank, NICOLE      eplerenone  25 mg Oral Daily NICOLE Flores      furosemide  60 mg Intravenous BID (diuretic) Yonas Tierney MD      lisinopril  40 mg Oral HS Omega Crank, NICOLE      LORazepam  0 5 mg Oral Q8H PRN Cortney Roth MD      metoprolol succinate  75 mg Oral Q12H Omega Crank, CRFELIX      polyethylene glycol  17 g Oral Daily PRN Lanny Arriola PA-C      [START ON 2/6/2022] predniSONE  10 mg Oral Daily Yonas Tierney MD      predniSONE  20 mg Oral Daily Yonas Tierney MD      simethicone  80 mg Oral 4x Daily PRN Omega Crank, FITONP      warfarin  3 mg Oral Once per day on Tue Thu Sat Yonas Tierney MD      warfarin  4 mg Oral Once per day on Sun Mon Wed Fri Yonas Tierney MD         Continuous Infusions:         Physical exam:  General appearance:  Alert no distress interaction appropriate  Head/Eyes:  Nonicteric sluggish  Neck:  Supple  Lungs:  Decreased BS bilateral crackles coarse breath sounds  Heart: normal S1 S2 irregular  Abdomen: Soft nontender with bowel sounds  Extremities: no edema  Skin: no rash    Invasive Devices  Report    Peripheral Intravenous Line            Peripheral IV 02/03/22 Dorsal (posterior); Left Forearm 1 day                  VTE Pharmacologic Prophylaxis:  Warfarin      Counseling / Coordination of Care  Total floor / unit time spent today 30  minutes  Greater than 50% of total time was spent with the patient and / or family counseling and / or coordination of care    A description of the counseling / coordination of care:

## 2022-02-04 NOTE — PLAN OF CARE
Problem: SAFETY ADULT  Goal: Maintain or return to baseline ADL function  Description: INTERVENTIONS:  - Educate patient/family on patient safety including physical limitations  - Instruct patient to call for assistance with activity   - Consult OT/PT to assist with strengthening/mobility   - Keep Call bell within reach  - Keep bed low and locked with side rails adjusted as appropriate  - Keep care items and personal belongings within reach  - Initiate and maintain comfort rounds  - Make Fall Risk Sign visible to staff  - Apply yellow socks and bracelet for high fall risk patients  - Consider moving patient to room near nurses station  Outcome: Not Progressing  Goal: Maintains/Returns to pre admission functional level  Description: INTERVENTIONS:  - Perform BMAT or MOVE assessment daily    - Set and communicate daily mobility goal to care team and patient/family/caregiver     - Collaborate with rehabilitation services on mobility goals if consulted  - Stand patient 5 times a day  - Ambulate patient 3 times a day  - Out of bed to chair 3 times a day   - Out of bed for meals 3 times a day  - Out of bed for toileting  - Record patient progress and toleration of activity level   Outcome: Not Progressing     Problem: RESPIRATORY - ADULT  Goal: Achieves optimal ventilation and oxygenation  Description: INTERVENTIONS:  - Assess for changes in respiratory status  - Assess for changes in mentation and behavior  - Position to facilitate oxygenation and minimize respiratory effort  - Oxygen administered by appropriate delivery if ordered  - Initiate smoking cessation education as indicated  - Encourage broncho-pulmonary hygiene including cough, deep breathe, Incentive Spirometry  - Assess the need for suctioning and aspirate as needed  - Assess and instruct to report SOB or any respiratory difficulty  - Respiratory Therapy support as indicated  Outcome: Not Progressing

## 2022-02-05 NOTE — PROGRESS NOTES
Progress Note - Akila Swan 61 y o  male MRN: [id number]    Unit/Bed#: E4 -01 Encounter: [id number]    Assessment/Plan:    Acute hypoxic resp failure  multifactorial with recent COVID and heart failure continue oxygen with steroids and diuresis    Acute/chronic combined HF  echo EF 20 percent, moderately dilated left ventricle, diuresis with Lasix, continue lisinopril metoprolol eplerenone    History of COVID   diagnosis January 9th, finishing steroid taper, monitor with Pulmonary    Hyponatremia    stable improving sodium 131 today    AVR     decrease warfarin to 2 5 hold today's dose INR 5 04    History of VFib    continue amiodarone /metoprolol, anticoagulant warfarin    Subjective:   Still short of breath with any activity, minimal cough, no chest pain no nausea vomiting no fevers chills appetite stable      Objective:     Vitals: Blood pressure 100/55, pulse 82, temperature (!) 97 °F (36 1 °C), temperature source Temporal, resp  rate 18, height 5' 10" (1 778 m), weight 53 2 kg (117 lb 3 2 oz), SpO2 91 %  ,Body mass index is 16 82 kg/m²  Results from last 7 days   Lab Units 02/05/22  0524   WBC Thousand/uL 13 84*   HEMOGLOBIN g/dL 12 1   HEMATOCRIT % 36 2*   PLATELETS Thousands/uL 372   INR  5 04*     Results from last 7 days   Lab Units 02/05/22  0524 02/04/22  0624 02/04/22  0624   POTASSIUM mmol/L 3 8   < > 4 0   CHLORIDE mmol/L 93*   < > 94*   CO2 mmol/L 29   < > 26   BUN mg/dL 15   < > 17   CREATININE mg/dL 0 89   < > 0 97   CALCIUM mg/dL 7 7*   < > 7 8*   ALK PHOS U/L  --   --  137*   ALT U/L  --   --  98*   AST U/L  --   --  126*    < > = values in this interval not displayed         Scheduled Meds:  Current Facility-Administered Medications   Medication Dose Route Frequency Provider Last Rate    acetaminophen  650 mg Oral Q6H PRN Jeannie Whitney PA-C      amiodarone  100 mg Oral Daily With Breakfast Ronette Race, NICOLE      aspirin  81 mg Oral Daily Ronette Race, CRNP      atorvastatin  80 mg Oral Daily With [employer], CRFELIX      benzonatate  100 mg Oral BID Oliva Johnston MD      calcium carbonate  1,000 mg Oral Daily PRN Gurney Cables, NICOLE      eplerenone  25 mg Oral Daily NICOLE Flores      furosemide  60 mg Intravenous BID (diuretic) Amor Davis MD      lisinopril  40 mg Oral HS Gurney Cables, CRFELIX      LORazepam  0 5 mg Oral Q8H PRN Kristi Williamson MD      metoprolol succinate  75 mg Oral Q12H Gurney Cables, CRFELIX      polyethylene glycol  17 g Oral Daily PRN Shell Wills PA-C      [START ON 2/6/2022] predniSONE  10 mg Oral Daily Amor Davis MD      predniSONE  20 mg Oral Daily Amor Davis MD      simethicone  80 mg Oral 4x Daily PRN Gurpratik Treviños, NICOLE      warfarin  3 mg Oral Daily (warfarin) Claire Shook DO         Continuous Infusions:         Physical exam:  General appearance:  Alert no distress interaction appropriate  Head/Eyes:  Nonicteric   Neck:  Supple  Lungs:  Decreased BS bilateral no wheezing rhonchi or rales  Heart: normal S1 S2 regular  Abdomen: Soft nontender with bowel sounds  Extremities: no edema  Skin: no rash    Invasive Devices  Report    Peripheral Intravenous Line            Peripheral IV 02/03/22 Dorsal (posterior); Left Forearm 2 days                  VTE Pharmacologic Prophylaxis:  Warfarin    Counseling / Coordination of Care  Total floor / unit time spent today 30  minutes  Greater than 50% of total time was spent with the patient and / or family counseling and / or coordination of care    A description of the counseling / coordination of care:

## 2022-02-05 NOTE — PLAN OF CARE
Problem: SAFETY ADULT  Goal: Maintain or return to baseline ADL function  Description: INTERVENTIONS:  - Educate patient/family on patient safety including physical limitations  - Instruct patient to call for assistance with activity   - Consult OT/PT to assist with strengthening/mobility   - Keep Call bell within reach  - Keep bed low and locked with side rails adjusted as appropriate  - Keep care items and personal belongings within reach  - Initiate and maintain comfort rounds  - Make Fall Risk Sign visible to staff  - Apply yellow socks and bracelet for high fall risk patients  - Consider moving patient to room near nurses station  Outcome: Not Progressing  Goal: Maintains/Returns to pre admission functional level  Description: INTERVENTIONS:  - Perform BMAT or MOVE assessment daily    - Set and communicate daily mobility goal to care team and patient/family/caregiver     - Collaborate with rehabilitation services on mobility goals if consulted  - Stand patient 3 times   a day  - Ambulate patient 3 times a day  - Out of bed to chair 3 times a day   - Out of bed for meals 3 times a day  - Out of bed for toileting  - Record patient progress and toleration of activity level   Outcome: Not Progressing     Problem: RESPIRATORY - ADULT  Goal: Achieves optimal ventilation and oxygenation  Description: INTERVENTIONS:  - Assess for changes in respiratory status  - Assess for changes in mentation and behavior  - Position to facilitate oxygenation and minimize respiratory effort  - Oxygen administered by appropriate delivery if ordered  - Initiate smoking cessation education as indicated  - Encourage broncho-pulmonary hygiene including cough, deep breathe, Incentive Spirometry  - Assess the need for suctioning and aspirate as needed  - Assess and instruct to report SOB or any respiratory difficulty  - Respiratory Therapy support as indicated  Outcome: Not Progressing

## 2022-02-06 NOTE — MALNUTRITION/BMI
This medical record reflects one or more clinical indicators suggestive of malnutrition and/or morbid obesity  Malnutrition Findings:   Adult Malnutrition type: Chronic illness  Adult Degree of Malnutrition: Malnutrition of moderate degree  Malnutrition Characteristics: Fat loss,Muscle loss,Inadequate energy,Weight loss (PCM r/t poor appetite AEB 5 6% wt loss x 1 month and 21 5% wt loss x 1 year, Pt with s/s of malnutrition: loss of muscle mass in UE's, clavicle visible and loss of fat mass: orbitals slightly hollow  )Treating with supplements  BMI Findings:  Adult BMI Classifications: Underweight < 18 5     Body mass index is 16 82 kg/m²  See Nutrition note dated 02/05/2022 for additional details  Completed nutrition assessment is viewable in the nutrition documentation

## 2022-02-06 NOTE — PLAN OF CARE
Problem: Potential for Falls  Goal: Patient will remain free of falls  Description: INTERVENTIONS:  - Educate patient/family on patient safety including physical limitations  - Instruct patient to call for assistance with activity   - Consult OT/PT to assist with strengthening/mobility   - Keep Call bell within reach  - Keep bed low and locked with side rails adjusted as appropriate  - Keep care items and personal belongings within reach  - Initiate and maintain comfort rounds  - Make Fall Risk Sign visible to staff  - Apply yellow socks and bracelet for high fall risk patients  - Consider moving patient to room near nurses station  Outcome: Progressing     Problem: PAIN - ADULT  Goal: Verbalizes/displays adequate comfort level or baseline comfort level  Description: Interventions:  - Encourage patient to monitor pain and request assistance  - Assess pain using appropriate pain scale  - Administer analgesics based on type and severity of pain and evaluate response  - Implement non-pharmacological measures as appropriate and evaluate response  - Consider cultural and social influences on pain and pain management  - Notify physician/advanced practitioner if interventions unsuccessful or patient reports new pain  Outcome: Progressing     Problem: INFECTION - ADULT  Goal: Absence or prevention of progression during hospitalization  Description: INTERVENTIONS:  - Assess and monitor for signs and symptoms of infection  - Monitor lab/diagnostic results  - Monitor all insertion sites, i e  indwelling lines, tubes, and drains  - Monitor endotracheal if appropriate and nasal secretions for changes in amount and color  - [place] appropriate cooling/warming therapies per order  - Administer medications as ordered  - Instruct and encourage patient and family to use good hand hygiene technique  - Identify and instruct in appropriate isolation precautions for identified infection/condition  Outcome: Progressing     Problem: SAFETY ADULT  Goal: Patient will remain free of falls  Description: INTERVENTIONS:  - Educate patient/family on patient safety including physical limitations  - Instruct patient to call for assistance with activity   - Consult OT/PT to assist with strengthening/mobility   - Keep Call bell within reach  - Keep bed low and locked with side rails adjusted as appropriate  - Keep care items and personal belongings within reach  - Initiate and maintain comfort rounds  - Make Fall Risk Sign visible to staff  - Apply yellow socks and bracelet for high fall risk patients  - Consider moving patient to room near nurses station  Outcome: Progressing  Goal: Maintain or return to baseline ADL function  Description: INTERVENTIONS:  - Educate patient/family on patient safety including physical limitations  - Instruct patient to call for assistance with activity   - Consult OT/PT to assist with strengthening/mobility   - Keep Call bell within reach  - Keep bed low and locked with side rails adjusted as appropriate  - Keep care items and personal belongings within reach  - Initiate and maintain comfort rounds  - Make Fall Risk Sign visible to staff  - Apply yellow socks and bracelet for high fall risk patients  - Consider moving patient to room near nurses station  Outcome: Progressing  Goal: Maintains/Returns to pre admission functional level  Description: INTERVENTIONS:  - Perform BMAT or MOVE assessment daily    - Set and communicate daily mobility goal to care team and patient/family/caregiver  - Collaborate with rehabilitation services on mobility goals if consulted  - Perform Range of Motion  times a day  - Reposition patient every  hours    - Dangle patient  times a day  - Stand patient  times a day  - Ambulate patient  times a day  - Out of bed to chair  times a day   - Out of bed for meals  times a day  - Out of bed for toileting  - Record patient progress and toleration of activity level   Outcome: Progressing Problem: DISCHARGE PLANNING  Goal: Discharge to home or other facility with appropriate resources  Description: INTERVENTIONS:  - Identify barriers to discharge w/patient and caregiver  - Arrange for needed discharge resources and transportation as appropriate  - Identify discharge learning needs (meds, wound care, etc )  - Arrange for interpretive services to assist at discharge as needed  - Refer to Case Management Department for coordinating discharge planning if the patient needs post-hospital services based on physician/advanced practitioner order or complex needs related to functional status, cognitive ability, or social support system  Outcome: Progressing     Problem: Knowledge Deficit  Goal: Patient/family/caregiver demonstrates understanding of disease process, treatment plan, medications, and discharge instructions  Description: Complete learning assessment and assess knowledge base    Interventions:  - Provide teaching at level of understanding  - Provide teaching via preferred learning methods  Outcome: Progressing     Problem: RESPIRATORY - ADULT  Goal: Achieves optimal ventilation and oxygenation  Description: INTERVENTIONS:  - Assess for changes in respiratory status  - Assess for changes in mentation and behavior  - Position to facilitate oxygenation and minimize respiratory effort  - Oxygen administered by appropriate delivery if ordered  - Initiate smoking cessation education as indicated  - Encourage broncho-pulmonary hygiene including cough, deep breathe, Incentive Spirometry  - Assess the need for suctioning and aspirate as needed  - Assess and instruct to report SOB or any respiratory difficulty  - Respiratory Therapy support as indicated  Outcome: Progressing

## 2022-02-06 NOTE — PROGRESS NOTES
Progress Note - Compa South 61 y o  male MRN: [id number]    Unit/Bed#: E4 -01 Encounter: [id number]    Assessment/Plan:    Acute hypoxic resp failure  multifactorial with heart failure/COVID, continue oxygen, titrating off steroids, holding diuresis with hypotension encourage use of incentive spirometer    Acute/chronic combined HF  EF 20 percent, continue lisinopril, metoprolol, eplerenone appears compensated    History of COVID   January 9, 2022 finishing steroid taper monitor with Pulmonary    Hyponatremia    chronic 126 today continue to monitor    AVR     INR 4 04 decreased warfarin to 2 5    History of AFib    rate control amiodarone metoprolol anticoagulation warfarin    Dyslipidemia    continue statin for LDL control    Subjective:   Resting no shortness of breath any activity short of breath minimal cough no chest pain no nausea vomiting no fevers chills appetite good      Objective:     Vitals: Blood pressure 100/68, pulse 76, temperature (!) 97 °F (36 1 °C), temperature source Oral, resp  rate 20, height 5' 10" (1 778 m), weight 53 7 kg (118 lb 6 2 oz), SpO2 98 %  ,Body mass index is 16 99 kg/m²  Results from last 7 days   Lab Units 02/06/22  0612 02/05/22  0524 02/05/22  0524   WBC Thousand/uL  --   --  13 84*   HEMOGLOBIN g/dL  --   --  12 1   HEMATOCRIT %  --   --  36 2*   PLATELETS Thousands/uL  --   --  372   INR  4 04*   < > 5 04*    < > = values in this interval not displayed  Results from last 7 days   Lab Units 02/06/22  0612 02/05/22 0524 02/04/22  0624   POTASSIUM mmol/L 4 5   < > 4 0   CHLORIDE mmol/L 92*   < > 94*   CO2 mmol/L 31   < > 26   BUN mg/dL 20   < > 17   CREATININE mg/dL 0 96   < > 0 97   CALCIUM mg/dL 8 2*   < > 7 8*   ALK PHOS U/L  --   --  137*   ALT U/L  --   --  98*   AST U/L  --   --  126*    < > = values in this interval not displayed         Scheduled Meds:  Current Facility-Administered Medications   Medication Dose Route Frequency Provider Last Rate    acetaminophen  650 mg Oral Q6H PRN Sue Chase PA-C      amiodarone  100 mg Oral Daily With Breakfast NICOLE Moseley      aspirin  81 mg Oral Daily NICOLE Moseley      atorvastatin  80 mg Oral Daily With [employer], NICOLE      benzonatate  100 mg Oral BID Karely Moses MD      calcium carbonate  1,000 mg Oral Daily PRN NICOLE Moseley      eplerenone  25 mg Oral Daily NICOLE Flores      lisinopril  20 mg Oral HS Ron Nassar DO      LORazepam  0 5 mg Oral Q8H PRN Gigi Johnson MD      metoprolol succinate  75 mg Oral Q12H NICOLE Moseley      polyethylene glycol  17 g Oral Daily PRN Mariela Martinez PA-C      predniSONE  10 mg Oral Daily Manolo Forbes MD      simethicone  80 mg Oral 4x Daily PRN NICOLE Moseley      warfarin  2 5 mg Oral Daily (warfarin) Ron Nassar DO         Continuous Infusions:         Physical exam:  General appearance:  Alert no distress interaction appropriate  Head/Eyes:  Nonicteric PERRL EOMI  Neck:  Supple  Lungs:  Decreased BS bilateral no wheezing rhonchi or rales  Heart: normal S1 S2 regular  Abdomen: Soft nontender with bowel sounds  Extremities: no edema  Skin: no rash    Invasive Devices  Report    Peripheral Intravenous Line            Peripheral IV 02/03/22 Dorsal (posterior); Left Forearm 3 days                  VTE Pharmacologic Prophylaxis:  Warfarin      Counseling / Coordination of Care  Total floor / unit time spent today 30  minutes  Greater than 50% of total time was spent with the patient and / or family counseling and / or coordination of care    A description of the counseling / coordination of care:

## 2022-02-07 NOTE — PLAN OF CARE
Problem: Potential for Falls  Goal: Patient will remain free of falls  Description: INTERVENTIONS:  - Educate patient/family on patient safety including physical limitations  - Instruct patient to call for assistance with activity   - Consult OT/PT to assist with strengthening/mobility   - Keep Call bell within reach  - Keep bed low and locked with side rails adjusted as appropriate  - Keep care items and personal belongings within reach  - Initiate and maintain comfort rounds  - Make Fall Risk Sign visible to staff  - Apply yellow socks and bracelet for high fall risk patients  - Consider moving patient to room near nurses station  Outcome: Progressing     Problem: PAIN - ADULT  Goal: Verbalizes/displays adequate comfort level or baseline comfort level  Description: Interventions:  - Encourage patient to monitor pain and request assistance  - Assess pain using appropriate pain scale  - Administer analgesics based on type and severity of pain and evaluate response  - Implement non-pharmacological measures as appropriate and evaluate response  - Consider cultural and social influences on pain and pain management  - Notify physician/advanced practitioner if interventions unsuccessful or patient reports new pain  Outcome: Progressing     Problem: INFECTION - ADULT  Goal: Absence or prevention of progression during hospitalization  Description: INTERVENTIONS:  - Assess and monitor for signs and symptoms of infection  - Monitor lab/diagnostic results  - Monitor all insertion sites, i e  indwelling lines, tubes, and drains  - Monitor endotracheal if appropriate and nasal secretions for changes in amount and color  - [place] appropriate cooling/warming therapies per order  - Administer medications as ordered  - Instruct and encourage patient and family to use good hand hygiene technique  - Identify and instruct in appropriate isolation precautions for identified infection/condition  Outcome: Progressing     Problem: SAFETY ADULT  Goal: Patient will remain free of falls  Description: INTERVENTIONS:  - Educate patient/family on patient safety including physical limitations  - Instruct patient to call for assistance with activity   - Consult OT/PT to assist with strengthening/mobility   - Keep Call bell within reach  - Keep bed low and locked with side rails adjusted as appropriate  - Keep care items and personal belongings within reach  - Initiate and maintain comfort rounds  - Make Fall Risk Sign visible to staff  - Apply yellow socks and bracelet for high fall risk patients  - Consider moving patient to room near nurses station  Outcome: Progressing  Goal: Maintain or return to baseline ADL function  Description: INTERVENTIONS:  - Educate patient/family on patient safety including physical limitations  - Instruct patient to call for assistance with activity   - Consult OT/PT to assist with strengthening/mobility   - Keep Call bell within reach  - Keep bed low and locked with side rails adjusted as appropriate  - Keep care items and personal belongings within reach  - Initiate and maintain comfort rounds  - Make Fall Risk Sign visible to staff  - Apply yellow socks and bracelet for high fall risk patients  - Consider moving patient to room near nurses station  Outcome: Progressing  Goal: Maintains/Returns to pre admission functional level  Description: INTERVENTIONS:  - Perform BMAT or MOVE assessment daily    - Set and communicate daily mobility goal to care team and patient/family/caregiver  - Collaborate with rehabilitation services on mobility goals if consulted  - Perform Range of Motion 3 times a day  - Reposition patient every 2 hours    - Dangle patient 3 times a day  - Stand patient 3 times a day  - Ambulate patient 3 times a day  - Out of bed to chair 3 times a day   - Out of bed for meals 3 times a day  - Out of bed for toileting  - Record patient progress and toleration of activity level   Outcome: Progressing Problem: DISCHARGE PLANNING  Goal: Discharge to home or other facility with appropriate resources  Description: INTERVENTIONS:  - Identify barriers to discharge w/patient and caregiver  - Arrange for needed discharge resources and transportation as appropriate  - Identify discharge learning needs (meds, wound care, etc )  - Arrange for interpretive services to assist at discharge as needed  - Refer to Case Management Department for coordinating discharge planning if the patient needs post-hospital services based on physician/advanced practitioner order or complex needs related to functional status, cognitive ability, or social support system  Outcome: Progressing     Problem: Knowledge Deficit  Goal: Patient/family/caregiver demonstrates understanding of disease process, treatment plan, medications, and discharge instructions  Description: Complete learning assessment and assess knowledge base    Interventions:  - Provide teaching at level of understanding  - Provide teaching via preferred learning methods  Outcome: Progressing     Problem: RESPIRATORY - ADULT  Goal: Achieves optimal ventilation and oxygenation  Description: INTERVENTIONS:  - Assess for changes in respiratory status  - Assess for changes in mentation and behavior  - Position to facilitate oxygenation and minimize respiratory effort  - Oxygen administered by appropriate delivery if ordered  - Initiate smoking cessation education as indicated  - Encourage broncho-pulmonary hygiene including cough, deep breathe, Incentive Spirometry  - Assess the need for suctioning and aspirate as needed  - Assess and instruct to report SOB or any respiratory difficulty  - Respiratory Therapy support as indicated  Outcome: Progressing

## 2022-02-07 NOTE — PLAN OF CARE
Problem: Potential for Falls  Goal: Patient will remain free of falls  Description: INTERVENTIONS:  - Educate patient/family on patient safety including physical limitations  - Instruct patient to call for assistance with activity   - Consult OT/PT to assist with strengthening/mobility   - Keep Call bell within reach  - Keep bed low and locked with side rails adjusted as appropriate  - Keep care items and personal belongings within reach  - Initiate and maintain comfort rounds  - Make Fall Risk Sign visible to staff  - Apply yellow socks and bracelet for high fall risk patients  - Consider moving patient to room near nurses station  Outcome: Progressing     Problem: PAIN - ADULT  Goal: Verbalizes/displays adequate comfort level or baseline comfort level  Description: Interventions:  - Encourage patient to monitor pain and request assistance  - Assess pain using appropriate pain scale  - Administer analgesics based on type and severity of pain and evaluate response  - Implement non-pharmacological measures as appropriate and evaluate response  - Consider cultural and social influences on pain and pain management  - Notify physician/advanced practitioner if interventions unsuccessful or patient reports new pain  Outcome: Progressing     Problem: SAFETY ADULT  Goal: Patient will remain free of falls  Description: INTERVENTIONS:  - Educate patient/family on patient safety including physical limitations  - Instruct patient to call for assistance with activity   - Consult OT/PT to assist with strengthening/mobility   - Keep Call bell within reach  - Keep bed low and locked with side rails adjusted as appropriate  - Keep care items and personal belongings within reach  - Initiate and maintain comfort rounds  - Make Fall Risk Sign visible to staff  - Apply yellow socks and bracelet for high fall risk patients  - Consider moving patient to room near nurses station  Outcome: Progressing     Problem: RESPIRATORY - ADULT  Goal: Achieves optimal ventilation and oxygenation  Description: INTERVENTIONS:  - Assess for changes in respiratory status  - Assess for changes in mentation and behavior  - Position to facilitate oxygenation and minimize respiratory effort  - Oxygen administered by appropriate delivery if ordered  - Initiate smoking cessation education as indicated  - Encourage broncho-pulmonary hygiene including cough, deep breathe, Incentive Spirometry  - Assess the need for suctioning and aspirate as needed  - Assess and instruct to report SOB or any respiratory difficulty  - Respiratory Therapy support as indicated  Outcome: Not Progressing

## 2022-02-07 NOTE — PROGRESS NOTES
[facility]  Progress Note - Raffi Enciso 1965, 61 y o  male MRN: [id number]  Unit/Bed#: E4 -01 Encounter: [id number]  Primary Care Provider: Erik Byrnes PA-C   Date and time admitted to hospital: 2022  3:13 AM     Assessment/Plan:     Acute hypoxic resp failure - multifactorial with heart failure/COVID, continue oxygen, titrated off steroids, continue lasix at 20mg BID  Currently on 8L with O2 ranging from high 90s to 100%  Will obtain home O2 eval  Possible d/c home tmr with O2  Declines STR     Acute/chronic combined HF - EF 20 percent, continue lisinopril, metoprolol, eplerenone  Currently euvolemic     History of COVID         Hyponatremia - chronic     AVR mechanical - Adjust coumadin to 2 5mg daily, monitor INR  Goal 2 5-3 5     History of AFib - Continue Amiodarone 100mg daily, Toprol XL 75mg BID, coumadin     Dyslipidemia  - continue statin for LDL control    VTE Pharmacologic Prophylaxis:   Pharmacologic: Warfarin (Coumadin)  Mechanical VTE Prophylaxis in Place: Yes    Patient Centered Rounds: I have performed bedside rounds with nursing staff today  Discussions with Specialists or Other Care Team Provider: None    Education and Discussions with Family / Patient: patient    Time Spent for Care: 30 minutes  More than 50% of total time spent on counseling and coordination of care as described above  Current Length of Stay: 10 day(s)    Current Patient Status: Inpatient   Certification Statement: The patient will continue to require additional inpatient hospital stay due to pending home o2 study    Discharge Plan: 24 hours    Code Status: Level 1 - Full Code      Subjective:   No events overnight  Reports breathing has improved  No complaints       Objective:     Vitals:   Temp (24hrs), Av 3 °F (36 3 °C), Min:97 1 °F (36 2 °C), Max:97 5 °F (36 4 °C)    Temp:  [97 1 °F (36 2 °C)-97 5 °F (36 4 °C)] 97 5 °F (36 4 °C)  HR:  [59-73] 60  Resp: [18-20] 18  BP: (101-121)/(55-65) 115/55  SpO2:  [94 %-100 %] 100 %  Body mass index is 16 96 kg/m²  Input and Output Summary (last 24 hours): Intake/Output Summary (Last 24 hours) at 2/7/2022 1410  Last data filed at 2/7/2022 0501  Gross per 24 hour   Intake 960 ml   Output 1400 ml   Net -440 ml       Physical Exam:     Physical Exam  Vitals and nursing note reviewed  Constitutional:       Appearance: He is normal weight  HENT:      Head: Normocephalic and atraumatic  Eyes:      General: No scleral icterus  Conjunctiva/sclera: Conjunctivae normal    Cardiovascular:      Rate and Rhythm: Normal rate  Pulses: Normal pulses  Pulmonary:      Effort: Pulmonary effort is normal       Breath sounds: No wheezing or rhonchi  Abdominal:      General: Bowel sounds are normal  There is no distension  Palpations: Abdomen is soft  Musculoskeletal:         General: No swelling  Right lower leg: No edema  Left lower leg: No edema  Skin:     General: Skin is warm and dry  Neurological:      General: No focal deficit present  Mental Status: He is alert  Mental status is at baseline  Additional Data:     Labs:    Results from last 7 days   Lab Units 02/05/22  0524 02/04/22  0624 02/04/22  0624   WBC Thousand/uL 13 84*   < > 16 07*   HEMOGLOBIN g/dL 12 1   < > 11 5*   HEMATOCRIT % 36 2*   < > 35 1*   PLATELETS Thousands/uL 372   < > 298   NEUTROS PCT %  --   --  81*   LYMPHS PCT %  --   --  5*   LYMPHO PCT % 8*  --   --    MONOS PCT %  --   --  12   MONO PCT % 7  --   --    EOS PCT % 0  --  0    < > = values in this interval not displayed       Results from last 7 days   Lab Units 02/07/22  0448 02/05/22  0524 02/04/22  0624   SODIUM mmol/L 130*   < > 129*   POTASSIUM mmol/L 4 2   < > 4 0   CHLORIDE mmol/L 93*   < > 94*   CO2 mmol/L 30   < > 26   BUN mg/dL 15   < > 17   CREATININE mg/dL 0 85   < > 0 97   ANION GAP mmol/L 7   < > 9   CALCIUM mg/dL 8 1*   < > 7 8*   ALBUMIN g/dL  --   --  1 7*   TOTAL BILIRUBIN mg/dL  --   --  0 72   ALK PHOS U/L  --   --  137*   ALT U/L  --   --  98*   AST U/L  --   --  126*   GLUCOSE RANDOM mg/dL 83   < > 105    < > = values in this interval not displayed  Results from last 7 days   Lab Units 02/07/22  0448   INR  4 07*             Results from last 7 days   Lab Units 02/03/22  0535 02/01/22  0456   PROCALCITONIN ng/ml 0 70* 0 72*           * I Have Reviewed All Lab Data Listed Above  * Additional Pertinent Lab Tests Reviewed: [address] Admission Reviewed    Imaging:    None    Recent Cultures (last 7 days):     Results from last 7 days   Lab Units 02/01/22  0710   SPUTUM CULTURE  2+ Growth of    GRAM STAIN RESULT  Rare Epithelial cells per low power field*  Rare Polys*  Rare Gram positive cocci in pairs*  Rare Gram positive rods*       Last 24 Hours Medication List:   Current Facility-Administered Medications   Medication Dose Route Frequency Provider Last Rate    acetaminophen  650 mg Oral Q6H PRN [employer] LORENZA KoromaC      aluminum-magnesium hydroxide-simethicone  30 mL Oral Q4H PRN Katty Shell DO      amiodarone  100 mg Oral Daily With Breakfast NICOLE Rodriguez      aspirin  81 mg Oral Daily NICOLE Rodriguez      atorvastatin  80 mg Oral Daily With [employer], NICOLE      eplerenone  25 mg Oral Daily NICOLE Flores      furosemide  20 mg Oral BID (diuretic) Katty Shell DO      lisinopril  20 mg Oral HS Katty Shell DO      LORazepam  0 5 mg Oral Q8H PRN Napoleon Lawson MD      metoprolol succinate  75 mg Oral Q12H NICOLE Rodriguez      polyethylene glycol  17 g Oral Daily PRN Ling Kruse PA-C      warfarin  2 5 mg Oral Daily (warfarin) Katty Shell DO          Today, Patient Was Seen By: Napoleon Lawson MD    ** Please Note: Dictation voice to text software may have been used in the creation of this document   **

## 2022-02-08 NOTE — PLAN OF CARE
Problem: Potential for Falls  Goal: Patient will remain free of falls  Description: INTERVENTIONS:  - Educate patient/family on patient safety including physical limitations  - Instruct patient to call for assistance with activity   - Consult OT/PT to assist with strengthening/mobility   - Keep Call bell within reach  - Keep bed low and locked with side rails adjusted as appropriate  - Keep care items and personal belongings within reach  - Initiate and maintain comfort rounds  - Make Fall Risk Sign visible to staff  - Apply yellow socks and bracelet for high fall risk patients  - Consider moving patient to room near nurses station  2/8/2022 1253 by Edilberto Landin  Outcome: Adequate for Discharge  2/8/2022 0917 by Edilberto Landin  Outcome: Progressing     Problem: PAIN - ADULT  Goal: Verbalizes/displays adequate comfort level or baseline comfort level  Description: Interventions:  - Encourage patient to monitor pain and request assistance  - Assess pain using appropriate pain scale  - Administer analgesics based on type and severity of pain and evaluate response  - Implement non-pharmacological measures as appropriate and evaluate response  - Consider cultural and social influences on pain and pain management  - Notify physician/advanced practitioner if interventions unsuccessful or patient reports new pain  2/8/2022 1253 by Edilberto Landin  Outcome: Adequate for Discharge  2/8/2022 0917 by Edilberto Landin  Outcome: Progressing     Problem: INFECTION - ADULT  Goal: Absence or prevention of progression during hospitalization  Description: INTERVENTIONS:  - Assess and monitor for signs and symptoms of infection  - Monitor lab/diagnostic results  - Monitor all insertion sites, i e  indwelling lines, tubes, and drains  - Monitor endotracheal if appropriate and nasal secretions for changes in amount and color  - [place] appropriate cooling/warming therapies per order  - Administer medications as ordered  - Instruct and encourage patient and family to use good hand hygiene technique  - Identify and instruct in appropriate isolation precautions for identified infection/condition  2/8/2022 1253 by Ryan Braulio  Outcome: Adequate for Discharge  2/8/2022 0917 by Ryan Braulio  Outcome: Progressing     Problem: SAFETY ADULT  Goal: Patient will remain free of falls  Description: INTERVENTIONS:  - Educate patient/family on patient safety including physical limitations  - Instruct patient to call for assistance with activity   - Consult OT/PT to assist with strengthening/mobility   - Keep Call bell within reach  - Keep bed low and locked with side rails adjusted as appropriate  - Keep care items and personal belongings within reach  - Initiate and maintain comfort rounds  - Make Fall Risk Sign visible to staff  - Apply yellow socks and bracelet for high fall risk patients  - Consider moving patient to room near nurses station  2/8/2022 1253 by Ryan Braulio  Outcome: Adequate for Discharge  2/8/2022 0917 by Ryan Braulio  Outcome: Progressing  Goal: Maintain or return to baseline ADL function  Description: INTERVENTIONS:  - Educate patient/family on patient safety including physical limitations  - Instruct patient to call for assistance with activity   - Consult OT/PT to assist with strengthening/mobility   - Keep Call bell within reach  - Keep bed low and locked with side rails adjusted as appropriate  - Keep care items and personal belongings within reach  - Initiate and maintain comfort rounds  - Make Fall Risk Sign visible to staff  - Apply yellow socks and bracelet for high fall risk patients  - Consider moving patient to room near nurses station  2/8/2022 1253 by Ryan Braulio  Outcome: Adequate for Discharge  2/8/2022 0917 by Ryan Braulio  Outcome: Progressing  Goal: Maintains/Returns to pre admission functional level  Description: INTERVENTIONS:  - Perform BMAT or MOVE assessment daily    - Set and communicate daily mobility goal to care team and patient/family/caregiver  - Collaborate with rehabilitation services on mobility goals if consulted  - Perform Range of Motion 3 times a day  - Reposition patient every 2 hours  - Dangle patient 3 times a day  - Stand patient 3 times a day  - Ambulate patient 3 times a day  - Out of bed to chair 3 times a day   - Out of bed for meals 3 times a day  - Out of bed for toileting  - Record patient progress and toleration of activity level   2/8/2022 1253 by Sage Duggan  Outcome: Adequate for Discharge  2/8/2022 0917 by Sage Duggan  Outcome: Progressing     Problem: DISCHARGE PLANNING  Goal: Discharge to home or other facility with appropriate resources  Description: INTERVENTIONS:  - Identify barriers to discharge w/patient and caregiver  - Arrange for needed discharge resources and transportation as appropriate  - Identify discharge learning needs (meds, wound care, etc )  - Arrange for interpretive services to assist at discharge as needed  - Refer to Case Management Department for coordinating discharge planning if the patient needs post-hospital services based on physician/advanced practitioner order or complex needs related to functional status, cognitive ability, or social support system  2/8/2022 1253 by Sage Duggan  Outcome: Adequate for Discharge  2/8/2022 0917 by Sage Duggan  Outcome: Progressing     Problem: Knowledge Deficit  Goal: Patient/family/caregiver demonstrates understanding of disease process, treatment plan, medications, and discharge instructions  Description: Complete learning assessment and assess knowledge base    Interventions:  - Provide teaching at level of understanding  - Provide teaching via preferred learning methods  2/8/2022 1253 by Sage Duggan  Outcome: Adequate for Discharge  2/8/2022 0917 by Sage Duggan  Outcome: Progressing     Problem: RESPIRATORY - ADULT  Goal: Achieves optimal ventilation and oxygenation  Description: INTERVENTIONS:  - Assess for changes in respiratory status  - Assess for changes in mentation and behavior  - Position to facilitate oxygenation and minimize respiratory effort  - Oxygen administered by appropriate delivery if ordered  - Initiate smoking cessation education as indicated  - Encourage broncho-pulmonary hygiene including cough, deep breathe, Incentive Spirometry  - Assess the need for suctioning and aspirate as needed  - Assess and instruct to report SOB or any respiratory difficulty  - Respiratory Therapy support as indicated  2/8/2022 1253 by Katiuska Young  Outcome: Adequate for Discharge  2/8/2022 0917 by Katiuska Young  Outcome: Progressing

## 2022-02-08 NOTE — CASE MANAGEMENT
Case Management Discharge Planning Note    Patient name Tenzin Jose  Location East 4 /E4 Brittany 40-* MRN [id number]  : 1965 Date 2022       Current Admission Date: 2022  Current Admission Diagnosis:Acute respiratory failure with hypoxia [facility])   Patient Active Problem List    Diagnosis Date Noted    [facility] failure with [facility] ) 2022    Medically noncompliant 2022    Acute on chronic combined systolic and diastolic congestive heart failure ([facility] ) 2022    Aortic ectasia, thoracic ([facility] ) 2022    Pneumonia 2022    Mild protein-calorie malnutrition ([facility] ) 2022    Hyponatremia 2022    Aortic stenosis 2022    Ischemic cardiomyopathy 2022    Drug abuse ([facility] ) 2022    Ventricular fibrillation ([facility] ) 2022    COVID-19 virus infection 2022    Impaired fasting glucose 09/10/2021    History of ventricular fibrillation 2021    Elevated troponin 2021    Cough 2021    Lactic acidosis 2021    SIRS (systemic inflammatory response syndrome) ([facility] ) 2021    Cocaine use 2021    History of ventricular tachycardia 2021    Depressed mood 2020    S/P AVR (aortic valve replacement) 2018    Ventricular fibrillation ([facility] ) 2018    Biventricular ICD (implantable cardioverter-defibrillator) in place 2018    Colonic mass 2016    Acute on chronic combined systolic and diastolic CHF (congestive heart failure) ([facility] ) 2016    Coronary artery disease involving native coronary artery of native heart without angina pectoris 2016      LOS (days): 11  Geometric Mean LOS (GMLOS) (days): 3 80  Days to GMLOS:-7 4     OBJECTIVE:  Risk of Unplanned Readmission Score: 34         Current admission status: Inpatient   Preferred Pharmacy:   [facility] [address] [facility] [address] - [place]ERYN PULIDO - [address] [facility] [address]  Phone: [phone] Fax: [phone] [address] [place], [address] ,  [address] ,  [address]  Phone: [phone] Fax: [phone]    [facility] [address] [facility], [address]   [address] [facility] [address]  Phone: [phone] Fax: [phone]    Primary Care Provider: NICOLE Beckwith    Primary Insurance: [employer] [facility]  Secondary Insurance: [address] [place] [address]    DISCHARGE DETAILS:    Discharge planning discussed with[de-identified] patient and daughter  Freedom of Choice: Yes  Comments - Freedom of Choice: Need for home O2 and oximizer, ordered through [employer]  Patient to be D/C'd today   Pt agreeable to VNA at this time after discussion with patient     Were Treatment Team discharge recommendations reviewed with patient/caregiver?: Yes  Did patient/caregiver verbalize understanding of patient care needs?: Yes  Were patient/caregiver advised of the risks associated with not following Treatment Team discharge recommendations?: Yes    Contacts  Patient Contacts: Daughter  Relationship to Patient[de-identified] Family  Reason/Outcome: Discharge [address]         [facility] the [facility]?: Yes  [address] requested[de-identified] [address] Name[de-identified] Other Montse Prescott)  [address] Provider[de-identified] PCP  Home Health Services Needed[de-identified] COPD Management,Heart Failure Management,Oxygen Via Nasal Cannula  Oxygen LPM Ordered (if applicable based on home health services needed):: 6 LPM  Homebound Criteria Met[de-identified] Requires the Assistance of Another Person for Safe Ambulation or to Leave the Home,Communicable Disease  Supporting Clincal Findings[de-identified] Fatigues Easliy in [address] with Exertion,Limited Endurance,Requires Oxygen    DME Referral Provided  Referral made for DME?: Yes  DME referral completed for the following items[de-identified] Portable Oxygen concentrator,Portable Oxygen tanks  DME Supplier Name[de-identified] [employer]         Would you like to participate in our [address] service program?  : No - Declined    Treatment Team Recommendation: Home with [address] [place] [address]  Discharge Destination Plan[de-identified] Home with [place] at Discharge : Family           IMM Given (Date):: 02/08/22  IMM Given to[de-identified] Patient (read to patient, copy left in room)

## 2022-02-08 NOTE — NURSING NOTE
Patient discharged home  Patient wheeled down to parking deck by staff, picked up by family  Patient sent with all belongings, including portable oxygen tank  No belonging left in room  IV removed and all discharge paperwork given

## 2022-02-08 NOTE — ASSESSMENT & PLAN NOTE
Left voicemail to call back · History of VFib/Vtac s/p ICD  Continues to have episodes of V-tach per outpatient cardiology note     · Maintained on amiodarone 100 mg daily and Toprol XL 75 mg q 12 hours

## 2022-02-08 NOTE — ASSESSMENT & PLAN NOTE
Wt Readings from Last 3 Encounters:   02/08/22 53 6 kg (118 lb 2 7 oz)   01/27/22 57 kg (125 lb 10 6 oz)   01/12/22 56 6 kg (124 lb 12 5 oz)     · Elevated BNP >31,000, previous proBNP had been [id number]  · Pulmonary team noted patient has significant congestion  · Continue eplerenone 25mg once daily  · Continue beta-blocker  · Currently euvolemic, continue Lasix 20 mg b i d   · Continue lisinopril, will decrease dose to 10 mg due to hypotension

## 2022-02-08 NOTE — PLAN OF CARE
Problem: Potential for Falls  Goal: Patient will remain free of falls  Description: INTERVENTIONS:  - Educate patient/family on patient safety including physical limitations  - Instruct patient to call for assistance with activity   - Consult OT/PT to assist with strengthening/mobility   - Keep Call bell within reach  - Keep bed low and locked with side rails adjusted as appropriate  - Keep care items and personal belongings within reach  - Initiate and maintain comfort rounds  - Make Fall Risk Sign visible to staff  - Apply yellow socks and bracelet for high fall risk patients  - Consider moving patient to room near nurses station  Outcome: Progressing     Problem: PAIN - ADULT  Goal: Verbalizes/displays adequate comfort level or baseline comfort level  Description: Interventions:  - Encourage patient to monitor pain and request assistance  - Assess pain using appropriate pain scale  - Administer analgesics based on type and severity of pain and evaluate response  - Implement non-pharmacological measures as appropriate and evaluate response  - Consider cultural and social influences on pain and pain management  - Notify physician/advanced practitioner if interventions unsuccessful or patient reports new pain  Outcome: Progressing     Problem: INFECTION - ADULT  Goal: Absence or prevention of progression during hospitalization  Description: INTERVENTIONS:  - Assess and monitor for signs and symptoms of infection  - Monitor lab/diagnostic results  - Monitor all insertion sites, i e  indwelling lines, tubes, and drains  - Monitor endotracheal if appropriate and nasal secretions for changes in amount and color  - [place] appropriate cooling/warming therapies per order  - Administer medications as ordered  - Instruct and encourage patient and family to use good hand hygiene technique  - Identify and instruct in appropriate isolation precautions for identified infection/condition  Outcome: Progressing     Problem: SAFETY ADULT  Goal: Patient will remain free of falls  Description: INTERVENTIONS:  - Educate patient/family on patient safety including physical limitations  - Instruct patient to call for assistance with activity   - Consult OT/PT to assist with strengthening/mobility   - Keep Call bell within reach  - Keep bed low and locked with side rails adjusted as appropriate  - Keep care items and personal belongings within reach  - Initiate and maintain comfort rounds  - Make Fall Risk Sign visible to staff  - Apply yellow socks and bracelet for high fall risk patients  - Consider moving patient to room near nurses station  Outcome: Progressing  Goal: Maintain or return to baseline ADL function  Description: INTERVENTIONS:  - Educate patient/family on patient safety including physical limitations  - Instruct patient to call for assistance with activity   - Consult OT/PT to assist with strengthening/mobility   - Keep Call bell within reach  - Keep bed low and locked with side rails adjusted as appropriate  - Keep care items and personal belongings within reach  - Initiate and maintain comfort rounds  - Make Fall Risk Sign visible to staff  - Apply yellow socks and bracelet for high fall risk patients  - Consider moving patient to room near nurses station  Outcome: Progressing  Goal: Maintains/Returns to pre admission functional level  Description: INTERVENTIONS:  - Perform BMAT or MOVE assessment daily    - Set and communicate daily mobility goal to care team and patient/family/caregiver  - Collaborate with rehabilitation services on mobility goals if consulted  - Perform Range of Motion 3 times a day  - Reposition patient every 2 hours    - Dangle patient 3 times a day  - Stand patient 3 times a day  - Ambulate patient 3 times a day  - Out of bed to chair 3 times a day   - Out of bed for meals 3 times a day  - Out of bed for toileting  - Record patient progress and toleration of activity level   Outcome: Progressing Problem: DISCHARGE PLANNING  Goal: Discharge to home or other facility with appropriate resources  Description: INTERVENTIONS:  - Identify barriers to discharge w/patient and caregiver  - Arrange for needed discharge resources and transportation as appropriate  - Identify discharge learning needs (meds, wound care, etc )  - Arrange for interpretive services to assist at discharge as needed  - Refer to Case Management Department for coordinating discharge planning if the patient needs post-hospital services based on physician/advanced practitioner order or complex needs related to functional status, cognitive ability, or social support system  Outcome: Progressing     Problem: Knowledge Deficit  Goal: Patient/family/caregiver demonstrates understanding of disease process, treatment plan, medications, and discharge instructions  Description: Complete learning assessment and assess knowledge base    Interventions:  - Provide teaching at level of understanding  - Provide teaching via preferred learning methods  Outcome: Progressing     Problem: RESPIRATORY - ADULT  Goal: Achieves optimal ventilation and oxygenation  Description: INTERVENTIONS:  - Assess for changes in respiratory status  - Assess for changes in mentation and behavior  - Position to facilitate oxygenation and minimize respiratory effort  - Oxygen administered by appropriate delivery if ordered  - Initiate smoking cessation education as indicated  - Encourage broncho-pulmonary hygiene including cough, deep breathe, Incentive Spirometry  - Assess the need for suctioning and aspirate as needed  - Assess and instruct to report SOB or any respiratory difficulty  - Respiratory Therapy support as indicated  Outcome: Progressing

## 2022-02-08 NOTE — ASSESSMENT & PLAN NOTE
· Patient has history of, with PCI in 2014     · No evidence of acute ischemia  · Continue ASA, BB and statin

## 2022-02-08 NOTE — DISCHARGE SUMMARY
[facility]  Discharge- [address] [place] 1965, 61 y o  male MRN: [id number]  Unit/Bed#: E4 -01 Encounter: [id number]  Primary Care Provider: NICOLE Haney   Date and time admitted to hospital: 1/28/2022  3:13 AM    * Acute respiratory failure with hypoxia [facility])  Assessment & Plan  · Patient was initially admitted 1/09-1/12 in respiratory distress and was intubated upon arrival to the ER  He was diagnosed with COVID a started on the treatment protocol  Patient unfortunately signed himself out against medical advice on 01/12  · Patient was readmitted 1/21-1/27 for dyspnea hypoxia secondary to acute/chronic systolic/diastolic congestive heart failure exacerbation    He was discharged on 2 L of oxygen  · Patient states the home oxygen was not functioning so he returned to the ER on 01/28  · Patient noted to be significantly hypoxic and requiring 15 L mid flow  · CTA study negative for PE, shows severe bilateral ground-glass opacities consistent with patient's known history of COVID  · Previously discharged on 2 L nasal cannula, has required up to 15L midflow  · There were concerns that patient had fibrotic changes secondary to COVID  · Patient was further diuresed, pulmonary and cardiology recommended increasing Lasix  · Discharge on Lasix 20 mg twice a day  · Home O2 eval shows that patient will need up to 6 L nasal cannula on exertion, on room air at rest  · Oxymizer also prescribed  · Patient requesting additional steroids, Medrol Dosepak prescribed    Acute on chronic combined systolic and diastolic congestive heart failure (HCC)  Assessment & Plan  Wt Readings from Last 3 Encounters:   02/08/22 53 6 kg (118 lb 2 7 oz)   01/27/22 57 kg (125 lb 10 6 oz)   01/12/22 56 6 kg (124 lb 12 5 oz)     · Elevated BNP >31,000, previous proBNP had been [id number]  · Pulmonary team noted patient has significant congestion  · Continue eplerenone 25mg once daily  · Continue beta-blocker  · Currently euvolemic, continue Lasix 20 mg b i d   · Continue lisinopril, will decrease dose to 10 mg due to hypotension    Hyponatremia  Assessment & Plan  He has history of chronic hyponatremia  Sodium levels have ranged from 127-130    Cocaine use  Assessment & Plan  · History of marijuana and cocaine abuse  · Positive drug screen on admission 1/9  · History of treatment noncompliance  · Recent drug screen negative    History of ventricular fibrillation  Assessment & Plan  · History of VFib/Vtac s/p ICD  Continues to have episodes of V-tach per outpatient cardiology note  · Maintained on amiodarone 100 mg daily and Toprol XL 75 mg q 12 hours    Biventricular ICD (implantable cardioverter-defibrillator) in place  Assessment & Plan  Patient has a history of ventricular fibrillation and ventricular tachycardia  Patient with history of cardiomyopathy  Dual chamber, biventricular Medtronic ICD in place  Continue beta-blocker, and amiodarone    S/P AVR (aortic valve replacement)  Assessment & Plan  · Patient has a history of Rheumatic heart disease s/p mechanical AVR in 1980's  Anticoagulated on warfarin  · Goal INR 2 5-3 5  · INR had been supratherapeutic  · Recommend Coumadin 2 5 mg once daily, to follow-up outpatient with Coumadin INR checks    Coronary artery disease involving native coronary artery of native heart without angina pectoris  Assessment & Plan  · Patient has history of, with PCI in 2014     · No evidence of acute ischemia  · Continue ASA, BB and statin          Discharging Physician / Practitioner: Juan Miguel Rey MD  [facility]  Admission Date:   Admission Orders (From admission, onward)     Ordered        01/28/22 0539  INPATIENT ADMISSION  Once                      Discharge Date: 02/08/22    Medical Problems             Resolved Problems  Date Reviewed: 2/8/2022    None                Consultations During [facility]:  · Pulmonology  · Cardiology    Procedures Performed:   · None    Significant Findings / Test Results:   CTA ED chest PE Study    Addendum Date: 1/28/2022    ADDENDUM: First line of impression should read: "No evidence of pulmonary embolus"    Result Date: 1/28/2022  · Impression: Evidence of pulmonary embolus  Extensive bilateral peripheral predominant groundglass airspace disease consistent with patient's known COVID pneumonia 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 measuring up to 41 mm  Recommendation is for follow-up low radiation dose chest CT in one year Workstation performed: [id number]   ·     Incidental Findings:   · None     Test Results Pending at Discharge (will require follow up): · None     Outpatient Tests Requested:  · None    Complications:  None    Reason for Admission:  Shortness of breath    Hospital Course:     Charlet Bence is a 61 y o  male patient who originally presented to the hospital on 1/28/2022 due to shortness of breath  Patient was previously discharge with 2 L of oxygen after home O2 eval   As patient was home, he reported increased shortness of breath, requiring 15 L mid flow with non-rebreather upon admission  CT chest was ordered to rule out PE which was negative  It did show significant COVID pneumonia which patient was recovering from previously  Pulmonary and Cardiology were consulted to evaluate patient  Recommended further diuresing patient  Patient was diuresed with IV Lasix and transition to oral Lasix  He was monitored over course of several days, while weaning his oxygen  At rest, patient was on several L of oxygen and maintain O2 sats greater than 95  While ambulating, patient significantly desatted into the low 80s requiring mid flow oxygen  Home O2 eval was completed by respiratory, shows that patient does not need oxygen at rest but does need up to 6 L on ambulation and exertion    Oxymizer was given upon discharge as patient may need further increased oxygen  Patient to follow with pulmonology outpatient on 02/15  Please see above list of diagnoses and related plan for additional information  Condition at Discharge: fair     Discharge Day Visit / Exam:     Subjective:  Doing well today, reports eager for discharge home  Ambulated with respiratory therapist, patient does meet criteria for increased oxygen on discharge  Oxygen tank arranged, awaiting delivery home prior to discharge  Vitals: Blood Pressure: 114/56 (02/08/22 0727)  Pulse: 66 (02/08/22 0727)  Temperature: 97 8 °F (36 6 °C) (02/08/22 0727)  Temp Source: Temporal (02/08/22 0727)  Respirations: 18 (02/08/22 0727)  Height: 5' 10" (177 8 cm) (01/28/22 0900)  Weight - Scale: 53 6 kg (118 lb 2 7 oz) (02/08/22 0600)  SpO2: 97 % (02/08/22 0727)  Exam:   Physical Exam  Vitals and nursing note reviewed  Constitutional:       Appearance: He is normal weight  HENT:      Head: Normocephalic and atraumatic  Eyes:      General: No scleral icterus  Conjunctiva/sclera: Conjunctivae normal    Cardiovascular:      Rate and Rhythm: Normal rate  Pulses: Normal pulses  Pulmonary:      Effort: Pulmonary effort is normal       Breath sounds: No wheezing or rhonchi  Abdominal:      General: Bowel sounds are normal  There is no distension  Palpations: Abdomen is soft  Musculoskeletal:         General: No swelling  Right lower leg: No edema  Left lower leg: No edema  Skin:     General: Skin is warm and dry  Neurological:      General: No focal deficit present  Mental Status: He is alert  Mental status is at baseline  Discussion with Family: patient    Discharge instructions/Information to patient and family:   See after visit summary for information provided to patient and family  Provisions for Follow-Up Care:  See after visit summary for information related to follow-up care and any pertinent home health orders        Disposition: Home with VNA Services (Reminder: Complete face to face encounter)    Planned Readmission: None     Discharge Statement:  I spent 35 minutes discharging the patient  This time was spent on the day of discharge  I had direct contact with the patient on the day of discharge  Greater than 50% of the total time was spent examining patient, answering all patient questions, arranging and discussing plan of care with patient as well as directly providing post-discharge instructions  Additional time then spent on discharge activities  Discharge Medications:  See after visit summary for reconciled discharge medications provided to patient and family        ** Please Note: This note has been constructed using a voice recognition system **

## 2022-02-08 NOTE — ASSESSMENT & PLAN NOTE
· Patient was initially admitted 1/09-1/12 in respiratory distress and was intubated upon arrival to the ER  He was diagnosed with COVID a started on the treatment protocol  Patient unfortunately signed himself out against medical advice on 01/12  · Patient was readmitted 1/21-1/27 for dyspnea hypoxia secondary to acute/chronic systolic/diastolic congestive heart failure exacerbation    He was discharged on 2 L of oxygen  · Patient states the home oxygen was not functioning so he returned to the ER on 01/28  · Patient noted to be significantly hypoxic and requiring 15 L mid flow  · CTA study negative for PE, shows severe bilateral ground-glass opacities consistent with patient's known history of COVID  · Previously discharged on 2 L nasal cannula, has required up to 15L midflow  · There were concerns that patient had fibrotic changes secondary to COVID  · Patient was further diuresed, pulmonary and cardiology recommended increasing Lasix  · Discharge on Lasix 20 mg twice a day  · Home O2 eval shows that patient will need up to 6 L nasal cannula on exertion, on room air at rest  · Oxymizer also prescribed  · Patient requesting additional steroids, Medrol Dosepak prescribed

## 2022-02-08 NOTE — CASE MANAGEMENT
Case Management Discharge Planning Note    Patient name Merrily Lefort  Location East 4 /E4 [place] 40-* MRN [id number]  : 1965 Date 2022       Current Admission Date: 2022  Current Admission Diagnosis:Acute respiratory failure with hypoxia [facility])   Patient Active Problem List    Diagnosis Date Noted    Acute respiratory failure with hypoxia ([facility] ) 2022    Medically noncompliant 2022    Acute on chronic combined systolic and diastolic congestive heart failure ([facility] ) 2022    Aortic ectasia, thoracic ([facility] ) 2022    Pneumonia 2022    Mild protein-calorie malnutrition ([facility] ) 2022    Hyponatremia 2022    Aortic stenosis 2022    Ischemic cardiomyopathy 2022    Drug abuse ([facility] ) 2022    Ventricular fibrillation ([facility] ) 2022    COVID-19 virus infection 2022    Impaired fasting glucose 09/10/2021    History of ventricular fibrillation 2021    Elevated troponin 2021    Cough 2021    Lactic acidosis 2021    SIRS (systemic inflammatory response syndrome) ([facility] ) 2021    Cocaine use 2021    History of ventricular tachycardia 2021    Depressed mood 2020    S/P AVR (aortic valve replacement) 2018    Ventricular fibrillation ([facility] ) 2018    Biventricular ICD (implantable cardioverter-defibrillator) in place 2018    Colonic mass 2016    Acute on chronic combined systolic and diastolic CHF (congestive heart failure) ([facility] ) 2016    Coronary artery disease involving native coronary artery of native heart without angina pectoris 2016      LOS (days): 11  Geometric Mean LOS (GMLOS) (days): 3 80  Days to GMLOS:-7 5     OBJECTIVE:  Risk of Unplanned Readmission Score: 37         Current admission status: Inpatient   Preferred Pharmacy:   [facility] [address] [facility] [address] - [place]ERYN PULIDO - [address] [facility] [address]  Phone: [phone] Fax: [phone] [address] [place]ie [place] - [address] ,  [address] ,  [address]  Phone: [phone] Fax: [phone]    [place] [address] [address] [facility], [address] [facility]   [address] [facility] [address]  Phone: [phone] Fax: [phone]    Primary Care Provider: NICOLE Foote    Primary Insurance: [facility]  Secondary Insurance: [address] [place] [address]    DISCHARGE DETAILS:    This CM confirmed that O2 tank delivered to patient room for D/C was full  Oxymizer provided to patient at this time as well   Will await O2 home delivery prior to D/C

## 2022-02-08 NOTE — ASSESSMENT & PLAN NOTE
· History of marijuana and cocaine abuse  · Positive drug screen on admission 1/9  · History of treatment noncompliance  · Recent drug screen negative

## 2022-02-08 NOTE — ASSESSMENT & PLAN NOTE
· Patient has a history of Rheumatic heart disease s/p mechanical AVR in 1980's   Anticoagulated on warfarin  · Goal INR 2 5-3 5  · INR had been supratherapeutic  · Recommend Coumadin 2 5 mg once daily, to follow-up outpatient with Coumadin INR checks

## 2022-02-08 NOTE — RESPIRATORY THERAPY NOTE
Home Oxygen Qualifying Test     Patient name: Dominique Araujo        : 1965   Date of Test:  2022  Diagnosis:    Home Oxygen Test:    **Medicare Guidelines require item(s) 1-5 on all ambulatory patients or 1 and 2 on non-ambulatory patients  1  Baseline SPO2 on Room Air at rest 94%   a  If <= 88% on Room Air add O2 via NC to obtain SpO2 >=88%  If LPM needed, document LPM  needed to reach =>88%    2  SPO2 during exertion on Room Air 83 %  a  During exertion monitor SPO2  If SPO2 increases >=89%, do not add supplemental oxygen    3  SPO2 on Oxygen at Rest  N/A     4  SPO2 during exertion on Oxygen 91 % at 6 LPM     5  Test performed during exertion activity  [x]  Supplemental Home Oxygen is indicated  []  Client does not qualify for home oxygen  Respiratory Additional Notes- Pt ambulated around room without difficulty for approximately 6 minutes       [address] [facility], [address]

## 2022-02-09 NOTE — PROGRESS NOTES
HRR     I called the patient using [employer] and explained my role  He asked that I call his daughter Aaron Dotson as she handles his health care  Call was placed to Aaron Dotson but her voicemail was full  I will continue further outreach  Chart reviewed

## 2022-02-14 NOTE — TELEPHONE ENCOUNTER
I spoke with patient, advised to take lasix 40 mg BID and try to cut back on salt  Patient agreeable  Patient will have INR drawn today

## 2022-02-14 NOTE — TELEPHONE ENCOUNTER
Spoke with YUNI Natarajan, advised increase in lasix  She states that she tried to draw patients blood, did not get enough, will to back tomorrow

## 2022-02-14 NOTE — TELEPHONE ENCOUNTER
Cathi Chen, [facility] called, states she is seeing patient, for the first time since discharge 2/8/22  Patient has +2 pitting edema, that started 2 days ago  Asking if patient needs to be seen  Patient on 6 L N/C for respiratory failure  Patient has been elevating feet  Upon questioning, patient has been eating canned soup  Patient does have follow up with pulmonary tomorrow (2/15/22)  Patient is on lasix 20 mg BID    Advised if patient worsens should proceed to ER  Please advise  I did question who is following his Coumadin? He was unsure  I did advised last INR was 2/7 prior to DC was 4 07 (rand 2 5-3 5) patient last tested via Coumadin clinic 9/2021, we did call patient with reminder 11/2021  Cathi Chen requesting order for INR to be faxed to [facility] at [phone] and she will have INR drawn tomorrow  Patient states he is taking 2 5 mg daily  Order faxed

## 2022-02-15 PROBLEM — J96.01 ACUTE RESPIRATORY FAILURE WITH HYPOXIA (HCC): Status: RESOLVED | Noted: 2022-01-01 | Resolved: 2022-01-01

## 2022-02-15 PROBLEM — J96.11 CHRONIC RESPIRATORY FAILURE WITH HYPOXIA (HCC): Status: ACTIVE | Noted: 2022-01-01

## 2022-02-15 PROBLEM — I49.01 VENTRICULAR FIBRILLATION (HCC): Status: RESOLVED | Noted: 2018-06-18 | Resolved: 2022-01-01

## 2022-02-15 PROBLEM — R05.9 COUGH: Status: RESOLVED | Noted: 2021-01-01 | Resolved: 2022-01-01

## 2022-02-15 NOTE — ASSESSMENT & PLAN NOTE
S/p biventricular ICD  Maintains on metoprolol and coumadin with management per cardiology outpatient

## 2022-02-15 NOTE — ASSESSMENT & PLAN NOTE
During hospitalization 1/21/22 diagnosed with possible multifocal pneumonia and elevated PCT-treated with a 7 day course of ABX    During subsequent hospitalization pneumonia was rule out and no additional ABX were indicated

## 2022-02-15 NOTE — PROGRESS NOTES
I called the patient's daughter, Gaby Palacio and explained my role  She states currently the patient would benefit from MOW as well as applying for [place]  She noted the patient has a nurse going to his home twice weekly through [address] [place] [address]  She denies the patient needing help with toileting, eating, bathing  Gaby Palacio states the patient had a Pulm appointment this morning and they are trying to wean him off oxygen; he is currently on 2L at rest     Gaby Palacio has my contact information if she finds she needs additional help in the future  I will remain on the case until the CHW has completed her tasks

## 2022-02-15 NOTE — ASSESSMENT & PLAN NOTE
Wt Readings from Last 3 Encounters:   02/15/22 60 8 kg (134 lb)   02/08/22 53 6 kg (118 lb 2 7 oz)   01/27/22 57 kg (125 lb 10 6 oz)     Most recent echo with EF 20% 1/10/    Noted weight gain today with recent 2+ pitting edema as reported by VNA  Increased lasix yesterday per cardiology to 40 mg BID  Edema noted today at 1+ pitting    I also reviewed the importance of a low sodium diet and his family that accompanied him to the visit today will reach out to schedule an appointment with the heart failure nurse as he does not appear to have a strong understanding on a low sodium diet    Continue daily weights and contact cardiology for any weight gain >2lbs in 24 hours no

## 2022-02-15 NOTE — ASSESSMENT & PLAN NOTE
Initial diagnosis 1/9-requiring intubation and ICU management  Also treated for bacterial pneumonia  He has been hospitalized 2 times since original diagnosis secondary to hypoxia and volume overload with grade 2 systolic and diastolic heart failure with severe pulmonary HTN  Due to his continued hypoxia there is concern for development of secondary fibrosis as well as bronchiectasis    Will plan on obtaining chest CT and PFTs in about 4 weeks  Trial Breo daily to determine effectiveness as her reports cough and significant dyspnea  Follow up after imaging and PFTs  Continue to increase activity as tolerated with use of 6 L NC  May benefit from pulmonary rehab in the future    Needs to follow closely with cardiology

## 2022-02-15 NOTE — PROGRESS NOTES
Pulmonary Follow Up Note   Catherine South 61 y o  male MRN: [id number]  2/15/2022      Assessment:    Chronic respiratory failure with hypoxia [facility])  Now requiring 6 L NC after most recent hospitalization end of January  Prior to severe covid infection 1/9/22 requiring intubation did not require supplemental O2  Initially sent home on 2 L NC, but after subsequent hospitalizations due to volume overload was most recently discharge on 6 L NC with exertion and 2 L with rest      At today's visit he did not require supplemental O2 at rest, but was unable to obtain accurate reading on pulse oximetry during ambulation likely due to poor circulation and atrial fibrillation  Would recommend continuing at current rate with 6 L with exertion and 2 L at rest    Ventricular fibrillation [facility])  S/p biventricular ICD  Maintains on metoprolol and coumadin with management per cardiology outpatient    Acute on chronic combined systolic and diastolic congestive heart failure ([facility] [address] )  Wt Readings from Last 3 Encounters:   02/15/22 60 8 kg (134 lb)   02/08/22 53 6 kg (118 lb 2 7 oz)   01/27/22 57 kg (125 lb 10 6 oz)     Most recent echo with EF 20% 1/10/    Noted weight gain today with recent 2+ pitting edema as reported by [facility]  Increased lasix yesterday per cardiology to 40 mg BID  Edema noted today at 1+ pitting  I also reviewed the importance of a low sodium diet and his family that accompanied him to the visit today will reach out to schedule an appointment with the heart failure nurse as he does not appear to have a strong understanding on a low sodium diet    Continue daily weights and contact cardiology for any weight gain >2lbs in 24 hours      Pneumonia  During hospitalization 1/21/22 diagnosed with possible multifocal pneumonia and elevated PCT-treated with a 7 day course of ABX    During subsequent hospitalization pneumonia was rule out and no additional ABX were indicated    Cocaine use  Reports complete cessation    COVID-19 virus infection  Initial diagnosis 1/9-requiring intubation and ICU management  Also treated for bacterial pneumonia  He has been hospitalized 2 times since original diagnosis secondary to hypoxia and volume overload with grade 2 systolic and diastolic heart failure with severe pulmonary HTN  Due to his continued hypoxia there is concern for development of secondary fibrosis as well as bronchiectasis    Will plan on obtaining chest CT and PFTs in about 4 weeks  Trial Breo daily to determine effectiveness as her reports cough and significant dyspnea  Follow up after imaging and PFTs  Continue to increase activity as tolerated with use of 6 L NC  May benefit from pulmonary rehab in the future  Needs to follow closely with cardiology       Plan:    Diagnoses and all orders for this visit:    COVID-19 virus infection  -     CT chest wo contrast; Future  -     Complete PFT with post bronchodilator; Future    Flu vaccine need  -     influenza vaccine, quadrivalent, recombinant, PF, 0 5 mL, for patients 18 yr+ (FLUBLOK)    Acute on chronic combined systolic and diastolic congestive heart failure (HCC)    Ventricular fibrillation (HCC)    Pneumonia of both upper lobes due to infectious organism    Chronic respiratory failure with hypoxia (HCC)    Cocaine use        Return in about 8 weeks (around 4/12/2022)  History of Present Illness   HPI:  Dorothy Rodriguez is a 61 y o  male who presented for routine hospital follow-up  Medical history significant for:  Alcohol abuse, cocaine abuse, chronic hypoxic respiratory failure, recent COVID-19 infection required intubation, combined systolic and diastolic heart failure with EF of 20%, AICD in place, atrial fibrillation, and CAD  Originally diagnosed with COVID on January 9th, patient was on vaccinated  During his hospitalization he was treated with a severe protocol and required intubation and ICU management    He was also treated for bacterial pneumonia  He was subsequently admitted 2 additional times due to hypoxia and volume overload secondary to severe heart failure  At the time of his most recent discharge she was placed on 6 L nasal cannula with exertion and 2 L with rest   At the time of today's visit he reports no symptom is dyspnea on exertion  He denies symptoms at rest   Has a cough but denies sputum production  Denies:  Chest pain complaint is reaching of fevers, chills, or hemoptysis  Concerning his heart failure he does follow with outpatient nurse recently noted to have increased edema and was placed on increased diuretics  He does appear to have poor insight concerning and diet management and heart failure  He will plan to follow closely with Cardiology, and heart failure nurse  He also obtain pulmonary function tests and CT scan and follow-up in our office in about 6-8 weeks  All questions and concerns addressed  Review of Systems   Constitutional: Positive for activity change and fatigue  HENT: Negative for congestion, postnasal drip and trouble swallowing  Eyes: Negative for discharge and itching  Respiratory: Positive for cough and shortness of breath  Negative for apnea, choking, chest tightness, wheezing and stridor  Cardiovascular: Positive for leg swelling  Negative for chest pain and palpitations  Gastrointestinal: Negative for abdominal distention and abdominal pain  Endocrine: Negative for cold intolerance and heat intolerance  Genitourinary: Negative for difficulty urinating  Musculoskeletal: Negative for arthralgias and back pain  Allergic/Immunologic: Negative for food allergies  Neurological: Negative for dizziness and headaches  Hematological: Negative for adenopathy  Psychiatric/Behavioral: Negative for agitation and behavioral problems  All other systems reviewed and are negative        Historical Information   Past Medical History:   Diagnosis Date    AICD (automatic cardioverter/defibrillator) present     medtronic     CAD (coronary artery disease)     Cancer [facility] [address] )     Cardiac disease     [facility]     mixed  predominently nonischemic    Colonic mass     Coronary artery disease     Hyperlipidemia     Hypertension     Irregular heart beat     Myocardial infarction ([facility] [address] )     2014 and 2015    Rectal bleeding     S/P AVR (aortic valve replacement)     mechanical    Wears glasses      Past Surgical History:   Procedure Laterality Date    AORTIC VALVE REPLACEMENT      APPENDECTOMY      CARDIAC DEFIBRILLATOR PLACEMENT      COLECTOMY MADELIN Right     Last Assessed: 6/7/2016    COLECTOMY LAPAROSCOPIC      COLON SURGERY Right     colectomy    CORONARY STENT PLACEMENT      INSERT / REPLACE / REMOVE PACEMAKER      MITRAL VALVE REPAIR      MITRAL VALVE REPAIR      MOUTH SURGERY      HI COLONOSCOPY FLX DX W/COLLJ [address] WHEN PFRMD N/A 1/15/2018    Procedure: COLONOSCOPY;  Surgeon: Jame Dawn MD;  Location: [facility];   Service: General    HI LAP,SURG,COLECTOMY, PARTIAL, W/ANAST N/A 6/2/2016    Procedure: RESECTION COLON RIGHT LAPAROSCOPIC HAND-ASSISTED;  Surgeon: Jame Dawn MD;  Location: [facility];  Service: General     Family History   Problem Relation Age of Onset    Diabetes Mother     Hypertension Mother     Valvular heart disease Mother     Valvular heart disease Father     Alcohol abuse Father        Social History     Tobacco Use   Smoking Status Never Smoker   Smokeless Tobacco Never Used         Meds/Allergies     Current Outpatient Medications:     amiodarone 100 mg tablet, Take 1 tablet (100 mg total) by mouth daily with breakfast, Disp: 30 tablet, Rfl: 0    aspirin 81 MG tablet, Take 81 mg by mouth daily  , Disp: , Rfl:     atorvastatin (LIPITOR) 80 mg tablet, take 1 tablet by mouth once daily, Disp: 30 tablet, Rfl: 6    eplerenone (INSPRA) 25 mg tablet, take 1 tablet by mouth once daily, Disp: 30 tablet, Rfl: 3   furosemide (LASIX) 20 mg tablet, Take 1 tablet (20 mg total) by mouth 2 (two) times a day, Disp: 60 tablet, Rfl: 0    lisinopril (ZESTRIL) 10 mg tablet, Take 1 tablet (10 mg total) by mouth daily at bedtime, Disp: 30 tablet, Rfl: 0    methylPREDNISolone 4 MG tablet therapy pack, Use as directed on package, Disp: 1 each, Rfl: 0    metoprolol succinate (TOPROL-XL) 25 mg 24 hr tablet, Take 3 tablets (75 mg total) by mouth every 12 (twelve) hours, Disp: 180 tablet, Rfl: 0    warfarin (COUMADIN) 2 5 mg tablet, Take 1 tablet (2 5 mg total) by mouth daily, Disp: 30 tablet, Rfl: 0  No Known Allergies    Vitals: Blood pressure 100/62, pulse 85, temperature (!) 97 2 °F (36 2 °C), resp  rate 18, height 5' 7" (1 702 m), weight 60 8 kg (134 lb), SpO2 92 %  Body mass index is 20 99 kg/m²  Oxygen Therapy  SpO2: 92 %  Oxygen Therapy: None (Room air)    Physical Exam  Physical Exam  Constitutional:       Appearance: Normal appearance  HENT:      Head: Normocephalic and atraumatic  Nose: Nose normal       Mouth/Throat:      Mouth: Mucous membranes are moist       Pharynx: Oropharynx is clear  Eyes:      Pupils: Pupils are equal, round, and reactive to light  Cardiovascular:      Rate and Rhythm: Normal rate and regular rhythm  Pulses: Normal pulses  Pulmonary:      Effort: Pulmonary effort is normal       Breath sounds: Normal breath sounds  Abdominal:      General: Abdomen is flat  Palpations: Abdomen is soft  Skin:     General: Skin is warm  Neurological:      General: No focal deficit present  Mental Status: He is alert and oriented to person, place, and time  Psychiatric:         Mood and Affect: Mood normal          Behavior: Behavior normal          Thought Content: Thought content normal          Judgment: Judgment normal          Labs: I have personally reviewed pertinent lab results    Lab Results   Component Value Date    WBC 13 84 (H) 02/05/2022    HGB 12 1 02/05/2022    HCT 36 2 (L) 02/05/2022    MCV 79 (L) 02/05/2022     02/05/2022     Lab Results   Component Value Date    GLUCOSE 93 12/23/2015    GLUCOSE 92 12/23/2015    CALCIUM 8 1 (L) 02/07/2022     12/23/2015     12/23/2015    K 4 2 02/07/2022    CO2 30 02/07/2022    CL 93 (L) 02/07/2022    BUN 15 02/07/2022    CREATININE 0 85 02/07/2022     No results found for: IGE  Lab Results   Component Value Date    ALT 98 (H) 02/04/2022     (H) 02/04/2022    ALKPHOS 137 (H) 02/04/2022    BILITOT 1 23 (H) 12/07/2015       Imaging and other studies: I have personally reviewed pertinent reports  and I have personally reviewed pertinent films in PACS CTA chest PE study 1/28/22      FINDINGS:     PULMONARY ARTERIAL TREE:  No pulmonary embolus is seen       LUNGS:  Extensive bilateral peripheral predominant groundglass airspace disease     There is no tracheal or endobronchial lesion      PLEURA:  Unremarkable      HEART/GREAT VESSELS:   Heavy atherosclerotic coronary artery calcification is noted  Heart is otherwise unremarkable  There is fusiform ectasia of the ascending thoracic aorta measuring up to 41 mm  Recommendation is for follow-up low radiation dose   chest CT in one year      MEDIASTINUM AND WANG:  Unremarkable      CHEST WALL AND LOWER NECK:   Unremarkable      VISUALIZED STRUCTURES IN THE UPPER ABDOMEN:  Unremarkable      OSSEOUS STRUCTURES:  No acute fracture or destructive osseous lesion        CXR 2/3/22      IMPRESSION:     No definite change in severe bilateral groundglass opacity, greater on the left, due to Covid-19      Other Studies: I have personally reviewed pertinent reports  Echo 1/10/22      Moderately dilated left ventricular cavity with markedly reduced left ventricular systolic function with global hypokinesis  Ejection fraction is estimated as around 20%  Indeterminate diastolic dysfunction grade  2  Normal right ventricular size and visually normal right ventricular systolic function    3  Moderate biatrial enlargement  4  Normal functioning mechanical aortic valve prosthesis with mild prosthetic valve regurgitation  5  Mitral annular calcification and leaflet sclerosis and thickening, mild mitral valve regurgitation  6  Trace to mild tricuspid and trace pulmonic valve regurgitation  7  No obvious pulmonary hypertension  8  No pericardial effusion

## 2022-02-15 NOTE — ASSESSMENT & PLAN NOTE
Now requiring 6 L NC after most recent hospitalization end of January  Prior to severe covid infection 1/9/22 requiring intubation did not require supplemental O2  Initially sent home on 2 L NC, but after subsequent hospitalizations due to volume overload was most recently discharge on 6 L NC with exertion and 2 L with rest      At today's visit he did not require supplemental O2 at rest, but was unable to obtain accurate reading on pulse oximetry during ambulation likely due to poor circulation and atrial fibrillation     Would recommend continuing at current rate with 6 L with exertion and 2 L at rest

## 2022-02-16 NOTE — PROGRESS NOTES
Spoke with Marquez Tinoco, advised INR remains high, will have patient take 1 25 mg Mon Wed and Fri, 2 5 mg other days, will recheck on 2/24/22  She will call if patient dose not have 2 5 mg tablets

## 2022-02-23 NOTE — PROGRESS NOTES
Spoke with patients daughter, Baltazar Emery, advised INR 2 5, will have patient take 1 25 mg Mon Fri, 2 5 mg other days    Will recheck in 2 weeks 3/8/22

## 2022-02-24 NOTE — PROGRESS NOTES
Outgoing Call:  2/24/2022    [facility] did call pt to discuss referral received for [facility] [id number] [facility]  CH[facility] assessment was completed and pt agreed to services  Pt plans on meeting with [facility] prior to his upcoming PCP appointment       Next outreach is scheduled for 3/4/22 at [address] [place] [address], at [place]

## 2022-02-25 NOTE — PROGRESS NOTES
[facility]  coding opportunities       Chart reviewed, no opportunity found: CHART REVIEWED, NO OPPORTUNITY FOUND                        Patients insurance company: "[employer]

## 2022-03-04 PROBLEM — U07.1 COVID-19 VIRUS INFECTION: Status: RESOLVED | Noted: 2022-01-01 | Resolved: 2022-01-01

## 2022-03-04 PROBLEM — Z86.79 HISTORY OF VENTRICULAR FIBRILLATION: Status: RESOLVED | Noted: 2021-01-01 | Resolved: 2022-01-01

## 2022-03-04 PROBLEM — Z09 ENCOUNTER FOR EXAMINATION FOLLOWING TREATMENT AT HOSPITAL: Status: ACTIVE | Noted: 2022-01-01

## 2022-03-04 PROBLEM — J18.9 PNEUMONIA: Status: RESOLVED | Noted: 2022-01-01 | Resolved: 2022-01-01

## 2022-03-04 PROBLEM — R77.8 ELEVATED TROPONIN: Status: RESOLVED | Noted: 2021-01-01 | Resolved: 2022-01-01

## 2022-03-04 PROBLEM — E87.2 LACTIC ACIDOSIS: Status: RESOLVED | Noted: 2021-01-01 | Resolved: 2022-01-01

## 2022-03-04 PROBLEM — Z86.79 HISTORY OF VENTRICULAR TACHYCARDIA: Status: RESOLVED | Noted: 2021-01-01 | Resolved: 2022-01-01

## 2022-03-04 PROBLEM — R73.01 IMPAIRED FASTING GLUCOSE: Status: RESOLVED | Noted: 2021-01-01 | Resolved: 2022-01-01

## 2022-03-04 NOTE — PROGRESS NOTES
Assessment/Plan:    Encounter for examination following treatment at hospital  Continue medication compliance with cardiac medications  Continue follow-up with pulmonology  Reviewed importance of making it to speciality appointments  Diagnoses and all orders for this visit:    Encounter for examination following treatment at hospital    Other dysphagia  -     Ambulatory Referral to Gastroenterology; Future    Mild protein-calorie malnutrition ([facility] [address] )  -     Ambulatory Referral to Nutrition Services; Future  -     HIV 1/2 ANTIGEN/ANTIBODY (4TH GENERATION) W REFLEX [facility]; Future    Encounter for immunization  -     PNEUMOCOCCAL POLYSACCHARIDE VACCINE 23-VALENT =>1YO SQ IM  -     TDAP VACCINE GREATER THAN OR EQUAL TO 6YO IM          Subjective:      Patient ID: Alberto Bojorquez is a 61 y o  male  Hospital Course:      Alberto Bojorquez is a 61 y o  male patient who originally presented to the hospital on 1/28/2022 due to shortness of breath  Patient was previously discharge with 2 L of oxygen after home O2 eval   As patient was home, he reported increased shortness of breath, requiring 15 L mid flow with non-rebreather upon admission  CT chest was ordered to rule out PE which was negative  It did show significant COVID pneumonia which patient was recovering from previously  Pulmonary and Cardiology were consulted to evaluate patient  Recommended further diuresing patient  Patient was diuresed with IV Lasix and transition to oral Lasix  He was monitored over course of several days, while weaning his oxygen  At rest, patient was on several L of oxygen and maintain O2 sats greater than 95  While ambulating, patient significantly desatted into the low 80s requiring mid flow oxygen  Home O2 eval was completed by respiratory, shows that patient does not need oxygen at rest but does need up to 6 L on ambulation and exertion    Oxymizer was given upon discharge as patient may need further increased oxygen  Patient to follow with pulmonology outpatient on 02/15  He is currently using 2L of oxygen and denies SOB at rest but does report SORENSEN at times  He saw pulmonology about 3 weeks prior and is scheduled for a Chest CT and PFTs  -He has an appt with cardiology in 3 weeks- reviewed importance of making it to this appt  He reports that he now has adequate transportation via his daughter  He also endorses dysphagia  The dysphagia occurs with solids  Symptoms have been present for approximately several weeks  The symptoms are stable  The dysphagia has been intermittent and has not been progressive  He complains of early satiety  He denies melena, hematochezia, hematemesis, and coffee ground emesis  This has been associated with no other symptoms  He denies abdominal bloating, belching, belching and eructation, bilious reflux, chest pain, choking on food, cough, deep pressure at base of neck, difficulty swallowing, heartburn, hematemesis, hoarseness, laryngitis, melena, midespigastric pain, nausea, need to clear throat frequently, nocturnal burning, odynophagia, regurgitation of undigested food, shortness of breath, symptoms primarily relate to meals, and lying down after meals, upper abdominal discomfort, waterbrash and wheezing  The following portions of the patient's history were reviewed and updated as appropriate: allergies, current medications, past family history, past medical history, past social history, past surgical history and problem list     Review of Systems   Constitutional: Positive for appetite change, fatigue and unexpected weight change  Negative for chills and fever  HENT: Positive for trouble swallowing  Negative for ear pain and sore throat  Eyes: Negative for pain and visual disturbance  Respiratory: Negative for cough and shortness of breath  Cardiovascular: Negative for chest pain and palpitations     Gastrointestinal: Negative for abdominal pain and vomiting  Genitourinary: Negative for dysuria and hematuria  Musculoskeletal: Negative for arthralgias and back pain  Skin: Negative for color change and rash  Neurological: Negative for seizures and syncope  All other systems reviewed and are negative  Objective:      /70 (BP Location: Left arm, Patient Position: Sitting, Cuff Size: Adult)   Pulse 63   Temp 97 6 °F (36 4 °C) (Temporal)   Resp 18   Ht 5' 7" (1 702 m)   Wt 60 8 kg (134 lb)   SpO2 99%   BMI 20 99 kg/m²          Physical Exam  Vitals and nursing note reviewed  Constitutional:       General: He is not in acute distress  Appearance: He is cachectic  He is not ill-appearing  HENT:      Head: Normocephalic and atraumatic  Right Ear: External ear normal  There is no impacted cerumen  Left Ear: External ear normal  There is no impacted cerumen  Nose: Nose normal  No congestion  Eyes:      Pupils: Pupils are equal, round, and reactive to light  Neck:      Thyroid: No thyroid mass, thyromegaly or thyroid tenderness  Cardiovascular:      Rate and Rhythm: Normal rate and regular rhythm  Pulses: Normal pulses  Heart sounds: Murmur heard  Pulmonary:      Effort: Pulmonary effort is normal       Breath sounds: Normal breath sounds  Abdominal:      General: Bowel sounds are normal       Palpations: Abdomen is soft  Tenderness: There is no abdominal tenderness  Musculoskeletal:         General: No tenderness  Normal range of motion  Cervical back: Full passive range of motion without pain, normal range of motion and neck supple  No tenderness  Skin:     General: Skin is warm and dry  Neurological:      General: No focal deficit present  Mental Status: He is alert and oriented to person, place, and time  Psychiatric:         Mood and Affect: Mood normal          Behavior: Behavior normal          Thought Content:  Thought content normal          Judgment: Judgment normal

## 2022-03-04 NOTE — PROGRESS NOTES
Per note from [place], [facility], she has completed her tasks  Case is being closed

## 2022-03-04 NOTE — PROGRESS NOTES
In Person:  3/4/2022    [facility] met with pt prior to his PCP appointment while he waited for his visit to begin  Pt had not called [facility] and was not intending on meeting with her per his daughter's request  Pt stated that his daughter does not feel he is in need of services and also stated that [facility] does [facility] however has not used it in some time due to excessive wait time for pickup  [facility] asked pt if he wanted to close out referral and he stated that he did not need any further services  This referral will be closed today  No further outreach is scheduled  Note:  Pt also not interested in MOW

## 2022-03-10 NOTE — PROGRESS NOTES
Spoke with Lindsey Dotson, daughter, advised INR slightly low, will have patient take 3 75 mg tonight, then 1 25 mg Mon only and 2 5 mg other days   Will recheck in 2 weeks 3/23/22

## 2022-03-15 NOTE — ED ATTENDING ATTESTATION
3/15/2022  I, Randy Armstrong MD, saw and evaluated the patient  I have discussed the patient with the resident/non-physician practitioner and agree with the resident's/non-physician practitioner's findings, Plan of Care, and MDM as documented in the resident's/non-physician practitioner's note, except where noted  All available labs and Radiology studies were reviewed  I was present for key portions of any procedure(s) performed by the resident/non-physician practitioner and I was immediately available to provide assistance  At this point I agree with the current assessment done in the Emergency Department  I have conducted an independent evaluation of this patient a history and physical is as follows:    ED Course         Critical Care Time  Procedures  Pt  Was shocked twice by AICD 7 days ago and he didn't come in to the ER or see a dr  At that time  +SOB at exertion and at rest, worsening  R lower chest pain, nonradiating  Pt  Is noncompliant with meds  H/o CHF, always on 4L of O2    Pt  Has been feeling weaker than usual lately  No abd  Pain, no n/v/d  Pt  Has increased work of breathing, tachycardic, CTA b/l, abd  Nontender, ext   1+ edema

## 2022-03-15 NOTE — ED PROVIDER NOTES
History  Chief Complaint   Patient presents with    Shortness of Breath     pt reports sob starting after "shocked last tuesday " Pt has defibrillator, but did not come into hospital after this incident  Pt on 3L O3      58-year-old man with relevant past medical history of CHF with last EF of 20%, hypertension, hyperlipidemia, and prior episodes of VFib with AICD presents with worsening dyspnea  He reportedly was shocked twice last Tuesday around 1400 when he was napping  He did not follow up with his cardiologist at that time  He has been reporting worsening dyspnea at rest and on exertion since then  He is chronically on 4 L of oxygen at baseline and has not needed to increase his oxygen  He reports on and off chest pain under his right breast that does not radiate and is not associated with vomiting or diaphoresis  His brother came to visit him today and was concerned with his shortness of breath, so he brought him to the hospital   Otherwise, he denies fever, cough, abdominal pain, dysuria, or diarrhea  He reports compliance with his home medications  Prior to Admission Medications   Prescriptions Last Dose Informant Patient Reported?  Taking?   amiodarone 100 mg tablet  Family Member No No   Sig: Take 1 tablet (100 mg total) by mouth daily with breakfast   aspirin 81 MG tablet  Family Member Yes No   Sig: Take 81 mg by mouth daily     atorvastatin (LIPITOR) 80 mg tablet   No No   Sig: take 1 tablet by mouth once daily   eplerenone (INSPRA) 25 mg tablet  Family Member No No   Sig: take 1 tablet by mouth once daily   furosemide (LASIX) 20 mg tablet   No No   Sig: Take 1 tablet (20 mg total) by mouth 2 (two) times a day   lisinopril (ZESTRIL) 10 mg tablet   No No   Sig: Take 1 tablet (10 mg total) by mouth daily at bedtime   metoprolol succinate (TOPROL-XL) 25 mg 24 hr tablet   No No   Sig: Take 3 tablets (75 mg total) by mouth every 12 (twelve) hours   warfarin (COUMADIN) 2 5 mg tablet  Family Member No No   Sig: Take 1 tablet (2 5 mg total) by mouth daily      Facility-Administered Medications: None       Past Medical History:   Diagnosis Date    AICD (automatic cardioverter/defibrillator) present     medtronic     CAD (coronary artery disease)     Cancer ([facility] )     Cardiac disease     Cardiomyopathy ([facility] )     mixed  predominently nonischemic    Colonic mass     Coronary artery disease     History of ventricular fibrillation 9/2/2021    History of ventricular tachycardia 5/25/2021    Hyperlipidemia     Hypertension     Irregular heart beat     Myocardial infarction ([facility] )     2014 and 2015    Pneumonia 1/26/2022    Rectal bleeding     S/P AVR (aortic valve replacement)     mechanical    Wears glasses        Past Surgical History:   Procedure Laterality Date    AORTIC VALVE REPLACEMENT      APPENDECTOMY      CARDIAC DEFIBRILLATOR PLACEMENT      COLECTOMY MADELIN Right     Last Assessed: 6/7/2016    COLECTOMY LAPAROSCOPIC      COLON SURGERY Right     colectomy    CORONARY STENT PLACEMENT      INSERT / [employer] Drivers / REMOVE PACEMAKER      MITRAL VALVE REPAIR      MITRAL VALVE REPAIR      MOUTH SURGERY      MO COLONOSCOPY FLX DX W/COLLJ [facility] WHEN PFRMD N/A 1/15/2018    Procedure: COLONOSCOPY;  Surgeon: Florentino Leiva MD;  Location: [facility]; Service: General    MO LAP,SURG,COLECTOMY, PARTIAL, W/ANAST N/A 6/2/2016    Procedure: RESECTION COLON RIGHT LAPAROSCOPIC HAND-ASSISTED;  Surgeon: Florentino Leiva MD;  Location: [facility];  Service: General       Family History   Problem Relation Age of Onset    Diabetes Mother     Hypertension Mother     Valvular heart disease Mother     Valvular heart disease Father     Alcohol abuse Father      I have reviewed and agree with the history as documented      E-Cigarette/Vaping    E-Cigarette Use Never User      E-Cigarette/Vaping Substances    Nicotine No     THC No     CBD No     Flavoring No      Social History     Tobacco Use    Smoking status: Never Smoker    Smokeless tobacco: Never Used   Vaping Use    Vaping Use: Never used   Substance Use Topics    Alcohol use: Not Currently    Drug use: Not Currently     Types: Marijuana        Review of Systems   Constitutional: Positive for fatigue  Negative for chills and fever  HENT: Negative for ear pain and sore throat  Eyes: Negative for pain and visual disturbance  Respiratory: Positive for shortness of breath  Negative for cough  Cardiovascular: Negative for chest pain and palpitations  Gastrointestinal: Negative for abdominal pain, constipation, diarrhea and vomiting  Genitourinary: Negative for dysuria and hematuria  Musculoskeletal: Negative for arthralgias and back pain  Skin: Negative for color change and rash  Neurological: Negative for seizures, syncope and light-headedness  Psychiatric/Behavioral: Negative for agitation and confusion  Physical Exam  ED Triage Vitals   Temperature Pulse Respirations Blood Pressure SpO2   03/15/22 1848 03/15/22 1840 03/15/22 1840 03/15/22 1840 03/15/22 1840   97 5 °F (36 4 °C) 103 (!) 26 121/81 91 %      Temp Source Heart Rate Source Patient Position - Orthostatic VS BP Location FiO2 (%)   03/15/22 1840 03/15/22 1840 03/15/22 1840 03/15/22 1840 --   Oral Monitor Sitting Right arm       Pain Score       03/15/22 1953       No Pain             Orthostatic Vital Signs  Vitals:    03/15/22 2121 03/15/22 2155 03/15/22 2301 03/15/22 2303   BP: 144/79 153/87  133/81   Pulse: 99 100 97    Patient Position - Orthostatic VS: Lying Lying  Lying       Physical Exam  Vitals and nursing note reviewed  Constitutional:       General: He is not in acute distress  Appearance: Normal appearance  He is well-developed  HENT:      Head: Normocephalic and atraumatic  Right Ear: External ear normal       Left Ear: External ear normal    Cardiovascular:      Rate and Rhythm: Normal rate and regular rhythm     Pulmonary:      Effort: Pulmonary effort is normal  No respiratory distress  Breath sounds: Normal breath sounds  Abdominal:      Palpations: Abdomen is soft  Tenderness: There is no abdominal tenderness  There is no guarding or rebound  Musculoskeletal:         General: No swelling or tenderness  Normal range of motion  Cervical back: Normal range of motion and neck supple  Right lower le+ Pitting Edema present  Left lower le+ Pitting Edema present  Skin:     General: Skin is warm and dry  Neurological:      General: No focal deficit present  Mental Status: He is alert and oriented to person, place, and time  Sensory: No sensory deficit  Psychiatric:         Mood and Affect: Mood normal          Behavior: Behavior normal          ED Medications  Medications   furosemide (LASIX) injection 80 mg (80 mg Intravenous Given 3/15/22 2156)       Diagnostic Studies  Results Reviewed     Procedure Component Value Units Date/Time    COVID/FLU/RSV [[id number]]  (Normal) Collected: 03/15/22 2203    Lab Status: Final result Specimen: Nares from Nasopharyngeal Swab Updated: 03/15/22 2244     SARS-CoV-2 Negative     INFLUENZA A PCR Negative     INFLUENZA B PCR Negative     RSV PCR Negative    Narrative:      FOR PEDIATRIC PATIENTS - copy/paste COVID Guidelines URL to browser: [email or web address]    SARS-CoV-2 assay is a Nucleic Acid Amplification assay intended for the  qualitative detection of nucleic acid from SARS-CoV-2 in nasopharyngeal  swabs  Results are for the presumptive identification of SARS-CoV-2 RNA  Positive results are indicative of infection with SARS-CoV-2, the virus  causing COVID-19, but do not rule out bacterial infection or co-infection  with other viruses  Laboratories within the [place] and its  territories are required to report all positive results to the appropriate  public health authorities   Negative results do not preclude SARS-CoV-2  infection and should not be used as the sole basis for treatment or other  patient management decisions  Negative results must be combined with  clinical observations, patient history, and epidemiological information  This test has not been FDA cleared or approved  This test has been authorized by FDA under an Emergency Use Authorization  (EUA)  This test is only authorized for the duration of time the  declaration that circumstances exist justifying the authorization of the  emergency use of an in vitro diagnostic tests for detection of SARS-CoV-2  virus and/or diagnosis of COVID-19 infection under section 564(b)(1) of  the Act, 21 U  S C  914KXF-0(J)(4), unless the authorization is terminated  or revoked sooner  The test has been validated but independent review by FDA  and CLIA is pending  Test performed using [employer] GeneXpert: This RT-PCR assay targets N2,  a region unique to SARS-CoV-2  A conserved region in the E-gene was chosen  for pan-Sarbecovirus detection which includes SARS-CoV-2      HS Troponin I 2hr [[id number]]  (Abnormal) Collected: 03/15/22 2154    Lab Status: Final result Specimen: Blood from Arm, Left Updated: 03/15/22 2228     hs TnI 2hr 57 ng/L      Delta 2hr hsTnI 3 ng/L     HS Troponin I 4hr [[id number]]     Lab Status: No result Specimen: Blood     Basic metabolic panel [[id number]]  (Abnormal) Collected: 03/15/22 2007    Lab Status: Final result Specimen: Blood from Arm, Left Updated: 03/15/22 2044     Sodium 135 mmol/L      Potassium 4 4 mmol/L      Chloride 96 mmol/L      CO2 32 mmol/L      ANION GAP 7 mmol/L      BUN 9 mg/dL      Creatinine 1 07 mg/dL      Glucose 125 mg/dL      Calcium 8 2 mg/dL      eGFR 76 ml/min/1 73sq m     Narrative:      [place] guidelines for Chronic Kidney Disease (CKD):     Stage 1 with normal or high GFR (GFR > 90 mL/min/1 73 square meters)    Stage 2 Mild CKD (GFR = 60-89 mL/min/1 73 square meters)   Stage 3A Moderate CKD (GFR = 45-59 mL/min/1 73 square meters)    Stage 3B Moderate CKD (GFR = 30-44 mL/min/1 73 square meters)    Stage 4 Severe CKD (GFR = 15-29 mL/min/1 73 square meters)    Stage 5 End Stage CKD (GFR <15 mL/min/1 73 square meters)  Note: GFR calculation is accurate only with a steady state creatinine    NT-BNP PRO [[id number]]  (Abnormal) Collected: 03/15/22 2007    Lab Status: Final result Specimen: Blood from Arm, Left Updated: 03/15/22 2044     NT-proBNP 12,025 pg/mL     HS Troponin 0hr (reflex protocol) [[id number]]  (Abnormal) Collected: 03/15/22 2007    Lab Status: Final result Specimen: Blood from Arm, Left Updated: 03/15/22 2042     hs TnI 0hr 54 ng/L     CBC and differential [[id number]]  (Abnormal) Collected: 03/15/22 2007    Lab Status: Final result Specimen: Blood from Arm, Left Updated: 03/15/22 2013     WBC 10 88 Thousand/uL      RBC 4 08 Million/uL      Hemoglobin 10 7 g/dL      Hematocrit 33 1 %      MCV 81 fL      MCH 26 2 pg      MCHC 32 3 g/dL      RDW 18 9 %      MPV 8 9 fL      Platelets 540 Thousands/uL      nRBC 0 /100 WBCs      Neutrophils Relative 68 %      Immat GRANS % 2 %      Lymphocytes Relative 16 %      Monocytes Relative 12 %      Eosinophils Relative 1 %      Basophils Relative 1 %      Neutrophils Absolute 7 39 Thousands/µL      Immature Grans Absolute 0 18 Thousand/uL      Lymphocytes Absolute 1 78 Thousands/µL      Monocytes Absolute 1 29 Thousand/µL      Eosinophils Absolute 0 14 Thousand/µL      Basophils Absolute 0 10 Thousands/µL                  XR chest 2 views    (Results Pending)         Procedures  Procedures      ED Course  ED Course as of 03/15/22 2316   Tue Mar 15, 2022   2119 Procedure Note: ECG  Date/Time: 03/15/22 9:19 PM   Interpreted by: Donte Otoole  Indications / Diagnosis: dyspnea  ECG reviewed by me, the ED Provider: yes   The EKG demonstrates:  Rhythm: paced  Intervals: paced  Axis: left axis  QRS/Blocks: prolonged QRS, RBB  ST Changes: No acute ST Changes, no STD/ELMER  T Waves: No inversions     2121 Reviewed medtronic report  Patient has had three episodes of v fib that were shocked  MDM  Number of Diagnoses or Management Options  Acute exacerbation of CHF (congestive heart failure) ([facility] )  AICD discharge  Chest pain  Diagnosis management comments: Presenting with worsening dyspnea after AICD discharge one week ago  As per device, he was in v fib and defibrillated  He does endorse on and off CP but none currently  Chest radiograph showing significant bilateral infiltrates like secondary to CHF exacerbation  80 mg furosemide was ordered  Mild trop elevation, but he has a history of trop elevation  Will admit to [facility] for CHF and cards eval  Patient in agreement with plan and questions were answered  Portions or all of this note were generated using voice recognition software  Occasional wrong word or "sound a like" substitutions may have occurred due to the inherent limitations of voice recognition software  Please interpret any errors within the intended context of the whole sentence or idea  Disposition  Final diagnoses:   Acute exacerbation of CHF (congestive heart failure) ([facility] )   Chest pain   AICD discharge     Time reflects when diagnosis was documented in both MDM as applicable and the Disposition within this note     Time User Action Codes Description Comment    3/15/2022  9:27 PM Jeanie Mejía Add [I50 9] Acute exacerbation of CHF (congestive heart failure) ([facility] )     3/15/2022  9:27 PM Ramy Jody [R07 9] Chest pain     3/15/2022  9:27 PM Jeanie Mejía Add [X49 27] AICD discharge       ED Disposition     ED Disposition Condition Date/Time Comment    Admit Stable Tue Mar 15, 2022  9:27 PM Case was discussed with BRICE and the patient's admission status was agreed to be Admission Status: inpatient status to the service of Dr Elieser Piedra          Follow-up Information    None         Patient's Medications Discharge Prescriptions    No medications on file     No discharge procedures on file  PDMP Review       Value Time User    PDMP Reviewed  Yes 1/29/2022 11:51 AM Kellee Guerrero MD           ED Provider  Attending physically available and evaluated Tomy Mina  SAMANTHA managed the patient along with the ED Attending      Electronically Signed by         Ronnie Wolfe MD  03/15/22 9040

## 2022-03-16 PROBLEM — R23.4 ESCHAR OF TOE: Status: ACTIVE | Noted: 2022-01-01

## 2022-03-16 PROBLEM — F19.11 HISTORY OF SUBSTANCE ABUSE (HCC): Status: ACTIVE | Noted: 2022-01-01

## 2022-03-16 PROBLEM — I47.2 VENTRICULAR TACHYCARDIA (HCC): Status: ACTIVE | Noted: 2022-01-01

## 2022-03-16 NOTE — UTILIZATION REVIEW
Initial Clinical Review    Admission: Date/Time/Statement:   Admission Orders (From admission, onward)     Ordered        03/15/22 2127  Inpatient Admission  Once                      Orders Placed This Encounter   Procedures    Inpatient Admission     Standing Status:   Standing     Number of Occurrences:   1     Order Specific Question:   Level of Care     Answer:   Med Surg [16]     Order Specific Question:   Estimated length of stay     Answer:   More than 2 Midnights     Order Specific Question:   Certification     Answer:   I certify that inpatient services are medically necessary for this patient for a duration of greater than two midnights  See H&P and MD Progress Notes for additional information about the patient's course of treatment  ED Arrival Information     Expected Arrival Acuity    - 3/15/2022 18:33 Emergent         Means of arrival Escorted by Service Admission type    [facility] Emergency         Arrival complaint    Shortness of breath        Chief Complaint   Patient presents with    Shortness of Breath     pt reports sob starting after "shocked last tuesday " Pt has defibrillator, but did not come into hospital after this incident  Pt on 3L O2  Initial Presentation:   Mr Amanda Jang is a 61 yom who presents to the ED from home with c/o progressively worsening SOB, orthopnea, PND, with reported med compliance  PMH: CHF s/p AICD, ischemic cardiomyopathy, AS s/p AVR on warfarin, chronic hypoxia on 4 L NC, CAD  In the ED he had mild troponin spill, elevated proBNP  ECG showed V paced rhythm, RBBB, nonischemic changes  Imaging Pulmonary congestion and GG opacities  He was treated with IV Lasix 80 mg x 2 in ED  On exam he is tachycardic, rales present, BLE edema  AICD was interrogated and he had 3 shocks last of which was 3/8/22  He notes he was unconscious for 8 min post shock    He is admitted to INPATIENT status with Acute on chronic combined CHF - IV Lasix 40 mg BID, Tele, daily wt, cardio consult  Chronic hypoxic resp failure - on home dose oxygen at 4L NC  Date: 3/16   Day 2:   Wound care consult for L great toe eval   WBC to 16  VT this AM, Amiodarone increased to 200mg daily  Concern for ICD lead that may be malfunctioning  Pt may transfer to [facility] for EPS consult  Continues on BB and IV Lasix diuresis  Pt feels improved  Tolerating PO  Agreeable to transfer to [facility]       3/16 Podiatry Consult - L juarez eschar, no intervention needed  LEADS for assessment, off load and elevate  WBAT       3/16 Cardio Consult - VF and VT with AICD - fired x 3 in last several months, A lead is undersensing, not appropriately BiV pacing, battery life 8 months - increase Amiodarone from 100 to 200 mg daily and Toprol XL 75 mg BID  Acute on chronic comb CHF - continue IV Lasix BID  Ischemic CM and CAD - mild troponin elevation likely d/t demand ischemia when AICD fired  Will hold Lisinopril d/t hypotension  Monitor volume status, renal function, electrolytes, check iron panel, will work with EPS team to address AICD malfunction        ED Triage Vitals   Temperature Pulse Respirations Blood Pressure SpO2   03/15/22 1848 03/15/22 1840 03/15/22 1840 03/15/22 1840 03/15/22 1840   97 5 °F (36 4 °C) 103 (!) 26 121/81 91 %      Temp Source Heart Rate Source Patient Position - Orthostatic VS BP Location FiO2 (%)   03/15/22 1840 03/15/22 1840 03/15/22 1840 03/15/22 1840 --   Oral Monitor Sitting Right arm       Pain Score       03/15/22 1953       No Pain          Wt Readings from Last 1 Encounters:   03/16/22 60 9 kg (134 lb 4 2 oz)     Additional Vital Signs:   03/16/22 0750 -- -- -- -- -- -- 36 4 L/min Nasal cannula --   03/16/22 07:24:46 97 9 °F (36 6 °C) 84 -- 100/45 Abnormal  63 97 % -- -- -- --   03/16/22 01:06:38 -- 65 -- 108/67 81 92 % -- -- -- --   03/15/22 23:34:59 97 5 °F (36 4 °C) 125 Abnormal  22 157/96 116 92 % 36 4 L/min Nasal cannula Lying   03/15/22 6281 -- -- -- 133/81 -- -- -- -- -- Lying   03/15/22 2301 -- 97 25 Abnormal  -- -- 93 % -- -- Nasal cannula --   03/15/22 2155 -- 100 26 Abnormal  153/87 -- 95 % 36 4 L/min Nasal cannula Lying   03/15/22 2121 -- 99 24 Abnormal  144/79 -- 95 % 36 4 L/min Nasal cannula Lying   03/15/22 1953 -- 100 26 Abnormal  136/82 101 92 % 36 4 L/min Nasal cannula Lying     Pertinent Labs/Diagnostic Test Results:   XR chest 2 views   Final Result by Raven Leavitt MD (03/16 2620)      Enlargement of cardiac silhouette with bilateral pulmonary airspace disease and pleural effusions  Pattern most suggestive for CHF/pulmonary edema superimposed upon more chronic pulmonary changes       Results from last 7 days   Lab Units 03/15/22  2203   SARS-COV-2  Negative     Results from last 7 days   Lab Units 03/16/22 0434 03/15/22  2007   WBC Thousand/uL 13 59* 10 88*   HEMOGLOBIN g/dL 9 1* 10 7*   HEMATOCRIT % 28 1* 33 1*   PLATELETS Thousands/uL 316 348   NEUTROS ABS Thousands/µL 10 31* 7 39         Results from last 7 days   Lab Units 03/16/22  0434 03/15/22  2007   SODIUM mmol/L 137 135*   POTASSIUM mmol/L 4 2 4 4   CHLORIDE mmol/L 97* 96*   CO2 mmol/L 30 32   ANION GAP mmol/L 10 7   BUN mg/dL 13 9   CREATININE mg/dL 1 07 1 07   EGFR ml/min/1 73sq m 76 76   CALCIUM mg/dL 8 2* 8 2*             Results from last 7 days   Lab Units 03/16/22  0434 03/15/22  2007   GLUCOSE RANDOM mg/dL 102 125     Results from last 7 days   Lab Units 03/16/22  0014 03/15/22  2154 03/15/22  2007   HS TNI 0HR ng/L  --   --  54*   HS TNI 2HR ng/L  --  57*  --    HSTNI D2 ng/L  --  3  --    HS TNI 4HR ng/L 56*  --   --    HSTNI D4 ng/L 2  --   --          Results from last 7 days   Lab Units 03/16/22 0434   PROTIME seconds 21 6*   INR  1 96*     Results from last 7 days   Lab Units 03/15/22  2007   NT-PRO BNP pg/mL 12,025*     Results from last 7 days   Lab Units 03/15/22  2203   INFLUENZA A PCR  Negative   INFLUENZA B PCR  Negative   RSV PCR  Negative     ED Treatment: Medication Administration from 03/15/2022 1833 to 03/15/2022 2330    Date/Time Order Dose Route Action   03/15/2022 5806 furosemide (LASIX) injection 80 mg 80 mg Intravenous Given        Past Medical History:   Diagnosis Date    AICD (automatic cardioverter/defibrillator) present     medtronic     CAD (coronary artery disease)     [facility]     Cardiac disease     Cardiomyopathy ([facility] )     mixed    predominently nonischemic    Colonic mass     Coronary artery disease     History of ventricular fibrillation 9/2/2021    History of ventricular tachycardia 5/25/2021    Hyperlipidemia     Hypertension     Irregular heart beat     Myocardial infarction [facility])     2014 and 2015    Pneumonia 1/26/2022    Rectal bleeding     S/P AVR (aortic valve replacement)     mechanical    Wears glasses      Present on Admission:   Acute on chronic combined systolic and diastolic congestive heart failure (HCC)   Aortic stenosis   Chronic respiratory failure with hypoxia (HCC)   Coronary artery disease involving native coronary artery of native heart without angina pectoris      Admitting Diagnosis: Shortness of breath [R06 02]  Chest pain [R07 9]  Acute exacerbation of CHF (congestive heart failure) (HCC) [I50 9]  AICD discharge [Z45 02]  Age/Sex: 61 y o  male  Admission Orders:  Scheduled Medications:  [START ON 3/17/2022] amiodarone, 200 mg, Oral, Daily With Breakfast  aspirin, 81 mg, Oral, Daily  atorvastatin, 80 mg, Oral, Daily  furosemide, 40 mg, Intravenous, BID  metoprolol succinate, 75 mg, Oral, Q12H AISHWARYA  warfarin, 2 5 mg, Oral, Daily (warfarin)      Continuous IV Infusions:     PRN Meds:  acetaminophen, 650 mg, Oral, Q4H PRN -x 1 3/16  aluminum-magnesium hydroxide-simethicone, 30 mL, Oral, Q6H PRN  ondansetron, 4 mg, Intravenous, Q6H PRN    Tele   EPS consult re; AICD  Wound care consult for L great toe  Fluid restriction  IP CONSULT TO CARDIOLOGY  IP CONSULT TO NUTRITION SERVICES  IP CONSULT TO PODIATRY    Network Utilization Review Department  ATTENTION: Please call with any questions or concerns to [phone] and carefully listen to the prompts so that you are directed to the right person  All voicemails are confidential   Maria Alejandra Bejarano all requests for admission clinical reviews, approved or denied determinations and any other requests to dedicated fax number below belonging to the campus where the patient is receiving treatment   List of dedicated fax numbers for the Facilities:  [address] DENIALS (Administrative/Medical Necessity) [phone]   [address] (Maternity/NICU/Pediatrics) [phone]   [address] [place] [address]  [address] [place] [address] [address] [place] [address] [address] [facility] [place] [address] [phone]

## 2022-03-16 NOTE — H&P
[address] 1965, 61 y o  male MRN: [id number]  Unit/Bed#: ED 09 Encounter: [id number]  Primary Care Provider: NICOLE Scott   Date and time admitted to hospital: 3/15/2022  6:41 PM    * Acute on chronic combined systolic and diastolic congestive heart failure [facility])  Assessment & Plan  Wt Readings from Last 3 Encounters:   03/15/22 60 9 kg (134 lb 4 2 oz)   03/04/22 60 8 kg (134 lb)   02/15/22 60 8 kg (134 lb)     Patient presents with shortness of breath  Reports being shocked by his ICDx2 one week ago (03/08/2022), consequently was unconscious for 8 minutes, did not follow-up with cardiologist   Reports compliance with diuretics, Lopressor and amiodarone  Discharged home on 6 L back in January, states he was weaned down to 4 L  · Interrogation revealed 3 episodes of VFib receiving shock, several episodes of V-tach which converted spontaneously  · Endorses dyspnea at rest, orthopnea, PND  · Vital signs stable, patient is not requiring additional O2  Currently satting 96% on 4 L  · High-sens Troponin 54  · ProBNP 12,000  · ECG shows ventricularly paced rhythm, RBBB, nonischemic  · CXR shows bilateral ground-glass opacities ( likely COVID fibrosis), as well as increased pulmonary congestion  · Received 80 mg IV Lasix in the ED    Plan:  - continue with IV Lasix 40 mg BID  - monitor on telemetry  - daily weights  -strict I&Os  - cardiology consult  Appreciate recs  Biventricular ICD (implantable cardioverter-defibrillator) in place  Assessment & Plan  Interrogation reveals 3 shocks for VFib  Patient reports being shocked last Tuesday (03/08/2022), followed by 8 minutes of unconsciousness  Did not go to the ED or cardiologist following shocks  On amiodarone and Lopressor, continue  Cardiology consult    Appreciate recs      Chronic respiratory failure with hypoxia (HCC)  Assessment & Plan  Chronically on 4 L nasal cannula    Aortic stenosis  Assessment & Plan  S/p AVR  On warfarin, goal INR 2 5 -3 5  Will check PT INR    Coronary artery disease involving native coronary artery of native heart without angina pectoris  Assessment & Plan  Continue aspirin and statin      VTE Prophylaxis: Warfarin (Coumadin)  / sequential compression device   Code Status:  Level 1 full code  POLST: There is no POLST form on file for this patient (pre-hospital)  Discussion with family:  Patient    Anticipated Length of Stay:  Patient will be admitted on an Inpatient basis with an anticipated length of stay of  greater 2 midnights  Justification for Hospital Stay:  CHF exacerbation    Total Time for Visit, including Counseling / Coordination of Care: 45 minutes  Greater than 50% of this total time spent on direct patient counseling and coordination of care  Chief Complaint:   Shortness of breath    History of Present Illness:    Saintclair Savage is a 61 y o  male with PMH CHF s/p AICD, ischemic cardiomyopathy, aortic stenosis s/p AVR on warfarin, chronic hypoxia on 4 L, who presents with shortness of breath  Patient reports his symptoms started 1 week ago, progressively worsened, today reports increased difficulty breathing prompting him to come to the ED  He denies any other symptoms including fever, chills, nausea, vomiting, chest pain, diaphoresis  He endorses orthopnea, PND  Reports compliance with medications  His lab work is significant for mild troponin elevation of 54, elevated proBNP [id number]  ECG shows ventricularly paced rhythm, RBBB, nonischemic  CXR shows increased pulmonary congestion as well as chronic ground glass opacities  Of note, interrogation of his ICD shows that he received 3 shocks for VFib, patient reports being shocked last Tuesday on 03/08/2022  He will be admitted for IV diuresis alongside cardiology consultation      Review of Systems:    Review of Systems   Constitutional: Negative for appetite change, chills, fatigue and fever  HENT: Negative for ear pain, sore throat and trouble swallowing  Eyes: Negative for visual disturbance  Respiratory: Positive for shortness of breath  Negative for cough, chest tightness and wheezing  Cardiovascular: Negative for chest pain, palpitations and leg swelling  Gastrointestinal: Negative for abdominal distention, abdominal pain, diarrhea, nausea and vomiting  Endocrine: Negative  Genitourinary: Negative for dysuria, flank pain and hematuria  Musculoskeletal: Negative for arthralgias, gait problem and myalgias  Skin: Negative for pallor  Allergic/Immunologic: Negative for immunocompromised state  Neurological: Negative for dizziness, syncope, light-headedness, numbness and headaches  Past Medical and Surgical History:     Past Medical History:   Diagnosis Date    AICD (automatic cardioverter/defibrillator) present     medtronic     CAD (coronary artery disease)     Cancer ([facility] )     Cardiac disease     Cardiomyopathy ([facility] )     mixed    predominently nonischemic    Colonic mass     Coronary artery disease     History of ventricular fibrillation 9/2/2021    History of ventricular tachycardia 5/25/2021    Hyperlipidemia     Hypertension     Irregular heart beat     [facility]     2014 and 2015    Pneumonia 1/26/2022    Rectal bleeding     S/P AVR (aortic valve replacement)     mechanical    Wears glasses        Past Surgical History:   Procedure Laterality Date    AORTIC VALVE REPLACEMENT      APPENDECTOMY      CARDIAC DEFIBRILLATOR PLACEMENT      COLECTOMY MADELIN Right     Last Assessed: 6/7/2016    COLECTOMY LAPAROSCOPIC      COLON SURGERY Right     colectomy    CORONARY STENT PLACEMENT      INSERT / [place] / REMOVE PACEMAKER      MITRAL VALVE REPAIR      MITRAL VALVE REPAIR      MOUTH SURGERY      AK COLONOSCOPY FLX DX W/COLLJ [address] WHEN PFRMD N/A 1/15/2018    Procedure: COLONOSCOPY;  Surgeon: Jame Dawn MD;  Location: [facility]; Service: General    MO LAP,SURG,COLECTOMY, PARTIAL, W/ANAST N/A 6/2/2016    Procedure: RESECTION COLON RIGHT LAPAROSCOPIC HAND-ASSISTED;  Surgeon: Troy Matson MD;  Location: [facility];  Service: General       Meds/Allergies:    Prior to Admission medications    Medication Sig Start Date End Date Taking? Authorizing Provider   amiodarone 100 mg tablet Take 1 tablet (100 mg total) by mouth daily with breakfast 1/13/22   Mckenna Jama MD   aspirin 81 MG tablet Take 81 mg by mouth daily      Historical Provider, MD   atorvastatin (LIPITOR) 80 mg tablet take 1 tablet by mouth once daily 3/14/22   Iesha Mendoza DO   eplerenone (INSPRA) 25 mg tablet take 1 tablet by mouth once daily 1/31/22   Iesha Mendoza DO   furosemide (LASIX) 20 mg tablet Take 1 tablet (20 mg total) by mouth 2 (two) times a day 3/1/22 6/29/22  Iesha Mendoza DO   lisinopril (ZESTRIL) 10 mg tablet Take 1 tablet (10 mg total) by mouth daily at bedtime 3/1/22 6/29/22  Iesha Mendoza DO   metoprolol succinate (TOPROL-XL) 25 mg 24 hr tablet Take 3 tablets (75 mg total) by mouth every 12 (twelve) hours 3/1/22   Iesha Mendoza DO   warfarin (COUMADIN) 2 5 mg tablet Take 1 tablet (2 5 mg total) by mouth daily 2/8/22 3/10/22  Cortney Roth MD     I have reviewed home medications with patient personally      Allergies: No Known Allergies    Social History:     Marital Status: /Civil Union   Occupation:  Noncontributory  Patient Pre-hospital Living Situation:  Home  Patient Pre-hospital Level of Mobility:  Independent  Patient Pre-hospital Diet Restrictions:  Cardiac  Substance Use History:   Social History     Substance and Sexual Activity   Alcohol Use Not Currently     Social History     Tobacco Use   Smoking Status Never Smoker   Smokeless Tobacco Never Used     Social History     Substance and Sexual Activity   Drug Use Not Currently    Types: Marijuana       Family History:    non-contributory    Physical Exam:     Vitals:   Blood Pressure: 153/87 (03/15/22 2155)  Pulse: 100 (03/15/22 2155)  Temperature: 97 5 °F (36 4 °C) (03/15/22 1848)  Temp Source: Oral (03/15/22 1848)  Respirations: (!) 26 (03/15/22 2155)  Weight - Scale: 60 9 kg (134 lb 4 2 oz) (03/15/22 1840)  SpO2: 95 % (03/15/22 2155)    Physical Exam  Vitals and nursing note reviewed  Constitutional:       Appearance: Normal appearance  HENT:      Head: Normocephalic and atraumatic  Mouth/Throat:      Mouth: Mucous membranes are moist       Pharynx: Oropharynx is clear  No oropharyngeal exudate  Eyes:      Extraocular Movements: Extraocular movements intact  Cardiovascular:      Rate and Rhythm: Regular rhythm  Tachycardia present  Pulses: Normal pulses  Heart sounds: Normal heart sounds  No murmur heard  No friction rub  No gallop  Pulmonary:      Effort: Pulmonary effort is normal  No respiratory distress  Breath sounds: No stridor  Rales present  No wheezing  Abdominal:      General: Abdomen is flat  Bowel sounds are normal  There is no distension  Palpations: Abdomen is soft  Tenderness: There is no abdominal tenderness  Musculoskeletal:      Right lower leg: Edema present  Left lower leg: Edema present  Skin:     General: Skin is warm and dry  Neurological:      General: No focal deficit present  Mental Status: He is alert and oriented to person, place, and time  Additional Data:     Lab Results: I have personally reviewed pertinent reports        Results from last 7 days   Lab Units 03/15/22  2007   WBC Thousand/uL 10 88*   HEMOGLOBIN g/dL 10 7*   HEMATOCRIT % 33 1*   PLATELETS Thousands/uL 348   NEUTROS PCT % 68   LYMPHS PCT % 16   MONOS PCT % 12   EOS PCT % 1     Results from last 7 days   Lab Units 03/15/22  2007   SODIUM mmol/L 135*   POTASSIUM mmol/L 4 4   CHLORIDE mmol/L 96*   CO2 mmol/L 32   BUN mg/dL 9   CREATININE mg/dL 1 07   ANION GAP mmol/L 7   CALCIUM mg/dL 8 2*   GLUCOSE RANDOM mg/dL 125 Results from last 7 days   Lab Units 03/09/22  0000   INR  2 10*                   Imaging: I have personally reviewed pertinent reports  XR chest 2 views    (Results Pending)       EKG, Pathology, and Other Studies Reviewed on Admission:   · EKG:  Ventricularly paced rhythm, RBBB, nonischemic    Allscripts / Epic Records Reviewed: Yes     ** Please Note: This note has been constructed using a voice recognition system   **

## 2022-03-16 NOTE — DISCHARGE SUMMARY
[address] [facility]  Discharge- [address] 1965, 61 y o  male MRN: [id number]  Unit/Bed#: [facility] 2 -02 Encounter: [id number]  Primary Care Provider: NICOLE Eng   Date and time admitted to hospital: 3/15/2022  6:41 PM          Admission Date: 3/15/2022       Discharge Date: 3/16/2022    Primary Diagnoses  Principal Problem:    Ventricular tachycardia ([place] [address] )  Active Problems:    Acute on chronic combined systolic and diastolic congestive heart failure ([place] [address] )    Coronary artery disease involving native coronary artery of native heart without angina pectoris    S/P AVR (aortic valve replacement)    Elevated troponin    Chronic respiratory failure with hypoxia ([place] [address] )    History of substance abuse ([place] [address] )    Eschar of toe  Resolved Problems:    * No resolved hospital problems  [facility] Course by problem:   * Ventricular tachycardia ([place] [address] )  Assessment & Plan  -patient has a previous history of ventricular fibrillation/ventricular tachycardia status post dual chamber Medtronic ICD placement  -he is maintained on albuterol and 100 mg daily and Toprol XL 75 mg q 12 hours as an outpatient  -patient presented to the ER reporting a defibrillator discharge 03/08 and subsequent week of worsening dyspnea  -patient was evaluated in consultation with the cardiology team, and his device was interrogated  -patient was noted to have ventricular tachycardia treated with ATP x3  -there is concerned that 1 of patient's lead is not functioning appropriately  -patient had additional run of ventricular tachycardia this morning:  His amiodarone dose was increased to 200 mg daily  -case was discussed with the electrophysiology team who requested transfer to [facility] for further EP evaluation    Acute on chronic combined systolic and diastolic congestive heart failure ([place] [address] )  Assessment & Plan  -patient has a history of chronic combined systolic and diastolic congestive heart failure  -most recent 2D echocardiogram 1/22 with left ventricular ejection fraction 20%, global hypokinesis  Indeterminate diastolic dysfunction  Moderate biatrial enlargement   -patient presented with worsening dyspnea over the past week, and was noted to have an acute exacerbation of his chronic combined congestive heart failure  -continue diuresed with IV Lasix 40 mg IV b i d :  Status post 80 mg IV Lasix x1 initially  -patient notes improvement in his dyspnea  -proBNP was 12,000: However had improved from 01/22 proBNP 31,000  -chest x-ray with bilateral pleural effusion  -continue diuresis with IV Lasix    Continue beta-blocker  -previously on eplerenone      Eschar of toe  Assessment & Plan  -pt has eschar left great toe  -was evaluated by podiatry  -LEADS pending  -cont local wound care    History of substance abuse [facility])  Assessment & Plan  Patient has prior history of marijuana and cocaine use  History of medication noncompliance  Drug screen pending    Chronic respiratory failure with hypoxia [facility])  Assessment & Plan  Patient has a history of chronic respiratory failure with hypoxia  Was admitted January 2022 with COVID pneumonia, and hypoxia  Chronically on 4 L nasal cannula prior to this admission    Elevated troponin  Assessment & Plan  Patient noted to have elevated troponin on admission:  54--57--56  Most likely secondary to non MI troponin elevation secondary to congestive heart failure  Appreciate cardiology evaluation and recommendation    S/P AVR (aortic valve replacement)  Assessment & Plan  Patient has a history of rheumatic heart disease and is status post previous mechanical aortic valve replacement in the 1980s  Patient is on chronic anticoagulation with Coumadin  Most recent echocardiogram 1/22 with appropriately functioning valve with mild AR  Patient follows with [facility] Coumadin [facility]:  Most recent visit 03/10, current dose is 2 5 mg daily except for 1 25 mg on Monday  INR subtherapeutic at 1 9--> increase Coumadin to 3 mg daily  Recheck INR in a m  Coronary artery disease involving native coronary artery of native heart without angina pectoris  Assessment & Plan  Patient has a history of coronary artery disease with previous PCI and bare metal stent placement in LAD and circumflex in 2014  Continue medical therapy with aspirin, beta-blocker, statin  Patient has a prior history of cocaine use, which made beta-blocker use concerning:  Urine drug screen pending      Service:  [facility], [facility]  Consulting Providers   Cardiology:  Dr Maharaj Plush    Procedures Performed   none    Hospital Studies:  3/15:  Chest x-ray Enlargement of cardiac silhouette with bilateral pulmonary airspace disease and pleural effusions  Pattern most suggestive for CHF/pulmonary edema superimposed upon more chronic pulmonary changes    Results from last 7 days   Lab Units 03/16/22  0434 03/15/22  2007   WBC Thousand/uL 13 59* 10 88*   HEMOGLOBIN g/dL 9 1* 10 7*   HEMATOCRIT % 28 1* 33 1*   PLATELETS Thousands/uL 316 348     Results from last 7 days   Lab Units 03/16/22  0434 03/15/22  2007   POTASSIUM mmol/L 4 2 4 4   CHLORIDE mmol/L 97* 96*   CO2 mmol/L 30 32   BUN mg/dL 13 9   CREATININE mg/dL 1 07 1 07   CALCIUM mg/dL 8 2* 8 2*       History and Physical Exam:  Please refer to the Admission H&P note    Discharge Condition: Improved  Discharge Disposition:  [facility] for EP evaluation    Discharge Note and Physical Exam:   Patient notes that he feels better  He denies any shortness of breath  He notes his dyspnea yesterday has much improved  He denies any cough  He denies any abdominal pain, nausea, vomiting, diarrhea  He is tolerating p o  Denies any dizziness or lightheadedness    He has just spoken with the cardiology team and he notes he is agreeable with transfer to [facility] for further EP evaluation    Vitals:    03/16/22 1459   BP: 106/77   Pulse: 84   Resp: 16   Temp: 97 7 °F (36 5 °C)   SpO2: 97%     General:  Pleasant, non-tachypnic, non-dyspnic  Conversant  Heart: Regular rate and rhythm, S1S2 present  No murmur, rub or gallop  Lungs: Clear to auscultation bilaterally, no wheezing, rhonchi, or crackles  Good air movement  No accessory muscle use or respiratory distress  Abdomen: soft, non-tender, non-distended, NABS  No rebound or guarding  No mass or peritoneal signs  Extremities: no clubbing, cyanosis or edema  2+ pedal pulses bilaterally  Neurologic: cranial nerves II-XII intact  Strength and sensation intact globally  Speech fluent and goal directed  Awake, alert and oriented x 3  Skin: warm and dry  No petechiae, purpura or rash  Discharge Medications   Please see Medical Reconciliation Discharge Form    Discharge Follow Up Appointments: Will be arranged at discharge from [facility]    Discharge  Statement   Total Time Spent today including physical exam, discussion with patient and discharge arrangements/care = 40 minutes  Discussed with patient's  Discussed with   Discussed with cardiologist, Dr Vicky Jimenez, at pt's bedside  Family:  Called pt's niece Lobito Gardenia, primary contact, and gave update  Reviewed all test results and treatment plan  Reviewed interrogation report, in transfer to [facility]  Addendum:  D/w transfer center and accepting physician:  Accepted in transfer- due to bed availability:   Will likely be in am   updated Raymon Wood and Dr Vicky Jimenez and pt's nurse    This note has been constructed using a voice recognition system

## 2022-03-16 NOTE — ED NOTES
Pt okay to eat per ED provider   Provided with turkey sandwich, pretzels and gingerale ([id number])     Dione Lombard  31/19/22 7810

## 2022-03-16 NOTE — CONSULTS
Podiatry - Consultation    Patient Information:   Lio Bush 61 y o  male MRN: [id number]  Unit/Bed#: [facility] [address] 210-02 Encounter: [id number]  PCP: NICOLE Reynoso  Date of Admission:  3/15/2022  Date of Consultation: 03/16/22  Requesting Physician: Fabiana Barry MD      ASSESSMENT:    Lio Bush is a 61 y o  male with:    1  Left hallux eschar  2  CAD  3  CHF    PLAN:    · No intervention necessary to left hallux eschar as it is superficial and well-adhered  · LEADS to assess baseline vascular status given delayed healing, non-palpable pulses  · Elevation and offloading on green foam wedges or pillows when non-ambulatory  · Rest of care per primary team   · Will discuss this plan with my attending and update as needed  Weightbearing status: WBAT    SUBJECTIVE:    History of Present Illness:    Lio Bush is a 61 y o  male who is originally admitted 3/15/2022 due to acute on chronic CHF  Patient has a past medical history of CHF, CAD, chronic respiratory failure  We are consulted for evaluation of left hallux gangrene  Patient states he noticed a clear-fluid blister on the tip of hallux 1 month ago  He recalls no injury or trauma to the area  He popped it at home and noticed that it scabbed over and turned black in color  He reports throbbing pain in the digit overnight  Denies drainage, redness, swelling  Denies any hx of LE wounds  Patient denies nausea, vomiting, chest pain, shortness of breath, chills, fever  Review of Systems:    Constitutional: Negative  HENT: Negative  Eyes: Negative  Respiratory: Negative  Cardiovascular: Negative  Gastrointestinal: Negative  Musculoskeletal: Left hallux pain   Skin: Left hallux eschar   Neurological: Negative  Psych: Negative       Past Medical and Surgical History:     Past Medical History:   Diagnosis Date    AICD (automatic cardioverter/defibrillator) present     medtronic     CAD (coronary artery disease)     Cancer ([facility] )     Cardiac disease     Cardiomyopathy ([facility] )     mixed  predominently nonischemic    Colonic mass     Coronary artery disease     History of ventricular fibrillation 9/2/2021    History of ventricular tachycardia 5/25/2021    Hyperlipidemia     Hypertension     Irregular heart beat     [facility]     2014 and 2015    Pneumonia 1/26/2022    Rectal bleeding     S/P AVR (aortic valve replacement)     mechanical    Wears glasses        Past Surgical History:   Procedure Laterality Date    AORTIC VALVE REPLACEMENT      APPENDECTOMY      CARDIAC DEFIBRILLATOR PLACEMENT      COLECTOMY MADELIN Right     Last Assessed: 6/7/2016    COLECTOMY LAPAROSCOPIC      COLON SURGERY Right     colectomy    CORONARY STENT PLACEMENT      INSERT / Sakina Genre / REMOVE PACEMAKER      MITRAL VALVE REPAIR      MITRAL VALVE REPAIR      MOUTH SURGERY      [place] COLONOSCOPY FLX DX W/COLLJ [facility] WHEN PFRMD N/A 1/15/2018    Procedure: COLONOSCOPY;  Surgeon: Michael Mixon MD;  Location: [facility];   Service: General    [place] LAP,SURG,COLECTOMY, PARTIAL, W/ANAST N/A 6/2/2016    Procedure: RESECTION COLON RIGHT LAPAROSCOPIC HAND-ASSISTED;  Surgeon: Michael Mixon MD;  Location: [facility];  Service: General       Meds/Allergies:    Medications Prior to Admission   Medication    amiodarone 100 mg tablet    aspirin 81 MG tablet    atorvastatin (LIPITOR) 80 mg tablet    eplerenone (INSPRA) 25 mg tablet    furosemide (LASIX) 20 mg tablet    lisinopril (ZESTRIL) 10 mg tablet    metoprolol succinate (TOPROL-XL) 25 mg 24 hr tablet    warfarin (COUMADIN) 2 5 mg tablet       No Known Allergies    Social History:     Marital Status: /Civil Union    Substance Use History:   Social History     Substance and Sexual Activity   Alcohol Use Not Currently     Social History     Tobacco Use   Smoking Status Never Smoker   Smokeless Tobacco Never Used     Social History Substance and Sexual Activity   Drug Use Not Currently    Types: Marijuana       Family History:    Family History   Problem Relation Age of Onset    Diabetes Mother     Hypertension Mother     Valvular heart disease Mother     Valvular heart disease Father     Alcohol abuse Father          OBJECTIVE:    Vitals:   Blood Pressure: (!) 100/45 (03/16/22 0724)  Pulse: 84 (03/16/22 0724)  Temperature: 97 9 °F (36 6 °C) (03/16/22 0724)  Temp Source: Oral (03/15/22 2334)  Respirations: 22 (03/15/22 2334)  Height: 5' 6" (167 6 cm) (03/15/22 2334)  Weight - Scale: 60 9 kg (134 lb 4 2 oz) (03/16/22 0536)  SpO2: 97 % (03/16/22 0724)    Physical Exam:    General Appearance: Alert, cooperative, no distress  HEENT: Head normocephalic, atraumatic, without obvious abnormality  Heart: Normal rate and rhythm  Lungs: Non-labored breathing  No respiratory distress  Abdomen: Without distension  Psychiatric: AAOx3  Lower Extremity:  Vascular:   Right DP and PT pulses are biphasic by Doppler  Left DP and PT pulses are monophasic by Doppler  CRT < 3 seconds at the digits  +0/4 edema noted at bilateral lower extremities  Pedal hair is absent  Skin temperature is WNL bilaterally  Musculoskeletal:  MMT is 5/5 in all muscle compartments bilaterally  ROM at the 1st MPJ and ankle joint are decreased bilaterally with the leg extended  Pain on palpation of left hallux eschar  No gross deformities noted  Dermatological:  Bilateral lower extremity with atrophic, shiny skin changes  Left hallux distal tip dry, adhered eschar without signs of active infection: no purulence, no malodor, no ascending erythema, no crepitus, no fluctuance  Neurological:  Gross sensation is intact  Protective sensation is intact  Patient Denies numbness and/or paresthesias  Clinical Images 03/16/22:       Additional data:     Lab Results: I have personally reviewed pertinent labs including:    Results from last 7 days   Lab Units 03/16/22  8727 WBC Thousand/uL 13 59*   HEMOGLOBIN g/dL 9 1*   HEMATOCRIT % 28 1*   PLATELETS Thousands/uL 316   NEUTROS PCT % 76*   LYMPHS PCT % 12*   MONOS PCT % 9   EOS PCT % 1     Results from last 7 days   Lab Units 03/16/22  0434   POTASSIUM mmol/L 4 2   CHLORIDE mmol/L 97*   CO2 mmol/L 30   BUN mg/dL 13   CREATININE mg/dL 1 07   CALCIUM mg/dL 8 2*     Results from last 7 days   Lab Units 03/16/22  0434   INR  1 96*       Cultures: I have personally reviewed pertinent cultures including:              Imaging: I have personally reviewed pertinent reports in PACS  EKG, Pathology, and Other Studies: I have personally reviewed pertinent reports  Time Spent for Care: 30 minutes  More than 50% of total time spent on counseling and coordination of care as described above  ** Please Note: Portions of the record may have been created with voice recognition software  Occasional wrong word or "sound a like" substitutions may have occurred due to the inherent limitations of voice recognition software  Read the chart carefully and recognize, using context, where substitutions have occurred   **

## 2022-03-16 NOTE — PLAN OF CARE
Problem: Potential for Falls  Goal: Patient will remain free of falls  Description: INTERVENTIONS:  - Educate patient/family on patient safety including physical limitations  - Instruct patient to call for assistance with activity   - Consult OT/PT to assist with strengthening/mobility   - Keep Call bell within reach  - Initiate and maintain comfort rounds  Outcome: Progressing     Problem: MOBILITY - ADULT  Goal: Maintain or return to baseline ADL function  Description: INTERVENTIONS:  -  Assess patient's ability to carry out ADLs; assess patient's baseline for ADL function and identify physical deficits which impact ability to perform ADLs (bathing, care of mouth/teeth, toileting, grooming, dressing, etc )  - Assess/evaluate cause of self-care deficits   - Assess range of motion  - Assess patient's mobility; develop plan if impaired  - Assess patient's need for assistive devices and provide as appropriate  - Encourage maximum independence but intervene and supervise when necessary  - Involve family in performance of ADLs  - Assess for home care needs following discharge   - Consider OT consult to assist with ADL evaluation and planning for discharge  - Provide patient education as appropriate  Outcome: Progressing  Goal: Maintains/Returns to pre admission functional level  Description: INTERVENTIONS:  - Perform BMAT or MOVE assessment daily    - Set and communicate daily mobility goal to care team and patient/family/caregiver  - Collaborate with rehabilitation services on mobility goals if consulted  - Record patient progress and toleration of activity level   Outcome: Progressing     Problem: Nutrition/Hydration-ADULT  Goal: Nutrient/Hydration intake appropriate for improving, restoring or maintaining nutritional needs  Description: Monitor and assess patient's nutrition/hydration status for malnutrition  Collaborate with interdisciplinary team and initiate plan and interventions as ordered    Monitor patient's weight and dietary intake as ordered or per policy  Utilize nutrition screening tool and intervene as necessary  Determine patient's food preferences and provide high-protein, high-caloric foods as appropriate       INTERVENTIONS:  - Monitor oral intake, urinary output, labs, and treatment plans  - Assess nutrition and hydration status and recommend course of action  - Evaluate amount of meals eaten  - Assist patient with eating if necessary   - Allow adequate time for meals  - Recommend/ encourage appropriate diets, oral nutritional supplements, and vitamin/mineral supplements  - Order, calculate, and assess calorie counts as needed  - Recommend, monitor, and adjust tube feedings and TPN/PPN based on assessed needs  - Assess need for intravenous fluids  - Provide specific nutrition/hydration education as appropriate  - Include patient/family/caregiver in decisions related to nutrition  Outcome: Progressing     Problem: PAIN - ADULT  Goal: Verbalizes/displays adequate comfort level or baseline comfort level  Description: Interventions:  - Encourage patient to monitor pain and request assistance  - Assess pain using appropriate pain scale  - Administer analgesics based on type and severity of pain and evaluate response  - Implement non-pharmacological measures as appropriate and evaluate response  - Consider cultural and social influences on pain and pain management  - Notify physician/advanced practitioner if interventions unsuccessful or patient reports new pain  Outcome: Progressing     Problem: INFECTION - ADULT  Goal: Absence or prevention of progression during hospitalization  Description: INTERVENTIONS:  - Assess and monitor for signs and symptoms of infection  - Monitor lab/diagnostic results  - Monitor all insertion sites, i e  indwelling lines, tubes, and drains  - Monitor endotracheal if appropriate and nasal secretions for changes in amount and color  - [place] appropriate cooling/warming therapies per order  - Administer medications as ordered  - Instruct and encourage patient and family to use good hand hygiene technique  - Identify and instruct in appropriate isolation precautions for identified infection/condition  Outcome: Progressing     Problem: SAFETY ADULT  Goal: Patient will remain free of falls  Description: INTERVENTIONS:  - Educate patient/family on patient safety including physical limitations  - Instruct patient to call for assistance with activity   - Consult OT/PT to assist with strengthening/mobility   - Keep Call bell within reach  - Keep bed low and locked with side rails adjusted as appropriate  - Keep care items and personal belongings within reach  - Initiate and maintain comfort rounds  - Make Fall Risk Sign visible to staff  - Apply yellow socks and bracelet for high fall risk patients  - Consider moving patient to room near nurses station  Outcome: Progressing  Goal: Maintain or return to baseline ADL function  Description: INTERVENTIONS:  -  Assess patient's ability to carry out ADLs; assess patient's baseline for ADL function and identify physical deficits which impact ability to perform ADLs (bathing, care of mouth/teeth, toileting, grooming, dressing, etc )  - Assess/evaluate cause of self-care deficits   - Assess range of motion  - Assess patient's mobility; develop plan if impaired  - Assess patient's need for assistive devices and provide as appropriate  - Encourage maximum independence but intervene and supervise when necessary  - Involve family in performance of ADLs  - Assess for home care needs following discharge   - Consider OT consult to assist with ADL evaluation and planning for discharge  - Provide patient education as appropriate  Outcome: Progressing  Goal: Maintains/Returns to pre admission functional level  Description: INTERVENTIONS:  - Perform BMAT or MOVE assessment daily    - Set and communicate daily mobility goal to care team and patient/family/caregiver  - Collaborate with rehabilitation services on mobility goals if consulted  - Perform Range of Motion3 times a day  - Reposition patient every 3 hours  - Dangle patient 3 times a day  - Stand patient 3 times a day  - Ambulate patient 3 times a day  - Out of bed to chair 3 times a day   - Out of bed for meals 3 times a day  - Out of bed for toileting  - Record patient progress and toleration of activity level   Outcome: Progressing     Problem: DISCHARGE PLANNING  Goal: Discharge to home or other facility with appropriate resources  Description: INTERVENTIONS:  - Identify barriers to discharge w/patient and caregiver  - Arrange for needed discharge resources and transportation as appropriate  - Identify discharge learning needs (meds, wound care, etc )  - Arrange for interpretive services to assist at discharge as needed  - Refer to Case Management Department for coordinating discharge planning if the patient needs post-hospital services based on physician/advanced practitioner order or complex needs related to functional status, cognitive ability, or social support system  Outcome: Progressing     Problem: Knowledge Deficit  Goal: Patient/family/caregiver demonstrates understanding of disease process, treatment plan, medications, and discharge instructions  Description: Complete learning assessment and assess knowledge base    Interventions:  - Provide teaching at level of understanding  - Provide teaching via preferred learning methods  Outcome: Progressing     Problem: NEUROSENSORY - ADULT  Goal: Achieves stable or improved neurological status  Description: INTERVENTIONS  - Monitor and report changes in neurological status  - Monitor vital signs such as temperature, blood pressure, glucose, and any other labs ordered      Outcome: Progressing  Goal: Achieves maximal functionality and self care  Description: INTERVENTIONS  - Monitor swallowing and airway patency with patient fatigue and changes in neurological status  - Encourage and assist patient to increase activity and self care     - Encourage visually impaired, hearing impaired and aphasic patients to use assistive/communication devices  Outcome: Progressing     Problem: CARDIOVASCULAR - ADULT  Goal: Maintains optimal cardiac output and hemodynamic stability  Description: INTERVENTIONS:  - Monitor I/O, vital signs and rhythm  - Monitor for S/S and trends of decreased cardiac output  - Administer and titrate ordered vasoactive medications to optimize hemodynamic stability  - Assess quality of pulses, skin color and temperature  - Assess for signs of decreased coronary artery perfusion  - Instruct patient to report change in severity of symptoms  Outcome: Progressing  Goal: Absence of cardiac dysrhythmias or at baseline rhythm  Description: INTERVENTIONS:  - Continuous cardiac monitoring, vital signs, obtain 12 lead EKG if ordered  - Administer antiarrhythmic and heart rate control medications as ordered  - Monitor electrolytes and administer replacement therapy as ordered  Outcome: Progressing     Problem: RESPIRATORY - ADULT  Goal: Achieves optimal ventilation and oxygenation  Description: INTERVENTIONS:  - Assess for changes in respiratory status  - Assess for changes in mentation and behavior  - Position to facilitate oxygenation and minimize respiratory effort  - Oxygen administered by appropriate delivery if ordered  - Encourage broncho-pulmonary hygiene including cough, deep breathe, Incentive Spirometry  - Assess the need for suctioning and aspirate as needed  - Assess and instruct to report SOB or any respiratory difficulty  - Respiratory Therapy support as indicated  Outcome: Progressing     Problem: GASTROINTESTINAL - ADULT  Goal: Minimal or absence of nausea and/or vomiting  Description: INTERVENTIONS:  - Administer ordered antiemetic medications as needed  - Provide nonpharmacologic comfort measures as appropriate  Outcome: Progressing  Goal: Maintains or returns to baseline bowel function  Description: INTERVENTIONS:  - Assess bowel function  - Encourage mobilization and activity  - Consider nutritional services referral to assist patient with adequate nutrition and appropriate food choices  Outcome: Progressing  Goal: Maintains adequate nutritional intake  Description: INTERVENTIONS:  - Monitor percentage of each meal consumed  - Identify factors contributing to decreased intake, treat as appropriate  - Obtain nutrition services referral as needed  Outcome: Progressing  Goal: Oral mucous membranes remain intact  Description: INTERVENTIONS  - Assess oral mucosa and hygiene practices  - Implement preventative oral hygiene regimen  - Implement oral medicated treatments as ordered  - Initiate Nutrition services referral as needed  Outcome: Progressing     Problem: GENITOURINARY - ADULT  Goal: Maintains or returns to baseline urinary function  Description: INTERVENTIONS:  - Assess urinary function  - Administer ordered medications as needed  - Offer frequent toileting  - Follow urinary retention protocol if ordered  Outcome: Progressing  Goal: Absence of urinary retention  Description: INTERVENTIONS:  - Assess patients ability to void and empty bladder  - Monitor I/O  - Bladder scan as needed  - Discuss with physician/AP medications to alleviate retention as needed  - Discuss catheterization for long term situations as appropriate  Outcome: Progressing     Problem: METABOLIC, FLUID AND ELECTROLYTES - ADULT  Goal: Electrolytes maintained within normal limits  Description: INTERVENTIONS:  - Monitor labs and assess patient for signs and symptoms of electrolyte imbalances  - Administer electrolyte replacement as ordered  - Monitor response to electrolyte replacements, including repeat lab results as appropriate  - Instruct patient on fluid and nutrition as appropriate  Outcome: Progressing  Goal: Fluid balance maintained  Description: INTERVENTIONS:  - Monitor labs   - Monitor I/O and WT  - Instruct patient on fluid and nutrition as appropriate  - Assess for signs & symptoms of volume excess or deficit  Outcome: Progressing    Problem: SKIN/TISSUE INTEGRITY - ADULT  Goal: Skin Integrity remains intact(Skin Breakdown Prevention)  Description: Assess:  -Perform Arben assessment every shift  -Clean and moisturize skin every 2  -Inspect skin when repositioning, toileting, and assisting with ADLS  -Assess extremities for adequate circulation and sensation     Bed Management:  -Have minimal linens on bed & keep smooth, unwrinkled  -Change linens as needed when moist or perspiring  -Avoid sitting or lying in one position for more [id number] while in bed    Toileting:  -Offer bedside commode  -Assess for incontinence every 2    Activity:  -Mobilize patient 3 times a day  -Encourage activity and walks on unit  -Encourage or provide ROM exercises   -Turn and reposition patient every 2Hours  Outcome: Progressing  Goal: Incision(s), wounds(s) or drain site(s) healing without S/S of infection  Description: INTERVENTIONS  - Assess and document dressing, incision, wound bed, drain sites and surrounding tissue  - Provide patient and family education  - Perform skin care/dressing changes every shift  Outcome: Progressing  Goal: Pressure injury heals and does not worsen  Description: Interventions:  - Perform passive or active ROM every 8  - Turn and reposition patient & offload bony prominences every 2 hours   - Utilize friction reducing device or surface for transfers   - Consider consults to  interdisciplinary teams such as wound  - Consider nutrition services referral as needed  Outcome: Progressing     Problem: HEMATOLOGIC - ADULT  Goal: Maintains hematologic stability  Description: INTERVENTIONS  - Assess for signs and symptoms of bleeding or hemorrhage  - Monitor labs  - Administer supportive blood products/factors as ordered and appropriate  Outcome: Progressing     Problem: MUSCULOSKELETAL - ADULT  Goal: Maintain or return mobility to safest level of function  Description: INTERVENTIONS:  - Assess patient's ability to carry out ADLs; assess patient's baseline for ADL function and identify physical deficits which impact ability to perform ADLs (bathing, care of mouth/teeth, toileting, grooming, dressing, etc )  - Assess/evaluate cause of self-care deficits   - Assess range of motion  - Assess patient's mobility  - Assess patient's need for assistive devices and provide as appropriate  - Encourage maximum independence but intervene and supervise when necessary  - Involve family in performance of ADLs  - Assess for home care needs following discharge   - Consider OT consult to assist with ADL evaluation and planning for discharge  - Provide patient education as appropriate  Outcome: Progressing  Goal: Maintain proper alignment of affected body part  Description: INTERVENTIONS:  - Support, maintain and protect limb and body alignment  - Provide patient/ family with appropriate education  Outcome: Progressing

## 2022-03-16 NOTE — ASSESSMENT & PLAN NOTE
-patient has a previous history of ventricular fibrillation/ventricular tachycardia status post dual chamber Medtronic ICD placement  -he is maintained on albuterol and 100 mg daily and Toprol XL 75 mg q 12 hours as an outpatient  -patient presented to the ER reporting a defibrillator discharge 03/08 and subsequent week of worsening dyspnea  -patient was evaluated in consultation with the cardiology team, and his device was interrogated  -patient was noted to have ventricular tachycardia treated with ATP x3  -there is concerned that 1 of patient's lead is not functioning appropriately  -patient had additional run of ventricular tachycardia this morning:  His amiodarone dose was increased to 200 mg daily  -case was discussed with the electrophysiology team who requested transfer to [facility] for further EP evaluation

## 2022-03-16 NOTE — ASSESSMENT & PLAN NOTE
Patient has a history of rheumatic heart disease and is status post previous mechanical aortic valve replacement in the 1980s  Patient is on chronic anticoagulation with Coumadin  Most recent echocardiogram 1/22 with appropriately functioning valve with mild AR  Patient follows with [facility]:  Most recent visit 03/10, current dose is 2 5 mg daily except for 1 25 mg on Monday  INR subtherapeutic at 1 9--> increase Coumadin to 3 mg daily  Recheck INR in a m

## 2022-03-16 NOTE — ASSESSMENT & PLAN NOTE
Patient has a history of chronic respiratory failure with hypoxia  Was admitted January 2022 with COVID pneumonia, and hypoxia  Chronically on 4 L nasal cannula prior to admission

## 2022-03-16 NOTE — ASSESSMENT & PLAN NOTE
-patient has a history of chronic combined systolic and diastolic congestive heart failure  -most recent 2D echocardiogram 1/22 with left ventricular ejection fraction 20%, global hypokinesis  Indeterminate diastolic dysfunction  Moderate biatrial enlargement   -patient presented with worsening dyspnea over the past week, and was noted to have an acute exacerbation of his chronic combined congestive heart failure  -continue diuresed with IV Lasix 40 mg IV b i d :  Status post 80 mg IV Lasix x1 initially  -patient notes improvement in his dyspnea  -proBNP was 12,000: However had improved from 01/22 proBNP 31,000  -chest x-ray with bilateral pleural effusion  -continue diuresis with IV Lasix    Continue beta-blocker  -previously on eplerenone

## 2022-03-16 NOTE — ASSESSMENT & PLAN NOTE
Interrogation reveals 3 shocks for VFib  Patient reports being shocked last Tuesday (03/08/2022), followed by 8 minutes of unconsciousness  Did not go to the ED or cardiologist following shocks  On amiodarone and Lopressor, continue  Cardiology consult    Appreciate recs

## 2022-03-16 NOTE — ASSESSMENT & PLAN NOTE
Patient noted to have elevated troponin on admission:  54--57--56  Most likely secondary to non MI troponin elevation secondary to congestive heart failure  Appreciate cardiology evaluation and recommendation

## 2022-03-16 NOTE — ASSESSMENT & PLAN NOTE
Patient has a history of coronary artery disease with previous PCI and bare metal stent placement in LAD and circumflex in 2014  Continue medical therapy with aspirin, beta-blocker, statin  Patient has a prior history of cocaine use, which made beta-blocker use concerning:  Urine drug screen pending  No

## 2022-03-16 NOTE — ASSESSMENT & PLAN NOTE
Wt Readings from Last 3 Encounters:   03/15/22 60 9 kg (134 lb 4 2 oz)   03/04/22 60 8 kg (134 lb)   02/15/22 60 8 kg (134 lb)     Patient presents with shortness of breath  Reports being shocked by his ICDx2 one week ago (03/08/2022), consequently was unconscious for 8 minutes, did not follow-up with cardiologist   Reports compliance with diuretics, Lopressor and amiodarone  Discharged home on 6 L back in January, states he was weaned down to 4 L  · Interrogation revealed 3 episodes of VFib receiving shock, several episodes of V-tach which converted spontaneously  · Endorses dyspnea at rest, orthopnea, PND  · Vital signs stable, patient is not requiring additional O2  Currently satting 96% on 4 L  · High-sens Troponin 54  · ProBNP 12,000  · ECG shows ventricularly paced rhythm, RBBB, nonischemic  · CXR shows bilateral ground-glass opacities ( likely COVID fibrosis), as well as increased pulmonary congestion  · Received 80 mg IV Lasix in the ED    Plan:  - continue with IV Lasix 40 mg BID  - monitor on telemetry  - daily weights  -strict I&Os  - cardiology consult  Appreciate recs

## 2022-03-16 NOTE — CONSULTS
Consult - Cardiology   [facility] A Akosua 61 y o  male MRN: [id number]  Unit/Bed#: [place] [address] 2 -02 Encounter: [id number]        Reason For Consult:  Acute heart failure, appropriate ICD shock               ASSESSMENT:  Ventricular fibrillation/ventricular tachycardia  Dual-chamber biventricular Medtronic ICD in-situ, 2/24/15   - device interrogation reviewed with electrophysiology team; VT treated with ATP x 3     - additionally, A lead undersensing at 0 1 Mv; not appropriately BiV pacing; battery life of 8 months  - current antiarhythmic Rx, amiodarone 100 mg daily  - current AV blocking Rx, Toprol-XL 75 mg BID  Acute on chronic combined systolic and diastolic congestive heart failure  Ischemic cardiomyopathy   - LVEF 20%, moderate biatrial enlargement, normal functioning mechanical aortic valve prosthesis with mild prostatic valve regurgitation, mild MR with mitral annular calcification, trace to mild TR and trace [place], 1/10/22     - prior hospital discharge weight 53 6 kg (118 lb) on 2/8/22  Coronary artery disease   - S/P PCI with BM S2 LAD and LCx, 2014    - stress test 3/13/17: evidence of prior myocardial infarction of the entire anterior wall  extending to the apex, and the inferior wall extending to the mid- basal inferolateral myocardial wall segments with no evidence of myocardial ischemia, LV reduced at 18%  Rheumatic heart disease/aortic stenosis   - S/P mechanical AVR in the 1980s  Dyslipidemia   - Rx, atorvastatin 80 mg daily  - most recent lipid panel 1/25/22: Cholesterol 87, triglycerides 86, HDL 13, LDL 57  Chronic respiratory failure with hypoxia, on 4 L home O2    Other:  - history of colon cancer, status post colon resection 6/2/16  - drug use, cocaine and marijuana use  - history of COVID-19  - ectasia of the ascending thoracic aorta, 41 mm  - noncompliance    PLAN/ DISCUSSION:     · Reviewed device interrogation with electrophysiology team; VT treated with ATP x 3  Additionally, VT noted on telemetry today  Will increase amiodarone to 200 mg daily to start today  Continue Toprol XL 75 mg BID  · Per interrogation, possible OptiVol fluid accumulation: 2/15/22 and ongoing  Chest x-ray upon admission revealed bilateral airspace disease and pleural effusions  NT pro BNP 19568; S/P furosemide 80 mg IV x 1; currently on furosemide 40 mg IV BID  Will continue same and monitor response  · Mildly elevated troponin with high sensitivity troponin trend of 54, 57, 56  Suspect due to demand ischemia in the setting of VT, acute heart failure with underlying cardiomyopathy  · Continue atorvastatin 80 mg daily, aspirin 81 mg daily  · Continue anticoagulation with warfarin  · Will hold lisinopril for now in light of hypotension  · Monitor volume status with strict intake/output  · Monitor renal function and electrolytes closely; maintain potassium > 4, magnesium > 2    · Hemoglobin down trending; recommend to check iron panel  Monitor in the setting of anticoagulation  · Will continue discussion of plan with electrophysiology team in regards to ICD interrogation as above  · Addendum: Plan to transfer patient to the [facility] for electrophysiology evaluation  History Of Present Illness:  58-year-old male presented to [facility] with shortness of breath  Per patient, symptoms reported approximately 1 week ago and have progressively getting worse thus prompting him to him to seek evaluation in the emergency department  Per patient, he was felt as though he was shocked on Tuesday, 3/8/22 by his device, with 8 minutes of loss of consciousness to follow  Since then, patient has noted to have increased lethargy and short of breath  He states that he has been compliant with his outpatient medications and he has not been using any drugs   Upon arrival to the emergency department, noted elevated proBNP in addition to increased pulmonary congestion on chest x-ray warranting initiation of IV diuretics  Upon assessment and evaluation, patient resting comfortably in bed  Patient states that he feels slightly better than when he presented to the emergency department  He denies any current shortness of breath or chest pain  He denies palpitations, dizziness, lightheadedness  Please see significant cardiac history and problems enumerated above  Patient follows with Dr Evelio Camara with advanced heart failure and Dr Indiana nye with electrophysiology as an outpatient  Office notes reviewed  Past Medical History:        Past Medical History:   Diagnosis Date    AICD (automatic cardioverter/defibrillator) present     medtronic     CAD (coronary artery disease)     [facility] [address] [facility]     Cardiac disease     [facility]     mixed  predominently nonischemic    Colonic mass     Coronary artery disease     History of ventricular fibrillation 9/2/2021    History of ventricular tachycardia 5/25/2021    Hyperlipidemia     Hypertension     Irregular heart beat     Myocardial infarction ([facility] [address] )     2014 and 2015    Pneumonia 1/26/2022    Rectal bleeding     S/P AVR (aortic valve replacement)     mechanical    Wears glasses       Past Surgical History:   Procedure Laterality Date    AORTIC VALVE REPLACEMENT      APPENDECTOMY      CARDIAC DEFIBRILLATOR PLACEMENT      COLECTOMY MADELIN Right     Last Assessed: 6/7/2016    COLECTOMY LAPAROSCOPIC      COLON SURGERY Right     colectomy    CORONARY STENT PLACEMENT      INSERT / Meriam Che / REMOVE PACEMAKER      MITRAL VALVE REPAIR      MITRAL VALVE REPAIR      MOUTH SURGERY      RI COLONOSCOPY FLX DX W/COLLJ [address] WHEN PFRMD N/A 1/15/2018    Procedure: COLONOSCOPY;  Surgeon: Jordon Robert MD;  Location: [facility];   Service: General    RI LAP,SURG,COLECTOMY, PARTIAL, W/ANAST N/A 6/2/2016    Procedure: RESECTION COLON RIGHT LAPAROSCOPIC HAND-ASSISTED;  Surgeon: Jordon Robert MD;  Location: [facility];  Service: General Allergy:        No Known Allergies    Medications:       Prior to Admission medications    Medication Sig Start Date End Date Taking?  Authorizing Provider   amiodarone 100 mg tablet Take 1 tablet (100 mg total) by mouth daily with breakfast 1/13/22   Mckenna Jama MD   aspirin 81 MG tablet Take 81 mg by mouth daily      Historical Provider, MD   atorvastatin (LIPITOR) 80 mg tablet take 1 tablet by mouth once daily 3/14/22   Iesha Mendoza DO   eplerenone (INSPRA) 25 mg tablet take 1 tablet by mouth once daily 1/31/22   Iesha Mendoza DO   furosemide (LASIX) 20 mg tablet Take 1 tablet (20 mg total) by mouth 2 (two) times a day 3/1/22 6/29/22  Iesha Mendoza DO   lisinopril (ZESTRIL) 10 mg tablet Take 1 tablet (10 mg total) by mouth daily at bedtime 3/1/22 6/29/22  Iesha Mendoza DO   metoprolol succinate (TOPROL-XL) 25 mg 24 hr tablet Take 3 tablets (75 mg total) by mouth every 12 (twelve) hours 3/1/22   Iesha Mendoza DO   warfarin (COUMADIN) 2 5 mg tablet Take 1 tablet (2 5 mg total) by mouth daily 2/8/22 3/10/22  Cortney Roth MD       Family History:     Family History   Problem Relation Age of Onset    Diabetes Mother     Hypertension Mother     Valvular heart disease Mother     Valvular heart disease Father     Alcohol abuse Father         Social History:       Social History     Socioeconomic History    Marital status: /Civil Union     Spouse name: None    Number of children: None    Years of education: None    Highest education level: None   Occupational History    None   Tobacco Use    Smoking status: Never Smoker    Smokeless tobacco: Never Used   Vaping Use    Vaping Use: Never used   Substance and Sexual Activity    Alcohol use: Not Currently    Drug use: Not Currently     Types: Marijuana    Sexual activity: None   Other Topics Concern    None   Social History Narrative    None     Social Determinants of Health     Financial Resource Strain: Not on file   Food Insecurity: No Food Insecurity    Worried About Running Out of Food in the Last Year: Never true    Bud of Food in the Last Year: Never true   Transportation Needs: No Transportation Needs    Lack of Transportation (Medical): No    Lack of Transportation (Non-Medical): No   Physical Activity: Not on file   Stress: Not on file   Social Connections: Not on file   Intimate Partner Violence: Not on file   Housing Stability: High Risk    Unable to Pay for Housing in the Last Year: No    Number of Places Lived in the Last Year: 1    Unstable Housing in the Last Year: Yes       ROS:  Negative except otherwise aforementioned above  Remainder review of systems is negative    Exam:  General:  alert, oriented and in no distress, cooperative  Head: Normocephalic, atraumatic  Eyes:  EOMI  Pupils - equal, round, reactive to accomodation  No icterus  Normal Conjunctiva  Oropharynx: moist and normal-appearing mucosa  Neck: supple, symmetrical, trachea midline   Heart: regular rate, regular rhythm, brisk mechanical heart sounds  Respiratory effort / Chest Inspection: unlabored  Lungs: diminished breath sounds bilateral bases  Abdomen: flat, normal findings: bowel sounds normal and soft, non-tender  Lower Limbs: no overt bilateral lower extremity edema  Musculoskeletal: ROM grossly normal    DATA:      ECG:                           Echocardiogram completed on 1/10/22:         1  Moderately dilated left ventricular cavity with markedly reduced left ventricular systolic function with global hypokinesis  Ejection fraction is estimated as around 20%  Indeterminate diastolic dysfunction grade  2  Normal right ventricular size and visually normal right ventricular systolic function  3  Moderate biatrial enlargement  4  Normal functioning mechanical aortic valve prosthesis with mild prosthetic valve regurgitation  5  Mitral annular calcification and leaflet sclerosis and thickening, mild mitral valve regurgitation    6  Trace to mild tricuspid and trace pulmonic valve regurgitation  7  No obvious pulmonary hypertension  8  No pericardial effusion  Ischemic Testing/ stress completed on 3/13/17:  SUMMARY:  -  Stress results: There was no chest pain during stress  -  ECG conclusions: The stress ECG was non-diagnostic due to paced rhythm and resting ST/T wave abnormality   -  Perfusion imagin) There was a large, severe, fixed myocardial perfusion defect of the entire anterior wall, with a complete fixed perfusion defect of the apex without reversibility  2) There was a large, severe, fixed myocardial perfusion defect of the entire inferior wall, extending to the mid-basal inferolateral wall without reversibility   -  Gated SPECT: The calculated left ventricular ejection fraction was 18 %  Left ventricular ejection fraction was markedly decreased by visual estimate  There was severe global left ventricular hypokinesis      IMPRESSIONS: Abnormal study after pharmacologic vasodilation  The left ventricle was dilated  There was evidence of prior myocardial infarction of the entire anterior wall extending to the apex, and the inferior wall exending to the  mid-basal inferolateral myocardial wall segments with no evidence of myocardial ischemia  Left ventricular systolic function was reduced, without distinct regional wall motion abnormalities  Weights: Wt Readings from Last 3 Encounters:   22 60 9 kg (134 lb 4 2 oz)   22 60 8 kg (134 lb)   02/15/22 60 8 kg (134 lb)   , Body mass index is 21 67 kg/m²           Lab Studies:             Results from last 7 days   Lab Units 03/16/22  0434 03/15/22  2007   WBC Thousand/uL 13 59* 10 88*   HEMOGLOBIN g/dL 9 1* 10 7*   HEMATOCRIT % 28 1* 33 1*   PLATELETS Thousands/uL 316 348   ,   Results from last 7 days   Lab Units 03/16/22  0434 03/15/22  2007   POTASSIUM mmol/L 4 2 4 4   CHLORIDE mmol/L 97* 96*   CO2 mmol/L 30 32   BUN mg/dL 13 9   CREATININE mg/dL 1 07 1 07   CALCIUM mg/dL 8 2* 8 2* Statement Selected

## 2022-03-16 NOTE — ASSESSMENT & PLAN NOTE
Patient has prior history of marijuana and cocaine use  History of medication noncompliance  Drug screen pending

## 2022-03-16 NOTE — WOUND OSTOMY CARE
Consult Note - Wound   Junaid [place] 61 y o  male MRN: [id number]  Unit/Bed#: [place] [address] -02 Encounter: [id number]      History and Present Illness:  61year old male presented to the hospital with shortness of breath  Assessment Findings:    Patient agreeable to assessment  He is continent  Able to reposition himself, sitting at edge of bed  Transfers with standby assist   Bilateral heels intact  Mild scaling to lower legs  1   Left great toe dry, black, well adhered eschar--patient denies trauma to this area  States he had a blister which is re-absorbing  He reports the area throbs at times  Anni-wound with mild edema and hyperpigmentation  Pedal pulses not palpable  Feet warm, dry, flaky  No intervention for toe eschar at this time  Dr Jesus Stafford made aware that patient would benefit from a podiatry consultation and vascular evaluation of lower extremities  Wound care team will sign-off at this time        Wound 03/16/22 Toe (Comment  which one) Left (Active)   Wound Image     03/16/22 1025   Wound Description Black;Dry 03/16/22 1025   Anni-wound Assessment Hyperpigmented;Edema 03/16/22 1025   Wound Length (cm) 1 8 cm 03/16/22 1025   Wound Width (cm) 2 2 cm 03/16/22 1025   Wound Depth (cm) 0 cm 03/16/22 1025   Wound Surface Area (cm^2) 3 96 cm^2 03/16/22 1025   Wound Volume (cm^3) 0 cm^3 03/16/22 1025   Calculated Wound Volume (cm^3) 0 cm^3 03/16/22 1025   Drainage Amount None 03/16/22 1025   Dressing Open to air 03/16/22 [address] FLORENCE, RN, [employer]

## 2022-03-17 PROBLEM — F19.90 SUBSTANCE USE: Status: ACTIVE | Noted: 2021-01-01

## 2022-03-17 PROBLEM — T82.198A MALFUNCTION OF IMPLANTABLE DEFIBRILLATOR VENTRICULAR (ICD) LEAD: Status: ACTIVE | Noted: 2022-01-01

## 2022-03-17 NOTE — RESPIRATORY THERAPY NOTE
RT Protocol Note  Gabo Alegre 61 y o  male MRN: [id number]  Unit/Bed#: [facility] 530-01 Encounter: [id number]    Assessment    Principal Problem:    Malfunction of implantable defibrillator ventricular (ICD) lead  Active Problems:    Coronary artery disease involving native coronary artery of native heart without angina pectoris    S/P AVR (aortic valve replacement)    Substance use    Acute on chronic combined systolic and diastolic congestive heart failure (HCC)    Chronic respiratory failure with hypoxia (HCC)    Eschar of toe    Hypertension    Hyperlipidemia      Home Pulmonary Medications:  Alb prn  Home Devices/Therapy: (P) Home O2    Past Medical History:   Diagnosis Date    AICD (automatic cardioverter/defibrillator) present     medtronic     CAD (coronary artery disease)     [facility] [address] [facility]     Cardiac disease     [facility]     mixed    predominently nonischemic    Colonic mass     Coronary artery disease     History of ventricular fibrillation 9/2/2021    History of ventricular tachycardia 5/25/2021    Hyperlipidemia     Hypertension     Irregular heart beat     Myocardial infarction [facility])     2014 and 2015    Pneumonia 1/26/2022    Rectal bleeding     S/P AVR (aortic valve replacement)     mechanical    Wears glasses      Social History     Socioeconomic History    Marital status: /Civil Union     Spouse name: Not on file    Number of children: Not on file    Years of education: Not on file    Highest education level: Not on file   Occupational History    Not on file   Tobacco Use    Smoking status: Never Smoker    Smokeless tobacco: Never Used   Vaping Use    Vaping Use: Never used   Substance and Sexual Activity    Alcohol use: Not Currently    Drug use: Not Currently     Types: Marijuana    Sexual activity: Not on file   Other Topics Concern    Not on file   Social History Narrative    Not on file     Social Determinants of Health     Financial Resource Strain: Not on file   Food Insecurity: No Food Insecurity    Worried About Running Out of Food in the Last Year: Never true    Bud of Food in the Last Year: Never true   Transportation Needs: No Transportation Needs    Lack of Transportation (Medical): No    Lack of Transportation (Non-Medical): No   Physical Activity: Not on file   Stress: Not on file   Social Connections: Not on file   Intimate Partner Violence: Not on file   Housing Stability: High Risk    Unable to Pay for Housing in the Last Year: No    Number of Places Lived in the Last Year: 1    Unstable Housing in the Last Year: Yes       Subjective         Objective    Physical Exam:   Assessment Type: Assess only  General Appearance: Awake,Alert  Respiratory Pattern: (P) Dyspnea with exertion  Chest Assessment: (P) Chest expansion symmetrical  Bilateral Breath Sounds: (P) Crackles,Coarse  Cough: (P) Non-productive    Vitals:  Blood pressure 150/76, pulse 64, temperature 97 5 °F (36 4 °C), temperature source Oral, resp  rate 16, height 5' 6" (1 676 m), weight 60 6 kg (133 lb 9 6 oz), SpO2 98 %  Imaging and other studies: I have personally reviewed pertinent reports  Plan    Respiratory Plan: Mild Distress pathway        Resp Comments: pt wears homke02 and takes neb at home prn  pt on4 l nc and bs dimished and course  pt sttaes he feels alot better  no prn needed at this time  will order alb prn

## 2022-03-17 NOTE — DISCHARGE SUMMARY
[facility]  Discharge- [address] Nieto 1965, 61 y o  male MRN: [id number]  Unit/Bed#: [place] 68 2 -02 Encounter: [id number]  Primary Care Provider: NICOLE Thorpe   Date and time admitted to hospital: 3/15/2022  6:41 PM      Admission Date: 3/15/2022       Discharge Date: 3/17/2022     Primary Diagnoses  Principal Problem:    Ventricular tachycardia ([facility] )  Active Problems:    Acute on chronic combined systolic and diastolic congestive heart failure ([facility] )    Coronary artery disease involving native coronary artery of native heart without angina pectoris    S/P AVR (aortic valve replacement)    Elevated troponin    Chronic respiratory failure with hypoxia ([place] [address] )    History of substance abuse ([place] [address] )    Eschar of toe  Resolved Problems:    * No resolved hospital problems  [facility] Course by problem:   * Ventricular tachycardia ([place] [address] )  Assessment & Plan  -patient has a previous history of ventricular fibrillation/ventricular tachycardia status post dual chamber [employer] ICD placement  -he is maintained on albuterol and 100 mg daily and Toprol XL 75 mg q 12 hours as an outpatient  -patient presented to the ER reporting a defibrillator discharge 03/08 and subsequent week of worsening dyspnea  -patient was evaluated in consultation with the cardiology team, and his device was interrogated  -patient was noted to have ventricular tachycardia treated with ATP x3  -there is concerned that 1 of patient's lead is not functioning appropriately  -patient had additional run of ventricular tachycardia after admission:  His amiodarone dose was increased to 200 mg daily 1/16  -case was discussed with the electrophysiology team who requested transfer to [facility] for further EP evaluation  -pt will be transferred there today     Acute on chronic combined systolic and diastolic congestive heart failure [facility])  Assessment & Plan  -patient has a history of chronic combined systolic and diastolic congestive heart failure  -most recent 2D echocardiogram 1/22 with left ventricular ejection fraction 20%, global hypokinesis  Indeterminate diastolic dysfunction  Moderate biatrial enlargement   -patient presented with worsening dyspnea over the past week, and was noted to have an acute exacerbation of his chronic combined congestive heart failure  -continue diuresed with IV Lasix 40 mg IV b i d :  Status post 80 mg IV Lasix x1 initially  -patient notes improvement in his dyspnea  -proBNP was 12,000: However had improved from 01/22 proBNP 31,000  -chest x-ray with bilateral pleural effusion  -continue diuresis with IV Lasix    Continue beta-blocker  -previously on eplerenone        Eschar of toe  Assessment & Plan  -pt has eschar left great toe  -was evaluated by podiatry  -LEADS pending  -cont local wound care     History of substance abuse [facility])  Assessment & Plan  Patient has prior history of marijuana and cocaine use  History of medication noncompliance  Drug screen negative     Chronic respiratory failure with hypoxia [facility])  Assessment & Plan  Patient has a history of chronic respiratory failure with hypoxia  Was admitted January 2022 with COVID pneumonia, and hypoxia  Chronically on 4 L nasal cannula prior to this admission  Cardiology team recommends CT chest for further eval:  Pt with h/o copd, plus covid with possible subsequent fibrosis     Elevated troponin  Assessment & Plan  Patient noted to have elevated troponin on admission:  54--57--56  Most likely secondary to non MI troponin elevation secondary to congestive heart failure  Appreciate cardiology evaluation and recommendation     S/P AVR (aortic valve replacement)  Assessment & Plan  Patient has a history of rheumatic heart disease and is status post previous mechanical aortic valve replacement in the 1980s  Patient is on chronic anticoagulation with Coumadin  Most recent echocardiogram 1/22 with appropriately functioning valve with mild AR  Patient follows with [facility] Coumadin clinic:  Most recent visit 03/10, current dose is 2 5 mg daily except for 1 25 mg on Monday  INR subtherapeutic at 1 9--> increased Coumadin to 3 mg daily however follow up INR today decreased further to 1 3  Communicated with receiving physician (pt has already arrived at [address]  [facility]) regarding starting iv heparin drip for bridging therapy  Recheck INR in a m      Coronary artery disease involving native coronary artery of native heart without angina pectoris  Assessment & Plan  Patient has a history of coronary artery disease with previous PCI and bare metal stent placement in LAD and circumflex in 2014  Continue medical therapy with aspirin, beta-blocker, statin  Patient has a prior history of cocaine use, which made beta-blocker use concerning:  UDS negative this admission    COPD:  Patient dyspneic this morning, however no significant crackles, and improved volume overload status    Dyspnea likely secondary to COPD:  Patient started on scheduled nebulizer treatments  Per Cardiology:  Recommend follow-up CT scan to evaluate possibility of fibrosis status post COVID 1/22        Service:  [facility], [facility]      Consulting Providers   Cardiology:  Dr Jaqueline Valdez     Procedures Performed   none     Hospital Studies:  3/15:  Chest x-ray Enlargement of cardiac silhouette with bilateral pulmonary airspace disease and pleural effusions   Pattern most suggestive for CHF/pulmonary edema superimposed upon more chronic pulmonary changes     Results from last 7 days   Lab Units 03/17/22 0927 03/16/22  0434 03/15/22  2007   WBC Thousand/uL 10 40* 13 59* 10 88*   HEMOGLOBIN g/dL 10 6* 9 1* 10 7*   HEMATOCRIT % 33 8* 28 1* 33 1*   PLATELETS Thousands/uL 319 316 348         Results from last 7 days   Lab Units 03/17/22 0927 03/16/22  0434 03/15/22  2007   POTASSIUM mmol/L 4 3 4 2 4 4   CHLORIDE mmol/L 97* 97* 96* CO2 mmol/L 30 30 32   BUN mg/dL 14 13 9   CREATININE mg/dL 1 04 1 07 1 07   CALCIUM mg/dL 8 3 8 2* 8 2*     History and Physical Exam:  Please refer to the Admission H&P note     Discharge Condition: Improved  Discharge Disposition:  [facility] for EP evaluation     Discharge Note and Physical Exam:   Patient was examined and interviewed early today while eating breakfast   He is sitting up at the bedside eating  He appears dyspneic  He denies any shortness of breath  He denies any cough  He denies any pain  He denies any complaints at all      Vitals:    03/17/22 1517   BP: 114/71   Pulse: 67   Resp: 16   Temp: 98 1 °F (36 7 °C)   SpO2: 97%     General:  Pleasant, male  Sitting upright at the bedside eating breakfast   Appears dyspneic  Speaking and multiple word phrases  Heart: Regular rate and rhythm, S1S2 present  No murmur, rub or gallop  Lungs:  Coarse breath sounds bilaterally  Poor air movement  However no wheezes, crackles, rhonchi  Prolonged end expiratory phase  No accessory muscle use or respiratory distress  Abdomen: soft, non-tender, non-distended, NABS  No rebound or guarding  No mass or peritoneal signs  Extremities: no clubbing, cyanosis or edema  2+ pedal pulses bilaterally  Neurologic:  Awake alert  Skin: warm and dry  No petechiae, purpura or rash         Discharge Medications   Please see Medical Reconciliation Discharge Form     Discharge Follow Up Appointments:    Will be arranged at discharge from [facility]     Discharge  Statement   Total Time Spent today including physical exam, discussion with patient and discharge arrangements/care = 36 minutes      Discussed with patient's nurse  Called RT and requested neb tx  Discussed with   Discussed with cardiologist, Dr Umu Bruno    Family: updated carlosdagmar Woodward via phone        Communicated with receiving physician: Dr Christopher Gaxiola regarding subtherapeutic INR and recommendations for heparin infusion bridging therapy        This note has been constructed using a voice recognition system

## 2022-03-17 NOTE — ASSESSMENT & PLAN NOTE
- Last SpO2: 92 % (03/18/22 1127) on Nasal Cannula O2 Flow Rate (L/min): 3 L/min     · On chart review, patient was recently hospitalized back in 1/2022 for both COVID and bacterial PNA, requiring intubation and ICU management  Has been hospitalized twice afterwards 2/2 hypoxia and volume overload (from HF with severe pulmonary HTN)  · Previously on 2L before hospitalization > 4L NC O2 is his current baseline  · Follows with pulmonology with last visit in 2/15/2022 - concern for development of secondary fibrosis as well as bronchiectasis given continued hypoxia  Planned for Chest CT and PFTs in 4 weeks from visit, which have not been completed yet  Recommended that patient trial Breo daily and to use 6L NC for increased activity  May benefit from pulmonary rehab in the future       Plan:  - Titrate O2 NC as needed for goal SO2 > 90%  - Respiratory protocol  - Will add [address] Breo with albuterol PRN

## 2022-03-17 NOTE — PLAN OF CARE
Problem: Potential for Falls  Goal: Patient will remain free of falls  Description: INTERVENTIONS:  - Educate patient/family on patient safety including physical limitations  - Instruct patient to call for assistance with activity   - Consult OT/PT to assist with strengthening/mobility   - Keep Call bell within reach  - Keep bed low and locked with side rails adjusted as appropriate  - Keep care items and personal belongings within reach  - Initiate and maintain comfort rounds  - Make Fall Risk Sign visible to staff  - Offer Toileting every  Hours, in advance of need  - Initiate/Maintain alarm  - Obtain necessary fall risk management equipment  - Apply yellow socks and bracelet for high fall risk patients  - Consider moving patient to room near nurses station  Outcome: Progressing     Problem: MOBILITY - ADULT  Goal: Maintain or return to baseline ADL function  Description: INTERVENTIONS:  - Educate patient/family on patient safety including physical limitations  - Instruct patient to call for assistance with activity   - Consult OT/PT to assist with strengthening/mobility   - Keep Call bell within reach  - Keep bed low and locked with side rails adjusted as appropriate  - Keep care items and personal belongings within reach  - Initiate and maintain comfort rounds  - Make Fall Risk Sign visible to staff  - Offer Toileting every  Hours, in advance of need  - Initiate/Maintain alarm  - Obtain necessary fall risk management equipment:   - Apply yellow socks and bracelet for high fall risk patients  - Consider moving patient to room near nurses station  Outcome: Progressing  Goal: Maintains/Returns to pre admission functional level  Description: INTERVENTIONS:  - Perform BMAT or MOVE assessment daily    - Set and communicate daily mobility goal to care team and patient/family/caregiver  - Collaborate with rehabilitation services on mobility goals if consulted  - Perform Range of Motion 3 times a day    - Reposition patient every 2 hours  - Out of bed to chair 3 times a day   - Out of bed for meals 3  times a day  - Out of bed for toileting  - Record patient progress and toleration of activity level   Outcome: Progressing     Problem: Nutrition/Hydration-ADULT  Goal: Nutrient/Hydration intake appropriate for improving, restoring or maintaining nutritional needs  Description: Monitor and assess patient's nutrition/hydration status for malnutrition  Collaborate with interdisciplinary team and initiate plan and interventions as ordered  Monitor patient's weight and dietary intake as ordered or per policy  Utilize nutrition screening tool and intervene as necessary  Determine patient's food preferences and provide high-protein, high-caloric foods as appropriate       INTERVENTIONS:  - Monitor oral intake, urinary output, labs, and treatment plans  - Assess nutrition and hydration status and recommend course of action  - Evaluate amount of meals eaten  - Assist patient with eating if necessary   - Allow adequate time for meals  - Recommend/ encourage appropriate diets, oral nutritional supplements, and vitamin/mineral supplements  - Order, calculate, and assess calorie counts as needed  - Recommend, monitor, and adjust tube feedings and TPN/PPN based on assessed needs  - Assess need for intravenous fluids  - Provide specific nutrition/hydration education as appropriate  - Include patient/family/caregiver in decisions related to nutrition  Outcome: Progressing     Problem: PAIN - ADULT  Goal: Verbalizes/displays adequate comfort level or baseline comfort level  Description: Interventions:  - Encourage patient to monitor pain and request assistance  - Assess pain using appropriate pain scale  - Administer analgesics based on type and severity of pain and evaluate response  - Implement non-pharmacological measures as appropriate and evaluate response  - Consider cultural and social influences on pain and pain management  - Notify physician/advanced practitioner if interventions unsuccessful or patient reports new pain  Outcome: Progressing     Problem: INFECTION - ADULT  Goal: Absence or prevention of progression during hospitalization  Description: INTERVENTIONS:  - Assess and monitor for signs and symptoms of infection  - Monitor lab/diagnostic results  - Monitor all insertion sites, i e  indwelling lines, tubes, and drains  - Monitor endotracheal if appropriate and nasal secretions for changes in amount and color  - [place] appropriate cooling/warming therapies per order  - Administer medications as ordered  - Instruct and encourage patient and family to use good hand hygiene technique  - Identify and instruct in appropriate isolation precautions for identified infection/condition  Outcome: Progressing     Problem: SAFETY ADULT  Goal: Patient will remain free of falls  Description: INTERVENTIONS:  - Educate patient/family on patient safety including physical limitations  - Instruct patient to call for assistance with activity   - Consult OT/PT to assist with strengthening/mobility   - Keep Call bell within reach  - Keep bed low and locked with side rails adjusted as appropriate  - Keep care items and personal belongings within reach  - Initiate and maintain comfort rounds  - Make Fall Risk Sign visible to staff  - Offer Toileting every  Hours, in advance of need  - Initiate/Maintain alarm  - Obtain necessary fall risk management equipment  - Apply yellow socks and bracelet for high fall risk patients  - Consider moving patient to room near nurses station  Outcome: Progressing  Goal: Maintain or return to baseline ADL function  Description: INTERVENTIONS:  - Educate patient/family on patient safety including physical limitations  - Instruct patient to call for assistance with activity   - Consult OT/PT to assist with strengthening/mobility   - Keep Call bell within reach  - Keep bed low and locked with side rails adjusted as appropriate  - Keep care items and personal belongings within reach  - Initiate and maintain comfort rounds  - Make Fall Risk Sign visible to staff  - Offer Toileting every  Hours, in advance of need  - Initiate/Maintain alarm  - Obtain necessary fall risk management equipment:   - Apply yellow socks and bracelet for high fall risk patients  - Consider moving patient to room near nurses station  Outcome: Progressing  Goal: Maintains/Returns to pre admission functional level  Description: INTERVENTIONS:  - Perform BMAT or MOVE assessment daily    - Set and communicate daily mobility goal to care team and patient/family/caregiver  - Collaborate with rehabilitation services on mobility goals if consulted  - Perform Range of Motion 3 times a day  - Reposition patient every 2 hours  - Out of bed to chair 3 times a day   - Out of bed for meals 3 times a day  - Out of bed for toileting  - Record patient progress and toleration of activity level   Outcome: Progressing     Problem: DISCHARGE PLANNING  Goal: Discharge to home or other facility with appropriate resources  Description: INTERVENTIONS:  - Identify barriers to discharge w/patient and caregiver  - Arrange for needed discharge resources and transportation as appropriate  - Identify discharge learning needs (meds, wound care, etc )  - Arrange for interpretive services to assist at discharge as needed  - Refer to Case Management Department for coordinating discharge planning if the patient needs post-hospital services based on physician/advanced practitioner order or complex needs related to functional status, cognitive ability, or social support system  Outcome: Progressing     Problem: Knowledge Deficit  Goal: Patient/family/caregiver demonstrates understanding of disease process, treatment plan, medications, and discharge instructions  Description: Complete learning assessment and assess knowledge base    Interventions:  - Provide teaching at level of understanding  - Provide teaching via preferred learning methods  Outcome: Progressing     Problem: CARDIOVASCULAR - ADULT  Goal: Maintains optimal cardiac output and hemodynamic stability  Description: INTERVENTIONS:  - Monitor I/O, vital signs and rhythm  - Monitor for S/S and trends of decreased cardiac output  - Administer and titrate ordered vasoactive medications to optimize hemodynamic stability  - Assess quality of pulses, skin color and temperature  - Assess for signs of decreased coronary artery perfusion  - Instruct patient to report change in severity of symptoms  Outcome: Progressing  Goal: Absence of cardiac dysrhythmias or at baseline rhythm  Description: INTERVENTIONS:  - Continuous cardiac monitoring, vital signs, obtain 12 lead EKG if ordered  - Administer antiarrhythmic and heart rate control medications as ordered  - Monitor electrolytes and administer replacement therapy as ordered  Outcome: Progressing     Problem: RESPIRATORY - ADULT  Goal: Achieves optimal ventilation and oxygenation  Description: INTERVENTIONS:  - Assess for changes in respiratory status  - Assess for changes in mentation and behavior  - Position to facilitate oxygenation and minimize respiratory effort  - Oxygen administered by appropriate delivery if ordered  - Initiate smoking cessation education as indicated  - Encourage broncho-pulmonary hygiene including cough, deep breathe, Incentive Spirometry  - Assess the need for suctioning and aspirate as needed  - Assess and instruct to report SOB or any respiratory difficulty  - Respiratory Therapy support as indicated  Outcome: Progressing     Problem: GASTROINTESTINAL - ADULT  Goal: Maintains adequate nutritional intake  Description: INTERVENTIONS:  - Monitor percentage of each meal consumed  - Identify factors contributing to decreased intake, treat as appropriate  - Assist with meals as needed  - Monitor I&O, weight, and lab values if indicated  - Obtain nutrition services referral as needed  Outcome: Progressing     Problem: GENITOURINARY - ADULT  Goal: Maintains or returns to baseline urinary function  Description: INTERVENTIONS:  - Assess urinary function  - Encourage oral fluids to ensure adequate hydration if ordered  - Administer IV fluids as ordered to ensure adequate hydration  - Administer ordered medications as needed  - Offer frequent toileting  - Follow urinary retention protocol if ordered  Outcome: Progressing     Problem: METABOLIC, FLUID AND ELECTROLYTES - ADULT  Goal: Electrolytes maintained within normal limits  Description: INTERVENTIONS:  - Monitor labs and assess patient for signs and symptoms of electrolyte imbalances  - Administer electrolyte replacement as ordered  - Monitor response to electrolyte replacements, including repeat lab results as appropriate  - Instruct patient on fluid and nutrition as appropriate  Outcome: Progressing  Goal: Fluid balance maintained  Description: INTERVENTIONS:  - Monitor labs   - Monitor I/O and WT  - Instruct patient on fluid and nutrition as appropriate  - Assess for signs & symptoms of volume excess or deficit  Outcome: Progressing     Problem: SKIN/TISSUE INTEGRITY - ADULT  Goal: Skin Integrity remains intact(Skin Breakdown Prevention)  Description: Assess:  -Perform Arben assessment every   -Clean and moisturize skin every   -Inspect skin when repositioning, toileting, and assisting with ADLS  -Assess under medical devices such as  every   -Assess extremities for adequate circulation and sensation     Bed Management:  -Have minimal linens on bed & keep smooth, unwrinkled  -Change linens as needed when moist or perspiring  -Avoid sitting or lying in one position for more than  hours while in bed  -Keep HOB at degrees     Toileting:  -Offer bedside commode  -Assess for incontinence every   -Use incontinent care products after each incontinent episode such as     Activity:  -Mobilize patient  times a day  -Encourage activity and walks on unit  -Encourage or provide ROM exercises   -Turn and reposition patient every  Hours  -Use appropriate equipment to lift or move patient in bed  -Instruct/ Assist with weight shifting every  when out of bed in chair  -Consider limitation of chair time  hour intervals    Skin Care:  -Avoid use of baby powder, tape, friction and shearing, hot water or constrictive clothing  -Relieve pressure over bony prominences using   -Do not massage red bony areas    Next Steps:  -Teach patient strategies to minimize risks such as    -Consider consults to  interdisciplinary teams such as   Outcome: Progressing  Goal: Incision(s), wounds(s) or drain site(s) healing without S/S of infection  Description: INTERVENTIONS  - Assess and document dressing, incision, wound bed, drain sites and surrounding tissue  - Provide patient and family education  - Perform skin care/dressing changes every   Outcome: Progressing  Goal: Pressure injury heals and does not worsen  Description: Interventions:  - Implement low air loss mattress or specialty surface (Criteria met)  - Apply silicone foam dressing  - Instruct/assist with weight shifting every  minutes when in chair   - Limit chair time to  hour intervals  - Use special pressure reducing interventions such as  when in chair   - Apply fecal or urinary incontinence containment device   - Perform passive or active ROM every   - Turn and reposition patient & offload bony prominences every  hours   - Utilize friction reducing device or surface for transfers   - Consider consults to  interdisciplinary teams such as   - Use incontinent care products after each incontinent episode such as  - Consider nutrition services referral as needed  Outcome: Progressing     Problem: HEMATOLOGIC - ADULT  Goal: Maintains hematologic stability  Description: INTERVENTIONS  - Assess for signs and symptoms of bleeding or hemorrhage  - Monitor labs  - Administer supportive blood products/factors as ordered and appropriate  Outcome: Progressing     Problem: MUSCULOSKELETAL - ADULT  Goal: Maintain or return mobility to safest level of function  Description: INTERVENTIONS:  - Assess patient's ability to carry out ADLs; assess patient's baseline for ADL function and identify physical deficits which impact ability to perform ADLs (bathing, care of mouth/teeth, toileting, grooming, dressing, etc )  - Assess/evaluate cause of self-care deficits   - Assess range of motion  - Assess patient's mobility  - Assess patient's need for assistive devices and provide as appropriate  - Encourage maximum independence but intervene and supervise when necessary  - Involve family in performance of ADLs  - Assess for home care needs following discharge   - Consider OT consult to assist with ADL evaluation and planning for discharge  - Provide patient education as appropriate  Outcome: Progressing

## 2022-03-17 NOTE — PROGRESS NOTES
CARDIOLOGY INPATIENT FOLLOW-UP PROGRESS NOTE  *-*-*-*-*-*-*-*-*-*-*-*-*-*-*-*-*-*-*-*-*-*-*-*-*-*-*-*-*-*-*-*-*-*-*-*-*-*-*-*-*-*-*-*-*-*-*-*-*-*-*-*-*-*-  Talia Olvera DATE: 03/17/22 9:05 AM   AUTHOR: Amari Howe MD  PATIENT: Irina Dominguez   1965    [id number]   47 y o    male  INPATIENT HOSPITALIST PHYSICIAN:   DATE OF ADMISSION: 3/15/2022  6:41 PM; LENGTH OF STAY: 2 days  *-*-*-*-*-*-*-*-*-*-*-*-*-*-*-*-*-*-*-*-*-*-*-*-*-*-*-*-*-*-*-*-*-*-*-*-*-*-*-*-*-*-*-*-*-*-*-*-*-*-*-*-*-*-  CARDIOLOGY ASSESSMENT:    1  Recurrent ventricular tachycardia, terminated with ATP therapy x3, on antiarrhythmic therapy with amiodarone  2  Atrial lead malfunction on ICD  3  Ischemic cardiomyopathy  4  Acute decompensated heart failure with underline chronic combined systolic and diastolic heart failure  5  Coronary artery disease, history of BMS to LAD and BMS to LCX in 2014  6  rheumatic heart disease status post mechanical AVR in 1980s  7  Chronic hypoxemic respiratory failure, on oxygen therapy, 0 liters/minute   8  History of colon cancer status post resection June 2016   9  History of cocaine and marijuana abuse   10  History of COVID-19 infection   11  Thoracic aortic aneurysm, 4 1 cm   12  Medication noncompliance   13  Abnormal nuclear stress test in 2017   14  On chronic anticoagulation with warfarin for mechanical valve  *-*-*-*-*-*-*-*-*-*-*-*-*-*-*-*-*-*-*-*-*-*-*-*-*-*-*-*-*-*-*-*-*-*-*-*-*-*-*-*-*-*-*-*-*-*-*-*-*-*-*-*-*-*-    CURRENT CARDIAC MEDICATIONS:  Amiodarone 200 mg daily, aspirin 81 mg daily, atorvastatin 80 mg daily, furosemide 40 mg IV twice daily, metoprolol succinate 75 mg twice daily, warfarin 3 mg daily  PERTINENT LABS AND OTHER INVESTIGATIONS:  Creatinine 1 07, potassium 4 2, magnesium 1 7, troponins have been negative and flat, NT proBNP at presentation was 12,025, iron stores show low iron level and decrease and saturation with normal TIBC and ferritin    Urine tox screen was negative at presentation  INR 1 96 yesterday    ECHOCARDIOGRAM AND OTHER CARDIAC TESTS RESULTS:  Echocardiogram January 2022 showed moderately dilated left ventricular cavity with EF of 20 percent with global hypokinesis, normal right ventricular size and function, moderate biatrial enlargement, mechanical aortic valve prosthesis with mild prosthetic valve regurgitation, mitral annular calcification with mild mitral valve regurgitation, trace to mild tricuspid and trace pulmonic valve regurgitation, no obvious pulmonary hypertension and no pericardial effusion  *-*-*-*-*-*-*-*-*-*-*-*-*-*-*-*-*-*-*-*-*-*-*-*-*-*-*-*-*-*-*-*-*-*-*-*-*-*-*-*-*-*-*-*-*-*-*-*-*-*-*-*-*-*-    Patient appears hemodynamically stable  Continues to diurese but not very significantly  Lung exam is confounded due to severe COPD and possible pulmonary fibrosis  There is no peripheral vascular congestion  Plan is for transfer to [facility] for atrial lead revision and electrophysiology evaluation regarding recurring VT and atrial lead malfunction  Patient has had no VT events on the floor  PLAN:  -- Will continue current medications  -- Recoomend CT chest without contrast as patient was scheduled for one prior admission but did not get it  It will help determining degree of pulm fibrosis, emphysema and status of pleural effusions  -- Transfer to [place] as scheduled  -- Todays labs awaited  Goal INR 2 0-3 0      *-*-*-*-*-*-*-*-*-*-*-*-*-*-*-*-*-*-*-*-*-*-*-*-*-*-*-*-*-*-*-*-*-*-*-*-*-*-*-*-*-*-*-*-*-*-*-*-*-*-*-*-*-*-  INTERVAL CHANGES / HISTORY OF PRESENT ILLNESS:   No acute events overnight  Patient denies any chest pain  His shortness of breath is at baseline  Denies any palpitations or dizziness or lightheadedness      *-*-*-*-*-*-*-*-*-*-*-*-*-*-*-*-*-*-*-*-*-*-*-*-*-*-*-*-*-*-*-*-*-*-*-*-*-*-*-*-*-*-*-*-*-*-*-*-*-*-*-*-*-*    PERTINENT REVIEW OF SYMPTOMS:  As noted above in HPI    *-*-*-*-*-*-*-*-*-*-*-*-*-*-*-*-*-*-*-*-*-*-*-*-*-*-*-*-*-*-*-*-*-*-*-*-*-*-*-*-*-*-*-*-*-*-*-*-*-*-*-*-*-*-  VITAL SIGNS:  Vitals:    22 1743 22 2128 22 0600 22 0731   BP: 128/73 91/61  123/77   BP Location:  Right arm     Pulse: 81 84  86   Resp:  20  16   Temp:  98 3 °F (36 8 °C)  97 6 °F (36 4 °C)   TempSrc:  Oral     SpO2: 90% 96%  96%   Weight:   65 kg (143 lb 4 8 oz)    Height:          Temp (24hrs), Av 9 °F (36 6 °C), Min:97 6 °F (36 4 °C), Max:98 3 °F (36 8 °C)  Current: Temperature: 97 6 °F (36 4 °C)    Weight    03/15/22 1840 03/15/22 2225 22 0439 22 0536   Weight: 60 9 kg (134 lb 4 2 oz) 60 9 kg (134 lb 4 2 oz) 60 9 kg (134 lb 4 2 oz) 60 9 kg (134 lb 4 2 oz)    22 0600   Weight: 65 kg (143 lb 4 8 oz)      Body mass index is 23 13 kg/m²   Wt Readings from Last 3 Encounters:   22 65 kg (143 lb 4 8 oz)   22 60 8 kg (134 lb)   02/15/22 60 8 kg (134 lb)      Intake/Output Summary (Last 24 hours) at 3/17/2022 0905  Last data filed at 3/17/2022 0657  Gross per 24 hour   Intake 720 ml   Output 1200 ml   Net -480 ml          PREVIOUS WEIGHTS:   Wt Readings from Last 25 Encounters:   22 65 kg (143 lb 4 8 oz)   22 60 8 kg (134 lb)   02/15/22 60 8 kg (134 lb)   22 53 6 kg (118 lb 2 7 oz)   22 57 kg (125 lb 10 6 oz)   22 56 6 kg (124 lb 12 5 oz)   21 62 4 kg (137 lb 8 oz)   09/10/21 61 2 kg (135 lb)   21 62 6 kg (138 lb 0 1 oz)   21 62 9 kg (138 lb 9 6 oz)   21 61 7 kg (136 lb)   21 67 9 kg (149 lb 9 6 oz)   20 63 2 kg (139 lb 5 3 oz)   20 63 1 kg (139 lb 3 2 oz)   20 64 5 kg (142 lb 4 8 oz)   20 66 8 kg (147 lb 4 8 oz)   10/11/19 68 kg (150 lb)   07/10/19 69 4 kg (152 lb 14 4 oz)   19 74 8 kg (165 lb)   19 74 8 kg (165 lb)   18 73 9 kg (162 lb 14 4 oz)   18 73 kg (161 lb)   18 72 3 kg (159 lb 6 4 oz)   18 71 6 kg (157 lb 13 6 oz) 01/19/18 69 7 kg (153 lb 8 9 oz)      *-*-*-*-*-*-*-*-*-*-*-*-*-*-*-*-*-*-*-*-*-*-*-*-*-*-*-*-*-*-*-*-*-*-*-*-*-*-*-*-*-*-*-*-*-*-*-*-*-*-*-*-*-*-  PHYSICAL EXAM:  General Appearance:    Alert, cooperative, in no respiratory distress on oxygen at 4 liters/minute  Head, Eyes, ENT:    No obvious abnormality, moist mucous mebranes  Neck:   Supple, no carotid bruit or JVD   Back:     Symmetric, no curvature  Lungs:     Respirations unlabored  decreased breath sounds bilaterally diffusely without significant crackles  ,    Chest wall:    No tenderness or deformity   Heart:    Regular rate and rhythm interrupted with premature beats, mechanical HSs, no murmur, rub  or gallop  Abdomen:     Soft, non-tender,    Extremities:   Extremities warm, no cyanosis or edema    Skin:    dry gangrenous wound in left lower extremity great toe  *-*-*-*-*-*-*-*-*-*-*-*-*-*-*-*-*-*-*-*-*-*-*-*-*-*-*-*-*-*-*-*-*-*-*-*-*-*-*-*-*-*-*-*-*-*-*-*-*-*-*-*-*-*-  TELEMETRY, LAST ECG:  Telemetry reviewed    Sinus rhythm with premature atrial contractions and rare premature ventricular contractions and ventricular couplets  No nonsustained VT events noted  Results for orders placed or performed during the hospital encounter of 03/15/22   ECG 12 lead   Result Value    Ventricular Rate 102    Atrial Rate 102    NM Interval 232    QRSD Interval 138    QT Interval 384    QTC Interval 500    P Axis 48    QRS Axis -89    T Wave Axis 100    Narrative      Sinus tachycardia with ventricular paced rhythm  Cannot exclude underlying atrial flutter  There are intermittent native beats with bundle-branch block morphology  Inferior infarct , age undetermined  Cannot rule out Anteroseptal infarct , age undetermined  Abnormal ECG    Compared to previous ECG from 12/29/2022-349 hours tachycardia and possible atrial flutter are new      Confirmed by [address] ([id number]) on 3/16/2022 2:38:53 PM *-*-*-*-*-*-*-*-*-*-*-*-*-*-*-*-*-*-*-*-*-*-*-*-*-*-*-*-*-*-*-*-*-*-*-*-*-*-*-*-*-*-*-*-*-*-*-*-*-*-*-*-*-*-      CURRENT SCHEDULED MEDICATIONS:    Current Facility-Administered Medications:     acetaminophen (TYLENOL) tablet 650 mg, 650 mg, Oral, Q4H PRN, Pranav Perkins PA-C, 650 mg at 03/16/22 2213    aluminum-magnesium hydroxide-simethicone (MYLANTA) oral suspension 30 mL, 30 mL, Oral, Q6H PRN, Pranav Perkins PA-C    amiodarone tablet 200 mg, 200 mg, Oral, Daily With Breakfast, NICOLE Flores, 200 mg at 03/17/22 0808    aspirin (ECOTRIN LOW STRENGTH) EC tablet 81 mg, 81 mg, Oral, Daily, KATHERYN Esposito-C, 81 mg at 03/17/22 0808    atorvastatin (LIPITOR) tablet 80 mg, 80 mg, Oral, Daily, KATHERYN Esposito-C, 80 mg at 03/17/22 0808    furosemide (LASIX) injection 40 mg, 40 mg, Intravenous, BID, Pranav Perkins PA-C, 40 mg at 03/17/22 1789    levalbuterol (XOPENEX) inhalation solution 1 25 mg, 1 25 mg, Nebulization, Q6H, Terry Sadler MD    metoprolol succinate (TOPROL-XL) 24 hr tablet 75 mg, 75 mg, Oral, Q12H [facility], KATHERYN Esposito-C, 75 mg at 03/17/22 0808    ondansetron (ZOFRAN) injection 4 mg, 4 mg, Intravenous, Q6H PRN, Pranav Perkins PA-C    sodium chloride 0 9 % inhalation solution 3 mL, 3 mL, Nebulization, Q6H, Terry Sadler MD    warfarin (COUMADIN) tablet 3 mg, 3 mg, Oral, Daily (warfarin), Terry Sadler MD, 3 mg at 03/16/22 2539 ALLERGIES:  No Known Allergies     *-*-*-*-*-*-*-*-*-*-*-*-*-*-*-*-*-*-*-*-*-*-*-*-*-*-*-*-*-*-*-*-*-*-*-*-*-*-*-*-*-*-*-*-*-*-*-*-*-*-*-*-*-*  LABORATORY DATA:  I have personally reviewed pertinent labs      CMP:  Results from last 7 days   Lab Units 03/16/22  0434 03/15/22  2007   SODIUM mmol/L 137 135*   POTASSIUM mmol/L 4 2 4 4   CHLORIDE mmol/L 97* 96*   CO2 mmol/L 30 32   BUN mg/dL 13 9   CREATININE mg/dL 1 07 1 07   CALCIUM mg/dL 8 2* 8 2*        Results from last 7 days   Lab Units 03/16/22 0434   MAGNESIUM mg/dL 1 7 Cardiac Profile:   Results from last 7 days   Lab Units 03/15/22  2007   NT-PRO BNP pg/mL 12,025*                CBC:   Results from last 7 days   Lab Units 22  0434 03/15/22  2007   WBC Thousand/uL 13 59* 10 88*   HEMOGLOBIN g/dL 9 1* 10 7*   HEMATOCRIT % 28 1* 33 1*   PLATELETS Thousands/uL 316 348     PT/INR: No results found for: PT, INR, Microbiology:          *-*-*-*-*-*-*-*-*-*-*-*-*-*-*-*-*-*-*-*-*-*-*-*-*-*-*-*-*-*-*-*-*-*-*-*-*-*-*-*-*-*-*-*-*-*-*-*-*-*-*-*-*-*-  IMAGING STUDIES REPORTS: Imaging studies results reviewed    No valid procedures specified  XR chest 2 views    Result Date: 3/16/2022  Impression Enlargement of cardiac silhouette with bilateral pulmonary airspace disease and pleural effusions  Pattern most suggestive for CHF/pulmonary edema superimposed upon more chronic pulmonary changes  Workstation performed: [id number]       *-*-*-*-*-*-*-*-*-*-*-*-*-*-*-*-*-*-*-*-*-*-*-*-*-*-*-*-*-*-*-*-*-*-*-*-*-*-*-*-*-*-*-*-*-*-*-*-*-*-*-*-*-*-    Results for orders placed during the hospital encounter of 21    Echo complete with contrast if indicated    Narrative  [facility] [address]    Transthoracic Echocardiogram  2D, M-mode, Doppler, and Color Doppler    Study date:  18-Mar-2021    Patient: Ana Rahman  MR number: [id number]  Account number: [de-identified]  : 1965  Age: 63 years  Gender: Male  Status: Outpatient  Location: [place] [facility]  Height: 66 in  Weight: 148 7 lb  BP: 112/ 58 mmHg    Indications: Ischemic Cardiomyopathy    Diagnoses: I25 5 - Ischemic cardiomyopathy    Sonographer:  Jacquelyn Horvath  Interpreting Physician:  Jeffry Chandra DO  Primary Physician:  Adebayo Valdes DO  Group:  [facility]    SUMMARY    SUMMARY:  1  This is a technically adequate study  2  Left ventricle severely dilated with severely reduced systolic function   Left ventricular ejection fraction is estimated at 18%  3  Grade 2 diastolic dysfunction with echocardiographic indications of elevated left atrial pressures  4  Regional wall motion abnormalities consistent with coronary artery disease  5  Right ventricle is mildly dilated with mildly reduced systolic function  Probable pacemaker wire present  6  Severe biatrial dilatation  7  Mechanical valve is noted within the aortic position  Valve is well seated  Trace central aortic regurgitation  Normal velocities and gradients for valve with Vmax 1 85 m/s, EOA 1 38 cm2, DVI 0 44, acceleration time 66 ms  8  Mild posteriorly directed mitral regurgitation with fibrocalcific changes of the mitral valve and mild mitral annular calcification  9  Moderate tricuspid regurgitation with pulmonary artery systolic pressure is estimated at 60-65 mm Hg  10  Compared to report from January 8, 8753, grade 3 diastolic dysfunction is noted with elevated filling pressures and pulmonary hypertension    HISTORY: PRIOR HISTORY: CAD, AVR, Ischemic cardiomyopathy, HTN, HLD    PROCEDURE: The study was performed in the [address]  This was a routine study  The transthoracic approach was used  The study included complete 2D imaging, M-mode, complete spectral Doppler, and color Doppler  The heart rate was 60  bpm, at the start of the study  Image quality was adequate  LEFT VENTRICLE: Left is severely dilated with severely reduced systolic function  Left ventricular ejection fraction is estimated at 18%  Normal wall thickness  Grade 3 diastolic dysfunction with echocardiographic indications of elevated  left atrial pressures  Increased trabeculations noted at the left ventricular apex  There is akinesis of the anterior and anterolateral wall from base to apex with dyskinetic apical wall and hypokinesis of the remaining walls  RIGHT VENTRICLE: Right ventricle is mildly dilated with mildly reduced systolic function   Linear echodensity noted within the right ventricle consistent with pacemaker/catheter    LEFT ATRIUM: Left atrium is severely dilated  ATRIAL SEPTUM: Interatrial septum is bowing toward the right atrium consistent with elevated left atrial pressures  RIGHT ATRIUM: Right atrium is severely dilated  MITRAL VALVE: Mitral valve opens well  Fibrocalcific changes of the mitral valve  No evidence of mitral stenosis  Mild posteriorly directed mitral regurgitation  AORTIC VALVE: There is a mechanical valve in the aortic position  The valve appears well seated  Trace central aortic regurgitation  Velocities and gradients are normal for valve  Vmax 1 85 m/s, mean PG 6 9 mmHg, EOA/ASHLEY 1 38 cm2, DVI  0 44, Acceleration time 66 ms, LVOTd 2cm, LVOT VTI 12 3, AV VTI 28 cm  TRICUSPID VALVE: Tricuspid valve opens well  Moderate tricuspid regurgitation with pulmonary artery systolic pressure is estimated at 60-65 mm Hg consistent with severe pulmonary hypertension  PULMONIC VALVE: Pulmonic valve not well visualized  No evidence of pulmonic stenosis or pulmonic regurgitation  PERICARDIUM: There is no evidence of significant pericardial effusion  AORTA: Aortic root is top-normal in size at 3 4 cm    SYSTEMIC VEINS: IVC: IVC is dilated with greater than 50% respirophasic collapse  SYSTEM MEASUREMENT TABLES    2D  %FS: 8 64 %  Ao Diam: 3 38 cm  EDV(Teich): 235 37 ml  EF(Teich): 18 56 %  ESV(Teich): 191 68 ml  IVSd: 0 97 cm  LA Area: 29 2 cm2  LA Diam: 4 78 cm  LVEDV MOD A4C: 260 34 ml  LVEF MOD A4C: 20 96 %  LVESV MOD A4C: 205 77 ml  LVIDd: 6 75 cm  LVIDs: 6 17 cm  LVLd A4C: 9 93 cm  LVLs A4C: 9 59 cm  LVOT Diam: 1 98 cm  LVPWd: 1 01 cm  RA Area: 23 cm2  RVIDd: 4 cm  SV MOD A4C: 54 57 ml  SV(Teich): 43 69 ml    CW  AV Env  Ti: 235 97 ms  AV VTI: 28 01 cm  AV Vmax: 1 85 m/s  AV Vmean: 1 19 m/s  AV maxP 71 mmHg  AV meanP 86 mmHg  TR Vmax: 3 67 m/s  TR maxP 96 mmHg    MM  TAPSE: 1 36 cm    PW  ASHLEY (VTI): 2 28 cm2  ASHLEY Vmax: 1 59 cm2  AVAI (VTI): 0 cm2/m2  AVAI Vmax: 0 cm2/m2  E' Sept: 0 06 m/s  E/E' Sept: 15 17  LVOT Env  Ti: 308 28 ms  LVOT VTI: 20 81 cm  LVOT Vmax: 0 96 m/s  LVOT Vmean: 0 67 m/s  LVOT maxPG: 3 7 mmHg  LVOT meanP 23 mmHg  LVSI Dopp: 36 28 ml/m2  LVSV Dopp: 63 86 ml  MV A Kevan: 0 35 m/s  MV Dec Gulf: 3 47 m/s2  MV DecT: 254 1 ms  MV E Kevan: 0 88 m/s  MV E/A Ratio: 2 51  MV PHT: 73 69 ms  MVA By PHT: 2 99 cm2    [facility]    Prepared and electronically signed by    Kinga Degroot DO  Signed 18-Mar-2021 18:50:34    No results found for this or any previous visit  No results found for this or any previous visit  No results found for this or any previous visit         *-*-*-*-*-*-*-*-*-*-*-*-*-*-*-*-*-*-*-*-*-*-*-*-*-*-*-*-*-*-*-*-*-*-*-*-*-*-*-*-*-*-*-*-*-*-*-*-*-*-*-*-*-*-  SIGNATURES:   [unfilled]   Janalyn Oppenheim, MD

## 2022-03-17 NOTE — CONSULTS
Consult: CHF    Provided verbal and written Heart Failure Nutrition Therapy diet education, PT was able to understand [place], written material provided was in [place]  Reviewed foods high in Na to limit, tips for flavoring food with out salt, encouraged monitoring weight at home and reviewed current fluid restriction

## 2022-03-17 NOTE — ASSESSMENT & PLAN NOTE
Prior history of marijuana and cocaine use  History of medication noncompliance  Positive for THC in February, cocaine in January this year  UDS at [facility] negative  Follow-up repeat UDS this admission  SWAPNA consulted

## 2022-03-17 NOTE — ASSESSMENT & PLAN NOTE
Pt noted to have Left hallux eschar during admission at [facility]  - Previously evaluated by podiatry in [facility], "no intervention necessary to left hallux eschar as it is superficial and well-adhered"  - 3/16 LEADS: "Diffuse disease noted throughout the femoral-popliteal arteries without significant focal stenosis" of the RLE and "short segment occlusion versus high grade stenosis of the common femoral artery with diffuse disease noted throughout the femoral-popliteal arteries" of the LLE    Plan:  - Podiatry consulted - no interventions, LEADS to assess healing potential  - Wound care consulted

## 2022-03-17 NOTE — Clinical Note
The Icd Cobalt Xt Hf Quad Crt-d Mri Df4 device was inserted  The leads were placed into the connector and visually verified to be in correct position  Injury current obtained

## 2022-03-17 NOTE — ASSESSMENT & PLAN NOTE
-patient has a previous history of ventricular fibrillation/ventricular tachycardia status post dual chamber Medtronic ICD placement  -he is maintained on albuterol and 100 mg daily and Toprol XL 75 mg q 12 hours as an outpatient  -patient presented to the ER reporting a defibrillator discharge 03/08 and subsequent week of worsening dyspnea  -patient was evaluated in consultation with the cardiology team, and his device was interrogated  -patient was noted to have ventricular tachycardia treated with ATP x3  -there is concerned that 1 of patient's lead is not functioning appropriately  -patient had additional run of ventricular tachycardia this morning:  His amiodarone dose was increased to 200 mg daily  -case was discussed with the electrophysiology team who requested transfer to [facility] for further EP evaluation

## 2022-03-17 NOTE — ASSESSMENT & PLAN NOTE
H/o HTN on home Lisinopril 10mg qHS  - Last Blood Pressure: 122/69 (07/82/34 6855)  - Systolic ([id number]), NHP:767 , Min:121 , Max:125     Plan:  - C/w home regimen  - Vitals per unit routine

## 2022-03-17 NOTE — QUICK NOTE
Will see patient in AM and do full consult  Leaving NPO after midnight in case of add on EP procedure tomorrow afternoon

## 2022-03-17 NOTE — ASSESSMENT & PLAN NOTE
Pt transferred from [facility] for EP eval of possible Atrial lead malfunction on ICD  - Pt c/o worsening exertion dyspnea and at rest after AICD discharge x2 on 3/8/22 around 2pm which pt did not seek medical attention for at that time  - H/o VFib with Bi-vent AICD placement on home Amiodarone 100mg daily and Toprol-XL 75mg BID  - AICD interrogation per cardiology in [facility] shows evidence of x3 shocks for VT and ATP x3 which is congruent with patient's report, but noted that Atrial lead not appropriately functioning, which home Amiodarone was increased from 100mg daily to 200mg daily  - Case was discussed by cardiology with Dr Miguel Angel Angulo ([facility] EP)    Plan:  - C/w telemetry x48 hours for arrhythmia  - C/w Amiodarone 200mg daily for antiarrhythmic  - C/w Toprol-XL 75mg BID for AV-uvaldo block  - EP and cardiology consulted  - Per EP, no immediate interventions   Transitioned back to home Coumadin 2 5mg qd

## 2022-03-17 NOTE — Clinical Note
History & Physical  Gynecology      SUBJECTIVE:     Chief Complaint: Well Woman       History of Present Illness:  Complains of UUI, vaginal dryness.   Vaginal dryness with sex. Has tried OTC lubrication, minimal relief.   Wearing one pad per day. Urinates every 2 hours. Gets up twice per night to urinate. At times doesn't make it to the restroom. Sprinolactone nightly.  FRANDY for AUB-L in 1990. Ovaries in place. Unsure if cervix is in place.     STD/STI Hx: Denies any history of STD's  Sexually Active: one male partner  GYN surgery: [facility] in 70's. FRANDY for AUB-L in 1990. Ovaries in place.  Family history: significant breast cancer history (see below). No genetics done.  Social: Wears seatbelts. Does not exercise. Feels safe at home. EtOH no. No tobacco or illicit drug use.   Last mammo: BIRADS 1 on 11/2018  Colonoscopy: utd  DEXA: utd  Flu: utd  Vitamins: taking      Review of patient's allergies indicates:   Allergen Reactions    Sulfa (sulfonamide antibiotics)      Other reaction(s): Rash       Past Medical History:   Diagnosis Date    Abnormal Pap smear     Acid reflux     Allergy     Arthritis     GERD (gastroesophageal reflux disease)     History of migraine headaches     Hypertension     Idiopathic neuropathy 7/20/2012    ILD (interstitial lung disease) 11/6/2013    Iron deficiency anemia 3/18/2014    MRSA carrier     Osteopenia     Pulmonary fibrosis     Pulmonary hypertension     Raynaud's disease     Scleroderma, diffuse     Sjogren's syndrome     Vitamin D deficiency 11/14/2013     Past Surgical History:   Procedure Laterality Date    BREAST BIOPSY      Left, benign    CERVICAL CONIZATION   W/ LASER  1970    COLONOSCOPY      COLONOSCOPY N/A 3/29/2019    Procedure: COLONOSCOPY;  Surgeon: Annamaria Mendoza MD;  Location: [address]);  Service: Endoscopy;  Laterality: N/A;    DILATION AND CURETTAGE OF UTERUS      ESOPHAGOGASTRODUODENOSCOPY      ESOPHAGOGASTRODUODENOSCOPY N/A  Micropuncture sheath  Exchanged for 4 fr sheath 3/29/2019    Procedure: EGD (ESOPHAGOGASTRODUODENOSCOPY);  Surgeon: Annamaria Mendoza MD;  Location: [address]);  Service: Endoscopy;  Laterality: N/A;  pulmonary htn    HYSTERECTOMY      FRANDY (AUB, Fibroids), ovaries remain     OB History        5    Para   4    Term   4            AB   1    Living   4       SAB   1    TAB        Ectopic        Multiple        Live Births                   Family History   Problem Relation Age of Onset    Breast cancer Mother     No Known Problems Daughter     No Known Problems Son     No Known Problems Daughter     No Known Problems Son     Breast cancer Sister     Breast cancer Maternal Aunt     Osteoarthritis Brother     Melanoma Neg Hx     Colon cancer Neg Hx     Crohn's disease Neg Hx     Stomach cancer Neg Hx     Ulcerative colitis Neg Hx     Rectal cancer Neg Hx     Irritable bowel syndrome Neg Hx     Esophageal cancer Neg Hx     Celiac disease Neg Hx     Ovarian cancer Neg Hx      Social History     Tobacco Use    Smoking status: Never Smoker    Smokeless tobacco: Never Used   Substance Use Topics    Alcohol use: Yes     Alcohol/week: 0.0 standard drinks     Frequency: Never     Comment: ocasionally    Drug use: No       Current Outpatient Medications   Medication Sig    albuterol (VENTOLIN HFA) 90 mcg/actuation inhaler Inhale 2 puffs into the lungs every 4 (four) hours as needed for Wheezing. Rescue    b complex vitamins tablet Take 1 tablet by mouth once daily.    carboxymethylcellulose sodium (REFRESH TEARS) 0.5 % Drop Apply 1-2 drops to every as needed    doxycycline (VIBRA-TABS) 100 MG tablet Take 1 tablet (100 mg total) by mouth 2 (two) times daily. for 10 days    ergocalciferol (ERGOCALCIFEROL) 50,000 unit Cap Take 1 capsule (50,000 Units total) by mouth every 7 days.    famotidine (PEPCID) 20 MG tablet Take 1 tablet (20 mg total) by mouth 2 (two) times daily.    ferrous sulfate 325 (65 FE) MG EC tablet Take 1  tablet (325 mg total) by mouth 3 (three) times daily. (Patient taking differently: Take 325 mg by mouth once daily. )    fluoride, sodium, (PREVIDENT 5000) 1.1 % Pste     guaifenesin-codeine 100-10 mg/5 ml (TUSSI-ORGANIDIN NR)  mg/5 mL syrup Take 5-10 mLs by mouth every evening.    multivitamin capsule Take 1 capsule by mouth once daily.     mycophenolate mofetil (CELLCEPT) 200 mg/mL SusR Take 5 mLs (1,000 mg total) by mouth 2 (two) times daily.    NIFEdipine (ADALAT CC) 90 MG TbSR TAKE 1 TABLET(90 MG) BY MOUTH EVERY DAY    omeprazole (PRILOSEC) 40 MG capsule Take 1 capsule (40 mg total) by mouth 2 (two) times daily before meals.    pravastatin (PRAVACHOL) 20 MG tablet TAKE 1 TABLET BY MOUTH EVERY EVENING    pregabalin (LYRICA) 150 MG capsule Take 1 capsule (150 mg total) by mouth 2 (two) times daily.    spironolactone (ALDACTONE) 25 MG tablet Take 1 tablet (25 mg total) by mouth once daily.    tadalafil (ADCIRCA) 20 mg Tab Take 2 tablets (40 mg total) by mouth once daily.    [START ON 11/21/2019] conjugated estrogens (PREMARIN) vaginal cream Place 1 g vaginally twice a week. Place pea sized amount to vagina twice per day for two weeks then twice per week therafter     No current facility-administered medications for this visit.          Review of Systems:  Review of Systems   Constitutional: Negative for activity change, appetite change, fever and unexpected weight change.   Respiratory: Negative for shortness of breath.    Cardiovascular: Negative for chest pain.   Gastrointestinal: Negative for abdominal pain, blood in stool, constipation, diarrhea, nausea and vomiting.   Endocrine: Negative for hot flashes.   Genitourinary: Positive for frequency and vaginal dryness. Negative for dyspareunia, dysuria, hematuria, hot flashes, pelvic pain, urgency, vaginal bleeding, vaginal discharge, vaginal pain, urinary incontinence, postcoital bleeding and postmenopausal bleeding.   Integumentary:  Negative  for breast mass.   Psychiatric/Behavioral: Negative for sleep disturbance.   Breast: Negative for lump and mass       OBJECTIVE:     Physical Exam:  Physical Exam   Constitutional: She is oriented to person, place, and time. She appears well-developed and well-nourished. No distress.   HENT:   Head: Normocephalic and atraumatic.   Eyes: Conjunctivae are normal. Right eye exhibits no discharge. Left eye exhibits no discharge. No scleral icterus.   Neck: Normal range of motion. Neck supple. No thyromegaly present.   Cardiovascular: Normal rate.   No murmur heard.  Pulmonary/Chest: Effort normal. Right breast exhibits no inverted nipple, no mass, no nipple discharge, no skin change and no tenderness. Left breast exhibits no inverted nipple, no mass, no nipple discharge, no skin change and no tenderness. No breast swelling, tenderness, discharge or bleeding. Breasts are symmetrical.   Abdominal: Soft. She exhibits no distension. There is no tenderness.   Genitourinary: No breast swelling, tenderness, discharge or bleeding. No labial fusion. There is no rash, tenderness, lesion or injury on the right labia. There is no rash, tenderness, lesion or injury on the left labia. Right adnexum displays no mass, no tenderness and no fullness. Left adnexum displays no mass, no tenderness and no fullness. No erythema, tenderness or bleeding in the vagina. No foreign body in the vagina. No signs of injury around the vagina.   Genitourinary Comments: Uterus, cervix and adnexa surgically absent. Atrophy present.   Musculoskeletal: Normal range of motion.   Lymphadenopathy:     She has no cervical adenopathy.   Neurological: She is alert and oriented to person, place, and time.   Skin: Skin is warm and dry. No rash noted. She is not diaphoretic. No erythema.   Psychiatric: She has a normal mood and affect.   Nursing note and vitals reviewed.        ASSESSMENT:       ICD-10-CM ICD-9-CM    1. Well woman exam with routine gynecological  exam Z01.419 V72.31 POCT URINE DIPSTICK WITHOUT MICROSCOPE      Urinalysis      Mammo Digital Screening Bilat w/ Jun   2. Urinary incontinence, unspecified type R32 788.30 POCT URINE DIPSTICK WITHOUT MICROSCOPE      Urinalysis      Urine culture   3. Vaginal dryness N89.8 625.8    4. Encounter for screening mammogram for malignant neoplasm of breast  Z12.31 V76.12 Mammo Digital Screening Bilat w/ Jun          Plan:      WWE  - Postmenopausal. No issues.  - Vaccines utd  - Labs utd  - DEXA, Colonoscopy utd  - Mammogram ordered  - Adequate screening with pap smear has normal. Discussed with patient that she no longer requires screening based on ASCCP guidelines.   - Taking daily vitamin with additional Vitamin D.  - CBE normal. Physical exam normal. VSS    Mixed urinary incontinence, urge > stress:    --Empty bladder every 3 hours.  Empty well: wait a minute, lean forward on toilet.    --Avoid dietary irritants (see sheet).  Keep diary x 3-5 days to determine your irritants.  --KEGELS: do 10 in AM and 10 in PM, holding each x 10 seconds.  When you feel urge to go, STOP, KEGEL, and when urge has passed, then go to bathroom.  Consider PT in future.    --URGE: consider anticholinergic.    --STRESS:  Pessary vs. Sling.   --Recommend moving Sprinolactone to mornings instead of evenings to improve nocturia  --UA/culture sent    Vaginal atrophy/dryness  - Discussed OTC remedies such as olive oil and coconut oil as water-based seems to irritate her   - Discussed other options such as osphena, premarin. Pt elects to use premarin cream. Instructions, theoretical risks reviewed. Rx sent.    Counseling time: 30 minutes    Miriam Reyes

## 2022-03-17 NOTE — ASSESSMENT & PLAN NOTE
Acute CHF exacerbation likely triggered by VTach event  - On home Eplerenone 20mg daily and Lasix 20mg BID  - Last ECHO on 1/12 showed LVEF 20% with global hypokinesis, indeterminate diastolic dysfunction, moderate biatrial enlargement  - 3/15 BNP 85629 (improved from last BNP 75312 on 1/28/22)  - 3/15 CXR: cardiomegaly with bilateral pulmonary airspace disease and pleural effusions concerning for CHF/pulmonary edema superimposed upon more chronic pulmonary changes    Plan:  - Hold home Eplerenone while inpatient  - C/w IV Lasix 40mg IV BID  - Fluid restricted diet  - Daily weight, strict I&Os    Intake/Output Summary (Last 24 hours) at 3/18/2022 0914  Last data filed at 3/18/2022 0200  Gross per 24 hour   Intake 450 ml   Output 925 ml   Net -475 ml

## 2022-03-17 NOTE — H&P
H&P - [address] [facility] 1965, 61 y o  male  MRN: [id number]    [facility] Encounter: [id number]  Primary Care Provider: Jessica Rice      Admission Date: 3/17/2022 1627  Admitting Provider: Abraham Palacios MD  Code Status:  Level 1 - Full Code  Diet: Diet Cardiovascular; Sodium 2 GM; Fluid Restriction 1500 ML  Diet NPO  Consult: IP CONSULT TO NUTRITION SERVICES  IP CONSULT TO CARDIOLOGY  IP CONSULT TO ELECTROPHYSIOLOGY  IP CONSULT TO PODIATRY  IP CONSULT TO CASE MANAGEMENT    Assessment/Plan   Assessments & Plans:   Discussed with [facility] team and finalization is pending attending physician attestation      * Malfunction of implantable defibrillator ventricular (ICD) lead  Assessment & Plan  Pt transferred from [facility] for EP eval of possible Atrial lead malfunction on ICD  - Pt c/o worsening exertion dyspnea and at rest after AICD discharge x2 on 3/8/22 around 2pm which pt did not seek medical attention for at that time  - H/o VFib with Bi-vent AICD placement on home Amiodarone 100mg daily and Toprol-XL 75mg BID  - AICD interrogation per cardi[facility] [address] shows evidence of x3 shocks for VT and ATP x3 which is congruent with patient's report, but noted that Atrial lead not appropriately functioning, which home Amiodarone was increased from 100mg daily to 200mg daily  - Case was discussed by cardiology with Dr Gerri Ramírez ([facility] EP)    Plan:  - C/w telemetry x48 hours for arrhythmia  - C/w Amiodarone 200mg daily for antiarrhythmic  - C/w Toprol-XL 75mg BID for AV-uvaldo block  - EP and cardiology consulted    Acute on chronic combined systolic and diastolic congestive heart failure ([facility] [address] )  Assessment & Plan  Acute CHF exacerbation likely triggered by VTach event  - On home Eplerenone 20mg daily and Lasix 20mg BID  - Last ECHO on 1/12 showed LVEF 20% with global hypokinesis, indeterminate diastolic dysfunction, moderate biatrial enlargement  - 3/15 BNP 01416 (improved from last BNP 37495 on 1/28/22)  - 3/15 CXR: cardiomegaly with bilateral pulmonary airspace disease and pleural effusions concerning for CHF/pulmonary edema superimposed upon more chronic pulmonary changes    Plan:  - Hold home Eplerenone while inpatient  - C/w IV Lasix 40mg IV BID  - Fluid restricted diet  - Daily weight, strict I&Os    No intake or output data in the 24 hours ending 03/17/22 1625      Chronic respiratory failure with hypoxia (HCC)  Assessment & Plan  Chronic hypoxia on home 4L O2 at baseline  - Last SO2 97% on 4L NC    Plan:  - Titrate O2 NC as needed for goal SO2 > 90%  - Respiratory protocol in place    Hyperlipidemia  Assessment & Plan  Continue with home Lipitor 80mg qd    Hypertension  Assessment & Plan  H/o HTN on home Lisinopril 10mg qHS    Plan:  - C/w home regimen  - Vitals per unit routine    Eschar of toe  Assessment & Plan  Previously evaluated by podiatry - no interventions as eschar is superficial  Podiatry consult   LEADS to assess baseline vascular status given delayed healing, non-palpable pulses      Substance use  Assessment & Plan  Prior history of marijuana and cocaine use  History of medication noncompliance  Positive for THC in February, cocaine in January this year  UDS at [facility] negative  Follow-up repeat UDS this admission  CM consulted     Coronary artery disease involving native coronary artery of native heart without angina pectoris  Assessment & Plan  H/o CAD s/p BMS-LAD and BMS-LCx (2014)  - On home Amiodarone 100mg daily for antiarrhythmic and Toprol-XL 75mg BID for AV-uvaldo block  - Also on ASA 81mg daily and Atorvastatin 80mg qHS  - 3/15 EKG: sinus tachy with V-pacing and LBBB    Plan:  - C/w home regimen  - Increase home Amiodarone to 200mg daily for antiarrhythmic      Disposition: Admit to Inpatient under Med-surg for EP eval of possible Atrial lead malfunction on ICD        VTE Pharm PPX: Warfarin (Coumadin)  VTE Mech PPX: sequential compression device and/or foot pump applied unless contraindicated otherwise  Patient Active Problem List   Diagnosis    Colonic mass    Coronary artery disease involving native coronary artery of native heart without angina pectoris    S/P AVR (aortic valve replacement)    Biventricular ICD (implantable cardioverter-defibrillator) in place    Depressed mood    Elevated troponin    SIRS [facility]ic inflammatory response syndrome) [facility]    Substance use    Aortic stenosis    Ischemic cardiomyopathy    Drug abuse ([facility] )    Ventricular fibrillation ([place] [address] )    Hyponatremia    Mild protein-calorie malnutrition ([place] [address] )    Medically noncompliant    Acute on chronic combined systolic and diastolic congestive heart failure ([place] [address] )    Aortic ectasia, thoracic (HCC)    Chronic respiratory failure with hypoxia ([place] [address] )    Encounter for examination following treatment at hospital    Ventricular tachycardia ([place] [address] )    History of substance abuse ([place] [address] )    Eschar of toe    Malfunction of implantable defibrillator ventricular (ICD) lead    Hypertension    Hyperlipidemia            Advance Directive and Living Will:      Power of :    POLST:    Emergency contact:    Primary Emergency Contact: Vestorly   Extended Emergency Contact Information  Primary Emergency Contact: Erica Market  Mobile Phone: [phone]  Relation: Niece  Secondary Emergency Contact: Honey Lukes  Mobile Phone: [phone]  Relation: Daughter    Subjective    Chief Complaints:   Transferred from [facility] for EP evaluation     HPI:     61 y o  male transfer from [facility] for EP eval of possible Atrial lead malfunction on ICD   Pmhx CHF with last ECHO 1/12/22 showed EF 20% with global hypokinesis, CAD s/p BMS-LAD and BMS-LCx (2014), rheumatic heart disease s/p mechanical AVR in the 1980s (on coumadin), HTN, HLD, prior VFib with Bi-vent AICD placement, chronic hypoxia on home 4L O2 at baseline, mild aortic aneurysm (4 1cm), colon cancer s/p resection (June 2016), cocaine/marijuana abuse, and medication noncompliance  Patient originally presented to [facility] ED on 3/15/22 with c/o worsening exertion dyspnea and at rest after AICD discharge x2 on 3/8/22 around 2pm which pt did not seek medical attention for at that time  Pt also reports intermittent non-radiating right-sided chest pain without diaphoresis  ED /81, , RR 26, SO2 91% on RA  Initial work-up showed anemia (Hgb 10 7), mild hyponatremia (Na 135), elevated troponin 54, and elevated BNP 55352  Otherwise CBC, CMP, COVID/Flu/RSV wnl  EKG showed STach with V-paced rhythm, age-indeterminate inferior infarct, and possible anteroseptal infarct  CXR showed cardiomegaly with bilateral pulmonary airspace disease and pleural effusions concerning for CHF/pulmonary edema superimposed upon more chronic pulmonary changes  Patient as admitted and cardiology was consulted  Per cardiology note, AICD interrogation shows evidence of x3 shocks for VT and ATP x3 which is congruent with patient's report, but noted that Atrial lead not appropriately functioning  Patient home Amiodarone was increased from 100mg daily to 200mg daily for his arrythmia with plan to transfer [facility] [place] [facility] eval (case was discussed with Dr John Mccoy)  During his admission, pt was noted to have Left hallux eschar and podiatry was consulted with Bilateral duplex on 3/16 showed "Diffuse disease noted throughout the femoral-popliteal arteries without significant focal stenosis" of the RLE and "short segment occlusion versus high grade stenosis of the common femoral artery with diffuse disease noted throughout the femoral-popliteal arteries" of the LLE       ROS:     Review of Systems   Constitutional: Negative for chills and fever  HENT: Negative for ear pain and sore throat  Eyes: Negative for pain and visual disturbance  Respiratory: Positive for shortness of breath   Negative for cough and chest tightness  Cardiovascular: Negative for chest pain and palpitations  Gastrointestinal: Negative for abdominal pain and vomiting  Genitourinary: Negative for dysuria and hematuria  Musculoskeletal: Negative for arthralgias and back pain  Skin: Negative for color change and rash  Neurological: Negative for seizures and syncope  All other systems reviewed and are negative  Review of systems (ROS):  A 12-point, complete review of systems was reviewed and negative except as stated in above HPI            Objective   ED Vitals & Management:     ED Triage Vitals   Temperature Pulse Respirations Blood Pressure SpO2   03/17/22 1706 03/17/22 1706 03/17/22 1706 03/17/22 1706 03/17/22 1634   97 5 °F (36 4 °C) 80 18 150/76 97 %      Temp Source Heart Rate Source Patient Position - Orthostatic VS BP Location FiO2 (%)   03/17/22 1706 -- 03/17/22 1706 03/17/22 1706 --   Oral  Lying Left arm       Pain Score       03/17/22 1634       No Pain         Medications   aspirin (ECOTRIN LOW STRENGTH) EC tablet 81 mg (has no administration in time range)   atorvastatin (LIPITOR) tablet 80 mg (has no administration in time range)   metoprolol succinate (TOPROL-XL) 24 hr tablet 75 mg (has no administration in time range)   ondansetron (ZOFRAN) injection 4 mg (has no administration in time range)   aluminum-magnesium hydroxide-simethicone (MYLANTA) oral suspension 30 mL (has no administration in time range)   furosemide (LASIX) injection 40 mg (has no administration in time range)   acetaminophen (TYLENOL) tablet 650 mg (has no administration in time range)   amiodarone tablet 200 mg (has no administration in time range)   warfarin (COUMADIN) tablet 3 mg (has no administration in time range)   albuterol inhalation solution 2 5 mg (has no administration in time range)         History:     Past Medical History:   Diagnosis Date    AICD (automatic cardioverter/defibrillator) present     medtronic     CAD (coronary artery disease)     Cancer ([facility] )     Cardiac disease     Cardiomyopathy ([facility] )     mixed  predominently nonischemic    Colonic mass     Coronary artery disease     History of ventricular fibrillation 9/2/2021    History of ventricular tachycardia 5/25/2021    Hyperlipidemia     Hypertension     Irregular heart beat     Myocardial infarction ([facility] )     2014 and 2015    Pneumonia 1/26/2022    Rectal bleeding     S/P AVR (aortic valve replacement)     mechanical    Wears glasses      Past Surgical History:   Procedure Laterality Date    AORTIC VALVE REPLACEMENT      APPENDECTOMY      CARDIAC DEFIBRILLATOR PLACEMENT      COLECTOMY MADELIN Right     Last Assessed: 6/7/2016    COLECTOMY LAPAROSCOPIC      COLON SURGERY Right     colectomy    CORONARY STENT PLACEMENT      INSERT / Simona Nap / REMOVE PACEMAKER      MITRAL VALVE REPAIR      MITRAL VALVE REPAIR      MOUTH SURGERY      MI COLONOSCOPY FLX DX W/COLL[facility] WHEN PFRMD N/A 1/15/2018    Procedure: COLONOSCOPY;  Surgeon: Candice Walters MD;  Location: [facility];   Service: General    MI LAP,SURG,COLECTOMY, PARTIAL, W/ANAST N/A 6/2/2016    Procedure: RESECTION COLON RIGHT LAPAROSCOPIC HAND-ASSISTED;  Surgeon: Candice Walters MD;  Location: [facility];  Service: General     Social History     Socioeconomic History    Marital status: /Civil Union     Spouse name: Not on file    Number of children: Not on file    Years of education: Not on file    Highest education level: Not on file   Occupational History    Not on file   Tobacco Use    Smoking status: Never Smoker    Smokeless tobacco: Never Used   Vaping Use    Vaping Use: Never used   Substance and Sexual Activity    Alcohol use: Not Currently    Drug use: Not Currently     Types: Marijuana    Sexual activity: Not on file   Other Topics Concern    Not on file   Social History Narrative    Not on file     Social Determinants of Health     Financial Resource Strain: Not on file Food Insecurity: No Food Insecurity    Worried About Running Out of Food in the Last Year: Never true    Bud of Food in the Last Year: Never true   Transportation Needs: No Transportation Needs    Lack of Transportation (Medical): No    Lack of Transportation (Non-Medical):  No   Physical Activity: Not on file   Stress: Not on file   Social Connections: Not on file   Intimate Partner Violence: Not on file   Housing Stability: High Risk    Unable to Pay for Housing in the Last Year: No    Number of Places Lived in the Last Year: 1    Unstable Housing in the Last Year: Yes     Family History   Problem Relation Age of Onset    Diabetes Mother     Hypertension Mother     Valvular heart disease Mother     Valvular heart disease Father     Alcohol abuse Father        Medications & Allergies:   all medications and allergies reviewed  No Known Allergies    PTA Medications:  Current Facility-Administered Medications on File Prior to Encounter   Medication Dose Route Frequency Provider Last Rate Last Admin    [DISCONTINUED] acetaminophen (TYLENOL) tablet 650 mg  650 mg Oral Q4H PRN Sari Fat, PA-C   650 mg at 03/16/22 2213    [DISCONTINUED] aluminum-magnesium hydroxide-simethicone (MYLANTA) oral suspension 30 mL  30 mL Oral Q6H PRN Sari Fat, PA-C        [DISCONTINUED] amiodarone tablet 200 mg  200 mg Oral Daily With Breakfast NICOLE Flores   200 mg at 03/17/22 0808    [DISCONTINUED] aspirin (ECOTRIN LOW STRENGTH) EC tablet 81 mg  81 mg Oral Daily Sari Fat, PA-C   81 mg at 03/17/22 1830    [DISCONTINUED] atorvastatin (LIPITOR) tablet 80 mg  80 mg Oral Daily Sari Fat, PA-C   80 mg at 03/17/22 6138    [DISCONTINUED] furosemide (LASIX) injection 40 mg  40 mg Intravenous BID Sari Fat, PA-C   40 mg at 03/17/22 0762    [DISCONTINUED] levalbuterol (XOPENEX) inhalation solution 1 25 mg  1 25 mg Nebulization Q6H Duarte Salazar MD   1 25 mg at 03/17/22 0907    [DISCONTINUED] metoprolol succinate (TOPROL-XL) 24 hr tablet 75 mg  75 [facility]   75 mg at 03/17/22 4037    [DISCONTINUED] ondansetron [facility] injection 4 mg  4 mg Intravenous Q6H PRN Danielle Butt PA-C        [DISCONTINUED] sodium chloride 0 9 % inhalation solution 3 mL  3 mL Nebulization Q6H Dena Wright MD   3 mL at 03/17/22 9976    [DISCONTINUED] warfarin (COUMADIN) tablet 3 mg  3 mg Oral Daily (warfarin) Dena Wright MD   3 mg at 03/16/22 6265     Current Outpatient Medications on File Prior to Encounter   Medication Sig Dispense Refill    amiodarone 100 mg tablet Take 1 tablet (100 mg total) by mouth daily with breakfast 30 tablet 0    furosemide (LASIX) 20 mg tablet Take 1 tablet (20 mg total) by mouth 2 (two) times a day 60 tablet 3    aspirin 81 MG tablet Take 81 mg by mouth daily        atorvastatin (LIPITOR) 80 mg tablet take 1 tablet by mouth once daily 30 tablet 6    eplerenone (INSPRA) 25 mg tablet take 1 tablet by mouth once daily 30 tablet 3    lisinopril (ZESTRIL) 10 mg tablet Take 1 tablet (10 mg total) by mouth daily at bedtime 30 tablet 3    metoprolol succinate (TOPROL-XL) 25 mg 24 hr tablet Take 3 tablets (75 mg total) by mouth every 12 (twelve) hours 180 tablet 3    warfarin (COUMADIN) 2 5 mg tablet Take 1 tablet (2 5 mg total) by mouth daily 30 tablet 0         Objective & Vitals:     Vitals: /76 (BP Location: Left arm)   Pulse 64   Temp 97 5 °F (36 4 °C) (Oral)   Resp 16   Ht 5' 6" (1 676 m)   Wt 60 6 kg (133 lb 9 6 oz)   SpO2 98%   BMI 21 56 kg/m²     No intake or output data in the 24 hours ending 03/17/22 7052    Invasive Devices  Report    Peripheral Intravenous Line            Peripheral IV 03/15/22 Left Antecubital 1 day                  Labs: I have personally reviewed pertinent reports      Recent Results (from the past 24 hour(s))   Rapid drug screen, urine    Collection Time: 03/16/22  7:07 PM   Result Value Ref Range    Amph/Meth UR Negative Negative    Barbiturate Ur Negative Negative    Benzodiazepine Urine Negative Negative    Cocaine Urine Negative Negative    Methadone Urine Negative Negative    Opiate Urine Negative Negative    PCP Ur Negative Negative    THC Urine Negative Negative    Oxycodone Urine Negative Negative   Protime-INR    Collection Time: 03/17/22  9:27 AM   Result Value Ref Range    Protime 16 5 (H) 11 6 - 14 5 seconds    INR 1 38 (H) 0 84 - 1 19   CBC and differential    Collection Time: 03/17/22  9:27 AM   Result Value Ref Range    WBC 10 40 (H) 4 31 - 10 16 Thousand/uL    RBC 4 07 3 88 - 5 62 Million/uL    Hemoglobin 10 6 (L) 12 0 - 17 0 g/dL    Hematocrit 33 8 (L) 36 5 - 49 3 %    MCV 83 82 - 98 fL    MCH 26 0 (L) 26 8 - 34 3 pg    MCHC 31 4 31 4 - 37 4 g/dL    RDW 19 2 (H) 11 6 - 15 1 %    MPV 8 9 8 9 - 12 7 fL    Platelets 542 294 - 870 Thousands/uL    nRBC 0 /100 WBCs    Neutrophils Relative 71 43 - 75 %    Immat GRANS % 1 0 - 2 %    Lymphocytes Relative 13 (L) 14 - 44 %    Monocytes Relative 12 4 - 12 %    Eosinophils Relative 2 0 - 6 %    Basophils Relative 1 0 - 1 %    Neutrophils Absolute 7 35 1 85 - 7 62 Thousands/µL    Immature Grans Absolute 0 12 0 00 - 0 20 Thousand/uL    Lymphocytes Absolute 1 35 0 60 - 4 47 Thousands/µL    Monocytes Absolute 1 25 (H) 0 17 - 1 22 Thousand/µL    Eosinophils Absolute 0 22 0 00 - 0 61 Thousand/µL    Basophils Absolute 0 11 (H) 0 00 - 0 10 Thousands/µL   Basic metabolic panel    Collection Time: 03/17/22  9:27 AM   Result Value Ref Range    Sodium 133 (L) 136 - 145 mmol/L    Potassium 4 3 3 5 - 5 3 mmol/L    Chloride 97 (L) 100 - 108 mmol/L    CO2 30 21 - 32 mmol/L    ANION GAP 6 4 - 13 mmol/L    BUN 14 5 - 25 mg/dL    Creatinine 1 04 0 60 - 1 30 mg/dL    Glucose 149 (H) 65 - 140 mg/dL    Calcium 8 3 8 3 - 10 1 mg/dL    eGFR 79 ml/min/1 73sq m       Imaging:     I have personally reviewed pertinent reports      XR chest 2 views    Result Date: 3/16/2022  Impression: Enlargement of cardiac silhouette with bilateral pulmonary airspace disease and pleural effusions  Pattern most suggestive for CHF/pulmonary edema superimposed upon more chronic pulmonary changes  Workstation performed: [id number]       EKG, Pathology, and Other Studies:   I have personally reviewed pertinent reports  Physical Exam:   Physical Exam  Constitutional:       General: He is not in acute distress  Appearance: He is ill-appearing  HENT:      Head: Normocephalic and atraumatic  Nose: Nose normal    Eyes:      Extraocular Movements: Extraocular movements intact  Conjunctiva/sclera: Conjunctivae normal    Cardiovascular:      Rate and Rhythm: Normal rate and regular rhythm  Heart sounds: No murmur heard  Pulmonary:      Effort: No respiratory distress  Breath sounds: Wheezing present  Comments: On 4L NC O2 (baseline) with diffuse wheezing  Abdominal:      General: Abdomen is flat  Bowel sounds are normal       Palpations: Abdomen is soft  Tenderness: There is no abdominal tenderness  Musculoskeletal:         General: No tenderness  Normal range of motion  Cervical back: Normal range of motion  Skin:     Comments: Eschar of right great toe   Neurological:      Mental Status: He is alert and oriented to person, place, and time  Psychiatric:         Mood and Affect: Mood normal          Behavior: Behavior normal                Lasha Jenkins MD  PGY-1, Family Medicine  03/17/22  5:32 PM    Dear reader, please be aware that portions of my note contain dictated text  I have done my best to proof-read this note prior to signing  However, there may be occasional unnoticed errors pertaining to "sound-alike" words and/or grammar during my dictation process  If there is any words or information that is unclear or appears erroneous, please kindly let me know and I will clarify and/or addend my notes accordingly   Thank you for your understanding

## 2022-03-17 NOTE — ASSESSMENT & PLAN NOTE
H/o CAD s/p BMS-LAD and BMS-LCx (2014)  - On home Amiodarone 100mg daily for antiarrhythmic and Toprol-XL 75mg BID for AV-uvaldo block  - Also on ASA 81mg daily and Atorvastatin 80mg qHS  - 3/15 EKG: sinus tachy with V-pacing and LBBB    Plan:  - C/w home regimen  - Increase home Amiodarone to 200mg daily for antiarrhythmic

## 2022-03-18 NOTE — ASSESSMENT & PLAN NOTE
Hx of rheumatic heart disease s/p mechanical AVR in the 1980s (on Coumadin 2 5mg qd)  Continue Coumadin 2 5mg qd (Goal INR 2-3)  Will check daily INR

## 2022-03-18 NOTE — UTILIZATION REVIEW
Notification of Discharge   This is a Notification of Discharge from our facility [address]  Please be advised that this patient has been discharge from our facility  Below you will find the admission and discharge date and time including the patients disposition  UTILIZATION REVIEW CONTACT:  Joseph Pederson  Utilization   Network Utilization Review Department  Phone: [phone] x carefully listen to the prompts  All voicemails are confidential   Email: [email or web address]     PHYSICIAN ADVISORY SERVICES:  FOR JLYC-KE-UZNC REVIEW - MEDICAL NECESSITY DENIAL  Phone: [phone]  Fax: [phone]  Email: [email or web address]     PRESENTATION DATE: 3/15/2022  6:41 PM  OBERVATION ADMISSION DATE:   INPATIENT ADMISSION DATE: 3/15/22  9:27 PM   DISCHARGE DATE: 3/17/2022  3:55 PM  DISPOSITION: [address] [facility]      IMPORTANT INFORMATION:  Send all requests for admission clinical reviews, approved or denied determinations and any other requests to dedicated fax number below belonging to the campus where the patient is receiving treatment   List of dedicated fax numbers:  [address] DENIALS (Administrative/Medical Necessity) [phone]   [address] (Maternity/NICU/Pediatrics) [phone]   Bridgette Betancourt [phone]   130 [facility] [phone]   [address] [place] [address] [phone]   2000 [facility] [address] [place] [address],[address] [place] [address] [phone]   [facility]  [phone]   [address] [facility] [address]  2401 [facility] 1000 [address] [facility] [phone]

## 2022-03-18 NOTE — UTILIZATION REVIEW
Initial Clinical Review    3/17 Transfer from [facility]  Transfer from 3/15 to 3/17    Admission: Date/Time/Statement:   Admission Orders (From admission, onward)     Ordered        03/17/22 Roscoe Chaparroclotildeanabelatariq 1  Inpatient Admission  Once                      Orders Placed This Encounter   Procedures    Inpatient Admission     Standing Status:   Standing     Number of Occurrences:   1     Order Specific Question:   Level of Care     Answer:   Med Surg [16]     Order Specific Question:   Estimated length of stay     Answer:   More than 2 Midnights     Order Specific Question:   Certification     Answer:   I certify that inpatient services are medically necessary for this patient for a duration of greater than two midnights  See H&P and MD Progress Notes for additional information about the patient's course of treatment  ED Arrival Information     Patient not seen in ED                     Initial Presentation:   59y Male, transfer from [address] [place] [address] for EP eval of possible atrial lead malfunction on ICD  Initially presented with c/o worsen exertional dyspnea at rest  Had AICD discharge x2 on 3/8 around 2pm but did not seek medical attention at that time  Per Cardiology consult; , AICD interrogation shows evidence of x3 shocks for VT and ATP x3 which is congruent with patient's report, but noted that Atrial lead not appropriately functioning  Pt home Amiodarone was increased from 100mg daily to 200mg daily for his arrhythmia  PMH of CHF with last ECHO 1/12/22 showed EF 20% with global hypokinesis, CAD s/p BMS-LAD and BMS-LCx (2014), rheumatic heart disease s/p mechanical AVR in the 1980s (on coumadin), HTN, HLD, prior VFib with Bi-vent AICD placement, chronic hypoxia on home 4L O2 at baseline, mild aortic aneurysm (4 1cm), colon cancer s/p resection (June 2016), cocaine/marijuana abuse, and medication noncompliance    Admit Inpatient level of care for Malfunction of implantable defibrillator ventricular, Acute on chronic combined systolic and diastolic CHF, Chronic respiratory failure with hypoxia on home O2 4L at baseline  Eschar of toe  Continue tele monitoring x48hrs for arrhythmia  Continue amiodarone 200 mg daily and Toprol-XL  EP and Cardiology consult  Hold home Epierenone while inpatient  C/w Iv Lasix 40 mg Iv Bid  Fluid restrict diet  Daily wt, strict I&Os  Titrate O2 NC as needed for goal SO2 > 90%    Date: 3/18    Day 2:   Progress notes; Continue tele monitoring  EP and Cardiology consult pending  Per EP, no immediate interventions  Transitioned back to home Coumadin 2 5mg qd  Continue Iv Lasix 40mg bid  Check daily INR  Continues on Iv Heparin drip  Podiatry cons; Left hallux eschar  No plan for podiatric surgical intervention at this time  Left great toe eschar stable, superficial and without any acute clinical signs of infection  LEADS ordered to assess lower extremity healing potential     Cardiology cons; Acute decompensated heart failure  Pt has a combination of factors contributing to decompensation including diet compliance, discharge on half of his diuretic regimen, and poor sensing atrial lead leading to fusion beats and sustained VT with subsequent shocks  Patient responded well to IV diuretics and appears more compensated  Chronic hypoxia most likely related to post covid pulmonary fibrosis  Start Lasix po 80 mg bid today  Statin daily  Restart lisinopril at lower doses 20 mg  Plan for Redo atrial lead per EP  EPS cons; VT, Atrial lead malfunction  Had two episodes of sustained VT terminating with ATP x 1, likely due to HF exacerbation  Amiodarone increased to 200mg QDay but likely can decrease down to 100mg at discharge once euvolemic and atrial lead fixed  Continue tele monitoring  EF on recent echo from 1/2021 is 20%  For now set LR atrially to 80bpm with immediate improvement in his BIV pacing   Prior to admit will consider venogram and a lead addition + gen change and cap old atrial lead since his device is currently not MRI compatible  Continues on po  diuresis and Iv Heparin drip  Continue daily INR        ED Triage Vitals   Temperature Pulse Respirations Blood Pressure SpO2   03/17/22 1706 03/17/22 1706 03/17/22 1706 03/17/22 1706 03/17/22 1634   97 5 °F (36 4 °C) 80 18 150/76 97 %      Temp Source Heart Rate Source Patient Position - Orthostatic VS BP Location FiO2 (%)   03/17/22 1706 03/18/22 1127 03/17/22 1706 03/17/22 1706 --   Oral Monitor Lying Left arm       Pain Score       03/17/22 1634       No Pain          Wt Readings from Last 1 Encounters:   03/17/22 60 6 kg (133 lb 9 6 oz)     Additional Vital Signs:   03/18/22 11:27:51 -- 82 -- 121/71 88 92 % 32 3 L/min Nasal cannula --   03/18/22 1000 -- -- -- -- -- -- 32 3 L/min Nasal cannula --   03/18/22 09:57:28 97 5 °F (36 4 °C) 80 14 125/72 90 98 % -- -- -- --   03/18/22 0746 -- -- -- -- -- 100 % 36 4 L/min Nasal cannula --   03/17/22 21:46:02 98 °F (36 7 °C) 81 20 126/71 89 95 % -- -- -- --   03/17/22 20:33:18 -- 79 -- 111/61 78 98 % -- -- -- --   03/17/22 19:06:33 98 8 °F (37 1 °C) 91 20 140/75 97 96 % -- -- -- --   03/17/22 17:06:03 97 5 °F (36 4 °C) 64 16 150/76 101 98 % 36 4 L/min Nasal cannula      Pertinent Labs/Diagnostic Test Results:     Lab Units 03/17/22 2053 03/17/22 0927   WBC Thousand/uL 11 84* 10 40*   HEMOGLOBIN g/dL 11 0* 10 6*   HEMATOCRIT % 33 1* 33 8*   PLATELETS Thousands/uL 311 319   NEUTROS ABS Thousands/µL  --  7 35         Lab Units 03/18/22  1115 03/17/22 2053 03/17/22  0927   SODIUM mmol/L 131* 132* 133*   POTASSIUM mmol/L 3 9 4 0 4 3   CHLORIDE mmol/L 94* 95* 97*   CO2 mmol/L 31 30 30   ANION GAP mmol/L 6 7 6   BUN mg/dL 19 15 14   CREATININE mg/dL 0 91 0 85 1 04   EGFR ml/min/1 73sq m 93 96 79   CALCIUM mg/dL 9 1 8 8 8 3   MAGNESIUM mg/dL 1 8  --   --              Lab Units 03/18/22  1115 03/17/22 2053 03/17/22  0927   GLUCOSE RANDOM mg/dL 157* 126 149*       Results from last 7 days   Lab Units 03/16/22  0014 03/15/22  2154 03/15/22  2007   HS TNI 0HR ng/L  --   --  54*   HS TNI 2HR ng/L  --  57*  --    HSTNI D2 ng/L  --  3  --    HS TNI 4HR ng/L 56*  --   --    HSTNI D4 ng/L 2  --   --          Results from last 7 days   Lab Units 03/18/22  1115 03/18/22  1007 03/17/22 2053 03/17/22  1734   PROTIME seconds 19 3*  --  17 7* 16 7*   INR  1 71*  --  1 53* 1 42*   PTT seconds 47* 50* 36  --          Results from last 7 days   Lab Units 03/17/22  1847   AMPH/METH  Negative   BARBITURATE UR  Negative   BENZODIAZEPINE UR  Negative   COCAINE UR  Negative   METHADONE URINE  Negative   OPIATE UR  Negative   PCP UR  Negative   THC UR  Negative       Past Medical History:   Diagnosis Date    AICD (automatic cardioverter/defibrillator) present     medtronic     CAD (coronary artery disease)     [facility]     Cardiac disease     [facility]     mixed    predominently nonischemic    Colonic mass     Coronary artery disease     History of ventricular fibrillation 9/2/2021    History of ventricular tachycardia 5/25/2021    Hyperlipidemia     Hypertension     Irregular heart beat     Myocardial infarction [facility])     2014 and 2015    Pneumonia 1/26/2022    Rectal bleeding     S/P AVR (aortic valve replacement)     mechanical    Wears glasses      Present on Admission:   Acute on chronic combined systolic and diastolic congestive heart failure (HCC)   Chronic respiratory failure with hypoxia (HCC)   Coronary artery disease involving native coronary artery of native heart without angina pectoris   Eschar of toe   Malfunction of implantable defibrillator ventricular (ICD) lead   Hypertension   Hyperlipidemia   Substance use      Admitting Diagnosis: CHF (congestive heart failure) ([facility] ) [I50 9]  Age/Sex: 61 y o  male     Admission Orders:  Scheduled Medications:  amiodarone, 200 mg, Oral, Daily With Breakfast  aspirin, 81 mg, Oral, Daily  atorvastatin, 80 mg, Oral, Daily  fluticasone-vilanterol, 1 puff, Inhalation, Daily  heparin (porcine), 3,600 Units, Intravenous, Once  metoprolol succinate, 75 mg, Oral, Q12H AISHWARYA  warfarin, 2 5 mg, Oral, Daily (warfarin)    furosemide (LASIX) injection 40 mg  Dose: 40 mg  Freq: 2 times daily Route: IV  Start: 03/17/22 1800 End: 03/18/22 1042    Continuous IV Infusions:  heparin (porcine), 3-20 Units/kg/hr (Order-Specific), Intravenous, Titrated      PRN Meds:  acetaminophen, 650 mg, Oral, Q4H PRN  albuterol, 2 5 mg, Nebulization, Q4H PRN  aluminum-magnesium hydroxide-simethicone, 30 mL, Oral, Q6H PRN  heparin (porcine), 1,800 Units, Intravenous, Q1H PRN  heparin (porcine), 3,600 Units, Intravenous, Q1H PRN  ondansetron, 4 mg, Intravenous, Q6H PRN      Tele monitoring  IP CONSULT TO NUTRITION SERVICES  IP CONSULT TO ELECTROPHYSIOLOGY  IP CONSULT TO PODIATRY  IP CONSULT TO CASE MANAGEMENT  IP CONSULT TO PICC TEAM  IP CONSULT TO CARDIOLOGY    Network Utilization Review Department  ATTENTION: Please call with any questions or concerns to [phone] and carefully listen to the prompts so that you are directed to the right person  All voicemails are confidential   Nisha Torrez all requests for admission clinical reviews, approved or denied determinations and any other requests to dedicated fax number below belonging to the campus where the patient is receiving treatment   List of dedicated fax numbers for the Facilities:  [address] DENIALS (Administrative/Medical Necessity) [phone]   [address] [place] [address] (Maternity/NICU/Pediatrics) 261 [facility],7Th Floor [address]  [address] [facility] [address] [place] [address] [address] [facility]99 [facility] [phone]

## 2022-03-18 NOTE — RESPIRATORY THERAPY NOTE
resp care      03/18/22 0746   Respiratory Assessment   Assessment Type Assess only   General Appearance Alert; Awake   Respiratory Pattern Normal   Chest Assessment Chest expansion symmetrical   Bilateral Breath Sounds Diminished   Cough Strong;Dry;Non-productive   Resp Comments no tx indicated, breath sounds clear and diminshed, no distress noted, will continue to monitor   O2 Device nc

## 2022-03-18 NOTE — CONSULTS
Podiatry - Consultation    Patient Information:   Jessa Goodman 61 y o  male MRN: [id number]  Unit/Bed#: [facility] 530-01 Encounter: [id number]  PCP: NICOLE Fragoso  Date of Admission:  3/17/2022  Date of Consultation: 03/18/22  Requesting Physician: Cathy Fitch MD      ASSESSMENT:    Jessa Goodman is a 61 y o  male with:    1  Left hallux eschar-POA  2  CAD  3  CHF    PLAN:    · No plan for podiatric surgical intervention at this time  Left great toe eschar stable, superficial and without any acute clinical signs of infection  · LEADs ordered to assess lower extremity healing potential   · Local wound care consisting of betadine paint, DANYA  Wound care instructions placed  · Elevation on green foam wedges or pillows when non-ambulatory  · Rest of care per primary team   · Will discuss this plan with my attending and update as needed  Weightbearing status: as tolerated    SUBJECTIVE:    History of Present Illness:    Jessa Goodman is a 61 y o  male who is originally admitted 3/17/2022 due to acute on chronic CHF, transferred from [facility]  Patient has a past medical history of CHF, CAD and chronic respiratory failure  We are consulted for evaluation of left hallux eschar  Patient reports that he first noticed a clear blister at the tip of his left great toe about 1 month ago  He denies any injury or trauma to toe  He reports that since he first noticed the blister it slowly darkened in color and scabbed over  He reports throbbing pain in toe mostly at night  He denies any redness, drainage or swelling  He denies any h/o lower extremity wounds  He denies any nausea, vomiting, fever, chills, chest pain or shortness of breath  Review of Systems:    Constitutional: Negative  HENT: Negative  Eyes: Negative  Respiratory: Negative  Cardiovascular: Negative  Gastrointestinal: Negative      Musculoskeletal: left great toe pain   Skin:left great toe discoloration and scb   Neurological: negative   Psych: Negative  Past Medical and Surgical History:     Past Medical History:   Diagnosis Date    AICD (automatic cardioverter/defibrillator) present     medtronic     CAD (coronary artery disease)     Cancer ([facility] )     Cardiac disease     Cardiomyopathy ([facility] )     mixed  predominently nonischemic    Colonic mass     Coronary artery disease     History of ventricular fibrillation 9/2/2021    History of ventricular tachycardia 5/25/2021    Hyperlipidemia     Hypertension     Irregular heart beat     Myocardial infarction ([facility] )     2014 and 2015    Pneumonia 1/26/2022    Rectal bleeding     S/P AVR (aortic valve replacement)     mechanical    Wears glasses        Past Surgical History:   Procedure Laterality Date    AORTIC VALVE REPLACEMENT      APPENDECTOMY      CARDIAC DEFIBRILLATOR PLACEMENT      COLECTOMY MADELIN Right     Last Assessed: 6/7/2016    COLECTOMY LAPAROSCOPIC      COLON SURGERY Right     colectomy    CORONARY STENT PLACEMENT      INSERT / Annie Lewis / REMOVE PACEMAKER      MITRAL VALVE REPAIR      MITRAL VALVE REPAIR      MOUTH SURGERY      TN COLONOSCOPY FLX DX W/COLLJ [address] WHEN PFRMD N/A 1/15/2018    Procedure: COLONOSCOPY;  Surgeon: Angela Julien MD;  Location: [facility];   Service: General    TN LAP,SURG,COLECTOMY, PARTIAL, W/ANAST N/A 6/2/2016    Procedure: RESECTION COLON RIGHT LAPAROSCOPIC HAND-ASSISTED;  Surgeon: Angela Julien MD;  Location: [facility];  Service: General       Meds/Allergies:    Medications Prior to Admission   Medication    amiodarone 100 mg tablet    furosemide (LASIX) 20 mg tablet    aspirin 81 MG tablet    atorvastatin (LIPITOR) 80 mg tablet    eplerenone (INSPRA) 25 mg tablet    lisinopril (ZESTRIL) 10 mg tablet    metoprolol succinate (TOPROL-XL) 25 mg 24 hr tablet    warfarin (COUMADIN) 2 5 mg tablet       No Known Allergies    Social History:     Marital Status: /Civil [place]    Substance Use History:   Social History     Substance and Sexual Activity   Alcohol Use Not Currently     Social History     Tobacco Use   Smoking Status Never Smoker   Smokeless Tobacco Never Used     Social History     Substance and Sexual Activity   Drug Use Not Currently    Types: Marijuana       Family History:    Family History   Problem Relation Age of Onset    Diabetes Mother     Hypertension Mother     Valvular heart disease Mother     Valvular heart disease Father     Alcohol abuse Father          OBJECTIVE:    Vitals:   Blood Pressure: 126/71 (03/17/22 2146)  Pulse: 81 (03/17/22 2146)  Temperature: 98 °F (36 7 °C) (03/17/22 2146)  Temp Source: Oral (03/17/22 1706)  Respirations: 20 (03/17/22 2146)  Height: 5' 6" (167 6 cm) (03/17/22 1634)  Weight - Scale: 60 6 kg (133 lb 9 6 oz) (03/17/22 1634)  SpO2: 100 % (03/18/22 0746)    Physical Exam:    General Appearance: Alert, cooperative, no distress  HEENT: Head normocephalic, atraumatic, without obvious abnormality  Heart: Normal rate and rhythm  Lungs: Non-labored breathing  No respiratory distress  Abdomen: Without distension  Psychiatric: AAOx3  Lower Extremity:  Vascular:   Right DP and PT pulses are biphasic by Doppler  Left DP and PT pulses are monophasic by Doppler  CRT < 3 seconds at the digits  +0/4 edema noted at bilateral lower extremities  Pedal hair is absent  Skin temperature is WNL bilaterally  Musculoskeletal:  MMT is 5/5 in all muscle compartments bilaterally  ROM at the 1st MPJ and ankle joint are decreased bilaterally with the leg extended  Pain on palpation of left hallux eschar  No gross deformities noted  Dermatological:  Lower extremity wound(s) as noted below:    Bilateral lower leg skin shiny and with atrophic changes  Distal tip of left hallux dry, adhered eschar without signs of active infection: no purulence, no malodor, no ascending erythema, no crepitus, no fluctuance        Neurological:  Gross sensation is intact  Protective sensation is intact  Patient Denies numbness and/or paresthesias  Clinical Images 03/18/22: Additional data:     Lab Results: I have personally reviewed pertinent labs including:    Results from last 7 days   Lab Units 03/17/22 2053 03/17/22 0927 03/17/22  0927   WBC Thousand/uL 11 84*   < > 10 40*   HEMOGLOBIN g/dL 11 0*   < > 10 6*   HEMATOCRIT % 33 1*   < > 33 8*   PLATELETS Thousands/uL 311   < > 319   NEUTROS PCT %  --   --  71   LYMPHS PCT %  --   --  13*   MONOS PCT %  --   --  12   EOS PCT %  --   --  2    < > = values in this interval not displayed  Results from last 7 days   Lab Units 03/17/22 2053   POTASSIUM mmol/L 4 0   CHLORIDE mmol/L 95*   CO2 mmol/L 30   BUN mg/dL 15   CREATININE mg/dL 0 85   CALCIUM mg/dL 8 8     Results from last 7 days   Lab Units 03/17/22 2053   INR  1 53*                 Imaging: I have personally reviewed pertinent reports in PACS  EKG, Pathology, and Other Studies: I have personally reviewed pertinent reports  ** Please Note: Portions of the record may have been created with voice recognition software  Occasional wrong word or "sound a like" substitutions may have occurred due to the inherent limitations of voice recognition software  Read the chart carefully and recognize, using context, where substitutions have occurred   **

## 2022-03-18 NOTE — PROCEDURES
Insert PICC line    Date/Time: 3/18/2022 9:13 AM  Performed by: Divya Peacock RN  Authorized by: Ana Cartwright MD     Patient location:  Bedside  Other Assisting Provider: Yes (comment)    Consent:     Consent obtained:  Written (obtained by MD)    Consent given by:  Patient    Procedural risks discussed: reviewed by MD   Universal protocol:     Procedure explained and questions answered to patient or proxy's satisfaction: yes      Relevant documents present and verified: yes      Test results available and properly labeled: yes      Radiology Images displayed and confirmed  If images not available, report reviewed: yes      Required blood products, implants, devices, and special equipment available: yes      Site/side marked: yes      Immediately prior to procedure, a time out was called: yes      Patient identity confirmed:  Verbally with patient and arm band  Pre-procedure details:     Hand hygiene: Hand hygiene performed prior to insertion      Sterile barrier technique: All elements of maximal sterile technique followed      Skin preparation:  ChloraPrep  Indications:     PICC line indications: no peripheral vascular access    Anesthesia (see MAR for exact dosages):      Anesthesia method:  Local infiltration    Local anesthetic:  Lidocaine 1% w/o epi (2mL)  Procedure details:     Location:  Brachial    Vessel type: vein      Laterality:  Right    Site selection rationale:  Left ICD    Approach: percutaneous technique used      Patient position:  Flat    Procedural supplies:  Double lumen    Catheter size:  5 Fr    Landmarks identified: yes      Ultrasound guidance: yes      Sterile ultrasound techniques: Sterile gel and sterile probe covers were used      Number of attempts:  1    Successful placement: yes      Vessel of catheter tip end:  Sherlock 3CG confirmed    Total catheter length (cm):  36    Catheter out on skin (cm):  0    Max flow rate:  999    Arm circumference:  22  Post-procedure details: Post-procedure:  Dressing applied and securement device placed    Assessment:  Blood return through all ports and free fluid flow    Post-procedure complications: none      Patient tolerance of procedure:   Tolerated well, no immediate complications

## 2022-03-18 NOTE — CONSULTS
Consultation - Electrophysiology-Cardiology (EP)   Lio Bush 61 y o  male MRN: [id number]  Unit/Bed#: [facility] 530-01 Encounter: [id number]      Inpatient consult to Electrophysiology  Consult performed by: Leora Cuevas PA-C  Consult ordered by: Agnes Berry MD          Assessment/Plan   Primary diagnosis:   1  VT    * patient was transferred to [facility] to [facility] due to VT on ICD, acute HF exacerbation and atrial lead malfunction    * device report reviewed, he had two episodes of sustained VT terminating with ATP x 1, likely due to HF exacerbation    * amiodarone increased to 200mg QDay but likely can decrease down to 100mg at discharge once euvolemic and atrial lead fixed    * continue tele monitoring    * EF on recent echo from 1-2022 is 20%     2  Atrial lead malfunction    * patient has MDT BIV placed in 2015 and in 2021 had >95% BIV pacing  On recent interrogations showing decline in BIV pacing down to 65% today which is likely because his atrial lead is poorly sensing even at 0 15    * for now we set LR atrially to 80bpm with immediate improvement in his BIV pacing    * prior to discharge we are going to consider venogram and a lead addition + gen change and cap old atrial lead since his device is currently not MRI compatible    * patient willing to stay for this procedure     3  Acute on chronic systolic CHF exacerbation    * appreciate general cardiology consult    * remains on IV diuresis    * is improving on diuretic therapy     4  S/p mechanical aortic valve    * INR today is 1 5, agree with heparin gtt    * continue daily INR, dose coumadin tonight and daily       Secondary diagnosis:   1  ICMP s/p MDT BIV ICD (2015)  2  HTN  3  HLD  4  CAD s/p BMS to LAD and LCX 2014   5  S/p mechanical AVR   6  S/p colon cancer s/p resection   7  Hx cocaine abuse   8  Hx covid 19 infection       Cardiac Studies/Imaging:     Imaging: I have personally reviewed pertinent reports      ECHO: Results for orders placed during the hospital encounter of 21    Echo complete with contrast if indicated    Narrative  [address]  [facility], [address] [place] [address]    Transthoracic Echocardiogram  2D, M-mode, Doppler, and Color Doppler    Study date:  18-Mar-2021    Patient: Elena Montana  MR number: [id number]  Account number: [de-identified]  : 1965  Age: 63 years  Gender: Male  Status: Outpatient  Location: [facility]  Height: 66 in  Weight: 148 7 lb  BP: 112/ 58 mmHg    Indications: Ischemic Cardiomyopathy    Diagnoses: I25 5 - Ischemic cardiomyopathy    Sonographer:  Phil Friedman  Interpreting Physician:  Tiffanie Goldman DO  Primary Physician:  Carlyle Coker DO  Group:  [facility]    SUMMARY    SUMMARY:  1  This is a technically adequate study  2  Left ventricle severely dilated with severely reduced systolic function  Left ventricular ejection fraction is estimated at 18%  3  Grade 2 diastolic dysfunction with echocardiographic indications of elevated left atrial pressures  4  Regional wall motion abnormalities consistent with coronary artery disease  5  Right ventricle is mildly dilated with mildly reduced systolic function  Probable pacemaker wire present  6  Severe biatrial dilatation  7  Mechanical valve is noted within the aortic position  Valve is well seated  Trace central aortic regurgitation  Normal velocities and gradients for valve with Vmax 1 85 m/s, EOA 1 38 cm2, DVI 0 44, acceleration time 66 ms  8  Mild posteriorly directed mitral regurgitation with fibrocalcific changes of the mitral valve and mild mitral annular calcification  9  Moderate tricuspid regurgitation with pulmonary artery systolic pressure is estimated at 60-65 mm Hg  10   Compared to report from 9802, grade 3 diastolic dysfunction is noted with elevated filling pressures and pulmonary hypertension    HISTORY: PRIOR HISTORY: CAD, AVR, Ischemic cardiomyopathy, HTN, HLD    PROCEDURE: The study was performed in the [facility]  This was a routine study  The transthoracic approach was used  The study included complete 2D imaging, M-mode, complete spectral Doppler, and color Doppler  The heart rate was 60  bpm, at the start of the study  Image quality was adequate  LEFT VENTRICLE: Left is severely dilated with severely reduced systolic function  Left ventricular ejection fraction is estimated at 18%  Normal wall thickness  Grade 3 diastolic dysfunction with echocardiographic indications of elevated  left atrial pressures  Increased trabeculations noted at the left ventricular apex  There is akinesis of the anterior and anterolateral wall from base to apex with dyskinetic apical wall and hypokinesis of the remaining walls  RIGHT VENTRICLE: Right ventricle is mildly dilated with mildly reduced systolic function  Linear echodensity noted within the right ventricle consistent with pacemaker/catheter    LEFT ATRIUM: Left atrium is severely dilated  ATRIAL SEPTUM: Interatrial septum is bowing toward the right atrium consistent with elevated left atrial pressures  RIGHT ATRIUM: Right atrium is severely dilated  MITRAL VALVE: Mitral valve opens well  Fibrocalcific changes of the mitral valve  No evidence of mitral stenosis  Mild posteriorly directed mitral regurgitation  AORTIC VALVE: There is a mechanical valve in the aortic position  The valve appears well seated  Trace central aortic regurgitation  Velocities and gradients are normal for valve  Vmax 1 85 m/s, mean PG 6 9 mmHg, EOA/ASHLEY 1 38 cm2, DVI  0 44, Acceleration time 66 ms, LVOTd 2cm, LVOT VTI 12 3, AV VTI 28 cm  TRICUSPID VALVE: Tricuspid valve opens well  Moderate tricuspid regurgitation with pulmonary artery systolic pressure is estimated at 60-65 mm Hg consistent with severe pulmonary hypertension  PULMONIC VALVE: Pulmonic valve not well visualized   No evidence of pulmonic stenosis or pulmonic regurgitation  PERICARDIUM: There is no evidence of significant pericardial effusion  AORTA: Aortic root is top-normal in size at 3 4 cm    SYSTEMIC VEINS: IVC: IVC is dilated with greater than 50% respirophasic collapse  SYSTEM MEASUREMENT TABLES    2D  %FS: 8 64 %  Ao Diam: 3 38 cm  EDV(Teich): 235 37 ml  EF(Teich): 18 56 %  ESV(Teich): 191 68 ml  IVSd: 0 97 cm  LA Area: 29 2 cm2  LA Diam: 4 78 cm  LVEDV MOD A4C: 260 34 ml  LVEF MOD A4C: 20 96 %  LVESV MOD A4C: 205 77 ml  LVIDd: 6 75 cm  LVIDs: 6 17 cm  LVLd A4C: 9 93 cm  LVLs A4C: 9 59 cm  LVOT Diam: 1 98 cm  LVPWd: 1 01 cm  RA Area: 23 cm2  RVIDd: 4 cm  SV MOD A4C: 54 57 ml  SV(Teich): 43 69 ml    CW  AV Env  Ti: 235 97 ms  AV VTI: 28 01 cm  AV Vmax: 1 85 m/s  AV Vmean: 1 19 m/s  AV maxP 71 mmHg  AV meanP 86 mmHg  TR Vmax: 3 67 m/s  TR maxP 96 mmHg    MM  TAPSE: 1 36 cm    PW  ASHLEY (VTI): 2 28 cm2  ASHLEY Vmax: 1 59 cm2  AVAI (VTI): 0 cm2/m2  AVAI Vmax: 0 cm2/m2  E' Sept: 0 06 m/s  E/E' Sept: 15 17  LVOT Env  Ti: 308 28 ms  LVOT VTI: 20 81 cm  LVOT Vmax: 0 96 m/s  LVOT Vmean: 0 67 m/s  LVOT maxPG: 3 7 mmHg  LVOT meanP 23 mmHg  LVSI Dopp: 36 28 ml/m2  LVSV Dopp: 63 86 ml  MV A Kevan: 0 35 m/s  MV Dec [place]: 3 47 m/s2  MV DecT: 254 1 ms  MV E Kevan: 0 88 m/s  MV E/A Ratio: 2 51  MV PHT: 73 69 ms  MVA By PHT: 2 99 cm2    [employer] Accredited Echocardiography Laboratory    Prepared and electronically signed by    Silke Zhao DO  Signed 18-Mar-2021 18:50:34      CATH/STRESS TEST:  None       EKG:       History of Present Illness   Physician Requesting Consult: Ranjana Trevino MD  Reason for Consult / Principal Problem: a lead revision     HPI: Darshan Olvera is a 61y o  year old male with a history as above who was transferred from [facility] to \[facility]\"" due to VT and atrial lead malfunction  Patient went to [facility] a few days ago due to concerns of orthopnea and SORESNEN   She was found to be in an acute HF exacerbation with ICD interrogations showing VT terminating with ATP  Also device interrogation concerning for a lead sensing malfunction with a major reduction in BIV pacing  Due to these issues he was transferred to our facility for EP evaluation  Today he feels markedly improved with his SOB but did feel his ATP prior to admission  Review of Systems  ROS as noted above, otherwise 12 point review of systems was performed and is negative  Historical Information   Past Medical History:   Diagnosis Date    AICD (automatic cardioverter/defibrillator) present     medtronic     CAD (coronary artery disease)     Cancer ([facility] [address] [facility]     Cardiac disease     Cardiomyopathy ([facility] )     mixed  predominently nonischemic    Colonic mass     Coronary artery disease     History of ventricular fibrillation 9/2/2021    History of ventricular tachycardia 5/25/2021    Hyperlipidemia     Hypertension     Irregular heart beat     Myocardial infarction ([facility] [address] [facility]     2014 and 2015    Pneumonia 1/26/2022    Rectal bleeding     S/P AVR (aortic valve replacement)     mechanical    Wears glasses      Past Surgical History:   Procedure Laterality Date    AORTIC VALVE REPLACEMENT      APPENDECTOMY      CARDIAC DEFIBRILLATOR PLACEMENT      COLECTOMY MADELIN Right     Last Assessed: 6/7/2016    COLECTOMY LAPAROSCOPIC      COLON SURGERY Right     colectomy    CORONARY STENT PLACEMENT      INSERT / Loni Ryan / REMOVE PACEMAKER      MITRAL VALVE REPAIR      MITRAL VALVE REPAIR      MOUTH SURGERY      VT COLONOSCOPY FLX DX W/COLLJ [facility] WHEN PFRMD N/A 1/15/2018    Procedure: COLONOSCOPY;  Surgeon: Shilpi Douglass MD;  Location: [facility];   Service: General    VT LAP,SURG,COLECTOMY, PARTIAL, W/ANAST N/A 6/2/2016    Procedure: RESECTION COLON RIGHT LAPAROSCOPIC HAND-ASSISTED;  Surgeon: Shilpi Douglass MD;  Location: [facility];  Service: General     Social History     Substance and Sexual Activity   Alcohol Use Not Currently     Social History Substance and Sexual Activity   Drug Use Not Currently    Types: Marijuana     Social History     Tobacco Use   Smoking Status Never Smoker   Smokeless Tobacco Never Used     Family History: non-contributory    Meds/Allergies   Hospital Medications:   Current Facility-Administered Medications   Medication Dose Route Frequency    acetaminophen (TYLENOL) tablet 650 mg  650 mg Oral Q4H PRN    albuterol inhalation solution 2 5 mg  2 5 mg Nebulization Q4H PRN    aluminum-magnesium hydroxide-simethicone (MYLANTA) oral suspension 30 mL  30 mL Oral Q6H PRN    amiodarone tablet 200 mg  200 mg Oral Daily With Breakfast    aspirin (ECOTRIN LOW STRENGTH) EC tablet 81 mg  81 mg Oral Daily    atorvastatin (LIPITOR) tablet 80 mg  80 mg Oral Daily    fluticasone-vilanterol (BREO ELLIPTA) 100-25 mcg/inh inhaler 1 puff  1 puff Inhalation Daily    furosemide (LASIX) tablet 80 mg  80 mg Oral BID (diuretic)    heparin (porcine) 25,000 Units in sodium chloride 0 45 % 250 mL infusion  3-20 Units/kg/hr (Order-Specific) Intravenous Titrated    heparin (porcine) injection 1,800 Units  1,800 Units Intravenous Q1H PRN    heparin (porcine) injection 3,600 Units  3,600 Units Intravenous Once    heparin (porcine) injection 3,600 Units  3,600 Units Intravenous Q1H PRN    metoprolol succinate (TOPROL-XL) 24 hr tablet 75 mg  75 mg Oral Q12H AISHWARYA    ondansetron (ZOFRAN) injection 4 mg  4 mg Intravenous Q6H PRN    warfarin (COUMADIN) tablet 2 5 mg  2 5 mg Oral Daily (warfarin)     Home Medications:   Medications Prior to Admission   Medication    amiodarone 100 mg tablet    furosemide (LASIX) 20 mg tablet    aspirin 81 MG tablet    atorvastatin (LIPITOR) 80 mg tablet    eplerenone (INSPRA) 25 mg tablet    lisinopril (ZESTRIL) 10 mg tablet    metoprolol succinate (TOPROL-XL) 25 mg 24 hr tablet    warfarin (COUMADIN) 2 5 mg tablet       No Known Allergies    Objective   Vitals: Blood pressure 121/70, pulse 80, temperature 97 9 °F (36 6 °C), temperature source Oral, resp  rate 17, height 5' 6" (1 676 m), weight 60 6 kg (133 lb 9 6 oz), SpO2 96 %  Orthostatic Blood Pressures      Most Recent Value   Blood Pressure 121/70 filed at 03/18/2022 1537   Patient Position - Orthostatic VS Lying filed at 03/18/2022 1537            Intake/Output Summary (Last 24 hours) at 3/18/2022 1605  Last data filed at 3/18/2022 1256  Gross per 24 hour   Intake 676 1 ml   Output 1825 ml   Net -1148 9 ml       Invasive Devices  Report    Peripherally Inserted Central Catheter Line            PICC Line 03/18/22 Right Brachial <1 day          Peripheral Intravenous Line            Peripheral IV 03/15/22 Left Antecubital 2 days                Physical Exam  Constitutional:       Appearance: He is well-developed  HENT:      Head: Normocephalic and atraumatic  Eyes:      Pupils: Pupils are equal, round, and reactive to light  Cardiovascular:      Rate and Rhythm: Normal rate and regular rhythm  Pulmonary:      Effort: Pulmonary effort is normal       Breath sounds: Rales present  Abdominal:      General: Bowel sounds are normal       Palpations: Abdomen is soft  Musculoskeletal:         General: Normal range of motion  Cervical back: Normal range of motion and neck supple  Skin:     General: Skin is warm and dry  Neurological:      Mental Status: He is alert and oriented to person, place, and time  Lab Results: I have personally reviewed pertinent lab results      Results from last 7 days   Lab Units 03/17/22 2053 03/17/22 0927 03/16/22  0434   WBC Thousand/uL 11 84* 10 40* 13 59*   HEMOGLOBIN g/dL 11 0* 10 6* 9 1*   HEMATOCRIT % 33 1* 33 8* 28 1*   PLATELETS Thousands/uL 311 319 316     Results from last 7 days   Lab Units 03/18/22  1115 03/17/22 2053 03/17/22  0927   POTASSIUM mmol/L 3 9 4 0 4 3   CHLORIDE mmol/L 94* 95* 97*   CO2 mmol/L 31 30 30   BUN mg/dL 19 15 14   CREATININE mg/dL 0 91 0 85 1 04   CALCIUM mg/dL 9 1 8 8 8 3 Results from last 7 days   Lab Units 03/18/22  1115 03/18/22  1007 03/17/22  2053 03/17/22  1734   INR  1 71*  --  1 53* 1 42*   PTT seconds 47* 50* 36  --      Results from last 7 days   Lab Units 03/18/22  1115 03/16/22  0434   MAGNESIUM mg/dL 1 8 1 7

## 2022-03-18 NOTE — OCCUPATIONAL THERAPY NOTE
Occupational Therapy Cx        Patient Name: Kellee Loaiza  IYALC'E Date: 3/18/2022         03/18/22 1337   OT Last Visit   OT Visit Date 03/18/22   Note Type   Note type Evaluation; Cancelled Session   Cancel Reasons Refusal       OT orders received and chart reviewed  Attempted to see pt this afternoon, however upon introduction, pt agitated and yelling at therapist, stating he is tired and does not want to move  Attempted to provide education, however this made pt increasingly agitated- pt then began yelling profanities at therapist, pulling covers over head and refusing to speak any further  Will hold initial eval at this time- RN and CM aware        Dean Jones, MOT, OTR/L

## 2022-03-18 NOTE — PROGRESS NOTES
PROGRESS NOTE - Family Medicine Residency [address] [facility] 1965, 61 y o  male  MRN: [id number]    Unit/Bed#: [facility] 530-01 Encounter: [id number]  Primary Care Provider: NICOLE Marti      Admission Date: 3/17/2022  Length of Stay: 1 days  Code Status:  Level 1 - Full Code  Diet: Diet Cardiovascular; Sodium 2 GM; Fluid Restriction 1800 ML  Consult:   IP CONSULT TO NUTRITION SERVICES  IP CONSULT TO ELECTROPHYSIOLOGY  IP CONSULT TO PODIATRY  IP CONSULT TO CASE MANAGEMENT  IP CONSULT TO PICC TEAM  IP CONSULT TO CARDIOLOGY    Assessment/Plan   Assessments & Plans:     Discussed with [facility] team and finalization is pending attending physician attestation  * Malfunction of implantable defibrillator ventricular (ICD) lead  Assessment & Plan  Pt transferred from [facility] for EP eval of possible Atrial lead malfunction on ICD  - Pt c/o worsening exertion dyspnea and at rest after AICD discharge x2 on 3/8/22 around 2pm which pt did not seek medical attention for at that time  - H/o VFib with Bi-vent AICD placement on home Amiodarone 100mg daily and Toprol-XL 75mg BID  - AICD interrogation per cardiology in [facility] shows evidence of x3 shocks for VT and ATP x3 which is congruent with patient's report, but noted that Atrial lead not appropriately functioning, which home Amiodarone was increased from 100mg daily to 200mg daily  - Case was discussed by cardiology with Dr Jose Maria Rain ([facility] EP)    Plan:  - C/w telemetry x48 hours for arrhythmia  - C/w Amiodarone 200mg daily for antiarrhythmic  - C/w Toprol-XL 75mg BID for AV-uvaldo block  - EP and cardiology consulted  - Per EP, no immediate interventions   Transitioned back to home Coumadin 2 5mg qd    Acute on chronic combined systolic and diastolic congestive heart failure (HCC)  Assessment & Plan  Acute CHF exacerbation likely triggered by VTach event  - On home Eplerenone 20mg daily and Lasix 20mg BID  - Last ECHO on 1/12 showed LVEF 20% with global hypokinesis, indeterminate diastolic dysfunction, moderate biatrial enlargement  - 3/15 BNP 83383 (improved from last BNP 35083 on 1/28/22)  - 3/15 CXR: cardiomegaly with bilateral pulmonary airspace disease and pleural effusions concerning for CHF/pulmonary edema superimposed upon more chronic pulmonary changes    Plan:  - Hold home Eplerenone while inpatient  - C/w IV Lasix 40mg IV BID  - Fluid restricted diet  - Daily weight, strict I&Os    Intake/Output Summary (Last 24 hours) at 3/18/2022 0914  Last data filed at 3/18/2022 0200  Gross per 24 hour   Intake 450 ml   Output 925 ml   Net -475 ml       Chronic respiratory failure with hypoxia (HCC)  Assessment & Plan  - Last SpO2: 92 % (03/18/22 1127) on Nasal Cannula O2 Flow Rate (L/min): 3 L/min     On chart review, patient was recently hospitalized back in 1/2022 for both COVID and bacterial PNA, requiring intubation and ICU management  Has been hospitalized twice afterwards 2/2 hypoxia and volume overload (from HF with severe pulmonary HTN)  Previously on 2L before hospitalization > 4L NC O2 is his current baseline  Follows with pulmonology with last visit in 2/15/2022 - concern for development of secondary fibrosis as well as bronchiectasis given continued hypoxia  Planned for Chest CT and PFTs in 4 weeks from visit, which have not been completed yet  Recommended that patient trial Breo daily and to use 6L NC for increased activity  May benefit from pulmonary rehab in the future       Plan:  - Titrate O2 NC as needed for goal SO2 > 90%  - Respiratory protocol  - Will add [address] Breo with albuterol PRN    Hyperlipidemia  Assessment & Plan  Continue with home Lipitor 80mg qd    Hypertension  Assessment & Plan  H/o HTN on home Lisinopril 10mg qHS  - Last Blood Pressure: 126/71 (45/41/79 0924)  - Systolic (49KFE), ZDI:680 , Min:111 , Max:150     Plan:  - C/w home regimen  - Vitals per unit routine    Eschar of toe  Assessment & Plan  Pt noted to have Left hallux eschar during admission at [facility]  - Previously evaluated by podiatry in [facility], "no intervention necessary to left hallux eschar as it is superficial and well-adhered"  - 3/16 LEADS: "Diffuse disease noted throughout the femoral-popliteal arteries without significant focal stenosis" of the RLE and "short segment occlusion versus high grade stenosis of the common femoral artery with diffuse disease noted throughout the femoral-popliteal arteries" of the LLE    Plan:  - Podiatry consulted - no interventions, LEADS to assess healing potential  - Wound care consulted    Substance use  Assessment & Plan  Prior history of marijuana and cocaine use  History of medication noncompliance  Positive for THC in February, cocaine in January this year  UDS at [facility] negative  Follow-up repeat UDS this admission  CM consulted     S/P AVR (aortic valve replacement)  Assessment & Plan  Hx of rheumatic heart disease s/p mechanical AVR in the 1980s (on Coumadin 2 5mg qd)  Continue Coumadin 2 5mg qd (Goal INR 2-3)  Will check daily INR    Coronary artery disease involving native coronary artery of native heart without angina pectoris  Assessment & Plan  H/o CAD s/p BMS-LAD and BMS-LCx (2014)  - On home Amiodarone 100mg daily for antiarrhythmic and Toprol-XL 75mg BID for AV-uvaldo block  - Also on ASA 81mg daily and Atorvastatin 80mg qHS  - 3/15 EKG: sinus tachy with V-pacing and LBBB    Plan:  - C/w home regimen  - Increase home Amiodarone to 200mg daily for antiarrhythmic      Principal Problem:    Malfunction of implantable defibrillator ventricular (ICD) lead  Active Problems:    Acute on chronic combined systolic and diastolic congestive heart failure (HCC)    Chronic respiratory failure with hypoxia (HCC)    Coronary artery disease involving native coronary artery of native heart without angina pectoris    S/P AVR (aortic valve replacement)    Substance use    Eschar of toe    Hypertension Hyperlipidemia      VTE Pharm PPX: Heparin  VTE Mech PPX: sequential compression device and/or foot pump applied unless otherwise contraindicated       Hospital Course & 24hr events:     Hospital course:  61 y o  male admitted 3/17/2022, now HD# 1, for EP eval of possible Atrial lead malfunction on ICD  Overnight/24hr events:  Per night team and nursing staff, they had a difficult time drawing labs  Requested we order a PICC line  Patient consented and form was signed  Patient also mentioned having some shortness of breath overnight, which has since improved  Subjective   Subjective & Review of Systems:     Review of Systems   Constitutional: Negative for chills and fever  HENT: Negative for ear pain and sore throat  Eyes: Negative for pain and visual disturbance  Respiratory: Positive for shortness of breath  Negative for cough and chest tightness  Cardiovascular: Negative for chest pain and palpitations  Gastrointestinal: Negative for abdominal pain and vomiting  Genitourinary: Negative for dysuria and hematuria  Musculoskeletal: Negative for arthralgias and back pain  Skin: Negative for color change and rash  Neurological: Negative for seizures and syncope  All other systems reviewed and are negative        Review of systems was reviewed and negative unless stated above in HPI/24-hour events           Objective    Objective:     Vitals:    Vitals:    03/17/22 2146 03/18/22 0746 03/18/22 0957 03/18/22 1127   BP: 126/71  125/72 121/71   BP Location:       Pulse: 81  80 82   Resp: 20  14    Temp: 98 °F (36 7 °C)  97 5 °F (36 4 °C)    TempSrc:       SpO2: 95% 100% 98% 92%   Weight:       Height:         Temp:  [97 5 °F (36 4 °C)-98 8 °F (37 1 °C)] 97 5 °F (36 4 °C)  HR:  [64-91] 82  Resp:  [14-20] 14  BP: (111-150)/(61-76) 121/71  Weight (last 2 days)     Date/Time Weight    03/17/22 1634 60 6 (133 6)          Intake/Output Summary (Last 24 hours) at 3/18/2022 1312  Last data filed at 3/18/2022 1256  Gross per 24 hour   Intake 661 12 ml   Output 1350 ml   Net -688 88 ml     Invasive Devices  Report    Peripherally Inserted Central Catheter Line            PICC Line 03/18/22 Right Brachial <1 day          Peripheral Intravenous Line            Peripheral IV 03/15/22 Left Antecubital 2 days                Labs: I have personally reviewed pertinent reports  Results from last 7 days   Lab Units 03/17/22 2053 03/17/22  0927 03/16/22  0434 03/15/22  2007   WBC Thousand/uL 11 84* 10 40* 13 59* 10 88*   HEMOGLOBIN g/dL 11 0* 10 6* 9 1* 10 7*   HEMATOCRIT % 33 1* 33 8* 28 1* 33 1*   PLATELETS Thousands/uL 311 319 316 348   NEUTROS ABS Thousands/µL  --  7 35 10 31* 7 39       Results from last 7 days   Lab Units 03/18/22  1115 03/17/22 2053 03/17/22 0927 03/16/22 0434 03/15/22  2007   POTASSIUM mmol/L 3 9 4 0 4 3 4 2 4 4   CHLORIDE mmol/L 94* 95* 97* 97* 96*   CO2 mmol/L 31 30 30 30 32   BUN mg/dL 19 15 14 13 9   CREATININE mg/dL 0 91 0 85 1 04 1 07 1 07   CALCIUM mg/dL 9 1 8 8 8 3 8 2* 8 2*   EGFR ml/min/1 73sq m 93 96 79 76 76   MAGNESIUM mg/dL 1 8  --   --  1 7  --                EKG, Pathology, Imaging, and Other Studies:     Lab Results   Component Value Date/Time    Blood Culture No Growth After 5 Days  01/28/2022 04:55 AM    Blood Culture No Growth After 5 Days  01/28/2022 04:51 AM    Sputum Culture 2+ Growth of  02/01/2022 07:10 AM    Gram Stain Result Rare Epithelial cells per low power field (A) 02/01/2022 07:10 AM    Gram Stain Result Rare Polys (A) 02/01/2022 07:10 AM    Gram Stain Result Rare Gram positive cocci in pairs (A) 02/01/2022 07:10 AM    Gram Stain Result Rare Gram positive rods (A) 02/01/2022 07:10 AM    Legionella Urinary Antigen Negative 09/02/2021 09:15 AM       No results found        Inpatient medications:     Current Facility-Administered Medications   Medication Dose Route Frequency    acetaminophen (TYLENOL) tablet 650 mg  650 mg Oral Q4H PRN    albuterol inhalation solution 2 5 mg  2 5 mg Nebulization Q4H PRN    aluminum-magnesium hydroxide-simethicone (MYLANTA) oral suspension 30 mL  30 mL Oral Q6H PRN    amiodarone tablet 200 mg  200 mg Oral Daily With Breakfast    aspirin (ECOTRIN LOW STRENGTH) EC tablet 81 mg  81 mg Oral Daily    atorvastatin (LIPITOR) tablet 80 mg  80 mg Oral Daily    fluticasone-vilanterol (BREO ELLIPTA) 100-25 mcg/inh inhaler 1 puff  1 puff Inhalation Daily    heparin (porcine) 25,000 Units in sodium chloride 0 45 % 250 mL infusion  3-20 Units/kg/hr (Order-Specific) Intravenous Titrated    heparin (porcine) injection 1,800 Units  1,800 Units Intravenous Q1H PRN    heparin (porcine) injection 3,600 Units  3,600 Units Intravenous Once    heparin (porcine) injection 3,600 Units  3,600 Units Intravenous Q1H PRN    metoprolol succinate (TOPROL-XL) 24 hr tablet 75 mg  75 mg Oral Q12H AISHWARYA    ondansetron (ZOFRAN) injection 4 mg  4 mg Intravenous Q6H PRN    warfarin (COUMADIN) tablet 2 5 mg  2 5 mg Oral Daily (warfarin)         Physical Exam:   Physical Exam      Physical Exam  Constitutional:       General: He is not in acute distress  Appearance: He is ill-appearing  HENT:      Head: Normocephalic and atraumatic  Nose: Nose normal    Eyes:      Extraocular Movements: Extraocular movements intact  Conjunctiva/sclera: Conjunctivae normal    Cardiovascular:      Rate and Rhythm: Normal rate and regular rhythm  Heart sounds: No murmur heard  Pulmonary:      Effort: No respiratory distress  Breath sounds: Wheezing present  Comments: On 4L NC O2 (baseline) with diffuse wheezing  Abdominal:      General: Abdomen is flat  Bowel sounds are normal       Palpations: Abdomen is soft  Tenderness: There is no abdominal tenderness  Musculoskeletal:         General: No tenderness  Normal range of motion  Cervical back: Normal range of motion     Skin:     Comments: Eschar of right great toe   Neurological: Mental Status: He is alert and oriented to person, place, and time  Psychiatric:         Mood and Affect: Mood normal          Behavior: Behavior normal                Rinku Dunn MD  PGY-1, Family Medicine  03/18/22  1:11 PM    Dear reader, please be aware that portions of my note contain dictated text  I have done my best to proof-read this note prior to signing  However, there may be occasional unnoticed errors pertaining to "sound-alike" words and/or grammar during my dictation process  If there is any words or information that is unclear or appears erroneous, please kindly let me know and I will clarify and/or addend my notes accordingly  Thank you for your understanding

## 2022-03-18 NOTE — CONSULTS
Consultation - General Cardiology Team 2  [facility] A Akosua 61 y o  male MRN: [id number]  Unit/Bed#: [facility] 530-01 Encounter: [id number]      Inpatient consult to Cardiology  Consult performed by: Sonam Farley MD  Consult ordered by: Sue Flaherty PA-C        PCP: Phuong Rice   Outpatient Cardiologist:     History of Present Illness   Physician Requesting Consult: Tyron Wiggins MD  Reason for Consult / Principal Problem: Heart Failure     HPI: Victorina Recinos is a 61y o  year old male with a history of chronic HFrEF, iCM (s/p BiV ICD in 2015), CAD (s/p BMS to LAD and left Cx in 2014), rheumatic heart disease, AS (s/p mechanical AVR), history of VF, HTN presenting with patient felt as though he was shocked by his ICD presenting interrogated with evidence VT and ATP x3 (Atrial lead not appropriately functioning)  He states that he has been doing fairly well until he felt like he was getting shocked  Patient;s ICD presenting interrogated with evidence VT and ATP x3 (Atrial lead not appropriately functioning)  He was thought to be decompensated from a heart failure standpoint  He was increased on amiodarone 200 mg, his beta blocker, and IV lasix  Patient states he tries to follow-up HF diet watching his fluid/salt intake  Denies recent drug use, tobacco, and alcohol  Review of Systems  Review of system was conducted and was negative except for as stated in the HPI  Historical Information   Past Medical History:   Diagnosis Date    AICD (automatic cardioverter/defibrillator) present     medtronic     CAD (coronary artery disease)     [facility] [address] [facility]     Cardiac disease     Cardiomyopathy ([facility] [address] )     mixed    predominently nonischemic    Colonic mass     Coronary artery disease     History of ventricular fibrillation 9/2/2021    History of ventricular tachycardia 5/25/2021    Hyperlipidemia     Hypertension     Irregular heart beat     Myocardial infarction [facility])     2014 and 2015    Pneumonia 1/26/2022    Rectal bleeding     S/P AVR (aortic valve replacement)     mechanical    Wears glasses      Past Surgical History:   Procedure Laterality Date    AORTIC VALVE REPLACEMENT      APPENDECTOMY      CARDIAC DEFIBRILLATOR PLACEMENT      COLECTOMY MADELIN Right     Last Assessed: 6/7/2016    COLECTOMY LAPAROSCOPIC      COLON SURGERY Right     colectomy    CORONARY STENT PLACEMENT      INSERT / REPLACE / REMOVE PACEMAKER      MITRAL VALVE REPAIR      MITRAL VALVE REPAIR      MOUTH SURGERY      MD COLONOSCOPY FLX DX W/COLLJ [address] WHEN PFRMD N/A 1/15/2018    Procedure: COLONOSCOPY;  Surgeon: Khurram Archer MD;  Location: [facility];   Service: General    MD LAP,SURG,COLECTOMY, PARTIAL, W/ANAST N/A 6/2/2016    Procedure: RESECTION COLON RIGHT LAPAROSCOPIC HAND-ASSISTED;  Surgeon: Khurram Archer MD;  Location: [facility];  Service: General     Social History     Substance and Sexual Activity   Alcohol Use Not Currently     Social History     Substance and Sexual Activity   Drug Use Not Currently    Types: Marijuana     Social History     Tobacco Use   Smoking Status Never Smoker   Smokeless Tobacco Never Used     Family History: non-contributory    Meds/Allergies   Hospital Medications:   Current Facility-Administered Medications   Medication Dose Route Frequency    acetaminophen (TYLENOL) tablet 650 mg  650 mg Oral Q4H PRN    albuterol inhalation solution 2 5 mg  2 5 mg Nebulization Q4H PRN    aluminum-magnesium hydroxide-simethicone (MYLANTA) oral suspension 30 mL  30 mL Oral Q6H PRN    amiodarone tablet 200 mg  200 mg Oral Daily With Breakfast    aspirin (ECOTRIN LOW STRENGTH) EC tablet 81 mg  81 mg Oral Daily    atorvastatin (LIPITOR) tablet 80 mg  80 mg Oral Daily    fluticasone-vilanterol (BREO ELLIPTA) 100-25 mcg/inh inhaler 1 puff  1 puff Inhalation Daily    furosemide (LASIX) tablet 80 mg  80 mg Oral BID (diuretic)    heparin (porcine) 25,000 Units in sodium chloride 0 45 % 250 mL infusion  3-20 Units/kg/hr (Order-Specific) Intravenous Titrated    heparin (porcine) injection 1,800 Units  1,800 Units Intravenous Q1H PRN    heparin (porcine) injection 3,600 Units  3,600 Units Intravenous Once    heparin (porcine) injection 3,600 Units  3,600 Units Intravenous Q1H PRN    metoprolol succinate (TOPROL-XL) 24 hr tablet 75 mg  75 mg Oral Q12H [address]    ondansetron (ZOFRAN) injection 4 mg  4 mg Intravenous Q6H PRN    warfarin (COUMADIN) tablet 2 5 mg  2 5 mg Oral Daily (warfarin)     Home Medications:   Medications Prior to Admission   Medication    amiodarone 100 mg tablet    furosemide (LASIX) 20 mg tablet    aspirin 81 MG tablet    atorvastatin (LIPITOR) 80 mg tablet    eplerenone (INSPRA) 25 mg tablet    lisinopril (ZESTRIL) 10 mg tablet    metoprolol succinate (TOPROL-XL) 25 mg 24 hr tablet    warfarin (COUMADIN) 2 5 mg tablet       No Known Allergies    Objective   Vitals: Blood pressure 121/71, pulse 82, temperature 97 5 °F (36 4 °C), resp  rate 14, height 5' 6" (1 676 m), weight 60 6 kg (133 lb 9 6 oz), SpO2 92 %    Orthostatic Blood Pressures      Most Recent Value   Blood Pressure 121/71 filed at 03/18/2022 1127   Patient Position - Orthostatic VS Lying filed at 03/17/2022 1706            Invasive Devices  Report    Peripherally Inserted Central Catheter Line            PICC Line 03/18/22 Right Brachial <1 day          Peripheral Intravenous Line            Peripheral IV 03/15/22 Left Antecubital 2 days                Physical Exam    GEN: Lio Bush appears well, alert and oriented x 3, pleasant and cooperative   HEENT:  Normocephalic, atraumatic, anicteric, moist mucous membranes  NECK: No JVD or carotid bruits   HEART: irregular rhythm, regular rate, normal S1 and S2, no murmurs, clicks, gallops or rubs   LUNGS: Clear to auscultation bilaterally; no wheezes, rales, or rhonchi; respiration nonlabored   ABDOMEN:  Normoactive bowel sounds, soft, no tenderness, no distention  EXTREMITIES: peripheral pulses palpable; no edema  NEURO: no gross focal findings; cranial nerves grossly intact   SKIN:  Dry, intact, warm to touch    Lab Results: I have personally reviewed pertinent lab results  Results from last 7 days   Lab Units 03/15/22  2007   NT-PRO BNP pg/mL 12,025*     Results from last 7 days   Lab Units 22  11122   POTASSIUM mmol/L 3 9 4 0 4 3   CO2 mmol/L 31 30 30   CHLORIDE mmol/L 94* 95* 97*   BUN mg/dL 19 15 14   CREATININE mg/dL 0 91 0 85 1 04     Results from last 7 days   Lab Units 22  0434   HEMOGLOBIN g/dL 11 0* 10 6* 9 1*   HEMATOCRIT % 33 1* 33 8* 28 1*   PLATELETS Thousands/uL 311 319 316     Results from last 7 days   Lab Units 22   PTT seconds 47* 50* 36             Imaging: I have personally reviewed pertinent reports  Telemetry:   V pacing     EKG:         Previous STRESS TEST:  No results found for this or any previous visit  No results found for this or any previous visit  Results for orders placed during the hospital encounter of 17    NM myocardial perfusion spect (rx stress and/or rest)    [address], [address]  [phone]    Rest/Stress Gated SPECT Myocardial Perfusion Imaging After Regadenoson    Patient: Roseann Mariscal  MR number: [id number]  Account number: [de-identified]  : 1965  Age: 45 years  Gender: Male  Status: Outpatient  Location: Stress lab  Height: 66 in  Weight: 162 lb  BP: 122/ 64 mmHg    Allergies: NO KNOWN ALLERGIES    Diagnosis: I25 10 - Atherosclerotic heart disease of native coronary artery without angina pectoris, Z01 810 - Encounter for preprocedural cardiovascular examination    Primary Physician:  Alysha Maciel PA-C  Technician:  Tip Chavez  RN:  Cristobal Paulson RN BSN  Referring Physician:  Christina Branham MD  Group:  [facility]  Report Prepared By[de-identified]  Miriam Simons RN BSN  Interpreting Physician:  Nawaf Johnson DO    INDICATIONS: Evaluation of known coronary artery disease  HISTORY: The patient is a 45year old  male  Chest pain status: no chest pain  Coronary artery disease risk factors: hypertension  Cardiovascular history: coronary artery disease, prior myocardial infarction( VT/VF arrest 2014),  congestive heart failure( EF 20%), mitral valve disease(MV repair), and aortic valve disease( AV [id number])  Prior cardiovascular procedures: angioplasty of left anterior descending (on 11/2014), angioplasty of left circumflex (on  11/2014), pacemaker, and implantable cardioverter defibrillator procedure  Medications: a nitrate, a beta blocker, an ACE inhibitor/ARB, a diuretic, aspirin, and a lipid lowering agent  PHYSICAL EXAM: Baseline physical exam screening: normal lung exam     REST ECG: Paced rhythm    PROCEDURE: The study was performed in the stress lab  A regadenoson infusion pharmacologic stress test was performed  Gated SPECT myocardial perfusion imaging was performed after stress and at rest  Systolic blood pressure was 122 mmHg, at  the start of the study  Diastolic blood pressure was 64 mmHg, at the start of the study  The heart rate was 50 bpm, at the start of the study  IV double checked  Regadenoson protocol:  HR bpm SBP mmHg DBP mmHg Symptoms  Baseline 50 122 64 none  Immediate 83 100 60 mild dyspnea  5 min 52 118 72 none  No medications or fluids given  The patient also performed low level exercise  STRESS SUMMARY: Duration of pharmacologic stress was 3 min  Maximal heart rate during stress was 83 bpm  The rate-pressure product for the peak heart rate and blood pressure was 30738  There was no chest pain during stress  The stress test  was terminated due to protocol completion  Pre oxygen saturation: 96 %   Peak oxygen saturation: 100 %  Arrhythmia during stress: isolated premature ventricular beats  The stress ECG was non-diagnostic due to paced rhythm and resting ST/T  wave abnormality  ISOTOPE ADMINISTRATION:  Resting isotope administration Stress isotope administration  Agent Tetrofosmin Tetrofosmin  Dose 11 mCi 33 mCi  Date 2017  Injection time 09:07 10:17  Imaging time 09:37 10:47  Injection-image interval 30 min 30 min    The radiopharmaceutical was injected at the peak effect of pharmacologic stress  MYOCARDIAL PERFUSION IMAGING:  The image quality was fair  The left ventricle was dilated  PERFUSION DEFECTS:  -  1) There was a large, severe, fixed myocardial perfusion defect of the entire anterior wall, with a complete fixed perfusion defect of the apex without reversibility  2) There was a large, severe, fixed myocardial perfusion defect of the entire inferior wall, extending to the mid-basal inferolateral wall without reversibility  GATED SPECT:  The calculated left ventricular ejection fraction was 18 %  Left ventricular ejection fraction was markedly decreased by visual estimate  There was severe global left ventricular hypokinesis  SUMMARY:  -  Stress results: There was no chest pain during stress  -  ECG conclusions: The stress ECG was non-diagnostic due to paced rhythm and resting ST/T wave abnormality   -  Perfusion imagin) There was a large, severe, fixed myocardial perfusion defect of the entire anterior wall, with a complete fixed perfusion defect of the apex without reversibility  2) There was a large, severe, fixed myocardial perfusion defect of the entire inferior wall, extending to the mid-basal inferolateral wall without reversibility   -  Gated SPECT: The calculated left ventricular ejection fraction was 18 %  Left ventricular ejection fraction was markedly decreased by visual estimate  There was severe global left ventricular hypokinesis      IMPRESSIONS: Abnormal study after pharmacologic vasodilation  The left ventricle was dilated  There was evidence of prior myocardial infarction of the entire anterior wall extending to the apex, and the inferior wall exending to the  mid-basal inferolateral myocardial wall segments with no evidence of myocardial ischemia  Left ventricular systolic function was reduced, without distinct regional wall motion abnormalities  Prepared and signed by    Kori Nelson DO  Signed 2017 14:20:12      Previous Cath/PCI:  No results found for this or any previous visit  No results found for this or any previous visit  No results found for this or any previous visit  ECHO:  Results for orders placed during the hospital encounter of 21    Echo complete with contrast if indicated    Narrative  [address] [place] [address]    Transthoracic Echocardiogram  2D, M-mode, Doppler, and Color Doppler    Study date:  18-Mar-2021    Patient: Tena Wood  MR number: [id number]  Account number: [de-identified]  : 1965  Age: 63 years  Gender: Male  Status: Outpatient  Location: [place] [facility]  Height: 66 in  Weight: 148 7 lb  BP: 112/ 58 mmHg    Indications: Ischemic Cardiomyopathy    Diagnoses: I25 5 - Ischemic cardiomyopathy    Sonographer:  Solomon Syed  Interpreting Physician:  Kasandra Zazueta DO  Primary Physician:  Daryl Anglin DO  Group:  [facility]    SUMMARY    SUMMARY:  1  This is a technically adequate study  2  Left ventricle severely dilated with severely reduced systolic function  Left ventricular ejection fraction is estimated at 18%  3  Grade 2 diastolic dysfunction with echocardiographic indications of elevated left atrial pressures  4  Regional wall motion abnormalities consistent with coronary artery disease  5  Right ventricle is mildly dilated with mildly reduced systolic function  Probable pacemaker wire present  6   Severe biatrial dilatation  7  Mechanical valve is noted within the aortic position  Valve is well seated  Trace central aortic regurgitation  Normal velocities and gradients for valve with Vmax 1 85 m/s, EOA 1 38 cm2, DVI 0 44, acceleration time 66 ms  8  Mild posteriorly directed mitral regurgitation with fibrocalcific changes of the mitral valve and mild mitral annular calcification  9  Moderate tricuspid regurgitation with pulmonary artery systolic pressure is estimated at 60-65 mm Hg  10  Compared to report from January 8, 3731, grade 3 diastolic dysfunction is noted with elevated filling pressures and pulmonary hypertension    HISTORY: PRIOR HISTORY: CAD, AVR, Ischemic cardiomyopathy, HTN, HLD    PROCEDURE: The study was performed in the [facility]  This was a routine study  The transthoracic approach was used  The study included complete 2D imaging, M-mode, complete spectral Doppler, and color Doppler  The heart rate was 60  bpm, at the start of the study  Image quality was adequate  LEFT VENTRICLE: Left is severely dilated with severely reduced systolic function  Left ventricular ejection fraction is estimated at 18%  Normal wall thickness  Grade 3 diastolic dysfunction with echocardiographic indications of elevated  left atrial pressures  Increased trabeculations noted at the left ventricular apex  There is akinesis of the anterior and anterolateral wall from base to apex with dyskinetic apical wall and hypokinesis of the remaining walls  RIGHT VENTRICLE: Right ventricle is mildly dilated with mildly reduced systolic function  Linear echodensity noted within the right ventricle consistent with pacemaker/catheter    LEFT ATRIUM: Left atrium is severely dilated  ATRIAL SEPTUM: Interatrial septum is bowing toward the right atrium consistent with elevated left atrial pressures  RIGHT ATRIUM: Right atrium is severely dilated  MITRAL VALVE: Mitral valve opens well  Fibrocalcific changes of the mitral valve  No evidence of mitral stenosis  Mild posteriorly directed mitral regurgitation  AORTIC VALVE: There is a mechanical valve in the aortic position  The valve appears well seated  Trace central aortic regurgitation  Velocities and gradients are normal for valve  Vmax 1 85 m/s, mean PG 6 9 mmHg, EOA/ASHLEY 1 38 cm2, DVI  0 44, Acceleration time 66 ms, LVOTd 2cm, LVOT VTI 12 3, AV VTI 28 cm  TRICUSPID VALVE: Tricuspid valve opens well  Moderate tricuspid regurgitation with pulmonary artery systolic pressure is estimated at 60-65 mm Hg consistent with severe pulmonary hypertension  PULMONIC VALVE: Pulmonic valve not well visualized  No evidence of pulmonic stenosis or pulmonic regurgitation  PERICARDIUM: There is no evidence of significant pericardial effusion  AORTA: Aortic root is top-normal in size at 3 4 cm    SYSTEMIC VEINS: IVC: IVC is dilated with greater than 50% respirophasic collapse  SYSTEM MEASUREMENT TABLES    2D  %FS: 8 64 %  Ao Diam: 3 38 cm  EDV(Teich): 235 37 ml  EF(Teich): 18 56 %  ESV(Teich): 191 68 ml  IVSd: 0 97 cm  LA Area: 29 2 cm2  LA Diam: 4 78 cm  LVEDV MOD A4C: 260 34 ml  LVEF MOD A4C: 20 96 %  LVESV MOD A4C: 205 77 ml  LVIDd: 6 75 cm  LVIDs: 6 17 cm  LVLd A4C: 9 93 cm  LVLs A4C: 9 59 cm  LVOT Diam: 1 98 cm  LVPWd: 1 01 cm  RA Area: 23 cm2  RVIDd: 4 cm  SV MOD A4C: 54 57 ml  SV(Teich): 43 69 ml    CW  AV Env  Ti: 235 97 ms  AV VTI: 28 01 cm  AV Vmax: 1 85 m/s  AV Vmean: 1 19 m/s  AV maxP 71 mmHg  AV meanP 86 mmHg  TR Vmax: 3 67 m/s  TR maxP 96 mmHg    MM  TAPSE: 1 36 cm    PW  ASHLEY (VTI): 2 28 cm2  ASHLEY Vmax: 1 59 cm2  AVAI (VTI): 0 cm2/m2  AVAI Vmax: 0 cm2/m2  E' Sept: 0 06 m/s  E/E' Sept: 15 17  LVOT Env  Ti: 308 28 ms  LVOT VTI: 20 81 cm  LVOT Vmax: 0 96 m/s  LVOT Vmean: 0 67 m/s  LVOT maxPG: 3 7 mmHg  LVOT meanP 23 mmHg  LVSI Dopp: 36 28 ml/m2  LVSV Dopp: 63 86 ml  MV A Kevan: 0 35 m/s  MV Dec Sargent: 3 47 m/s2  MV DecT: 254 1 ms  MV E Kevan: 0 88 m/s  MV E/A Ratio: 2 51  MV PHT: 73 69 ms  MVA By PHT: 2 99 cm2    [facility]    Prepared and electronically signed by    Valery Castro DO  Signed 18-Mar-2021 18:50:34    No results found for this or any previous visit  DEBBIE:  No results found for this or any previous visit  No results found for this or any previous visit  CMR:  No results found for this or any previous visit  No results found for this or any previous visit  No results found for this or any previous visit  HOLTER  No results found for this or any previous visit  No results found for this or any previous visit  VTE Prophylaxis: Sequential compression device (Venodyne)       Assessment/Plan   Jessa Goodman is a 61y o  year old male with a history of chronic HFrEF, iCM (s/p BiV ICD in 2015), CAD (s/p BMS to LAD and left Cx in 2014), rheumatic heart disease, AS (s/p mechanical AVR), history of VF, HTN presenting with patient felt as though he was shocked by his ICD presenting interrogated with evidence VT and ATP x3 (Atrial lead not appropriately functioning)  Acute decompensated heart failure: Patient has a combination of factors contributing to decompensation including diet compliance, discharge on half of his diuretic regimen, and poor sensing atrial lead leading to fusion beats and sustained VT with subsequent shocks  Patient responded well to IV diuretics and appears more compensated  Chronic hypoxia most likely related to post covid pulmonary fibrosis  -started oral lasix 80 mg BID   -lipitor 80 mg daily   -asa 81 mg daily   -eplerenon 25 mg daily   -restart lisinopril at lower doses 20 mg   -warfarin for atrial fibrillation being managed by ep  -toprol XL per EP recs  -amio per EP recs   -plan for redo atrial lead per EP   -HF follow-up outpatient       Case discussed and reviewed with Dr Sandra Rodrigues who agrees with my assessment and plan      Thank you for involving us in the care of your patient  Primo Mcfarlane MD  Cardiology Fellow PGY-4    ==========================================================================================    Counseling / Coordination of Care  Total floor / unit time spent today 45 minutes minutes  Greater than 50% of total time was spent with the patient and / or family counseling and / or coordination of care  A description of the counseling / coordination of care:          Epic/ Allscripts/Care Everywhere records reviewed:     ** Please Note: Fluency DirectDictation voice to text software may have been used in the creation of this document   **

## 2022-03-18 NOTE — PHYSICAL THERAPY NOTE
Physical Therapy Cancellation Note       03/18/22 1342   PT Last Visit   PT Visit Date 03/18/22   Note Type   Note type Evaluation; Cancelled Session   Cancel Reasons Refusal     ORDERS RECEIVED AND CHART REVIEW COMPLETED  PT/OT ATTEMPTING TO SEE PT FOR EVAL  PT BECOMING IMMEDIATELY AGITATED ; REFUSING ALL PARTICIPATION DESPITE EDUCATION; PULLING COVERS UP OVER HEAD AND YELLING PROFANITIES  ATTEMPTED ENCOURAGEMENT AND EDUCATION; WHICH WAS JUST ESCALATING PT IRRITATION  PT LEFT IN BED W/ COVERS PULLED OVER HEAD  RN / CM AWARE OF REFUSAL  WILL ATTEMPT COMPLETION OF PHYSICAL THERAPY EVAL AT A LATER TIME

## 2022-03-18 NOTE — CASE MANAGEMENT
Case Management Assessment & Discharge Planning Note    Patient name Logan Tavares  Location [facility] 530-01 MRN [id number]  : 1965 Date 3/18/2022       Current Admission Date: 3/17/2022  Current Admission Diagnosis:Malfunction of implantable defibrillator ventricular (ICD) lead   Patient Active Problem List    Diagnosis Date Noted    Malfunction of implantable defibrillator ventricular (ICD) lead 2022    Hypertension     Hyperlipidemia     Ventricular tachycardia ([facility] ) 2022    History of substance abuse ([facility] ) 2022    Eschar of toe 2022    Encounter for examination following treatment at hospital 2022    Chronic respiratory failure with hypoxia ([facility] ) 02/15/2022    Medically noncompliant 2022    Acute on chronic combined systolic and diastolic congestive heart failure ([facility] ) 2022    Aortic ectasia, thoracic ([facility] ) 2022    Mild protein-calorie malnutrition ([facility] ) 2022    Hyponatremia 2022    Aortic stenosis 2022    Ischemic cardiomyopathy 2022    Drug abuse ([facility] ) 2022    Ventricular fibrillation ([facility] ) 2022    Elevated troponin 2021    SIRS (systemic inflammatory response syndrome) ([facility] ) 2021    Substance use 2021    Depressed mood 2020    S/P AVR (aortic valve replacement) 2018    Biventricular ICD (implantable cardioverter-defibrillator) in place 2018    Colonic mass 2016    Coronary artery disease involving native coronary artery of native heart without angina pectoris 2016      LOS (days): 1  Geometric Mean LOS (GMLOS) (days): 4 80  Days to GMLOS:3 8     OBJECTIVE:  PATIENT READMITTED TO HOSPITAL  Risk of Unplanned Readmission Score: 35         Current admission status: Inpatient  Referral Reason: VNA    Preferred Pharmacy:   [facility] [address] [facility] [facility] - [place], [place] - Tim Stock [address] [facility]  Phone: [phone] Fax: [phone]    Primary Care Provider: NICOLE Liang    Primary Insurance: [facility]  Secondary Insurance: [address] [facility] [address]    ASSESSMENT:  [facility] NANCIE Representative - Daughter   Primary Phone: [phone] (Mobile)                         Readmission Root Cause  30 Day Readmission: No      Activities of Daily Living Prior to Admission  Completes ADLs independently?: Yes  Ambulates independently?: Yes  Does patient use assisted devices?: Yes  Assisted Devices (DME) used: Straight Cane (Occasional use)  Does patient currently own DME?: Yes  What DME does the patient currently own?: Straight Cane  Does patient have a history of Outpatient Therapy (PT/OT)?: No  Does the patient have a history of Short-Term Rehab?: No  Does patient have a history of HHC?: Yes  Does patient currently have [facility]?: Yes ([facility])    [address][de-identified]  ([facility])  [address] [facility] Provider[de-identified] PCP    Patient Information Continued  Income Source: SSI/SSD  Does patient have prescription coverage?: Yes ([address])  Within the past 12 months, you worried that your food would run out before you got the money to buy more : Never true  Within the past 12 months, the food you bought just didnt last and you didnt have money to get more : Never true  Food insecurity resource given?: N/A  Does patient receive dialysis treatments?: No  Does patient have a history of substance abuse?: Yes  Historical substance use preference: Cocaine (ETOH)  Is patient currently in treatment for substance abuse?: No  Patient declined treatment information    Does patient have a history of Mental Health Diagnosis?: No         Means of Transportation  [facility] Family transport  Was application for public transport provided?: N/A        DISCHARGE DETAILS:    Discharge planning discussed with[de-identified] Patient  Freedom of Choice: Yes     CM contacted family/caregiver?: Yes (Daughter- Reji Whalen called to find out who patient uses for VNA services   Currently using [facility])  Were Treatment Team discharge recommendations reviewed with patient/caregiver?: No  Did patient/caregiver verbalize understanding of patient care needs?: No  Were patient/caregiver advised of the risks associated with not following Treatment Team discharge recommendations?: No- see comments    Contacts  Reason/Outcome: Continuity of [address]         Is the patient interested in [facility] at discharge?: Yes (Patient currently has [facility] sent in [facility])  [address] requested[de-identified] [address] [facility] Name[de-identified] Other (Daniel Hager)  [facility] [address] Provider[de-identified] PCP  Home Health Services Needed[de-identified] Strengthening/Theraputic Exercises to Improve Function,Evaluate Functional Status and Safety  Oxygen LPM Ordered (if applicable based on home health services needed):: 4 LPM  Homebound Criteria Met[de-identified] Uses an Assist Device (i e  cane, walker, etc),Requires the Assistance of Another Person for Safe Ambulation or to Leave the Home  Supporting Clincal Findings[de-identified] Requires Oxygen         Other Referral/Resources/Interventions Provided:  Interventions: [facility]  Referral Comments: Referral sent to Daniel Hager in [facility]         Treatment Team Recommendation: [facility]  Discharge Destination Plan[de-identified] Home with [place] at Discharge : Family

## 2022-03-18 NOTE — PLAN OF CARE
Problem: MOBILITY - ADULT  Goal: Maintain or return to baseline ADL function  Description: INTERVENTIONS:  -  Assess patient's ability to carry out ADLs; assess patient's baseline for ADL function and identify physical deficits which impact ability to perform ADLs (bathing, care of mouth/teeth, toileting, grooming, dressing, etc )  - Assess/evaluate cause of self-care deficits   - Assess range of motion  - Assess patient's mobility; develop plan if impaired  - Assess patient's need for assistive devices and provide as appropriate  - Encourage maximum independence but intervene and supervise when necessary  - Involve family in performance of ADLs  - Assess for home care needs following discharge   - Consider OT consult to assist with ADL evaluation and planning for discharge  - Provide patient education as appropriate  3/18/2022 1925 by Melissa Galloway RN  Outcome: Progressing  3/18/2022 1925 by Melissa Galloway RN  Outcome: Progressing  Goal: Maintains/Returns to pre admission functional level  Description: INTERVENTIONS:  - Perform BMAT or MOVE assessment daily    - Set and communicate daily mobility goal to care team and patient/family/caregiver  Problem: Knowledge Deficit  Goal: Patient/family/caregiver demonstrates understanding of disease process, treatment plan, medications, and discharge instructions  Description: Complete learning assessment and assess knowledge base    Interventions:  - Provide teaching at level of understanding  - Provide teaching via preferred learning methods  3/18/2022 1925 by Melissa Galloway RN  Outcome: Progressing  3/18/2022 1925 by Melissa Galloway RN  Outcome: Progressing     Problem: Potential for Falls  Goal: Patient will remain free of falls  Description: INTERVENTIONS:  - Educate patient/family on patient safety including physical limitations  - Instruct patient to call for assistance with activity   - Consult OT/PT to assist with strengthening/mobility   - Keep Call bell within reach  - Keep bed low and locked with side rails adjusted as appropriate  - Keep care items and personal belongings within reach  - Initiate and maintain comfort rounds  - Make Fall Risk Sign visible to staff  Problem: CARDIOVASCULAR - ADULT  Goal: Maintains optimal cardiac output and hemodynamic stability  Description: INTERVENTIONS:  - Monitor I/O, vital signs and rhythm  - Monitor for S/S and trends of decreased cardiac output  - Administer and titrate ordered vasoactive medications to optimize hemodynamic stability  - Assess quality of pulses, skin color and temperature  - Assess for signs of decreased coronary artery perfusion  - Instruct patient to report change in severity of symptoms  Outcome: Progressing  Goal: Absence of cardiac dysrhythmias or at baseline rhythm  Description: INTERVENTIONS:  - Continuous cardiac monitoring, vital signs, obtain 12 lead EKG if ordered  - Administer antiarrhythmic and heart rate control medications as ordered  - Monitor electrolytes and administer replacement therapy as ordered  Outcome: Progressing     - Apply yellow socks and bracelet for high fall risk patients  - Consider moving patient to room near nurses station  3/18/2022 1925 by Teresa Cook RN  Outcome: Progressing  3/18/2022 1925 by Teresa Cook RN  Outcome: Progressing     - Out of bed for toileting  - Record patient progress and toleration of activity level   3/18/2022 1925 by Teresa Cook RN  Outcome: Progressing  3/18/2022 1925 by Teresa Cook RN  Outcome: Progressing

## 2022-03-19 NOTE — OCCUPATIONAL THERAPY NOTE
Occupational Therapy Evaluation      Compa Nieves    3/19/2022    Principal Problem:    Malfunction of implantable defibrillator ventricular (ICD) lead  Active Problems:    Coronary artery disease involving native coronary artery of native heart without angina pectoris    S/P AVR (aortic valve replacement)    Substance use    Acute on chronic combined systolic and diastolic congestive heart failure (HCC)    Chronic respiratory failure with hypoxia (HCC)    Eschar of toe    Hypertension    Hyperlipidemia      Past Medical History:   Diagnosis Date    AICD (automatic cardioverter/defibrillator) present     medtronic     CAD (coronary artery disease)     Cancer ([facility] )     Cardiac disease     [facility]     mixed  predominently nonischemic    Colonic mass     Coronary artery disease     History of ventricular fibrillation 9/2/2021    History of ventricular tachycardia 5/25/2021    Hyperlipidemia     Hypertension     Irregular heart beat     Myocardial infarction ([facility] )     2014 and 2015    Pneumonia 1/26/2022    Rectal bleeding     S/P AVR (aortic valve replacement)     mechanical    Wears glasses        Past Surgical History:   Procedure Laterality Date    AORTIC VALVE REPLACEMENT      APPENDECTOMY      CARDIAC DEFIBRILLATOR PLACEMENT      COLECTOMY MADELIN Right     Last Assessed: 6/7/2016    COLECTOMY LAPAROSCOPIC      COLON SURGERY Right     colectomy    CORONARY STENT PLACEMENT      INSERT / Luis Billie / REMOVE PACEMAKER      MITRAL VALVE REPAIR      MITRAL VALVE REPAIR      MOUTH SURGERY      NJ COLONOSCOPY FLX DX W/COLLJ [facility] WHEN PFRMD N/A 1/15/2018    Procedure: COLONOSCOPY;  Surgeon: Luly Hall MD;  Location: [facility];   Service: General    [place] LAP,SURG,COLECTOMY, PARTIAL, W/ANAST N/A 6/2/2016    Procedure: RESECTION COLON RIGHT LAPAROSCOPIC HAND-ASSISTED;  Surgeon: Luly Hall MD;  Location: [facility];  Service: General        03/19/22 0904   OT Last Visit OT Visit Date 03/19/22   Note Type   Note type Evaluation   Restrictions/Precautions   Weight Bearing Precautions Per Order No   Other Precautions Agitated   Pain Assessment   Pain Assessment Tool 0-10   Pain Score No Pain   Home Living   Type of Home Apartment  (1 level, 2STE)   Home Layout One level   Bathroom Shower/Tub Tub/shower unit   Bathroom Toilet Standard   Bathroom Equipment Other (Comment)  (denies DME)   Bathroom Accessibility Accessible   Prior Function   Level of [place] Independent with ADLs and functional mobility   Lives With Alone   Receives Help From Family;Friend(s)   ADL Assistance Independent   IADLs Independent   Falls in the last 6 months 0   Lifestyle   Autonomy Pt reports being IND w/ all ADLS and IADLS   Reciprocal Relationships Pt lives alone  Pt reports having supportive family and friends who live local and can provide A as needed   Service to Others Pt is retired   Intrinsic Gratification None stated   Psychosocial   Psychosocial (WDL) X   Patient Behaviors/Mood Anxious;Irritable   Subjective   Subjective "stop asking me questions"   ADL   Where Assessed Edge of bed   Eating Assistance 7  Independent   Grooming Assistance 7  Independent   UB Bathing Assistance 5  Supervision/Setup   LB Bathing Assistance 5  Supervision/Setup   UB Dressing Assistance 5  Supervision/Setup   LB Dressing Assistance 5  Supervision/Setup   Toileting Assistance  5  Supervision/Setup   Bed Mobility   Supine to Sit 6  Modified independent   Sit to Supine 6  Modified independent   Additional Comments Pt laying supine in bed upon OT arrival  Pt returned laying supine in bed w/ all needs in reach s/p OT session  Transfers   Sit to Stand 5  Supervision   Additional items Assist x 1; Increased time required;Verbal cues;Armrests   Stand to Sit 5  Supervision   Additional items Assist x 1; Increased time required;Verbal cues;Armrests   Additional Comments Transfers w/o AD   Functional Mobility   Functional Mobility 5  Supervision   Additional Comments Pt demonstraed long distance household mobility into hallway w/o AD at S level  Pt has lateral sway during mobility   Balance   Static Sitting Good   Dynamic Sitting Fair +   Static Standing Fair +   Dynamic Standing Fair   Ambulatory Fair   Activity Tolerance   Activity Tolerance Patient limited by fatigue;Treatment limited secondary to agitation   Nurse Made Aware RN cleared Pt for OT sesgeeo   RUE Assessment   RUE Assessment WFL   LUE Assessment   LUE Assessment WFL   Hand Function   Gross Motor Coordination Functional   Fine Motor Coordination Functional   Sensation   Light Touch No apparent deficits   Cognition   Overall Cognitive Status WFL   Arousal/Participation Alert; Uncooperative   Attention Within functional limits   Orientation Level Oriented X4   Memory Within functional limits   Following Commands Follows all commands and directions without difficulty   Comments Pt not cooperative   Assessment   Limitation Decreased endurance   Prognosis Good   Assessment Pt is a 62 yo male seen for OT eval s/p adm to [facility] on 3/17/22 w/ worsening SORENSEN dx'd w/ malfunction of implantable ICD  Pt  has a past medical history of AICD (automatic cardioverter/defibrillator) present, CAD (coronary artery disease), Cancer ([facility] ), Cardiac disease, Cardiomyopathy ([facility] ), Colonic mass, Coronary artery disease, History of ventricular fibrillation (9/2/2021), History of ventricular tachycardia (5/25/2021), Hyperlipidemia, Hypertension, Irregular heart beat, Myocardial infarction [facility]), Pneumonia (1/26/2022), Rectal bleeding, S/P AVR (aortic valve replacement), and Wears glasses    Pt with active OT orders and up with assistance  orders  Pt not cooperative and minimally participative - Pt resides in 1 level apt w/ 2STE  Pt lives alone and reports he has supportive local family and friends  Pt was I w/  ADLS and IADLS, drove, & required no use of DME PTA   Pt is currently demonstrating the following occupational deficits: S all ADLS and mobility w/o AD  These deficits that are impacting pt's baseline areas of occupation are a result of the following impairments: endurance and activity tolerance  Based on the aforementioned OT evaluation, functional performance deficits, and assessments, pt has been identified as a high complexity evaluation  Recommend home with family support upon D/C  Pt appears to be functioning at/close to baseline levels  No further acute care OT needs at this time  Will D/C OT  Please re-consult if appropriate  Recommend continued engagement in all meaningful daily activities w/ nursing & restorative staff during hospital stay      Goals   Patient Goals To be left alone   Recommendation   OT Discharge Recommendation No rehabilitation needs   OT - OK to Discharge Yes  (when medically cleared)   AM-PAC Daily Activity Inpatient   Lower Body Dressing 3   Bathing 3   Toileting 3   Upper Body Dressing 4   Grooming 4   Eating 4   Daily Activity Raw Score 21   Daily Activity Standardized Score (Calc for Raw Score >=11) 44 27   AM-PAC Applied Cognition Inpatient   Following a Speech/Presentation 4   Understanding Ordinary Conversation 4   Taking Medications 4   Remembering Where Things Are Placed or Put Away 4   Remembering List of 4-5 Errands 4   Taking Care of Complicated Tasks 4   Applied Cognition Raw Score 24   Applied Cognition Standardized Score 62 21   Modified Ida Scale   Modified Ida Scale 2         Kate Carson, MS, OTR/L

## 2022-03-19 NOTE — PLAN OF CARE
Problem: PHYSICAL THERAPY ADULT  Goal: Performs mobility at highest level of function for planned discharge setting  See evaluation for individualized goals  Description: Treatment/Interventions: LE strengthening/ROM,Functional transfer training,Elevations,Therapeutic exercise,Endurance training,Patient/family training,Equipment eval/education,Bed mobility,Gait training  Equipment Recommended: Steve       See flowsheet documentation for full assessment, interventions and recommendations  Note: Prognosis: Good  Problem List: Decreased endurance,Impaired balance,Decreased mobility,Impaired judgement  Assessment: Pt is 61 y o  male seen for PT evaluation s/p admit to [facility] on 3/17/2022 w/ Malfunction of implantable defibrillator ventricular (ICD) lead  PT consulted to assess pt's functional mobility and d/c needs  Order placed for PT eval and tx, w/ up as tolerated order  Comorbidities affecting pt's physical performance at time of assessment include: HTN, HLD, CAD, AVR, colon CA s/p resection, hx of cocaine and ETOH  PTA, pt was independent w/ all functional mobility w/ no AD and provides limited history  Pt is irritable and mildly agitated throughout eval  Personal factors affecting pt at time of IE include: impulsivity, cues to avoid obstacles with ambulation in hallway, increased path deviation and occasional scissoring with ambulation  Please find objective findings from PT assessment regarding body systems outlined above with impairments and limitations including impaired balance, decreased endurance, gait deviations and impaired judgement  The following objective measures performed on IE also reveal limitations: AM-PAC 6-Clicks: 29/58  Pt's clinical presentation is currently evolving seen in pt's presentation of mobility below baseline, agitation interfering with participation in therapy, complex medical history   Pt to benefit from continued PT tx to address deficits as defined above and maximize level of functional independent mobility and consistency  From PT/mobility standpoint, recommendation at time of d/c would be home with home health rehabilitation pending progress in order to facilitate return to PLOF  Barriers to Discharge:  (limited history provided by patient)        PT Discharge Recommendation: Home with home health rehabilitation          See flowsheet documentation for full assessment

## 2022-03-19 NOTE — PROGRESS NOTES
Cardiology Progress Note - Trung Castellano 61 y o  male MRN: [id number]    Unit/Bed#: [facility] 530-01 Encounter: [id number]    Hospital Problems:  Principal Problem:    Malfunction of implantable defibrillator ventricular (ICD) lead  Active Problems:    Coronary artery disease involving native coronary artery of native heart without angina pectoris    S/P AVR (aortic valve replacement)    Substance use    Acute on chronic combined systolic and diastolic congestive heart failure (HCC)    Chronic respiratory failure with hypoxia (HCC)    Eschar of toe    Hypertension    Hyperlipidemia      Assessment & Plan:       Acute decompensated heart failure: Patient has a combination of factors contributing to decompensation including diet compliance, discharge on half of his diuretic regimen, and poor sensing atrial lead leading to fusion beats and sustained VT with subsequent shocks  Patient responded well to IV diuretics and appears more compensated  Chronic hypoxia most likely related to post covid pulmonary fibrosis  -oral lasix 80 mg BID   -lipitor 80 mg daily   -asa 81 mg daily   -eplerenon 25 mg daily   -restart lisinopril at lower doses 20 mg   -warfarin for atrial fibrillation being managed by ep  -toprol XL per EP recs  -amio per EP recs   -plan for redo atrial lead per EP   -HF follow-up outpatient       He made 3L of urine yesterday on lasix 40mg IV once and 80mg PO once  If UOP this high on lasix 80mg PO BID over next day or two, would decrease to 40mg PO BID  Goal is net even  General cardiology will sign off  Subjective:   Patient seen and examined  No significant events overnight  Objective:     Vitals: Blood pressure 122/69, pulse 82, temperature 97 9 °F (36 6 °C), temperature source Oral, resp  rate 17, height 5' 6" (1 676 m), weight 60 1 kg (132 lb 7 9 oz), SpO2 99 %  , Body mass index is 21 39 kg/m² ,   Orthostatic Blood Pressures      Most Recent Value   Blood Pressure 122/69 filed at 03/19/2022 0805   Patient Position - Orthostatic VS Lying filed at 03/18/2022 2154            Intake/Output Summary (Last 24 hours) at 3/19/2022 1051  Last data filed at 3/19/2022 0601  Gross per 24 hour   Intake 422 06 ml   Output 2750 ml   Net -2327 94 ml       Physical Exam:    General: Tenzin Jose is a well appearing male, lying comfortably in bed  HEENT: moist mucous membranes, EOMI  Neck:  No JVD, supple, trachea midline  Cardiovascular: unremarkable S1/S2, regular rate and rhythm, no murmurs, rubs or gallops  Pulmonary: normal respiratory effort, CTAB  Abdomen: soft and nondistended  Extremities: trace lower extremity edema  Warm and well perfused extremities    Neuro: no focal motor deficits, AAOx3 (person, place, time)  Psych: Normal mood and affect, cooperative      Medications:      Current Facility-Administered Medications:     acetaminophen (TYLENOL) tablet 650 mg, 650 mg, Oral, Q4H PRN, Verito Daniel MD, 650 mg at 03/18/22 2256    albuterol inhalation solution 2 5 mg, 2 5 mg, Nebulization, Q4H PRN, Karina Gary MD    aluminum-magnesium hydroxide-simethicone (MYLANTA) oral suspension 30 mL, 30 mL, Oral, Q6H PRN, Verito Daniel MD    amiodarone tablet 200 mg, 200 mg, Oral, Daily With Breakfast, Verito Daniel MD, 200 mg at 03/19/22 0805    aspirin (ECOTRIN LOW STRENGTH) EC tablet 81 mg, 81 mg, Oral, Daily, Verito Daniel MD, 81 mg at 03/19/22 0805    atorvastatin (LIPITOR) tablet 80 mg, 80 mg, Oral, Daily, Verito Daniel MD, 80 mg at 03/19/22 0805    eplerenone (INSPRA) tablet 25 mg, 25 mg, Oral, Daily, Terry Jimenez MD, 25 mg at 03/19/22 0808    fluticasone-vilanterol (BREO ELLIPTA) 100-25 mcg/inh inhaler 1 puff, 1 puff, Inhalation, Daily, Finesse Florentino MD, 1 puff at 03/19/22 0808    furosemide (LASIX) tablet 80 mg, 80 mg, Oral, BID (diuretic), Terry Jimenez MD, 80 mg at 03/19/22 0805    heparin (porcine) 25,000 Units in sodium chloride 0 45 % 250 mL infusion, 3-20 Units/kg/hr (Order-Specific), Intravenous, Titrated, Alexandru Cabrera PA-C, Last Rate: 9 mL/hr at 03/19/22 0844, 15 Units/kg/hr at 03/19/22 0844    heparin (porcine) injection 1,800 Units, 1,800 Units, Intravenous, Q1H PRN, Jerson Moody PA-C, 1,800 Units at 03/19/22 0037    heparin (porcine) injection 3,600 Units, 3,600 Units, Intravenous, Once, Alexandru Cabrera PA-C    heparin (porcine) injection 3,600 Units, 3,600 Units, Intravenous, Q1H PRN, Alexandru Cabrera PA-C    lisinopril (ZESTRIL) tablet 20 mg, 20 mg, Oral, Daily, Padmaja Gomez MD, 20 mg at 03/19/22 0805    metoprolol succinate (TOPROL-XL) 24 hr tablet 75 mg, 75 mg, Oral, Q12H [address], Jayy Liu MD, 75 mg at 03/19/22 0805    ondansetron (ZOFRAN) injection 4 mg, 4 mg, Intravenous, Q6H PRN, Jayy Liu MD    warfarin (COUMADIN) tablet 2 5 mg, 2 5 mg, Oral, Daily (warfarin), Radha Cabrera PA-C, 2 5 mg at 03/18/22 1720     Labs & Results:        Results from last 7 days   Lab Units 03/17/22 2053 03/17/22  0927 03/16/22  0434   WBC Thousand/uL 11 84* 10 40* 13 59*   HEMOGLOBIN g/dL 11 0* 10 6* 9 1*   HEMATOCRIT % 33 1* 33 8* 28 1*   PLATELETS Thousands/uL 311 319 316         Results from last 7 days   Lab Units 03/19/22  0547 03/18/22  1115 03/17/22  2053   POTASSIUM mmol/L 3 5 3 9 4 0   CHLORIDE mmol/L 94* 94* 95*   CO2 mmol/L 34* 31 30   BUN mg/dL 18 19 15   CREATININE mg/dL 0 99 0 91 0 85   CALCIUM mg/dL 8 4 9 1 8 8     Results from last 7 days   Lab Units 03/19/22  0626 03/18/22  2356 03/18/22  1725 03/18/22  1115 03/18/22  1115 03/18/22  1007 03/17/22 2053   INR  1 81*  --   --   --  1 71*  --  1 53*   PTT seconds 129* 54* 45*   < > 47*   < > 36    < > = values in this interval not displayed  Results from last 7 days   Lab Units 03/18/22  1115 03/16/22  0434   MAGNESIUM mg/dL 1 8 1 7              This note was completed in part utilizing M*Modal fluency direct voice recognition software   Grammatical errors, random word insertion, spelling mistakes, occasional wrong word or "sound-alike" substitutions and incomplete sentences may be an occasional consequence of the system secondary to software limitations, ambient noise and hardware issues  At the time of dictation, efforts were made to edit, clarify and /or correct errors  Please read the chart carefully and recognize, using context, where substitutions have occurred  If you have any questions or concerns about the context, text or information contained within the body of this dictation, please contact myself, the provider, for further clarification

## 2022-03-19 NOTE — PHYSICAL THERAPY NOTE
Physical Therapy Evaluation     Patient's Name: Alberto Bojorquez    Admitting Diagnosis  CHF (congestive heart failure) ([facility] ) [I50 9]    Problem List  Patient Active Problem List   Diagnosis    Colonic mass    Coronary artery disease involving native coronary artery of native heart without angina pectoris    S/P AVR (aortic valve replacement)    Biventricular ICD (implantable cardioverter-defibrillator) in place    Depressed mood    Elevated troponin    SIRS (systemic inflammatory response syndrome) ([facility] )    Substance use    Aortic stenosis    Ischemic cardiomyopathy    Drug abuse [facility]    Ventricular fibrillation ([facility] )    Hyponatremia    Mild protein-calorie malnutrition ([facility] )    Medically noncompliant    Acute on chronic combined systolic and diastolic congestive heart failure ([facility] )    Aortic ectasia, thoracic (HCC)    Chronic respiratory failure with hypoxia ([facility] )    Encounter for examination following treatment at hospital    Ventricular tachycardia ([facility] )    History of substance abuse ([facility] )    Eschar of toe    Malfunction of implantable defibrillator ventricular (ICD) lead    Hypertension    Hyperlipidemia       Past Medical History  Past Medical History:   Diagnosis Date    AICD (automatic cardioverter/defibrillator) present     medtronic     CAD (coronary artery disease)     Cancer [facility]     Cardiac disease     Cardiomyopathy ([facility] )     mixed    predominently nonischemic    Colonic mass     Coronary artery disease     History of ventricular fibrillation 9/2/2021    History of ventricular tachycardia 5/25/2021    Hyperlipidemia     Hypertension     Irregular heart beat     Myocardial infarction [facility])     2014 and 2015    Pneumonia 1/26/2022    Rectal bleeding     S/P AVR (aortic valve replacement)     mechanical    Wears glasses        Past Surgical History  Past Surgical History:   Procedure Laterality Date    AORTIC VALVE REPLACEMENT      APPENDECTOMY      CARDIAC DEFIBRILLATOR PLACEMENT      COLECTOMY MADELIN Right     Last Assessed: 6/7/2016    COLECTOMY LAPAROSCOPIC      COLON SURGERY Right     colectomy    CORONARY STENT PLACEMENT      INSERT / REPLACE / REMOVE PACEMAKER      MITRAL VALVE REPAIR      MITRAL VALVE REPAIR      MOUTH SURGERY      OR COLONOSCOPY FLX DX W/COLLJ SPEC WHEN PFRMD N/A 1/15/2018    Procedure: COLONOSCOPY;  Surgeon: Carli Marie MD;  Location: [facility];   Service: General    OR LAP,SURG,COLECTOMY, PARTIAL, W/ANAST N/A 6/2/2016    Procedure: RESECTION COLON RIGHT LAPAROSCOPIC HAND-ASSISTED;  Surgeon: Carli Marie MD;  Location: [facility];  Service: General          03/19/22 0905   PT Last Visit   PT Visit Date 03/19/22   Note Type   Note type Evaluation   Pain Assessment   Pain Assessment Tool 0-10   Pain Score No Pain   Restrictions/Precautions   Weight Bearing Precautions Per Order No   Other Precautions O2;Telemetry;Multiple lines   Home Living   Type of Home Apartment   Home Layout One level   Bathroom Shower/Tub Tub/shower unit   Bathroom Toilet Standard   Bathroom Accessibility Accessible   Home Equipment Cane   Additional Comments does not use cane   Prior Function   Level of [place] Independent with ADLs and functional mobility   Lives With Alone   Receives Help From Family;Friend(s)   ADL Assistance Independent   IADLs Independent   Falls in the last 6 months 0   General   Family/Caregiver Present No   Cognition   Overall Cognitive Status WFL   Arousal/Participation   (pt not cooperative with providing history, irritable/agitate)   Attention Within functional limits   Orientation Level Oriented X4   Memory Within functional limits   Following Commands Follows all commands and directions without difficulty   Subjective   Subjective agrees to brief eval with therapy   RLE Assessment   RLE Assessment WFL   LLE Assessment   LLE Assessment WFL   Bed Mobility   Supine to Sit 6  Modified independent   Additional items Increased time required   Sit to Supine 6  Modified independent   Additional items Increased time required   Transfers   Sit to Stand 5  Supervision   Additional items Assist x 1   Stand to Sit 5  Supervision   Additional items Assist x 1   Stand pivot 5  Supervision   Additional items Assist x 1   Ambulation/Elevation   Gait pattern Step through pattern;Decreased foot clearance  (increased path deviation, occasional scissoring)   Gait Assistance 5  Supervision   Additional items Assist x 1;Verbal cues  (cues to avoid obstacles)   Assistive Device None   Distance 260'   Stair Management Assistance 5  Supervision   Additional items Assist x 1   Stair Management Technique One rail R   Number of Stairs 3   Balance   Static Sitting Good   Dynamic Sitting Fair +   Static Standing Fair +   Dynamic Standing Fair   Ambulatory Fair   Activity Tolerance   Activity Tolerance Treatment limited secondary to agitation;Patient limited by fatigue   Nurse Made Aware ok to see per [facility]   Assessment   Prognosis Good   Problem List Decreased endurance; Impaired balance;Decreased mobility; Impaired judgement   Assessment Pt is 61 y o  male seen for PT evaluation s/p admit to [facility] on 3/17/2022 w/ Malfunction of implantable defibrillator ventricular (ICD) lead  PT consulted to assess pt's functional mobility and d/c needs  Order placed for PT eval and tx, w/ up as tolerated order  Comorbidities affecting pt's physical performance at time of assessment include: HTN, HLD, CAD, AVR, colon CA s/p resection, hx of cocaine and ETOH  PTA, pt was independent w/ all functional mobility w/ no AD and provides limited history  Pt is irritable and mildly agitated throughout eval  Personal factors affecting pt at time of IE include: impulsivity, cues to avoid obstacles with ambulation in hallway, increased path deviation and occasional scissoring with ambulation   Please find objective findings from PT assessment regarding body systems outlined above with impairments and limitations including impaired balance, decreased endurance, gait deviations and impaired judgement  The following objective measures performed on IE also reveal limitations: AM-PAC 6-Clicks: 30/99  Pt's clinical presentation is currently evolving seen in pt's presentation of mobility below baseline, agitation interfering with participation in therapy, complex medical history  Pt to benefit from continued PT tx to address deficits as defined above and maximize level of functional independent mobility and consistency  From PT/mobility standpoint, recommendation at time of d/c would be home with home health rehabilitation pending progress in order to facilitate return to PLOF  Barriers to Discharge   (limited history provided by patient)   Goals   Patient Goals to be left alone   STG Expiration Date 03/29/22   Short Term Goal #1 In 10 days, Drake Almaraz will demonstrate 1) bed mobility at independent level in order to get in/out of bed safely, 2) sit<>stand at independent level in order to rise/sit from bed, chair, and complete toileting, 3) ambulate 500 feet with no AD and path deviation within normal limits at independent level in order to access their home environment, 4) negotiate 10 stairs with one railing at independent level in order to safely enter/exit their home and access second floor of home, 5) participation in HEP to include but not limited to strengthening, stretching, endurance, balance, and coordination in order to facilitate progress toward the above goals  PT Treatment Day 0   Plan   Treatment/Interventions LE strengthening/ROM; Functional transfer training;Elevations; Therapeutic exercise; Endurance training;Patient/family training;Equipment eval/education; Bed mobility;Gait training   PT Frequency 3-5x/wk   Recommendation   PT Discharge Recommendation Home with home health rehabilitation   [address] [place] [address] Inpatient   Turning in Bed Without Bedrails 4   Lying on Back to Sitting on Edge of Flat Bed 3   Moving Bed to Chair 4   Standing Up From Chair 4   Walk in Room 4   Climb 3-5 Stairs 3   Basic Mobility Inpatient Raw Score 22   Basic Mobility Standardized Score 47 4   Highest Level Of Mobility   JH-HLM Goal 7: Walk 25 feet or more   JH-HLM Highest Level of Mobility 8: Walk 250 feet ot more   JH-HLM Goal Achieved Yes   End of Consult   Patient Position at End of Consult Supine; All needs within reach         [facility], PT

## 2022-03-19 NOTE — UTILIZATION REVIEW
Inpatient Admission Authorization Request   NOTIFICATION OF INPATIENT ADMISSION/INPATIENT AUTHORIZATION REQUEST   SERVICING FACILITY:   [facility]  Address: [address] [facility] [facility], [address] [place] [address]  Tax ID: [id number]  NPI: [id number]  Place of Service: Inpatient [address] [facility] Code: 24     ATTENDING PROVIDER:  Attending Name and NPI#: Nellie Blankenship [[id number]]  Address: [address] [place] [address], [address] [place] [address]  Phone: [phone]     UTILIZATION REVIEW CONTACT:  [facility] Utilization   Network Utilization Review Department  Phone: [phone]  Fax: [phone]  Email: [email or web address]     PHYSICIAN ADVISORY SERVICES:  FOR [id number]-[facility] REVIEW - MEDICAL NECESSITY DENIAL  Phone: [phone]  Fax: [phone]  Email: [email or web address]     TYPE OF REQUEST:  Inpatient Status     ADMISSION INFORMATION:  ADMISSION DATE/TIME: 3/17/22  4:27 PM  PATIENT DIAGNOSIS CODE/DESCRIPTION:  CHF (congestive heart failure) ([facility] ) [I50 9]  DISCHARGE DATE/TIME: No discharge date for patient encounter  IMPORTANT INFORMATION:  Please contact the [facility] directly with any questions or concerns regarding this request  Department voicemails are confidential     Send requests for admission clinical reviews, concurrent reviews, approvals, and administrative denials due to lack of clinical to fax [phone]

## 2022-03-19 NOTE — RESPIRATORY THERAPY NOTE
resp care      03/19/22 0724   Respiratory Assessment   Assessment Type Assess only   General Appearance Alert; Awake   Respiratory Pattern Normal   Chest Assessment Chest expansion symmetrical   Bilateral Breath Sounds Diminished   Resp Comments no prn tx given, pt appears comfortable will continue to monitor

## 2022-03-19 NOTE — PROGRESS NOTES
[address] [facility]  Progress Note - [address] 1965, 61 y o  male MRN: [id number]  [facility] Encounter: [id number]  Primary Care Provider: NICOLE Fragoso   Date and time admitted to hospital: 3/17/2022  4:27 PM    Hyperlipidemia  Assessment & Plan  Continue with home Lipitor 80mg qd    Hypertension  Assessment & Plan  H/o HTN on home Lisinopril 10mg qHS  - Last Blood Pressure: 122/69 (49/10/28 8119)  - Systolic (32JRH), OLF:214 , Min:121 , Max:125     Plan:  - C/w home regimen  - Vitals per unit routine    Eschar of toe  Assessment & Plan  Pt noted to have Left hallux eschar during admission at [facility]  - Previously evaluated by podiatry in [facility], "no intervention necessary to left hallux eschar as it is superficial and well-adhered"  - 3/16 LEADS: "Diffuse disease noted throughout the femoral-popliteal arteries without significant focal stenosis" of the RLE and "short segment occlusion versus high grade stenosis of the common femoral artery with diffuse disease noted throughout the femoral-popliteal arteries" of the LLE    Plan:  - Podiatry consulted - no interventions, LEADS to assess healing potential  - Wound care consulted    Chronic respiratory failure with hypoxia ([address] )  Assessment & Plan  - Last SpO2: 92 % (03/18/22 1127) on Nasal Cannula O2 Flow Rate (L/min): 3 L/min     · On chart review, patient was recently hospitalized back in 1/2022 for both COVID and bacterial PNA, requiring intubation and ICU management  Has been hospitalized twice afterwards 2/2 hypoxia and volume overload (from HF with severe pulmonary HTN)  · Previously on 2L before hospitalization > 4L NC O2 is his current baseline  · Follows with pulmonology with last visit in 2/15/2022 - concern for development of secondary fibrosis as well as bronchiectasis given continued hypoxia   Planned for Chest CT and PFTs in 4 weeks from visit, which have not been completed yet  Recommended that patient trial Breo daily and to use 6L NC for increased activity  May benefit from pulmonary rehab in the future       Plan:  - Titrate O2 NC as needed for goal SO2 > 90%  - Respiratory protocol  - Will add [address] Breo with albuterol PRN    Acute on chronic combined systolic and diastolic congestive heart failure (HCC)  Assessment & Plan  Acute CHF exacerbation likely triggered by VTach event  - On home Eplerenone 20mg daily and Lasix 20mg BID  - Last ECHO on 1/12 showed LVEF 20% with global hypokinesis, indeterminate diastolic dysfunction, moderate biatrial enlargement  - 3/15 BNP 39951 (improved from last BNP 18080 on 1/28/22)  - 3/15 CXR: cardiomegaly with bilateral pulmonary airspace disease and pleural effusions concerning for CHF/pulmonary edema superimposed upon more chronic pulmonary changes    Plan:  - Hold home Eplerenone while inpatient  - C/w IV Lasix 40mg IV BID  - Fluid restricted diet  - Daily weight, strict I&Os    Intake/Output Summary (Last 24 hours) at 3/18/2022 0914  Last data filed at 3/18/2022 0200  Gross per 24 hour   Intake 450 ml   Output 925 ml   Net -475 ml       Substance use  Assessment & Plan  Prior history of marijuana and cocaine use  History of medication noncompliance  Positive for THC in February, cocaine in January this year  UDS at [facility] negative  Follow-up repeat UDS this admission  CM consulted     S/P AVR (aortic valve replacement)  Assessment & Plan  Hx of rheumatic heart disease s/p mechanical AVR in the 1980s (on Coumadin 2 5mg qd)  Continue Coumadin 2 5mg qd (Goal INR 2-3)  Will check daily INR    Coronary artery disease involving native coronary artery of native heart without angina pectoris  Assessment & Plan  H/o CAD s/p BMS-LAD and BMS-LCx (2014)  - On home Amiodarone 100mg daily for antiarrhythmic and Toprol-XL 75mg BID for AV-uvaldo block  - Also on ASA 81mg daily and Atorvastatin 80mg qHS  - 3/15 EKG: sinus tachy with V-pacing and LBBB    Plan:  - C/w home regimen  - Increase home Amiodarone to 200mg daily for antiarrhythmic    * Malfunction of implantable defibrillator ventricular (ICD) lead  Assessment & Plan  Pt transferred from [facility] for EP eval of possible Atrial lead malfunction on ICD  - Pt c/o worsening exertion dyspnea and at rest after AICD discharge x2 on 3/8/22 around 2pm which pt did not seek medical attention for at that time  - H/o VFib with Bi-vent AICD placement on home Amiodarone 100mg daily and Toprol-XL 75mg BID  - AICD interrogation per cardiology in [facility] shows evidence of x3 shocks for VT and ATP x3 which is congruent with patient's report, but noted that Atrial lead not appropriately functioning, which home Amiodarone was increased from 100mg daily to 200mg daily  - Case was discussed by cardiology with Dr Devyn Brewer (B EP)    Plan:  - C/w telemetry x48 hours for arrhythmia  - C/w Amiodarone 200mg daily for antiarrhythmic  - C/w Toprol-XL 75mg BID for AV-uvaldo block  - EP and cardiology consulted  - Per EP, no immediate interventions  Transitioned back to home Coumadin 2 5mg qd        Subjective:   No acute events overnight  Pt examined at bedside this AM  Sitting comfortably eating breakfast, denies any acute complaints or concerns  Objective:     Vitals: Blood pressure 122/69, pulse 85, temperature 97 9 °F (36 6 °C), temperature source Oral, resp  rate 17, height 5' 6" (1 676 m), weight 60 6 kg (133 lb 9 6 oz), SpO2 98 %  ,Body mass index is 21 56 kg/m²  Intake/Output Summary (Last 24 hours) at 3/19/2022 0743  Last data filed at 3/19/2022 0601  Gross per 24 hour   Intake 515 18 ml   Output 3175 ml   Net -2659 82 ml       Physical Exam:   Physical Exam  Constitutional:       General: He is not in acute distress  Appearance: He is not ill-appearing or toxic-appearing  HENT:      Head: Normocephalic and atraumatic        Right Ear: External ear normal       Left Ear: External ear normal       Nose: Nose normal       Mouth/Throat:      Mouth: Mucous membranes are moist       Pharynx: Oropharynx is clear  Eyes:      General: No scleral icterus  Conjunctiva/sclera: Conjunctivae normal    Cardiovascular:      Rate and Rhythm: Normal rate and regular rhythm  Heart sounds: Normal heart sounds  No murmur heard  Pulmonary:      Effort: Pulmonary effort is normal  No respiratory distress  Breath sounds: Normal breath sounds  No wheezing, rhonchi or rales  Abdominal:      General: Bowel sounds are normal       Palpations: Abdomen is soft  Tenderness: There is no abdominal tenderness  There is no guarding  Hernia: No hernia is present  Musculoskeletal:         General: No swelling  Right lower leg: No edema  Left lower leg: No edema  Skin:     General: Skin is warm and dry  Coloration: Skin is not jaundiced  Comments: Eschar of left great toe   Neurological:      General: No focal deficit present  Mental Status: He is alert and oriented to person, place, and time  Mental status is at baseline  Psychiatric:         Mood and Affect: Mood normal          Behavior: Behavior normal          Invasive Devices  Report    Peripherally Inserted Central Catheter Line            PICC Line 03/18/22 Right Brachial <1 day          Peripheral Intravenous Line            Peripheral IV 03/15/22 Left Antecubital 3 days                           Lab and other studies:  I have personally reviewed pertinent reports       Admission on 03/17/2022   Component Date Value    Amph/Meth UR 03/17/2022 Negative     Barbiturate Ur 03/17/2022 Negative     Benzodiazepine Urine 03/17/2022 Negative     Cocaine Urine 03/17/2022 Negative     Methadone Urine 03/17/2022 Negative     Opiate Urine 03/17/2022 Negative     PCP Ur 03/17/2022 Negative     THC Urine 03/17/2022 Negative     Oxycodone Urine 03/17/2022 Negative     Protime 03/17/2022 16 7*    INR 03/17/2022 1 42*    Sodium 03/17/2022 132*    Potassium 03/17/2022 4 0     Chloride 03/17/2022 95*    CO2 03/17/2022 30     ANION GAP 03/17/2022 7     BUN 03/17/2022 15     Creatinine 03/17/2022 0 85     Glucose 03/17/2022 126     Calcium 03/17/2022 8 8     eGFR 03/17/2022 96     WBC 03/17/2022 11 84*    RBC 03/17/2022 4 27     Hemoglobin 03/17/2022 11 0*    Hematocrit 03/17/2022 33 1*    MCV 03/17/2022 78*    MCH 03/17/2022 25 8*    MCHC 03/17/2022 33 2     RDW 03/17/2022 19 4*    Platelets 53/72/5429 311     MPV 03/17/2022 8 6*    PTT 03/17/2022 36     Protime 03/17/2022 17 7*    INR 03/17/2022 1 53*    PTT 03/18/2022 50*    PTT 03/18/2022 47*    Protime 03/18/2022 19 3*    INR 03/18/2022 1 71*    Sodium 03/18/2022 131*    Potassium 03/18/2022 3 9     Chloride 03/18/2022 94*    CO2 03/18/2022 31     ANION GAP 03/18/2022 6     BUN 03/18/2022 19     Creatinine 03/18/2022 0 91     Glucose 03/18/2022 157*    Calcium 03/18/2022 9 1     eGFR 03/18/2022 93     Magnesium 03/18/2022 1 8     PTT 03/18/2022 45*    PTT 03/18/2022 54*    Sodium 03/19/2022 133*    Potassium 03/19/2022 3 5     Chloride 03/19/2022 94*    CO2 03/19/2022 34*    ANION GAP 03/19/2022 5     BUN 03/19/2022 18     Creatinine 03/19/2022 0 99     Glucose 03/19/2022 115     Calcium 03/19/2022 8 4     eGFR 03/19/2022 84        Recent Results (from the past 24 hour(s))   APTT    Collection Time: 03/18/22 10:07 AM   Result Value Ref Range    PTT 50 (H) 23 - 37 seconds   APTT six (6) hours after Heparin bolus/drip initiation or dosing change    Collection Time: 03/18/22 11:15 AM   Result Value Ref Range    PTT 47 (H) 23 - 37 seconds   Protime-INR    Collection Time: 03/18/22 11:15 AM   Result Value Ref Range    Protime 19 3 (H) 11 6 - 14 5 seconds    INR 1 71 (H) 0 84 - 2 85   Basic metabolic panel    Collection Time: 03/18/22 11:15 AM   Result Value Ref Range    Sodium 131 (L) 136 - 145 mmol/L    Potassium 3 9 3 5 - 5 3 mmol/L Chloride 94 (L) 100 - 108 mmol/L    CO2 31 21 - 32 mmol/L    ANION GAP 6 4 - 13 mmol/L    BUN 19 5 - 25 mg/dL    Creatinine 0 91 0 60 - 1 30 mg/dL    Glucose 157 (H) 65 - 140 mg/dL    Calcium 9 1 8 3 - 10 1 mg/dL    eGFR 93 ml/min/1 73sq m   Magnesium    Collection Time: 03/18/22 11:15 AM   Result Value Ref Range    Magnesium 1 8 1 6 - 2 6 mg/dL   APTT    Collection Time: 03/18/22  5:25 PM   Result Value Ref Range    PTT 45 (H) 23 - 37 seconds   APTT    Collection Time: 03/18/22 11:56 PM   Result Value Ref Range    PTT 54 (H) 23 - 37 seconds   Basic metabolic panel    Collection Time: 03/19/22  5:47 AM   Result Value Ref Range    Sodium 133 (L) 136 - 145 mmol/L    Potassium 3 5 3 5 - 5 3 mmol/L    Chloride 94 (L) 100 - 108 mmol/L    CO2 34 (H) 21 - 32 mmol/L    ANION GAP 5 4 - 13 mmol/L    BUN 18 5 - 25 mg/dL    Creatinine 0 99 0 60 - 1 30 mg/dL    Glucose 115 65 - 140 mg/dL    Calcium 8 4 8 3 - 10 1 mg/dL    eGFR 84 ml/min/1 73sq m     Blood Culture:   Lab Results   Component Value Date    BLOODCX No Growth After 5 Days   01/28/2022   ,   Urinalysis:   Lab Results   Component Value Date    COLORU Karen 01/28/2022    COLORU Yellow 12/07/2015    CLARITYU Clear 01/28/2022    CLARITYU Clear 12/07/2015    SPECGRAV 1 020 01/28/2022    SPECGRAV 1 015 12/07/2015    PHUR 5 5 01/28/2022    PHUR 7 0 12/07/2015    LEUKOCYTESUR Negative 01/28/2022    LEUKOCYTESUR Negative 12/07/2015    NITRITE Negative 01/28/2022    NITRITE Negative 12/07/2015    PROTEINUA Trace (A) 12/07/2015    GLUCOSEU Negative 01/28/2022    GLUCOSEU Negative 12/07/2015    KETONESU Negative 01/28/2022    KETONESU Negative 12/07/2015    BILIRUBINUR Negative 01/28/2022    BILIRUBINUR Negative 12/07/2015    BLOODU Trace (A) 01/28/2022    BLOODU Negative 12/07/2015   ,   Urine Culture: No results found for: URINECX,   Wound Culure: No results found for: WOUNDCULT    VTE Pharmacologic Prophylaxis: Heparin and Warfarin (Coumadin), Heparin bridge back to patient's home dose of Warfarin   VTE Mechanical Prophylaxis: sequential compression device and foot pump applied    Current Facility-Administered Medications   Medication Dose Route Frequency    acetaminophen (TYLENOL) tablet 650 mg  650 mg Oral Q4H PRN    albuterol inhalation solution 2 5 mg  2 5 mg Nebulization Q4H PRN    aluminum-magnesium hydroxide-simethicone (MYLANTA) oral suspension 30 mL  30 mL Oral Q6H PRN    amiodarone tablet 200 mg  200 mg Oral Daily With Breakfast    aspirin (ECOTRIN LOW STRENGTH) EC tablet 81 mg  81 mg Oral Daily    atorvastatin (LIPITOR) tablet 80 mg  80 mg Oral Daily    eplerenone (INSPRA) tablet 25 mg  25 mg Oral Daily    fluticasone-vilanterol (BREO ELLIPTA) 100-25 mcg/inh inhaler 1 puff  1 puff Inhalation Daily    furosemide (LASIX) tablet 80 mg  80 mg Oral BID (diuretic)    heparin (porcine) 25,000 Units in sodium chloride 0 45 % 250 mL infusion  3-20 Units/kg/hr (Order-Specific) Intravenous Titrated    heparin (porcine) injection 1,800 Units  1,800 Units Intravenous Q1H PRN    heparin (porcine) injection 3,600 Units  3,600 Units Intravenous Once    heparin (porcine) injection 3,600 Units  3,600 Units Intravenous Q1H PRN    lisinopril (ZESTRIL) tablet 20 mg  20 mg Oral Daily    metoprolol succinate (TOPROL-XL) 24 hr tablet 75 mg  75 mg Oral Q12H [address]    ondansetron (ZOFRAN) injection 4 mg  4 mg Intravenous Q6H PRN    warfarin (COUMADIN) tablet 2 5 mg  2 5 mg Oral Daily (warfarin)         PPX:   Diet: Cardiovascular     Code Status: level-1 full code   Dispo: electrophysiology to further evaluate ICD next week     Plan D/W Dr Jaramillo and [facility] Team      Sommer Pastor DO  Family Medicine Resident PGY1

## 2022-03-20 NOTE — ASSESSMENT & PLAN NOTE
H/o HTN on home Lisinopril 10mg qHS  - Last Blood Pressure: 109/65 (82/15/24 0347)   - Systolic (87FOW), HD , Min:106 , Max:109   - Pt hypotensive overnight, 3/19-3/20   On repeat 3/20 at 9am:117/66 HR 80    Plan:  - Decreased lisinopril from 20 mg to 10 mg daily given SBP in 80s during this admission, will continue 10mg on discharge  - Continue home epleronone, Lasix on discharge  - Metoprolol succinate 75 mg daily

## 2022-03-20 NOTE — QUICK NOTE
Attempted to see patient he would not wake up to talk to me, is sleeping  Our plan is for TENTATIVE add on 3-21 for A lead addition + BIV ICD gen change with capping of old atrial lead as his current BIV ICD is NOT MRI compatible  I think we have room to do this in the late afternoon tomorrow  If emergent transfers or other procedures need to be added on for tomorrow we will have to move the above procedure to Tuesday  Recommend keeping NPO after midnight tonight and we can make final decision tomorrow AM if we can perform above procedure  Prior to procedure, while he is in the EP lab, we will first need to do a venogram in the EP lab to assess subclavian vein patency  If vein is patent can perform procedure as planned  If vein is occluded will be unable to perform a lead addition, gen change and capping of malfunctioning atrial lead as we will not have access  Will need to re-discuss plan if vein occluded  Discussed this via telephone with daughter and discussed plan with RN as well as family medicine resident  His INR is therapeutic today please stop heparin gtt, continue coumadin dosing with INR tomorrow AM, keep NPO after midnight

## 2022-03-20 NOTE — ASSESSMENT & PLAN NOTE
Hx of rheumatic heart disease s/p mechanical AVR in the 1980s (on Coumadin 2 5mg qd)  Continue Coumadin 2 5mg qd, INR today 1 99 (goal INR 2-3)  INR ordered for 3/24

## 2022-03-20 NOTE — ASSESSMENT & PLAN NOTE
Acute CHF exacerbation likely triggered by VTach event  - On home Eplerenone 20mg daily and Lasix 20mg BID  - Last ECHO on 1/12/22 showed LVEF 20% with global hypokinesis, indeterminate diastolic dysfunction, moderate biatrial enlargement  - 3/15 BNP 98003 (improved from last BNP 30225 on 1/28/22)  - 3/15 CXR: cardiomegaly with bilateral pulmonary airspace disease and pleural effusions concerning for CHF/pulmonary edema superimposed upon more chronic pulmonary changes    Plan:  - Continue with home Epleronone, Lasix on discharge  - Lisinopril decreased from 20mg to 10mg on discharge due to SBP in the 80s this admission      Intake/Output Summary (Last 24 hours) at 3/22/2022 1152  Last data filed at 3/22/2022 0843  Gross per 24 hour   Intake 1789 5 ml   Output 600 ml   Net 1189 5 ml

## 2022-03-20 NOTE — ASSESSMENT & PLAN NOTE
- Last SpO2: 100 % (03/22/22 0853) on Nasal Cannula O2 Flow Rate (L/min): 3 L/min     · On chart review, patient was recently hospitalized back in 1/2022 for both COVID and bacterial PNA, requiring intubation and ICU management  Has been hospitalized twice afterwards 2/2 hypoxia and volume overload (from HF with severe pulmonary HTN)  · Previously on 2L before hospitalization > 4L NC O2 is his current baseline  · Follows with pulmonology with last visit in 2/15/2022 - concern for development of secondary fibrosis as well as bronchiectasis given continued hypoxia  Planned for Chest CT and PFTs in 4 weeks from visit, which have not been completed yet  Recommended that patient trial Breo daily and to use 6L NC for increased activity  May benefit from pulmonary rehab in the future  Plan:  - Titrate O2 NC as needed for goal SO2 > 90%  - Respiratory protocol  - Continue [address] Breo on discharge   - Ordered PFTs and pulmonary referral outpatient for new post-COVID wheezing

## 2022-03-20 NOTE — RESPIRATORY THERAPY NOTE
resp care      03/20/22 0757   Respiratory Assessment   Assessment Type Assess only   General Appearance Alert; Awake   Respiratory Pattern Normal   Chest Assessment Chest expansion symmetrical   Bilateral Breath Sounds Clear;Diminished   Cough None   Resp Comments no prn tx given, no distress noted, will continue to monitor

## 2022-03-20 NOTE — ASSESSMENT & PLAN NOTE
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

## 2022-03-20 NOTE — ASSESSMENT & PLAN NOTE
Pt noted to have Left hallux eschar during admission at [facility]  - Previously evaluated by podiatry in [facility], "no intervention necessary to left hallux eschar as it is superficial and well-adhered"  - 3/16 LEADS: "Diffuse disease noted throughout the femoral-popliteal arteries without significant focal stenosis" of the RLE and "short segment occlusion versus high grade stenosis of the common femoral artery with diffuse disease noted throughout the femoral-popliteal arteries" of the LLE    Plan:  - Podiatry consulted - no interventions, signed off  - Vascular surgery consulted by podiatry for blood blister of foot - no interventions, continue ASA/statin  - See media for picture (3/20)

## 2022-03-20 NOTE — ASSESSMENT & PLAN NOTE
Pt transferred from [facility] for EP eval of possible Atrial lead malfunction on ICD  - Pt c/o worsening exertion dyspnea and at rest after AICD discharge x2 on 3/8/22 around 2pm which pt did not seek medical attention for at that time  - H/o VFib with Bi-vent AICD placement on home Amiodarone 100mg daily and Toprol-XL 75mg BID  - AICD interrogation per cardiology in [facility] shows evidence of x3 shocks for VT and ATP x3 which is congruent with patient's report, but noted that Atrial lead not appropriately functioning, which home Amiodarone was increased from 100mg daily to 200mg daily  - Case was discussed by cardiology with Dr Parvez Pleitez (B EP)    Plan:  - S/p lead extraction and venogram (3/21/22) with EP  - Stable for discharge per EP, discharge with Amiodarone 200mg qd and continue other cardiac meds  - Toprol-XL 75mg was changed from BID to daily during this admission  - Coumadin 2 5mg qd, INR today 1 99 (Goal INR 2-3)  - Ordered BMP, INR to be done 3/24 prior to follow-up with heart failure team No

## 2022-03-20 NOTE — ASSESSMENT & PLAN NOTE
Prior history of marijuana and cocaine use  History of medication noncompliance  Positive for THC in February, cocaine in January this year  UDS at [facility] negative, with repeat UDS in [place] negative   CM consulted

## 2022-03-20 NOTE — PROGRESS NOTES
Progress Notes - Family Medicine Residency, [address] [facility] 1965, 61 y o  male  MRN: [id number]    Unit/Bed#: [facility] 530-01 Encounter: [id number]  Primary Care Provider: NICOLE Galvez      Admission Date: 3/17/2022 1627  Length of Stay: 3 days  Code Status:  Level 1 - Full Code  Consult:   IP CONSULT TO NUTRITION SERVICES  IP CONSULT TO ELECTROPHYSIOLOGY  IP CONSULT TO PODIATRY  IP CONSULT TO CASE MANAGEMENT  IP CONSULT TO PICC TEAM  IP CONSULT TO CARDIOLOGY      Assessments & Plans:   Plans discussed with [facility] team and finalization is pending attending physician Dr Latia Garcia MD attestation  * Malfunction of implantable defibrillator ventricular (ICD) lead  Assessment & Plan  Pt transferred from [facility] for EP eval of possible Atrial lead malfunction on ICD  - Pt c/o worsening exertion dyspnea and at rest after AICD discharge x2 on 3/8/22 around 2pm which pt did not seek medical attention for at that time  - H/o VFib with Bi-vent AICD placement on home Amiodarone 100mg daily and Toprol-XL 75mg BID  - AICD interrogation per cardiology in [facility] shows evidence of x3 shocks for VT and ATP x3 which is congruent with patient's report, but noted that Atrial lead not appropriately functioning, which home Amiodarone was increased from 100mg daily to 200mg daily  - Case was discussed by cardiology with Dr Zac Ding ([facility] EP)    Plan:  - C/w Amiodarone 200mg daily for antiarrhythmic  - Changed Toprol-XL 75mg to daily for AV-uvaldo block  - EP and cardiology consulted  - Per EP, scheduled for venogram and atrial lead redo tomorrow  Okay to be on Coumadin 2 5mg qd  - Bridged to coumadin appropriately, d/c'd heparin drip   Goal INR 2-3    - NPO at midnight    Acute on chronic combined systolic and diastolic congestive heart failure (HCC)  Assessment & Plan  Acute CHF exacerbation likely triggered by VTach event  - On home Eplerenone 20mg daily and Lasix 20mg BID  - Last ECHO on 1/12/22 showed LVEF 20% with global hypokinesis, indeterminate diastolic dysfunction, moderate biatrial enlargement  - 3/15 BNP 86472 (improved from last BNP 87356 on 1/28/22)  - 3/15 CXR: cardiomegaly with bilateral pulmonary airspace disease and pleural effusions concerning for CHF/pulmonary edema superimposed upon more chronic pulmonary changes    Plan:  - Continue Eplerenone while inpatient  If hypotensive OVN, will consider d/c'ing medication 3/21    - Patient not fluid overloaded on exam  Started PO Lasix 40mg BID   - Consider holding lasix tomorrow while pt is NPO for procedure  Reassess fluid status daily    - Fluid restricted diet  - Daily weight, strict I&Os    Intake/Output Summary (Last 24 hours) at 3/20/2022 1047  Last data filed at 3/20/2022 1001  Gross per 24 hour   Intake 1341 77 ml   Output 2100 ml   Net -758 23 ml       S/P AVR (aortic valve replacement)  Assessment & Plan  Hx of rheumatic heart disease s/p mechanical AVR in the 1980s (on Coumadin 2 5mg qd)  Continue Coumadin 2 5mg qd (Goal INR 2-3)  Will check daily INR    Coronary artery disease involving native coronary artery of native heart without angina pectoris  Assessment & Plan  H/o CAD s/p BMS-LAD and BMS-LCx (2014)  - On home Amiodarone 100mg daily for antiarrhythmic and Toprol-XL 75mg BID for AV-uvaldo block  - Also on ASA 81mg daily and Atorvastatin 80mg qHS  - 3/15 EKG: sinus tachy with V-pacing and LBBB  - Pt hypotensive overnight, please see Essential Hypertension  Plan:  - Decreased frequency of Toprolol 75 mg from BID to Daily  - c/w Amiodarone to 200mg daily for antiarrhythmic    Hypertension  Assessment & Plan  H/o HTN on home Lisinopril 10mg qHS  - Last Blood Pressure: (!) 86/58 (53/72/09 7086)   - Systolic (76LVP), ALH:95 , Min:72 , Max:97   - Pt hypotensive overnight, 3/19-3/20   On repeat 3/20 at 9am:117/66 HR 80    Plan:  - Held today's dose of Lisinopril  - Decreased lisinopril from 20 mg to 10 mg Daily, starting tomorrow, 3/21  - Inspra 25 daily  Consider holding tomorrow, 3/21, of hypotensive OVN again    - PO Lasix 40 BID  - Metoprolol succinate 75 mg daily  - Vitals per unit routine    Chronic respiratory failure with hypoxia (HCC)  Assessment & Plan  - Last SpO2: 100 % (03/20/22 0245) on Nasal Cannula O2 Flow Rate (L/min): 4 L/min     On chart review, patient was recently hospitalized back in 1/2022 for both COVID and bacterial PNA, requiring intubation and ICU management  Has been hospitalized twice afterwards 2/2 hypoxia and volume overload (from HF with severe pulmonary HTN)  Previously on 2L before hospitalization > 4L NC O2 is his current baseline  Follows with pulmonology with last visit in 2/15/2022 - concern for development of secondary fibrosis as well as bronchiectasis given continued hypoxia  Planned for Chest CT and PFTs in 4 weeks from visit, which have not been completed yet  Recommended that patient trial Breo daily and to use 6L NC for increased activity  May benefit from pulmonary rehab in the future  Plan:  - Titrate O2 NC as needed for goal SO2 > 90%  - Respiratory protocol  - Continue [address] Breo with albuterol PRN  - Will need PFTs and pulmonary referral outpatient for new post-COVID wheezing       Eschar of toe  Assessment & Plan  Pt noted to have Left hallux eschar during admission at [facility]  - Previously evaluated by podiatry in [facility], "no intervention necessary to left hallux eschar as it is superficial and well-adhered"  - 3/16 LEADS: "Diffuse disease noted throughout the femoral-popliteal arteries without significant focal stenosis" of the RLE and "short segment occlusion versus high grade stenosis of the common femoral artery with diffuse disease noted throughout the femoral-popliteal arteries" of the LLE    Plan:  - Podiatry consulted - no interventions, LEADS to assess healing potential  - Wound care consulted  - See media for picture (3/20)    Hyperlipidemia  Assessment & Plan  Continue with home Lipitor 80mg qd    Substance use  Assessment & Plan  Prior history of marijuana and cocaine use  History of medication noncompliance  Positive for THC in February, cocaine in January this year  UDS at [facility] negative  Follow-up repeat UDS this admission  CM consulted       Patient Active Problem List   Diagnosis    Colonic mass    Coronary artery disease involving native coronary artery of native heart without angina pectoris    S/P AVR (aortic valve replacement)    Biventricular ICD (implantable cardioverter-defibrillator) in place    Depressed mood    Elevated troponin    SIRS (systemic inflammatory response syndrome) ([facility] )    Substance use    Aortic stenosis    Ischemic cardiomyopathy    Drug abuse ([place] [address] )    Ventricular fibrillation ([place] [address] )    Hyponatremia    Mild protein-calorie malnutrition ([place] [address] )    Medically noncompliant    Acute on chronic combined systolic and diastolic congestive heart failure ([place] [address] )    Aortic ectasia, thoracic (HCC)    Chronic respiratory failure with hypoxia ([facility] )    Encounter for examination following treatment at hospital    Ventricular tachycardia ([place] [address] )    History of substance abuse ([place] [address] )    Eschar of toe    Malfunction of implantable defibrillator ventricular (ICD) lead    Hypertension    Hyperlipidemia       Diet: Diet Cardiovascular; Sodium 2 GM; Fluid Restriction 1800 ML  Diet NPO; Sips with meds  VTE Pharm PPX: Warfarin (Coumadin)  [facility] sequential [facility]      Subjective:   Patient had asymptomatic low BPs overnight  Nursing has no additional events to report  Patient seen and examined at bedside and without questions or concerns  Patient denies chest pain, light headedness, dizziness, cough, shortness of breath, palpitations, dysuria  Patient denies any difficulty urinating  Patient understands and agrees with plan      Vitals:     Vitals:    03/20/22 0245 03/20/22 0600 03/20/22 0622 03/20/22 0856   BP: (!) 83/57  (!) 86/58 117/56   BP Location:   Left arm    Pulse: 81  80 80   Resp:   22    Temp:       TempSrc:       SpO2: 100%      Weight:  62 kg (136 lb 11 oz)     Height:         Temp:  [97 9 °F (36 6 °C)-99 °F (37 2 °C)] 98 2 °F (36 8 °C)  HR:  [40-85] 80  Resp:  [18-22] 22  BP: ()/(40-58) 117/56  Weight (last 2 days)     Date/Time Weight    03/20/22 0600 62 (136 69)    03/19/22 0900 60 1 (132 5)    03/19/22 0600 62 2 (137 13)          Intake/Output Summary (Last 24 hours) at 3/20/2022 1104  Last data filed at 3/20/2022 1001  Gross per 24 hour   Intake 1341 77 ml   Output 2100 ml   Net -758 23 ml     Invasive Devices  Report    Peripherally Inserted Central Catheter Line            PICC Line 03/18/22 Right Brachial 2 days                Labs:     CBC:  Results from last 7 days   Lab Units 03/20/22  0534 03/19/22  0547 03/17/22 2053 03/17/22  0927 03/16/22  0434 03/15/22  2007   WBC Thousand/uL 12 09* 11 62* 11 84* 10 40* 13 59* 10 88*   HEMOGLOBIN g/dL 9 6* 10 0* 11 0* 10 6* 9 1* 10 7*   HEMATOCRIT % 30 0* 32 9* 33 1* 33 8* 28 1* 33 1*   PLATELETS Thousands/uL 281 317 311 319 316 348   NEUTROS ABS Thousands/µL  --   --   --  7 35 10 31* 7 39       CMP:  Results from last 7 days   Lab Units 03/20/22  0534 03/19/22  0547 03/18/22  1115 03/17/22 2053 03/17/22  0927 03/16/22  0434 03/15/22  2007   POTASSIUM mmol/L 3 3* 3 5 3 9 4 0 4 3 4 2 4 4   CHLORIDE mmol/L 94* 94* 94* 95* 97* 97* 96*   CO2 mmol/L 36* 34* 31 30 30 30 32   BUN mg/dL 13 18 19 15 14 13 9   CREATININE mg/dL 0 82 0 99 0 91 0 85 1 04 1 07 1 07   CALCIUM mg/dL 8 2* 8 4 9 1 8 8 8 3 8 2* 8 2*   EGFR ml/min/1 73sq m 98 84 93 96 79 76 76   MAGNESIUM mg/dL  --   --  1 8  --   --  1 7  --        Sepsis:        Micro:  Lab Results   Component Value Date/Time    Blood Culture No Growth After 5 Days  01/28/2022 04:55 AM    Blood Culture No Growth After 5 Days   01/28/2022 04:51 AM    Sputum Culture 2+ Growth of  02/01/2022 07:10 AM    Gram Stain Result Rare Epithelial cells per low power field (A) 02/01/2022 07:10 AM    Gram Stain Result Rare Polys (A) 02/01/2022 07:10 AM    Gram Stain Result Rare Gram positive cocci in pairs (A) 02/01/2022 07:10 AM    Gram Stain Result Rare Gram positive rods (A) 02/01/2022 07:10 AM    Legionella Urinary Antigen Negative 09/02/2021 09:15 AM       Imaging:   No results found  Medications:     Current Facility-Administered Medications   Medication Dose Route Frequency    acetaminophen (TYLENOL) tablet 650 mg  650 mg Oral Q4H PRN    albuterol inhalation solution 2 5 mg  2 5 mg Nebulization Q4H PRN    aluminum-magnesium hydroxide-simethicone (MYLANTA) oral suspension 30 mL  30 mL Oral Q6H PRN    amiodarone tablet 200 mg  200 mg Oral Daily With Breakfast    aspirin (ECOTRIN LOW STRENGTH) EC tablet 81 mg  81 mg Oral Daily    atorvastatin (LIPITOR) tablet 80 mg  80 mg Oral Daily    eplerenone (INSPRA) tablet 25 mg  25 mg Oral Daily    fluticasone-vilanterol (BREO ELLIPTA) 100-25 mcg/inh inhaler 1 puff  1 puff Inhalation Daily    furosemide (LASIX) tablet 40 mg  40 mg Oral BID (diuretic)    [START ON 3/21/2022] lisinopril (ZESTRIL) tablet 10 mg  10 mg Oral Daily    [START ON 3/21/2022] metoprolol succinate (TOPROL-XL) 24 hr tablet 75 mg  75 mg Oral Daily    ondansetron (ZOFRAN) injection 4 mg  4 mg Intravenous Q6H PRN    warfarin (COUMADIN) tablet 2 5 mg  2 5 mg Oral Daily (warfarin)       Physical Exam:     General: Pt observed lying comfortably in bed, NAD  Not toxic/ill-appearing  No cachectic or diaphoresis  No obvious sign of trauma or bleeding  Psych: able to converse appropriately  Neuro: no gross neurological deficits  Head: atraumatic, normocephalic  Eyes: open spontaneously, EOM intact, conjunctiva non-injected, no scleral icterus, no discharge  Ear: normal external ear, no visible drainage at external auditory orifice  Nose: no epistaxis, no rhinorrhea  Throat: no hoarse voice, no cough    Neck: normal ROM   Heart: RRR, no murmur/distant heart sound appreciated  Lungs: Diffuse wheezing, nml respiratory effort, no agonal/labored breathing, no accessory muscle use  Abdomen: soft, nontender, nondistended, normal bowel sound  Extremities: No edema      Arleta Other, DO  PGY-1, Family Medicine  03/20/22  11:04 AM

## 2022-03-20 NOTE — PROGRESS NOTES
Most recent SBP ranging 72-83  No S/S  TT message sent to on call resident, Dr Joey Bentley, to make aware  No orders at this time   To call if SBP drops below 72 or patient develops S/S

## 2022-03-21 NOTE — ANESTHESIA POSTPROCEDURE EVALUATION
Post-Op Assessment Note    CV Status:  Stable    Pain management: adequate     Mental Status:  Alert and awake   Hydration Status:  Stable   PONV Controlled:  None   Airway Patency:  Patent      Post Op Vitals Reviewed: Yes      Staff: Anesthesiologist, CRNA         No complications documented      BP   97/67   Temp      Pulse 65   Resp 14   SpO2 95% on RA

## 2022-03-21 NOTE — ANESTHESIA PREPROCEDURE EVALUATION
Procedure:  Cardiac lead revision (N/A Chest)  Cardiac biv icd generator change (N/A Chest)    Relevant Problems   CARDIO   (+) Acute on chronic combined systolic and diastolic congestive heart failure (HCC)   (+) Aortic ectasia, thoracic (HCC)   (+) Aortic stenosis   (+) Coronary artery disease involving native coronary artery of native heart without angina pectoris   (+) Hyperlipidemia   (+) Hypertension   (+) S/P AVR (aortic valve replacement)   (+) Ventricular fibrillation (HCC)   (+) Ventricular tachycardia (HCC)      NEURO/PSYCH   (+) History of substance abuse ([facility] )      PULMONARY   (+) [facility] respiratory failure with hypoxia ([facility] )    Home O2 4L NC      1/10/22 TTE Moderately dilated left ventricular cavity with markedly reduced left ventricular systolic function with global hypokinesis   Ejection fraction is estimated as around 20%   Indeterminate diastolic dysfunction grade  Results for Nazario Ballesteros (MRN [id number]) as of 3/21/2022 16:20   Ref   Range 3/21/2022 05:37   Sodium Latest Ref Range: 136 - 145 mmol/L 130 (L)   Potassium Latest Ref Range: 3 5 - 5 3 mmol/L 3 9   Chloride Latest Ref Range: 100 - 108 mmol/L 94 (L)   CO2 Latest Ref Range: 21 - 32 mmol/L 33 (H)   Anion Gap Latest Ref Range: 4 - 13 mmol/L 3 (L)   BUN Latest Ref Range: 5 - 25 mg/dL 13   Creatinine Latest Ref Range: 0 60 - 1 30 mg/dL 0 86   Glucose, Random Latest Ref Range: 65 - 140 mg/dL 93   Calcium Latest Ref Range: 8 3 - 10 1 mg/dL 8 5   eGFR Latest Units: ml/min/1 73sq m 96   WBC Latest Ref Range: 4 31 - 10 16 Thousand/uL 11 00 (H)   Red Blood Cell Count Latest Ref Range: 3 88 - 5 62 Million/uL 3 70 (L)   Hemoglobin Latest Ref Range: 12 0 - 17 0 g/dL 9 4 (L)   HCT Latest Ref Range: 36 5 - 49 3 % 30 9 (L)   MCV Latest Ref Range: 82 - 98 fL 84   MCH Latest Ref Range: 26 8 - 34 3 pg 25 4 (L)   MCHC Latest Ref Range: 31 4 - 37 4 g/dL 30 4 (L)   RDW Latest Ref Range: 11 6 - 15 1 % 19 2 (H)   Platelet Count Latest Ref Range: 149 - 390 Thousands/uL 261     Physical Exam    Airway    Mallampati score: II  TM Distance: >3 FB  Neck ROM: full     Dental   No notable dental hx     Cardiovascular  Murmur,     Pulmonary  Breath sounds clear to auscultation,     Other Findings        Anesthesia Plan  ASA Score- 4     Anesthesia Type- IV sedation with anesthesia with ASA Monitors  Additional Monitors:   Airway Plan:           Plan Factors-Exercise tolerance (METS): <4 METS  Chart reviewed  EKG reviewed  Imaging results reviewed  Existing labs reviewed  Patient summary reviewed  Patient is not a current smoker  Induction- intravenous  Postoperative Plan-     Informed Consent- Anesthetic plan and risks discussed with patient  I personally reviewed this patient with the CRNA  Discussed and agreed on the Anesthesia Plan with the CRNA  Yris Franklin

## 2022-03-21 NOTE — PROGRESS NOTES
PROGRESS NOTE - Family Medicine Residency [facility] 1965, 61 y o  male  MRN: [id number]    Unit/Bed#: [facility] 530-01 Encounter: [id number]  Primary Care Provider: NICOLE Galvez      Admission Date: 3/17/2022  Length of Stay: 4 days  Code Status:  Level 1 - Full Code  Diet: Diet NPO; Sips with meds  Consult:   IP CONSULT TO NUTRITION SERVICES  IP CONSULT TO ELECTROPHYSIOLOGY  IP CONSULT TO PODIATRY  IP CONSULT TO CASE MANAGEMENT  IP CONSULT TO PICC TEAM  IP CONSULT TO CARDIOLOGY    Assessment/Plan   Assessments & Plans:     Discussed with [facility] team and finalization is pending attending physician attestation  * Malfunction of implantable defibrillator ventricular (ICD) lead  Assessment & Plan  Pt transferred from [facility] for EP eval of possible Atrial lead malfunction on ICD  - Pt c/o worsening exertion dyspnea and at rest after AICD discharge x2 on 3/8/22 around 2pm which pt did not seek medical attention for at that time  - H/o VFib with Bi-vent AICD placement on home Amiodarone 100mg daily and Toprol-XL 75mg BID  - AICD interrogation per cardiology in [facility] shows evidence of x3 shocks for VT and ATP x3 which is congruent with patient's report, but noted that Atrial lead not appropriately functioning, which home Amiodarone was increased from 100mg daily to 200mg daily  - Case was discussed by cardiology with Dr Zac Ding ([facility] EP)    Plan:  - C/w Amiodarone 200mg daily for antiarrhythmic  - Changed Toprol-XL 75mg to daily for AV-uvaldo block  - EP and cardiology consulted  - Per EP, scheduled for venogram and atrial lead redo today  Okay to be on Coumadin 2 5mg qd  - Bridged to coumadin appropriately, d/c'd heparin drip   Goal INR 2-3    - NPO overnight    Acute on chronic combined systolic and diastolic congestive heart failure (HCC)  Assessment & Plan  Acute CHF exacerbation likely triggered by VTach event  - On home Eplerenone 20mg daily and Lasix 20mg BID  - Last ECHO on 1/12/22 showed LVEF 20% with global hypokinesis, indeterminate diastolic dysfunction, moderate biatrial enlargement  - 3/15 BNP 68833 (improved from last BNP 81159 on 1/28/22)  - 3/15 CXR: cardiomegaly with bilateral pulmonary airspace disease and pleural effusions concerning for CHF/pulmonary edema superimposed upon more chronic pulmonary changes    Plan:  - Continue Eplerenone while inpatient  If hypotensive, will consider d/c'ing medication   - Does not appear fluid overloaded on exam, can consider holding PO lasix today  - Fluid restricted diet  - Daily weight, strict I&Os    Intake/Output Summary (Last 24 hours) at 3/20/2022 1047  Last data filed at 3/20/2022 1001  Gross per 24 hour   Intake 1341 77 ml   Output 2100 ml   Net -758 23 ml       Chronic respiratory failure with hypoxia (HCC)  Assessment & Plan  - Last SpO2: 100 % (03/20/22 0245) on Nasal Cannula O2 Flow Rate (L/min): 4 L/min     On chart review, patient was recently hospitalized back in 1/2022 for both COVID and bacterial PNA, requiring intubation and ICU management  Has been hospitalized twice afterwards 2/2 hypoxia and volume overload (from HF with severe pulmonary HTN)  Previously on 2L before hospitalization > 4L NC O2 is his current baseline  Follows with pulmonology with last visit in 2/15/2022 - concern for development of secondary fibrosis as well as bronchiectasis given continued hypoxia  Planned for Chest CT and PFTs in 4 weeks from visit, which have not been completed yet  Recommended that patient trial Breo daily and to use 6L NC for increased activity  May benefit from pulmonary rehab in the future  Plan:  - Titrate O2 NC as needed for goal SO2 > 90%  - Respiratory protocol  - Continue [address] Breo with albuterol PRN  - Will need PFTs and pulmonary referral outpatient for new post-COVID wheezing       Hyperlipidemia  Assessment & Plan  Continue with home Lipitor 80mg qd    Hypertension  Assessment & Plan  H/o HTN on home Lisinopril 10mg qHS  - Last Blood Pressure: 111/66 (20/15/09 1795)   - Systolic (47TAR), MLR:812 , Min:110 , Max:115   - Pt hypotensive overnight, 3/19-3/20   On repeat 3/20 at 9am:117/66 HR 80    Plan:  - Decreased lisinopril from 20 mg to 10 mg Daily  - Inspra 25 daily  - PO Lasix 40 BID - can consider holding as patient is not overloaded on exam  - Metoprolol succinate 75 mg daily  - Vitals per unit routine    Eschar of toe  Assessment & Plan  Pt noted to have Left hallux eschar during admission at [facility]  - Previously evaluated by podiatry in [facility], "no intervention necessary to left hallux eschar as it is superficial and well-adhered"  - 3/16 LEADS: "Diffuse disease noted throughout the femoral-popliteal arteries without significant focal stenosis" of the RLE and "short segment occlusion versus high grade stenosis of the common femoral artery with diffuse disease noted throughout the femoral-popliteal arteries" of the LLE    Plan:  - Podiatry consulted - no interventions, signed off  - Wound care consulted  - See media for picture (3/20)    Substance use  Assessment & Plan  Prior history of marijuana and cocaine use  History of medication noncompliance  Positive for THC in February, cocaine in January this year  UDS at [facility] negative  Follow-up repeat UDS this admission  CM consulted     S/P AVR (aortic valve replacement)  Assessment & Plan  Hx of rheumatic heart disease s/p mechanical AVR in the 1980s (on Coumadin 2 5mg qd)  Continue Coumadin 2 5mg qd (Goal INR 2-3)  Will check daily INR    Coronary artery disease involving native coronary artery of native heart without angina pectoris  Assessment & Plan  H/o CAD s/p BMS-LAD and BMS-LCx (2014)  - On home Amiodarone 100mg daily for antiarrhythmic and Toprol-XL 75mg BID for AV-uvaldo block  - Also on ASA 81mg daily and Atorvastatin 80mg qHS  - 3/15 EKG: sinus tachy with V-pacing and LBBB  - Pt hypotensive overnight, please see Essential Hypertension  Plan:  - Decreased frequency of Toprolol 75 mg from BID to Daily  - c/w Amiodarone to 200mg daily for antiarrhythmic (decrease to 100mg qd on discharge per EP)      Principal Problem:    Malfunction of implantable defibrillator ventricular (ICD) lead  Active Problems:    Acute on chronic combined systolic and diastolic congestive heart failure (HCC)    Chronic respiratory failure with hypoxia (HCC)    Coronary artery disease involving native coronary artery of native heart without angina pectoris    S/P AVR (aortic valve replacement)    Substance use    Eschar of toe    Hypertension    Hyperlipidemia      VTE Pharm PPX: Reason for no pharmacologic prophylaxis on home Coumadin  VTE Mech PPX: sequential compression device and/or foot pump applied unless otherwise contraindicated       Hospital Course & 24hr events:     Hospital course:  61 y o  male admitted 3/17/2022, now HD# 4, for EP evaluation  Overnight/24hr events:  Patient without acute events overnight per sign-out  No concern or report per nursing staff  Patient seen and examined at bedside - patient is very frustrated that he has not had the procedure yet  States he is leaving If he does not get it done today  Subjective   Subjective & Review of Systems:     Review of Systems   Constitutional: Negative for chills and fever  HENT: Negative for ear pain and sore throat  Eyes: Negative for pain and visual disturbance  Respiratory: Negative for cough, chest tightness and shortness of breath  Cardiovascular: Negative for chest pain and palpitations  Gastrointestinal: Negative for abdominal pain and vomiting  Genitourinary: Negative for dysuria and hematuria  Musculoskeletal: Negative for arthralgias and back pain  Skin: Negative for color change and rash  Neurological: Negative for seizures and syncope  All other systems reviewed and are negative        Review of systems was reviewed and negative unless stated above in HPI/24-hour events           Objective    Objective:     Vitals:    Vitals:    03/20/22 2205 03/21/22 0500 03/21/22 0647 03/21/22 0731   BP: 110/66  111/66    BP Location: Left arm      Pulse: 85 83 81 80   Resp: 20   18   Temp: 97 8 °F (36 6 °C)  98 °F (36 7 °C)    TempSrc: Oral      SpO2: 99% 100% 100% 100%   Weight:  60 1 kg (132 lb 7 9 oz)     Height:         Temp:  [97 8 °F (36 6 °C)-98 9 °F (37 2 °C)] 98 °F (36 7 °C)  HR:  [80-86] 80  Resp:  [18-22] 18  BP: (110-115)/(65-67) 111/66  Weight (last 2 days)     Date/Time Weight    03/21/22 0500 60 1 (132 5)    03/20/22 0600 62 (136 69)    03/19/22 0900 60 1 (132 5)    03/19/22 0600 62 2 (137 13)          Intake/Output Summary (Last 24 hours) at 3/21/2022 0917  Last data filed at 3/21/2022 0536  Gross per 24 hour   Intake 330 ml   Output 1550 ml   Net -1220 ml     Invasive Devices  Report    Peripherally Inserted Central Catheter Line            PICC Line 03/18/22 Right Brachial 3 days                Labs: I have personally reviewed pertinent reports        Results from last 7 days   Lab Units 03/21/22  0537 03/20/22  0534 03/19/22  0547 03/17/22 2053 03/17/22  0927 03/16/22  0434 03/15/22  2007   WBC Thousand/uL 11 00* 12 09* 11 62* 11 84* 10 40* 13 59* 10 88*   HEMOGLOBIN g/dL 9 4* 9 6* 10 0* 11 0* 10 6* 9 1* 10 7*   HEMATOCRIT % 30 9* 30 0* 32 9* 33 1* 33 8* 28 1* 33 1*   PLATELETS Thousands/uL 261 281 317 311 319 316 348   NEUTROS ABS Thousands/µL  --   --   --   --  7 35 10 31* 7 39       Results from last 7 days   Lab Units 03/21/22  0537 03/20/22  0534 03/19/22  0547 03/18/22  1115 03/17/22 2053 03/17/22  0927 03/16/22  0434   POTASSIUM mmol/L 3 9 3 3* 3 5 3 9 4 0 4 3 4 2   CHLORIDE mmol/L 94* 94* 94* 94* 95* 97* 97*   CO2 mmol/L 33* 36* 34* 31 30 30 30   BUN mg/dL 13 13 18 19 15 14 13   CREATININE mg/dL 0 86 0 82 0 99 0 91 0 85 1 04 1 07   CALCIUM mg/dL 8 5 8 2* 8 4 9 1 8 8 8 3 8 2*   EGFR ml/min/1 73sq m 96 98 84 93 96 79 76   MAGNESIUM mg/dL  --   -- --  1 8  --   --  1 7               EKG, Pathology, Imaging, and Other Studies:     Lab Results   Component Value Date/Time    Blood Culture No Growth After 5 Days  01/28/2022 04:55 AM    Blood Culture No Growth After 5 Days  01/28/2022 04:51 AM    Sputum Culture 2+ Growth of  02/01/2022 07:10 AM    Gram Stain Result Rare Epithelial cells per low power field (A) 02/01/2022 07:10 AM    Gram Stain Result Rare Polys (A) 02/01/2022 07:10 AM    Gram Stain Result Rare Gram positive cocci in pairs (A) 02/01/2022 07:10 AM    Gram Stain Result Rare Gram positive rods (A) 02/01/2022 07:10 AM    Legionella Urinary Antigen Negative 09/02/2021 09:15 AM       No results found  Inpatient medications:     Current Facility-Administered Medications   Medication Dose Route Frequency    acetaminophen (TYLENOL) tablet 650 mg  650 mg Oral Q4H PRN    albuterol inhalation solution 2 5 mg  2 5 mg Nebulization Q4H PRN    aluminum-magnesium hydroxide-simethicone (MYLANTA) oral suspension 30 mL  30 mL Oral Q6H PRN    amiodarone tablet 200 mg  200 mg Oral Daily With Breakfast    aspirin (ECOTRIN LOW STRENGTH) EC tablet 81 mg  81 mg Oral Daily    atorvastatin (LIPITOR) tablet 80 mg  80 mg Oral Daily    eplerenone (INSPRA) tablet 25 mg  25 mg Oral Daily    fluticasone-vilanterol (BREO ELLIPTA) 100-25 mcg/inh inhaler 1 puff  1 puff Inhalation Daily    furosemide (LASIX) tablet 40 mg  40 mg Oral BID (diuretic)    lisinopril (ZESTRIL) tablet 10 mg  10 mg Oral Daily    melatonin tablet 3 mg  3 mg Oral HS PRN    metoprolol succinate (TOPROL-XL) 24 hr tablet 75 mg  75 mg Oral Daily    ondansetron (ZOFRAN) injection 4 mg  4 mg Intravenous Q6H PRN    warfarin (COUMADIN) tablet 2 5 mg  2 5 mg Oral Daily (warfarin)         Physical Exam:   Physical Exam      Physical Exam  Constitutional:       General: He is not in acute distress  Appearance: He is ill-appearing  HENT:      Head: Normocephalic and atraumatic        Nose: Nose normal    Eyes:      Extraocular Movements: Extraocular movements intact  Conjunctiva/sclera: Conjunctivae normal    Cardiovascular:      Rate and Rhythm: Normal rate and regular rhythm  Heart sounds: No murmur heard  Pulmonary:      Effort: No respiratory distress  Breath sounds: No wheezing  Comments: On 3L NC O2 (4L baseline) with no wheezing  Abdominal:      General: Abdomen is flat  Bowel sounds are normal       Palpations: Abdomen is soft  Tenderness: There is no abdominal tenderness  Musculoskeletal:         General: No tenderness  Normal range of motion  Cervical back: Normal range of motion  Skin:     Comments: Eschar of right great toe   Neurological:      Mental Status: He is alert and oriented to person, place, and time  Psychiatric:         Mood and Affect: Mood normal          Behavior: Behavior normal                Ana Cartwright MD  PGY-1, Family Medicine  03/21/22  9:17 AM    Dear reader, please be aware that portions of my note contain dictated text  I have done my best to proof-read this note prior to signing  However, there may be occasional unnoticed errors pertaining to "sound-alike" words and/or grammar during my dictation process  If there is any words or information that is unclear or appears erroneous, please kindly let me know and I will clarify and/or addend my notes accordingly  Thank you for your understanding

## 2022-03-21 NOTE — NURSING NOTE
Pt transferred to 530 via stretcher on tele,moved to bed with smooth    PT AAAx3 and painfree,device drsg dry and intact,care assumed by Cathi Weir RN in attendance at pt bedside

## 2022-03-21 NOTE — CASE MANAGEMENT
Case Management Discharge Planning Note    Patient name Lisa Israel  Location [facility] MRN [id number]  : 1965 Date 3/21/2022       Current Admission Date: 3/17/2022  Current Admission Diagnosis:Malfunction of implantable defibrillator ventricular (ICD) lead   Patient Active Problem List    Diagnosis Date Noted    Malfunction of implantable defibrillator ventricular (ICD) lead 2022    Hypertension     Hyperlipidemia     Ventricular tachycardia ([facility] [facility] ) 2022    History of substance abuse ([facility] ) 2022    Eschar of toe 2022    Encounter for examination following treatment at hospital 2022    Chronic respiratory failure with hypoxia ([facility] ) 02/15/2022    Medically noncompliant 2022    Acute on chronic combined systolic and diastolic congestive heart failure ([facility] ) 2022    Aortic ectasia, thoracic ([facility] [facility] ) 2022    Mild protein-calorie malnutrition ([facility] ) 2022    Hyponatremia 2022    Aortic stenosis 2022    Ischemic cardiomyopathy 2022    Drug abuse ([facility] [facility] ) 2022    Ventricular fibrillation ([facility] [facility] ) 2022    Elevated troponin 2021    SIRS (systemic inflammatory response syndrome) ([facility] [facility] ) 2021    Substance use 2021    Depressed mood 2020    S/P AVR (aortic valve replacement) 2018    Biventricular ICD (implantable cardioverter-defibrillator) in place 2018    Colonic mass 2016    Coronary artery disease involving native coronary artery of native heart without angina pectoris 2016      LOS (days): 4  Geometric Mean LOS (GMLOS) (days): 4 80  Days to GMLOS:0 9     OBJECTIVE:  Risk of Unplanned Readmission Score: 36         Current admission status: Inpatient   Preferred Pharmacy:   [facility] [place] [address] [place] [address]  Phone: [phone] Fax: [phone]    Primary Care Provider: NICOLE Vicente    Primary Insurance: [facility]  Secondary Insurance: [facility]    DISCHARGE DETAILS:    Discharge planning discussed with[de-identified] patient, daughter and sister at bedside     Comments - Freedom of Choice: pt accepted by [facility] for VN/PT/OT f/u  CM contacted family/caregiver?: Yes (daughter in room)        Other Referral/Resources/Interventions Provided:  Interventions: Mary Mcdaniel    Treatment Team Recommendation: Home with [address] [place] [address]  Discharge Destination Plan[de-identified] Home,Home with [place] at Discharge : Family

## 2022-03-22 PROBLEM — T82.198A MALFUNCTION OF IMPLANTABLE DEFIBRILLATOR VENTRICULAR (ICD) LEAD: Status: RESOLVED | Noted: 2022-01-01 | Resolved: 2022-01-01

## 2022-03-22 NOTE — DISCHARGE INSTRUCTIONS
MEDICATIONS:  Please increase amiodarone to 200 mg daily, Coumadin clinic will be made aware, please recheck INR on Thursday with other lab work for Dr Talia Koo  Please continue taking your water pill Lasix 20 mg twice daily as he were prior to admission  Please continue all other cardiac meds as previously prescribed  DEVICE INSTRUCTIONS:  Please refer to post device implantation discharge instructions and restrictions below and your device booklet/temporary card  Keep incision dry for one week  Do not use lotions/powders/creams on incision  Leave outer bandage in place for 1 week - it is water proof, and as long as it is fully adhered to your skin you may shower with it  If it appears as though the bandage is coming off and/or there is any communication to the area of device incision, please then keep the whole area dry for the remaining week  After 1 week, please remove by pulling all edges away from the center of the bandage  No overhead reaching/pushing/pulling/lifting greater than 5-10lbs with left arm for six weeks  Please call the office if you notice redness, swelling, bleeding, or drainage from incision or if you develop fevers    Cardiac Resynchronization Therapy (CRT)    If you have any questions, please call [phone] to speak with a nurse (8:30am-4pm, or [phone] after hours)  For appointments, please call [phone]  WHAT YOU SHOULD KNOW:    Your device is a biventricular ICD, which delivers resynchronization therapy and is also a defibrillator (see below)  Cardiac resynchronization therapy (CRT) is a procedure used to treat problems with how your heart contracts  CRT is also called biventricular pacing  Your heart has 2 upper chambers, called atria, and 2 lower chambers, called ventricles  Your heartbeat is synchronized when all areas of your heart contract together properly   When the areas of your heart do not contract as they should, your heart cannot pump enough blood and oxygen to your body  You may have trouble breathing, tire easily, and have swelling in your legs and feet  With a biventricular device, there is one wire in the right atria (in most cases), one wire in the right ventricle, and a third wire that goes through a vein and wraps around to your left ventricle  The two ventricular wires work together to help your heart contract more synchronously and efficiently  AFTER YOU LEAVE:      Medicines:   · Heart medicine may be given to help your heart beat strongly and regularly  Ask your healthcare provider for more information about heart medicines  · Take your medicine as directed  Contact your healthcare provider if you think your medicine is not helping or if you have side effects  Tell him if you are allergic to any medicine  Keep a list of the medicines, vitamins, and herbs you take  Include the amounts, and when and why you take them  Bring the list or the pill bottles to follow-up visits  Carry your medicine list with you in case of an emergency  Driving: you are ok to drive 48 hours after device is implanted     Follow up with your healthcare provider as directed: You will need to return to have your pacemaker checked  You may also need an EKG, echocardiogram, or chest x-ray to check the leads in your heart, and your doctor will order these tests if necessary  Write down your questions so you remember to ask them during your visits  Device safety: Some electrical devices may interfere with how your pacemaker works  Some examples are cell phones, security systems, power cables, and electric monitors  Ask your healthcare provider how long you can be near these devices, and which devices to avoid  Tell caregivers you have a pacemaker before you have a procedure or surgery  Wound care: Care for your wound as directed  Keep incision dry for one week  Do not use lotions/powders/creams on incision   Leave outer bandage in place for 1 week - it is water proof, and as long as it is fully adhered to your skin you may shower with it  If it appears as though the bandage is coming off and/or there is any communication to the area of device incision, please then keep the whole area dry for the remaining week  After 1 week, please remove by pulling all edges away from the center of the bandage  No overhead reaching/pushing/pulling/lifting greater than 5-10lbs with left arm for one month  Please call the office if you notice redness, swelling, bleeding, or drainage from incision or if you develop fevers      Contact your healthcare provider if:   · You have new or increased swelling in your legs or feet  · You feel more tired than usual    · You have trouble breathing during activity  · Your wound is red, warm, swollen, or draining pus  · You have a fever  · You have questions or concerns about your condition or care  Seek care immediately or call 911 if:   · You feel like your heart is fluttering or jumping  · You have any of the following signs of a heart attack:    ¨ Squeezing, pressure, fullness, or pain in your chest that lasts longer than a few minutes or returns   ¨ Discomfort or pain in your back, neck, jaw, stomach, or arm   ¨ Shortness of breath or breathing problems   ¨ A sudden cold sweat, lightheadedness, dizziness, or nausea, especially with chest pain or trouble breathing      Implantable Cardioverter Defibrillator (ICD)    If you have any questions, please call [phone] to speak with a nurse (8:30am-4pm, or [phone] after hours)  For appointments, please call [phone]  WHAT YOU SHOULD KNOW:    Your device is a biventricular ICD, which delivers resynchronization therapy (see above) and is also a defibrillator  An implantable cardioverter defibrillator (ICD) is a small device that monitors your heart rate and rhythm  It is commonly placed inside your upper chest region   It may be used if you have a ventricular arrhythmia, which is an irregular, dangerous rhythm from the bottom chamber of your heart  Some arrhythmias may cause your heart to suddenly stop beating  An ICD can give a shock to your heart to make it start beating again, or it can give pacing therapy (also known as pain-free therapy) to return your heart to normal rhythm  It is also a pacemaker, so it will pace your heart if needed to prevent it from beating too slowly  AFTER YOU LEAVE:      Follow up with your primary healthcare provider or cardiologist as directed: You will need to follow-up to have your ICD checked and make sure you are not having problems  Write down your questions so you remember to ask them during your visits  Self-care:   · Ask about activity: Ask if you need to avoid moving your shoulder or arm, and for how long  Ask if you should avoid lifting heavy objects  Do not play any contact sports, such as football or wrestling, until your primary healthcare provider [facility] or cardiologist tells you it is okay  You may only be able to drive for a certain amount of time per day, or during certain hours  Ask when you can return to work  · Care for your skin over the ICD: see answer in instructions above     When you get a shock from your ICD: A shock may feel like someone has hit you, or you may feel a thump in the chest  If someone is touching you when you get a shock, they may feel a tingling feeling  The first time you feel a shock, it may scare you  Sit or lie down and stay calm  Ask someone to stay with you if possible  Please either call your cardiologist or report to an emergency room  Safety instructions when you have an ICD:   · Carry an ID card for the ICD: This card has important information about your ICD  · Stay away from magnets or machines with electric fields: This includes MRI machines  Avoid leaning into a car engine or doing welding  These things can interfere with how your ICD works      · Tell airport security you have an ICD: You may need to be searched by hand when you go through a security gate  The security gate or handheld wand could harm your ICD  · Keep an ICD diary: Record when you get a shock and what you were doing before you got the shock  Keep track of how you felt before and after the shock, as well as how many shocks you received  Write down the day and time of each shock  Bring the diary with you when you see your PHP or cardiologist    · Carry medical alert identification: Wear medical alert jewelry or carry a card that says you have an ICD  Ask your PHP or cardiologist where to get these items  Contact your primary healthcare provider or cardiologist if:   · You have a fever  · You feel 1 or more shocks from your ICD and feel fine afterwards  · Your feet or ankles swell  · The area around your ICD is painful or tender after surgery  · The skin around your stitches or staples is red, swollen, or draining pus or fluid  · You have chills, a cough, and feel weak or achy  · You are sad or anxious and find it hard to do your usual activities  · You have questions or concerns about your condition or care  Seek care immediately or call 911 if:   · Your stitches or staples come apart  · Blood soaks through your bandage  · You feel more than 3 shocks in a row from your ICD  · You become weak, dizzy, or faint  · You feel your heart skip beats or beat very fast or slow, but you do not feel a shock from your ICD  · You have chest pain that does not go away with rest or medicine  © 2014 [address] is for End User's use only and may not be sold, redistributed or otherwise used for commercial purposes  All illustrations and images included in CareNotes® are the copyrighted property of [employer]  or Alejandro Hdez  The above information is an  only   It is not intended as medical advice for individual conditions or treatments  Talk to your doctor, nurse or pharmacist before following any medical regimen to see if it is safe and effective for you

## 2022-03-22 NOTE — DISCHARGE SUMMARY
DISCHARGE SUMMARY - Family Medicine Residency, 9 [facility] 1965, 61 y o  male  MRN: [id number]    Unit/Bed#: [facility] 530-01 Encounter: [id number]  Primary Care Provider: Jessica Sinha      Admission Date: 3/17/2022 1627  Discharge Date: 03/22/22  Length of Stay: 5 days  Diagnosis:   Active Problems:    Acute on chronic combined systolic and diastolic congestive heart failure (HCC)    Chronic respiratory failure with hypoxia ([address] )    Coronary artery disease involving native coronary artery of native heart without angina pectoris    S/P AVR (aortic valve replacement)    Substance use    Eschar of toe    Hypertension    Hyperlipidemia      Plans finalization pending attending physician attestation        ASSESSMENTS & PLANS:     * Malfunction of implantable defibrillator ventricular (ICD) lead-resolved as of 3/22/2022  Assessment & Plan  Pt transferred from [address] [place] [address] for EP eval of possible Atrial lead malfunction on ICD  - Pt c/o worsening exertion dyspnea and at rest after AICD discharge x2 on 3/8/22 around 2pm which pt did not seek medical attention for at that time  - H/o VFib with Bi-vent AICD placement on home Amiodarone 100mg daily and Toprol-XL 75mg BID  - AICD interrogation per cardiology in [facility] shows evidence of x3 shocks for VT and ATP x3 which is congruent with patient's report, but noted that Atrial lead not appropriately functioning, which home Amiodarone was increased from 100mg daily to 200mg daily  - Case was discussed by cardiology with Dr Damián Benites ([facility] EP)    Plan:  - S/p lead extraction and venogram (3/21/22) with EP  - Stable for discharge per EP, discharge with Amiodarone 200mg qd and continue other cardiac meds  - Toprol-XL 75mg was changed from BID to daily during this admission  - Coumadin 2 5mg qd, INR today 1 99 (Goal INR 2-3)  - Ordered BMP, INR to be done 3/24 prior to follow-up with heart failure team     Acute on chronic combined systolic and diastolic congestive heart failure (HCC)  Assessment & Plan  Acute CHF exacerbation likely triggered by VTach event  - On home Eplerenone 20mg daily and Lasix 20mg BID  - Last ECHO on 1/12/22 showed LVEF 20% with global hypokinesis, indeterminate diastolic dysfunction, moderate biatrial enlargement  - 3/15 BNP 08958 (improved from last BNP 24752 on 1/28/22)  - 3/15 CXR: cardiomegaly with bilateral pulmonary airspace disease and pleural effusions concerning for CHF/pulmonary edema superimposed upon more chronic pulmonary changes    Plan:  - Continue with home Epleronone, Lasix on discharge  - Lisinopril decreased from 20mg to 10mg on discharge due to SBP in the 80s this admission      Intake/Output Summary (Last 24 hours) at 3/22/2022 1152  Last data filed at 3/22/2022 0843  Gross per 24 hour   Intake 1789 5 ml   Output 600 ml   Net 1189 5 ml       Chronic respiratory failure with hypoxia (HCC)  Assessment & Plan  - Last SpO2: 100 % (03/22/22 0853) on Nasal Cannula O2 Flow Rate (L/min): 3 L/min     On chart review, patient was recently hospitalized back in 1/2022 for both COVID and bacterial PNA, requiring intubation and ICU management  Has been hospitalized twice afterwards 2/2 hypoxia and volume overload (from HF with severe pulmonary HTN)  Previously on 2L before hospitalization > 4L NC O2 is his current baseline  Follows with pulmonology with last visit in 2/15/2022 - concern for development of secondary fibrosis as well as bronchiectasis given continued hypoxia  Planned for Chest CT and PFTs in 4 weeks from visit, which have not been completed yet  Recommended that patient trial Breo daily and to use 6L NC for increased activity  May benefit from pulmonary rehab in the future  Plan:  - Titrate O2 NC as needed for goal SO2 > 90%  - Respiratory protocol  - Continue [facility] Breo on discharge   - Ordered PFTs and pulmonary referral outpatient for new post-COVID wheezing       Hyperlipidemia  Assessment & Plan  Continue with home Lipitor 80mg qd    Hypertension  Assessment & Plan  H/o HTN on home Lisinopril 10mg qHS  - Last Blood Pressure: 109/65 (23/31/01 4236)   - Systolic (01LZC), EZV:841 , Min:106 , Max:109   - Pt hypotensive overnight, 3/19-3/20   On repeat 3/20 at 9am:117/66 HR 80    Plan:  - Decreased lisinopril from 20 mg to 10 mg daily given SBP in 80s during this admission, will continue 10mg on discharge  - Continue home epleronone, Lasix on discharge  - Metoprolol succinate 75 mg daily      Eschar of toe  Assessment & Plan  Pt noted to have Left hallux eschar during admission at [facility]  - Previously evaluated by podiatry in [facility], "no intervention necessary to left hallux eschar as it is superficial and well-adhered"  - 3/16 LEADS: "Diffuse disease noted throughout the femoral-popliteal arteries without significant focal stenosis" of the RLE and "short segment occlusion versus high grade stenosis of the common femoral artery with diffuse disease noted throughout the femoral-popliteal arteries" of the LLE    Plan:  - Podiatry consulted - no interventions, signed off  - Vascular surgery consulted by podiatry for blood blister of foot - no interventions, continue ASA/statin  - See media for picture (3/20)    Substance use  Assessment & Plan  Prior history of marijuana and cocaine use  History of medication noncompliance  Positive for THC in February, cocaine in January this year  UDS at [facility] negative, with repeat UDS in [facility] negative   CM consulted     S/P AVR (aortic valve replacement)  Assessment & Plan  Hx of rheumatic heart disease s/p mechanical AVR in the 1980s (on Coumadin 2 5mg qd)  Continue Coumadin 2 5mg qd, INR today 1 99 (goal INR 2-3)  INR ordered for 3/24    Coronary artery disease involving native coronary artery of native heart without angina pectoris  Assessment & Plan  H/o CAD s/p BMS-LAD and BMS-LCx (2014)  - On home Amiodarone 100mg daily for antiarrhythmic and Toprol-XL 75mg BID for AV-uvaldo block  - Also on ASA 81mg daily and Atorvastatin 80mg qHS  - 3/15 EKG: sinus tachy with V-pacing and LBBB  - Pt hypotensive overnight, please see Essential Hypertension  Plan:  - Decreased frequency of Toprolol 75 mg from BID to Daily, continue on discharge  - c/w Amiodarone to 200mg daily for antiarrhythmic per EP on discharge      Patient Active Problem List   Diagnosis    Colonic mass    Coronary artery disease involving native coronary artery of native heart without angina pectoris    S/P AVR (aortic valve replacement)    Biventricular ICD (implantable cardioverter-defibrillator) in place    Depressed mood    Elevated troponin    SIRS (systemic inflammatory response syndrome) [facility]    Substance use    Aortic stenosis    Ischemic cardiomyopathy    Drug abuse [facility]    Ventricular fibrillation ([facility] )    Hyponatremia    Mild protein-calorie malnutrition ([facility] )    Medically noncompliant    Acute on chronic combined systolic and diastolic congestive heart failure ([facility] )    Aortic ectasia, thoracic (HCC)    Chronic respiratory failure with hypoxia ([facility] )    Encounter for examination following treatment at hospital    Ventricular tachycardia ([facility] )    History of substance abuse ([facility] )    Eschar of toe    Hypertension    Hyperlipidemia         HPI (per admission H&P note on 3/17/22)     HPI:   "61 y o  male transfer from [facility] for EP eval of possible Atrial lead malfunction on ICD  Pmhx CHF with last ECHO 1/12/22 showed EF 20% with global hypokinesis, CAD s/p BMS-LAD and BMS-LCx (2014), rheumatic heart disease s/p mechanical AVR in the 1980s (on coumadin), HTN, HLD, prior VFib with Bi-vent AICD placement, chronic hypoxia on home 4L O2 at baseline, mild aortic aneurysm (4 1cm), colon cancer s/p resection (June 2016), cocaine/marijuana abuse, and medication noncompliance   Patient originally presented to [facility] ED on 3/15/22 with c/o worsening exertion dyspnea and at rest after AICD discharge x2 on 3/8/22 around 2pm which pt did not seek medical attention for at that time  Pt also reports intermittent non-radiating right-sided chest pain without diaphoresis  ED /81, , RR 26, SO2 91% on RA  Initial work-up showed anemia (Hgb 10 7), mild hyponatremia (Na 135), elevated troponin 54, and elevated BNP 34207  Otherwise CBC, CMP, COVID/Flu/RSV wnl  EKG showed STach with V-paced rhythm, age-indeterminate inferior infarct, and possible anteroseptal infarct  CXR showed cardiomegaly with bilateral pulmonary airspace disease and pleural effusions concerning for CHF/pulmonary edema superimposed upon more chronic pulmonary changes  Patient as admitted and cardiology was consulted  Per cardiology note, AICD interrogation shows evidence of x3 shocks for VT and ATP x3 which is congruent with patient's report, but noted that Atrial lead not appropriately functioning  Patient home Amiodarone was increased from 100mg daily to 200mg daily for his arrythmia with plan to transfer patient to [facility] for EP eval (case was discussed with Dr Sunitha George)  During his admission, pt was noted to have Left hallux eschar and podiatry was consulted with Bilateral duplex on 3/16 showed "Diffuse disease noted throughout the femoral-popliteal arteries without significant focal stenosis" of the RLE and "short segment occlusion versus high grade stenosis of the common femoral artery with diffuse disease noted throughout the femoral-popliteal arteries" of the LLE "      HOSPITAL COURSE:     Hospital Course:   61 y o  male transferred from [facility] on 3/17/2022 for EP evaluation due to VT on ICD 2/2 atrial lead malfunction  EP and cardiology were consulted, and the patient underwent successful venogram and lead extraction (3/21/22)  Post-op CXR and device interrogation showed proper placement with appropriate device function   Per EP, patient's amiodarone was increased from 100mg to 200mg daily with BMP/INR recheck for Thursday 3/24  Due to low systolic blood pressures in the 80s this admission, his antihypertensive regimen was adjusted and continued on discharge: Decreased lisinopril from 20mg to 10mg daily, decreased Toprol-XL 75mg from BID to daily  On chart review, the patient followed outpatient pulmonology and recently had a severe COVID infection back in 1/2022 requiring intubation and ICU management  He's had multiple admissions since then for chronic hypoxia  Patient was placed on respiratory protocol and scheduled Breo was added  On discharge, the patient was continued on Breo scheduled and Albuterol PRN, with recommendations for outpatient PFTs and pulmonology follow-up for new post-COVID wheezing  Podiatry and vascular surgery were consulted for chronic eschar of the left great toe and reported no need for interventions  PT/OT recommended discharge to home with Mary Mcdaniel  On 03/22/22, HD# 5, pt remains stable, using 3L NC O2 with no diffuse wheezing  No concerns or questions  COMPLICATIONS:     Complications: NONE       PROCEDURES:     Procedures Performed:   Orders Placed This Encounter   Procedures    Cardiac ep lab eps/ablations         SIGNIFICANT FINDINGS / ABNORMAL RESULTS:     Significant Findings/Abnormal Results with this admission:  Hyponatremia 130-134  Anemia Hgb 9-11    XR chest 2 views    Result Date: 3/16/2022  Narrative: CHEST INDICATION:   dyspnea  COMPARISON:  2/3/2022 EXAM PERFORMED/VIEWS:  XR CHEST PA & LATERAL FINDINGS:  There are median sternotomy wires indicating prior cardiac surgery  Heart shadow is enlarged but unchanged from prior exam   ICD transvenous pacemaker present  Diffuse pulmonary airspace disease identified  There are bilateral effusions larger on right than left  No obvious pneumothorax  These findings are superimposed upon more chronic pulmonary changes perhaps related to COVID fibrosis  Please refer to chest CT 1/28/2022    Osseous structures appear within normal limits for patient age  Impression: Enlargement of cardiac silhouette with bilateral pulmonary airspace disease and pleural effusions  Pattern most suggestive for CHF/pulmonary edema superimposed upon more chronic pulmonary changes  Workstation performed: [id number]     Cardiac ep lab eps/ablations    Result Date: 3/21/2022  Narrative: ELECTROPHYSIOLOGY OPERATIVE REPORT PATIENT NAME: Kale Stout :  1965 MRN: [id number] Date of surgery: 22 Surgeon: Karan Davidson MD Pt Location: Cath Lab PROCEDURE PERFORMED: 1) Successful addition of new atrial lead 2  Successful generator change due to low battery life NCDR ICD REGISTRY INFO Heart Failure: Yes NYHA Functional Classification: Class II  Ischemic Cardiomyopathy: Yes  Timeframe: 0 >= 3 Months  Guideline Directed Medical Therapy Maximum Dose - Yes (for 3 Months)  Non-Ischemic Cardiomyopathy: No  Ejection Fraction:20% Ventricular Tachycardia: No  Indications for Permanent Pacemaker: Yes  Class I or Class II Guideline Bradycardia Pacemaker Indication Present - Yes  Pacing Type: Atrial, Right Ventricular and Left Ventricular  Reason Pacing Indicated - Chronotrophic Incompetence  Ejection fraction < 35%  Anticipated Requirement of >40% RV Pacing - Yes  ICD Indication: Primary Prevention  A formal shared decision making encounter has occurred with the patient using an evidence-based decision tool on ICDs prior to initial ICD implantation: N/A Generator changes only- Patients clinical condition is unchanged and the LVEF will not be assessed: Yes Preoperative Medications: ANESTHESIA: MAC POSTOPERATIVE DIAGNOSIS: Successful Implantable Cardioverter Defibrillator implant  Same as Preop  Informed Consent: Risks, benefits, and alternatives discussed with patient and any family present   He understands risks, which include but are not limited to life threatening  bleeding, infection, air around lungs, blood around the heart and reoperation dislodged or malfunctioning device  Procedure Description: Patient written consent was obtained following full risk/benefit discussion  A time-out was conducted in the EP lab  Pre-procedure IV antibiotic was given  The patient was prepped and draped in sterile fashion  Local anesthetic was used and systemic sedation was managed by the anesthesia team   Peripheral venogram was performed to assess for any occlusion  As the venous system was found to be patent decision was made to proceed with adding new atrial lead  Using ultrasound and micropuncture needle and axillary venous access was obtained  A micro wire was advanced to the right atrium and micro sheath was placed over micro wire  Guidewire however could not be advanced beyond subclavian-SVC junction  Glidewire was used instead and was advanced to the right atrium and then down to the IVC  Incision was made and carried down to the generator which was freed from the pocket  Glidewire was pulled into the pocket but in the processed it was retracted to the subclavian vein  It could not be readvanced to the right atrium  Micro sheath was readvanced over the Glidewire and a micro wire was readvanced to the right atrium  Micro sheath was removed and 4 Fransisco Timur sheath was advanced over the micro wire  A run-through wire was advanced alongside the micro wire down to right atrium and then down to the IVC  A 4 Fransisco Timur JR sheath was advanced over the runthrough wire to the right atrium  Micro wire and run-through wires were removed and a Glidewire was advanced to right atrium and then down to the IVC  The 4 Cypriot sheath and TR sheath both were removed leaving the Glidewire in the heart  A long 7 Cypriot sheath was advanced over the Dom Schwab  In new right atrial lead (active fixation) was advanced to the right atrium and positioned in the right atrial appendage    A current of injury was seen during initial testing but injury went away after deploying helix  The lead was repositioned into lateral right atrium with sensing of 1 mV  After deploying helix sensing did not change significantly and threshold was 1 volt at 0 4 milliseconds  The long 7 Estonian sheath was split and the atrial lead was tied down to pectoralis muscle  The new atrial lead was connected to new generator along with existing RV and LV lead  The old atrial lead was capped  At this time it was noted that the sensing had decreased 0 3 -0 4 mV on the atrial lead however threshold remained stable  Given the difficulty in advancing sheaths across stenosis near the SVC-subclavian junction decision was made to keep atrial lead in place and maximize sensitivity  Also to achieve optimal Bi V pacing percentage lower rate limit was kept at 80 beats per minute  The generator and the leads were placed back in the pocket  The full system was imaged fluoroscopically  Antibiotic pouch was placed around the generator  The wound was closed in 3 layers with Vicryl suture  Aquaseal surgical adhesive was used to dress the closed incision  Final programmed parameters were tailored to the patient's specific pacing and VT/VF needs  EBL: Minimal Complications: None [id number] cc Findings:  Successful addition of new atrial lead  However atrial lead noted to have decreased sensing at the end of the case but threshold remains stable  There was significant stenosis at the SVC-subclavian junction approving it very difficult to advance wire and sheaths across the stenosis  As a result right atrial lead was not repositioned  The patient tolerated the procedure well  Plan: Routine postoperative care, CXR, and overnight observation with telemetry  Chest x-ray portable on 03/21/22 and PA & lateral x-ray on 03/22/22  Follow-up in 2 weeks with incision check and interrogation   Parameters:        VAS lower limb arterial duplex, complete bilateral    Result Date: 3/16/2022  Narrative:  [address] REPORT CLINICAL: Indications:  Left hallux eschar  Operative History: AVR Risk Factors The patient has history of CAD and CHF  Clinical Right Pressure:  117/ mm Hg, Left Pressure: IV  FINDINGS:  Segment                Right            Left                                          PSV (cm/s)  EDV  PSV (cm/s)  EDV  Common Femoral Artery         109   14          60   16  Prox Profunda                  46    7          33   16  Prox SFA                       75    0          26    9  Mid SFA                        68   11          26   12  Dist SFA                       67   13          25       Proximal Pop                   57    8          25   12  Distal Pop                     54    6          33    9  Tibioperoneal                  65               19       Dist Post Tibial               57   18          63   26  Dist  Ant  Tibial              16    4          29   11     CONCLUSION: Impression: RIGHT LOWER LIMB: Diffuse disease noted throughout the femoral-popliteal arteries without significant focal stenosis  Findings suggests tibioperoneal disease  Ankle/Brachial index: 2 17, supra normal, consistent with poorly compressible vessels  PVR/ PPG tracings are dampened  Metatarsal pressure of 53 mm Hg Great toe pressure of 36 mm Hg, above healing threshold for a non-diabetic  LEFT LOWER LIMB: Findings suggests a short segment occlusion versus high grade stenosis of the common femoral artery with diffuse disease noted throughout the femoral-popliteal arteries  Findings suggests tibioperoneal disease  Ankle/Brachial index: 2 17, supra normal, consistent with poorly compressible vessels  PVR tracings at the ankle are severely dampened  PVR tracings at the metatarsal level and PPG waveform of the great toe are near flat lined/ flat lined, therefore, metatarsal and great toe pressure are unobtainable suggesting poor distal perfusion    SIGNATURE: Electronically Signed Juan A Rico on 2022-03-16 06:44:32 PM        VITALS ON DISCHARGE DATE:     Vitals  Blood Pressure: 109/65 (03/22/22 1055)  Temperature: 97 8 °F (36 6 °C) (03/22/22 1055)  Temp Source: Oral (03/22/22 0248)  Pulse: 71 (03/22/22 0828)  Respirations: 18 (03/22/22 1055)  SpO2: 100 % (03/22/22 0853)  Height: 5' 6" (167 6 cm) (03/17/22 1634)  Weight - Scale: 60 kg (132 lb 4 4 oz) (03/22/22 0333)    Temp:  [97 8 °F (36 6 °C)-98 6 °F (37 °C)] 97 8 °F (36 6 °C)  HR:  [71-81] 71  Resp:  [18] 18  BP: (107-109)/(59-65) 109/65    Weight (last 2 days) before discharge     Date/Time Weight    03/22/22 0333 60 (132 28)    03/21/22 0500 60 1 (132 5)    03/20/22 0600 62 (136 69)            Intake/Output Summary (Last 24 hours) at 3/22/2022 1515  Last data filed at 3/22/2022 1248  Gross per 24 hour   Intake 1969 5 ml   Output 600 ml   Net 1369 5 ml       Invasive Devices  Report    None                   PHYSICAL EXAM ON DAY OF DISCHARGE:     Physical Exam  Vitals reviewed  Constitutional:       Appearance: He is well-developed  He is ill-appearing  HENT:      Head: Normocephalic and atraumatic  Eyes:      Extraocular Movements: Extraocular movements intact  Conjunctiva/sclera: Conjunctivae normal    Cardiovascular:      Rate and Rhythm: Normal rate and regular rhythm  Heart sounds: No murmur heard  Pulmonary:      Effort: No respiratory distress  Comments: On 3L NC O2 on discharge, no wheezing  Abdominal:      General: Bowel sounds are normal       Palpations: Abdomen is soft  Tenderness: There is no abdominal tenderness  Musculoskeletal:      Cervical back: Normal range of motion  Comments: Eschar of great left toe   Skin:     General: Skin is warm and dry  Neurological:      Mental Status: He is alert and oriented to person, place, and time  Psychiatric:         Mood and Affect: Mood normal              CONDITION AT DISCHARGE:   On day of discharge patient is seen and evaluated at bedside   Patient is stable and without concern  Patient denies any pain or SOB  Patient able to tolerate PO food without N/V/D and had bowel movement and baseline urine output  Patient able to ambulate independently without assistance  Patient is aware of current health status and understand plan of treatment and outpatient follow-up  The patient understood and agreed with the plan  All pertinent lab results, imaging studies, procedures, and/or any incidental findings have been disclosed to the patient  All pertinent questions are answered to patient's satisfaction  On day of discharge, the patient was hemodynamically stable and appropriate for discharge home  Condition at Discharge: fair       DISCHARGE MEDICATIONS:     Discharge Medications:  See after visit summary (AVS) for detailed reconciled discharge medications, which was provided to patient and family  Summary of medication changes made with this admission:    START:  Breo-Ellipta daily  Albuterol PRN    STOP:  None    CHANGE:  Amidarone 100 to 200mg qd  Toprol-XL 75mg BID to daily  Lisinopril 20mg to 10mg qd    RESUME:  All other home medications       FOLLOW-UP APPOINTMENTS / INSTRUCTION :     Important Physician Related Follow Up:    Follow-up with PCP in 1 week  Follow-up with heart failure 3/24 appt  Follow-up with pulmonology outpatient     Appointment confirmed:  Future Appointments   Date Time Provider Chio Carlisle   3/24/2022 10:40 AM DO ANDREAS Pena [facility]   3/29/2022 12:00 PM AL CT 1 AL CT [facility]   3/29/2022 12:30 PM AL PULM ROOM 01 AL Pul [address]   4/5/2022  1:40 PM Adiel Okeefe MD GASTRO [facility]   4/8/2022 11:30 AM DEVICE IN PERSON [place] [facility]   4/13/2022  9:40 [facility]   5/31/2022 10:00 AM 31 Janel Zepeda DIETITIAN 31 Janel Zepeda OP [address]   9/6/2022  1:45 PM NICOLE Coello [address] AL SIG [address]       Discharge instructions/Information to patient and family:   See after visit summary (AVS) for information provided to patient and family  Provisions for Follow-Up Care:  See after visit summary for information related to follow-up care and any pertinent home health orders  DISPOSITION:     Disposition: Home with Mary Mcdaniel    Discharge Statement   I spent 30 minutes discharging the patient  This time was spent on the day of discharge  I had direct contact with the patient on the day of discharge  Additional documentation is required if more than 30 minutes were spent on discharge  Planned Readmission: No    The senior resident, Dr Dariana Monroy, and the attending physician, No att  providers found, agrees with the assessment and plan  Elke Buchanan MD  PGY-1, Family Medicine  03/22/22  3:15 PM    Dear reader, please be aware that portions of my note contain dictated text  I have done my best to proof-read this note prior to signing  However, there may be occasional unnoticed errors pertaining to "sound-alike" words and/or grammar during my dictation process  If there is any words or information that is unclear or appears erroneous, please kindly let me know and I will clarify and/or addend my notes accordingly  Thank you for your understanding

## 2022-03-22 NOTE — PROGRESS NOTES
Progress Note - Electrophysiology  Elizabeth South 61 y o  male MRN: [id number]  Unit/Bed#: [facility] 530-01 Encounter: [id number]      Assessment:  1  [employer] biventricular ICD in situ with atrial lead revision  2  Ventricular tachycardia  3  Chronic systolic and diastolic congestive heart failure  4  Essential hypertension   5  Hyperlipidemia  6  History of CAD with prior stents       Plan:  1  Device - Patient is doing well status post atrial lead revision of his Bi V ICD  His incision is clean, dry, and intact without evidence of hematoma or ecchymosis or signs of infection  Vital signs and labs were reviewed  Assessment of chest x-rays show proper lead placement without evidence of pneumothorax  Device interrogation showed appropriate device function with lead parameters such as sensing, threshold, and impedance being tested  2  Post care - Discharge instructions and restrictions were discussed with the patient in detail  He will also be provided with written instructions detailing what was discussed  Patient also requires a 2 week device and site check which has already been arranged and is in Epic  3  Medications - In terms of medications, given his ventricular tachycardia, he was asked to increase his amiodarone to 200 mg daily in to recheck his INR on Thursday  He had been placed back on his diuretic regimen of Lasix 20 mg twice daily as he had been prior to admission  He is on guideline directed medical therapy of metoprolol succinate and lisinopril  4  Patient is stable from an EP standpoint for discharge at this time  Subjective/Objective   Subjective: Aditi Fuchs is a 61y o  year old male who is status post atrial lead revision of his biventricular device  He reports no issues overnight, denies chest pain, shortness of breath, palpitations, lightheadedness or dizziness       Objective:  Vitals: /65   Pulse 71   Temp 97 8 °F (36 6 °C)   Resp 18   Ht 5' 6" (1 676 m)   Wt 60 kg (132 lb 4 4 oz)   SpO2 100%   BMI 21 35 kg/m²     Vitals:    03/21/22 0500 03/22/22 0333   Weight: 60 1 kg (132 lb 7 9 oz) 60 kg (132 lb 4 4 oz)     Orthostatic Blood Pressures      Most Recent Value   Blood Pressure 109/65 filed at 03/22/2022 1055   Patient Position - Orthostatic VS Lying filed at 03/22/2022 0248            Intake/Output Summary (Last 24 hours) at 3/22/2022 1330  Last data filed at 3/22/2022 1248  Gross per 24 hour   Intake 1969 5 ml   Output 600 ml   Net 1369 5 ml       Invasive Devices  Report    Peripherally Inserted Central Catheter Line            PICC Line 03/18/22 Right Brachial 4 days          Peripheral Intravenous Line            Peripheral IV 03/21/22 Left Antecubital <1 day                          Scheduled Meds:  Current Facility-Administered Medications   Medication Dose Route Frequency Provider Last Rate    acetaminophen  650 mg Oral Q4H PRN Oziel Chua MD      albuterol  2 5 mg Nebulization Q4H PRN Yoan Short MD      aluminum-magnesium hydroxide-simethicone  30 mL Oral Q6H PRN Oziel Chua MD      amiodarone  200 mg Oral Daily With Breakfast Oziel Chua MD      aspirin  81 mg Oral Daily Oziel Chua MD      atorvastatin  80 mg Oral Daily Oziel Chua MD      eplerenone  25 mg Oral Daily Es Yu MD      fluticasone-vilanterol  1 puff Inhalation Daily Judy Jones MD      furosemide  20 mg Oral BID (diuretic) Judy Jones MD      lisinopril  10 mg Oral Daily Judy Jones MD      melatonin  3 mg Oral HS PRN Sherryle Benton, MD      metoprolol succinate  75 mg Oral Daily Joselin Galarza DO      ondansetron  4 mg Intravenous Q6H PRN Oziel Chua MD      vancomycin  15 mg/kg Intravenous Once Alexandru Cabrera PA-C      warfarin  2 5 mg Oral Daily (warfarin) Judy Jones MD       Continuous Infusions:   PRN Meds:   acetaminophen    albuterol    aluminum-magnesium hydroxide-simethicone    melatonin    ondansetron    Review of Systems:  ROS as noted above, otherwise 12 point review of systems was performed and is negative  Physical Exam:   GEN: NAD, alert and oriented, well appearing  SKIN: dry without significant lesions or rashes  HEENT: NCAT, PERRL, EOMs intact  NECK: No JVD or carotid bruits appreciated  CARDIOVASCULAR: RRR, normal S1, S2 without murmurs, rubs, or gallops appreciated  LUNGS: Clear to auscultation bilaterally without wheezes, rhonchi, or rales  ABDOMEN: Soft, nontender, nondistended  EXTREMITIES/VASCULAR: perfused without clubbing, cyanosis, or edema b/l  PSYCH: Normal mood and affect  NEURO: CN ll-Xll grossly intact              Lab Results: I have personally reviewed pertinent lab results  Results from last 7 days   Lab Units 03/22/22  0536 03/21/22  0537 03/20/22  0534   WBC Thousand/uL 10 89* 11 00* 12 09*   HEMOGLOBIN g/dL 9 1* 9 4* 9 6*   HEMATOCRIT % 30 0* 30 9* 30 0*   PLATELETS Thousands/uL 233 261 281     Results from last 7 days   Lab Units 03/22/22  0536 03/21/22  0537 03/20/22  0534   POTASSIUM mmol/L 3 6 3 9 3 3*   CHLORIDE mmol/L 96* 94* 94*   CO2 mmol/L 31 33* 36*   BUN mg/dL 15 13 13   CREATININE mg/dL 0 97 0 86 0 82   CALCIUM mg/dL 8 3 8 5 8 2*     Results from last 7 days   Lab Units 03/22/22  0536 03/21/22  0537 03/20/22  0534 03/19/22  2329 03/19/22  1614 03/19/22  0626   INR  1 99* 1 95* 2 05*  --   --    < >   PTT seconds  --   --  74* 76* 53*  --     < > = values in this interval not displayed  Results from last 7 days   Lab Units 03/18/22  1115 03/16/22  0434   MAGNESIUM mg/dL 1 8 1 7       Imaging: I have personally reviewed pertinent reports      Results for orders placed during the hospital encounter of 03/18/21    Echo complete with contrast if indicated    Narrative  [facility] [address]    Transthoracic Echocardiogram  2D, M-mode, Doppler, and Color Doppler    Study date:  18-Mar-2021    Patient: Beth Ernandez  MR number: [id number]  Account number: [de-identified]  : 1965  Age: 63 years  Gender: Male  Status: Outpatient  Location: [facility]  Height: 66 in  Weight: 148 7 lb  BP: 112/ 58 mmHg    Indications: Ischemic Cardiomyopathy    Diagnoses: I25 5 - Ischemic cardiomyopathy    Sonographer:  Charles Seip  Interpreting Physician:  Dominique Hardwick DO  Primary Physician:  Colin Falk DO  Group:  [facility]    SUMMARY    SUMMARY:  1  This is a technically adequate study  2  Left ventricle severely dilated with severely reduced systolic function  Left ventricular ejection fraction is estimated at 18%  3  Grade 2 diastolic dysfunction with echocardiographic indications of elevated left atrial pressures  4  Regional wall motion abnormalities consistent with coronary artery disease  5  Right ventricle is mildly dilated with mildly reduced systolic function  Probable pacemaker wire present  6  Severe biatrial dilatation  7  Mechanical valve is noted within the aortic position  Valve is well seated  Trace central aortic regurgitation  Normal velocities and gradients for valve with Vmax 1 85 m/s, EOA 1 38 cm2, DVI 0 44, acceleration time 66 ms  8  Mild posteriorly directed mitral regurgitation with fibrocalcific changes of the mitral valve and mild mitral annular calcification  9  Moderate tricuspid regurgitation with pulmonary artery systolic pressure is estimated at 60-65 mm Hg  10  Compared to report from 859, grade 3 diastolic dysfunction is noted with elevated filling pressures and pulmonary hypertension    HISTORY: PRIOR HISTORY: CAD, AVR, Ischemic cardiomyopathy, HTN, HLD    PROCEDURE: The study was performed in the [facility]   This was a routine study  The transthoracic approach was used  The study included complete 2D imaging, M-mode, complete spectral Doppler, and color Doppler  The heart rate was 60  bpm, at the start of the study  Image quality was adequate      LEFT VENTRICLE: Left is severely dilated with severely reduced systolic function  Left ventricular ejection fraction is estimated at 18%  Normal wall thickness  Grade 3 diastolic dysfunction with echocardiographic indications of elevated  left atrial pressures  Increased trabeculations noted at the left ventricular apex  There is akinesis of the anterior and anterolateral wall from base to apex with dyskinetic apical wall and hypokinesis of the remaining walls  RIGHT VENTRICLE: Right ventricle is mildly dilated with mildly reduced systolic function  Linear echodensity noted within the right ventricle consistent with pacemaker/catheter    LEFT ATRIUM: Left atrium is severely dilated  ATRIAL SEPTUM: Interatrial septum is bowing toward the right atrium consistent with elevated left atrial pressures  RIGHT ATRIUM: Right atrium is severely dilated  MITRAL VALVE: Mitral valve opens well  Fibrocalcific changes of the mitral valve  No evidence of mitral stenosis  Mild posteriorly directed mitral regurgitation  AORTIC VALVE: There is a mechanical valve in the aortic position  The valve appears well seated  Trace central aortic regurgitation  Velocities and gradients are normal for valve  Vmax 1 85 m/s, mean PG 6 9 mmHg, EOA/ASHLEY 1 38 cm2, DVI  0 44, Acceleration time 66 ms, LVOTd 2cm, LVOT VTI 12 3, AV VTI 28 cm  TRICUSPID VALVE: Tricuspid valve opens well  Moderate tricuspid regurgitation with pulmonary artery systolic pressure is estimated at 60-65 mm Hg consistent with severe pulmonary hypertension  PULMONIC VALVE: Pulmonic valve not well visualized  No evidence of pulmonic stenosis or pulmonic regurgitation  PERICARDIUM: There is no evidence of significant pericardial effusion  AORTA: Aortic root is top-normal in size at 3 4 cm    SYSTEMIC VEINS: IVC: IVC is dilated with greater than 50% respirophasic collapse      SYSTEM MEASUREMENT TABLES    2D  %FS: 8 64 %  Ao Diam: 3 38 cm  EDV(Teich): 235 37 ml  EF(Teich): 18 56 %  ESV(Teich): 191 68 ml  IVSd: 0 97 cm  LA Area: 29 2 cm2  LA Diam: 4 78 cm  LVEDV MOD A4C: 260 34 ml  LVEF MOD A4C: 20 96 %  LVESV MOD A4C: 205 77 ml  LVIDd: 6 75 cm  LVIDs: 6 17 cm  LVLd A4C: 9 93 cm  LVLs A4C: 9 59 cm  LVOT Diam: 1 98 cm  LVPWd: 1 01 cm  RA Area: 23 cm2  RVIDd: 4 cm  SV MOD A4C: 54 57 ml  SV(Teich): 43 69 ml    CW  AV Env  Ti: 235 97 ms  AV VTI: 28 01 cm  AV Vmax: 1 85 m/s  AV Vmean: 1 19 m/s  AV maxP 71 mmHg  AV meanP 86 mmHg  TR Vmax: 3 67 m/s  TR maxP 96 mmHg    MM  TAPSE: 1 36 cm    PW  ASHLEY (VTI): 2 28 cm2  ASHLEY Vmax: 1 59 cm2  AVAI (VTI): 0 cm2/m2  AVAI Vmax: 0 cm2/m2  E' Sept: 0 06 m/s  E/E' Sept: 15 17  LVOT Env  Ti: 308 28 ms  LVOT VTI: 20 81 cm  LVOT Vmax: 0 96 m/s  LVOT Vmean: 0 67 m/s  LVOT maxPG: 3 7 mmHg  LVOT meanP 23 mmHg  LVSI Dopp: 36 28 ml/m2  LVSV Dopp: 63 86 ml  MV A [place]: 0 35 m/s  MV Dec [place]: 3 47 m/s2  MV DecT: 254 1 ms  MV E [place]evan: 0 88 m/s  MV E/A Ratio: 2 51  MV PHT: 73 69 ms  MVA By PHT: 2 99 cm2    [facility]    Prepared and electronically signed by    Tiffanie Goldman DO  Signed 18-Mar-2021 18:50:34      VTE Pharmacologic Prophylaxis: Sequential compression device (Venodyne)   VTE Mechanical Prophylaxis: sequential compression device

## 2022-03-22 NOTE — PLAN OF CARE
Problem: MOBILITY - ADULT  Goal: Maintain or return to baseline ADL function  Description: INTERVENTIONS:  -  Assess patient's ability to carry out ADLs; assess patient's baseline for ADL function and identify physical deficits which impact ability to perform ADLs (bathing, care of mouth/teeth, toileting, grooming, dressing, etc )  - Assess/evaluate cause of self-care deficits   - Assess range of motion  - Assess patient's mobility; develop plan if impaired  - Assess patient's need for assistive devices and provide as appropriate  - Encourage maximum independence but intervene and supervise when necessary  - Involve family in performance of ADLs  - Assess for home care needs following discharge   - Consider OT consult to assist with ADL evaluation and planning for discharge  - Provide patient education as appropriate  Outcome: Adequate for Discharge  Goal: Maintains/Returns to pre admission functional level  Description: INTERVENTIONS:  - Perform BMAT or MOVE assessment daily    - Set and communicate daily mobility goal to care team and patient/family/caregiver  - Collaborate with rehabilitation services on mobility goals if consulted  - Perform Range of Motion times a day  - Reposition patient every  hours  - Dangle patient times a day  - Stand patient times a day  - Ambulate patient  times a day  - Out of bed to chair  times a day   - Out of bed for meals  times a day  - Out of bed for toileting  - Record patient progress and toleration of activity level   Outcome: Adequate for Discharge     Problem: Knowledge Deficit  Goal: Patient/family/caregiver demonstrates understanding of disease process, treatment plan, medications, and discharge instructions  Description: Complete learning assessment and assess knowledge base    Interventions:  - Provide teaching at level of understanding  - Provide teaching via preferred learning methods  3/22/2022 1438 by Faheem Hidalgo RN  Outcome: Adequate for Discharge  3/22/2022 1102 by Adriel Jc RN  Outcome: Progressing     Problem: Potential for Falls  Goal: Patient will remain free of falls  Description: INTERVENTIONS:  - Educate patient/family on patient safety including physical limitations  - Instruct patient to call for assistance with activity   - Consult OT/PT to assist with strengthening/mobility   - Keep Call bell within reach  - Keep bed low and locked with side rails adjusted as appropriate  - Keep care items and personal belongings within reach  - Initiate and maintain comfort rounds  - Make Fall Risk Sign visible to staff  - Offer Toileting every  Hours, in advance of need  - Initiate/Maintain alarm  - Obtain necessary fall risk management equipment:   - Apply yellow socks and bracelet for high fall risk patients  - Consider moving patient to room near nurses station  Outcome: Adequate for Discharge     Problem: CARDIOVASCULAR - ADULT  Goal: Maintains optimal cardiac output and hemodynamic stability  Description: INTERVENTIONS:  - Monitor I/O, vital signs and rhythm  - Monitor for S/S and trends of decreased cardiac output  - Administer and titrate ordered vasoactive medications to optimize hemodynamic stability  - Assess quality of pulses, skin color and temperature  - Assess for signs of decreased coronary artery perfusion  - Instruct patient to report change in severity of symptoms  Outcome: Adequate for Discharge  Goal: Absence of cardiac dysrhythmias or at baseline rhythm  Description: INTERVENTIONS:  - Continuous cardiac monitoring, vital signs, obtain 12 lead EKG if ordered  - Administer antiarrhythmic and heart rate control medications as ordered  - Monitor electrolytes and administer replacement therapy as ordered  Outcome: Adequate for Discharge     Problem: SKIN/TISSUE INTEGRITY - ADULT  Goal: Skin Integrity remains intact(Skin Breakdown Prevention)  Description: Assess:  -Perform Arben assessment every   -Clean and moisturize skin every   -Inspect skin when repositioning, toileting, and assisting with ADLS  -Assess under medical devices such as  every   -Assess extremities for adequate circulation and sensation     Bed Management:  -Have minimal linens on bed & keep smooth, unwrinkled  -Change linens as needed when moist or perspiring  -Avoid sitting or lying in one position for more than  hours while in bed  -Keep HOB at degrees     Toileting:  -Offer bedside commode  -Assess for incontinence every   -Use incontinent care products after each incontinent episode such as     Activity:  -Mobilize patient times a day  -Encourage activity and walks on unit  -Encourage or provide ROM exercises   -Turn and reposition patient every Hours  -Use appropriate equipment to lift or move patient in bed  -Instruct/ Assist with weight shifting every when out of bed in chair  -Consider limitation of chair time  hour intervals    Skin Care:  -Avoid use of baby powder, tape, friction and shearing, hot water or constrictive clothing  -Relieve pressure over bony prominences using   -Do not massage red bony areas    Next Steps:  -Teach patient strategies to minimize risks such as   -Consider consults to  interdisciplinary teams such as   Outcome: Adequate for Discharge  Goal: Incision(s), wounds(s) or drain site(s) healing without S/S of infection  Description: INTERVENTIONS  - Assess and document dressing, incision, wound bed, drain sites and surrounding tissue  - Provide patient and family education  - Perform skin care/dressing changes every   Outcome: Adequate for Discharge  Goal: Pressure injury heals and does not worsen  Description: Interventions:  - Implement low air loss mattress or specialty surface (Criteria met)  - Apply silicone foam dressing  - Instruct/assist with weight shifting every minutes when in chair   - Limit chair time to  hour intervals  - Use special pressure reducing interventions such as when in chair   - Apply fecal or urinary incontinence containment device   - Perform passive or active ROM every   - Turn and reposition patient & offload bony prominences every hours   - Utilize friction reducing device or surface for transfers   - Consider consults to  interdisciplinary teams such as   - Use incontinent care products after each incontinent episode such as   - Consider nutrition services referral as needed  Outcome: Adequate for Discharge

## 2022-03-22 NOTE — CONSULTS
Consultation - Vascular Surgery   [facility] A Anastasiyaa 61 y o  male MRN: [id number]  Unit/Bed#: [facility] 530-01 Encounter: [id number]    Assessment/Plan     Assessment:  60 yo male with currently w/ ICD found to have atrial lead malfunction incidentally found to have left hallux eschar    LEADs  RLE: diffuse dz w/o focal stensosis 2 17/53/36  LLE: short segment occlusion vs high grade stenosis of L CFA, diffuse dz 2 17/-/-    Palp fems b/l, dop dp/pts b/l  Motor/sensory intact    Plan:  -Podiatry with no plan for surgical intervention  -No acute vascular intervention, outpatient follow up for potential agram   -Continue asa/statin  -Local wound care per podiatry  -Remainder of care per primary    History of Present Illness     HPI:  Tomy Mina is a 61 y o  male who presented as a transfer from [facility] for EP eval secondary to atrial lead malfunction on ICD  His PMHx is significant for chronic diastolic CHF, AICD, CAD, HLD, HTN, rheumatic heart dz s/p mechanical AVR (on coumadin), chronic hypoxia on 4L NC, colon cancer s/p resection substance abuse, and medication non compliance  Incidentally during admission patient was found to have stable dry eschar for the left great toe  Patient reports that about 2 months ago the left toe developed a blister which he popped with a needle  He states that it formed black scab at the distal end of his toe  It has not progressed, but has not really changed much in the past month  He endorses pain at the area  Pain is better with ambulation  He denies any history of claudication, no rest pain  Denies smoking cigarettes, but history of smoking marijuana and cocaine use  Inpatient consult to Vascular Surgery  Consult performed by: Chandrika Mandel DO  Consult ordered by: Leobardo Gerardo DPM          Review of Systems   Skin: Positive for wound  All other systems reviewed and are negative        Historical Information   Past Medical History:   Diagnosis Date    AICD (automatic cardioverter/defibrillator) present     medtronic     CAD (coronary artery disease)     Cancer ([facility] )     Cardiac disease     [facility]     mixed  predominently nonischemic    Colonic mass     Coronary artery disease     History of ventricular fibrillation 9/2/2021    History of ventricular tachycardia 5/25/2021    Hyperlipidemia     Hypertension     Irregular heart beat     Myocardial infarction ([facility] )     2014 and 2015    Pneumonia 1/26/2022    Rectal bleeding     S/P AVR (aortic valve replacement)     mechanical    Wears glasses      Past Surgical History:   Procedure Laterality Date    AORTIC VALVE REPLACEMENT      APPENDECTOMY      CARDIAC DEFIBRILLATOR PLACEMENT      CARDIAC ELECTROPHYSIOLOGY PROCEDURE N/A 3/21/2022    Procedure: Cardiac lead revision;  Surgeon: Sri Manley MD;  Location: [facility]; Service: Cardiology    CARDIAC ELECTROPHYSIOLOGY PROCEDURE N/A 3/21/2022    Procedure: Cardiac biv icd generator change;  Surgeon: Sri Manley MD;  Location: [facility]; Service: Cardiology    COLECTOMY MADELIN Right     Last Assessed: 6/7/2016    COLECTOMY LAPAROSCOPIC      COLON SURGERY Right     colectomy    CORONARY STENT PLACEMENT      INSERT / REPLACE / REMOVE PACEMAKER      MITRAL VALVE REPAIR      MITRAL VALVE REPAIR      MOUTH SURGERY      MA COLONOSCOPY FLX DX W/COLLJ SPEC WHEN PFRMD N/A 1/15/2018    Procedure: COLONOSCOPY;  Surgeon: Tea Lackey MD;  Location: [facility];   Service: General    MA LAP,SURG,COLECTOMY, PARTIAL, W/ANAST N/A 6/2/2016    Procedure: RESECTION COLON RIGHT LAPAROSCOPIC HAND-ASSISTED;  Surgeon: Tea Lackey MD;  Location: [facility];  Service: General     Social History   Social History     Substance and Sexual Activity   Alcohol Use Not Currently     Social History     Substance and Sexual Activity   Drug Use Not Currently    Types: Marijuana     E-Cigarette/Vaping    E-Cigarette Use Never User      E-Cigarette/Vaping Substances    Nicotine No     THC No     CBD No     Flavoring No      Social History     Tobacco Use   Smoking Status Never Smoker   Smokeless Tobacco Never Used     Family History:   Family History   Problem Relation Age of Onset    Diabetes Mother     Hypertension Mother     Valvular heart disease Mother     Valvular heart disease Father     Alcohol abuse Father        Meds/Allergies   all current active meds have been reviewed  No Known Allergies    Objective   First Vitals:   Blood Pressure: 150/76 (03/17/22 1706)  Pulse: 80 (03/17/22 1706)  Temperature: 97 5 °F (36 4 °C) (03/17/22 1706)  Temp Source: Oral (03/17/22 1706)  Respirations: 18 (03/17/22 1706)  Height: 5' 6" (167 6 cm) (03/17/22 1634)  Weight - Scale: 60 6 kg (133 lb 9 6 oz) (03/17/22 1634)  SpO2: 97 % (03/17/22 1634)    Current Vitals:   Blood Pressure: 109/65 (03/22/22 1055)  Pulse: 71 (03/22/22 0828)  Temperature: 97 8 °F (36 6 °C) (03/22/22 1055)  Temp Source: Oral (03/22/22 0248)  Respirations: 18 (03/22/22 1055)  Height: 5' 6" (167 6 cm) (03/17/22 1634)  Weight - Scale: 60 kg (132 lb 4 4 oz) (03/22/22 0333)  SpO2: 100 % (03/22/22 0853)      Intake/Output Summary (Last 24 hours) at 3/22/2022 1215  Last data filed at 3/22/2022 0843  Gross per 24 hour   Intake 1789 5 ml   Output 600 ml   Net 1189 5 ml       Invasive Devices  Report    Peripherally Inserted Central Catheter Line            PICC Line 03/18/22 Right Brachial 4 days          Peripheral Intravenous Line            Peripheral IV 03/21/22 Left Antecubital <1 day                Physical Exam  General: NAD  HENT: NCAT MMM  Neck: supple, no JVD  CV: nl rate  Lungs: nl wob  No resp distress  On 3L NC  ABD: Soft, nontender, nondistended  Extrem: Stable dry eschar on the great left toe  No erythema, drainage, or purulence  Neuro: AAOx3    Vascular: motor/sensory intact  Palpable fems b/l   Dopp dp/pt b/l    Lab Results:   I have personally reviewed pertinent lab results    , CBC:   Lab Results   Component Value Date    WBC 10 89 (H) 03/22/2022    HGB 9 1 (L) 03/22/2022    HCT 30 0 (L) 03/22/2022    MCV 85 03/22/2022     03/22/2022    MCH 25 7 (L) 03/22/2022    MCHC 30 3 (L) 03/22/2022    RDW 19 2 (H) 03/22/2022    MPV 9 2 03/22/2022   , CMP:   Lab Results   Component Value Date    SODIUM 131 (L) 03/22/2022    K 3 6 03/22/2022    CL 96 (L) 03/22/2022    CO2 31 03/22/2022    BUN 15 03/22/2022    CREATININE 0 97 03/22/2022    CALCIUM 8 3 03/22/2022    EGFR 86 03/22/2022   , Coagulation:   Lab Results   Component Value Date    INR 1 99 (H) 03/22/2022     Imaging: I have personally reviewed pertinent films in PACS  EKG, Pathology, and Other Studies: I have personally reviewed pertinent films in PACS

## 2022-03-22 NOTE — PROGRESS NOTES
PROGRESS NOTE - Family Medicine Residency [facility] 1965, 61 y o  male  MRN: [id number]    Unit/Bed#: [facility] 530-01 Encounter: [id number]  Primary Care Provider: Marybelle Lennox, CRNP      Admission Date: 3/17/2022  Length of Stay: 5 days  Code Status:  Level 1 - Full Code  Diet: Diet Cardiovascular; Sodium 2 GM; Fluid Restriction 1800 ML  Consult:   IP CONSULT TO NUTRITION SERVICES  IP CONSULT TO ELECTROPHYSIOLOGY  IP CONSULT TO PODIATRY  IP CONSULT TO CASE MANAGEMENT  IP CONSULT TO PICC TEAM  IP CONSULT TO CARDIOLOGY  IP CONSULT TO VASCULAR SURGERY    Assessment/Plan   Assessments & Plans:     Discussed with [facility] team and finalization is pending attending physician attestation      * Malfunction of implantable defibrillator ventricular (ICD) lead  Assessment & Plan  Pt transferred from [facility] for EP eval of possible Atrial lead malfunction on ICD  - Pt c/o worsening exertion dyspnea and at rest after AICD discharge x2 on 3/8/22 around 2pm which pt did not seek medical attention for at that time  - H/o VFib with Bi-vent AICD placement on home Amiodarone 100mg daily and Toprol-XL 75mg BID  - AICD interrogation per cardiology in [facility] shows evidence of x3 shocks for VT and ATP x3 which is congruent with patient's report, but noted that Atrial lead not appropriately functioning, which home Amiodarone was increased from 100mg daily to 200mg daily  - Case was discussed by cardiology with Dr Yodit Bruno ([facility] EP)    Plan:  - S/p lead extraction and venogram (3/21/22) with EP  - C/w Amiodarone 200mg daily for antiarrhythmic  - Changed Toprol-XL 75mg to daily for AV-uvaldo block  - EP and cardiology consulted  - Pending discharge per EP   - Coumadin 2 5mg qd, INR today 1 99 (Goal INR 2-3)    Acute on chronic combined systolic and diastolic congestive heart failure ([address] )  Assessment & Plan  Acute CHF exacerbation likely triggered by VTach event  - On home Eplerenone 20mg daily and Lasix 20mg BID  - Last ECHO on 1/12/22 showed LVEF 20% with global hypokinesis, indeterminate diastolic dysfunction, moderate biatrial enlargement  - 3/15 BNP 00554 (improved from last BNP 71715 on 1/28/22)  - 3/15 CXR: cardiomegaly with bilateral pulmonary airspace disease and pleural effusions concerning for CHF/pulmonary edema superimposed upon more chronic pulmonary changes    Plan:  - Restarted home Epleronone, Lasix, Lisinopril today  - Does not appear fluid overloaded on exam  - Fluid restricted diet  - Daily weight, strict I&Os    Intake/Output Summary (Last 24 hours) at 3/22/2022 1152  Last data filed at 3/22/2022 0843  Gross per 24 hour   Intake 1789 5 ml   Output 600 ml   Net 1189 5 ml       Chronic respiratory failure with hypoxia (HCC)  Assessment & Plan  - Last SpO2: 100 % (03/20/22 0245) on Nasal Cannula O2 Flow Rate (L/min): 4 L/min     On chart review, patient was recently hospitalized back in 1/2022 for both COVID and bacterial PNA, requiring intubation and ICU management  Has been hospitalized twice afterwards 2/2 hypoxia and volume overload (from HF with severe pulmonary HTN)  Previously on 2L before hospitalization > 4L NC O2 is his current baseline  Follows with pulmonology with last visit in 2/15/2022 - concern for development of secondary fibrosis as well as bronchiectasis given continued hypoxia  Planned for Chest CT and PFTs in 4 weeks from visit, which have not been completed yet  Recommended that patient trial Breo daily and to use 6L NC for increased activity  May benefit from pulmonary rehab in the future  Plan:  - Titrate O2 NC as needed for goal SO2 > 90%  - Respiratory protocol  - Continue [address] Breo with albuterol PRN  - Will need PFTs and pulmonary referral outpatient for new post-COVID wheezing       Hyperlipidemia  Assessment & Plan  Continue with home Lipitor 80mg qd    Hypertension  Assessment & Plan  H/o HTN on home Lisinopril 10mg qHS  - Last Blood Pressure: 109/65 (39/20/05 4029)   - Systolic (25WQN), JOB:182 , Min:106 , Max:109   - Pt hypotensive overnight, 3/19-3/20   On repeat 3/20 at 9am:117/66 HR 80    Plan:  - Decreased lisinopril from 20 mg to 10 mg daily given soft pressures this past weekend  - Restarted home epleronone, Lasix, Lisinopril today   - Metoprolol succinate 75 mg daily  - Vitals per unit routine    Eschar of toe  Assessment & Plan  Pt noted to have Left hallux eschar during admission at [facility]  - Previously evaluated by podiatry in [facility], "no intervention necessary to left hallux eschar as it is superficial and well-adhered"  - 3/16 LEADS: "Diffuse disease noted throughout the femoral-popliteal arteries without significant focal stenosis" of the RLE and "short segment occlusion versus high grade stenosis of the common femoral artery with diffuse disease noted throughout the femoral-popliteal arteries" of the LLE    Plan:  - Podiatry consulted - no interventions, signed off  - Vascular surgery consulted by podiatry for blood blister of foot - pending recs  - Wound care consulted  - See media for picture (3/20)    Substance use  Assessment & Plan  Prior history of marijuana and cocaine use  History of medication noncompliance  Positive for THC in February, cocaine in January this year  UDS at [facility] negative  Follow-up repeat UDS this admission  CM consulted     S/P AVR (aortic valve replacement)  Assessment & Plan  Hx of rheumatic heart disease s/p mechanical AVR in the 1980s (on Coumadin 2 5mg qd)  Continue Coumadin 2 5mg qd, INR today 1 99 (goal INR 2-3)  Will check daily INR    Coronary artery disease involving native coronary artery of native heart without angina pectoris  Assessment & Plan  H/o CAD s/p BMS-LAD and BMS-LCx (2014)  - On home Amiodarone 100mg daily for antiarrhythmic and Toprol-XL 75mg BID for AV-uvaldo block  - Also on ASA 81mg daily and Atorvastatin 80mg qHS  - 3/15 EKG: sinus tachy with V-pacing and LBBB  - Pt hypotensive overnight, please see Essential Hypertension  Plan:  - Decreased frequency of Toprolol 75 mg from BID to Daily  - c/w Amiodarone to 200mg daily for antiarrhythmic (decrease to 100mg qd on discharge per EP)      Principal Problem:    Malfunction of implantable defibrillator ventricular (ICD) lead  Active Problems:    Acute on chronic combined systolic and diastolic congestive heart failure (HCC)    Chronic respiratory failure with hypoxia (HCC)    Coronary artery disease involving native coronary artery of native heart without angina pectoris    S/P AVR (aortic valve replacement)    Substance use    Eschar of toe    Hypertension    Hyperlipidemia      VTE Pharm PPX: Reason for no pharmacologic prophylaxis on home Coumadin  VTE Mech PPX: sequential compression device and/or foot pump applied unless otherwise contraindicated       Hospital Course & 24hr events:     Hospital course:  61 y o  male admitted 3/17/2022, now HD# 5, for EP evaluation  Overnight/24hr events:  Patient without acute events overnight per sign-out  No concern or report per nursing staff  Patient seen and examined at bedside - patient would like to go home today  Subjective   Subjective & Review of Systems:     Review of Systems   Constitutional: Negative for chills and fever  HENT: Negative for ear pain and sore throat  Eyes: Negative for pain and visual disturbance  Respiratory: Negative for cough, chest tightness and shortness of breath  Cardiovascular: Negative for chest pain and palpitations  Gastrointestinal: Negative for abdominal pain and vomiting  Genitourinary: Negative for dysuria and hematuria  Musculoskeletal: Negative for arthralgias and back pain  Skin: Negative for color change and rash  Neurological: Negative for seizures and syncope  All other systems reviewed and are negative        Review of systems was reviewed and negative unless stated above in HPI/24-hour events           Objective Objective:     Vitals:    Vitals:    03/22/22 0333 03/22/22 0828 03/22/22 0853 03/22/22 1055   BP:  108/64  109/65   BP Location:       Pulse:  71     Resp:    18   Temp:  98 6 °F (37 °C)  97 8 °F (36 6 °C)   TempSrc:       SpO2:  100% 100%    Weight: 60 kg (132 lb 4 4 oz)      Height:         Temp:  [97 8 °F (36 6 °C)-98 6 °F (37 °C)] 97 8 °F (36 6 °C)  HR:  [71-81] 71  Resp:  [18] 18  BP: (106-109)/(59-65) 109/65  Weight (last 2 days)     Date/Time Weight    03/22/22 0333 60 (132 28)    03/21/22 0500 60 1 (132 5)    03/20/22 0600 62 (136 69)          Intake/Output Summary (Last 24 hours) at 3/22/2022 1215  Last data filed at 3/22/2022 0843  Gross per 24 hour   Intake 1789 5 ml   Output 600 ml   Net 1189 5 ml     Invasive Devices  Report    Peripherally Inserted Central Catheter Line            PICC Line 03/18/22 Right Brachial 4 days          Peripheral Intravenous Line            Peripheral IV 03/21/22 Left Antecubital <1 day                Labs: I have personally reviewed pertinent reports  Results from last 7 days   Lab Units 03/22/22  0536 03/21/22  0537 03/20/22  0534 03/19/22  0547 03/17/22 2053 03/17/22  0927 03/16/22  0434 03/15/22  2007 03/15/22  2007   WBC Thousand/uL 10 89* 11 00* 12 09* 11 62* 11 84* 10 40* 13 59*   < > 10 88*   HEMOGLOBIN g/dL 9 1* 9 4* 9 6* 10 0* 11 0* 10 6* 9 1*   < > 10 7*   HEMATOCRIT % 30 0* 30 9* 30 0* 32 9* 33 1* 33 8* 28 1*   < > 33 1*   PLATELETS Thousands/uL 233 261 281 317 311 319 316   < > 348   NEUTROS ABS Thousands/µL  --   --   --   --   --  7 35 10 31*  --  7 39    < > = values in this interval not displayed         Results from last 7 days   Lab Units 03/22/22  0536 03/21/22  0537 03/20/22  0534 03/19/22  0547 03/18/22  1115 03/17/22 2053 03/17/22  0927 03/16/22  0434 03/16/22  0434   POTASSIUM mmol/L 3 6 3 9 3 3* 3 5 3 9 4 0 4 3   < > 4 2   CHLORIDE mmol/L 96* 94* 94* 94* 94* 95* 97*   < > 97*   CO2 mmol/L 31 33* 36* 34* 31 30 30   < > 30   BUN mg/dL 15 13 13 18 19 15 14   < > 13   CREATININE mg/dL 0 97 0 86 0 82 0 99 0 91 0 85 1 04   < > 1 07   CALCIUM mg/dL 8 3 8 5 8 2* 8 4 9 1 8 8 8 3   < > 8 2*   EGFR ml/min/1 73sq m 86 96 98 84 93 96 79   < > 76   MAGNESIUM mg/dL  --   --   --   --  1 8  --   --   --  1 7    < > = values in this interval not displayed  EKG, Pathology, Imaging, and Other Studies:     Lab Results   Component Value Date/Time    Blood Culture No Growth After 5 Days  01/28/2022 04:55 AM    Blood Culture No Growth After 5 Days  01/28/2022 04:51 AM    Sputum Culture 2+ Growth of  02/01/2022 07:10 AM    Gram Stain Result Rare Epithelial cells per low power field (A) 02/01/2022 07:10 AM    Gram Stain Result Rare Polys (A) 02/01/2022 07:10 AM    Gram Stain Result Rare Gram positive cocci in pairs (A) 02/01/2022 07:10 AM    Gram Stain Result Rare Gram positive rods (A) 02/01/2022 07:10 AM    Legionella Urinary Antigen Negative 09/02/2021 09:15 AM       No results found        Inpatient medications:     Current Facility-Administered Medications   Medication Dose Route Frequency    acetaminophen (TYLENOL) tablet 650 mg  650 mg Oral Q4H PRN    albuterol inhalation solution 2 5 mg  2 5 mg Nebulization Q4H PRN    aluminum-magnesium hydroxide-simethicone (MYLANTA) oral suspension 30 mL  30 mL Oral Q6H PRN    amiodarone tablet 200 mg  200 mg Oral Daily With Breakfast    aspirin (ECOTRIN LOW STRENGTH) EC tablet 81 mg  81 mg Oral Daily    atorvastatin (LIPITOR) tablet 80 mg  80 mg Oral Daily    eplerenone (INSPRA) tablet 25 mg  25 mg Oral Daily    fluticasone-vilanterol (BREO ELLIPTA) 100-25 mcg/inh inhaler 1 puff  1 puff Inhalation Daily    furosemide (LASIX) tablet 20 mg  20 mg Oral BID (diuretic)    lisinopril (ZESTRIL) tablet 10 mg  10 mg Oral Daily    melatonin tablet 3 mg  3 mg Oral HS PRN    metoprolol succinate (TOPROL-XL) 24 hr tablet 75 mg  75 mg Oral Daily    ondansetron (ZOFRAN) injection 4 mg  4 mg Intravenous Q6H PRN    vancomycin (VANCOCIN) 1000 mg in sodium chloride 0 9% 250 mL IVPB  15 mg/kg Intravenous Once    warfarin (COUMADIN) tablet 2 5 mg  2 5 mg Oral Daily (warfarin)         Physical Exam:   Physical Exam      Physical Exam  Constitutional:       General: He is not in acute distress  Appearance: He is ill-appearing  HENT:      Head: Normocephalic and atraumatic  Nose: Nose normal    Eyes:      Extraocular Movements: Extraocular movements intact  Conjunctiva/sclera: Conjunctivae normal    Cardiovascular:      Rate and Rhythm: Normal rate and regular rhythm  Heart sounds: No murmur heard  Pulmonary:      Effort: No respiratory distress  Breath sounds: No wheezing  Comments: On 3L NC O2 (4L baseline) with no wheezing  Abdominal:      General: Abdomen is flat  Bowel sounds are normal       Palpations: Abdomen is soft  Tenderness: There is no abdominal tenderness  Musculoskeletal:         General: No tenderness  Normal range of motion  Cervical back: Normal range of motion  Skin:     Comments: Eschar of right great toe   Neurological:      Mental Status: He is alert and oriented to person, place, and time  Psychiatric:         Mood and Affect: Mood normal          Behavior: Behavior normal                David Gray MD  PGY-1, Family Medicine  03/22/22  12:15 PM    Dear reader, please be aware that portions of my note contain dictated text  I have done my best to proof-read this note prior to signing  However, there may be occasional unnoticed errors pertaining to "sound-alike" words and/or grammar during my dictation process  If there is any words or information that is unclear or appears erroneous, please kindly let me know and I will clarify and/or addend my notes accordingly  Thank you for your understanding

## 2022-03-22 NOTE — PLAN OF CARE
Problem: MOBILITY - ADULT  Goal: Maintain or return to baseline ADL function  Description: INTERVENTIONS:  -  Assess patient's ability to carry out ADLs; assess patient's baseline for ADL function and identify physical deficits which impact ability to perform ADLs (bathing, care of mouth/teeth, toileting, grooming, dressing, etc )  - Assess/evaluate cause of self-care deficits   - Assess range of motion  - Assess patient's mobility; develop plan if impaired  - Assess patient's need for assistive devices and provide as appropriate  - Encourage maximum independence but intervene and supervise when necessary  - Involve family in performance of ADLs  - Assess for home care needs following discharge   - Consider OT consult to assist with ADL evaluation and planning for discharge  - Provide patient education as appropriate  Outcome: Progressing  Goal: Maintains/Returns to pre admission functional level  Description: INTERVENTIONS:  - Perform BMAT or MOVE assessment daily    - Set and communicate daily mobility goal to care team and patient/family/caregiver  - Collaborate with rehabilitation services on mobility goals if consulted  - Perform Range of Motion  times a day  - Reposition patient every  hours  - Dangle patient  times a day  - Stand patient  times a day  - Ambulate patient  times a day  - Out of bed to chair  times a day   - Out of bed for meals  times a day  - Out of bed for toileting  - Record patient progress and toleration of activity level   Outcome: Progressing     Problem: Knowledge Deficit  Goal: Patient/family/caregiver demonstrates understanding of disease process, treatment plan, medications, and discharge instructions  Description: Complete learning assessment and assess knowledge base    Interventions:  - Provide teaching at level of understanding  - Provide teaching via preferred learning methods  Outcome: Progressing     Problem: Potential for Falls  Goal: Patient will remain free of falls  Description: INTERVENTIONS:  - Educate patient/family on patient safety including physical limitations  - Instruct patient to call for assistance with activity   - Consult OT/PT to assist with strengthening/mobility   - Keep Call bell within reach  - Keep bed low and locked with side rails adjusted as appropriate  - Keep care items and personal belongings within reach  - Initiate and maintain comfort rounds  - Make Fall Risk Sign visible to staff  - Offer Toileting every  Hours, in advance of need  - Initiate/Maintain alarm  - Obtain necessary fall risk management equipment:   - Apply yellow socks and bracelet for high fall risk patients  - Consider moving patient to room near nurses station  Outcome: Progressing     Problem: CARDIOVASCULAR - ADULT  Goal: Maintains optimal cardiac output and hemodynamic stability  Description: INTERVENTIONS:  - Monitor I/O, vital signs and rhythm  - Monitor for S/S and trends of decreased cardiac output  - Administer and titrate ordered vasoactive medications to optimize hemodynamic stability  - Assess quality of pulses, skin color and temperature  - Assess for signs of decreased coronary artery perfusion  - Instruct patient to report change in severity of symptoms  Outcome: Progressing  Goal: Absence of cardiac dysrhythmias or at baseline rhythm  Description: INTERVENTIONS:  - Continuous cardiac monitoring, vital signs, obtain 12 lead EKG if ordered  - Administer antiarrhythmic and heart rate control medications as ordered  - Monitor electrolytes and administer replacement therapy as ordered  Outcome: Progressing     Problem: SKIN/TISSUE INTEGRITY - ADULT  Goal: Skin Integrity remains intact(Skin Breakdown Prevention)  Description: Assess:  -Perform Arben assessment every   -Clean and moisturize skin every   -Inspect skin when repositioning, toileting, and assisting with ADLS  -Assess under medical devices such as  every   -Assess extremities for adequate circulation and sensation     Bed Management:  -Have minimal linens on bed & keep smooth, unwrinkled  -Change linens as needed when moist or perspiring  -Avoid sitting or lying in one position for more than  hours while in bed  -Keep HOB at degrees     Toileting:  -Offer bedside commode  -Assess for incontinence every   -Use incontinent care products after each incontinent episode such as     Activity:  -Mobilize patient  times a day  -Encourage activity and walks on unit  -Encourage or provide ROM exercises   -Turn and reposition patient every  Hours  -Use appropriate equipment to lift or move patient in bed  -Instruct/ Assist with weight shifting every  when out of bed in chair  -Consider limitation of chair time  hour intervals    Skin Care:  -Avoid use of baby powder, tape, friction and shearing, hot water or constrictive clothing  -Relieve pressure over bony prominences using   -Do not massage red bony areas    Next Steps:  -Teach patient strategies to minimize risks such as    -Consider consults to  interdisciplinary teams such as   Outcome: Progressing  Goal: Incision(s), wounds(s) or drain site(s) healing without S/S of infection  Description: INTERVENTIONS  - Assess and document dressing, incision, wound bed, drain sites and surrounding tissue  - Provide patient and family education  - Perform skin care/dressing changes every   Outcome: Progressing  Goal: Pressure injury heals and does not worsen  Description: Interventions:  - Implement low air loss mattress or specialty surface (Criteria met)  - Apply silicone foam dressing  - Instruct/assist with weight shifting every  minutes when in chair   - Limit chair time to  hour intervals  - Use special pressure reducing interventions such as  when in chair   - Apply fecal or urinary incontinence containment device   - Perform passive or active ROM every   - Turn and reposition patient & offload bony prominences every  hours   - Utilize friction reducing device or surface for transfers   - Consider consults to  interdisciplinary teams such as   - Use incontinent care products after each incontinent episode such as  - Consider nutrition services referral as needed  Outcome: Progressing

## 2022-03-23 NOTE — PROGRESS NOTES
Heart Failure Office Note - [place] 61 y o  male MRN: [id number]    @ Encounter: [id number]      Assessment/Plan:    Patient Active Problem List    Diagnosis Date Noted    Elevated troponin 09/02/2021    SIRS (systemic inflammatory response syndrome) ([facility] ) 09/02/2021    Substance use 09/02/2021    Depressed mood 03/06/2020    S/P AVR (aortic valve replacement) 06/18/2018    Biventricular ICD (implantable cardioverter-defibrillator) in place 06/18/2018    Colonic mass 06/03/2016    Coronary artery disease involving native coronary artery of native heart without angina pectoris 06/03/2016    Hypertension     Hyperlipidemia     Ventricular tachycardia ([facility] ) 03/16/2022    History of substance abuse ([place] [address] ) 03/16/2022    Eschar of toe 03/16/2022    Encounter for examination following treatment at hospital 03/04/2022    Chronic respiratory failure with hypoxia ([facility] ) 02/15/2022    Medically noncompliant 01/28/2022    Acute on chronic combined systolic and diastolic congestive heart failure ([facility] ) 01/28/2022    Aortic ectasia, thoracic ([facility] ) 01/28/2022    Mild protein-calorie malnutrition ([facility] ) 01/25/2022    Hyponatremia 01/21/2022    Aortic stenosis 01/09/2022    Ischemic cardiomyopathy 01/09/2022    Drug abuse ([facility] ) 01/09/2022    Ventricular fibrillation ([place] [address] ) 01/09/2022     # Chronic HFrEF, Stage C, NYHA II  Etiology: iCM  Last admit 9/2 for decompensation, UDS THC and cocaine    Weight: 137 lbs  NT proBNP:    Studies- personally reviewed by me  Echo 1/10/22  LVEF: 20%  LVEDd: 6 5 cm  RV: normal  PASP:  AV: normal functioning mechanical aorti valve  MR: mild    Echo 3/18/21:   LVEF: 18%, grade 2 DD  LVEDd:6 8 cm  RV: mildly dilated, mildly reduced  Mechanical AV, normal velocities  Moderate TR  PASP: 60-65 mmHg    Echocardiogram 1/8/20  LVEF: 15-20%  LVIDd: 5 7 cm  RV: mildly enlarged, lower RVSF, TAPSE 1 6  MR: mild to moderate  PASP:  RVOT:   Other: normal mechanical aortic valve function    Nuclear stress test 03/13/2017: Non-diagnostic stress EKG  "Large, severe, fixed myocardial perfusion defect of the entire anterior wall, with a complete fixed perfusion defect of the apex without reversibility and of the entire inferior wall, extending to the mid-basal inferolateral wall without reversibility " LVEF 18%  Lab Results   Component Value Date    NTBNP 12,025 (H) 03/15/2022        Neurohormonal Blockade:  --Beta-Blocker: metoprolol 75 mg daily  --ACEi, ARB or ARNi: lisinopril 10 mg daily  --Aldosterone Receptor Blocker: epleronone 25 mg daily  --Diuretic: lasix 20 mg BID with potassium 20 meq daily    Sudden Cardiac Death Risk Reduction:  --ICD: MDT BiV    Electrical Resynchronization:  Baseline  msec  --Medtronic BiV ICD in situ since 02/24/2015  Lead revision done 3/21/22: new atrial lead, generator change   Response: negative, good pacing percentage, somewhat wide QRS at 180 msec- wider than native; also large scar burden      Advanced Therapies (if appropriate): --Inotrope:  --LVAD/Transplant Candidacy:  He is now 5 years from colon cancer  UDS was positive for cocaine    # CAD- s/p PCI with BMS to LAD and LCx in 2014  Nuclear stress test 03/13/2017: Non-diagnostic stress EKG  "Large, severe, fixed myocardial perfusion defect of the entire anterior wall, with a complete fixed perfusion defect of the apex without reversibility and of the entire inferior wall, extending to the mid-basal inferolateral wall without reversibility " LVEF 18%       # hyperlipidemia- atorvastatin 80 mg daily  04/26/2019 cholesterol 118; TG 91; HDL 45; direct LDL 55    # Hx of ventricular fibrillation/ ventricular tachycardia  Amiodarone 100 mg daily, metoprolol 50 mg Q12  12/3- had 13 second run of VT treated with ATP (metoprolol since increased)    # Amiodarone use  TSH 9/10/21: 1 99  LFTs 9/10/21: normal  3/9/21: corrected DLCO 72%  2019: DLCO 79%    # Rheumatic heart disease/ aortic stenosis  S/p mechanical AVR in 1980's  coumadin    # hx of colon CA- s/p colon resection on 6/2/16    # Drug use, UDS 9/2 positive for cocaine and THC  Admitted for heart failure  UDS 3/17/22: negative    # hyponatremia- due to HF    Today's Plan:  GDMT is optimized  Importance of substance abuse abstinence discussed  He readmit risk given his frequent admits  Continue coumadin for [facility] [place] for mechanical AV  Discussed adding SGLT2i for HFrEF- BP low, will hold off  --2g sodium diet  Weights daily    HPI:      62 yo male with chronic HFrEF, iCM (s/p BiV ICD in 2015), CAD (s/p BMS to LAD and left Cx in 2014), rheumatic heart disease, AS (s/p mechanical AVR), history of VF, HTN and HLD  Patient had a 13 second run of VT on 12/03/2019 that was successfully treated with ATP  Patient reports being asymptomatic with this  Acute visit was then scheduled for 01/13/2019  No family history of SCD  Patient is a never smoker and reports drinking about 3-4 light beers monthly only at social events  Reports remote history of drug use in the 1990s (used heroin, cocaine, marijuana); denies any current drug use  Patient lives with his girlfriend  Has 4 children who all live relatively nearby  Previously worked in construction but now watches his 7 grandchildren (ranging from an infant to 10years old) during the day  From a symptoms standpoint he feels fine, no shortness of breath, no orthopnea  Admitted 9/2 with acute onset of SOB and palpitations  Urine was positive for THC and cocaine  Interim:  Admit to hospital 1/9-1/12 with COVID- intubated  Admit to hospital 1/22-1/27 for dyspnea secondary to heart failure  Admitted again in Feb for acute hypoxic respiratory failure  Admitted to [facility] with recurrent ventricular tachycardia terminated with ATP x 3, on antiarrhthmic therapy with amiodarone  Just discharged from the hospital for another admission for decompensated heart failure   Contributing factors being diet compliance, discharge on half of his diuretic regimen and poor sensing atrial lead leading to fusion beats and sustained VT with subsequent ICD shocks  Received IV diuretic  Lead revision done 3/21/22: new atrial lead, generator change     Past Medical History:   Diagnosis Date    AICD (automatic cardioverter/defibrillator) present     medtronic     CAD (coronary artery disease)     Cancer [facility] )     Cardiac disease     Cardiomyopathy ([facility] )     mixed  predominently nonischemic    Colonic mass     Coronary artery disease     History of ventricular fibrillation 9/2/2021    History of ventricular tachycardia 5/25/2021    Hyperlipidemia     Hypertension     Irregular heart beat     Myocardial infarction [facility])     2014 and 2015    Pneumonia 1/26/2022    Rectal bleeding     S/P AVR (aortic valve replacement)     mechanical    Wears glasses        Review of Systems   Constitutional: Negative for activity change, appetite change, fatigue and unexpected weight change (down 5 lbs)  HENT: Negative for congestion and nosebleeds  Eyes: Negative  Respiratory: Negative for cough, chest tightness and shortness of breath  Cardiovascular: Negative for chest pain, palpitations and leg swelling  Gastrointestinal: Negative for abdominal distention  Endocrine: Negative  Genitourinary: Negative  Musculoskeletal: Negative  Skin: Negative  Neurological: Negative for dizziness, syncope and weakness  Hematological: Negative  Psychiatric/Behavioral: Negative  No Known Allergies        Current Outpatient Medications:     albuterol (5 mg/mL) 0 5 % nebulizer solution, Take 0 5 mL (2 5 mg total) by nebulization every 4 (four) hours as needed for shortness of breath, Disp: 20 mL, Rfl: 0    amiodarone 200 mg tablet, Take 1 tablet (200 mg total) by mouth daily with breakfast, Disp: 30 tablet, Rfl: 0    aspirin 81 MG tablet, Take 81 mg by mouth daily  , Disp: , Rfl:     atorvastatin (LIPITOR) 80 mg tablet, take 1 tablet by mouth once daily, Disp: 30 tablet, Rfl: 6    eplerenone (INSPRA) 25 mg tablet, take 1 tablet by mouth once daily, Disp: 30 tablet, Rfl: 3    fluticasone-vilanterol (BREO ELLIPTA) 100-25 mcg/inh inhaler, Inhale 1 puff daily Rinse mouth after use , Disp: 60 blister, Rfl: 0    furosemide (LASIX) 20 mg tablet, Take 1 tablet (20 mg total) by mouth 2 (two) times a day, Disp: 60 tablet, Rfl: 3    lisinopril (ZESTRIL) 10 mg tablet, Take 1 tablet (10 mg total) by mouth daily at bedtime, Disp: 30 tablet, Rfl: 3    metoprolol succinate (TOPROL-XL) 25 mg 24 hr tablet, Take 3 tablets (75 mg total) by mouth daily, Disp: 90 tablet, Rfl: 0    warfarin (COUMADIN) 2 5 mg tablet, Take 1 tablet (2 5 mg total) by mouth daily, Disp: 30 tablet, Rfl: 0    lisinopril (ZESTRIL) 40 mg tablet, Take 40 mg by mouth daily at bedtime, Disp: , Rfl:     Social History     Socioeconomic History    Marital status: /Civil Union     Spouse name: Not on file    Number of children: Not on file    Years of education: Not on file    Highest education level: Not on file   Occupational History    Not on file   Tobacco Use    Smoking status: Never Smoker    Smokeless tobacco: Never Used   Vaping Use    Vaping Use: Never used   Substance and Sexual Activity    Alcohol use: Not Currently    Drug use: Not Currently     Types: Marijuana    Sexual activity: Not on file   Other Topics Concern    Not on file   Social History Narrative    Not on file     Social Determinants of Health     Financial Resource Strain: Not on file   Food Insecurity: No Food Insecurity    Worried About Running Out of Food in the Last Year: Never true    Bud of Food in the Last Year: Never true   Transportation Needs: No Transportation Needs    Lack of Transportation (Medical): No    Lack of Transportation (Non-Medical):  No   Physical Activity: Not on file   Stress: Not on file   Social Connections: Not on file   Intimate Partner Violence: Not on file   Housing Stability: High Risk    Unable to Pay for Housing in the Last Year: No    Number of Places Lived in the Last Year: 1    Unstable Housing in the Last Year: Yes       Family History   Problem Relation Age of Onset    Diabetes Mother     Hypertension Mother     Valvular heart disease Mother     Valvular heart disease Father     Alcohol abuse Father        Physical Exam:    Vitals: Blood pressure 96/70, weight 62 9 kg (138 lb 9 6 oz)  , Body mass index is 22 37 kg/m² ,   Wt Readings from Last 3 Encounters:   03/24/22 62 9 kg (138 lb 9 6 oz)   03/22/22 60 kg (132 lb 4 4 oz)   03/17/22 65 kg (143 lb 4 8 oz)       Physical Exam  Constitutional:       Appearance: He is well-developed  HENT:      Head: Normocephalic and atraumatic  Eyes:      Pupils: Pupils are equal, round, and reactive to light  Neck:      Vascular: No JVD  Cardiovascular:      Rate and Rhythm: Normal rate and regular rhythm  Heart sounds: No murmur heard  Pulmonary:      Effort: Pulmonary effort is normal  No respiratory distress  Breath sounds: Normal breath sounds  Abdominal:      General: There is no distension  Palpations: Abdomen is soft  Tenderness: There is no abdominal tenderness  Musculoskeletal:         General: Normal range of motion  Cervical back: Normal range of motion  Skin:     General: Skin is warm and dry  Findings: No rash  Neurological:      Mental Status: He is alert and oriented to person, place, and time           Labs & Results:    Lab Results   Component Value Date    WBC 10 89 (H) 03/22/2022    HGB 9 1 (L) 03/22/2022    HCT 30 0 (L) 03/22/2022    MCV 85 03/22/2022     03/22/2022     Lab Results   Component Value Date    SODIUM 131 (L) 03/22/2022    K 3 6 03/22/2022    CL 96 (L) 03/22/2022    CO2 31 03/22/2022    BUN 15 03/22/2022    CREATININE 0 97 03/22/2022    GLUC 110 03/22/2022    CALCIUM 8 3 03/22/2022     Lab Results   Component Value Date    NTBNP 12,025 (H) 03/15/2022      Lab Results   Component Value Date    CHOLESTEROL 87 01/25/2022    CHOLESTEROL 118 04/26/2019    CHOLESTEROL 126 07/21/2016     Lab Results   Component Value Date    HDL 13 (L) 01/25/2022    HDL 45 04/26/2019    HDL 48 07/21/2016     Lab Results   Component Value Date    TRIG 86 01/25/2022    TRIG 91 04/26/2019    TRIG 177 (H) 07/21/2016     Lab Results   Component Value Date    [place] [facility] 04/26/2019       EKG personally reviewed by Rhona Hooper DO  Counseling / Coordination of Care  Time spent today 25 minutes  Greater than 50% of total time was spent with the patient and / or family counseling and / or coordination of care  We discussed diagnoses, most recent studies, tests and any changes in treatment plan    Thank you for the opportunity to participate in the care of this patient        [address] [place] [address] PULMONARY HYPERTENSION  MEDICAL DIRECTOR OF [place]

## 2022-03-23 NOTE — UTILIZATION REVIEW
Notification of Discharge   This is a Notification of Discharge from our facility [address]  Please be advised that this patient has been discharge from our facility  Below you will find the admission and discharge date and time including the patients disposition  UTILIZATION REVIEW CONTACT:  Richa Murillo  Utilization   Network Utilization Review Department  Phone: [phone] x carefully listen to the prompts  All voicemails are confidential   Email: [email or web address]     PHYSICIAN ADVISORY SERVICES:  FOR SOLF-PR-VPGJ REVIEW - MEDICAL NECESSITY DENIAL  Phone: [phone]  Fax: [phone]  Email: [email or web address]     PRESENTATION DATE: 3/17/2022  4:27 PM  OBERVATION ADMISSION DATE:   INPATIENT ADMISSION DATE: 3/17/22  4:27 PM   DISCHARGE DATE: 3/22/2022  3:05 PM  DISPOSITION: Home with [place] with [address] [place] [address] Road INFORMATION:  Send all requests for admission clinical reviews, approved or denied determinations and any other requests to dedicated fax number below belonging to the campus where the patient is receiving treatment   List of dedicated fax numbers:  [address] DENIALS (Administrative/Medical Necessity) [phone]   [address] (Maternity/NICU/Pediatrics) [phone]   Nirali Ga [phone]   Rupali Leigh [phone]   Alan Sinclair [phone]   [address] [place] [address],[address] [phone]   [facility]  [phone]   [address] [place] [address] [phone]

## 2022-03-29 NOTE — TELEPHONE ENCOUNTER
Niece is calling for patient states he is feeling uncomfortalbe today,   notices increased edema in face and ankles  States weight is the same  Poor appetite, was uncomfortable sleeping last night could not lie flat  Did have pulmonary CTA today  Pt states would like to  Know what to due with symptoms    Please advise, current lasix dose 20 BID

## 2022-03-29 NOTE — TELEPHONE ENCOUNTER
According to the niece pt is taking 40 BID for awhile   (do not see documentation of that LOV stated 20 BID) Please advise    labs needed

## 2022-03-30 NOTE — ASSESSMENT & PLAN NOTE
History of ventricular fibrillation and ventricular tachycardia  He is status post dual chamber biventricular Medtronic ICD in place  Maintained on amiodarone  Recent lead extraction  and addition of a new atrial lead    Also had  generator change due to low battery life in March of 2022

## 2022-03-30 NOTE — ASSESSMENT & PLAN NOTE
Patient has a history of coronary artery disease with previous PCI and bare metal stent placement in LAD and circumflex in 2014  Continue medical therapy with aspirin, beta-blocker, statin  Patient has a prior history of cocaine use, UDS Pending

## 2022-03-30 NOTE — ASSESSMENT & PLAN NOTE
Wt Readings from Last 3 Encounters:   03/24/22 62 9 kg (138 lb 9 6 oz)   03/22/22 60 kg (132 lb 4 4 oz)   03/17/22 65 kg (143 lb 4 8 oz)     Patient is admitted for CHF exacerbation  BNP on admission is   Lab Results   Component Value Date    NTBNP 7,674 (H) 03/30/2022    NTBNP 12,025 (H) 03/15/2022    NTBNP 31,172 (H) 01/28/2022       Will plan IV diuresis with plan for cardiology consulation if applicable  CHF type is combined systolic and diastolic  Last Echocardiogram shows an EF of 14%, Diastolic HF  Inderterminate  1/2022  Trend BMP with diuresis and replete potassium if applicable   Will trend cardiac enzymes  Fluid restriction and low sodium diet placed  Monitor Sodium levels - likely low in the setting of hypervolemia  Chest Xray shows:  Vascular congestion, large right and moderate left pleural effusion on CT of the chest from 03/29/2022  Troponin reviewed and in the medical chart  Patient will need heart failure follow up within 1 week of discharge

## 2022-03-30 NOTE — ASSESSMENT & PLAN NOTE
History of chronic respiratory failure with hypoxia after having COVID-19  Initially admitted in January of 2022 in respiratory distress, was intubated, subsequently left against medical advice  Discharged on 2 L of oxygen as outpatient pulmonary follow-up  He is chronically on 2-4 L of oxygen   Was recently seen by Pulmonary with CT of the chest demonstrating fibrotic lung disease

## 2022-03-30 NOTE — ASSESSMENT & PLAN NOTE
Likely in the setting of volume overload  Hyponatremia workup underway  Continue fluid restriction  Continue diuresis  Consider Samsca/Tolvaptan  Recent Labs     03/30/22  1216   SODIUM 126*

## 2022-03-30 NOTE — ASSESSMENT & PLAN NOTE
History of rheumatic heart disease and is status post mechanical aortic valve replacement in the 1980s    Patient on chronic anticoagulation with Coumadin  INR is therapeutic  Recent Labs     03/30/22  1216   INR 2 49*

## 2022-03-30 NOTE — CONSULTS
Consult - Cardiology   [facility] A Akosua 61 y o  male MRN: [id number]  Unit/Bed#: ED 17 Encounter: [id number]        Reason For Consult:  Congestive heart failure               Assessment:  Chronic and acute systolic CHF   Diuretic:  Typically taking Lasix 40 mg b i d  (Had been taking 60 mg b i d  1 day prior to admission  Large right and moderate left pleural effusion (POA) - per CT scan 3/29/2022  Nonischemic myocardial injury   HS troponin 34, 32  Ischemic Cardiomyopathy    -Multiple echocardiograms between 2015 2021 reported EF between 15 and 20%   -Echo 1/10/2022:  Estimated EF 20% (global hypokinesia)  Indwelling Medtronic BiV ICD   -Initial implant 2/2015   -generator change & atrial lead addition 3/1/2022 (decreased sensing noted at the end of the case but threshold remains stable  Significant stenosis at SVC-subclavian junction causing difficulty to advance wire/she is across stenosis)  CAD             -PCI/BMS LAD & Cfx 2014             -NM stress 3/2017:  Dense scar of the anterior wall, apex, and entire inferior wall without reversibility      Hx VT/VFib   -Prior successful ATP events   -3/18/22 interrogation:  2 episodes of sustained ventricular tachycardia successful treated with ATP--> amiodarone increased to 200 mg daily   -Device interrogation 3/28/2021: VF episode (3/28/2022) duration 33 beats at 250 beats per minute successfully treated with ATP x1  Hx rheumatic aortic stenosis - s/p mechanical AVR 1988  Warfarin anticoagulation:  Current INR 2 49  Echo (limited) 1/10/2022  Mild MR, trace-mild TR, trace PI  Hypertension:  Currently controlled    dyslipidemia             -Atorvastatin 80 mg  illicit substance use     -hx of remote heroin use   -UDS cocaine positive 2/1/22; currently negative for all tested substances    Discussion / Plan:  #  patient presents with signs/symptoms of recurrent decompensated CHF  #  earlier this month patient had ventricular tachycardia treated with ATP --> amiodarone increased to 200 mg daily  #  single episode of ventricular fibrillation 3/28/2022 (33 beats at 250 bpm) aborted with a ATP x1   Patient seemingly aware of ectopy without LOC  #  admits to some recent chest discomfort though poorly characterized and not clearly anginal without evidence of acute coronary syndrome at arrival           #  currently with modest non MI troponin elevation occurring in the setting of decompensated CHF and recent defibrillation not indicative of acute coronary syndrome    · Continue diuresis - suggest Lasix 60 mg IV b i d  Closely following exam/oxygenation/symptoms/renal function   · Closely monitor electrolytes with augmented diuresis and recent if EF with magnesium and potassium goals respectively not less than 2 0 and 4 0   · Will reinterrogate device for signs of any interval change (?  Recurrent VT) since last interrogation 3/28  · Continue current dose of amiodarone -200 mg b i d   · Continue current pre-hospital dose of Toprol -75 mg daily and Inspira -25 mg daily  · For now can hold lisinopril closely following electrolytes and renal function with augmented diuresis  · Will confer with attending regarding need/timing for ischemic testing noting last nuclear stress test from 2017 reported large severe fixed defect of the entire anterior wall, apex, and entire inferior wall extending to the mid does basal inferolateral wall without reversibility"  · Suggest repeat imaging at about 48 hours with consideration of thoracentesis to hasten resolution of pleural effusions          History Of Present Illness:  Elvis Crenshaw is 56-year-old primarily cared for by [facility] ER cardiologist Dr Reed Gordon with additional care by our electrophysiologists  He has a rather extensive cardiac history is highlighted above      This year he has already had several hospitalizations noting the following    - Hospitalized 1/9 - 1/12 with COVID infection requiring intubation    - Admitted 1/22 - 1/27 dyspnea secondary to CHF    - Again admitted in February for hypoxic respiratory failure  Urine drug screen positive for cocaine at that time    Earlier this month the patient was admitted to the [facility] presenting on 3/15 with signs of decompensated CHF and ICD interrogation showing ventricular tachycardia treated with ATP  Additional concern was raised for atrial lead malfunction the patient transferred to the [facility] on 03/17 for further EP evaluation and treatment  He then went on to have ICD generator change and implantation of a new atrial lead  Before discharge device interrogation showed appropriate device function and lead parameters  At discharge he was was instructed to continue the increased dose of amiodarone - 200 mg daily  First heart failure and cardiomyopathy was discharged on a plantar known 20 mg daily, Lasix 20 mg b i d , Toprol XL 75 mg daily, and lisinopril which was decreased from 20 mg to 10 mg daily because of some hypotension  On 3/24 the patient was seen in our office by Dr Michael Medley  At that visit blood pressure was 96/70 with weight of 62 9 kg     On 03/28 patient had a [facility] ICD transmission noting 1 VF episode on 03/28 of 33 beats duration at 250 beats per minute successfully treated with ATP x1    Yesterday, 3/29, the patient called our office reporting decreased appetite, increased lower extremity edema and dyspnea  He is instructed to increase Lasix from 40 mg b i d  To 80 mg b i d     Today, 3/30, the patient was seen by visiting nurse  He apparently was noted to be hypoxic and advised to come to the hospital   The patient did so and at the time of my visit he reiterates a 2-3 day history of increased lower extremity edema, and dyspnea  He describes some feelings of chest discomfort which are hard for him to characterize without clear indoor spine of angina, cardiac ectopy, or sense of ICD shock  Conrad Lopez   He describes these is intermittent recurrent and not clearly correlated to activity  Current CT scan shows signs of pulmonary vascular congestion and bilateral pleural effusion  Chemistry shows a sodium of 126 with potassium of 4 2 with normal BUN and creatinine  ProBNP is abnormal at 7674 with high sensitivity troponin levels of 34 and 32  Past Medical History:        Past Medical History:   Diagnosis Date    AICD (automatic cardioverter/defibrillator) present     medtronic     CAD (coronary artery disease)     Cancer ([facility] )     Cardiac disease     Cardiomyopathy ([facility] )     mixed  predominently nonischemic    Colonic mass     Coronary artery disease     History of ventricular fibrillation 9/2/2021    History of ventricular tachycardia 5/25/2021    Hyperlipidemia     Hypertension     Irregular heart beat     [facility]     2014 and 2015    Pneumonia 1/26/2022    Rectal bleeding     S/P AVR (aortic valve replacement)     mechanical    Wears glasses       Past Surgical History:   Procedure Laterality Date    AORTIC VALVE REPLACEMENT      APPENDECTOMY      CARDIAC DEFIBRILLATOR PLACEMENT      CARDIAC ELECTROPHYSIOLOGY PROCEDURE N/A 3/21/2022    Procedure: Cardiac lead revision;  Surgeon: Rosalinda Dunn MD;  Location: [facility]; Service: Cardiology    CARDIAC ELECTROPHYSIOLOGY PROCEDURE N/A 3/21/2022    Procedure: Cardiac biv icd generator change;  Surgeon: Rosalinda Dunn MD;  [facility]: [facility]; Service: Cardiology    COLECTOMY MADELIN Right     Last Assessed: 6/7/2016    COLECTOMY LAPAROSCOPIC      COLON SURGERY Right     colectomy    CORONARY STENT PLACEMENT      INSERT / REPLACE / REMOVE PACEMAKER      MITRAL VALVE REPAIR      MITRAL VALVE REPAIR      MOUTH SURGERY      OR COLONOSCOPY FLX DX W/COLLJ SPEC WHEN PFRMD N/A 1/15/2018    Procedure: COLONOSCOPY;  Surgeon: Vicki Dugan MD;  Location: [facility];   Service: General    OR LAP,SURG,COLECTOMY, PARTIAL, W/ANAST N/A 6/2/2016    Procedure: RESECTION COLON RIGHT LAPAROSCOPIC HAND-ASSISTED;  Surgeon: Tamika Cristobal MD;  Location: [facility];  Service: General        Allergy:        No Known Allergies    Medications:       Prior to Admission medications    Medication Sig Start Date End Date Taking?  Authorizing Provider   albuterol (5 mg/mL) 0 5 % nebulizer solution Take 0 5 mL (2 5 mg total) by nebulization every 4 (four) hours as needed for shortness of breath 3/22/22   Anu Ruiz MD   amiodarone 200 mg tablet Take 1 tablet (200 mg total) by mouth daily with breakfast 3/23/22   Anu Ruiz MD   aspirin 81 MG tablet Take 81 mg by mouth daily      Historical Provider, MD   atorvastatin (LIPITOR) 80 mg tablet take 1 tablet by mouth once daily 3/14/22   Alfredo Ware, DO   eplerenone (INSPRA) 25 mg tablet take 1 tablet by mouth once daily 1/31/22   Alfredo Ware, DO   fluticasone-vilanterol (BREO ELLIPTA) 100-25 mcg/inh inhaler Inhale 1 puff daily Rinse mouth after use  3/23/22   Anu Ruiz MD   furosemide (LASIX) 20 mg tablet Take 1 tablet (20 mg total) by mouth 2 (two) times a day 3/1/22 6/29/22  Alfredo Ware, DO   lisinopril (ZESTRIL) 10 mg tablet Take 1 tablet (10 mg total) by mouth daily at bedtime 3/1/22 6/29/22  Alfredo Ware, DO   lisinopril (ZESTRIL) 40 mg tablet Take 40 mg by mouth daily at bedtime 3/4/22   Historical Provider, MD   metoprolol succinate (TOPROL-XL) 25 mg 24 hr tablet Take 3 tablets (75 mg total) by mouth daily 3/23/22   Anu Ruiz MD   warfarin (COUMADIN) 2 5 mg tablet Take 1 tablet (2 5 mg total) by mouth daily 3/22/22 4/21/22  Anu Ruiz MD       Family History:     Family History   Problem Relation Age of Onset    Diabetes Mother     Hypertension Mother     Valvular heart disease Mother     Valvular heart disease Father     Alcohol abuse Father         Social History:       Social History     Socioeconomic History    Marital status: /Civil Union     Spouse name: None    Number of children: None    Years of education: None    Highest education level: None   Occupational History    None   Tobacco Use    Smoking status: Never Smoker    Smokeless tobacco: Never Used   Vaping Use    Vaping Use: Never used   Substance and Sexual Activity    Alcohol use: Not Currently    Drug use: Not Currently     Types: Marijuana    Sexual activity: None   Other Topics Concern    None   Social History Narrative    None     Social Determinants of Health     Financial Resource Strain: Not on file   Food Insecurity: No Food Insecurity    Worried About Running Out of Food in the Last Year: Never true    Bud of Food in the Last Year: Never true   Transportation Needs: No Transportation Needs    Lack of Transportation (Medical): No    Lack of Transportation (Non-Medical): No   Physical Activity: Not on file   Stress: Not on file   Social Connections: Not on file   Intimate Partner Violence: Not on file   Housing Stability: High Risk    Unable to Pay for Housing in the Last Year: No    Number of Places Lived in the Last Year: 1    Unstable Housing in the Last Year: Yes       ROS:  Symptoms per HPI  The remainder of the review of systems is negative    Exam:  General:  Alert, normally conversant, comfortable appearing  Head: Normocephalic, atraumatic  Eyes:  EOMI  Pupils - equal, round, reactive to accomodation  No icterus  Normal Conjunctiva  Oropharynx: Moist without lesion  Neck:  Positive JVD  No thyromegaly, or lymphadenopathy  Heart:  Regular with controlled rate  Crisp mechanical aortic prosthetic closure click  CHRISTO at low left sternal border   Lungs:  Bibasilar dulling with few rales  Abdomen:  Soft and nontender with normal bowel sounds   No organomegaly or mass  Lower Limbs:  2+ edema proximal pretibial region through the malleolar area  Pulses[de-identified]  RLE - DP: 1+                 LLE - DP:  1+  Musculoskeletal: Independent movement of limbs observed, Formal ROM and strength eval not performed  Neurologic:    Oriented to: person, place, situation  Cranial Nerves: grossly intact - vision, smell, taste, and hearing were not tested  Motor function: grossly normal, symmetric   Sensation: Was not tested  Vitals:    03/30/22 1158 03/30/22 1202 03/30/22 1424   BP: (!) 154/101  135/84   BP Location:   Right arm   Pulse: 104  104   Resp: 22 22   Temp:  (!) 97 4 °F (36 3 °C)    TempSrc:  Oral    SpO2: 100%  99%           DATA:      -----------    ECG:                      -----------------------------------------------------------------------------------------------------------------------------------------------  Telemetry:   Sinus rhythm with frequent ventricular pacing          -----------------------------------------------------------------------------------------------------------------------------------------------  Weights: Wt Readings from Last 20 Encounters:   03/24/22 62 9 kg (138 lb 9 6 oz)   03/22/22 60 kg (132 lb 4 4 oz)   03/17/22 65 kg (143 lb 4 8 oz)   03/04/22 60 8 kg (134 lb)   02/15/22 60 8 kg (134 lb)   02/08/22 53 6 kg (118 lb 2 7 oz)   01/27/22 57 kg (125 lb 10 6 oz)   01/12/22 56 6 kg (124 lb 12 5 oz)   09/13/21 62 4 kg (137 lb 8 oz)   09/10/21 61 2 kg (135 lb)   09/03/21 62 6 kg (138 lb 0 1 oz)   06/04/21 62 9 kg (138 lb 9 6 oz)   05/25/21 61 7 kg (136 lb)   02/17/21 67 9 kg (149 lb 9 6 oz)   08/24/20 63 2 kg (139 lb 5 3 oz)   03/06/20 63 1 kg (139 lb 3 2 oz)   02/03/20 64 5 kg (142 lb 4 8 oz)   01/13/20 66 8 kg (147 lb 4 8 oz)   10/11/19 68 kg (150 lb)   07/10/19 69 4 kg (152 lb 14 4 oz)   , There is no height or weight on file to calculate BMI           Lab Studies:               Results from last 7 days   Lab Units 03/30/22  1216   WBC Thousand/uL 10 22*   HEMOGLOBIN g/dL 11 1*   HEMATOCRIT % 34 9*   PLATELETS Thousands/uL 319   ,   Results from last 7 days   Lab Units 03/30/22  1216   POTASSIUM mmol/L 4 2   CHLORIDE mmol/L 88*   CO2 mmol/L 29   BUN mg/dL 9 CREATININE mg/dL 1 11   CALCIUM mg/dL 8 9   ALK PHOS U/L 117*   ALT U/L 18   AST U/L 24

## 2022-03-30 NOTE — ED PROVIDER NOTES
History  Chief Complaint   Patient presents with    Chest Pain     Pt reports left sided chest pain that began last night  Pt reports it is on his left side and does not radiate  Pt reports exrtreme sob  Pt is on home 18      79-year-old male with history aortic valve replacement, CAD, CHF with ejection fraction of 18%, ICD presents to the ED with severe shortness of breath that started last evening and progressively became worse this morning  He is also complaining of intermittent left-sided chest pain without radiation  He describes this as a pressure  No cough or fever  He is normally on 3 to 4 liters O2 which she states is secondary to previous COVID-19 infection in January  He does have a history of medical noncompliance and also substance abuse although he denies this today  History provided by:  Patient   used: No    Chest Pain  Pain location:  L chest  Pain quality: tightness    Pain radiates to:  Does not radiate  Pain severity:  Moderate  Onset quality:  Gradual  Duration:  12 hours  Timing:  Intermittent  Progression:  Unchanged  Chronicity:  New  Context: at rest    Worsened by:  Nothing tried  Ineffective treatments:  None tried  Associated symptoms: lower extremity edema and shortness of breath    Associated symptoms: no abdominal pain, no altered mental status, no anorexia, no anxiety, no back pain, no claudication, no cough, no diaphoresis, no dizziness, no dysphagia, no fatigue, no fever, no headache, no heartburn, no nausea, no near-syncope, no numbness, no orthopnea, no palpitations, no PND, no syncope, not vomiting and no weakness    Shortness of breath:     Severity:  Severe    Onset quality:  Gradual    Duration:  12 hours    Timing:  Constant    Progression:  Unchanged  Risk factors: coronary artery disease and hypertension    Risk factors: no smoking        Prior to Admission Medications   Prescriptions Last Dose Informant Patient Reported? Taking?    albuterol (5 mg/mL) 0 5 % nebulizer solution   No No   Sig: Take 0 5 mL (2 5 mg total) by nebulization every 4 (four) hours as needed for shortness of breath   amiodarone 200 mg tablet   No No   Sig: Take 1 tablet (200 mg total) by mouth daily with breakfast   aspirin 81 MG tablet  Family Member Yes No   Sig: Take 81 mg by mouth daily     atorvastatin (LIPITOR) 80 mg tablet   No No   Sig: take 1 tablet by mouth once daily   eplerenone (INSPRA) 25 mg tablet  Family Member No No   Sig: take 1 tablet by mouth once daily   fluticasone-vilanterol (BREO ELLIPTA) 100-25 mcg/inh inhaler   No No   Sig: Inhale 1 puff daily Rinse mouth after use  furosemide (LASIX) 20 mg tablet   No No   Sig: Take 1 tablet (20 mg total) by mouth 2 (two) times a day   lisinopril (ZESTRIL) 10 mg tablet   No No   Sig: Take 1 tablet (10 mg total) by mouth daily at bedtime   lisinopril (ZESTRIL) 40 mg tablet   Yes No   Sig: Take 40 mg by mouth daily at bedtime   metoprolol succinate (TOPROL-XL) 25 mg 24 hr tablet   No No   Sig: Take 3 tablets (75 mg total) by mouth daily   warfarin (COUMADIN) 2 5 mg tablet   No No   Sig: Take 1 tablet (2 5 mg total) by mouth daily      Facility-Administered Medications: None       Past Medical History:   Diagnosis Date    AICD (automatic cardioverter/defibrillator) present     medtronic     CAD (coronary artery disease)     [facility] ([facility]     Cardiac disease     Cardiomyopathy ([facility] )     mixed    predominently nonischemic    Colonic mass     Coronary artery disease     History of ventricular fibrillation 9/2/2021    History of ventricular tachycardia 5/25/2021    Hyperlipidemia     Hypertension     Irregular heart beat     Myocardial infarction [facility])     2014 and 2015    Pneumonia 1/26/2022    Rectal bleeding     S/P AVR (aortic valve replacement)     mechanical    Wears glasses        Past Surgical History:   Procedure Laterality Date    AORTIC VALVE REPLACEMENT      APPENDECTOMY      CARDIAC DEFIBRILLATOR PLACEMENT      CARDIAC ELECTROPHYSIOLOGY PROCEDURE N/A 3/21/2022    Procedure: Cardiac lead revision;  Surgeon: Sha Patel MD;  [facility]; Service: Cardiology    CARDIAC ELECTROPHYSIOLOGY PROCEDURE N/A 3/21/2022    Procedure: Cardiac biv icd generator change;  Surgeon: Sha Patel MD;  [facility]; Service: Cardiology    COLECTOMY MADELIN Right     Last Assessed: 6/7/2016    COLECTOMY LAPAROSCOPIC      COLON SURGERY Right     colectomy    CORONARY STENT PLACEMENT      INSERT / REPLACE / REMOVE PACEMAKER      MITRAL VALVE REPAIR      MITRAL VALVE REPAIR      MOUTH SURGERY      AR COLONOSCOPY FLX DX W/COLLJ SPEC WHEN PFRMD N/A 1/15/2018    Procedure: COLONOSCOPY;  Surgeon: Tyrone Benavidez MD;  Location: [facility]; Service: General    AR LAP,SURG,COLECTOMY, PARTIAL, W/ANAST N/A 6/2/2016    Procedure: RESECTION COLON RIGHT LAPAROSCOPIC HAND-ASSISTED;  Surgeon: Tyrone Benavidez MD;  Location: [facility];  Service: General       Family History   Problem Relation Age of Onset    Diabetes Mother     Hypertension Mother     Valvular heart disease Mother     Valvular heart disease Father     Alcohol abuse Father      I have reviewed and agree with the history as documented  E-Cigarette/Vaping    E-Cigarette Use Never User      E-Cigarette/Vaping Substances    Nicotine No     THC No     CBD No     Flavoring No      Social History     Tobacco Use    Smoking status: Never Smoker    Smokeless tobacco: Never Used   Vaping Use    Vaping Use: Never used   Substance Use Topics    Alcohol use: Not Currently    Drug use: Not Currently     Types: Marijuana       Review of Systems   Constitutional: Negative  Negative for chills, diaphoresis, fatigue and fever  HENT: Negative  Negative for trouble swallowing  Eyes: Negative  Respiratory: Positive for shortness of breath  Negative for cough  Cardiovascular: Positive for chest pain   Negative for palpitations, orthopnea, claudication, leg swelling, syncope, PND and near-syncope  Gastrointestinal: Negative  Negative for abdominal pain, anorexia, heartburn, nausea and vomiting  Genitourinary: Negative  Musculoskeletal: Negative for back pain and neck pain  Skin: Negative  Allergic/Immunologic: Negative  Neurological: Negative  Negative for dizziness, weakness, numbness and headaches  Hematological: Negative  Psychiatric/Behavioral: Negative  All other systems reviewed and are negative  Physical Exam  Physical Exam  Vitals and nursing note reviewed  Constitutional:       General: He is awake  He is not in acute distress  Appearance: Normal appearance  He is well-developed and normal weight  He is not ill-appearing, toxic-appearing or diaphoretic  Comments: Conversational dyspnea   HENT:      Head: Normocephalic and atraumatic  Right Ear: External ear normal       Left Ear: External ear normal       Nose: Nose normal    Eyes:      Conjunctiva/sclera: Conjunctivae normal       Pupils: Pupils are equal, round, and reactive to light  Neck:      Thyroid: No thyromegaly  Vascular: No JVD  Cardiovascular:      Rate and Rhythm: Regular rhythm  Tachycardia present  Heart sounds: Murmur heard  Pulmonary:      Effort: Pulmonary effort is normal  Tachypnea present  No accessory muscle usage or respiratory distress  Breath sounds: No stridor  Examination of the right-upper field reveals decreased breath sounds  Examination of the left-upper field reveals decreased breath sounds  Examination of the right-middle field reveals decreased breath sounds  Examination of the left-middle field reveals decreased breath sounds  Examination of the right-lower field reveals decreased breath sounds  Examination of the left-lower field reveals decreased breath sounds  Decreased breath sounds present  No wheezing, rhonchi or rales     Abdominal:      General: Bowel sounds are normal  There is no distension  Palpations: Abdomen is soft  There is no mass  Tenderness: There is no abdominal tenderness  Hernia: No hernia is present  Musculoskeletal:         General: No tenderness, deformity or signs of injury  Normal range of motion  Cervical back: Normal range of motion and neck supple  Right lower leg: Edema present  Left lower leg: Edema present  Lymphadenopathy:      Cervical: No cervical adenopathy  Skin:     General: Skin is warm and dry  Coloration: Skin is not jaundiced or pale  Findings: No bruising, erythema, lesion or rash  Neurological:      General: No focal deficit present  Mental Status: He is alert and oriented to person, place, and time  Motor: No weakness  Deep Tendon Reflexes: Reflexes are normal and symmetric  Psychiatric:         Mood and Affect: Mood normal          Behavior: Behavior is cooperative  Vital Signs  ED Triage Vitals   Temperature Pulse Respirations Blood Pressure SpO2   03/30/22 1202 03/30/22 1158 03/30/22 1158 03/30/22 1158 03/30/22 1158   (!) 97 4 °F (36 3 °C) 104 22 (!) 154/101 100 %      Temp Source Heart Rate Source Patient Position - Orthostatic VS BP Location FiO2 (%)   03/30/22 1202 -- -- -- --   Oral          Pain Score       --                  Vitals:    03/30/22 1158   BP: (!) 154/101   Pulse: 104         Visual Acuity      ED Medications  Medications   furosemide (LASIX) injection 40 mg (40 mg Intravenous Given 3/30/22 1257)       Diagnostic Studies  Results Reviewed     Procedure Component Value Units Date/Time    Rapid drug screen, urine [[id number]] Collected: 03/30/22 1345    Lab Status:  In process Specimen: Urine, Other Updated: 03/30/22 1408    Procalcitonin [[id number]]     Lab Status: No result Specimen: Blood     Lactic acid [[id number]]  (Abnormal) Collected: 03/30/22 1216    Lab Status: Final result Specimen: Blood from Arm, Right Updated: 03/30/22 1321 LACTIC ACID 4 3 mmol/L     Narrative:      Result may be elevated if tourniquet was used during collection  Lactic acid 2 Hours [[id number]]     Lab Status: No result Specimen: Blood     Comprehensive metabolic panel [[id number]]  (Abnormal) Collected: 03/30/22 1216    Lab Status: Final result Specimen: Blood from Arm, Right Updated: 03/30/22 1309     Sodium 126 mmol/L      Potassium 4 2 mmol/L      Chloride 88 mmol/L      CO2 29 mmol/L      ANION GAP 9 mmol/L      BUN 9 mg/dL      Creatinine 1 11 mg/dL      Glucose 170 mg/dL      Calcium 8 9 mg/dL      Corrected Calcium 9 5 mg/dL      AST 24 U/L      ALT 18 U/L      Alkaline Phosphatase 117 U/L      Total Protein 8 1 g/dL      Albumin 3 3 g/dL      Total Bilirubin 1 39 mg/dL      eGFR 73 ml/min/1 73sq m     Narrative:      [facility] guidelines for Chronic Kidney Disease (CKD):     Stage 1 with normal or high GFR (GFR > 90 mL/min/1 73 square meters)    Stage 2 Mild CKD (GFR = 60-89 mL/min/1 73 square meters)    Stage 3A Moderate CKD (GFR = 45-59 mL/min/1 73 square meters)    Stage 3B Moderate CKD (GFR = 30-44 mL/min/1 73 square meters)    Stage 4 Severe CKD (GFR = 15-29 mL/min/1 73 square meters)    Stage 5 End Stage CKD (GFR <15 mL/min/1 73 square meters)  Note: GFR calculation is accurate only with a steady state creatinine    NT-BNP PRO [[id number]]  (Abnormal) Collected: 03/30/22 1216    Lab Status: Final result Specimen: Blood from Arm, Right Updated: 03/30/22 1309     NT-proBNP 7,674 pg/mL     D-Dimer [[id number]]  (Abnormal) Collected: 03/30/22 1216    Lab Status: Final result Specimen: Blood from Arm, Right Updated: 03/30/22 1300     D-Dimer, Quant 2 64 ug/ml FEU     Narrative:       In the evaluation for possible pulmonary embolism, in the appropriate (Well's Score of 4 or less) patient, the age adjusted d-dimer cutoff for this patient can be calculated as:    Age x 0 01 (in ug/mL) for Age-adjusted D-dimer exclusion threshold for a patient over 50 years      Protime-INR [[id number]]  (Abnormal) Collected: 03/30/22 1216    Lab Status: Final result Specimen: Blood from Arm, Right Updated: 03/30/22 1250     Protime 25 9 seconds      INR 2 49    APTT [[id number]]  (Abnormal) Collected: 03/30/22 1216    Lab Status: Final result Specimen: Blood from Arm, Right Updated: 03/30/22 1250     PTT 47 seconds     HS Troponin I 2hr [[id number]]     Lab Status: No result Specimen: Blood     HS Troponin 0hr (reflex protocol) [[id number]]  (Normal) Collected: 03/30/22 1216    Lab Status: Final result Specimen: Blood from Arm, Right Updated: 03/30/22 1249     hs TnI 0hr 34 ng/L     CBC and differential [[id number]]  (Abnormal) Collected: 03/30/22 1216    Lab Status: Final result Specimen: Blood from Arm, Right Updated: 03/30/22 1238     WBC 10 22 Thousand/uL      RBC 4 48 Million/uL      Hemoglobin 11 1 g/dL      Hematocrit 34 9 %      MCV 78 fL      MCH 24 8 pg      MCHC 31 8 g/dL      RDW 17 9 %      MPV 8 9 fL      Platelets 588 Thousands/uL      nRBC 0 /100 WBCs      Neutrophils Relative 80 %      Immat GRANS % 1 %      Lymphocytes Relative 9 %      Monocytes Relative 9 %      Eosinophils Relative 0 %      Basophils Relative 1 %      Neutrophils Absolute 8 21 Thousands/µL      Immature Grans Absolute 0 08 Thousand/uL      Lymphocytes Absolute 0 91 Thousands/µL      Monocytes Absolute 0 92 Thousand/µL      Eosinophils Absolute 0 02 Thousand/µL      Basophils Absolute 0 08 Thousands/µL                  XR chest 1 view portable   ED Interpretation by Edwin Grant DO (03/30 1248)   chf                 Procedures  ECG 12 Lead Documentation Only    Date/Time: 3/30/2022 12:59 PM  Performed by: Edwin Grant DO  Authorized by: Edwin Grant DO     Indications / Diagnosis:  Cp  ECG reviewed by me, the ED Provider: yes    Patient location:  ED  Interpretation:     Interpretation: abnormal    Rate:     ECG rate:  101    ECG rate assessment: tachycardic    Rhythm:     Rhythm: sinus rhythm and paced    Pacing:     Capture:  Intermittent  Ectopy:     Ectopy: PVCs      PVCs:  Infrequent  Conduction:     Conduction: abnormal      Abnormal conduction: complete RBBB    ST segments:     ST segments:  Normal  T waves:     T waves: normal               ED Course  ED Course as of 03/30/22 1410   Wed Mar 30, 2022   1301 D-Dimer, Quant(!): 2 64  Chest x-ray suggestive of CHF   1322 LACTIC ACID(!!): 4 3  Symptoms, exam, blood work, chest x-ray consistent with CHF  Will hold off on giving IV fluids and antibiotics  Will add procalcitonin                            Initial Sepsis Screening     Row Name 03/30/22 1319                Is the patient's history suggestive of a new or worsening infection? No  -[address]        Suspected source of infection --        Are two or more of the following signs & symptoms of infection both present and new to the patient? Yes (Proceed)  -[address]        Indicate SIRS criteria Tachycardia > 90 bpm;Tachypnea > 20 resp per min  -[address]        If the answer is yes to both questions, suspicion of sepsis is present --        If severe sepsis is present AND tissue hypoperfusion perists in the hour after fluid resuscitation or lactate > 4, the patient meets criteria for SEPTIC SHOCK --        Are any of the following organ dysfunction criteria present within 6 hours of suspected infection and SIRS criteria that are NOT considered to be chronic conditions?  --        Organ dysfunction --        Date of presentation of severe sepsis --        Time of presentation of severe sepsis --        Tissue hypoperfusion persists in the hour after crystalloid fluid administration, evidenced, by either: --        Was hypotension present within one hour of the conclusion of crystalloid fluid administration? --        Date of presentation of septic shock --        Time of presentation of septic shock --              User Key  (r) = Recorded By, (t) = Taken By, (c) = Cosigned By    [address] Name Provider Type    [address] [facility], DO Physician                              MDM  Number of Diagnoses or Management Options  Diagnosis management comments: 69-year-old male with history of CAD, atrial valve replacement, ICD, EF of 18% presents to the ED with shortness of breath and chest pain that started last evening  On exam he appears short of breath with some conversational dyspnea  His lungs have decreased breath sounds bilaterally  He does have lower extremity edema to the knee which he says is new for him  This is bilateral   Will do cardiac workup  High clinical suspicion for CHF    No infectious symptoms to suggest pneumonia        Amount and/or Complexity of Data Reviewed  Clinical lab tests: ordered and reviewed  Tests in the radiology section of CPT®: ordered and reviewed  Review and summarize past medical records: yes  Discuss the patient with other providers: yes  Independent visualization of images, tracings, or specimens: yes        Disposition  Final diagnoses:   Acute congestive heart failure, unspecified [facility] ([facility] )   Chest pain   Lactic acidosis     Time reflects when diagnosis was documented in both MDM as applicable and the Disposition within this note     Time User Action Codes Description Comment    3/30/2022  1:42 PM Joseph HUMPHRIES Add [I50 9] Acute congestive heart failure, unspecified heart failure type ([facility] )     3/30/2022  1:42 PM Joseph HUMPHRIES Add [R07 9] Chest pain     3/30/2022  1:43 PM Agnes Karimi Add [E87 2] Lactic acidosis     3/30/2022  2:02 PM Cinthia Choudhury Add [I25 5] Ischemic cardiomyopathy     3/30/2022  2:04 PM Cinthia Choudhury Add [I50 43] Acute on chronic combined systolic and diastolic congestive heart failure [facility])       ED Disposition     ED Disposition Condition Date/Time Comment    Admit Stable Wed Mar 30, 2022  1:42 PM Case was discussed with dr Genevieve Castellano and the patient's admission status was agreed to be Admission Status: inpatient status to the service of Dr Cornelius Esposito   Follow-up Information    None         Patient's Medications   Discharge Prescriptions    No medications on file       No discharge procedures on file      PDMP Review       Value Time User    PDMP Reviewed  Yes 1/29/2022 11:51 AM Khanh Willams MD          ED Provider  Electronically Signed by           Aide Mayfield, DO  03/30/22 1018 [address], DO  03/30/22 1410

## 2022-03-31 PROBLEM — E87.2 LACTIC ACIDOSIS: Status: ACTIVE | Noted: 2022-01-01

## 2022-03-31 NOTE — PROGRESS NOTES
Progress Note - Cardiology   [facility] KRISTEL [place] 61 y o  male MRN: [id number]  Unit/Bed#: E4 -01 Encounter: [id number]    Assessment:     Patient appears to be having recurrent ventricular tachycardia on the monitor  Pacemaker appears to follow and pace with ventricular tachycardia  Yesterday had atrial sensed rhythm and ventricular paced  Unknown do not see P waves unless there had need in the T-waves   Acute on chronic systolic congestive heart failure   Hyponatremia   Normal troponin less than 50 with no positive delta   Lactic acidosis   NT-BNP 7674 which was less than 0 on March 15th, 2022 went was 12,025   Chest x-ray demonstrates bilateral infiltrates consistent with acute pulmonary edema, cannot rule out noncardiac pulmonary edema   Bilateral pleural effusions by CT scan   Medtronic biventricular ICD   Coronary artery disease with PCI of LAD and circumflex 2014   Ventricular tachycardia/ventricular fibrillation with successful ATP events   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   Warfarin anticoagulation   Hypertension   Dyslipidemia   History of illicit substance abuse presently negative for all tested substances    Addendum:  10:56 a m :  Patient just had another episode a rapid ventricular tachycardia which was broke by ATP recorded on monitor    Plan:     D/C p o  Amiodarone and begin IV amiodarone   Begin furosemide infusion at 10 mg/hour   Continue oral fluid restriction at 1000 cc/day   Check chest x-ray tomorrow   Repeat echocardiogram   Arterial blood gas   Change p o  Metoprolol to IV metoprolol to make sure that it is being absorbed      Interval history:  Patient complains of abdominal pain  Chest x-ray findings are out of proportion to physical findings  Patient appears acutely ill      PROBLEM LIST:    Patient Active Problem List    Diagnosis Date Noted    Hyponatremia 01/21/2022    Aortic stenosis 01/09/2022    Ischemic cardiomyopathy 01/09/2022    Drug abuse ([facility] ) 01/09/2022    Ventricular fibrillation ([facility] ) 01/09/2022    Elevated troponin 09/02/2021    SIRS (systemic inflammatory response syndrome) (HCC) 09/02/2021    Substance use 09/02/2021    Depressed mood 03/06/2020    S/P AVR (aortic valve replacement) 06/18/2018    Biventricular ICD (implantable cardioverter-defibrillator) in place 06/18/2018    Colonic mass 06/03/2016    Coronary artery disease involving native coronary artery of native heart without angina pectoris 06/03/2016    Hypertension     Hyperlipidemia     Ventricular tachycardia ([facility] ) 03/16/2022    History of substance abuse ([place] [address] ) 03/16/2022    Eschar of toe 03/16/2022    Encounter for examination following treatment at hospital 03/04/2022    Chronic respiratory failure with hypoxia ([facility] ) 02/15/2022    Medically noncompliant 01/28/2022    Acute on chronic combined systolic and diastolic congestive heart failure ([facility] ) 01/28/2022    Aortic ectasia, thoracic ([facility] ) 01/28/2022    Mild protein-calorie malnutrition ([facility] ) 01/25/2022       Vitals: /88 (BP Location: Right arm)   Pulse (!) 115   Temp 97 6 °F (36 4 °C) (Temporal)   Resp 20   Ht 5' 6" (1 676 m)   Wt 60 9 kg (134 lb 4 2 oz)   SpO2 97%   BMI 21 67 kg/m²   PREVIOUS WEIGHTS:   Weight (last 2 days)     Date/Time Weight    03/31/22 0600 60 9 (134 26)    03/30/22 2034 63 9 (140 87)          Wt Readings from Last 2 Encounters:   03/31/22 60 9 kg (134 lb 4 2 oz)   03/24/22 62 9 kg (138 lb 9 6 oz)       Labs/Data:        Results from last 7 days   Lab Units 03/31/22  0506 03/30/22  1216   WBC Thousand/uL 10 55* 10 22*   HEMOGLOBIN g/dL 10 4* 11 1*   HEMATOCRIT % 33 2* 34 9*   MCV fL 79* 78*   MCH pg 24 6* 24 8*   MCHC g/dL 31 3* 31 8   PLATELETS Thousands/uL 292 319     Results from last 7 days   Lab Units 03/31/22  0506 03/30/22  1216   EGFR ml/min/1 73sq m 86 73   SODIUM mmol/L 126* 126*   POTASSIUM mmol/L 4 1 4 2   CHLORIDE mmol/L 90* 88*   CO2 mmol/L 29 29   BUN mg/dL 11 9   CREATININE mg/dL 0 97 1 11     Results from last 7 days   Lab Units 03/31/22  0506 03/30/22  1216   CALCIUM mg/dL 8 6 8 9   AST U/L  --  24   ALT U/L  --  18   ALK PHOS U/L  --  117*         Lab Results   Component Value Date    NTBNP 7,674 (H) 03/30/2022    NTBNP 12,025 (H) 03/15/2022    NTBNP 31,172 (H) 01/28/2022     Lab Results   Component Value Date    CHOLESTEROL 87 01/25/2022    TRIG 86 01/25/2022    HDL 13 (L) 01/25/2022    LDLCALC 57 01/25/2022    HGBA1C 5 8 (H) 09/10/2021       Results from last 7 days   Lab Units 03/31/22  0506 03/30/22  1216   INR  2 28* 2 49*   PROTIME seconds 24 2* 25 9*          Current Facility-Administered Medications:     acetaminophen (TYLENOL) tablet 650 mg, 650 mg, Oral, Q4H PRN, Rubio Pardo DO    albuterol inhalation solution 2 5 mg, 2 5 mg, Nebulization, Q4H PRN, Rubio Pardo DO    amiodarone tablet 200 mg, 200 mg, Oral, Daily With Breakfast, Rubio Pardo DO    aspirin chewable tablet 81 mg, 81 mg, Oral, Daily, Rubio Pardo DO    atorvastatin (LIPITOR) tablet 80 mg, 80 mg, Oral, Daily, Rubio Pardo DO    eplerenone (INSPRA) tablet 25 mg, 25 mg, Oral, Daily, Rubio Pardo DO    fluticasone-vilanterol (BREO ELLIPTA) 100-25 mcg/inh inhaler 1 puff, 1 puff, Inhalation, Daily, Rubio Pardo DO    furosemide (LASIX) injection 60 mg, 60 mg, Intravenous, BID, Rubio Pardo DO, 60 mg at 03/31/22 5188    metoprolol succinate (TOPROL-XL) 24 hr tablet 75 mg, 75 mg, Oral, Daily, Rubio Pardo DO    ondansetron (ZOFRAN) injection 4 mg, 4 mg, Intravenous, Q6H PRN, Rubio D Pardo, DO    warfarin (COUMADIN) tablet 2 5 mg, 2 5 mg, Oral, Daily (warfarin), Rubio Pardo, DO, 2 5 mg at 03/30/22 2133  No Known Allergies      Intake/Output Summary (Last 24 hours) at 3/31/2022 0958  Last data filed at 3/31/2022 0500  Gross per 24 hour   Intake --   Output 1595 ml   Net -1595 ml       Invasive Devices  Report    Peripheral Intravenous Line Peripheral IV 03/30/22 Left Antecubital <1 day                Review of Systems   Constitutional: Negative for chills and fever  HENT: Negative for ear pain and sore throat  Eyes: Negative for pain and visual disturbance  Respiratory: Negative for cough, shortness of breath and wheezing  Cardiovascular: Negative for chest pain, palpitations and leg swelling  Gastrointestinal: Positive for abdominal pain  Negative for vomiting  Genitourinary: Negative for dysuria and hematuria  Musculoskeletal: Negative for arthralgias and back pain  Skin: Negative for color change and rash  Neurological: Negative for seizures and syncope  All other systems reviewed and are negative  Physical Exam  Vitals reviewed  Constitutional:       General: He is in acute distress  Appearance: He is ill-appearing  HENT:      Head: Normocephalic and atraumatic  Neck:      Thyroid: No thyromegaly  Vascular: No carotid bruit or JVD  Trachea: No tracheal deviation  Cardiovascular:      Rate and Rhythm: Normal rate and regular rhythm  Pulses: Normal pulses  Heart sounds: Normal heart sounds  No murmur heard  No friction rub  No gallop  Pulmonary:      Effort: Pulmonary effort is normal  No respiratory distress  Breath sounds: Normal breath sounds  No wheezing, rhonchi or rales  Chest:      Chest wall: No tenderness  Abdominal:      Palpations: Abdomen is soft  Comments: No bowel sounds identified  Musculoskeletal:      Cervical back: Normal range of motion and neck supple  Right lower leg: No edema  Left lower leg: No edema  Skin:     General: Skin is warm and dry  Neurological:      General: No focal deficit present  Mental Status: He is alert and oriented to person, place, and time  Psychiatric:         Mood and Affect: Mood normal          Behavior: Behavior normal          Thought Content:  Thought content normal          Judgment: Judgment normal          ======================================================     Imaging:   I have personally reviewed pertinent reports  EKG: Intermittent paced rhythm with intermittent ventricular tachycardia  Pacemaker tries to tract ventricular tachycardia when the rate is not too rapid  I do not see P-waves although they could be buried in the T-waves  Yesterday had an atrial sensed ventricular paced rhythm  Today's rhythm may be all ventricular  Addendum:  12 lead EKG suggests that there are at times P-waves in front of the paced QRS suggesting atrial sensing and ventricular pacing  Frequent ventricular ectopy and 1 monitor patient is going in and out of ventricular tachycardia    I spent 40 minutes on the patient's hospital visit  This time was spent on the day of the visit  I had direct contact with the patient in the hospital on the day of the visit  Greater than 50% of the total time was spent obtaining a history, examining patient, reviewing chart, discussions with staff, arranging and discussing plan of care with patient and staff as well as directly providing instructions  Additional time then spent on orders and hospital chart  Portions of the record may have been created with voice recognition software  Occasional wrong word or "sound a like" substitutions may have occurred due to the inherent limitations of voice recognition software  Read the chart carefully and recognize, using context, where substitutions have occurred

## 2022-03-31 NOTE — ASSESSMENT & PLAN NOTE
Likely in the setting of volume overload  Hyponatremia workup underway  Continue fluid restriction, placed on 1000 mL  Continue diuresis  Consider Samsca/Tolvaptan if worsens  Recent Labs     03/30/22  1216 03/31/22  0506   SODIUM 126* 126*

## 2022-03-31 NOTE — PLAN OF CARE
Problem: PHYSICAL THERAPY ADULT  Goal: Performs mobility at highest level of function for planned discharge setting  See evaluation for individualized goals  Description: Treatment/Interventions: Functional transfer training,LE strengthening/ROM,Elevations,Therapeutic exercise,Endurance training,Patient/family training,Bed mobility,Equipment eval/education,Gait training,Continued evaluation,Spoke to nursing,OT,ST          See flowsheet documentation for full assessment, interventions and recommendations  3/31/2022 1207 by Primo Aquino, PT  Note: Prognosis: Fair  Problem List: Decreased endurance,Decreased safety awareness,Decreased mobility  Assessment: Mark Ko is a 61 y o  male admitted to [facility] on 3/30/2022 for Acute on chronic combined systolic and diastolic congestive heart failure ([facility] )  PT was consulted and pt was seen on 3/31/2022 for mobility assessment and d/c planning  Pt presents w multiple lines  At baseline is indep without an AD  Is on 4L at baseline  Pt is currently functioning at a independent assistance level for bed mobility, supervision assistance x1 level for transfers and ambulation without an AD  Pt demonstrated no significant deficits of BLE strength  Mild gait deviations as outlined in flowsheet however w no significant impact on mobility or ambulatory balance  Was dyspneic post ambulation however O2 stable on 4L (+tachycardia)  SORENSEN improve w rest  Pt will benefit from continued skilled IP PT to address the above mentioned impairments of endurance in order to maximize recovery and increase functional independence when completing mobility and ADLs  At this time PT recommendations for d/c are home when medically stable  Barriers to Discharge: None        PT Discharge Recommendation: No rehabilitation needs          See flowsheet documentation for full assessment       3/31/2022 1207 by Primo Aquino, PT  Note: Prognosis: Fair  Problem List: Decreased endurance,Decreased safety awareness,Decreased mobility  Assessment: Laurie Baxter is a 61 y o  male admitted to [facility] on 3/30/2022 for Acute on chronic combined systolic and diastolic congestive heart failure ([facility] )  PT was consulted and pt was seen on 3/31/2022 for mobility assessment and d/c planning  Pt presents w multiple lines  At baseline is indep without an AD  Is on 4L at baseline  Pt is currently functioning at a independent assistance level for bed mobility, supervision assistance x1 level for transfers and ambulation without an AD  Pt demonstrated no significant deficits of BLE strength  Mild gait deviations as outlined in flowsheet however w no significant impact on mobility or ambulatory balance  Was dyspneic post ambulation however O2 stable on 4L (+tachycardia)  SORENSEN improve w rest  Pt will benefit from continued skilled IP PT to address the above mentioned impairments of endurance in order to maximize recovery and increase functional independence when completing mobility and ADLs  At this time PT recommendations for d/c are home when medically stable  Barriers to Discharge: None        PT Discharge Recommendation: No rehabilitation needs          See flowsheet documentation for full assessment

## 2022-03-31 NOTE — ASSESSMENT & PLAN NOTE
Wt Readings from Last 3 Encounters:   03/31/22 60 8 kg (134 lb)   03/24/22 62 9 kg (138 lb 9 6 oz)   03/22/22 60 kg (132 lb 4 4 oz)     Patient is admitted for CHF exacerbation  BNP on admission is   Lab Results   Component Value Date    NTBNP 7,674 (H) 03/30/2022    NTBNP 12,025 (H) 03/15/2022    NTBNP 31,172 (H) 01/28/2022       Will plan IV diuresis with plan for cardiology consulation if applicable  CHF type is combined systolic and diastolic  Last Echocardiogram shows an EF of 64%, Diastolic HF  Inderterminate  1/2022  Trend BMP with diuresis and replete potassium if applicable   Will trend cardiac enzymes  Fluid restriction and low sodium diet placed  Monitor Sodium levels - likely low in the setting of hypervolemia  Chest Xray shows:  Vascular congestion, large right and moderate left pleural effusion on CT of the chest from 03/29/2022  Troponin reviewed and in the medical chart  Patient will need heart failure follow up within 1 week of discharge

## 2022-03-31 NOTE — ASSESSMENT & PLAN NOTE
Patient with intermittent episodes of ventricular tachycardia, started on amiodarone infusion, additionally given amiodarone bolus    Situation remains high risk

## 2022-03-31 NOTE — PHYSICAL THERAPY NOTE
PHYSICAL THERAPY EVALUATION          Patient Name: Saravanan Quinteros  UAPLR'P Date: 3/31/2022   PT EVALUATION    61 y o     [id number]    Lactic acidosis [E87 2]  Chest pain [R07 9]  Ischemic cardiomyopathy [I25 5]  Acute on chronic combined systolic and diastolic congestive heart failure (HCC) [I50 43]  Acute congestive heart failure, unspecified heart failure type ([facility] ) [I50 9]    Past Medical History:   Diagnosis Date    AICD (automatic cardioverter/defibrillator) present     medtronic     CAD (coronary artery disease)     Cancer ([facility] )     Cardiac disease     Cardi[facility]     mixed  predominently nonischemic    Colonic mass     Coronary artery disease     History of ventricular fibrillation 9/2/2021    History of ventricular tachycardia 5/25/2021    Hyperlipidemia     Hypertension     Irregular heart beat     Myocardial infarction ([facility] )     2014 and 2015    Pneumonia 1/26/2022    Rectal bleeding     S/P AVR (aortic valve replacement)     mechanical    Wears glasses      Past Surgical History:   Procedure Laterality Date    AORTIC VALVE REPLACEMENT      APPENDECTOMY      CARDIAC DEFIBRILLATOR PLACEMENT      CARDIAC ELECTROPHYSIOLOGY PROCEDURE N/A 3/21/2022    Procedure: Cardiac lead revision;  Surgeon: Sidney Martinez MD;  [facility]: [facility]; Service: Cardiology    CARDIAC ELECTROPHYSIOLOGY PROCEDURE N/A 3/21/2022    Procedure: Cardiac biv icd generator change;  Surgeon: Sidney Martinez MD;  [facility]: [facility];   Service: Cardiology    COLECTOMY MADELIN Right     Last Assessed: 6/7/2016    COLECTOMY LAPAROSCOPIC      COLON SURGERY Right     colectomy    CORONARY STENT PLACEMENT      INSERT / REPLACE / REMOVE PACEMAKER      MITRAL VALVE REPAIR      MITRAL VALVE REPAIR      MOUTH SURGERY      WI COLONOSCOPY FLX DX W/COLLJ SPEC WHEN PFRMD N/A 1/15/2018    Procedure: COLONOSCOPY;  Surgeon: Heather Escalante MD;  Location: [facility]; Service: General    [place] LAP,SURG,COLECTOMY, PARTIAL, W/ANAST N/A 6/2/2016    Procedure: RESECTION COLON RIGHT LAPAROSCOPIC HAND-ASSISTED;  Surgeon: Heather Escalante MD;  Location: [facility];  Service: General        03/31/22 0856   PT Last Visit   PT Visit Date 03/31/22   Note Type   Note type Evaluation   Pain Assessment   Pain Assessment Tool 0-10   Pain Score No Pain   Restrictions/Precautions   Other Precautions Cognitive;Multiple lines;Telemetry;O2  (4L)   Home Living   Type of Home Apartment   Home Layout One level;Stairs to enter without rails   Bathroom Shower/Tub Tub/shower unit   [address] [facility]; Other (Comment)  (home O2)   Additional Comments 2 ELMER   Prior Function   Level of [place] Independent with ADLs and functional mobility   Lives With Alone   Receives Help From Family   ADL Assistance Independent   IADLs   (family gets his groceries)   Falls in the last 6 months 0   Comments per patient he is indep at baseline without an AD  on 4L at baseline  - takes the bus/ public transportation   General   Additional Pertinent History pt admitted 3/30/22 for acute on chronic CHF  [address] from 3/15-17 and was [facility] to [facility] until 3/22 for EP eval  pmhx significant for AICD, CAD, CA, cardiomyopathy, cardiac disease, hx vfib/vtach, MI   Cognition   Overall Cognitive Status [facility]   Arousal/Participation Cooperative   Following Commands Follows one step commands without difficulty   Comments cues to redirect attention  labile moods  tearful at first due to being hungry and difficulty swallowing  redirectable      Subjective   Subjective "I havent eaten in three days I cant swallow" "Okay you want to walk more?"   RLE Assessment   RLE Assessment WFL   LLE Assessment   LLE Assessment WFL   Coordination   Sensation WFL  (denies n/t)   Bed Mobility   Supine to Sit 7  Independent   Sit to Supine 7  Independent Transfers   Sit to Stand 5  Supervision   Stand to Sit 5  Supervision   Ambulation/Elevation   Gait pattern Wide JOANNA; Short stride; Excessively slow  (stiff gait)   Gait Assistance 5  Supervision   Additional items Assist x 1   Assistive Device None   Distance 80'   Stair Management Assistance Not tested   Balance   Static Standing Fair   Dynamic Standing Fair -   Ambulatory Fair -   Endurance Deficit   Endurance Deficit Yes   Endurance Deficit Description SORENSEN  vitals post ambulation on 4L: 118-120, 93-95%   Activity Tolerance   Activity Tolerance Patient limited by fatigue;Treatment limited secondary to medical complications (Comment)  (SORENSEN)   Medical Staff Made Aware OT   Nurse Made Aware Tigre Trammell RN   Assessment   Prognosis Fair   Problem List Decreased endurance;Decreased safety awareness;Decreased mobility   Assessment Compa Liz is a 61 y o  male admitted to [facility] on 3/30/2022 for Acute on chronic combined systolic and diastolic congestive heart failure ([facility] )  PT was consulted and pt was seen on 3/31/2022 for mobility assessment and d/c planning  Pt presents w multiple lines  At baseline is indep without an AD  Is on 4L at baseline  Pt is currently functioning at a independent assistance level for bed mobility, supervision assistance x1 level for transfers and ambulation without an AD  Pt demonstrated no significant deficits of BLE strength  Mild gait deviations as outlined in flowsheet however w no significant impact on mobility or ambulatory balance  Was dyspneic post ambulation however O2 stable on 4L (+tachycardia)  SORENSEN improve w rest  Pt will benefit from continued skilled IP PT to address the above mentioned impairments of endurance in order to maximize recovery and increase functional independence when completing mobility and ADLs  At this time PT recommendations for d/c are home when medically stable     Barriers to Discharge None   Goals   Patient Goals be able to eat    STG Expiration Date 04/10/22   Short Term Goal #1 1)  Continue to perform bed mobility indep demonstrating appropriate technique 100% of the time in order to improve function  2)  Perform all transfers with Monalisa demonstrating safe and appropriate technique 100% of the time in order to improve ability to negotiate safely in home environment  3) Amb with least restrictive AD > 150'x1 with mod I in order to demonstrate ability to negotiate in home environment  4)  Improve overall strength and balance 1/2 grade in order to optimize ability to perform functional tasks and reduce fall risk  5) Increase activity tolerance to 45 minutes in order to improve endurance to functional tasks  6)  Negotiate stairs using most appropriate technique and mod I in order to be able to negotiate safely in home environment  7) PT for ongoing patient and family/caregiver education, DME needs and d/c planning in order to promote highest level of function in least restrictive environment  Plan   Treatment/Interventions Functional transfer training;LE strengthening/ROM; Elevations; Therapeutic exercise; Endurance training;Patient/family training;Bed mobility; Equipment eval/education;Gait training;Continued evaluation;Spoke to nursing;OT;ST   PT Frequency 1-2x/wk   Recommendation   PT Discharge Recommendation No rehabilitation needs   Additional Comments The patient's AM-PAC Basic Mobility Inpatient Short Form Raw Score is 20  A Raw score of greater than 16 suggests the patient may benefit from discharge to home  Please also refer to the recommendation of the Physical Therapist for safe discharge planning     AM-PAC Basic Mobility Inpatient   Turning in Bed Without Bedrails 4   Lying on Back to Sitting on Edge of Flat Bed 4   Moving Bed to Chair 3   Standing Up From Chair 3   Walk in Room 3   Climb 3-5 Stairs 3   Basic Mobility Inpatient Raw Score 20   Basic Mobility Standardized Score 43 99   Highest Level Of Mobility   -[facility] Goal 6: Walk 10 steps or more   -[facility] Highest Level of Mobility 7: Walk 25 feet or more   -[facility] Goal Achieved Yes   History: co - morbidities, past experience (prev admission), fall risk, cognition, multiple lines  Exam: impairments in systems including musculoskeletal (strength), neuromuscular (balance, gait, transfers, motor function), am-pac, cardiopulmonary, cognition  Clinical: unstable/unpredictable  Complexity:high      Destinee Carrera, PT

## 2022-03-31 NOTE — UTILIZATION REVIEW
Inpatient Admission Authorization Request   NOTIFICATION OF INPATIENT ADMISSION/INPATIENT AUTHORIZATION REQUEST   SERVICING FACILITY:   [address] [place] [address], [facility]  Tax ID: [id number]  NPI: [id number]  Place of Service: Inpatient [facility]  [address]  Place of Service Code: 24     ATTENDING PROVIDER:  Attending Name and NPI#: Gregory Duncan [[id number]]  Address: [address] [place] [address], [facility]  Phone: [phone]     UTILIZATION REVIEW CONTACT:  Gladys Wu Utilization   Network Utilization Review Department  Phone: [phone]  Fax: [phone]  Email: [email or web address]     PHYSICIAN ADVISORY SERVICES:  FOR IJFU-[facility]-OL[facility] REVIEW - MEDICAL NECESSITY DENIAL  Phone: [phone]  Fax: [phone]  Email: [email or web address]     TYPE OF REQUEST:  Inpatient Status     ADMISSION INFORMATION:  ADMISSION DATE/TIME: 3/30/22  1:44 PM  PATIENT DIAGNOSIS CODE/DESCRIPTION:  Lactic acidosis [E87 2]  Chest pain [R07 9]  Ischemic cardiomyopathy [I25 5]  Acute on chronic combined systolic and diastolic congestive heart failure (HCC) [I50 43]  Acute congestive heart failure, unspecified heart failure type ([address] ) [I50 9]  DISCHARGE DATE/TIME: No discharge date for patient encounter  IMPORTANT INFORMATION:  Please contact the Gladys Wu directly with any questions or concerns regarding this request  Department voicemails are confidential     Send requests for admission clinical reviews, concurrent reviews, approvals, and administrative denials due to lack of clinical to fax [phone]

## 2022-03-31 NOTE — PROGRESS NOTES
[facility]  Progress Note - [address] [place] 1965, 61 y o  male MRN: [id number]  Unit/Bed#: E4 -01 Encounter: [id number]  Primary Care Provider: NICOLE Pena   Date and time admitted to hospital: 3/30/2022 11:52 AM    * Acute on chronic combined systolic and diastolic congestive heart failure [facility])  Assessment & Plan  Wt Readings from Last 3 Encounters:   03/31/22 60 8 kg (134 lb)   03/24/22 62 9 kg (138 lb 9 6 oz)   03/22/22 60 kg (132 lb 4 4 oz)     Patient is admitted for CHF exacerbation  BNP on admission is   Lab Results   Component Value Date    NTBNP 7,674 (H) 03/30/2022    NTBNP 12,025 (H) 03/15/2022    NTBNP 31,172 (H) 01/28/2022       Will plan IV diuresis with plan for cardiology consulation if applicable  CHF type is combined systolic and diastolic  Last Echocardiogram shows an EF of 39%, Diastolic HF  Inderterminate  1/2022  Trend BMP with diuresis and replete potassium if applicable  Will trend cardiac enzymes  Fluid restriction and low sodium diet placed  Monitor Sodium levels - likely low in the setting of hypervolemia  Chest Xray shows:  Vascular congestion, large right and moderate left pleural effusion on CT of the chest from 03/29/2022  Troponin reviewed and in the medical chart  Patient will need heart failure follow up within 1 week of discharge          Lactic acidosis  Assessment & Plan  Patient with lactic acidosis felt to be likely secondary to poor perfusion in the setting of multiple episodes of ventricular tachycardia    No evidence of acute infection  CT of the abdomen and pelvis with mesenteric edema without any other acute pathology    Chronic respiratory failure with hypoxia [facility])  Assessment & Plan  History of chronic respiratory failure with hypoxia after having COVID-19  Initially admitted in January of 2022 in respiratory distress, was intubated, subsequently left against medical advice  Discharged on 2 L of oxygen as outpatient pulmonary follow-up  He is chronically on 2-4 L of oxygen   Was recently seen by Pulmonary with CT of the chest demonstrating fibrotic lung disease    Medically noncompliant  Assessment & Plan  History of medication noncompliance- likely etiology of heart failure    Hyponatremia  Assessment & Plan  Likely in the setting of volume overload  Hyponatremia workup underway  Continue fluid restriction, placed on 1000 mL  Continue diuresis  Consider Samsca/Tolvaptan if worsens  Recent Labs     03/30/22  1216 03/31/22  0506   SODIUM 126* 126*         Ventricular fibrillation ([address] )  Assessment & Plan  Patient with intermittent episodes of ventricular tachycardia, started on amiodarone infusion, additionally given amiodarone bolus  Situation remains high risk    Substance use  Assessment & Plan  History of THC and cocaine use in the past - denies currently    Biventricular ICD (implantable cardioverter-defibrillator) in place  Assessment & Plan  History of ventricular fibrillation and ventricular tachycardia  He is status post dual chamber biventricular [employer] ICD in place  Maintained on amiodarone  Recent lead extraction  and addition of a new atrial lead  Also had  generator change due to low battery life in March of 2022    S/P AVR (aortic valve replacement)  Assessment & Plan  History of rheumatic heart disease and is status post mechanical aortic valve replacement in the 1980s    Patient on chronic anticoagulation with Coumadin  INR is therapeutic  Goal INR of 2-3  Recent Labs     03/30/22  1216 03/31/22  0506   INR 2 49* 2 28*         Coronary artery disease involving native coronary artery of native heart without angina pectoris  Assessment & Plan  Patient has a history of coronary artery disease with previous PCI and bare metal stent placement in LAD and circumflex in 2014  Continue medical therapy with aspirin, beta-blocker, statin  Patient has a prior history of cocaine use, UDS negative for any illicit substances      [facility] Internal Medicine Progress Note  Patient: Paolo Dick 61 y o  male   MRN: [id number]  PCP: NICOLE Rubin  Unit/Bed#: E4 -01 Encounter: [id number]  Date Of Visit: 03/31/22    Assessment:    Principal Problem:    Acute on chronic combined systolic and diastolic congestive heart failure (HCC)  Active Problems:    Coronary artery disease involving native coronary artery of native heart without angina pectoris    S/P AVR (aortic valve replacement)    Biventricular ICD (implantable cardioverter-defibrillator) in place    Substance use    Ventricular fibrillation (HCC)    Hyponatremia    Medically noncompliant    Chronic respiratory failure with hypoxia (HCC)    Lactic acidosis      Total critical care time 60 minutes  Total critical care time documented does not include time spent on separately billed procedures or the services of  nurse practioners or physician assistants  I have personally seen and examined the patient  I have reviewed all diagnostic interpretations and treatment plans as written  I was present for the key portions of any procedures performed and the inclusive time noted in any critical care statement  Critical care time includes patient management by me, time spent at the patients bedside, time to review lab and imaging results, discussing patient care, documentation in the medical record, and time spent with the family or caregiver  Meets Critical care criteria based on  Organ System affected cardiac  Time Spent 60 minutes  Action taken is intravenous amiodarone infusion, without this the patient has a life threatening condition for which rapid deterioation is imminent and will lead to potential death if untreated      Plan:    · Start amiodarone drip  · Start furosemide drip  · Transfer to step-down level 2  · May need to consider goals of care discussion if patient does not improve    · Has significant burden of disease, with low ejection fraction, and COVID pulmonary fibrosis resulting in hypoxia  VTE Pharmacologic Prophylaxis:   Pharmacologic: Warfarin (Coumadin)  Mechanical VTE Prophylaxis in Place: Yes    Patient Centered Rounds: I have performed bedside rounds with nursing staff today  Discussions with Specialists or Other Care Team Provider:  Discussed with case management/Cardiology    Education and Discussions with Family / Patient:  Family members at bedside    Time Spent for Care: 45 minutes  More than 50% of total time spent on counseling and coordination of care as described above  Current Length of Stay: 1 day(s)    Current Patient Status: Inpatient   Certification Statement: The patient will continue to require additional inpatient hospital stay due to Congestive heart failure, ventricular tachycardia    Discharge Plan / Estimated Discharge Date: To be determined    Code Status: Level 1 - Full Code      Subjective:   Seen and examined, no acute complaints  Tolerating oral diet  No nausea vomiting or diarrhea  Early reported abdominal pain    A complete and comprehensive 14 point organ system review has been performed and all other systems are negative other than stated above  Objective:     Vitals:   Temp (24hrs), Av 2 °F (36 2 °C), Min:96 3 °F (35 7 °C), Max:97 6 °F (36 4 °C)    Temp:  [96 3 °F (35 7 °C)-97 6 °F (36 4 °C)] 97 4 °F (36 3 °C)  HR:  [] 63  Resp:  [19-22] 20  BP: (118-150)/(69-97) 121/82  SpO2:  [96 %-100 %] 97 %  Body mass index is 21 63 kg/m²  Input and Output Summary (last 24 hours):        Intake/Output Summary (Last 24 hours) at 3/31/2022 1528  Last data filed at 3/31/2022 0500  Gross per 24 hour   Intake --   Output 1150 ml   Net -1150 ml       Physical Exam:     General: well appearing, no acute distress  HEENT: atraumatic, PERRLA, moist mucosa, normal pharynx, normal tonsils and adenoids, normal tongue, no fluid in sinuses  Neck: Trachea midline, no carotid bruit, no masses  Respiratory:  Rales   Cardiovascular:  Murmur present  Abdomen: Soft, non-tender, non-distended, normal bowel sounds in all quadrants, no hepatosplenomegaly, no tympany  Rectal: deferred  Musculoskeletal: normal ROM in upper and lower extremities  Integumentary: warm, dry, and pink, with no rash, purpura, or petechia  Heme/Lymph: no lymphadenopathy, no bruises  Neurological: Cranial Nerves II-XII grossly intact, no tics, normal sensation to pressure and light touch  Psychiatric: cooperative with normal mood, affect, and cognition      Additional Data:     Labs:    Results from last 7 days   Lab Units 03/31/22  0506 03/30/22  1216 03/30/22  1216   WBC Thousand/uL 10 55*   < > 10 22*   HEMOGLOBIN g/dL 10 4*   < > 11 1*   HEMATOCRIT % 33 2*   < > 34 9*   PLATELETS Thousands/uL 292   < > 319   NEUTROS PCT %  --   --  80*   LYMPHS PCT %  --   --  9*   MONOS PCT %  --   --  9   EOS PCT %  --   --  0    < > = values in this interval not displayed  Results from last 7 days   Lab Units 03/31/22  1218 03/31/22  0506 03/31/22  0506 03/30/22  1216 03/30/22  1216   POTASSIUM mmol/L 5 6*   < > 4 1   < > 4 2   CHLORIDE mmol/L  --   --  90*   < > 88*   CO2 mmol/L  --   --  29   < > 29   BUN mg/dL  --   --  11   < > 9   CREATININE mg/dL  --   --  0 97   < > 1 11   CALCIUM mg/dL  --   --  8 6   < > 8 9   ALK PHOS U/L  --   --   --   --  117*   ALT U/L  --   --   --   --  18   AST U/L  --   --   --   --  24    < > = values in this interval not displayed  Results from last 7 days   Lab Units 03/31/22  0506   INR  2 28*       * I Have Reviewed All Lab Data Listed Above  * Additional Pertinent Lab Tests Reviewed:  [address] Admission Reviewed    Imaging:    Imaging Reports Reviewed Today Include:  No new imaging  Imaging Personally Reviewed by Myself Includes:  No new imaging    Recent Cultures (last 7 days):           Last 24 Hours Medication List:   Current Facility-Administered Medications Medication Dose Route Frequency Provider Last Rate    acetaminophen  650 mg Oral Q4H PRN Awanda Nurys, DO      albuterol  2 5 mg Nebulization Q4H PRN Cathi Colemans, DO      amiodarone  1 mg/min Intravenous Continuous Jeanie Guaman MD 1 mg/min (03/31/22 1208)    Followed by   Arnaldo Johnson amiodarone  0 5 mg/min Intravenous Continuous Jeanie Guaman MD      aspirin  81 mg Oral Daily Rubio Pardo, DO      atorvastatin  80 mg Oral Daily Rubio Pardo, DO      fluticasone-vilanterol  1 puff Inhalation Daily Rubio Pardo, DO      furosemide  10 mg/hr Intravenous Continuous Jeanie Guaman MD 10 mg/hr (03/31/22 1221)    metoprolol  2 5 mg Intravenous Q6H Jeanie Guaman MD      ondansetron  4 mg Intravenous Q6H PRN Awanda Nurys, DO      warfarin  2 5 mg Oral Daily (warfarin) Awapaul Colemans, DO          Today, Patient Was Seen By: Herlinda Sagasutme DO    ** Please Note: This note was completed in part utilizing M-Modal Fluency Direct Software  Grammatical errors, random word insertions, spelling mistakes, and incomplete sentences may be an occasional consequence of this system secondary to software limitations, ambient noise, and hardware issues  If you have any questions or concerns about the content, text, or information contained within the body of this dictation, please contact the provider for clarification   **

## 2022-03-31 NOTE — ASSESSMENT & PLAN NOTE
Patient with lactic acidosis felt to be likely secondary to poor perfusion in the setting of multiple episodes of ventricular tachycardia    No evidence of acute infection  CT of the abdomen and pelvis with mesenteric edema without any other acute pathology

## 2022-03-31 NOTE — ASSESSMENT & PLAN NOTE
Patient has a history of coronary artery disease with previous PCI and bare metal stent placement in LAD and circumflex in 2014  Continue medical therapy with aspirin, beta-blocker, statin  Patient has a prior history of cocaine use, UDS negative for any illicit substances

## 2022-03-31 NOTE — ASSESSMENT & PLAN NOTE
History of rheumatic heart disease and is status post mechanical aortic valve replacement in the 1980s    Patient on chronic anticoagulation with Coumadin  INR is therapeutic  Goal INR of 2-3  Recent Labs     03/30/22  1216 03/31/22  0506   INR 2 49* 2 28*

## 2022-03-31 NOTE — UTILIZATION REVIEW
Initial Clinical Review    Admission: Date/Time/Statement:   Admission Orders (From admission, onward)     Ordered        03/30/22 1344  Inpatient Admission  Once                      Orders Placed This Encounter   Procedures    Inpatient Admission     Standing Status:   Standing     Number of Occurrences:   1     Order Specific Question:   Level of Care     Answer:   Med Surg [16]     Order Specific Question:   Estimated length of stay     Answer:   More than 2 Midnights     Order Specific Question:   Certification     Answer:   I certify that inpatient services are medically necessary for this patient for a duration of greater than two midnights  See H&P and MD Progress Notes for additional information about the patient's course of treatment  ED Arrival Information     Expected Arrival Acuity    - 3/30/2022 11:41 Emergent         Means of arrival Escorted by Service Admission type    [facility] Emergency         Arrival complaint    sob chest pain        Chief Complaint   Patient presents with    Chest Pain     Pt reports left sided chest pain that began last night  Pt reports it is on his left side and does not radiate  Pt reports exrtreme sob  Pt is on home 02  Initial Presentation: 61 y o  male with PMH AVR, CAD, CHF with EF 18%, ICD, medication non compliance, substance abuse presents to ED from home with severe SOB since last evening, worsening this am and intermittent left sided chest pain/pressure  Normally uses 3-4 L O2  2/2 previous covid-19 infection  ED Findings: conversational dyspnea, tachycardia, tachypnea, lower extremity edema, lactic acid 4 3, D-dimer 2 64,  Na 126, NTproBNP 7,674, WBC's 10 22, Hg 11 1  CXR shows vascular congestion  EKG Atrial-sensed ventricular-paced rhythm with occasional AV dual-paced complexes and Fusion complexes     Admitted to [address] [place] [address] with Acute on chronic sys&diastolic CHF, Chronic respiratory failure w/ Hypoxia, Hyponatremia  Monitor on Telemetry, IV diuresis, fluid restrict, Cardio consult, denies current substance abuse-UDS pending, Continue medical therapy with aspirin, beta-blocker, statin    Per Cardio: presents with signs/symptoms of recurrent decompensated CHF  Earlier this month patient had ventricular tachycardia treated with ATP --> amiodarone increased to 200 mg daily  single episode of ventricular fibrillation 3/28/2022 (33 beats at 250 bpm) aborted with a ATP x1  modest non MI troponin elevation occurring in the setting of decompensated CHF and recent defibrillation not indicative of acute coronary syndrome  Continue IV diuresis - lasix 60 mg IV bid, monoitor elytes, renal fx  Re-interrogate device  Continue current dose amiodarone  Hold lisinopril  Suggest repeat imaging at about 48 hours with consideration of thoracentesis to hasten resolution of pleural effusions        Date: 3/31  Day 2: Pt  Appears to be having recurrent VT on monitor  DC po amiodarone and started on IV amiodarone and Lasix Infusion  Continue oral fluid restrict 1000 ml/day  CXR in am  Repeat ECHOI  ABG  Change po metoprolol to IV  C/O Abd pain  CT A&P ordered     Upgraded to Level 2 Stepdown    ED Triage Vitals   Temperature Pulse Respirations Blood Pressure SpO2   03/30/22 1202 03/30/22 1158 03/30/22 1158 03/30/22 1158 03/30/22 1158   (!) 97 4 °F (36 3 °C) 104 22 (!) 154/101 100 %      Temp Source Heart Rate Source Patient Position - Orthostatic VS BP Location FiO2 (%)   03/30/22 1202 03/30/22 1424 03/30/22 1424 03/30/22 1424 --   Oral Monitor Lying Right arm       Pain Score       03/30/22 1424       5          Wt Readings from Last 1 Encounters:   03/31/22 60 8 kg (134 lb)     Additional Vital Signs:   03/31/22 1208 97 4 °F (36 3 °C)  63 20 121/82 90 97 % -- -- Nasal cannula Lying   03/31/22 0700 97 6 °F (36 4 °C) 115 Abnormal  20 135/88 106 97 % -- -- Nasal cannula Sitting   03/31/22 0300 97 2 °F (36 2 °C)   94 20 121/77 92 96 % -- -- -- Lying   03/31/22 0004 97 4 °F (36 3 °C)  73 20 118/86 -- 99 % -- -- Nasal cannula Lying   03/30/22 2034 96 3 °F (35 7 °C)  103 20 139/84 98 97 % 36 4 L/min Nasal cannula Lying   03/30/22 2020 -- 96 20 126/88 -- 100 % 40 5 L/min Nasal cannula Lying   03/30/22 1930 -- 100 19 124/84 100 100 % -- -- None (Room air) Lying   03/30/22 1830 -- 100 22 150/97 117 100 % 36 4 L/min Nasal cannula Lying   03/30/22 1700 -- 98 22 145/82 107 100 % 36 4 L/min Nasal cannula Lying   03/30/22 1530 -- 102 22 129/69 -- 100 % 36 4 L/min Nasal cannula Lying   03/30/22 1424 -- 104 22 135/84 -- 99 % 36 4 L/min Nasal cannula Lying       Pertinent Labs/Diagnostic Test Results:   CT abdomen pelvis w contrast   Final Result by Simran Olmos MD (03/31 1418)      No acute intra-abdominal pathology  Diffuse groundglass opacities and moderate-sized bilateral pleural effusions, likely on the basis of pulmonary edema  Cardiomegaly with findings suggestive of congestive heart failure  Diffuse mesenteric edema and small pelvic ascites  Workstation performed: [id number]         XR chest 1 view portable   ED Interpretation by Cassandra Coats DO (03/30 1248)   chf      Final Result by Tushar Lopez MD (03/30 1428)      Vascular congestion  Workstation performed: [id number]         XR chest portable    (Results Pending)     3/30 EKG: ECG  Rate:  101    ECG rate assessment: tachycardic      Rhythm: sinus rhythm and paced    Pacing:     Capture:  Intermittent    Ectopy: PVCs      PVCs:  Infrequent      Abnormal conduction: complete RBBB      ST segments:  Normal    T waves: normal       3/31 ECHO:  Left Ventricle: Left ventricular cavity size is moderately dilated  Wall thickness is normal  The left ventricular ejection fraction is 17% by biplane measurement  Systolic function is severely reduced  There is moderate global hypokinesis with regional variation   The apex is the most severely hypokenetic but is not akenetic    Right Ventricle: Right ventricular cavity size is moderately dilated  Systolic function is moderately reduced  Abnormal tricuspid annular plane systolic excursion (TAPSE) < 1 7 cm    Left Atrium: The atrium is severely dilated at 62 ml/m2    Right Atrium: The atrium is moderately dilated    Aortic Valve: There is a tilting disc mechanical valve  There is no evidence of stenosis  The aortic valve peak velocity is 2 13 m/s  The aortic valve mean gradient is 8 mmHg  The DVI is 0 25  The aortic valve area is 0 90 cm2    Mitral Valve: There is mild regurgitation    Tricuspid Valve: There is moderate regurgitation    Aorta: The aortic root is mildly dilated  The ascending aorta is mildly dilated    Pericardium: There is a trivial pericardial effusion  There is no echocardiographic evidence of tamponade    Pulmonary Artery: The estimated pulmonary artery systolic pressure is 34 4 mmHg   The pulmonary artery systolic pressure is severely increased      Results from last 7 days   Lab Units 03/31/22  0506 03/30/22  1216   WBC Thousand/uL 10 55* 10 22*   HEMOGLOBIN g/dL 10 4* 11 1*   HEMATOCRIT % 33 2* 34 9*   PLATELETS Thousands/uL 292 319   NEUTROS ABS Thousands/µL  --  8 21*     Results from last 7 days   Lab Units 03/31/22  1218 03/31/22  0506 03/30/22  1216   SODIUM mmol/L  --  126* 126*   POTASSIUM mmol/L 5 6* 4 1 4 2   CHLORIDE mmol/L  --  90* 88*   CO2 mmol/L  --  29 29   ANION GAP mmol/L  --  7 9   BUN mg/dL  --  11 9   CREATININE mg/dL  --  0 97 1 11   EGFR ml/min/1 73sq m  --  86 73   CALCIUM mg/dL  --  8 6 8 9   MAGNESIUM mg/dL 1 8  --   --      Results from last 7 days   Lab Units 03/30/22  1216   AST U/L 24   ALT U/L 18   ALK PHOS U/L 117*   TOTAL PROTEIN g/dL 8 1   ALBUMIN g/dL 3 3*   TOTAL BILIRUBIN mg/dL 1 39*     Results from last 7 days   Lab Units 03/31/22  0506 03/30/22  1216   GLUCOSE RANDOM mg/dL 119 170*     Results from last 7 days   Lab Units 03/30/22  1419   OSMOLALITY, SERUM mmol/     Results from last 7 days   Lab Units 03/31/22  1109   PH ART  7 474*   PCO2 ART mm Hg 29 2*   PO2 ART mm Hg 105 7   HCO3 ART mmol/L 21 0*   BASE EXC ART mmol/L -1 7   O2 CONTENT ART mL/dL 16 1   O2 HGB, ARTERIAL % 97 1*   ABG SOURCE  Brachial, Right     Results from last 7 days   Lab Units 03/30/22  1712 03/30/22  1419 03/30/22  1216   HS TNI 0HR ng/L  --   --  34   HS TNI 2HR ng/L  --  32  --    HSTNI D2 ng/L  --  -2  --    HS TNI 4HR ng/L 40  --   --    HSTNI D4 ng/L 6  --   --      Results from last 7 days   Lab Units 03/30/22  1216   D-DIMER QUANTITATIVE ug/ml FEU 2 64*     Results from last 7 days   Lab Units 03/31/22  0506 03/30/22  1216   PROTIME seconds 24 2* 25 9*   INR  2 28* 2 49*   PTT seconds  --  47*     Results from last 7 days   Lab Units 03/30/22  1216   TSH 3RD GENERATON uIU/mL 4 722*     Results from last 7 days   Lab Units 03/30/22  1216   PROCALCITONIN ng/ml <0 05     Results from last 7 days   Lab Units 03/31/22  1218 03/30/22  1419 03/30/22  1216   LACTIC ACID mmol/L 4 3* 3 2* 4 3*     Results from last 7 days   Lab Units 03/30/22  1216   NT-PRO BNP pg/mL 7,674*     Results from last 7 days   Lab Units 03/30/22  1419 03/30/22  1345   OSMOLALITY, SERUM mmol/  --    OSMO UR mmol/KG  --  317     Results from last 7 days   Lab Units 03/30/22  1345   SODIUM UR  76     Results from last 7 days   Lab Units 03/30/22  1345   AMPH/METH  Negative   BARBITURATE UR  Negative   BENZODIAZEPINE UR  Negative   COCAINE UR  Negative   METHADONE URINE  Negative   OPIATE UR  Negative   PCP UR  Negative   THC UR  Negative     ED Treatment:   Medication Administration from 03/30/2022 1141 to 03/30/2022 2042       Date/Time Order Dose Route Action     03/30/2022 1257 furosemide (LASIX) injection 40 mg 40 mg Intravenous Given     03/30/2022 1914 furosemide (LASIX) injection 40 mg 40 mg Intravenous Given        Past Medical History:   Diagnosis Date    AICD (automatic cardioverter/defibrillator) present     [de-identified]     CAD (coronary artery disease)     Cancer ([facility] )     Cardiac disease     Cardiomyopathy ([place] [address] )     mixed    predominently nonischemic    Colonic mass     Coronary artery disease     History of ventricular fibrillation 9/2/2021    History of ventricular tachycardia 5/25/2021    Hyperlipidemia     Hypertension     Irregular heart beat     Myocardial infarction [facility])     2014 and 2015    Pneumonia 1/26/2022    Rectal bleeding     S/P AVR (aortic valve replacement)     mechanical    Wears glasses      Present on Admission:   Coronary artery disease involving native coronary artery of native heart without angina pectoris   Substance use   Acute on chronic combined systolic and diastolic congestive heart failure (HCC)   Chronic respiratory failure with hypoxia ([facility])   Biventricular ICD (implantable cardioverter-defibrillator) in place   Hyponatremia      Admitting Diagnosis: Lactic acidosis [E87 2]  Chest pain [R07 9]  Ischemic cardiomyopathy [I25 5]  Acute on chronic combined systolic and diastolic congestive heart failure (HCC) [I50 43]  Acute congestive heart failure, unspecified heart failure type ([place] [address] ) [I50 9]  Age/Sex: 61 y o  male     Admission Orders:  Scheduled Medications:  aspirin, 81 mg, Oral, Daily  atorvastatin, 80 mg, Oral, Daily  fluticasone-vilanterol, 1 puff, Inhalation, Daily  furosemide, 60 mg, IV BID     metoprolol, 2 5 mg, Intravenous, Q6H  warfarin, 2 5 mg, Oral, Daily (warfarin)  3/31 Amiodarone bolus 150 mg IV x 1  3/31 Ativan 1 mg IV x 1    Continuous IV Infusions:  amiodarone, 1 mg/min, Intravenous, Continuous   Followed by  amiodarone, 0 5 mg/min, Intravenous, Continuous  furosemide, 10 mg/hr, Intravenous, Continuous      PRN Meds:  acetaminophen, 650 mg, Oral, Q4H PRN  albuterol, 2 5 mg, Nebulization, Q4H PRN  ondansetron, 4 mg, Intravenous, Q6H PRN    Telemetry  Cardiovascular diet, 2Gm Na, Fluid restrict 1000 mls  IP CONSULT TO CARDIOLOGY  IP CONSULT TO NUTRITION SERVICES  IP CONSULT TO CARDIOLOGY    Network Utilization Review Department  ATTENTION: Please call with any questions or concerns to [phone] and carefully listen to the prompts so that you are directed to the right person  All voicemails are confidential   Chandrika Jabari all requests for admission clinical reviews, approved or denied determinations and any other requests to dedicated fax number below belonging to the campus where the patient is receiving treatment   List of dedicated fax numbers for the Facilities:  [address] DENIALS (Administrative/Medical Necessity) [phone]   [address] (Maternity/NICU/Pediatrics) [phone]   [address]  [address] [address] [address]ace Melvin [address] [facility] [place] [address] [phone]

## 2022-04-01 NOTE — ASSESSMENT & PLAN NOTE
History of rheumatic heart disease and is status post mechanical aortic valve replacement in the 1980s    Patient on chronic anticoagulation with Coumadin  INR is therapeutic  Goal INR of 2-3  Slightly above goal likely in the setting of amiodarone, will hold warfarin tonight  Recent Labs     03/30/22  1216 03/31/22  0506 04/01/22  0503   INR 2 49* 2 28* 3 33*

## 2022-04-01 NOTE — ASSESSMENT & PLAN NOTE
Wt Readings from Last 3 Encounters:   03/31/22 60 8 kg (134 lb)   03/24/22 62 9 kg (138 lb 9 6 oz)   03/22/22 60 kg (132 lb 4 4 oz)     Patient is admitted for CHF exacerbation  BNP on admission is   Lab Results   Component Value Date    NTBNP 7,674 (H) 03/30/2022    NTBNP 12,025 (H) 03/15/2022    NTBNP 31,172 (H) 01/28/2022       Continue furosemide drip  CHF type is combined systolic and diastolic  Last Echocardiogram shows an EF of 77%, Diastolic HF  Inderterminate  1/2022  Trend BMP with diuresis and replete potassium if applicable  Will trend cardiac enzymes  Fluid restriction and low sodium diet placed  Monitor Sodium levels - likely low in the setting of hypervolemia  Chest Xray shows:  Vascular congestion, large right and moderate left pleural effusion on CT of the chest from 03/29/2022  Repeat chest x-ray under penetrated  Will order repeat PA and lateral in a m    Troponin reviewed and in the medical chart  Patient will need heart failure follow up within 1 week of discharge

## 2022-04-01 NOTE — ASSESSMENT & PLAN NOTE
Patient with intermittent episodes of ventricular tachycardia, started on amiodarone infusion, additionally given amiodarone bolus    Less persistent ventricular fibrillation

## 2022-04-01 NOTE — PROGRESS NOTES
[facility]  Progress Note - [address] 1965, 61 y o  male MRN: [id number]  Unit/Bed#: E4 -01 Encounter: [id number]  Primary Care Provider: NICOLE Pro   Date and time admitted to hospital: 3/30/2022 11:52 AM    * Acute on chronic combined systolic and diastolic congestive heart failure [facility])  Assessment & Plan  Wt Readings from Last 3 Encounters:   03/31/22 60 8 kg (134 lb)   03/24/22 62 9 kg (138 lb 9 6 oz)   03/22/22 60 kg (132 lb 4 4 oz)     Patient is admitted for CHF exacerbation  BNP on admission is   Lab Results   Component Value Date    NTBNP 7,674 (H) 03/30/2022    NTBNP 12,025 (H) 03/15/2022    NTBNP 31,172 (H) 01/28/2022       Continue furosemide drip  CHF type is combined systolic and diastolic  Last Echocardiogram shows an EF of 28%, Diastolic HF  Inderterminate  1/2022  Trend BMP with diuresis and replete potassium if applicable  Will trend cardiac enzymes  Fluid restriction and low sodium diet placed  Monitor Sodium levels - likely low in the setting of hypervolemia  Chest Xray shows:  Vascular congestion, large right and moderate left pleural effusion on CT of the chest from 03/29/2022  Repeat chest x-ray under penetrated  Will order repeat PA and lateral in a m  Troponin reviewed and in the medical chart  Patient will need heart failure follow up within 1 week of discharge          Lactic acidosis  Assessment & Plan  Patient with lactic acidosis felt to be likely secondary to poor perfusion in the setting of multiple episodes of ventricular tachycardia    No evidence of acute infection  CT of the abdomen and pelvis with mesenteric edema without any other acute pathology    Chronic respiratory failure with hypoxia [facility])  Assessment & Plan  History of chronic respiratory failure with hypoxia after having COVID-19  Initially admitted in January of 2022 in respiratory distress, was intubated, subsequently left against medical advice  Discharged on 2 L of oxygen as outpatient pulmonary follow-up  He is chronically on 2-4 L of oxygen   Was recently seen by Pulmonary with CT of the chest demonstrating fibrotic lung disease    Medically noncompliant  Assessment & Plan  History of medication noncompliance- likely etiology of heart failure    Hyponatremia  Assessment & Plan  Likely in the setting of volume overload  Hyponatremia workup underway  Continue fluid restriction, placed on 1000 mL  Continue diuresis  Consider Samsca/Tolvaptan if worsens  Sodium improved  Recent Labs     03/30/22  1216 03/31/22  0506 04/01/22  0503   SODIUM 126* 126* 130*         Ventricular fibrillation (HCC)  Assessment & Plan  Patient with intermittent episodes of ventricular tachycardia, started on amiodarone infusion, additionally given amiodarone bolus  Less persistent ventricular fibrillation    Substance use  Assessment & Plan  History of THC and cocaine use in the past - denies currently    Biventricular ICD (implantable cardioverter-defibrillator) in place  Assessment & Plan  History of ventricular fibrillation and ventricular tachycardia  He is status post dual chamber biventricular Medtronic ICD in place  Maintained on amiodarone  Recent lead extraction  and addition of a new atrial lead  Also had  generator change due to low battery life in March of 2022    S/P AVR (aortic valve replacement)  Assessment & Plan  History of rheumatic heart disease and is status post mechanical aortic valve replacement in the 1980s    Patient on chronic anticoagulation with Coumadin  INR is therapeutic  Goal INR of 2-3  Slightly above goal likely in the setting of amiodarone, will hold warfarin tonight  Recent Labs     03/30/22  1216 03/31/22  0506 04/01/22  0503   INR 2 49* 2 28* 3 33*         Coronary artery disease involving native coronary artery of native heart without angina pectoris  Assessment & Plan  Patient has a history of coronary artery disease with previous PCI and bare metal stent placement in LAD and circumflex in 2014  Continue medical therapy with aspirin, beta-blocker, statin  Patient has a prior history of cocaine use, UDS negative for any illicit substances      [facility] Internal Medicine Progress Note  Patient: Edward Minors 61 y o  male   MRN: [id number]  PCP: NICOLE Goddard  Unit/Bed#: E4 -01 Encounter: [id number]  Date Of Visit: 04/01/22    Assessment:    Principal Problem:    Acute on chronic combined systolic and diastolic congestive heart failure (HCC)  Active Problems:    Coronary artery disease involving native coronary artery of native heart without angina pectoris    S/P AVR (aortic valve replacement)    Biventricular ICD (implantable cardioverter-defibrillator) in place    Substance use    Ventricular fibrillation ([facility] [address] )    Hyponatremia    Medically noncompliant    Chronic respiratory failure with hypoxia (HCC)    Lactic acidosis      Plan:    · Continue current IV furosemide infusion  · Continue Coumadin  · Check chest x-ray in the morning  · Can take off step-down level 2  · Overall prognosis still remains poor       VTE Pharmacologic Prophylaxis:   Pharmacologic: Warfarin (Coumadin)  Mechanical VTE Prophylaxis in Place: Yes    Patient Centered Rounds: I have performed bedside rounds with nursing staff today  Discussions with Specialists or Other Care Team Provider:  Cardiology    Education and Discussions with Family / Patient:  Discussed with neice    Time Spent for Care: 45 minutes  More than 50% of total time spent on counseling and coordination of care as described above      Current Length of Stay: 2 day(s)    Current Patient Status: Inpatient   Certification Statement: The patient will continue to require additional inpatient hospital stay due to Intravenous diuretic    Discharge Plan / Estimated Discharge Date:  48 hours    Code Status: Level 1 - Full Code      Subjective:   Seen and examined, significantly less short of breath, tolerating oral diet  No nausea no vomiting  Lower extremity edema is improved    A complete and comprehensive 14 point organ system review has been performed and all other systems are negative other than stated above  Objective:     Vitals:   Temp (24hrs), Av 9 °F (36 6 °C), Min:97 4 °F (36 3 °C), Max:98 3 °F (36 8 °C)    Temp:  [97 4 °F (36 3 °C)-98 3 °F (36 8 °C)] 97 9 °F (36 6 °C)  HR:  [] 83  Resp:  [18-20] 18  BP: (110-147)/(67-92) 110/73  SpO2:  [97 %-100 %] 99 %  Body mass index is 21 63 kg/m²  Input and Output Summary (last 24 hours):        Intake/Output Summary (Last 24 hours) at 2022 1122  Last data filed at 2022 0701  Gross per 24 hour   Intake --   Output 725 ml   Net -725 ml       Physical Exam:     General:  Appears chronically ill, older than stated age  HEENT: atraumatic, PERRLA, moist mucosa, normal pharynx, normal tonsils and adenoids, normal tongue, no fluid in sinuses  Neck: Trachea midline, no carotid bruit, no masses  Respiratory: normal chest wall expansion, CTA B, no r/r/w, no rubs  Cardiovascular: RRR, with 6/6 metallic click murmur heard at the right upper sternal border  Abdomen: Soft, non-tender, non-distended, normal bowel sounds in all quadrants, no hepatosplenomegaly, no tympany  Rectal: deferred  Musculoskeletal: normal ROM in upper and lower extremities  Integumentary: warm, dry, and pink, with no rash, purpura, or petechia  Heme/Lymph: no lymphadenopathy, no bruises  Neurological: Cranial Nerves II-XII grossly intact, no tics, normal sensation to pressure and light touch  Psychiatric: cooperative with normal mood, affect, and cognition      Additional Data:     Labs:    Results from last 7 days   Lab Units 22  0503 22  0506 22  1216   WBC Thousand/uL 12 54*   < > 10 22*   HEMOGLOBIN g/dL 10 2*   < > 11 1*   HEMATOCRIT % 32 0*   < > 34 9*   PLATELETS Thousands/uL 278   < > 319   NEUTROS PCT %  --   --  80*   LYMPHS PCT %  --   --  9*   MONOS PCT %  --   --  9   EOS PCT %  --   --  0    < > = values in this interval not displayed  Results from last 7 days   Lab Units 04/01/22  0503 03/31/22  0506 03/30/22  1216   POTASSIUM mmol/L 3 8   < > 4 2   CHLORIDE mmol/L 87*   < > 88*   CO2 mmol/L 30   < > 29   BUN mg/dL 14   < > 9   CREATININE mg/dL 1 22   < > 1 11   CALCIUM mg/dL 8 6   < > 8 9   ALK PHOS U/L  --   --  117*   ALT U/L  --   --  18   AST U/L  --   --  24    < > = values in this interval not displayed  Results from last 7 days   Lab Units 04/01/22  0503   INR  3 33*       * I Have Reviewed All Lab Data Listed Above  * Additional Pertinent Lab Tests Reviewed:  [address] Admission Reviewed    Imaging:    Imaging Reports Reviewed Today Include:  CT abdomen and pelvis  Imaging Personally Reviewed by Myself Includes:  CT abdomen pelvis    Recent Cultures (last 7 days):           Last 24 Hours Medication List:   Current Facility-Administered Medications   Medication Dose Route Frequency Provider Last Rate    acetaminophen  650 mg Oral Q4H PRN Amparo Nair DO      albuterol  2 5 mg Nebulization Q4H PRN Amparo Nair DO      amiodarone  0 5 mg/min Intravenous Continuous Mark Calhoun MD 0 5 mg/min (03/31/22 1756)    amiodarone  400 mg Oral Q8H Mark Calhoun MD      aspirin  81 mg Oral Daily Rubio Pardo DO      atorvastatin  80 mg Oral Daily Rubio Pardo DO      fluticasone-vilanterol  1 puff Inhalation Daily Rubio Pardo DO      furosemide  10 mg/hr Intravenous Continuous Mark Calhoun MD 10 mg/hr (03/31/22 1221)    metoprolol tartrate  50 mg Oral Q12H [address] Mark Calhoun MD      ondansetron  4 mg Intravenous Q6H PRN Amparo Nair DO      potassium chloride  40 mEq Oral BID Mark Calhoun MD      warfarin  2 5 mg Oral Daily (warfarin) Amparo Nair DO          Today, Patient Was Seen By: Max Mcneill DO    ** Please Note: This note was completed in part utilizing M-Modal Fluency Direct Software  Grammatical errors, random word insertions, spelling mistakes, and incomplete sentences may be an occasional consequence of this system secondary to software limitations, ambient noise, and hardware issues  If you have any questions or concerns about the content, text, or information contained within the body of this dictation, please contact the provider for clarification   **

## 2022-04-01 NOTE — PROGRESS NOTES
Progress Note - Cardiology   [facility] [place] 61 y o  male MRN: [id number]  Unit/Bed#: E4 -01 Encounter: [id number]    Assessment:     Sustained ventricular tachycardia appears presently controlled with IV amiodarone   Acute on chronic systolic congestive heart failure   Hyponatremia improving   Lactic acidosis   Chest x-ray done supine and total white of chest, under penetrated   Bilateral pleural effusions by CT scan   Medtronic biventricular dual chamber ICD   Coronary artery disease with PCI of LAD and circumflex 2014   Ventricular tachycardia/fibrillation with successful ATP events   Mechanical aortic valve 1988 for rheumatic aortic stenosis   Warfarin anticoagulation   Hypertension   Dyslipidemia   Illicit substance abuse   GFR 86 to 65    Plan:     Convert from IV amiodarone to p o  Amiodarone   Continue furosemide infusion at 10 mg an hour   Continue p o  Fluid restriction   Changed IV metoprolol to p o  Metoprolol   If patient remains stable on p o  Amiodarone, will consider PA and lateral chest x-ray tomorrow      Interval history:  Patient feeling much better than yesterday  No further episodes of ventricular tachycardia  His breathing is better  His weight is down 6 lb  He does not complain of being short of breath but he is still mildly tachypneic      PROBLEM LIST:    Patient Active Problem List    Diagnosis Date Noted    Hyponatremia 01/21/2022    Aortic stenosis 01/09/2022    Ischemic cardiomyopathy 01/09/2022    Drug abuse ([facility] ) 01/09/2022    Ventricular fibrillation ([facility] ) 01/09/2022    Elevated troponin 09/02/2021    SIRS (systemic inflammatory response syndrome) ([facility] [address] ) 09/02/2021    Substance use 09/02/2021    Depressed mood 03/06/2020    S/P AVR (aortic valve replacement) 06/18/2018    Biventricular ICD (implantable cardioverter-defibrillator) in place 06/18/2018    Colonic mass 06/03/2016    Coronary artery disease involving native coronary artery of native heart without angina pectoris 06/03/2016    Lactic acidosis 03/31/2022    Hypertension     Hyperlipidemia     [facility]cardia ([email or web address] ) 03/16/2022    History of substance abuse ([place] [address] ) 03/16/2022    Eschar of toe 03/16/2022    Encounter for examination following treatment at hospital 03/04/2022    Chronic respiratory failure with hypoxia ([place] [address] ) 02/15/2022    Medically noncompliant 01/28/2022    Acute on chronic combined systolic and diastolic congestive heart failure ([place] [address] ) 01/28/2022    Aortic ectasia, thoracic ([place] [address] ) 01/28/2022    Mild protein-calorie malnutrition ([place] [address] ) 01/25/2022       Vitals: /67 (BP Location: Right arm)   Pulse 82   Temp 97 7 °F (36 5 °C) (Temporal)   Resp 18   Ht 5' 6" (1 676 m)   Wt 60 8 kg (134 lb)   SpO2 100%   BMI 21 63 kg/m²   PREVIOUS WEIGHTS:   Weight (last 2 days)     Date/Time Weight    03/31/22 1208 60 8 (134)    03/31/22 0600 60 9 (134 26)    03/30/22 2034 63 9 (140 87)          Wt Readings from Last 2 Encounters:   03/31/22 60 8 kg (134 lb)   03/24/22 62 9 kg (138 lb 9 6 oz)       Labs/Data:        Results from last 7 days   Lab Units 04/01/22  0503 03/31/22  0506 03/30/22  1216   WBC Thousand/uL 12 54* 10 55* 10 22*   HEMOGLOBIN g/dL 10 2* 10 4* 11 1*   HEMATOCRIT % 32 0* 33 2* 34 9*   MCV fL 78* 79* 78*   MCH pg 24 9* 24 6* 24 8*   MCHC g/dL 31 9 31 3* 31 8   PLATELETS Thousands/uL 278 292 319     Results from last 7 days   Lab Units 04/01/22  0503 03/31/22  1218 03/31/22  0506 03/30/22  1216 03/30/22  1216   EGFR ml/min/1 73sq m 65  --  86  --  73   SODIUM mmol/L 130*  --  126*  --  126*   POTASSIUM mmol/L 3 8 5 6* 4 1   < > 4 2   CHLORIDE mmol/L 87*  --  90*  --  88*   CO2 mmol/L 30  --  29  --  29   BUN mg/dL 14  --  11  --  9   CREATININE mg/dL 1 22  --  0 97  --  1 11    < > = values in this interval not displayed       Results from last 7 days   Lab Units 04/01/22  0503 03/31/22  1218 03/31/22  0506 03/30/22  1216   CALCIUM mg/dL 8 6  -- 8 6 8 9   AST U/L  --   --   --  24   ALT U/L  --   --   --  18   ALK PHOS U/L  --   --   --  117*   MAGNESIUM mg/dL  --  1 8  --   --          Lab Results   Component Value Date    NTBNP 7,674 (H) 2022    NTBNP 12,025 (H) 03/15/2022    NTBNP 31,172 (H) 2022     Lab Results   Component Value Date    CHOLESTEROL 87 2022    TRIG 86 2022    HDL 13 (L) 2022    LDLCALC 57 2022    HGBA1C 5 8 (H) 09/10/2021       Results from last 7 days   Lab Units 22  0503 22  0506 22  1216   INR  3 33* 2 28* 2 49*   PROTIME seconds 32 3* 24 2* 25 9*          Current Facility-Administered Medications:     acetaminophen (TYLENOL) tablet 650 mg, 650 mg, Oral, Q4H PRN, Rubio Pardo DO    albuterol inhalation solution 2 5 mg, 2 5 mg, Nebulization, Q4H PRN, Rubio Pardo DO    [] amiodarone (CORDARONE) 900 mg in dextrose 5 % 500 mL infusion, 1 mg/min, Intravenous, Continuous, Stopped at 22 **FOLLOWED BY** amiodarone (CORDARONE) 900 mg in dextrose 5 % 500 mL infusion, 0 5 mg/min, Intravenous, Continuous, Therese Nieto MD, Last Rate: 16 7 mL/hr at 22, 0 5 mg/min at 22    aspirin chewable tablet 81 mg, 81 mg, Oral, Daily, Rubio Pardo DO, 81 mg at 22    atorvastatin (LIPITOR) tablet 80 mg, 80 mg, Oral, Daily, Rubio Pardo DO, 80 mg at 22    fluticasone-vilanterol (BREO ELLIPTA) 100-25 mcg/inh inhaler 1 puff, 1 puff, Inhalation, Daily, Rubio Pardo DO, 1 puff at 2248    furosemide (LASIX) 500 mg infusion 50 mL, 10 mg/hr, Intravenous, Continuous, Therese Nieto MD, Last Rate: 1 mL/hr at 22 1221, 10 mg/hr at 22 1221    metoprolol (LOPRESSOR) injection 2 5 mg, 2 5 mg, Intravenous, Q6H, Therese Nieto MD, 2 5 mg at 22 0458    ondansetron (ZOFRAN) injection 4 mg, 4 mg, Intravenous, Q6H PRN, Merlin Chafe, DO    warfarin (COUMADIN) tablet 2 5 mg, 2 5 mg, Oral, Daily (warfarin), Merlin Chafe, DO, 2 5 mg at 03/31/22 1710  No Known Allergies      Intake/Output Summary (Last 24 hours) at 4/1/2022 0958  Last data filed at 4/1/2022 0701  Gross per 24 hour   Intake --   Output 725 ml   Net -725 ml       Invasive Devices  Report    Peripheral Intravenous Line            Peripheral IV 03/31/22 Distal;Left;Upper;Ventral (anterior) Arm <1 day    Peripheral IV 03/31/22 Dorsal (posterior); Left Forearm <1 day                Review of Systems   Constitutional: Negative for activity change  Respiratory: Negative for cough, chest tightness, shortness of breath and wheezing  Cardiovascular: Negative for chest pain, palpitations and leg swelling  Musculoskeletal: Negative for gait problem  Skin: Negative for color change  Neurological: Negative for dizziness, tremors, syncope, weakness, light-headedness and headaches  Psychiatric/Behavioral: Negative for agitation and confusion  Physical Exam  Vitals reviewed  Constitutional:       General: He is not in acute distress  Appearance: He is well-developed  Comments: Patient feeling much better than yesterday   HENT:      Head: Normocephalic and atraumatic  Neck:      Thyroid: No thyromegaly  Vascular: No carotid bruit or JVD  Trachea: No tracheal deviation  Cardiovascular:      Rate and Rhythm: Normal rate and regular rhythm  Pulses: Normal pulses  Heart sounds: Normal heart sounds  No murmur heard  No friction rub  No gallop  Pulmonary:      Effort: Pulmonary effort is normal  No respiratory distress  Breath sounds: Normal breath sounds  No wheezing, rhonchi or rales  Chest:      Chest wall: No tenderness  Musculoskeletal:      Cervical back: Normal range of motion and neck supple  Right lower leg: No edema  Left lower leg: No edema  Skin:     General: Skin is warm and dry  Neurological:      General: No focal deficit present  Mental Status: He is alert and oriented to person, place, and time  Psychiatric:         Mood and Affect: Mood normal          Behavior: Behavior normal          Thought Content: Thought content normal          Judgment: Judgment normal          ======================================================     Imaging:   I have personally reviewed pertinent reports  EKG:  Atrial sensed and ventricular paced along with atrial and ventricular paced rhythm  No further ventricular tachycardia      Portions of the record may have been created with voice recognition software  Occasional wrong word or "sound a like" substitutions may have occurred due to the inherent limitations of voice recognition software  Read the chart carefully and recognize, using context, where substitutions have occurred

## 2022-04-01 NOTE — PLAN OF CARE
Problem: MOBILITY - ADULT  Goal: Maintain or return to baseline ADL function  Description: INTERVENTIONS:  -  Assess patient's ability to carry out ADLs; assess patient's baseline for ADL function and identify physical deficits which impact ability to perform ADLs (bathing, care of mouth/teeth, toileting, grooming, dressing, etc )  - Assess/evaluate cause of self-care deficits   - Assess range of motion  - Assess patient's mobility; develop plan if impaired  - Assess patient's need for assistive devices and provide as appropriate  - Encourage maximum independence but intervene and supervise when necessary  - Involve family in performance of ADLs  - Assess for home care needs following discharge   - Consider OT consult to assist with ADL evaluation and planning for discharge  - Provide patient education as appropriate  3/31/2022 2335 by Anthony Ag RN  Outcome: Progressing  3/31/2022 2334 by Anthony Ag RN  Outcome: Progressing  Goal: Maintains/Returns to pre admission functional level  Description: INTERVENTIONS:  - Perform BMAT or MOVE assessment daily    - Set and communicate daily mobility goal to care team and patient/family/caregiver  - Collaborate with rehabilitation services on mobility goals if consulted  - Record patient progress and toleration of activity level   3/31/2022 2335 by Anthony Ag RN  Outcome: Progressing  3/31/2022 2334 by Anthony Ag RN  Outcome: Progressing     Problem: Nutrition/Hydration-ADULT  Goal: Nutrient/Hydration intake appropriate for improving, restoring or maintaining nutritional needs  Description: Monitor and assess patient's nutrition/hydration status for malnutrition  Collaborate with interdisciplinary team and initiate plan and interventions as ordered  Monitor patient's weight and dietary intake as ordered or per policy  Utilize nutrition screening tool and intervene as necessary   Determine patient's food preferences and provide high-protein, high-caloric foods as appropriate       INTERVENTIONS:  - Monitor oral intake, urinary output, labs, and treatment plans  - Assess nutrition and hydration status and recommend course of action  - Evaluate amount of meals eaten  - Assist patient with eating if necessary   - Allow adequate time for meals  - Recommend/ encourage appropriate diets, oral nutritional supplements, and vitamin/mineral supplements  - Order, calculate, and assess calorie counts as needed  - Recommend, monitor, and adjust tube feedings and TPN/PPN based on assessed needs  - Assess need for intravenous fluids  - Provide specific nutrition/hydration education as appropriate  - Include patient/family/caregiver in decisions related to nutrition  3/31/2022 2335 by Ashlee Hollingsworth RN  Outcome: Progressing  3/31/2022 2334 by Ashlee Hollingsworth, RN  Outcome: Progressing     Problem: CARDIOVASCULAR - ADULT  Goal: Maintains optimal cardiac output and hemodynamic stability  Description: INTERVENTIONS:  - Monitor I/O, vital signs and rhythm  - Monitor for S/S and trends of decreased cardiac output  - Administer and titrate ordered vasoactive medications to optimize hemodynamic stability  - Assess quality of pulses, skin color and temperature  - Assess for signs of decreased coronary artery perfusion  - Instruct patient to report change in severity of symptoms  Outcome: Progressing  Goal: Absence of cardiac dysrhythmias or at baseline rhythm  Description: INTERVENTIONS:  - Continuous cardiac monitoring, vital signs, obtain 12 lead EKG if ordered  - Administer antiarrhythmic and heart rate control medications as ordered  - Monitor electrolytes and administer replacement therapy as ordered  Outcome: Progressing     Problem: RESPIRATORY - ADULT  Goal: Achieves optimal ventilation and oxygenation  Description: INTERVENTIONS:  - Assess for changes in respiratory status  - Assess for changes in mentation and behavior  - Position to facilitate oxygenation and minimize respiratory effort  - Oxygen administered by appropriate delivery if ordered  - Initiate smoking cessation education as indicated  - Encourage broncho-pulmonary hygiene including cough, deep breathe, Incentive Spirometry  - Assess the need for suctioning and aspirate as needed  - Assess and instruct to report SOB or any respiratory difficulty  - Respiratory Therapy support as indicated  Outcome: Progressing

## 2022-04-01 NOTE — CASE MANAGEMENT
Case Management Assessment & Discharge Planning Note    Patient name [facility]  Location [address] [place] [address] Luite Rosalino 87 432/E4 Luite Rosalino 87 110-* MRN [id number]  : 1965 Date 2022       Current Admission Date: 3/30/2022  Current Admission Diagnosis:Acute on chronic combined systolic and diastolic congestive heart failure [facility])   Patient Active Problem List    Diagnosis Date Noted    Lactic acidosis 2022    Hypertension     Hyperlipidemia     Ventricular tachycardia ([address] ) 2022    History of substance abuse ([facility] ) 2022    Eschar of toe 2022    Encounter for examination following treatment at hospital 2022    Chronic respiratory failure with hypoxia ([address] ) 02/15/2022    Medically noncompliant 2022    Acute on chronic combined systolic and diastolic congestive heart failure ([address] ) 2022    Aortic ectasia, thoracic ([facility] [address] ) 2022    Mild protein-calorie malnutrition ([address] ) 2022    Hyponatremia 2022    Aortic stenosis 2022    Ischemic cardiomyopathy 2022    Drug abuse ([facility] [address] ) 2022    Ventricular fibrillation ([facility] [address] ) 2022    Elevated troponin 2021    SIRS (systemic inflammatory response syndrome) (HCC) 2021    Substance use 2021    Depressed mood 2020    S/P AVR (aortic valve replacement) 2018    Biventricular ICD (implantable cardioverter-defibrillator) in place 2018    Colonic mass 2016    Coronary artery disease involving native coronary artery of native heart without angina pectoris 2016      LOS (days): 2  Geometric Mean LOS (GMLOS) (days): 3 80  Days to GMLOS:1 7     OBJECTIVE:  PATIENT READMITTED TO HOSPITAL  Risk of Unplanned Readmission Score: 41         Current admission status: Inpatient       Preferred Pharmacy:   [facility] [address] KATHERYN PULIDO  Tim [address] [address]  Phone: [phone] Fax: [phone]    Primary Care Provider: NICOLE Lancaster    Primary Insurance: [facility] H[facility] REP  Secondary Insurance: [address] [place] [address] E    ASSESSMENT:  [facility] Representative - Daughter   Primary Phone: [phone] (Mobile)               Advance Directives  Does patient have a [address]?: No  Was patient offered paperwork?: Yes (declined)  Does patient currently have a Health Care decision maker?: No  Does patient have Advance Directives?: No  Was patient offered paperwork?: Yes (declined)         Readmission Root Cause  30 Day Readmission: Yes  Who directed you to return to the hospital?: Self  Did you understand whom to contact if you had questions or problems?: Yes  Did you get your prescriptions before you left the hospital?: Yes  Were you able to get your prescriptions filled when you left the hospital?: Yes  Did you take your medications as prescribed?: Yes  Were you able to get to your follow-up appointments?: Yes  During previous admission, was a post-acute recommendation made?: Yes  What post-acute resources were offered?: Mary Mcdaniel  Patient was readmitted due to: CHF, v tach  Action Plan: IV lasix, IV amiodarone    Patient Information  Admitted from[de-identified] Home  Mental Status: Alert  During Assessment patient was accompanied by: Not accompanied during assessment  Assessment information provided by[de-identified] Patient  Primary Caregiver: Self  Support Systems: [address] [place] [address] of Residence: [facility]  What city do you live in[place] entry access options   Select all that apply : Stairs  Number of steps to enter home : 2  Do the steps have railings?: No  Type of Current Residence: Apartment  Floor Level: 1  Upon entering residence, is there a bedroom on the main floor (no further steps)?: Yes  Upon entering residence, is there a bathroom on the main floor (no further steps)?: Yes  In the last 12 months, was there a time when you were not able to pay the mortgage or rent on time?: No  In the last 12 months, how many places have you lived?: 1  In the last 12 months, was there a time when you did not have a steady place to sleep or slept in a shelter (including now)?: No  Homeless/housing insecurity resource given?: N/A  Living Arrangements: Lives Alone  Is patient a ?: No    Activities of Daily Living Prior to Admission  Functional Status: Independent  Completes ADLs independently?: Yes  Ambulates independently?: Yes  Does patient use assisted devices?: Yes  Assisted Devices (DME) used: Straight Cane  Does patient currently own DME?: Yes  What DME does the patient currently own?: Straight Cane  Does patient have a history of HHC?: Yes  Does patient currently have [facility]?: Yes    Current Home Health Care  Type of Current Home Care Services: [facility][de-identified] Other (please enter name in comment) ([facility])    Patient Information Continued  Does patient have prescription coverage?: Yes  Within the past 12 months, you worried that your food would run out before you got the money to buy more : Never true  Within the past 12 months, the food you bought just didnt last and you didnt have money to get more : Never true  Food insecurity resource given?: N/A  Does patient receive dialysis treatments?: No  Does patient have a history of Mental Health Diagnosis?: Yes  Is patient receiving treatment for mental health?: No  Treatment options were provided    Has patient received inpatient treatment related to mental health in the last 2 years?: No         Means of Transportation  Means of Transport to [facility][de-identified] [address]  In the past 12 months, has lack of transportation kept you from medical appointments or from getting medications?: No  In the past 12 months, has lack of transportation kept you from meetings, work, or from getting things needed for daily living?: No  Was application for public transport provided?: N/A        DISCHARGE DETAILS:    Discharge planning discussed with[de-identified] pt  Freedom of Choice: Yes  Comments - Freedom of Choice: PT rec home no needs  Patient reports he is open with VNA but cannot remember which one  Chart review shows [facility]   CM will send ref to see if they will continue to follow patient  CM contacted family/caregiver?: No- see comments (declined)  Were Treatment Team discharge recommendations reviewed with patient/caregiver?: Yes  Did patient/caregiver verbalize understanding of patient care needs?: Yes  Were patient/caregiver advised of the risks associated with not following Treatment Team discharge recommendations?: Yes         [address] [place] [facility]         Is the patient interested in Bricepedro Mcdaniel at discharge?: Yes  [address] Sujatha Glez requested[de-identified] [facility] Name[de-identified] Other Amandeep Sawyer)  [facility] Provider[de-identified] PCP  Home Health Services Needed[de-identified] Evaluate Functional Status and Safety,Gait/ADL Training,Heart Failure Management,Strengthening/Theraputic Exercises to Improve Function  Oxygen LPM Ordered (if applicable based on home health services needed):: 3 LPM  Homebound Criteria Met[de-identified] Requires the Assistance of Another Person for Safe Ambulation or to Leave the Home,Uses an Assist Device (i e  cane, walker, etc)  Supporting Clincal Findings[de-identified] Fatigues Easliy in Short Distances,Limited Endurance,Requires Oxygen,Dyspnea with Exertion    DME Referral Provided  Referral made for DME?: No    Other Referral/Resources/Interventions Provided:  Interventions: Mary Mcdaniel         Treatment Team Recommendation: Home with [address] MD Lingo  Discharge Destination Plan[de-identified] Home with 2003 MD Lingo

## 2022-04-01 NOTE — ASSESSMENT & PLAN NOTE
[facility]  HISTORY AND PHYSICAL    Name:  Douglas Torres  MR#:   [id number]  :  08/10/1928  ACCOUNT #:  [de-identified]  ADMIT DATE:  2019    The patient seen and examined on 2019 by myself. HISTORY OF PRESENT ILLNESS:  This is a 79-year-old white female who was hospitalized after two syncopal episodes at the assisted living facility while she was getting wound care to her lower extremity wounds. Neurologic workup was negative, both CT and MRI. Did not have any significant arrhythmia on a cardiac monitor. Her syncope was felt to be vasovagal.  The patient was switched from Bumex to Lasix at the request of her family because they felt that contributed to her syncope as she did not have syncope in the past while on Lasix. The patient has lower extremity wounds, which are results of a fall, hematoma wound infection. She was evaluated by infectious disease, treated with vancomycin and Rocephin. Subsequently, switched to Ancef. Wound care continues under the direction of Dr. Catalina Mcdowell from Plastic Surgery. The patient was seen in her bed, she denies any chest pain or shortness of breath. No abdominal pain. She has clean and dry dressing, both lower extremities. Nurses report no significant change in status. PAST MEDICAL HISTORY:  1. Chronic atrial fibrillation, on chronic anticoagulation. 2.  Coronary artery disease, status post coronary stenting. 3.  Chronic systolic heart failure. 4.  Depression. 5.  Irritable bowel syndrome. 6.  Allergic rhinitis. 7.  Chronic intermittent benign vertigo. 8.  Vitamin D deficiency. 9.  Vitamin B12 deficiency. 10.  Hypercholesterolemia. PAST SURGICAL HISTORY:  She had cholecystectomy, cataract surgery, D and C in the past.    SOCIAL HISTORY:  The patient is , she has two children, she used to volunteered here at the hospital.  She is a nonsmoker and nondrinker. FAMILY HISTORY:  Her mother had diabetes.     REVIEW OF SYSTEMS: Likely in the setting of volume overload  Hyponatremia workup underway  Continue fluid restriction, placed on 1000 mL  Continue diuresis  Consider Samsca/Tolvaptan if worsens  Sodium improved  Recent Labs     03/30/22  1216 03/31/22  0506 04/01/22  0503   SODIUM 126* 126* 130* The patient denies chest pain, palpitation, shortness of breath. No abdominal pain, nausea, or vomiting. No focal neurologic symptoms. No dysuria, polyuria, or hematuria. System review otherwise negative. PHYSICAL EXAMINATION:  GENERAL:  A very pleasant female, in no acute distress. VITAL SIGNS:  Stable, blood pressure is controlled. HEENT:  Atraumatic and normocephalic. NECK:  No venous distention, adenopathy or thyromegaly. LUNGS:  Equal air entry bilaterally, clear to auscultation. HEART:  Irregular, rate controlled. ABDOMEN:  Soft, nontender, and nondistended. EXTREMITIES:  The patient has clean and dry Kerlix around both lower legs. NEUROLOGIC:  She is awake, alert and oriented to place, person, and situation and she moves all 4 extremities in bed. Gait was not tested. IMPRESSION:  1. Infected bilateral lower extremity traumatic wounds. 2.  Chronic atrial fibrillation on chronic anticoagulation. 3.  Chronic systolic heart failure. 4.  Probable vasovagal syncope. 5.  As per past medical history. PLAN:  Continue current medications including antibiotics as directed. Wound care under the direction of Dr. Donald Chou, close medical monitoring, physical therapy and rehab for strengthening, balance and gait training. Further management accordingly.         Nirmal Saravia MD      [id number]/  D:  11/29/2019 6:36  T:  11/29/2019 9:27  JOB #:  [id number]

## 2022-04-01 NOTE — PLAN OF CARE
Problem: MOBILITY - ADULT  Goal: Maintain or return to baseline ADL function  Description: INTERVENTIONS:  -  Assess patient's ability to carry out ADLs; assess patient's baseline for ADL function and identify physical deficits which impact ability to perform ADLs (bathing, care of mouth/teeth, toileting, grooming, dressing, etc )  - Assess/evaluate cause of self-care deficits   - Assess range of motion  - Assess patient's mobility; develop plan if impaired  - Assess patient's need for assistive devices and provide as appropriate  - Encourage maximum independence but intervene and supervise when necessary  - Involve family in performance of ADLs  - Assess for home care needs following discharge   - Consider OT consult to assist with ADL evaluation and planning for discharge  - Provide patient education as appropriate    Problem: Nutrition/Hydration-ADULT  Goal: Nutrient/Hydration intake appropriate for improving, restoring or maintaining nutritional needs  Description: Monitor and assess patient's nutrition/hydration status for malnutrition  Collaborate with interdisciplinary team and initiate plan and interventions as ordered  Monitor patient's weight and dietary intake as ordered or per policy  Utilize nutrition screening tool and intervene as necessary  Determine patient's food preferences and provide high-protein, high-caloric foods as appropriate       INTERVENTIONS:  - Monitor oral intake, urinary output, labs, and treatment plans  - Assess nutrition and hydration status and recommend course of action  - Evaluate amount of meals eaten  - Assist patient with eating if necessary   - Allow adequate time for meals  - Recommend/ encourage appropriate diets, oral nutritional supplements, and vitamin/mineral supplements  - Order, calculate, and assess calorie counts as needed  - Recommend, monitor, and adjust tube feedings and TPN/PPN based on assessed needs  - Assess need for intravenous fluids  - Provide specific nutrition/hydration education as appropriate  - Include patient/family/caregiver in decisions related to nutrition  Outcome: Progressing     Problem: CARDIOVASCULAR - ADULT  Goal: Maintains optimal cardiac output and hemodynamic stability  Description: INTERVENTIONS:  - Monitor I/O, vital signs and rhythm  - Monitor for S/S and trends of decreased cardiac output  - Administer and titrate ordered vasoactive medications to optimize hemodynamic stability  - Assess quality of pulses, skin color and temperature  - Assess for signs of decreased coronary artery perfusion  - Instruct patient to report change in severity of symptoms  Outcome: Progressing  Goal: Absence of cardiac dysrhythmias or at baseline rhythm  Description: INTERVENTIONS:  - Continuous cardiac monitoring, vital signs, obtain 12 lead EKG if ordered  - Administer antiarrhythmic and heart rate control medications as ordered  - Monitor electrolytes and administer replacement therapy as ordered  Outcome: Progressing     Problem: RESPIRATORY - ADULT  Goal: Achieves optimal ventilation and oxygenation  Description: INTERVENTIONS:  - Assess for changes in respiratory status  - Assess for changes in mentation and behavior  - Position to facilitate oxygenation and minimize respiratory effort  - Oxygen administered by appropriate delivery if ordered  - Initiate smoking cessation education as indicated  - Encourage broncho-pulmonary hygiene including cough, deep breathe, Incentive Spirometry  - Assess the need for suctioning and aspirate as needed  - Assess and instruct to report SOB or any respiratory difficulty  - Respiratory Therapy support as indicated  Outcome: Progressing

## 2022-04-02 NOTE — PLAN OF CARE
Problem: MOBILITY - ADULT  Goal: Maintain or return to baseline ADL function  Description: INTERVENTIONS:  -  Assess patient's ability to carry out ADLs; assess patient's baseline for ADL function and identify physical deficits which impact ability to perform ADLs (bathing, care of mouth/teeth, toileting, grooming, dressing, etc )  - Assess/evaluate cause of self-care deficits   - Assess range of motion  - Assess patient's mobility; develop plan if impaired  - Assess patient's need for assistive devices and provide as appropriate  - Encourage maximum independence but intervene and supervise when necessary  - Involve family in performance of ADLs  - Assess for home care needs following discharge   - Consider OT consult to assist with ADL evaluation and planning for discharge  - Provide patient education as appropriate  Outcome: Progressing  Goal: Maintains/Returns to pre admission functional level  Description: INTERVENTIONS:  - Perform BMAT or MOVE assessment daily    - Set and communicate daily mobility goal to care team and patient/family/caregiver  - Collaborate with rehabilitation services on mobility goals if consulted  - Perform Range of Motion  times a day  - Reposition patient every  hours    - Dangle patient  times a day  - Stand patient  times a day  - Ambulate patient  times a day  - Out of bed to chair  times a day   - Out of bed for meals  times a day  - Out of bed for toileting  - Record patient progress and toleration of activity level   Outcome: Progressing     Problem: Potential for Falls  Goal: Patient will remain free of falls  Description: INTERVENTIONS:  - Educate patient/family on patient safety including physical limitations  - Instruct patient to call for assistance with activity   - Consult OT/PT to assist with strengthening/mobility   - Keep Call bell within reach  - Keep bed low and locked with side rails adjusted as appropriate  - Keep care items and personal belongings within reach  - Initiate and maintain comfort rounds  - Make Fall Risk Sign visible to staff  - Offer Toileting every  Hours, in advance of need  - Initiate/Maintain alarm  - Obtain necessary fall risk management equipment:   - Apply yellow socks and bracelet for high fall risk patients  - Consider moving patient to room near nurses station  Outcome: Progressing     Problem: Nutrition/Hydration-ADULT  Goal: Nutrient/Hydration intake appropriate for improving, restoring or maintaining nutritional needs  Description: Monitor and assess patient's nutrition/hydration status for malnutrition  Collaborate with interdisciplinary team and initiate plan and interventions as ordered  Monitor patient's weight and dietary intake as ordered or per policy  Utilize nutrition screening tool and intervene as necessary  Determine patient's food preferences and provide high-protein, high-caloric foods as appropriate       INTERVENTIONS:  - Monitor oral intake, urinary output, labs, and treatment plans  - Assess nutrition and hydration status and recommend course of action  - Evaluate amount of meals eaten  - Assist patient with eating if necessary   - Allow adequate time for meals  - Recommend/ encourage appropriate diets, oral nutritional supplements, and vitamin/mineral supplements  - Order, calculate, and assess calorie counts as needed  - Recommend, monitor, and adjust tube feedings and TPN/PPN based on assessed needs  - Assess need for intravenous fluids  - Provide specific nutrition/hydration education as appropriate  - Include patient/family/caregiver in decisions related to nutrition  Outcome: Progressing     Problem: CARDIOVASCULAR - ADULT  Goal: Maintains optimal cardiac output and hemodynamic stability  Description: INTERVENTIONS:  - Monitor I/O, vital signs and rhythm  - Monitor for S/S and trends of decreased cardiac output  - Administer and titrate ordered vasoactive medications to optimize hemodynamic stability  - Assess quality of pulses, skin color and temperature  - Assess for signs of decreased coronary artery perfusion  - Instruct patient to report change in severity of symptoms  Outcome: Progressing  Goal: Absence of cardiac dysrhythmias or at baseline rhythm  Description: INTERVENTIONS:  - Continuous cardiac monitoring, vital signs, obtain 12 lead EKG if ordered  - Administer antiarrhythmic and heart rate control medications as ordered  - Monitor electrolytes and administer replacement therapy as ordered  Outcome: Progressing     Problem: RESPIRATORY - ADULT  Goal: Achieves optimal ventilation and oxygenation  Description: INTERVENTIONS:  - Assess for changes in respiratory status  - Assess for changes in mentation and behavior  - Position to facilitate oxygenation and minimize respiratory effort  - Oxygen administered by appropriate delivery if ordered  - Initiate smoking cessation education as indicated  - Encourage broncho-pulmonary hygiene including cough, deep breathe, Incentive Spirometry  - Assess the need for suctioning and aspirate as needed  - Assess and instruct to report SOB or any respiratory difficulty  - Respiratory Therapy support as indicated  Outcome: Progressing

## 2022-04-02 NOTE — CODE DOCUMENTATION
Progress Note - Critical Care   Som Holguin Akosua 61 y o  male MRN: [id number]  Unit/Bed#: E4 -01 Encounter: [id number]    Called to [employer]  Patient found agonal respirations with CPR in progress  Patient was identified as having significant history cardiomyopathy with ICD placement and had presented with ICD firings  Patient was given amiodarone x2 and CPR  Found to be in ventricular fibrillation  Intubation by Ofilia Fore with bloody return from the endotracheal tube  Chest x-ray which have been performed earlier in the morning was indicative of bilateral pulmonary edema verses pulmonary hemorrhage  Electrolytes from the morning were reviewed and had mild hyperkalemia  He was 2 rounds of bicarbonate and calcium  Rashid CAMPA from Cardiology arrived to the bedside to provide additional history  Discussed care  Agreed on trial of lidocaine but not effective and did not recover sinus rhythm  Pronounced dead after 15 minutes of attempted code resuscitation  Dr Andre Singh attending notified of unsuccessful resuscitation  He has spoken with the family      He was shocked multiple times by intrinsic ICD without recovery of sinus rhythm      Leti Castellano MD

## 2022-04-02 NOTE — DEATH NOTE
INPATIENT DEATH NOTE  Tiffany South 61 y o  male MRN: [id number]  Unit/Bed#: E4 -01 Encounter: [id number]    [facility] Pronouncement of Death  Patient: Yani Jarquin 61 y o  male   MRN: [id number]  PCP: NICOLE Rossi  Unit/Bed#: E4 -01 Encounter: [id number]    Date of Admission:  3/30/2022  Date of Death: 04/02/22    Time of Death:  10:55 am    Code Status at Time of Death: Level 1 - Full Code    Patient on Hospice?:  No    Family Notification?:  Yes    Autopsy Desired?:  No    Suspected Cause of Death:  Cardiac arrest    Hospital Problem List:     Principal Problem:    Acute on chronic combined systolic and diastolic congestive heart failure ([facility] [address] )  Active Problems:    Coronary artery disease involving native coronary artery of native heart without angina pectoris    S/P AVR (aortic valve replacement)    Biventricular ICD (implantable cardioverter-defibrillator) in place    Substance use    Ventricular fibrillation ([address] )    Hyponatremia    Medically noncompliant    Chronic respiratory failure with hypoxia ([address] )    Lactic acidosis      Pronouncement of Death: I was contacted by nursing staff to evaluate patient found without vital signs  Patient is identified visually and identification confirmed with identification bracelet  The patient is laying in bed with all lines and tubes intact  The patient is examined personally and no heart sounds heard nor pulse detected  No breath sounds after 2 minutes auscultation  Pupils are fixed   No corneal reflex present  The patient is pronounced dead on this date and time        Debi Tellez DO

## 2022-04-02 NOTE — PROGRESS NOTES
Family declined patient's medications  Returned to pharmacy for disposal   O2 tank left in patient's room   Company contacted and tank left in [employer] office for

## 2022-04-02 NOTE — PROCEDURES
Intubation    Date/Time: 4/2/2022 10:56 AM  Performed by: NICOLE Escalante  Authorized by: NICOLE Escalante     Patient location:  Bedside  Other Assisting Provider: No    Consent:     Consent obtained:  Emergent situation  Universal protocol:     Relevant documents present and verified: yes      Test results available and properly labeled: yes      Radiology Images displayed and confirmed  If images not available, report reviewed: yes      Required blood products, implants, devices, and special equipment available: yes      Site/side marked: yes      Immediately prior to procedure, a time out was called: yes      Patient identity confirmed:  Hospital-assigned identification number and arm band  Pre-procedure details:     Patient status:  Unresponsive    Mallampati score:  2    Pretreatment medications:  None    Paralytics:  None  Indications:     Indications for intubation: respiratory failure    Procedure details:     Preoxygenation:  Bag valve mask    CPR in progress: yes      Intubation method:  Oral    Oral intubation technique:  Direct    Laryngoscope blade: Mac 3    Tube size (mm):  8 0    Tube type:  Cuffed    Number of attempts:  1    Ventilation between attempts: no      Cricoid pressure: no      Tube visualized through cords: no    Placement assessment:     Tube secured with:  ETT penn    Breath sounds:  Equal and absent over the epigastrium    Placement verification: chest rise, condensation, equal breath sounds, ETCO2 detector and tube exhalation      CXR findings:  ETT in proper place  Post-procedure details:     Patient tolerance of procedure:   Tolerated well, no immediate complications

## 2022-04-02 NOTE — DISCHARGE SUMMARY
[facility]  Discharge- [address] 1965, 61 y o  male MRN: [id number]  Unit/Bed#: E4 -01 Encounter: [id number]  Primary Care Provider: NICOLE Hoyos   Date and time admitted to hospital: 3/30/2022 11:52 AM    Admitting Provider:  Tanmay Hensley DO  Discharge Provider:  Talbert Riedel, DO  Admission Date: 3/30/2022       Discharge Date: 22   LOS: 3  Primary Care Physician at Discharge: Lizz Elena, [address]:  [address] is a 61 y o  male who presented with shortness of breath and difficulty breathing  The patient has a history of medical noncompliance, previous cocaine use as well as mechanical aortic valve replacement  He was admitted to the general medical floor, he was found to have acute on chronic combined systolic and diastolic heart failure  He was placed on intravenous diuretics and placed on a in furosemide infusion to aid with diuresis  Throughout his hospitalization he developed ventricular tachycardia, he was started on amiodarone drip and his arrhythmia burden did improve  The patient was scheduled for chest x-ray to determine volume status  He subsequently went into cardiac arrest due to overwhelming burden of disease related to heart failure, as well as 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)  Assessment & Plan  Wt Readings from Last 3 Encounters:   22 60 4 kg (133 lb 2 5 oz)   22 62 9 kg (138 lb 9 6 oz)   22 60 kg (132 lb 4 4 oz)     Patient is admitted for CHF exacerbation  BNP on admission is   Lab Results   Component Value Date    NTBNP 7,674 (H) 03/30/2022    NTBNP 12,025 (H) 03/15/2022    NTBNP 31,172 (H) 01/28/2022       CHF type is combined systolic and diastolic  Last Echocardiogram shows an EF of 85%, Diastolic HF  Inderterminate  1/2022  Repeat chest x-ray with persistent effusion, fibrotic lung change        Lactic acidosis  Assessment & Plan  Patient with lactic acidosis felt to be likely secondary to poor perfusion in the setting of multiple episodes of ventricular tachycardia  No evidence of acute infection  CT of the abdomen and pelvis with mesenteric edema without any other acute pathology    Chronic respiratory failure with hypoxia [facility])  Assessment & Plan  History of chronic respiratory failure with hypoxia after having COVID-19  Initially admitted in January of 2022 in respiratory distress, was intubated, subsequently left against medical advice  Discharged on 2 L of oxygen as outpatient pulmonary follow-up  He is chronically on 2-4 L of oxygen   Was recently seen by Pulmonary with CT of the chest demonstrating fibrotic lung disease    Medically noncompliant  Assessment & Plan  History of medication noncompliance- likely etiology of heart failure    Hyponatremia  Assessment & Plan  Likely in the setting of volume overload  Hyponatremia workup underway  Continue fluid restriction, placed on 1000 mL  Continue diuresis  Consider Samsca/Tolvaptan if worsens    Recent Labs     03/31/22  0506 04/01/22  0503 04/02/22  0514   SODIUM 126* 130* 129*         Ventricular fibrillation (HCC)  Assessment & Plan  Patient with intermittent episodes of ventricular tachycardia, started on amiodarone infusion, additionally given amiodarone bolus    Less persistent ventricular fibrillation    Substance use  Assessment & Plan  History of THC and cocaine use in the past - denies currently    Biventricular ICD (implantable cardioverter-defibrillator) in place  Assessment & Plan  History of ventricular fibrillation and ventricular tachycardia  He is status post dual chamber biventricular [employer] ICD in place  Maintained on amiodarone  Recent lead extraction  and addition of a new atrial lead  Also had  generator change due to low battery life in March of 2022    S/P AVR (aortic valve replacement)  Assessment & Plan  History of rheumatic heart disease and is status post mechanical aortic valve replacement in the 1980s  Patient on chronic anticoagulation with Coumadin  INR is therapeutic  Goal INR of 2-3  Slightly above goal likely in the setting of amiodarone, will hold warfarin again  Recent Labs     03/31/22  0506 04/01/22  0503 04/02/22  0548   INR 2 28* 3 33* 3 62*         Coronary artery disease involving native coronary artery of native heart without angina pectoris  Assessment & Plan  Patient has a history of coronary artery disease with previous PCI and bare metal stent placement in LAD and circumflex in 2014  Continue medical therapy with aspirin, beta-blocker, statin  Patient has a prior history of cocaine use, UDS negative for any illicit substances      CONSULTING PROVIDERS   IP CONSULT TO CARDIOLOGY  IP CONSULT TO NUTRITION SERVICES  IP CONSULT TO CARDIOLOGY    PROCEDURES PERFORMED  * No surgery found *    RADIOLOGY RESULTS  XR chest portable    Impression: Cardiomegaly, CHF and pulmonary edema  Small right pleural effusion  Workstation performed: [id number]     XR chest 1 view portable      Impression: Vascular congestion  Workstation performed: [id number]     XR chest portable      Impression: 1  Bilateral airspace opacities suspicious for multifocal infection versus pulmonary edema  2   Enlargement of the cardiac silhouette  Workstation performed: [id number]       XR chest 2 views      Impression: Enlargement of cardiac silhouette with bilateral pulmonary airspace disease and pleural effusions    Pattern most suggestive for CHF/pulmonary edema superimposed upon more chronic pulmonary changes  Workstation performed: [id number]     CT chest wo contrast    Impression: Worsening lung parenchymal groundglass changes now involving most of the lung parenchyma with consolidation like changes in upper lobes anteriorly  Large right and moderate left pleural effusion with fluid in the major fissure on the right is new  Workstation performed: [id number]       VAS lower limb arterial duplex, complete bilateral  CONCLUSION: Impression: RIGHT LOWER LIMB: Diffuse disease noted throughout the femoral-popliteal arteries without significant focal stenosis  Findings suggests tibioperoneal disease  Ankle/Brachial index: 2 17, supra normal, consistent with poorly compressible vessels  PVR/ PPG tracings are dampened  Metatarsal pressure of 53 mm Hg Great toe pressure of 36 mm Hg, above healing threshold for a non-diabetic  LEFT LOWER LIMB: Findings suggests a short segment occlusion versus high grade stenosis of the common femoral artery with diffuse disease noted throughout the femoral-popliteal arteries  Findings suggests tibioperoneal disease  Ankle/Brachial index: 2 17, supra normal, consistent with poorly compressible vessels  PVR tracings at the ankle are severely dampened  PVR tracings at the metatarsal level and PPG waveform of the great toe are near flat lined/ flat lined, therefore, metatarsal and great toe pressure are unobtainable suggesting poor distal perfusion  SIGNATURE: Electronically Signed by: Burgess Lou on 2022-03-16 06:44:32 PM    CT abdomen pelvis w contrast    Impression: No acute intra-abdominal pathology  Diffuse groundglass opacities and moderate-sized bilateral pleural effusions, likely on the basis of pulmonary edema  Cardiomegaly with findings suggestive of congestive heart failure  Diffuse mesenteric edema and small pelvic ascites   Workstation performed: [id number]       Result Date: 3/30/2022  Narrative: Diagnosis:  COVID-19 Requesting provider:  Daya Quesada Results: Patient gave good effort and cooperation  The testing met ATS Standards for Acceptability and Repeatability  Oxygen saturation on 4 L 91%  Hemoglobin 7 9  Spirometry: FEV1/FVC Ratio is 88%  FEV1 is 0 96 L, 29% predicted  FVC is 1 10 L, 26% predicted  No significant improvement after administration of bronchodilator Flow volume loop: Severely restricted appearance Lung volumes: Total lung capacity 33% predicted  Thoracic gas volume 33% predicted Diffusing capacity: Corrected DLCO 30% predicted Airway resistance: Airway resistance is normal     Impression: 1  Severe restrictive ventilatory flow limitation without significant improvement after the administration of bronchodilator 2  Restricted appearing flow volume loop 3  Severe to leave restricted total lung capacity 4  Severe impairment in corrected diffusing capacity 5  Anemia with hemoglobin 7 9 Juvenal Sampson MD Portions of the record may have been created with voice recognition software  Occasional wrong word or "sound a like" substitutions may have occurred due to the inherent limitations of voice recognition software  Read the chart carefully and recognize, using context, where substitutions have occurred  Echo complete w/ contrast if indicated    Result Date: 3/31/2022  Narrative: Honey Allan  Left Ventricle: Left ventricular cavity size is moderately dilated  Wall thickness is normal  The left ventricular ejection fraction is 17% by biplane measurement  Systolic function is severely reduced  There is moderate global hypokinesis with regional variation  The apex is the most severely hypokenetic but is not akenetic    Right Ventricle: Right ventricular cavity size is moderately dilated  Systolic function is moderately reduced  Abnormal tricuspid annular plane systolic excursion (TAPSE) < 1 7 cm    Left Atrium: The atrium is severely dilated at 62 ml/m2    Right Atrium: The atrium is moderately dilated     Aortic Valve: There is a tilting disc mechanical valve  There is no evidence of stenosis  The aortic valve peak velocity is 2 13 m/s  The aortic valve mean gradient is 8 mmHg  The DVI is 0 25  The aortic valve area is 0 90 cm2    Mitral Valve: There is mild regurgitation    Tricuspid Valve: There is moderate regurgitation    Aorta: The aortic root is mildly dilated  The ascending aorta is mildly dilated    Pericardium: There is a trivial pericardial effusion  There is no echocardiographic evidence of tamponade    Pulmonary Artery: The estimated pulmonary artery systolic pressure is 54 9 mmHg  The pulmonary artery systolic pressure is severely increased         LABS  Results from last 7 days   Lab Units 04/02/22  0548 04/01/22  0503 03/31/22  0506 03/30/22  1216   WBC Thousand/uL 15 37* 12 54* 10 55* 10 22*   HEMOGLOBIN g/dL 11 0* 10 2* 10 4* 11 1*   HEMATOCRIT % 34 4* 32 0* 33 2* 34 9*   MCV fL 81* 78* 79* 78*   PLATELETS Thousands/uL 276 278 292 319   INR  3 62* 3 33* 2 28* 2 49*     Results from last 7 days   Lab Units 04/02/22  0514 04/01/22  0503 03/31/22  1218 03/31/22  0506 03/30/22  1216   SODIUM mmol/L 129* 130*  --  126* 126*   POTASSIUM mmol/L 5 4* 3 8 5 6* 4 1 4 2   CHLORIDE mmol/L 86* 87*  --  90* 88*   CO2 mmol/L 32 30  --  29 29   BUN mg/dL 19 14  --  11 9   CREATININE mg/dL 1 41* 1 22  --  0 97 1 11   CALCIUM mg/dL 8 6 8 6  --  8 6 8 9   ALBUMIN g/dL  --   --   --   --  3 3*   TOTAL BILIRUBIN mg/dL  --   --   --   --  1 39*   ALK PHOS U/L  --   --   --   --  117*   ALT U/L  --   --   --   --  18   AST U/L  --   --   --   --  24   EGFR ml/min/1 73sq m 54 65  --  86 73   GLUCOSE RANDOM mg/dL 93 104  --  119 170*     Results from last 7 days   Lab Units 03/30/22  1712 03/30/22  1419 03/30/22  1216   HS TNI 0HR ng/L  --   --  34   HS TNI 2HR ng/L  --  32  --    HS TNI 4HR ng/L 40  --   --      Results from last 7 days   Lab Units 03/30/22  1216   NT-PRO BNP pg/mL 7,674*      Results from last 7 days   Lab Units 22  1216   D-DIMER QUANTITATIVE ug/ml FEU 2 64*             Results from last 7 days   Lab Units 22  1216   TSH 3RD GENERATON uIU/mL 4 722*     Results from last 7 days   Lab Units 22  1031 22  1510 22  1218 22  1419 22  1216   LACTIC ACID mmol/L 2 7* 4 3* 4 3* 3 2* 4 3*   PROCALCITONIN ng/ml  --   --   --   --  <0 05           Cultures:         Invalid input(s): URIBILINOGEN              PHYSICAL EXAM:  Vitals:   Blood Pressure: 126/86 (22 07)  Pulse: 83 (22)  Temperature: (!) 97 1 °F (36 2 °C) (22)  Temp Source: Temporal (22)  Respirations: 18 (22)  Height: 5' 6" (167 6 cm) (22 1208)  Weight - Scale: 60 4 kg (133 lb 2 5 oz) (22 0600)  SpO2: 98 % (22)      At the time of death the patient's pupils were fixed and dilated, heart tones could not be auscultated, there were signs of rigor mortis, and the patient was pronounced dead  Discharge Disposition:      Test Results Pending at Discharge:           Medications   · Summary of Medication Adjustments made as a result of this hospitalization: None  · Medication Dosing Tapers - Please refer to Discharge Medication List for details on any medication dosing tapers (if applicable to patient)  · Discharge Medication List: See after visit summary for reconciled discharge medications  Diet restrictions:         Diet Orders   (From admission, onward)             Start     Ordered    22 08  Diet Cardiovascular; Sodium 2 GM; Fluid Restriction 1000 ML  Diet effective now        References:    Nutrtion Support Algorithm Enteral vs  Parenteral   Question Answer Comment   Diet Type Cardiovascular    Cardiac Sodium 2 GM    Other Restriction(s): Fluid Restriction 1000 ML    RD to adjust diet per protocol?  Yes        22              Activity restrictions: No strenuous activity  Discharge Condition: good    Outpatient Follow-Up and Discharge Instructions  See after visit summary section titled Discharge Instructions for information provided to patient and family  Code Status: Level 1 - Full Code  Discharge Statement   I spent 35 minutes discharging the patient  This time was spent on the day of discharge  Greater than 50% of total time was spent with the patient and / or family counseling and / or coordination of care  ** Please Note: This note was completed in part utilizing 6[employer] Direct Software  Grammatical errors, random word insertions, spelling mistakes, and incomplete sentences may be an occasional consequence of this system secondary to software limitations, ambient noise, and hardware issues  If you have any questions or concerns about the content, text, or information contained within the body of this dictation, please contact the provider for clarification  **

## 2022-04-02 NOTE — ASSESSMENT & PLAN NOTE
History of rheumatic heart disease and is status post mechanical aortic valve replacement in the 1980s    Patient on chronic anticoagulation with Coumadin  INR is therapeutic  Goal INR of 2-3  Slightly above goal likely in the setting of amiodarone, will hold warfarin again  Recent Labs     03/31/22  0506 04/01/22  0503 04/02/22  0548   INR 2 28* 3 33* 3 62*

## 2022-04-02 NOTE — ASSESSMENT & PLAN NOTE
Wt Readings from Last 3 Encounters:   04/02/22 60 4 kg (133 lb 2 5 oz)   03/24/22 62 9 kg (138 lb 9 6 oz)   03/22/22 60 kg (132 lb 4 4 oz)     Patient is admitted for CHF exacerbation   BNP on admission is   Lab Results   Component Value Date    NTBNP 7,674 (H) 03/30/2022    NTBNP 12,025 (H) 03/15/2022    NTBNP 31,172 (H) 01/28/2022       CHF type is combined systolic and diastolic  Last Echocardiogram shows an EF of 72%, Diastolic HF  Inderterminate  1/2022  Repeat chest x-ray with persistent effusion, fibrotic lung change

## 2022-04-02 NOTE — ASSESSMENT & PLAN NOTE
Likely in the setting of volume overload  Hyponatremia workup underway  Continue fluid restriction, placed on 1000 mL  Continue diuresis  Consider Samsca/Tolvaptan if worsens    Recent Labs     03/31/22  0506 04/01/22  0503 04/02/22  0514   SODIUM 126* 130* 129*

## 2022-04-07 RX ORDER — LIDOCAINE HYDROCHLORIDE 20 MG/ML
INJECTION, SOLUTION EPIDURAL; INFILTRATION; INTRACAUDAL; PERINEURAL CODE/TRAUMA/SEDATION MEDICATION
Status: COMPLETED | OUTPATIENT
Start: 2022-01-01 | End: 2022-01-01

## 2022-04-07 RX ORDER — AMIODARONE HYDROCHLORIDE 50 MG/ML
INJECTION, SOLUTION INTRAVENOUS CODE/TRAUMA/SEDATION MEDICATION
Status: COMPLETED | OUTPATIENT
Start: 2022-01-01 | End: 2022-04-07

## 2022-04-07 RX ORDER — CALCIUM CHLORIDE 100 MG/ML
SYRINGE (ML) INTRAVENOUS CODE/TRAUMA/SEDATION MEDICATION
Status: COMPLETED | OUTPATIENT
Start: 2022-01-01 | End: 2022-01-01

## 2022-04-07 RX ADMIN — AMIODARONE HYDROCHLORIDE 150 MG: 50 INJECTION, SOLUTION INTRAVENOUS at 10:45

## 2022-12-05 NOTE — PROGRESS NOTES
10/24/18, tc to pt, reports he missed a dose this week  He felt he was taking to many medications this week  instructed to call office before skipping dose  Pt understood same  Will take 4 mg x 2 days then resume previous dose, inr due 2 weeks, areli    11/8/18, above note copied today       11/8/18, tc to pt, continue current dose, inr due 3 weeks, areli no known allergies

## 2024-03-03 NOTE — ASSESSMENT & PLAN NOTE
Continue medication compliance with cardiac medications  Continue follow-up with pulmonology  Reviewed importance of making it to speciality appointments  IMPROVE-DD Application Not Available

## 2024-08-28 NOTE — ASSESSMENT & PLAN NOTE
UDS positive on previous admission for cocaine Unable to reach Patient after 2 attempts. Left SMS text message and 2 voice messages at phone number given (# [phone] ).  Advised to call back if we can be of assistance. Advised if condition becomes life threatening should seek immediate medical assistance by calling 911 or going to the nearest Emergency Dept for an evaluation.    Reason for Disposition   Message left on unidentified voice mail. Phone number verified.   Second attempt to contact caller AND no contact made. Phone number verified.    Protocols used: No Contact or Duplicate Contact Call-A-OH

## (undated) DEVICE — PLASMABLADE PS200-040 4.0: Brand: PLASMABLADE™

## (undated) DEVICE — MICROPUNCTURE INTRODUCER SET SILHOUETTE TRANSITIONLESS WITH STAINLESS STEEL WIRE GUIDE: Brand: MICROPUNCTURE

## (undated) DEVICE — INTRO SHEATH PEEL AWAY 7FR

## (undated) DEVICE — RADIFOCUS GLIDEWIRE: Brand: GLIDEWIRE

## (undated) DEVICE — SAFE SHEATH INTRODUCER 7FR LONG

## (undated) DEVICE — PINNACLE INTRODUCER SHEATH: Brand: PINNACLE